# Patient Record
Sex: MALE | Race: WHITE | NOT HISPANIC OR LATINO | Employment: OTHER | URBAN - METROPOLITAN AREA
[De-identification: names, ages, dates, MRNs, and addresses within clinical notes are randomized per-mention and may not be internally consistent; named-entity substitution may affect disease eponyms.]

---

## 2018-07-06 ENCOUNTER — ANTICOAG VISIT (OUTPATIENT)
Dept: CARDIOLOGY CLINIC | Facility: CLINIC | Age: 83
End: 2018-07-06

## 2018-07-06 LAB — INR PPP: 4.5 (ref 0.86–1.17)

## 2018-07-18 LAB — INR PPP: 3.3 (ref 0.86–1.17)

## 2018-07-19 ENCOUNTER — ANTICOAG VISIT (OUTPATIENT)
Dept: CARDIOLOGY CLINIC | Facility: CLINIC | Age: 83
End: 2018-07-19

## 2018-08-01 ENCOUNTER — ANTICOAG VISIT (OUTPATIENT)
Dept: CARDIOLOGY CLINIC | Facility: CLINIC | Age: 83
End: 2018-08-01

## 2018-08-01 LAB — INR PPP: 3.3 (ref 0.86–1.17)

## 2018-08-03 DIAGNOSIS — I10 ESSENTIAL HYPERTENSION: ICD-10-CM

## 2018-08-03 DIAGNOSIS — I48.0 PAF (PAROXYSMAL ATRIAL FIBRILLATION) (HCC): Primary | ICD-10-CM

## 2018-08-03 RX ORDER — WARFARIN SODIUM 2 MG/1
TABLET ORAL
COMMUNITY
Start: 2017-12-19 | End: 2018-08-03 | Stop reason: SDUPTHER

## 2018-08-03 RX ORDER — LISINOPRIL 5 MG/1
TABLET ORAL
COMMUNITY
Start: 2018-04-10 | End: 2018-08-03 | Stop reason: SDUPTHER

## 2018-08-06 RX ORDER — LISINOPRIL 5 MG/1
5 TABLET ORAL DAILY
Qty: 90 TABLET | Refills: 3 | Status: SHIPPED | OUTPATIENT
Start: 2018-08-06 | End: 2018-12-10 | Stop reason: SDUPTHER

## 2018-08-06 RX ORDER — WARFARIN SODIUM 2 MG/1
TABLET ORAL
Qty: 105 TABLET | Refills: 3 | Status: SHIPPED | OUTPATIENT
Start: 2018-08-06 | End: 2018-12-10 | Stop reason: SDUPTHER

## 2018-08-16 ENCOUNTER — ANTICOAG VISIT (OUTPATIENT)
Dept: CARDIOLOGY CLINIC | Facility: CLINIC | Age: 83
End: 2018-08-16

## 2018-08-16 LAB — INR PPP: 2.7 (ref 0.86–1.17)

## 2018-09-06 ENCOUNTER — ANTICOAG VISIT (OUTPATIENT)
Dept: CARDIOLOGY CLINIC | Facility: CLINIC | Age: 83
End: 2018-09-06

## 2018-09-06 DIAGNOSIS — E87.8 ELECTROLYTE IMBALANCE: Primary | ICD-10-CM

## 2018-09-06 DIAGNOSIS — E78.5 HYPERLIPIDEMIA, UNSPECIFIED HYPERLIPIDEMIA TYPE: ICD-10-CM

## 2018-09-06 DIAGNOSIS — K92.2 GASTROINTESTINAL HEMORRHAGE, UNSPECIFIED GASTROINTESTINAL HEMORRHAGE TYPE: ICD-10-CM

## 2018-09-06 LAB — INR PPP: 2.4 (ref 0.86–1.17)

## 2018-10-03 ENCOUNTER — ANTICOAG VISIT (OUTPATIENT)
Dept: CARDIOLOGY CLINIC | Facility: CLINIC | Age: 83
End: 2018-10-03

## 2018-10-03 LAB
INR PPP: 2 (ref 0.86–1.17)
INR PPP: 2 (ref 0.86–1.17)

## 2018-10-04 ENCOUNTER — ANTICOAG VISIT (OUTPATIENT)
Dept: CARDIOLOGY CLINIC | Facility: CLINIC | Age: 83
End: 2018-10-04

## 2018-10-09 ENCOUNTER — OFFICE VISIT (OUTPATIENT)
Dept: CARDIOLOGY CLINIC | Facility: CLINIC | Age: 83
End: 2018-10-09
Payer: COMMERCIAL

## 2018-10-09 ENCOUNTER — IN-CLINIC DEVICE VISIT (OUTPATIENT)
Dept: CARDIOLOGY CLINIC | Facility: CLINIC | Age: 83
End: 2018-10-09
Payer: COMMERCIAL

## 2018-10-09 VITALS
BODY MASS INDEX: 27.85 KG/M2 | DIASTOLIC BLOOD PRESSURE: 60 MMHG | WEIGHT: 157.2 LBS | OXYGEN SATURATION: 92 % | HEIGHT: 63 IN | HEART RATE: 61 BPM | SYSTOLIC BLOOD PRESSURE: 100 MMHG

## 2018-10-09 DIAGNOSIS — I10 ESSENTIAL HYPERTENSION: ICD-10-CM

## 2018-10-09 DIAGNOSIS — I25.2 OLD MI (MYOCARDIAL INFARCTION): ICD-10-CM

## 2018-10-09 DIAGNOSIS — I47.2 VENTRICULAR TACHYCARDIA (HCC): ICD-10-CM

## 2018-10-09 DIAGNOSIS — Z98.61 STATUS POST PERCUTANEOUS TRANSLUMINAL CORONARY ANGIOPLASTY: ICD-10-CM

## 2018-10-09 DIAGNOSIS — Z95.810 PRESENCE OF AUTOMATIC CARDIOVERTER/DEFIBRILLATOR (AICD): ICD-10-CM

## 2018-10-09 DIAGNOSIS — I25.5 ISCHEMIC CARDIOMYOPATHY: ICD-10-CM

## 2018-10-09 DIAGNOSIS — N18.30 STAGE 3 CHRONIC KIDNEY DISEASE (HCC): ICD-10-CM

## 2018-10-09 DIAGNOSIS — E78.2 MIXED HYPERLIPIDEMIA: ICD-10-CM

## 2018-10-09 DIAGNOSIS — Z95.810 CARDIAC DEFIBRILLATOR IN SITU: ICD-10-CM

## 2018-10-09 DIAGNOSIS — I48.0 PAROXYSMAL ATRIAL FIBRILLATION (HCC): ICD-10-CM

## 2018-10-09 DIAGNOSIS — I63.40 CEREBROVASCULAR ACCIDENT (CVA) DUE TO EMBOLISM OF CEREBRAL ARTERY (HCC): ICD-10-CM

## 2018-10-09 DIAGNOSIS — I25.10 CORONARY ARTERY DISEASE INVOLVING NATIVE HEART WITHOUT ANGINA PECTORIS, UNSPECIFIED VESSEL OR LESION TYPE: Primary | ICD-10-CM

## 2018-10-09 DIAGNOSIS — I47.2 VENTRICULAR TACHYCARDIA (HCC): Primary | ICD-10-CM

## 2018-10-09 PROCEDURE — 99214 OFFICE O/P EST MOD 30 MIN: CPT | Performed by: INTERNAL MEDICINE

## 2018-10-09 PROCEDURE — 93000 ELECTROCARDIOGRAM COMPLETE: CPT | Performed by: INTERNAL MEDICINE

## 2018-10-09 PROCEDURE — 93283 PRGRMG EVAL IMPLANTABLE DFB: CPT | Performed by: INTERNAL MEDICINE

## 2018-10-09 RX ORDER — CARVEDILOL 25 MG/1
25 TABLET ORAL
COMMUNITY
Start: 2016-04-17 | End: 2018-12-10 | Stop reason: SDUPTHER

## 2018-10-09 RX ORDER — SPIRONOLACTONE 25 MG/1
12.5 TABLET ORAL
COMMUNITY
Start: 2016-04-17 | End: 2018-12-10 | Stop reason: SDUPTHER

## 2018-10-09 RX ORDER — FERROUS SULFATE 325(65) MG
1 TABLET ORAL
COMMUNITY
End: 2019-12-09 | Stop reason: ALTCHOICE

## 2018-10-09 RX ORDER — ATORVASTATIN CALCIUM 40 MG/1
40 TABLET, FILM COATED ORAL
COMMUNITY
Start: 2016-04-17 | End: 2018-12-10 | Stop reason: SDUPTHER

## 2018-10-09 NOTE — PROGRESS NOTES
Results for orders placed or performed in visit on 10/09/18   Cardiac EP device report    Narrative    MDT DUAL ICD  DEVICE INTERROGATED IN THE [facility] HEART OFFICE BY DEVICE COMPANY REP  BATTERY VOLTAGE ADEQUATE (2 61 V JEAN 2 63 V HAS NOT TRIGERED JEAN YET~ WILL DO MONTHLY REMOTES)  ALL LEAD PARAMETERS WITHIN NORMAL LIMITS   1 VT EPISODE W/ EGRAM SHOWING VT @ 214 BPM W/ ATP X 2 UNSUCCESSFUL, SHOCK X 1 SUCCESSFUL FOR VT   2 VT-NS EPISODES W/ EGRAMS SHOWING THE VT EPISODE AS PER ABOVE   NO OTHER SIGNIFICANT HIGH RATE EPISODES  NO PROGRAMMING CHANGES MADE TO DEVICE PARAMETERS  PT TAKES WARFARIN, LISINOPRIL  NO ECHO IN EPIC  APPROPRIATELY FUNCTIONING ICD    [address]

## 2018-10-09 NOTE — PROGRESS NOTES
Cardiology Follow Up    Kelli Ozuna  10/31/1932  [id number]  [address] [facility] [facility]  [address] [place] [id number] [phone]    1  Coronary artery disease involving native heart without angina pectoris, unspecified vessel or lesion type  POCT ECG    NM myocardial perfusion spect (stress and/or rest)    Echo complete with contrast if indicated   2  Ischemic cardiomyopathy  NM myocardial perfusion spect (stress and/or rest)    Echo complete with contrast if indicated   3  Ventricular tachycardia (HCC)  NM myocardial perfusion spect (stress and/or rest)    Echo complete with contrast if indicated   4  Paroxysmal atrial fibrillation (HCC)     5  Cerebrovascular accident (CVA) due to embolism of cerebral artery (HCC)     6  Cardiac defibrillator in situ     7  Status post percutaneous transluminal coronary angioplasty     8  Stage 3 chronic kidney disease ([facility] )     9  Essential hypertension     10  Mixed hyperlipidemia     11  Old MI (myocardial infarction)  NM myocardial perfusion spect (stress and/or rest)    Echo complete with contrast if indicated       Patient was 1st seen May 23, 2016  He had moved from [place] and was seeking to establish cardiology care  In 1989, he had a myocardial infarction treated by PTCA resulting in an ischemic cardiomyopathy with an ejection fraction of 35%  He has a Medtronic ICD which was placed on May 9th, 2011 in [place]  He is on chronic oral anticoagulation  He has a history of paroxysmal atrial fibrillation with a TIA/CVA in the past   Prior history includes myocardial infarction, ventricular tachycardia, TIA/CVA, GI bleed, hypertension and hyperlipidemia     05/27/2016 echocardiogram:  Severe LV systolic dysfunction, EF 96%  Distal anterior apical transmural myocardial infarction with akinesis to paradoxical motion  Grade 1 diastolic dysfunction    Mildly elevated filling pressures  Mild left ventricular enlargement  Mild concentric LVH  Biatrial enlargement  10/04/2017 labs:  Creatinine 1 23, GFR 54, cholesterol 122, triglycerides 77, HDL 51, non HDL cholesterol 71, LDL calculated 56 and direct LDL 68  Interval History:   Patient returns  He denies cardiac symptoms  He has no chest pain, shortness of breath, palpitations, dizziness or lightheadedness, near-syncope/syncope  He brought his blood pressure chart from home along  His blood pressures range from 90 systolic to 849 systolic  One or 2 blood pressures were in the 80s  Patient states that he is completely asymptomatic with these blood pressures  Since last visit he had 1 episode of sustained ventricular tachycardia at a rate of 214 beats per minute  The rhythm was terminated by the defibrillator with a shock  He has no recollection of the event  Maybe he was sleeping and passed out  Then when he awoke he did not realize anything had happened  Patient Active Problem List   Diagnosis    Cardiac defibrillator in situ    Chronic kidney disease    [facility]olic stroke ([facility] [address] )    Hyperlipidemia    Old myocardial infarction    Status post percutaneous transluminal coronary angioplasty    Ventricular tachycardia (HCC)    Ischemic cardiomyopathy    Paroxysmal atrial fibrillation ([facility] )    Essential hypertension     No past medical history on file  Social History     Social History    Marital status: /Civil Union     Spouse name: N/A    Number of children: N/A    Years of education: N/A     Occupational History    Not on file       Social History Main Topics    Smoking status: Never Smoker    Smokeless tobacco: Not on file    Alcohol use Not on file    Drug use: Unknown    Sexual activity: Not on file     Other Topics Concern    Not on file     Social History Narrative    No narrative on file      Family History   Problem Relation Age of Onset    Coronary artery disease Mother     Heart attack Father         MI    Stroke Sister      Past Surgical History:   Procedure Laterality Date    CARDIAC PACEMAKER PLACEMENT  05/09/2011    Medtronic dual chamber ICD implant    CORONARY ANGIOPLASTY  1989       Current Outpatient Prescriptions:     aspirin 81 MG tablet, Take 81 mg by mouth, Disp: , Rfl:     atorvastatin (LIPITOR) 40 mg tablet, Take 40 mg by mouth, Disp: , Rfl:     carvedilol (COREG) 25 mg tablet, Take 25 mg by mouth, Disp: , Rfl:     Cholecalciferol 2000 units CAPS, Take 1 capsule by mouth, Disp: , Rfl:     ferrous sulfate 325 (65 Fe) mg tablet, Take 1 capsule by mouth, Disp: , Rfl:     lisinopril (ZESTRIL) 5 mg tablet, Take 1 tablet (5 mg total) by mouth daily, Disp: 90 tablet, Rfl: 3    spironolactone (ALDACTONE) 25 mg tablet, Take 12 5 mg by mouth, Disp: , Rfl:     warfarin (COUMADIN) 2 mg tablet, Take 1 1/2 tablets 3 times per week & 1 tablet 4 times per week , Disp: 105 tablet, Rfl: 3  No Known Allergies    Results for orders placed or performed in visit on 10/09/18   POCT ECG    Narrative    Normal sinus rhythm at a rate 81 beats per minute  Old anterior septal myocardial infarction with secondary ST T wave abnormalities  10/03/2018 lab testing at [address] [place] [address]  CBC:  Hemoglobin 11 6, hematocrit 33 3, WBCs 5 8, platelets a 084 with normal red cell indices  Chemistry:  Glucose 89, BUN 18, creatinine 1 1, electrolytes normal, calcium 8 6, alkaline phosphatase 35, albumin 3 2, total protein 6 0, bilirubin 0 6, AST 15, ALT 18, GFR 61  Lipid profile: Total cholesterol 135, triglycerides 89, HDL 54, non HDL cholesterol 81, calculated LDL 63  Review of Systems:  Review of Systems   Constitutional: Negative  HENT: Negative  Eyes: Negative  Respiratory: Negative for chest tightness and shortness of breath  Cardiovascular: Negative for chest pain, palpitations and leg swelling  Gastrointestinal: Negative      Endocrine: Negative  Musculoskeletal: Negative  Skin: Negative  Allergic/Immunologic: Negative  Neurological: Negative  Hematological: Negative  Psychiatric/Behavioral: Negative  Physical Exam:  /60   Pulse 61   Ht 5' 3" (1 6 m)   Wt 71 3 kg (157 lb 3 2 oz)   SpO2 92%   BMI 27 85 kg/m²   Physical Exam   Constitutional: He is oriented to person, place, and time  He appears well-developed and well-nourished  HENT:   Head: Normocephalic and atraumatic  Neck: Normal range of motion  Neck supple  No JVD present  No tracheal deviation present  Cardiovascular: Normal rate, regular rhythm, normal heart sounds and intact distal pulses  Pulmonary/Chest: Effort normal and breath sounds normal  No respiratory distress  He has no wheezes  He has no rales  Abdominal: Soft  Bowel sounds are normal    Musculoskeletal: He exhibits no edema  Neurological: He is alert and oriented to person, place, and time  Skin: Skin is warm and dry  Psychiatric: He has a normal mood and affect  His behavior is normal        Discussion/Summary:  1  Sustained ventricular tachycardia requiring a defibrillator shock to terminate  Rate 214 beats per minute  2   Ischemic cardiomyopathy, old anterior septal myocardial infarction  3  Paroxysmal atrial fibrillation, none recently  4  Hyperlipidemia well controlled    Discussion and plan:  Patient has not had an episode of ventricular tachycardia since just after defibrillator implantation  A his myocardial infarction is from [facility]  He has no evaluation for ischemia or progression of coronary artery disease in the recent past     1   Nuclear stress test  2   Echocardiogram  3  Return in 3 months with device check

## 2018-10-24 ENCOUNTER — HOSPITAL ENCOUNTER (OUTPATIENT)
Dept: NON INVASIVE DIAGNOSTICS | Facility: HOSPITAL | Age: 83
Discharge: HOME | End: 2018-10-24
Payer: COMMERCIAL

## 2018-10-24 ENCOUNTER — HOSPITAL ENCOUNTER (OUTPATIENT)
Dept: RADIOLOGY | Facility: HOSPITAL | Age: 83
Discharge: HOME | End: 2018-10-24
Payer: COMMERCIAL

## 2018-10-24 DIAGNOSIS — I25.2 OLD MI (MYOCARDIAL INFARCTION): ICD-10-CM

## 2018-10-24 DIAGNOSIS — I47.2 VENTRICULAR TACHYCARDIA (HCC): ICD-10-CM

## 2018-10-24 DIAGNOSIS — I25.10 CORONARY ARTERY DISEASE INVOLVING NATIVE HEART WITHOUT ANGINA PECTORIS, UNSPECIFIED VESSEL OR LESION TYPE: ICD-10-CM

## 2018-10-24 DIAGNOSIS — I25.5 ISCHEMIC CARDIOMYOPATHY: ICD-10-CM

## 2018-10-24 PROCEDURE — 93306 TTE W/DOPPLER COMPLETE: CPT

## 2018-10-24 PROCEDURE — 93018 CV STRESS TEST I&R ONLY: CPT | Performed by: INTERNAL MEDICINE

## 2018-10-24 PROCEDURE — 93016 CV STRESS TEST SUPVJ ONLY: CPT | Performed by: INTERNAL MEDICINE

## 2018-10-24 PROCEDURE — A9502 TC99M TETROFOSMIN: HCPCS

## 2018-10-24 PROCEDURE — 93306 TTE W/DOPPLER COMPLETE: CPT | Performed by: INTERNAL MEDICINE

## 2018-10-24 PROCEDURE — 78452 HT MUSCLE IMAGE SPECT MULT: CPT | Performed by: INTERNAL MEDICINE

## 2018-10-24 PROCEDURE — 78452 HT MUSCLE IMAGE SPECT MULT: CPT

## 2018-10-24 PROCEDURE — 93017 CV STRESS TEST TRACING ONLY: CPT

## 2018-10-24 RX ADMIN — REGADENOSON 0.4 MG: 0.08 INJECTION, SOLUTION INTRAVENOUS at 10:30

## 2018-10-25 ENCOUNTER — ANTICOAG VISIT (OUTPATIENT)
Dept: CARDIOLOGY CLINIC | Facility: CLINIC | Age: 83
End: 2018-10-25

## 2018-10-25 LAB — INR PPP: 3.3 (ref 0.86–1.17)

## 2018-10-26 LAB
CHEST PAIN STATEMENT: NORMAL
MAX DIASTOLIC BP: 90 MMHG
MAX HEART RATE: 81 BPM
MAX PREDICTED HEART RATE: 135 BPM
MAX. SYSTOLIC BP: 150 MMHG
PROTOCOL NAME: NORMAL
REASON FOR TERMINATION: NORMAL
TARGET HR FORMULA: NORMAL
TEST INDICATION: NORMAL
TIME IN EXERCISE PHASE: NORMAL

## 2018-11-19 ENCOUNTER — APPOINTMENT (OUTPATIENT)
Dept: LAB | Age: 83
End: 2018-11-19
Payer: COMMERCIAL

## 2018-11-19 ENCOUNTER — ANTICOAG VISIT (OUTPATIENT)
Dept: CARDIOLOGY CLINIC | Facility: CLINIC | Age: 83
End: 2018-11-19

## 2018-11-19 ENCOUNTER — TRANSCRIBE ORDERS (OUTPATIENT)
Dept: ADMINISTRATIVE | Facility: HOSPITAL | Age: 83
End: 2018-11-19

## 2018-11-19 DIAGNOSIS — I42.9 FAMILIAL CARDIOMYOPATHY (HCC): Primary | ICD-10-CM

## 2018-11-19 DIAGNOSIS — I10 ESSENTIAL HYPERTENSION, MALIGNANT: ICD-10-CM

## 2018-11-19 DIAGNOSIS — I48.91 ATRIAL FIBRILLATION, UNSPECIFIED TYPE (HCC): ICD-10-CM

## 2018-11-19 DIAGNOSIS — I42.9 FAMILIAL CARDIOMYOPATHY (HCC): ICD-10-CM

## 2018-11-19 DIAGNOSIS — E78.2 MIXED HYPERLIPIDEMIA: ICD-10-CM

## 2018-11-19 LAB
INR PPP: 2.16 (ref 0.86–1.17)
PROTHROMBIN TIME: 24.2 SECONDS (ref 11.8–14.2)

## 2018-11-19 PROCEDURE — 85610 PROTHROMBIN TIME: CPT

## 2018-11-19 PROCEDURE — 36415 COLL VENOUS BLD VENIPUNCTURE: CPT

## 2018-11-26 ENCOUNTER — TELEPHONE (OUTPATIENT)
Dept: CARDIOLOGY CLINIC | Facility: CLINIC | Age: 83
End: 2018-11-26

## 2018-11-26 NOTE — TELEPHONE ENCOUNTER
Pt called stating his device alarm sounded   told him I would notify device clinic and they will call him   he states his device did not fire

## 2018-11-26 NOTE — TELEPHONE ENCOUNTER
Patient called as requested  Patient alert tones for ERT battery voltage began yesterday  No home remote monitor to confirm  Denies all cardiac s/sxs and/or ICD shock  Offered to see patient at [facility] office on 11/27/18 or 1/30/18 device clinic to deactivate alert tone & confirm ERT  Pt declines at this time and  will just await call back from Dr Natalie Ramsey office for further direction on arranging ICD generator revision in near future

## 2018-11-29 NOTE — TELEPHONE ENCOUNTER
Per Dr Lalo Henley please arrange device check for the patient   spoke with patient who is willing to do this because Dr Nenita Wiley agreed  Thank you  Please call patient to make these arrangements

## 2018-12-10 ENCOUNTER — OFFICE VISIT (OUTPATIENT)
Dept: CARDIOLOGY CLINIC | Facility: CLINIC | Age: 83
End: 2018-12-10
Payer: COMMERCIAL

## 2018-12-10 ENCOUNTER — IN-CLINIC DEVICE VISIT (OUTPATIENT)
Dept: CARDIOLOGY CLINIC | Facility: CLINIC | Age: 83
End: 2018-12-10
Payer: COMMERCIAL

## 2018-12-10 ENCOUNTER — APPOINTMENT (OUTPATIENT)
Dept: LAB | Facility: HOSPITAL | Age: 83
End: 2018-12-10
Payer: COMMERCIAL

## 2018-12-10 VITALS
DIASTOLIC BLOOD PRESSURE: 80 MMHG | OXYGEN SATURATION: 98 % | HEART RATE: 64 BPM | HEIGHT: 63 IN | WEIGHT: 158.2 LBS | BODY MASS INDEX: 28.03 KG/M2 | SYSTOLIC BLOOD PRESSURE: 110 MMHG

## 2018-12-10 DIAGNOSIS — Z95.810 CARDIAC DEFIBRILLATOR IN SITU: ICD-10-CM

## 2018-12-10 DIAGNOSIS — I48.0 PAF (PAROXYSMAL ATRIAL FIBRILLATION) (HCC): ICD-10-CM

## 2018-12-10 DIAGNOSIS — I42.9 CARDIOMYOPATHY, UNSPECIFIED TYPE (HCC): Primary | ICD-10-CM

## 2018-12-10 DIAGNOSIS — E78.2 MIXED HYPERLIPIDEMIA: ICD-10-CM

## 2018-12-10 DIAGNOSIS — I10 ESSENTIAL HYPERTENSION: ICD-10-CM

## 2018-12-10 DIAGNOSIS — Z45.02 ELECTIVE REPLACEMENT INDICATED FOR IMPLANTABLE CARDIOVERTER-DEFIBRILLATOR (ICD): ICD-10-CM

## 2018-12-10 DIAGNOSIS — I25.2 OLD MYOCARDIAL INFARCTION: ICD-10-CM

## 2018-12-10 DIAGNOSIS — I25.10 CORONARY ARTERY DISEASE INVOLVING NATIVE HEART WITHOUT ANGINA PECTORIS, UNSPECIFIED VESSEL OR LESION TYPE: Primary | ICD-10-CM

## 2018-12-10 DIAGNOSIS — Z98.61 STATUS POST PERCUTANEOUS TRANSLUMINAL CORONARY ANGIOPLASTY: ICD-10-CM

## 2018-12-10 DIAGNOSIS — Z95.810 PRESENCE OF CARDIOVERTER DEFIBRILLATOR: ICD-10-CM

## 2018-12-10 DIAGNOSIS — I47.2 VENTRICULAR TACHYCARDIA (HCC): ICD-10-CM

## 2018-12-10 DIAGNOSIS — I25.5 ISCHEMIC CARDIOMYOPATHY: Primary | ICD-10-CM

## 2018-12-10 DIAGNOSIS — I63.40 CEREBROVASCULAR ACCIDENT (CVA) DUE TO EMBOLISM OF CEREBRAL ARTERY (HCC): ICD-10-CM

## 2018-12-10 DIAGNOSIS — I25.5 ISCHEMIC CARDIOMYOPATHY: ICD-10-CM

## 2018-12-10 DIAGNOSIS — I48.0 PAROXYSMAL ATRIAL FIBRILLATION (HCC): ICD-10-CM

## 2018-12-10 LAB
ANION GAP SERPL CALCULATED.3IONS-SCNC: 7 MMOL/L (ref 5–14)
BASOPHILS # BLD AUTO: 0.1 THOUSANDS/ΜL (ref 0–0.1)
BASOPHILS NFR BLD AUTO: 1 % (ref 0–1)
BUN SERPL-MCNC: 23 MG/DL (ref 5–25)
CALCIUM SERPL-MCNC: 9.1 MG/DL (ref 8.4–10.2)
CHLORIDE SERPL-SCNC: 102 MMOL/L (ref 97–108)
CO2 SERPL-SCNC: 27 MMOL/L (ref 22–30)
CREAT SERPL-MCNC: 1.17 MG/DL (ref 0.7–1.5)
EOSINOPHIL # BLD AUTO: 0.3 THOUSAND/ΜL (ref 0–0.4)
EOSINOPHIL NFR BLD AUTO: 4 % (ref 0–6)
ERYTHROCYTE [DISTWIDTH] IN BLOOD BY AUTOMATED COUNT: 12.8 %
GFR SERPL CREATININE-BSD FRML MDRD: 56 ML/MIN/1.73SQ M
GLUCOSE SERPL-MCNC: 98 MG/DL (ref 70–99)
HCT VFR BLD AUTO: 40.5 % (ref 41–53)
HGB BLD-MCNC: 13.2 G/DL (ref 13.5–17.5)
INR PPP: 2.62 (ref 0.89–1.1)
LYMPHOCYTES # BLD AUTO: 1.2 THOUSANDS/ΜL (ref 0.5–4)
LYMPHOCYTES NFR BLD AUTO: 20 % (ref 25–45)
MCH RBC QN AUTO: 33.2 PG (ref 26.8–34.3)
MCHC RBC AUTO-ENTMCNC: 32.7 G/DL (ref 31.4–37.4)
MCV RBC AUTO: 102 FL (ref 80–100)
MONOCYTES # BLD AUTO: 0.6 THOUSAND/ΜL (ref 0.2–0.9)
MONOCYTES NFR BLD AUTO: 10 % (ref 1–10)
NEUTROPHILS # BLD AUTO: 4.1 THOUSANDS/ΜL (ref 1.8–7.8)
NEUTS SEG NFR BLD AUTO: 65 % (ref 45–65)
PLATELET # BLD AUTO: 165 THOUSANDS/UL (ref 150–450)
PLATELET BLD QL SMEAR: ADEQUATE
PMV BLD AUTO: 8.7 FL (ref 8.9–12.7)
POTASSIUM SERPL-SCNC: 4.2 MMOL/L (ref 3.6–5)
PROTHROMBIN TIME: 26.3 SECONDS (ref 9.5–11.6)
RBC # BLD AUTO: 3.99 MILLION/UL (ref 4.5–5.9)
RBC MORPH BLD: NORMAL
SODIUM SERPL-SCNC: 136 MMOL/L (ref 137–147)
WBC # BLD AUTO: 6.3 THOUSAND/UL (ref 4.31–10.16)

## 2018-12-10 PROCEDURE — 85610 PROTHROMBIN TIME: CPT

## 2018-12-10 PROCEDURE — 93289 INTERROG DEVICE EVAL HEART: CPT | Performed by: INTERNAL MEDICINE

## 2018-12-10 PROCEDURE — 80048 BASIC METABOLIC PNL TOTAL CA: CPT

## 2018-12-10 PROCEDURE — 93000 ELECTROCARDIOGRAM COMPLETE: CPT | Performed by: INTERNAL MEDICINE

## 2018-12-10 PROCEDURE — 85025 COMPLETE CBC W/AUTO DIFF WBC: CPT

## 2018-12-10 PROCEDURE — 36415 COLL VENOUS BLD VENIPUNCTURE: CPT

## 2018-12-10 PROCEDURE — 99214 OFFICE O/P EST MOD 30 MIN: CPT | Performed by: INTERNAL MEDICINE

## 2018-12-10 RX ORDER — ATORVASTATIN CALCIUM 40 MG/1
40 TABLET, FILM COATED ORAL DAILY
Qty: 90 TABLET | Refills: 3 | Status: SHIPPED | OUTPATIENT
Start: 2018-12-10 | End: 2019-12-09 | Stop reason: SDUPTHER

## 2018-12-10 RX ORDER — LISINOPRIL 5 MG/1
5 TABLET ORAL DAILY
Qty: 90 TABLET | Refills: 3 | Status: SHIPPED | OUTPATIENT
Start: 2018-12-10 | End: 2018-12-20 | Stop reason: SDUPTHER

## 2018-12-10 RX ORDER — SPIRONOLACTONE 25 MG/1
12.5 TABLET ORAL DAILY
Qty: 45 TABLET | Refills: 3 | Status: SHIPPED | OUTPATIENT
Start: 2018-12-10 | End: 2019-12-09 | Stop reason: SDUPTHER

## 2018-12-10 RX ORDER — WARFARIN SODIUM 2 MG/1
TABLET ORAL
Qty: 115 TABLET | Refills: 1 | Status: SHIPPED | OUTPATIENT
Start: 2018-12-10 | End: 2019-05-15 | Stop reason: SDUPTHER

## 2018-12-10 RX ORDER — CARVEDILOL 25 MG/1
25 TABLET ORAL 2 TIMES DAILY WITH MEALS
Qty: 180 TABLET | Refills: 3 | Status: SHIPPED | OUTPATIENT
Start: 2018-12-10 | End: 2019-12-09 | Stop reason: DRUGHIGH

## 2018-12-10 NOTE — PROGRESS NOTES
Cardiology Follow Up    Kelli Ozuna  10/31/1932  [id number]  [address] [facility] [facility]  [address], [facility] [id number] [phone]    1  Coronary artery disease involving native heart without angina pectoris, unspecified vessel or lesion type  POCT ECG   2  Ischemic cardiomyopathy     3  Old myocardial infarction     4  Status post percutaneous transluminal coronary angioplasty     5  Cardiac defibrillator in situ     6  Cerebrovascular accident (CVA) due to embolism of cerebral artery ([facility] [address] )     7  PAF (paroxysmal atrial fibrillation) ([facility] [address] )     8  Mixed hyperlipidemia     9  Ventricular tachycardia ([facility]     10  Essential hypertension         Patient was 1st seen May 23, 2016  He had moved from [place] and was seeking to establish cardiology care  In 1989, he had a myocardial infarction treated by PTCA resulting in an ischemic cardiomyopathy with an ejection fraction of 35%  He has a Medtronic ICD which was placed on May 9th, 2011 in [place]  He is on chronic oral anticoagulation  He has a history of paroxysmal atrial fibrillation with a TIA/CVA in the past   Prior history includes myocardial infarction, ventricular tachycardia, TIA/CVA, GI bleed, hypertension and hyperlipidemia     05/27/2016 echocardiogram:  Severe LV systolic dysfunction, EF 23%  Distal anterior apical transmural myocardial infarction with akinesis to paradoxical motion  Grade 1 diastolic dysfunction  Mildly elevated filling pressures  Mild left ventricular enlargement  Mild concentric LVH  Biatrial enlargement  10/04/2017 labs:  Creatinine 1 23, GFR 54, cholesterol 122, triglycerides 77, HDL 51, non HDL cholesterol 71, LDL calculated 56 and direct LDL 68  Interval History:   Patient is AICD started to beep in the morning  Today he had it interrogated and it has reached the point of replacement    In addition interrogation demonstrated that both leads were functioning normally  There was 1 episode atrial fibrillation which lasted for 8 hours and 22 seconds and spontaneously terminated  The device used 67 episodes of ATP to try to terminate atrial fibrillation but was unsuccessful  The beeping on the ICD was disabled  Patient denies chest pain, shortness of breath, palpitations, dizziness/lightheadedness/near-syncope or syncope  Patient Active Problem List   Diagnosis    Cardiac defibrillator in situ    Chronic kidney disease    [facility]olic stroke ([facility]    Hyperlipidemia    Old myocardial infarction    Status post percutaneous transluminal coronary angioplasty    Ventricular tachycardia (HCC)    Ischemic cardiomyopathy    PAF (paroxysmal atrial fibrillation) ([facility] )    Essential hypertension     No past medical history on file  Social History     Social History    Marital status: /Civil Union     Spouse name: N/A    Number of children: N/A    Years of education: N/A     Occupational History    Not on file       Social History Main Topics    Smoking status: Never Smoker    Smokeless tobacco: Not on file    Alcohol use Not on file    Drug use: Unknown    Sexual activity: Not on file     Other Topics Concern    Not on file     Social History Narrative    No narrative on file      Family History   Problem Relation Age of Onset    Coronary artery disease Mother     Heart attack Father         MI    Stroke Sister      Past Surgical History:   Procedure Laterality Date    CARDIAC PACEMAKER PLACEMENT  05/09/2011    Medtronic dual chamber ICD implant    CORONARY ANGIOPLASTY  1989       Current Outpatient Prescriptions:     aspirin 81 MG tablet, Take 81 mg by mouth, Disp: , Rfl:     atorvastatin (LIPITOR) 40 mg tablet, Take 40 mg by mouth, Disp: , Rfl:     carvedilol (COREG) 25 mg tablet, Take 25 mg by mouth  , Disp: , Rfl:     Cholecalciferol 2000 units CAPS, Take 1 capsule by mouth, Disp: , Rfl:     ferrous sulfate 325 (65 Fe) mg tablet, Take 1 capsule by mouth, Disp: , Rfl:     lisinopril (ZESTRIL) 5 mg tablet, Take 1 tablet (5 mg total) by mouth daily, Disp: 90 tablet, Rfl: 3    spironolactone (ALDACTONE) 25 mg tablet, Take 12 5 mg by mouth, Disp: , Rfl:     warfarin (COUMADIN) 2 mg tablet, Take 1 1/2 tablets 3 times per week & 1 tablet 4 times per week , Disp: 105 tablet, Rfl: 3  No Known Allergies    10/24/2018 nuclear stress test:  IMPRESSIONS: 1  Abnormal myocardial perfusion scan  2  Evidence of extensive, severe intensity fixed perfusion abnormalities without evidence of ischemia  3  Dilated left ventricular cavity with severely reduced left ventricular systolic function  Ejection fraction calculated as 29%  Diagnostic sensitivity was limited by submaximal stress  10/24/2018 echocardiogram:  IMPRESSIONS:  Borderline dilated left ventricular cavity with some thinning of the apex and anterior and anteroseptal walls, severely reduced left ventricular systolic function with akinesis of the distal anterior, anteroseptal and apical wall and  hypokinesis of other regions  Ejection fraction is estimated as around 25-30%  There is grade 1 diastolic dysfunction  Mild left atrial cavity enlargement  Aortic valve sclerosis, no aortic stenosis or regurgitation  Mitral annular calcification, trace mitral valve regurgitation  Mild tricuspid valve regurgitation  No obvious pulmonary hypertension  No pericardial effusion  Review of Systems:  Review of Systems   Constitutional: Negative for activity change  Respiratory: Negative for cough, chest tightness, shortness of breath and wheezing  Cardiovascular: Negative for chest pain, palpitations and leg swelling  Musculoskeletal: Negative for gait problem  Skin: Negative for color change  Neurological: Negative for dizziness, tremors, syncope, weakness, light-headedness and headaches     Psychiatric/Behavioral: Negative for agitation and confusion  Physical Exam:  /80   Pulse 64   Ht 5' 3" (1 6 m)   Wt 71 8 kg (158 lb 3 2 oz)   SpO2 98%   BMI 28 02 kg/m²   Physical Exam   Constitutional: He is oriented to person, place, and time  He appears well-developed and well-nourished  No distress  HENT:   Head: Normocephalic and atraumatic  Neck: No JVD present  Cardiovascular: Normal rate, regular rhythm, normal heart sounds and intact distal pulses  Exam reveals no gallop and no friction rub  No murmur heard  Pulmonary/Chest: Effort normal and breath sounds normal  No respiratory distress  He has no wheezes  He has no rales  He exhibits no tenderness  Abdominal: Soft  Bowel sounds are normal  He exhibits no distension  Musculoskeletal: He exhibits no edema  Neurological: He is alert and oriented to person, place, and time  Skin: Skin is warm and dry  Psychiatric: He has a normal mood and affect  His behavior is normal        Discussion/Summary:  1  Patient not to take any more warfarin at the present time  2  Patient to have a stat INR/PTT today  3  When INR is 2 0 or less will admit for generator change  4    Would not want patient's INR to drop too low since he has had an embolic CVA with atrial fibrillation in the past

## 2018-12-10 NOTE — PROGRESS NOTES
Results for orders placed or performed in visit on 12/10/18   Cardiac EP device report    Narrative    MDT DUAL ICD  MDT DUAL CHAMBER ICD DEVICE INTERROGATED IN THE [place] OFFICE  BATTERY VOLTAGE @ RRT (11/25/18) - 2 62v/RRT- 2 63v  AP - 67%  1 1%  ALL LEAD PARAMETERS TEST WITHIN NORMAL LIMITS & STABLE  1 AHR EPISODE - DURATION @ 8:22 MINS WITH EGRM SHOWING PAF WITH AVG V RATE @ 64 BPM  EPISODE TREATED WITH 67 UNSUCCESSFUL ATP SCHEMES  HX:PAF & PT ON ASA 81, WARFARIN, CARVEDILOL  ERT PATIENT ALERT TONES DISABLED  NO OTHER PROGRAMMING CHANGES MADE TO DEVICE PARAMETERS  PT SEEN IN OFFICE TODAY BY DR Kary Ramírez FOR H&P   APPROPRIATELY FUNCTIONING ICD @ ERT BATTERY STATUS    eb

## 2018-12-11 ENCOUNTER — TELEPHONE (OUTPATIENT)
Dept: SURGERY | Facility: HOSPITAL | Age: 83
End: 2018-12-11

## 2018-12-11 ENCOUNTER — ANTICOAG VISIT (OUTPATIENT)
Dept: CARDIOLOGY CLINIC | Facility: CLINIC | Age: 83
End: 2018-12-11

## 2018-12-11 DIAGNOSIS — I42.9 CARDIOMYOPATHY (HCC): Primary | ICD-10-CM

## 2018-12-11 RX ORDER — SODIUM CHLORIDE 9 MG/ML
50 INJECTION, SOLUTION INTRAVENOUS CONTINUOUS
Status: CANCELLED | OUTPATIENT
Start: 2018-12-12

## 2018-12-11 NOTE — PROGRESS NOTES
ICD generator change scheduled for 12/12/2018 at 12:30 pm  Spoke to cath lab  Will need to check coags prior to procedure

## 2018-12-12 ENCOUNTER — ANESTHESIA (OUTPATIENT)
Dept: INTERVENTIONAL RADIOLOGY/VASCULAR | Facility: HOSPITAL | Age: 83
End: 2018-12-12

## 2018-12-12 ENCOUNTER — ANESTHESIA EVENT (OUTPATIENT)
Dept: INTERVENTIONAL RADIOLOGY/VASCULAR | Facility: HOSPITAL | Age: 83
End: 2018-12-12

## 2018-12-12 ENCOUNTER — HOSPITAL ENCOUNTER (OUTPATIENT)
Dept: INTERVENTIONAL RADIOLOGY/VASCULAR | Facility: HOSPITAL | Age: 83
Discharge: HOME | End: 2018-12-12
Attending: INTERNAL MEDICINE
Payer: COMMERCIAL

## 2018-12-12 ENCOUNTER — TELEPHONE (OUTPATIENT)
Dept: SURGERY | Facility: HOSPITAL | Age: 83
End: 2018-12-12

## 2018-12-12 VITALS
RESPIRATION RATE: 14 BRPM | HEART RATE: 60 BPM | DIASTOLIC BLOOD PRESSURE: 64 MMHG | OXYGEN SATURATION: 97 % | TEMPERATURE: 96.5 F | SYSTOLIC BLOOD PRESSURE: 110 MMHG

## 2018-12-12 DIAGNOSIS — I42.9 CARDIOMYOPATHY (HCC): ICD-10-CM

## 2018-12-12 DIAGNOSIS — I25.5 ISCHEMIC CARDIOMYOPATHY: Primary | ICD-10-CM

## 2018-12-12 LAB
INR PPP: 1.73 (ref 0.89–1.1)
PROTHROMBIN TIME: 17.8 SECONDS (ref 9.5–11.6)

## 2018-12-12 PROCEDURE — C1721 AICD, DUAL CHAMBER: HCPCS

## 2018-12-12 PROCEDURE — 33263 RMVL & RPLCMT DFB GEN 2 LEAD: CPT | Performed by: INTERNAL MEDICINE

## 2018-12-12 PROCEDURE — 85610 PROTHROMBIN TIME: CPT | Performed by: INTERNAL MEDICINE

## 2018-12-12 PROCEDURE — 33263 RMVL & RPLCMT DFB GEN 2 LEAD: CPT

## 2018-12-12 RX ORDER — FENTANYL CITRATE 50 UG/ML
INJECTION, SOLUTION INTRAMUSCULAR; INTRAVENOUS AS NEEDED
Status: DISCONTINUED | OUTPATIENT
Start: 2018-12-12 | End: 2018-12-12 | Stop reason: SURG

## 2018-12-12 RX ORDER — PROPOFOL 10 MG/ML
INJECTION, EMULSION INTRAVENOUS CONTINUOUS PRN
Status: DISCONTINUED | OUTPATIENT
Start: 2018-12-12 | End: 2018-12-12 | Stop reason: SURG

## 2018-12-12 RX ORDER — CEFAZOLIN SODIUM 2 G/50ML
SOLUTION INTRAVENOUS AS NEEDED
Status: DISCONTINUED | OUTPATIENT
Start: 2018-12-12 | End: 2018-12-12 | Stop reason: SURG

## 2018-12-12 RX ORDER — MIDAZOLAM HYDROCHLORIDE 1 MG/ML
INJECTION INTRAMUSCULAR; INTRAVENOUS AS NEEDED
Status: DISCONTINUED | OUTPATIENT
Start: 2018-12-12 | End: 2018-12-12 | Stop reason: SURG

## 2018-12-12 RX ORDER — SODIUM CHLORIDE 9 MG/ML
50 INJECTION, SOLUTION INTRAVENOUS CONTINUOUS
Status: DISCONTINUED | OUTPATIENT
Start: 2018-12-13 | End: 2018-12-16 | Stop reason: HOSPADM

## 2018-12-12 RX ORDER — CEPHALEXIN 500 MG/1
500 CAPSULE ORAL EVERY 12 HOURS SCHEDULED
Qty: 10 CAPSULE | Refills: 0 | Status: SHIPPED | OUTPATIENT
Start: 2018-12-12 | End: 2018-12-17

## 2018-12-12 RX ORDER — LIDOCAINE HYDROCHLORIDE 10 MG/ML
INJECTION, SOLUTION INFILTRATION; PERINEURAL CODE/TRAUMA/SEDATION MEDICATION
Status: COMPLETED | OUTPATIENT
Start: 2018-12-12 | End: 2018-12-12

## 2018-12-12 RX ORDER — EPHEDRINE SULFATE 50 MG/ML
INJECTION, SOLUTION INTRAVENOUS AS NEEDED
Status: DISCONTINUED | OUTPATIENT
Start: 2018-12-12 | End: 2018-12-12 | Stop reason: SURG

## 2018-12-12 RX ADMIN — MIDAZOLAM HYDROCHLORIDE 1 MG: 1 INJECTION, SOLUTION INTRAMUSCULAR; INTRAVENOUS at 14:00

## 2018-12-12 RX ADMIN — LIDOCAINE HYDROCHLORIDE 7 ML: 10 INJECTION, SOLUTION INFILTRATION; PERINEURAL at 14:16

## 2018-12-12 RX ADMIN — EPHEDRINE SULFATE 10 MG: 50 INJECTION INTRAMUSCULAR; INTRAVENOUS; SUBCUTANEOUS at 14:10

## 2018-12-12 RX ADMIN — FENTANYL CITRATE 50 MCG: 50 INJECTION INTRAMUSCULAR; INTRAVENOUS at 13:55

## 2018-12-12 RX ADMIN — SODIUM CHLORIDE 50 ML/HR: 0.9 INJECTION, SOLUTION INTRAVENOUS at 13:24

## 2018-12-12 RX ADMIN — SODIUM CHLORIDE: 0.9 INJECTION, SOLUTION INTRAVENOUS at 13:41

## 2018-12-12 RX ADMIN — PROPOFOL 100 MCG/KG/MIN: 10 INJECTION, EMULSION INTRAVENOUS at 13:55

## 2018-12-12 RX ADMIN — MIDAZOLAM HYDROCHLORIDE 1 MG: 1 INJECTION, SOLUTION INTRAMUSCULAR; INTRAVENOUS at 13:55

## 2018-12-12 RX ADMIN — CEFAZOLIN SODIUM 2000 MG: 2 SOLUTION INTRAVENOUS at 14:00

## 2018-12-12 NOTE — H&P
[facility]    [address] [facility] [address]  [address]  Phone#  [phone]  Fax#  [phone]  *-*-*-*-*-*-*-*-*-*-*-*-*-*-*-*-*-*-*-*-*-*-*-*-*-*-*-*-*-*-*-*-*-*-*-*-*-*-*-*-*-*-*-*-*-*-*-*-*-*-*-*-*-*    ENCOUNTER DATE: 12/12/18 1:56 PM  PATIENT NAME: Reena Manuel   10/31/1932    [id number]  Age: 80 y o  Sex: male  AUTHOR: Malini Cintron MD  PRIMARYCARE PHYSICIAN: Sue Guerra DO  REFERRING PHYSICIAN: Prince Villatoro MD  *-*-*-*-*-*-*-*-*-*-*-*-*-*-*-*-*-*-*-*-*-*-*-*-*-*-*-*-*-*-*-*-*-*-*-*-*-*-*-*-*-*-*-*-*-*-*-*-*-*-*-*-*-*-  REASON FOR REFERRAL:  ICD generator change    *-*-*-*-*-*-*-*-*-*-*-*-*-*-*-*-*-*-*-*-*-*-*-*-*-*-*-*-*-*-*-*-*-*-*-*-*-*-*-*-*-*-*-*-*-*-*-*-*-*-*-*-*-*-  CARDIOLOGY ASSESSMENT & PLAN:  Gentleman with known history of coronary artery disease and ischemic cardiomyopathy with severely reduced left ventricular systolic function with history of ventricular tachycardia episode in the past is now noted to have depleting of the ICD generator battery  He is currently well compensated and his labs are overall stable  INR is slightly subtherapeutic  I have discussed the procedure of ICD generator change with him including its associated risks including but not limited to risk of infection bleeding damage to the existing leads, abnormal heart rhythm, stroke, heart attack or even death  We also discussed possible T that if there is complication he may have to be transferred to another hospital for further care  He understands and has signed the informed consent form  He has no known drug allergies  Will use Ancef 2 g IV for prophylaxis during the procedure  Postprocedure he will be observed and discharged to home at appropriate time with arrangements for follow-up with Dr Prince Villatoro as outpatient      *-*-*-*-*-*-*-*-*-*-*-*-*-*-*-*-*-*-*-*-*-*-*-*-*-*-*-*-*-*-*-*-*-*-*-*-*-*-*-*-*-*-*-*-*-*-*-*-*-*-*-*-*-*-  HISTORY OF PRESENT ILLNESS:  Patient is a pleasant 80year-old gentleman with medical history significant for:  1  Coronary artery disease involving native heart without angina pectoris, unspecified vessel or lesion type    2  Ischemic cardiomyopathy    3  Old myocardial infarction    4  Status post percutaneous transluminal coronary angioplasty    5  Cardiac defibrillator in situ    6  Cerebrovascular accident (CVA) due to embolism of cerebral artery ([facility] [address] )    7  PAF (paroxysmal atrial fibrillation) ([facility] [address] )    8  Mixed hyperlipidemia    9  Ventricular tachycardia ([facility] [address] )    10  Essential hypertension      It has been recently noted that his ICD device has reached the JEAN mode and needs replacement of generator  He has been recently seen by his cardiologist Dr Rivas Segura and has been noted to be doing well otherwise with no significant symptoms  He Has had no recent chest pain shortness of breath, palpitations, dizziness lightheadedness, presyncope or syncope        *-*-*-*-*-*-*-*-*-*-*-*-*-*-*-*-*-*-*-*-*-*-*-*-*-*-*-*-*-*-*-*-*-*-*-*-*-*-*-*-*-*-*-*-*-*-*-*-*-*-*-*-*-*  PAST MEDICAL HISTORY:   Past Medical History:   Diagnosis Date    Anemia     Arthritis     Coronary artery disease     Hyperlipidemia     Hypertension        PAST SURGICAL HISTORY:   Past Surgical History:   Procedure Laterality Date    CARDIAC PACEMAKER PLACEMENT  05/09/2011    Medtronic dual chamber ICD implant    CORONARY ANGIOPLASTY  1989       FAMILY HISTORY:  Family History   Problem Relation Age of Onset    Coronary artery disease Mother     Heart attack Father         MI    Stroke Sister        SOCIAL HISTORY:  [unfilled]  History   Smoking Status    Never Smoker   Smokeless Tobacco    Never Used     History   Alcohol Use    1 8 oz/week    3 Shots of liquor per week     Comment: daily     History   Drug use: Unknown       *-*-*-*-*-*-*-*-*-*-*-*-*-*-*-*-*-*-*-*-*-*-*-*-*-*-*-*-*-*-*-*-*-*-*-*-*-*-*-*-*-*-*-*-*-*-*-*-*-*-*-*-*-*  ALLERGIES:  No Known Allergies  *-*-*-*-*-*-*-*-*-*-*-*-*-*-*-*-*-*-*-*-*-*-*-*-*-*-*-*-*-*-*-*-*-*-*-*-*-*-*-*-*-*-*-*-*-*-*-*-*-*-*-*-*-*  CURRENT OUTPATIENT MEDICATIONS:     Current Outpatient Prescriptions:     aspirin 81 MG tablet, Take 81 mg by mouth, Disp: , Rfl:     atorvastatin (LIPITOR) 40 mg tablet, Take 1 tablet (40 mg total) by mouth daily, Disp: 90 tablet, Rfl: 3    carvedilol (COREG) 25 mg tablet, Take 1 tablet (25 mg total) by mouth 2 (two) times a day with meals, Disp: 180 tablet, Rfl: 3    Cholecalciferol 2000 units CAPS, Take 1 capsule by mouth, Disp: , Rfl:     ferrous sulfate 325 (65 Fe) mg tablet, Take 1 capsule by mouth, Disp: , Rfl:     lisinopril (ZESTRIL) 5 mg tablet, Take 1 tablet (5 mg total) by mouth daily, Disp: 90 tablet, Rfl: 3    spironolactone (ALDACTONE) 25 mg tablet, Take 0 5 tablets (12 5 mg total) by mouth daily, Disp: 45 tablet, Rfl: 3    warfarin (COUMADIN) 2 mg tablet, Take 1 1/2 tablets  Daily or as directed by MD, Disp: 115 tablet, Rfl: 1    Current Facility-Administered Medications:     [START ON 12/13/2018] sodium chloride 0 9 % infusion, 50 mL/hr, Intravenous, Continuous, Jackie Naik MD, Last Rate: 50 mL/hr at 12/12/18 1324, 50 mL/hr at 12/12/18 1324    *-*-*-*-*-*-*-*-*-*-*-*-*-*-*-*-*-*-*-*-*-*-*-*-*-*-*-*-*-*-*-*-*-*-*-*-*-*-*-*-*-*-*-*-*-*-*-*-*-*-*-*-*-*  REVIEW OF SYMPTOMS:    Positive for:  No significant recent symptoms  Negative for: All remaining as reviewed below and in HPI  SYSTEM SYMPTOMS REVIEWED:  General--weight change, fever, night sweats  Respirato      Cardiovascular--chest pain, syncope, dyspnea on exertion, edema, decline in exercise tolerance, claudication   Gastrointestinal--persistent vomiting, diarrhea, abdominal distention, blood in stool   Muscular or skeletal--joint pain or swelling   Neurologic--headaches, syncope, abnormal movement  Hematologic--history of easy bruising and bleeding   Endocrine--thyroid enlargement, heat or cold intolerance, polyuria   Psychiatric--anxiety, depression     *-*-*-*-*-*-*-*-*-*-*-*-*-*-*-*-*-*-*-*-*-*-*-*-*-*-*-*-*-*-*-*-*-*-*-*-*-*-*-*-*-*-*-*-*-*-*-*-*-*-*-*-*-*-  VITAL SIGNS:  Vitals:    12/12/18 1142   BP: 114/69   Pulse: 60   Resp: 14   Temp: (!) 96 9 °F (36 1 °C)   TempSrc: Temporal   SpO2: 98%       *-*-*-*-*-*-*-*-*-*-*-*-*-*-*-*-*-*-*-*-*-*-*-*-*-*-*-*-*-*-*-*-*-*-*-*-*-*-*-*-*-*-*-*-*-*-*-*-*-*-*-*-*-*-  PHYSICAL EXAM:  General Appearance:    Alert, cooperative, no distress, appears stated age, short stature  Head, Eyes, ENT:    No obvious abnormality, moist mucous mebranes  Neck:   Supple, no carotid bruit or JVD   Back:     Symmetric, no curvature  Lungs:     Respirations unlabored  Clear to auscultation bilaterally,    Chest wall:    left precordial ICD device  Heart:    Regular rate and rhythm, S1 and S2 normal, no murmur, rub  or gallop  Abdomen:     Soft, non-tender, No obvious masses, or organomegaly   Extremities:   Extremities normal, no cyanosis or edema    Skin:   Skin color, texture, turgor normal, no rashes or lesions     *-*-*-*-*-*-*-*-*-*-*-*-*-*-*-*-*-*-*-*-*-*-*-*-*-*-*-*-*-*-*-*-*-*-*-*-*-*-*-*-*-*-*-*-*-*-*-*-*-*-*-*-*-*-  LABORATORY DATA:  I have personally reviewed pertinent labs  INR 1 73      Potassium   Date Value Ref Range Status   12/10/2018 4 2 3 6 - 5 0 mmol/L Final     Chloride   Date Value Ref Range Status   12/10/2018 102 97 - 108 mmol/L Final     CO2   Date Value Ref Range Status   12/10/2018 27 22 - 30 mmol/L Final     BUN   Date Value Ref Range Status   12/10/2018 23 5 - 25 mg/dL Final     Creatinine   Date Value Ref Range Status   12/10/2018 1 17 0 70 - 1 50 mg/dL Final     Comment:     Standardized to IDMS reference method     eGFR   Date Value Ref Range Status   12/10/2018 56 (L) >60 ml/min/1 73sq m Final     Calcium   Date Value Ref Range Status   12/10/2018 9 1 8 4 - 10 2 mg/dL Final     WBC   Date Value Ref Range Status   12/10/2018 6 30 4 31 - 10 16 Thousand/uL Final     Hemoglobin   Date Value Ref Range Status   12/10/2018 13 2 (L) 13 5 - 17 5 g/dL Final     Platelets   Date Value Ref Range Status   12/10/2018 165 150 - 450 Thousands/uL Final     INR   Date Value Ref Range Status   2018 1 73 (H) 0 89 - 1 10 Final   12/10/2018 2 62 (H) 0 89 - 1 10 Final     No results found for: CKMB, BNP, DIGOXIN  No results found for: TSH  No results found for: CHOL, HDL, LDL, TRIG   No results found for: HGBA1C  No results found for: Hilario Langton, GRAMSTAIN, URINECX, WOUNDCULT, BODYFLUIDCUL, MRSACULTURE, INFLUAPCR, INFLUBPCR, RSVPCR, LEGIONELLAUR, CDIFFTOXINB    *-*-*-*-*-*-*-*-*-*-*-*-*-*-*-*-*-*-*-*-*-*-*-*-*-*-*-*-*-*-*-*-*-*-*-*-*-*-*-*-*-*-*-*-*-*-*-*-*-*-*-*-*-*-  RADIOLOGY RESULTS:  No results found  *-*-*-*-*-*-*-*-*-*-*-*-*-*-*-*-*-*-*-*-*-*-*-*-*-*-*-*-*-*-*-*-*-*-*-*-*-*-*-*-*-*-*-*-*-*-*-*-*-*-*-*-*-*-  CURRENT ECG:  ATRIAL PACED VENTRICULAR SENSED RHYTHM    ECHOCARDIOGRAM AND OTHER CARDIOLOGY RESULTS:  Results for orders placed during the hospital encounter of 10/24/18   Echo complete with contrast if indicated    Narrative [facility] [address]    Transthoracic Echocardiogram  2D, M-mode, Doppler, and Color Doppler    Study date:  24-Oct-2018    Patient: Almeta Pallas  MR number: [id number]  Account number: [de-identified]  : 31-Oct-1932  Age: 80 years  Gender: Male  Status: Outpatient  Location: [facility]  Height: 63 in  Weight: 157 lb  BP: 100/ 60 mmHg    Indications: Ischemic cardiomyopathy, coronary artery disease      Diagnoses: I25 5 - Ischemic cardiomyopathy    Sonographer:  Josefina Trinidad  Primary Physician:  Kiara Dougherty DO  Referring Physician:  Ashlie Carter MD  Group:  [facility]  Interpreting Physician:  Mahnaz Joseph MD    IMPRESSIONS:  Borderline dilated left ventricular cavity with some thinning of the apex and anterior and anteroseptal walls, severely reduced left ventricular systolic function with akinesis of the distal anterior, anteroseptal and apical wall and  hypokinesis of other regions  Ejection fraction is estimated as around 25-30%  There is grade 1 diastolic dysfunction  Mild left atrial cavity enlargement  Aortic valve sclerosis, no aortic stenosis or regurgitation  Mitral annular calcification, trace mitral valve regurgitation  Mild tricuspid valve regurgitation  No obvious pulmonary hypertension  No pericardial effusion  Compared to previous echocardiogram from May 27, 2016 there is overall no significant change  Previously noted borderline pulmonary hypertension is not currently appreciable  SUMMARY    LEFT VENTRICLE:  Borderline dilated left ventricular cavity, normal wall thickness with thinning of the mid to distal anterior wall apex and anteroseptal walls, severely reduced left ventricular systolic function with akinesis of the mid to distal anterior  wall, anteroseptal wall and apex extending to distal inferolateral walls, hypokinesis of other regions  Ejection fraction is estimated as around 25-30%  Grade 1 diastolic dysfunction  Estimated left atrial pressure is normal     RIGHT VENTRICLE:  Normal right ventricular size and systolic function  Normal estimated right ventricular systolic pressure  LEFT ATRIUM:  Mild left atrial cavity enlargement  RIGHT ATRIUM:  Normal right atrial cavity size  MITRAL VALVE:  Mild mitral annular calcification and leaflet sclerosis, trace mitral valve regurgitation  AORTIC VALVE:  Trace mitral valve regurgitation  TRICUSPID VALVE:  Mild tricuspid valve regurgitation  PULMONIC VALVE:  No significant pulmonic valve regurgitation  AORTA:  Aortic root and proximal ascending aorta are normal in size on 2D imaging      IVC, HEPATIC VEINS:  Inferior vena cava is normal in size and demonstrates appropriate respiratory phasic changes in diameter  PERICARDIUM:  Trace pericardial effusion with some organized material close to the apex, possible thrombus versus anterior fat pad  HISTORY: PRIOR HISTORY: Ventricular tachycardia, atrial fibrillation, cerebral vascular accident, defibrillator, HLD  PROCEDURE: The study was performed in the [facility]  This was a routine study  The transthoracic approach was used  The study included complete 2D imaging, M-mode, complete spectral Doppler, and color Doppler  The heart rate was  70 bpm, at the start of the study  Images were obtained from the parasternal, apical, subcostal, and suprasternal notch acoustic windows  Image quality was adequate  LEFT VENTRICLE: Borderline dilated left ventricular cavity, normal wall thickness with thinning of the mid to distal anterior wall apex and anteroseptal walls, severely reduced left ventricular systolic function with akinesis of the mid to  distal anterior wall, anteroseptal wall and apex extending to distal inferolateral walls, hypokinesis of other regions  Ejection fraction is estimated as around 25-30%  Grade 1 diastolic dysfunction  Estimated left atrial pressure is  normal     RIGHT VENTRICLE: Normal right ventricular size and systolic function  Normal estimated right ventricular systolic pressure  LEFT ATRIUM: Mild left atrial cavity enlargement  RIGHT ATRIUM: Normal right atrial cavity size  MITRAL VALVE: Mild mitral annular calcification and leaflet sclerosis, trace mitral valve regurgitation  AORTIC VALVE: Trace mitral valve regurgitation  TRICUSPID VALVE: Mild tricuspid valve regurgitation  PULMONIC VALVE: No significant pulmonic valve regurgitation  PERICARDIUM: Trace pericardial effusion with some organized material close to the apex, possible thrombus versus anterior fat pad  AORTA: Aortic root and proximal ascending aorta are normal in size on 2D imaging      SYSTEMIC VEINS: IVC: Inferior vena cava is normal in size and demonstrates appropriate respiratory phasic changes in diameter  SYSTEM MEASUREMENT TABLES    2D  %FS: 19 %  Ao Diam: 3 61 cm  EDV(Teich): 144 29 ml  EF(Teich): 38 79 %  ESV(Teich): 88 33 ml  IVSd: 1 04 cm  LA Area: 9 18 cm2  LA Diam: 4 59 cm  LVEDV MOD A4C: 104 87 ml  LVEF MOD A4C: 37 99 %  LVESV MOD A4C: 65 03 ml  LVIDd: 5 45 cm  LVIDs: 4 41 cm  LVLd A4C: 8 58 cm  LVLs A4C: 8 55 cm  LVPWd: 0 95 cm  RA Area: 11 11 cm2  RVIDd: 3 9 cm  SV MOD A4C: 39 83 ml  SV(Teich): 55 96 ml    CW  AV Vmax: 1 01 m/s  AV maxP 08 mmHg    MM  TAPSE: 2 27 cm    PW  E': 0 04 m/s  E/E': 14 25  MV A [place]: 0 93 m/s  MV Dec [place]: 3 38 m/s2  MV DecT: 164 17 ms  MV E [place]: 0 56 m/s  MV E/A Ratio: 0 6  MV PHT: 47 61 ms  MVA By PHT: 4 62 cm2  PRend P 51 mmHg  PRend Vmax: 0 79 m/s  PV Vmax: 0 65 m/s  PV maxP 71 mmHg  TR Vmax: 1 2 m/s  TR maxP 77 mmHg    [employer] Accredited Echocardiography Laboratory    Prepared and electronically signed by    Park Tovar MD  Signed 24-Oct-2018 13:25:41       No results found for this or any previous visit  No results found for this or any previous visit    Results for orders placed during the hospital encounter of 10/24/18   NM myocardial perfusion spect (stress and/or rest)    [facility] [employer]   [facility], [address] [place] [address]    Rest/Stress Gated SPECT Myocardial Perfusion Imaging After Regadenoson    Patient: Pamela Moon  MR number: [id number]  Account number: [de-identified]  : 10/31/1932  Age: 80 years  Gender: Male  Status: Outpatient  Location: [facility]  Height: 63 in  Weight: 157 lb  BP: 150/ 90 mmHg    Diagnosis: I25 10 - Atherosclerotic heart disease of native coronary artery without angina pectoris, I25 2 - Old myocardial infarction, I25 5 - Ischemic cardiomyopathy, I47 2 - Ventricular tachycardia    RN:  Valerie Stout RN  Referring Physician:  Kevon Hubbard MD  Group:  [facility]  Report Prepared By[de-identified]  Sierra Parry RN  Interpreting Physician:  Giuseppe Thomas MD    INDICATIONS: Evaluation of known coronary artery disease  Ischemic cardiomyopathy    HISTORY: The patient is a 80year old  male  Chest pain status: no chest pain  Coronary artery disease risk factors: dyslipidemia, hypertension, and family history of premature coronary artery disease  Cardiovascular history:  coronary artery disease, prior myocardial infarction, congestive heart failure, arrhythmia, stroke, ventricular tachycardia, and ischemic cardiomyopathy  ischemic cardiomyopathy Prior cardiovascular procedures: percutaneous transluminal  coronary angioplasty (on 1989), coronary artery bypass grafting, and implantable cardioverter defibrillator procedure  Medications: a beta blocker, an ACE inhibitor/ARB, a diuretic, aspirin, and a lipid lowering agent  Previous test  results: abnormal resting echocardiogram     REST ECG: Sinus rhythm, first-degree AV block, evidence of prior anteroseptal infarction with nonspecific ST T wave abnormalities at rest     PROCEDURE: The study was performed in the [facility]  A regadenoson infusion pharmacologic stress test was performed  Gated SPECT myocardial perfusion imaging was performed after stress and at rest  Systolic blood pressure was  150 mmHg, at the start of the study  Diastolic blood pressure was 90 mmHg, at the start of the study  The heart rate was 72 bpm, at the start of the study  Regadenoson protocol:  HR bpm SBP mmHg DBP mmHg Symptoms  Baseline 72 150 90 --  1 min 77 -- -- dyspnea  2 min 80 100 60 same as above  3 min 81 -- -- subsiding  4 min 76 136 80 none  5 min 67 -- -- none  6 min 64 -- -- none  7 min 65 150 80 none  8 min 65 -- -- none  9 min 63 -- -- none  10 min 63 140 80 none  No medications or fluids given  STRESS SUMMARY: Duration of pharmacologic stress was 10 min   Maximal heart rate during stress was 81 bpm ( 60 % of maximal predicted heart rate)  The heart rate response to stress was normal  There was resting hypertension with an  appropriate blood pressure response to stress  The rate-pressure product for the peak heart rate and blood pressure was 51006  There was no chest pain during stress  The stress test was terminated due to protocol completion  No abnormal ST  T wave changes with regadenoson injection  No significant arrhythmia noted  MYOCARDIAL PERFUSION IMAGING:  The image quality was fair  Rotating projection images reveal mild diaphragmatic attenuation and mild subdiaphragmatic activity  The left ventricular cavity is mildly dilated  End systolic volume measures 137 mL and end-diastolic volume measures 247 mL  Review of resting and post regadenoson SPECT imaging demonstrates large size, severe, fixed perfusion abnormality involving the entire anterior wall, apex, distal lateral wall, distal anteroseptal wall and septum  There is no definite  ischemia noted  GATED SPECT:  Gated SPECT imaging demonstrates severely reduced left ventricular systolic function with dyskinesis of the apex and the distal anterior wall and hypokinesis of other walls  Ejection fraction is calculated as 29%  SUMMARY:  -  Stress results: Target heart rate was not achieved  There was resting hypertension with an appropriate blood pressure response to stress  There was no chest pain during stress  IMPRESSIONS: 1  Abnormal myocardial perfusion scan  2  Evidence of extensive, severe intensity fixed perfusion abnormalities without evidence of ischemia  3  Dilated left ventricular cavity with severely reduced left ventricular systolic function  Ejection fraction calculated as 29%  Diagnostic sensitivity was limited by submaximal stress      Prepared and signed by    Sho Mccoy MD  Signed 10/24/2018 18:15:42 *-*-*-*-*-*-*-*-*-*-*-*-*-*-*-*-*-*-*-*-*-*-*-*-*-*-*-*-*-*-*-*-*-*-*-*-*-*-*-*-*-*-*-*-*-*-*-*-*-*-*-*-*-*-  SIGNATURES:   @NWQ@   Mark Anthony Moran MD     CC:   DO Abdulaziz Bear Ather, MD

## 2018-12-12 NOTE — DISCHARGE INSTRUCTIONS
Pacemaker Generator Change   WHAT YOU NEED TO KNOW:   What do I need to know about a pacemaker generator change? The pacemaker generator is the metal piece that you can feel under your skin  A battery is sealed within your generator  Your healthcare provider will check the generator for battery power and function during scheduled generator checks  He will decide when it is time for your generator to be changed  He will replace your generator before the battery runs out of power completely  Pacemaker batteries can last up to 12 years  He will also replace the generator if it does not function properly  How do I prepare for a pacemaker generator change? Your healthcare provider will talk to you about how to prepare for the procedure  He may tell you not to eat or drink anything after midnight on the day of your procedure  He will tell you what medicines to take or not take on the day of your procedure  What will happen during a pacemaker generator change? · You will be given medicine in your IV to help you relax  Local anesthesia will be given to numb the area  You may still feel pressure or pushing during the procedure, but you should not feel any pain  You will have a continuous ECG (electrocardiogram) to monitor your heart rate during your procedure  · Your healthcare provider will make an incision over the original scar from the procedure used to insert the pacemaker  The incision will be slightly longer than the generator  Your healthcare provider will have to remove any scar tissue holding the generator in place  He will remove the generator, unplug the leads from the generator and inspect them for damage  He may have to remove any tissue that makes the leads difficult to move  Then he will plug the leads into the new generator  Your healthcare provider will make sure the new generator functions properly  Then he will insert the new generator and close the incision    What are the risks of a pacemaker generator change? There is risk of bleeding and infection  The leads may move and damage your veins, nerves, wall of your heart, or lungs  You may have to have another procedure to correct the damage or to replace the generator or leads  CARE AGREEMENT:   You have the right to help plan your care  Learn about your health condition and how it may be treated  Discuss treatment options with your caregivers to decide what care you want to receive  You always have the right to refuse treatment  The above information is an  only  It is not intended as medical advice for individual conditions or treatments  Talk to your doctor, nurse or pharmacist before following any medical regimen to see if it is safe and effective for you  © 2017 [employer]  Information is for End User's use only and may not be sold, redistributed or otherwise used for commercial purposes  All illustrations and images included in CareNotes® are the copyrighted property of A D A M , Inc  or [employer]  Pacemaker Generator Change   WHAT YOU NEED TO KNOW:   The pacemaker generator is the metal piece that you can feel under your skin  A battery is sealed within your generator  Your healthcare provider will check the generator for battery power and function during scheduled generator checks  He will decide when it is time for your generator to be changed  He will replace your generator before the battery runs out of power completely  Pacemaker batteries can last up to 12 years  He will also replace the generator if it does not function properly  DISCHARGE INSTRUCTIONS:   Call 911 for any of the following:   · You feel lightheaded, short of breath, and have chest pain       · You have any of the following signs of a heart attack:      ¨ Squeezing, pressure, or pain in your chest that lasts longer than 5 minutes or returns    ¨ Discomfort or pain in your back, neck, jaw, stomach, or arm     ¨ Trouble breathing    ¨ Nausea or vomiting    ¨ Lightheadedness or a sudden cold sweat, especially with chest pain or trouble breathing  Seek care immediately if:   · You are dizzy, or you faint  · Your stitches come apart  · Your pulse is lower than the set rate or higher than 100  Contact your healthcare provider if:   · You have a fever or chills  · Your wound is red, swollen, or draining pus  · You have hiccups that last more than 48 hours  · You have questions or concerns about your condition or care  Care for your wound as directed:  Keep your bandage clean and dry  Your healthcare provider will teach you how to care for your wound  Activity after your procedure:   · Take baths instead of showers  for as long as directed  Baths allow you to keep your wound dry and helps lower your risk for infection  · Do not rub or massage  the area around your pacemaker  You may damage the leads or move them out of place  · Eat a variety of healthy foods to help with healing  Healthy foods include fruits, vegetables, whole-grain breads, low-fat dairy products, beans, lean meats, and fish  · Do not lift anything heavier than 3 pounds with the arm closest to your pacemaker  Do not  lift the arm over your head until your healthcare provider says it is okay  Pacemaker safety:   · Wear medical alert jewelry, such as a bracelet  Carry a pacemaker identification card  These items will inform others that you have a pacemaker  Ask where to get these items  · Check your pulse every day  Check for 1 minute while you are resting  Write down your pulse rates and keep a record of the results  · Tell all healthcare providers that you have a pacemaker  Some procedures may interfere with your pacemaker  · Do not play full contact sports  An example is football  Contact sports may damage your pacemaker  Ask your healthcare provider how much and what kinds of physical activity are safe for you       · Use your cell phone on the ear opposite from your pacemaker  Do not carry your cell phone in your shirt pocket over your chest      · Show airport security your pacemaker card before you go through the metal detectors  Metal detectors may alert security because of the small amount of metal in your pacemaker  Step away from machine if you feel dizzy or your heart rate increases  If a handheld wand is used, ask that security agents do not hold the wand over your pacemaker longer than a few seconds  Your pacemaker function or programming may be affected by the wand  Electromagnetic interference:  Electromechanical interference (ALICIA) can affect your pacemaker function  Move 4 to 6 feet away from the source if you have dizziness or palpitations  Your pulse should return to normal  Household items, such as a microwaves and electric blankets, do not  affect your pacemaker's function  The following  may  affect your pacemaker's function:  · Antennas, and amplifiers used in audio and laboratory equipment    · Electric cautery equipment, MRI equipment, and TENS units    · Unshielded motors on cars and boats  Follow up with your healthcare provider as directed: You will need regular checks to make sure your pacemaker is working properly  Some checks may be done over the telephone  Ask your healthcare provider about them  Write down your questions so you remember to ask them during your visits  © 2017 [facility] Information is for End User's use only and may not be sold, redistributed or otherwise used for commercial purposes  All illustrations and images included in CareNotes® are the copyrighted property of A D A M , Inc  or [employer]  The above information is an  only  It is not intended as medical advice for individual conditions or treatments  Talk to your doctor, nurse or pharmacist before following any medical regimen to see if it is safe and effective for you

## 2018-12-12 NOTE — PROCEDURES
Procedures   DATE:12/12/2018      PROCEDURE: Dual chamber permanent pacemaker implantation via left axillary vein approach  IMPLANTING CARDIOLOGIST: Amanda Reilly MD [facility]      NURSING ASSISTANT: Caroline Polk RN     ANESTHESIOLOGIST: Jean Pierre Genao CRNA;  Holland Lepe MD     PRE PROCEDURE DIAGNOSIS:  Ischemic cardiomyopathy, history of Medtronic dual-chamber ICD with ICD at elective replacement indicator mode  POST PROCEDURE DIAGNOSIS:  Ischemic cardiomyopathy, Medtronic dual-chamber ICD status post generator change  DEVICE INFORMATION:   Explanted generator: Medtronic Protecta  [id number], serial # [id number], date of implant: 5/9/2011  Newly implanted generator: Medtronic Evera MRI XT  [id number], serial # [id number]  Right Atrial lead: Medtronic 6299, - serial # [id number]  Right Ventricular lead: Medtronic Sprint Quatro, serial # [id number]    ANTIBIOTIC: Ancef 2 g IV  FLUORO TIME: Approximately 0 4min     Estimated Blood Loss: < 10 cc  COMPLICATIONS: None during or immediately following the procedure  PROCEDURE:   Upon arrival in the cardiac catheterization laboratory, the patient was identified visually and by name tag and Informed consent was verified  Time out procedure performed and patient was sedated by the anesthesia team and patient was prepped in a sterile environment  Fluoroscopy was performed to adequately identify device and lead positions  Under anesthesia and with the use of 1% lidocaine incision was made over the region of the device  As the device had migrated down incision was made below the site of original device implant incision  Through a combination of sharp, blunt and cautery dissection, the underlying pectoralis fascia was identified and the capsule was opened and the generator was extracted without difficulty  The generator was disconnected and the leads were tested and showed appropriate parameters    After this new device was received from the representative and connected to the lead after verification of correct position  The pocket was modified slightly to create space of for the new generator  The pocket was flushed with antibiotic solution and the generator was inserted into the cavity  The pocket was then closed with a 3-0 Vicryl suture in two layers and the skin was closed with a running subcuticular stitch  Steri-Strips were applied and a sterile dressing was placed  Final device check and fluoroscopy was performed to ensure appropriate positioning of the device  Therapies were turned back on  The patient was transferred to the medical floor for overnight observation in stable condition  IMPLANT PARAMETERS (Device measured):   Right atrium - P wave- 2 5 mV, Threshold 0  5 V at 0 4 ms, Impedance 551 Ohms;   Right ventricle - R wave 13 9 mV, Threshold 0 75 V at 0 4 ms; Impedance 361 Ohms; shock impedance:  76 Ohms     Final programmed parameters:   AAI=DDD- , Paced/Sensed AV Delay 180/150 ms  VT detection interval at 176 beats per minute, VF detection interval at 231 beats per minute  SUMMARY:   1  Successful ICD generator change  RECOMMENDATIONS:   - Patient will complete prophylactic antibiotics and continue on his medical regimen including Coumadin from tonight   - Patient will be discharged to home later today  Outpatient follow-up with Dr Loretta Jimenez as arranged    - Incision check on 12/20/2018, Thursday at 10:30 AM

## 2018-12-12 NOTE — NURSING NOTE
Received patient from IR awake and alert  VSS  Denies pain  Wife at bedside  Tolerated toast and liquids  Dr Laine Phelps here to see patient

## 2018-12-12 NOTE — PROGRESS NOTES
Left chest insertion site soft, clean and dry  Pt A/O x3  Denies c/o pain or SOB    Resting comfortably on RA

## 2018-12-12 NOTE — ANESTHESIA POSTPROCEDURE EVALUATION
Post-Op Assessment Note      CV Status:  Stable    Mental Status:  Alert and awake    Hydration Status:  Euvolemic    PONV Controlled:  Controlled    Airway Patency:  Patent    Post Op Vitals Reviewed: Yes          Staff: Anesthesiologist           BP   128/70   Temp  97 8   Pulse  61   Resp   18   SpO2   97% on 3 L NC

## 2018-12-13 ENCOUNTER — ANTICOAG VISIT (OUTPATIENT)
Dept: CARDIOLOGY CLINIC | Facility: CLINIC | Age: 83
End: 2018-12-13

## 2018-12-13 NOTE — NURSING NOTE
No distress  Denies chest pain or SOB  Ambulated with supervision  Voided  IV removed  Discharge instructions given verbally by Dr Kathleen Primes earlier  Written instructions given prior to discharge  Aware to  antibiotic at pharmacy  Left via wheelchair with staff member and wife

## 2018-12-19 ENCOUNTER — ANTICOAG VISIT (OUTPATIENT)
Dept: CARDIOLOGY CLINIC | Facility: CLINIC | Age: 83
End: 2018-12-19

## 2018-12-19 LAB
INR PPP: 2 (ref 0.86–1.17)
INR PPP: 2 (ref 0.86–1.17)

## 2018-12-19 NOTE — PROGRESS NOTES
Pt having a device replacement will hold x2 days have rechecked on admission 12/12/18 sindi pt understood instructions  jn    12/19/18, above note copied today  conor    12/19/18, tc to pt, continue current dose, inr due 2 weeks   conor

## 2018-12-20 ENCOUNTER — CLINICAL SUPPORT (OUTPATIENT)
Dept: CARDIOLOGY CLINIC | Facility: CLINIC | Age: 83
End: 2018-12-20

## 2018-12-20 ENCOUNTER — ANTICOAG VISIT (OUTPATIENT)
Dept: CARDIOLOGY CLINIC | Facility: CLINIC | Age: 83
End: 2018-12-20

## 2018-12-20 DIAGNOSIS — I10 ESSENTIAL HYPERTENSION: Primary | ICD-10-CM

## 2018-12-20 DIAGNOSIS — Z51.89 VISIT FOR WOUND CHECK: Primary | ICD-10-CM

## 2018-12-20 PROCEDURE — 99024 POSTOP FOLLOW-UP VISIT: CPT

## 2018-12-20 RX ORDER — LISINOPRIL 5 MG/1
5 TABLET ORAL DAILY
Qty: 90 TABLET | Refills: 3 | Status: SHIPPED | OUTPATIENT
Start: 2018-12-20 | End: 2019-12-09 | Stop reason: SDUPTHER

## 2018-12-20 NOTE — PATIENT INSTRUCTIONS
Pt was seen today to check his site where his pacemaker was done and it looks very good, he states he has been healing well  Shahana [place] inform him that if the tape does not come off on its own in a week to remove it with alcohol  He is scheduled for a pacer check and OV on 1/8/19

## 2019-01-04 ENCOUNTER — ANTICOAG VISIT (OUTPATIENT)
Dept: CARDIOLOGY CLINIC | Facility: CLINIC | Age: 84
End: 2019-01-04

## 2019-01-04 ENCOUNTER — APPOINTMENT (OUTPATIENT)
Dept: LAB | Age: 84
End: 2019-01-04
Payer: COMMERCIAL

## 2019-01-04 LAB
INR PPP: 2.84 (ref 0.86–1.17)
PROTHROMBIN TIME: 29.9 SECONDS (ref 11.8–14.2)

## 2019-01-04 PROCEDURE — 36415 COLL VENOUS BLD VENIPUNCTURE: CPT

## 2019-01-04 PROCEDURE — 85610 PROTHROMBIN TIME: CPT

## 2019-01-08 ENCOUNTER — IN-CLINIC DEVICE VISIT (OUTPATIENT)
Dept: CARDIOLOGY CLINIC | Facility: CLINIC | Age: 84
End: 2019-01-08
Payer: COMMERCIAL

## 2019-01-08 ENCOUNTER — OFFICE VISIT (OUTPATIENT)
Dept: CARDIOLOGY CLINIC | Facility: CLINIC | Age: 84
End: 2019-01-08
Payer: COMMERCIAL

## 2019-01-08 VITALS
DIASTOLIC BLOOD PRESSURE: 82 MMHG | BODY MASS INDEX: 27.96 KG/M2 | OXYGEN SATURATION: 99 % | SYSTOLIC BLOOD PRESSURE: 120 MMHG | HEIGHT: 63 IN | HEART RATE: 63 BPM | WEIGHT: 157.8 LBS

## 2019-01-08 DIAGNOSIS — I25.2 OLD MYOCARDIAL INFARCTION: ICD-10-CM

## 2019-01-08 DIAGNOSIS — Z95.810 PRESENCE OF AUTOMATIC CARDIOVERTER/DEFIBRILLATOR (AICD): ICD-10-CM

## 2019-01-08 DIAGNOSIS — I25.5 ISCHEMIC CARDIOMYOPATHY: Primary | ICD-10-CM

## 2019-01-08 DIAGNOSIS — I47.2 VENTRICULAR TACHYCARDIA (HCC): ICD-10-CM

## 2019-01-08 DIAGNOSIS — I63.40 CEREBROVASCULAR ACCIDENT (CVA) DUE TO EMBOLISM OF CEREBRAL ARTERY (HCC): ICD-10-CM

## 2019-01-08 DIAGNOSIS — E78.2 MIXED HYPERLIPIDEMIA: ICD-10-CM

## 2019-01-08 DIAGNOSIS — Z95.810 CARDIAC DEFIBRILLATOR IN SITU: ICD-10-CM

## 2019-01-08 DIAGNOSIS — Z98.61 STATUS POST PERCUTANEOUS TRANSLUMINAL CORONARY ANGIOPLASTY: ICD-10-CM

## 2019-01-08 DIAGNOSIS — I47.2 VENTRICULAR TACHYCARDIA (HCC): Primary | ICD-10-CM

## 2019-01-08 DIAGNOSIS — I48.0 PAF (PAROXYSMAL ATRIAL FIBRILLATION) (HCC): ICD-10-CM

## 2019-01-08 DIAGNOSIS — I10 ESSENTIAL HYPERTENSION: ICD-10-CM

## 2019-01-08 PROCEDURE — 99214 OFFICE O/P EST MOD 30 MIN: CPT | Performed by: INTERNAL MEDICINE

## 2019-01-08 PROCEDURE — 93283 PRGRMG EVAL IMPLANTABLE DFB: CPT | Performed by: INTERNAL MEDICINE

## 2019-01-08 PROCEDURE — 93000 ELECTROCARDIOGRAM COMPLETE: CPT | Performed by: INTERNAL MEDICINE

## 2019-01-08 NOTE — PROGRESS NOTES
Results for orders placed or performed in visit on 01/08/19   Cardiac EP device report    Narrative    MDT DUAL ICD  DEVICE INTERROGATED IN THE SACR HEART OFFICE  BATTERY VOLTAGE ADEQUATE (10 5 YRS)  AP: 58 3%  : 0 7%  ALL LEAD PARAMETERS WITHIN NORMAL LIMITS  NO SIGNIFICANT HIGH RATE EPISODES  NO PROGRAMMING CHANGES MADE TO DEVICE PARAMETERS  PT SEEN BY DR DR Mccullough Bodily    APPROPRIATELY FUNCTIONING ICD   [address] [place] [address]

## 2019-01-08 NOTE — PROGRESS NOTES
Cardiology Follow Up    Eliza Frye  10/31/1932  [id number]  [facility]  [address] [id number] [phone]    1  Ischemic cardiomyopathy  POCT ECG   2  Old myocardial infarction     3  Status post percutaneous transluminal coronary angioplasty     4  Cardiac defibrillator in situ     5  PAF (paroxysmal atrial fibrillation) ([facility] [address] )     6  Cerebrovascular accident (CVA) due to embolism of cerebral artery ([facility] [address] )     7  Essential hypertension     8  Mixed hyperlipidemia     9  Ventricular tachycardia [facility])         Patient was 1st seen May 23, 2016  He had moved from [place] and was seeking to establish cardiology care  In 1989, he had a myocardial infarction treated by PTCA resulting in an ischemic cardiomyopathy with an ejection fraction of 35%  He has a Medtronic ICD which was placed on May 9th, 2011 in [place]  He is on chronic oral anticoagulation  He has a history of paroxysmal atrial fibrillation with a TIA/CVA in the past  Prior history includes myocardial infarction, ventricular tachycardia, TIA/CVA, GI bleed, hypertension and hyperlipidemia     05/27/2016 echocardiogram: Severe LV systolic dysfunction, EF 13%  Distal anterior apical transmural myocardial infarction with akinesis to paradoxical motion  Grade 1 diastolic dysfunction  Mildly elevated filling pressures  Mild left ventricular enlargement  Mild concentric LVH  Biatrial enlargement  10/03/2018 lipid profile from LVH:  Total cholesterol 135, triglycerides 89, HDL 54, non HDL cholesterol 81, LDL calculated 63  Interval History:   Patient returns following having his ICD generator change  His Coumadin dose was withheld for his INR to drop to approximately 2 0  He has a history of a stroke in the distant past   Presently he is therapeutic on Coumadin with an INR of 2 8      He has no chest discomfort, shortness of breath, dizziness lightheadedness or leg edema  His cholesterol values performed at [facility] in October 2018 were excellent  Patient Active Problem List   Diagnosis    Cardiac defibrillator in situ    Chronic kidney disease    Embolic stroke ([address] )    Hyperlipidemia    Old myocardial infarction    Status post percutaneous transluminal coronary angioplasty    Ventricular tachycardia (HCC)    Ischemic cardiomyopathy    PAF (paroxysmal atrial fibrillation) (HCC)    Essential hypertension     Past Medical History:   Diagnosis Date    Anemia     Arthritis     Coronary artery disease     Hyperlipidemia     Hypertension      Social History     Social History    Marital status: /Civil Union     Spouse name: N/A    Number of children: N/A    Years of education: N/A     Occupational History    Not on file       Social History Main Topics    Smoking status: Never Smoker    Smokeless tobacco: Never Used    Alcohol use 1 8 oz/week     3 Shots of liquor per week      Comment: daily    Drug use: Unknown    Sexual activity: Not on file     Other Topics Concern    Not on file     Social History Narrative    No narrative on file      Family History   Problem Relation Age of Onset    Coronary artery disease Mother     Heart attack Father         MI    Stroke Sister      Past Surgical History:   Procedure Laterality Date    CARDIAC PACEMAKER PLACEMENT  05/09/2011    Medtronic dual chamber ICD implant    CORONARY ANGIOPLASTY  1989       Current Outpatient Prescriptions:     aspirin 81 MG tablet, Take 81 mg by mouth, Disp: , Rfl:     atorvastatin (LIPITOR) 40 mg tablet, Take 1 tablet (40 mg total) by mouth daily, Disp: 90 tablet, Rfl: 3    carvedilol (COREG) 25 mg tablet, Take 1 tablet (25 mg total) by mouth 2 (two) times a day with meals, Disp: 180 tablet, Rfl: 3    Cholecalciferol 2000 units CAPS, Take 1 capsule by mouth, Disp: , Rfl:     ferrous sulfate 325 (65 Fe) mg tablet, Take 1 capsule by mouth, Disp: , Rfl:     lisinopril (ZESTRIL) 5 mg tablet, Take 1 tablet (5 mg total) by mouth daily, Disp: 90 tablet, Rfl: 3    spironolactone (ALDACTONE) 25 mg tablet, Take 0 5 tablets (12 5 mg total) by mouth daily, Disp: 45 tablet, Rfl: 3    warfarin (COUMADIN) 2 mg tablet, Take 1 1/2 tablets  Daily or as directed by MD, Disp: 115 tablet, Rfl: 1  No Known Allergies    Results for orders placed or performed in visit on 01/08/19   POCT ECG    Narrative    Normal sinus rhythm at a rate of 63 beats per minute  Left axis deviation  Cannot rule out old anterior septal myocardial infarction with secondary ST T wave abnormalities  Lab Results   Component Value Date    CALCIUM 9 1 12/10/2018    K 4 2 12/10/2018    CO2 27 12/10/2018     12/10/2018    BUN 23 12/10/2018    CREATININE 1 17 12/10/2018     Lab Results   Component Value Date    K 4 2 12/10/2018     12/10/2018    CO2 27 12/10/2018    BUN 23 12/10/2018    CREATININE 1 17 12/10/2018    CALCIUM 9 1 12/10/2018    EGFR 56 (L) 12/10/2018     Lab Results   Component Value Date    WBC 6 30 12/10/2018    HGB 13 2 (L) 12/10/2018    HCT 40 5 (L) 12/10/2018     (H) 12/10/2018     12/10/2018         Review of Systems:  Review of Systems   Constitutional: Negative for activity change  Respiratory: Negative for cough, chest tightness, shortness of breath and wheezing  Cardiovascular: Negative for chest pain, palpitations and leg swelling  Musculoskeletal: Negative for gait problem  Skin: Negative for color change  Neurological: Negative for dizziness, tremors, syncope, weakness, light-headedness and headaches  Psychiatric/Behavioral: Negative for agitation and confusion         Physical Exam:  /82 (BP Location: Left arm, Patient Position: Sitting, Cuff Size: Adult)   Pulse 63   Ht 5' 3" (1 6 m)   Wt 71 6 kg (157 lb 12 8 oz)   SpO2 99%   BMI 27 95 kg/m²    Physical Exam   Constitutional: He is oriented to person, place, and time  He appears well-developed and well-nourished  No distress  HENT:   Head: Normocephalic and atraumatic  Neck: No JVD present  Cardiovascular: Normal rate, regular rhythm, normal heart sounds and intact distal pulses  Exam reveals no gallop and no friction rub  No murmur heard  Pulmonary/Chest: Effort normal and breath sounds normal  No respiratory distress  He has no wheezes  He has no rales  He exhibits no tenderness  Abdominal: Soft  Bowel sounds are normal  He exhibits no distension  Musculoskeletal: He exhibits no edema  Neurological: He is alert and oriented to person, place, and time  Skin: Skin is warm and dry  Psychiatric: He has a normal mood and affect  His behavior is normal        Discussion/Summary:  1  Continue present management  2  Remote device check in 3 months  3  Return office visit with device check in office in 6 months

## 2019-02-05 ENCOUNTER — APPOINTMENT (OUTPATIENT)
Dept: LAB | Age: 84
End: 2019-02-05
Payer: COMMERCIAL

## 2019-02-05 ENCOUNTER — ANTICOAG VISIT (OUTPATIENT)
Dept: CARDIOLOGY CLINIC | Facility: CLINIC | Age: 84
End: 2019-02-05

## 2019-02-05 LAB — INR PPP: 2.14 (ref 0.86–1.17)

## 2019-02-07 ENCOUNTER — ANTICOAG VISIT (OUTPATIENT)
Dept: CARDIOLOGY CLINIC | Facility: CLINIC | Age: 84
End: 2019-02-07

## 2019-02-28 ENCOUNTER — APPOINTMENT (OUTPATIENT)
Dept: LAB | Age: 84
End: 2019-02-28
Payer: COMMERCIAL

## 2019-02-28 ENCOUNTER — ANTICOAG VISIT (OUTPATIENT)
Dept: CARDIOLOGY CLINIC | Facility: CLINIC | Age: 84
End: 2019-02-28

## 2019-03-08 ENCOUNTER — TELEPHONE (OUTPATIENT)
Dept: CARDIOLOGY CLINIC | Facility: CLINIC | Age: 84
End: 2019-03-08

## 2019-03-08 DIAGNOSIS — I25.5 ISCHEMIC CARDIOMYOPATHY: Primary | ICD-10-CM

## 2019-03-08 NOTE — TELEPHONE ENCOUNTER
Pt called in reference to optivol test record showed increase in thorax fluid per Dr Juan Valenzuela  Pt denies any change in how he feels or any SOB  Pt will get BMP as instructed next week

## 2019-03-13 ENCOUNTER — APPOINTMENT (OUTPATIENT)
Dept: LAB | Age: 84
End: 2019-03-13
Payer: COMMERCIAL

## 2019-03-13 ENCOUNTER — ANTICOAG VISIT (OUTPATIENT)
Dept: CARDIOLOGY CLINIC | Facility: CLINIC | Age: 84
End: 2019-03-13

## 2019-03-13 LAB
ANION GAP SERPL CALCULATED.3IONS-SCNC: 5 MMOL/L (ref 4–13)
BUN SERPL-MCNC: 18 MG/DL (ref 5–25)
CALCIUM SERPL-MCNC: 8.8 MG/DL (ref 8.3–10.1)
CHLORIDE SERPL-SCNC: 103 MMOL/L (ref 100–108)
CO2 SERPL-SCNC: 28 MMOL/L (ref 21–32)
CREAT SERPL-MCNC: 1.25 MG/DL (ref 0.6–1.3)
GFR SERPL CREATININE-BSD FRML MDRD: 52 ML/MIN/1.73SQ M
GLUCOSE P FAST SERPL-MCNC: 84 MG/DL (ref 65–99)
POTASSIUM SERPL-SCNC: 4.3 MMOL/L (ref 3.5–5.3)
SODIUM SERPL-SCNC: 136 MMOL/L (ref 136–145)

## 2019-03-13 PROCEDURE — 80048 BASIC METABOLIC PNL TOTAL CA: CPT

## 2019-04-04 ENCOUNTER — ANTICOAG VISIT (OUTPATIENT)
Dept: CARDIOLOGY CLINIC | Facility: CLINIC | Age: 84
End: 2019-04-04

## 2019-04-04 ENCOUNTER — APPOINTMENT (OUTPATIENT)
Dept: LAB | Age: 84
End: 2019-04-04
Payer: COMMERCIAL

## 2019-04-09 ENCOUNTER — REMOTE DEVICE CLINIC VISIT (OUTPATIENT)
Dept: CARDIOLOGY CLINIC | Facility: CLINIC | Age: 84
End: 2019-04-09
Payer: COMMERCIAL

## 2019-04-09 DIAGNOSIS — I47.2 VENTRICULAR TACHYCARDIA (HCC): Primary | ICD-10-CM

## 2019-04-09 DIAGNOSIS — Z95.810 PRESENCE OF AUTOMATIC CARDIOVERTER/DEFIBRILLATOR (AICD): ICD-10-CM

## 2019-04-09 PROCEDURE — 93295 DEV INTERROG REMOTE 1/2/MLT: CPT | Performed by: INTERNAL MEDICINE

## 2019-04-09 PROCEDURE — 93296 REM INTERROG EVL PM/IDS: CPT | Performed by: INTERNAL MEDICINE

## 2019-04-11 ENCOUNTER — TELEPHONE (OUTPATIENT)
Dept: CARDIOLOGY CLINIC | Facility: CLINIC | Age: 84
End: 2019-04-11

## 2019-05-07 ENCOUNTER — ANTICOAG VISIT (OUTPATIENT)
Dept: CARDIOLOGY CLINIC | Facility: CLINIC | Age: 84
End: 2019-05-07

## 2019-05-07 ENCOUNTER — APPOINTMENT (OUTPATIENT)
Dept: LAB | Age: 84
End: 2019-05-07
Payer: COMMERCIAL

## 2019-05-07 DIAGNOSIS — I63.40 CEREBROVASCULAR ACCIDENT (CVA) DUE TO EMBOLISM OF CEREBRAL ARTERY (HCC): ICD-10-CM

## 2019-05-07 DIAGNOSIS — I48.0 PAF (PAROXYSMAL ATRIAL FIBRILLATION) (HCC): ICD-10-CM

## 2019-05-13 ENCOUNTER — REMOTE DEVICE CLINIC VISIT (OUTPATIENT)
Dept: CARDIOLOGY CLINIC | Facility: CLINIC | Age: 84
End: 2019-05-13
Payer: COMMERCIAL

## 2019-05-13 ENCOUNTER — TELEPHONE (OUTPATIENT)
Dept: CARDIOLOGY CLINIC | Facility: CLINIC | Age: 84
End: 2019-05-13

## 2019-05-13 DIAGNOSIS — Z95.810 PRESENCE OF AUTOMATIC CARDIOVERTER/DEFIBRILLATOR (AICD): Primary | ICD-10-CM

## 2019-05-13 DIAGNOSIS — I47.2 VENTRICULAR TACHYCARDIA (HCC): ICD-10-CM

## 2019-05-13 PROCEDURE — 93297 REM INTERROG DEV EVAL ICPMS: CPT | Performed by: INTERNAL MEDICINE

## 2019-05-13 PROCEDURE — 93299 PR REM INTERROG ICPMS/SCRMS <30 D TECH REVIEW: CPT | Performed by: INTERNAL MEDICINE

## 2019-05-15 DIAGNOSIS — I48.0 PAF (PAROXYSMAL ATRIAL FIBRILLATION) (HCC): ICD-10-CM

## 2019-05-17 ENCOUNTER — TELEPHONE (OUTPATIENT)
Dept: CARDIOLOGY CLINIC | Facility: CLINIC | Age: 84
End: 2019-05-17

## 2019-05-23 ENCOUNTER — APPOINTMENT (OUTPATIENT)
Dept: LAB | Age: 84
End: 2019-05-23
Payer: COMMERCIAL

## 2019-05-23 ENCOUNTER — ANTICOAG VISIT (OUTPATIENT)
Dept: CARDIOLOGY CLINIC | Facility: CLINIC | Age: 84
End: 2019-05-23

## 2019-05-23 DIAGNOSIS — I48.0 PAF (PAROXYSMAL ATRIAL FIBRILLATION) (HCC): ICD-10-CM

## 2019-05-25 RX ORDER — WARFARIN SODIUM 2 MG/1
TABLET ORAL
Qty: 115 TABLET | Refills: 1 | Status: SHIPPED | OUTPATIENT
Start: 2019-05-25 | End: 2019-12-09 | Stop reason: SDUPTHER

## 2019-06-26 ENCOUNTER — APPOINTMENT (OUTPATIENT)
Dept: LAB | Age: 84
End: 2019-06-26
Payer: COMMERCIAL

## 2019-06-26 ENCOUNTER — ANTICOAG VISIT (OUTPATIENT)
Dept: CARDIOLOGY CLINIC | Facility: CLINIC | Age: 84
End: 2019-06-26

## 2019-06-26 DIAGNOSIS — I48.0 PAF (PAROXYSMAL ATRIAL FIBRILLATION) (HCC): ICD-10-CM

## 2019-07-10 ENCOUNTER — REMOTE DEVICE CLINIC VISIT (OUTPATIENT)
Dept: CARDIOLOGY CLINIC | Facility: CLINIC | Age: 84
End: 2019-07-10
Payer: COMMERCIAL

## 2019-07-10 DIAGNOSIS — Z95.810 PRESENCE OF AUTOMATIC CARDIOVERTER/DEFIBRILLATOR (AICD): Primary | ICD-10-CM

## 2019-07-10 PROCEDURE — 93295 DEV INTERROG REMOTE 1/2/MLT: CPT | Performed by: INTERNAL MEDICINE

## 2019-07-10 PROCEDURE — 93296 REM INTERROG EVL PM/IDS: CPT | Performed by: INTERNAL MEDICINE

## 2019-07-10 NOTE — PROGRESS NOTES
Results for orders placed or performed in visit on 07/10/19   Cardiac EP device report    Narrative    MDT-DUAL CHAMBER ICD (AAIR-DDDR MODE)  CARELINK TRANSMISSION: BATTERY VOLTAGE ADEQUATE (10 2 YRS)  AP: 56 4%  : 0 9% (MVP-ON)  ALL AVAILABLE LEAD PARAMETERS WITHIN NORMAL LIMITS  NO SIGNIFICANT HIGH RATE EPISODES  OPTI-VOL WITHIN NORMAL LIMITS  APPROPRIATELY FUNCTIONING ICD    [facility]

## 2019-07-12 ENCOUNTER — ANTICOAG VISIT (OUTPATIENT)
Dept: CARDIOLOGY CLINIC | Facility: CLINIC | Age: 84
End: 2019-07-12

## 2019-07-12 ENCOUNTER — APPOINTMENT (OUTPATIENT)
Dept: LAB | Age: 84
End: 2019-07-12
Payer: COMMERCIAL

## 2019-07-12 DIAGNOSIS — I48.0 PAF (PAROXYSMAL ATRIAL FIBRILLATION) (HCC): ICD-10-CM

## 2019-07-12 DIAGNOSIS — I42.9 CARDIOMYOPATHY, UNSPECIFIED TYPE (HCC): ICD-10-CM

## 2019-07-12 LAB
INR PPP: 2.92 (ref 0.84–1.19)
PROTHROMBIN TIME: 30 SECONDS (ref 11.6–14.5)

## 2019-07-12 PROCEDURE — 36415 COLL VENOUS BLD VENIPUNCTURE: CPT

## 2019-07-12 PROCEDURE — 85610 PROTHROMBIN TIME: CPT

## 2019-07-12 NOTE — PROGRESS NOTES
Pt called will continue same dose and recheck in 3 weeks   Pt taking 2 on tues and Friday per patient

## 2019-07-15 ENCOUNTER — OFFICE VISIT (OUTPATIENT)
Dept: CARDIOLOGY CLINIC | Facility: CLINIC | Age: 84
End: 2019-07-15
Payer: COMMERCIAL

## 2019-07-15 VITALS
WEIGHT: 157 LBS | SYSTOLIC BLOOD PRESSURE: 140 MMHG | HEART RATE: 70 BPM | DIASTOLIC BLOOD PRESSURE: 82 MMHG | BODY MASS INDEX: 27.82 KG/M2 | HEIGHT: 63 IN

## 2019-07-15 DIAGNOSIS — I25.2 OLD MYOCARDIAL INFARCTION: ICD-10-CM

## 2019-07-15 DIAGNOSIS — I47.2 VENTRICULAR TACHYCARDIA (HCC): ICD-10-CM

## 2019-07-15 DIAGNOSIS — Z98.61 STATUS POST PERCUTANEOUS TRANSLUMINAL CORONARY ANGIOPLASTY: ICD-10-CM

## 2019-07-15 DIAGNOSIS — E78.2 MIXED HYPERLIPIDEMIA: ICD-10-CM

## 2019-07-15 DIAGNOSIS — Z95.810 CARDIAC DEFIBRILLATOR IN SITU: ICD-10-CM

## 2019-07-15 DIAGNOSIS — I48.0 PAF (PAROXYSMAL ATRIAL FIBRILLATION) (HCC): Primary | ICD-10-CM

## 2019-07-15 DIAGNOSIS — I10 ESSENTIAL HYPERTENSION: ICD-10-CM

## 2019-07-15 DIAGNOSIS — I25.5 ISCHEMIC CARDIOMYOPATHY: ICD-10-CM

## 2019-07-15 DIAGNOSIS — I63.10 CEREBROVASCULAR ACCIDENT (CVA) DUE TO EMBOLISM OF PRECEREBRAL ARTERY (HCC): ICD-10-CM

## 2019-07-15 PROCEDURE — 93000 ELECTROCARDIOGRAM COMPLETE: CPT | Performed by: INTERNAL MEDICINE

## 2019-07-15 PROCEDURE — 99214 OFFICE O/P EST MOD 30 MIN: CPT | Performed by: INTERNAL MEDICINE

## 2019-07-15 NOTE — PROGRESS NOTES
Cardiology Follow Up    Joyce Valencia  10/31/1932  [address] [place] [address]  [phone]    1  PAF (paroxysmal atrial fibrillation) (HCC)  POCT ECG   2  Ischemic cardiomyopathy     3  Old myocardial infarction     4  Status post percutaneous transluminal coronary angioplasty     5  Cardiac defibrillator in situ     6  Essential hypertension     7  Mixed hyperlipidemia     8  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 ([address] )     9  Ventricular tachycardia [facility])         Patient was 1st seen May 23, 2016  He had moved from [place] and was seeking to establish cardiology care  In 1989, he had a myocardial infarction treated by PTCA resulting in an ischemic cardiomyopathy with an ejection fraction of 35%  He has a Medtronic ICD which was placed on May 9th, 2011 in [place]  He is on chronic oral anticoagulation  He has a history of paroxysmal atrial fibrillation with a TIA/CVA in the past  Prior history includes myocardial infarction, ventricular tachycardia, TIA/CVA, GI bleed, hypertension and hyperlipidemia  10/24/2018 echocardiogram  Borderline dilated left ventricular cavity with some thinning of the apex and anterior and anteroseptal walls, severely reduced left ventricular systolic function with akinesis of the distal anterior, anteroseptal and apical wall and  hypokinesis of other regions  Ejection fraction is estimated as around 25-30%  There is grade 1 diastolic dysfunction  Mild left atrial cavity enlargement  Aortic valve sclerosis, no aortic stenosis or regurgitation  Mitral annular calcification, trace mitral valve regurgitation  Mild tricuspid valve regurgitation  No obvious pulmonary hypertension  No pericardial effusion  10/24/2018 nuclear stress test:  IMPRESSIONS: 1  Abnormal myocardial perfusion scan    2  Evidence of extensive, severe intensity fixed perfusion abnormalities of the anterior wall without evidence of ischemia  3  Dilated left ventricular cavity with severely reduced left ventricular systolic function  Ejection fraction calculated as 29%  Diagnostic sensitivity was limited by submaximal stress  10/03/2018 lipid profile from [facility]: Total cholesterol 135, triglycerides 89, HDL 54, non HDL cholesterol 81, LDL calculated 63  Interval History:  Patient has minor chest discomfort which occurs at random  He denies shortness of breath  He denies dizziness or lightheadedness  His last lipid profile was good  His EKG demonstrates a Q-wave in V3 with T-wave inversions in V3 and V4  He has had no significant chest discomfort since his last visit to correspond to any extension is myocardial infarction  Most likely the changes a difference in lead positioning  In addition his stress test from last year demonstrated a prior extensive anterior wall myocardial infarction without ischemia      Patient Active Problem List   Diagnosis    Cardiac defibrillator in situ    Chronic kidney disease    Embolic stroke ([facility] [address] )    Hyperlipidemia    Old myocardial infarction    Status post percutaneous transluminal coronary angioplasty    Ventricular tachycardia (HCC)    Ischemic cardiomyopathy    PAF (paroxysmal atrial fibrillation) (HCC)    Essential hypertension     Past Medical History:   Diagnosis Date    Anemia     Arthritis     Coronary artery disease     Hyperlipidemia     Hypertension      Social History     Socioeconomic History    Marital status: /Civil Union     Spouse name: Not on file    Number of children: Not on file    Years of education: Not on file    Highest education level: Not on file   Occupational History    Not on file   Social Needs    Financial resource strain: Not on file    Food insecurity:     Worry: Not on file     Inability: Not on file    Transportation needs:     Medical: Not on file     Non-medical: Not on file   Tobacco Use    Smoking status: Never Smoker    Smokeless tobacco: Never Used   Substance and Sexual Activity    Alcohol use:  Yes     Alcohol/week: 3 0 standard drinks     Types: 3 Shots of liquor per week     Comment: daily    Drug use: Not on file    Sexual activity: Not on file   Lifestyle    Physical activity:     Days per week: Not on file     Minutes per session: Not on file    Stress: Not on file   Relationships    Social connections:     Talks on phone: Not on file     Gets together: Not on file     Attends [employer] service: Not on file     Active member of club or organization: Not on file     Attends meetings of clubs or organizations: Not on file     Relationship status: Not on file    Intimate partner violence:     Fear of current or ex partner: Not on file     Emotionally abused: Not on file     Physically abused: Not on file     Forced sexual activity: Not on file   Other Topics Concern    Not on file   Social History Narrative    Not on file      Family History   Problem Relation Age of Onset    Coronary artery disease Mother     Heart attack Father         MI    Stroke Sister      Past Surgical History:   Procedure Laterality Date    CARDIAC PACEMAKER PLACEMENT  05/09/2011    Medtronic dual chamber ICD implant    CORONARY ANGIOPLASTY  1989       Current Outpatient Medications:     atorvastatin (LIPITOR) 40 mg tablet, Take 1 tablet (40 mg total) by mouth daily, Disp: 90 tablet, Rfl: 3    carvedilol (COREG) 25 mg tablet, Take 1 tablet (25 mg total) by mouth 2 (two) times a day with meals, Disp: 180 tablet, Rfl: 3    Cholecalciferol 2000 units CAPS, Take 1 capsule by mouth, Disp: , Rfl:     ferrous sulfate 325 (65 Fe) mg tablet, Take 1 capsule by mouth, Disp: , Rfl:     lisinopril (ZESTRIL) 5 mg tablet, Take 1 tablet (5 mg total) by mouth daily, Disp: 90 tablet, Rfl: 3    spironolactone (ALDACTONE) 25 mg tablet, Take 0 5 tablets (12 5 mg total) by mouth daily, Disp: 45 tablet, Rfl: 3    warfarin (COUMADIN) 2 mg tablet, TAKE 1 AND 1/2 TABLETS  DAILY OR AS DIRECTED BY MD, Disp: 115 tablet, Rfl: 1  No Known Allergies    Results for orders placed or performed in visit on 07/15/19   POCT ECG    Narrative    Atrial paced rhythm  Anterior septal myocardial infarction of indeterminate age  Abnormal EKG  Lab Results   Component Value Date    SODIUM 136 03/13/2019    K 4 3 03/13/2019     03/13/2019    CO2 28 03/13/2019    BUN 18 03/13/2019    CREATININE 1 25 03/13/2019    GLUC 98 12/10/2018    CALCIUM 8 8 03/13/2019     Lab Results   Component Value Date    SODIUM 136 03/13/2019    K 4 3 03/13/2019     03/13/2019    CO2 28 03/13/2019    AGAP 5 03/13/2019    BUN 18 03/13/2019    CREATININE 1 25 03/13/2019    GLUC 98 12/10/2018    GLUF 84 03/13/2019    CALCIUM 8 8 03/13/2019    EGFR 52 03/13/2019     Lab Results   Component Value Date    WBC 6 30 12/10/2018    HGB 13 2 (L) 12/10/2018    HCT 40 5 (L) 12/10/2018     (H) 12/10/2018     12/10/2018         Review of Systems:  Review of Systems   Constitutional: Negative for activity change  Respiratory: Negative for cough, chest tightness, shortness of breath and wheezing  Cardiovascular: Negative for chest pain, palpitations and leg swelling  Musculoskeletal: Negative for gait problem  Skin: Negative for color change  Neurological: Negative for dizziness, tremors, syncope, weakness, light-headedness and headaches  Psychiatric/Behavioral: Negative for agitation and confusion  Physical Exam:  /82 (BP Location: Right arm, Patient Position: Sitting, Cuff Size: Adult)   Pulse 70   Ht 5' 3" (1 6 m)   Wt 71 2 kg (157 lb)   BMI 27 81 kg/m²    Physical Exam   Constitutional: He is oriented to person, place, and time  He appears well-developed and well-nourished  No distress  HENT:   Head: Normocephalic and atraumatic  Neck: No JVD present     Cardiovascular: Normal rate, regular rhythm and intact distal pulses  Exam reveals no gallop and no friction rub  Murmur heard  Grade 1/6 systolic murmur at the left lower sternal border  Pulmonary/Chest: Effort normal and breath sounds normal  No respiratory distress  He has no wheezes  He has no rales  He exhibits no tenderness  Abdominal: Soft  Bowel sounds are normal  He exhibits no distension  Musculoskeletal: He exhibits no edema  Neurological: He is alert and oriented to person, place, and time  Skin: Skin is warm and dry  Psychiatric: He has a normal mood and affect  His behavior is normal        Discussion/Summary:  1  Continue present management and return in 6 months

## 2019-08-12 ENCOUNTER — APPOINTMENT (OUTPATIENT)
Dept: LAB | Age: 84
End: 2019-08-12
Payer: COMMERCIAL

## 2019-08-13 ENCOUNTER — ANTICOAG VISIT (OUTPATIENT)
Dept: CARDIOLOGY CLINIC | Facility: CLINIC | Age: 84
End: 2019-08-13

## 2019-08-13 DIAGNOSIS — I63.10 CEREBROVASCULAR ACCIDENT (CVA) DUE TO EMBOLISM OF PRECEREBRAL ARTERY (HCC): ICD-10-CM

## 2019-08-13 DIAGNOSIS — I48.0 PAF (PAROXYSMAL ATRIAL FIBRILLATION) (HCC): ICD-10-CM

## 2019-09-12 ENCOUNTER — APPOINTMENT (OUTPATIENT)
Dept: LAB | Age: 84
End: 2019-09-12
Payer: COMMERCIAL

## 2019-09-12 ENCOUNTER — ANTICOAG VISIT (OUTPATIENT)
Dept: CARDIOLOGY CLINIC | Facility: CLINIC | Age: 84
End: 2019-09-12

## 2019-09-12 DIAGNOSIS — I48.0 PAF (PAROXYSMAL ATRIAL FIBRILLATION) (HCC): ICD-10-CM

## 2019-09-12 DIAGNOSIS — I63.10 CEREBROVASCULAR ACCIDENT (CVA) DUE TO EMBOLISM OF PRECEREBRAL ARTERY (HCC): ICD-10-CM

## 2019-09-12 NOTE — PROGRESS NOTES
9/12/19, tc to pt,deneis any changes in health, medication diet or bleeding  will take 2 mg today in place of 3 mg , then resume previous dose, inr due 2 weeks   conor

## 2019-10-01 ENCOUNTER — APPOINTMENT (OUTPATIENT)
Dept: LAB | Age: 84
End: 2019-10-01
Payer: COMMERCIAL

## 2019-10-01 ENCOUNTER — ANTICOAG VISIT (OUTPATIENT)
Dept: CARDIOLOGY CLINIC | Facility: CLINIC | Age: 84
End: 2019-10-01

## 2019-10-01 DIAGNOSIS — I48.0 PAF (PAROXYSMAL ATRIAL FIBRILLATION) (HCC): ICD-10-CM

## 2019-10-08 ENCOUNTER — REMOTE DEVICE CLINIC VISIT (OUTPATIENT)
Dept: CARDIOLOGY CLINIC | Facility: CLINIC | Age: 84
End: 2019-10-08
Payer: COMMERCIAL

## 2019-10-08 ENCOUNTER — TELEPHONE (OUTPATIENT)
Dept: CARDIOLOGY CLINIC | Facility: CLINIC | Age: 84
End: 2019-10-08

## 2019-10-08 DIAGNOSIS — Z95.810 PRESENCE OF IMPLANTABLE CARDIOVERTER-DEFIBRILLATOR (ICD): Primary | ICD-10-CM

## 2019-10-08 PROCEDURE — 93296 REM INTERROG EVL PM/IDS: CPT | Performed by: INTERNAL MEDICINE

## 2019-10-08 PROCEDURE — 93295 DEV INTERROG REMOTE 1/2/MLT: CPT | Performed by: INTERNAL MEDICINE

## 2019-10-08 NOTE — PROGRESS NOTES
MDT-DUAL CHAMBER ICD (AAIR-DDDR MODE)  CARELINK TRANSMISSION:  BATTERY VOLTAGE ADEQUATE (10 0 YR)   AP 50 9%  0 9%    ALL LEAD PARAMETERS WITHIN NORMAL LIMITS   NO SIGNIFICANT HIGH RATE EPISODES   OPTI-VOL WITHIN NORMAL LIMITS   NORMAL DEVICE FUNCTION   RG

## 2019-11-04 ENCOUNTER — ANTICOAG VISIT (OUTPATIENT)
Dept: CARDIOLOGY CLINIC | Facility: CLINIC | Age: 84
End: 2019-11-04

## 2019-11-04 ENCOUNTER — APPOINTMENT (OUTPATIENT)
Dept: LAB | Age: 84
End: 2019-11-04
Payer: COMMERCIAL

## 2019-11-04 DIAGNOSIS — I48.0 PAF (PAROXYSMAL ATRIAL FIBRILLATION) (HCC): ICD-10-CM

## 2019-11-07 ENCOUNTER — TELEPHONE (OUTPATIENT)
Dept: CARDIOLOGY CLINIC | Facility: CLINIC | Age: 84
End: 2019-11-07

## 2019-11-07 NOTE — TELEPHONE ENCOUNTER
Pt called  Said he was using his teater machine 5 days ago for 2 minutes and when he got right side up he lost his mind for 4 seconds  Since then he feels fine but getting up from the couch he gets dizzy for 1-2 seconds then he feels fine  He would like a call from you  I told him if he gets worse he should go to the er but he said no he'll just be expecting a call

## 2019-12-04 ENCOUNTER — APPOINTMENT (OUTPATIENT)
Dept: LAB | Age: 84
End: 2019-12-04
Payer: COMMERCIAL

## 2019-12-05 ENCOUNTER — ANTICOAG VISIT (OUTPATIENT)
Dept: CARDIOLOGY CLINIC | Facility: CLINIC | Age: 84
End: 2019-12-05

## 2019-12-05 DIAGNOSIS — I48.0 PAF (PAROXYSMAL ATRIAL FIBRILLATION) (HCC): ICD-10-CM

## 2019-12-05 DIAGNOSIS — I48.0 PAF (PAROXYSMAL ATRIAL FIBRILLATION) (HCC): Primary | ICD-10-CM

## 2019-12-09 ENCOUNTER — OFFICE VISIT (OUTPATIENT)
Dept: CARDIOLOGY CLINIC | Facility: CLINIC | Age: 84
End: 2019-12-09
Payer: COMMERCIAL

## 2019-12-09 VITALS
HEIGHT: 63 IN | WEIGHT: 157 LBS | SYSTOLIC BLOOD PRESSURE: 130 MMHG | DIASTOLIC BLOOD PRESSURE: 72 MMHG | HEART RATE: 72 BPM | BODY MASS INDEX: 27.82 KG/M2

## 2019-12-09 DIAGNOSIS — E78.2 MIXED HYPERLIPIDEMIA: ICD-10-CM

## 2019-12-09 DIAGNOSIS — I47.2 VENTRICULAR TACHYCARDIA (HCC): ICD-10-CM

## 2019-12-09 DIAGNOSIS — I42.9 CARDIOMYOPATHY, UNSPECIFIED TYPE (HCC): ICD-10-CM

## 2019-12-09 DIAGNOSIS — I25.5 ISCHEMIC CARDIOMYOPATHY: ICD-10-CM

## 2019-12-09 DIAGNOSIS — I63.40 CEREBROVASCULAR ACCIDENT (CVA) DUE TO EMBOLISM OF CEREBRAL ARTERY (HCC): ICD-10-CM

## 2019-12-09 DIAGNOSIS — I48.0 PAF (PAROXYSMAL ATRIAL FIBRILLATION) (HCC): Primary | ICD-10-CM

## 2019-12-09 DIAGNOSIS — Z98.61 STATUS POST PERCUTANEOUS TRANSLUMINAL CORONARY ANGIOPLASTY: ICD-10-CM

## 2019-12-09 DIAGNOSIS — Z95.810 CARDIAC DEFIBRILLATOR IN SITU: ICD-10-CM

## 2019-12-09 DIAGNOSIS — I10 ESSENTIAL HYPERTENSION: ICD-10-CM

## 2019-12-09 PROCEDURE — 99215 OFFICE O/P EST HI 40 MIN: CPT | Performed by: INTERNAL MEDICINE

## 2019-12-09 PROCEDURE — 93000 ELECTROCARDIOGRAM COMPLETE: CPT | Performed by: INTERNAL MEDICINE

## 2019-12-09 RX ORDER — SPIRONOLACTONE 25 MG/1
12.5 TABLET ORAL DAILY
Qty: 45 TABLET | Refills: 3 | Status: SHIPPED | OUTPATIENT
Start: 2019-12-09 | End: 2020-11-29

## 2019-12-09 RX ORDER — LISINOPRIL 5 MG/1
5 TABLET ORAL DAILY
Qty: 90 TABLET | Refills: 3 | Status: SHIPPED | OUTPATIENT
Start: 2019-12-09 | End: 2020-12-29 | Stop reason: SDUPTHER

## 2019-12-09 RX ORDER — WARFARIN SODIUM 2 MG/1
TABLET ORAL
Qty: 150 TABLET | Refills: 3 | Status: SHIPPED | OUTPATIENT
Start: 2019-12-09 | End: 2020-11-29

## 2019-12-09 RX ORDER — ATORVASTATIN CALCIUM 40 MG/1
40 TABLET, FILM COATED ORAL DAILY
Qty: 90 TABLET | Refills: 3 | Status: SHIPPED | OUTPATIENT
Start: 2019-12-09 | End: 2020-12-29 | Stop reason: SDUPTHER

## 2019-12-09 RX ORDER — CARVEDILOL 12.5 MG/1
12.5 TABLET ORAL 2 TIMES DAILY WITH MEALS
Qty: 180 TABLET | Refills: 3 | Status: SHIPPED | OUTPATIENT
Start: 2019-12-09 | End: 2020-11-29

## 2019-12-09 NOTE — PROGRESS NOTES
Cardiology Follow Up    Gus Mendoza  10/31/1932  [address] [id number] [phone]    1  PAF (paroxysmal atrial fibrillation) (HCC)  POCT ECG    Comprehensive metabolic panel    warfarin (COUMADIN) 2 mg tablet   2  Ischemic cardiomyopathy  CBC and differential    Iron Panel (Includes Ferritin, Iron Sat%, Iron, and TIBC)    carvedilol (COREG) 12 5 mg tablet   3  Status post percutaneous transluminal coronary angioplasty     4  Essential hypertension  Comprehensive metabolic panel    lisinopril (ZESTRIL) 5 mg tablet    spironolactone (ALDACTONE) 25 mg tablet   5  Mixed hyperlipidemia  Lipid Panel with Direct LDL reflex    atorvastatin (LIPITOR) 40 mg tablet   6  Cardiac defibrillator in situ  Comprehensive metabolic panel   7  Ventricular tachycardia ([facility] [address] )     8  Cerebrovascular accident (CVA) due to embolism of cerebral artery ([facility] [address] )     9  Cardiomyopathy, unspecified type [facility])         Patient was 1st seen May 23, 2016  He had moved from [place] and was seeking to establish cardiology care  In 1989, he had a myocardial infarction treated by PTCA resulting in an ischemic cardiomyopathy with an ejection fraction of 35%  He has a Medtronic ICD which was placed on May 9th, 2011 in [place]  He is on chronic oral anticoagulation  He has a history of paroxysmal atrial fibrillation with a TIA/CVA in the past  Prior history includes myocardial infarction, ventricular tachycardia, TIA/CVA, GI bleed, hypertension and hyperlipidemia  10/24/2018 echocardiogram  Borderline dilated left ventricular cavity with some thinning of the apex and anterior and anteroseptal walls, severely reduced left ventricular systolic function with akinesis of the distal anterior, anteroseptal and apical wall and  hypokinesis of other regions  Ejection fraction is estimated as around 25-30%   There is grade 1 diastolic dysfunction  Mild left atrial cavity enlargement  Aortic valve sclerosis, no aortic stenosis or regurgitation  Mitral annular calcification, trace mitral valve regurgitation  Mild tricuspid valve regurgitation  No obvious pulmonary hypertension  No pericardial effusion  10/24/2018 nuclear stress test:  IMPRESSIONS: 1  Abnormal myocardial perfusion scan  2  Evidence of extensive, severe intensity fixed perfusion abnormalities of the anterior wall without evidence of ischemia  3  Dilated left ventricular cavity with severely reduced left ventricular systolic function  Ejection fraction calculated as 29%  Diagnostic sensitivity was limited by submaximal stress  10/03/2018 lipid profile from [facility]: Total cholesterol 135, triglycerides 89, HDL 54, non HDL cholesterol 81, LDL calculated 63  Interval History:  Patient with a history of paroxysmal atrial fibrillation, ischemic cardiomyopathy, status post ICD placement, history of ventricular tachycardia, essential hypertension, hyperlipidemia, prior cerebral vascular accident returns  This visit was made after patient had a syncopal episode when using his veto   Discussed with patient the need to be very careful and slow in getting off his device to avoid repeat syncope  Discussed patient's last ICD report with him  He had no events and had normal function  Reviewed his labs and since none are recent, ordered repeat labs  Patient is taking iron due to being told many years ago that he had an iron deficiency but his iron level has not been checked in many years  Reviewed all the patient's medications with him and reordered medications for next year      Patient Active Problem List   Diagnosis    Cardiac defibrillator in situ    Chronic kidney disease    [facility]olic stroke ([facility] [address] )    Hyperlipidemia    Old myocardial infarction    Status post percutaneous transluminal coronary angioplasty    Ventricular tachycardia ([address] )    Ischemic cardiomyopathy    PAF (paroxysmal atrial fibrillation) (HCC)    Essential hypertension     Past Medical History:   Diagnosis Date    Anemia     Arthritis     Coronary artery disease     Hyperlipidemia     Hypertension      Social History     Socioeconomic History    Marital status: /Civil Union     Spouse name: Not on file    Number of children: Not on file    Years of education: Not on file    Highest education level: Not on file   Occupational History    Not on file   Social Needs    Financial resource strain: Not on file    Food insecurity:     Worry: Not on file     Inability: Not on file    Transportation needs:     Medical: Not on file     Non-medical: Not on file   Tobacco Use    Smoking status: Never Smoker    Smokeless tobacco: Never Used   Substance and Sexual Activity    Alcohol use:  Yes     Alcohol/week: 3 0 standard drinks     Types: 3 Shots of liquor per week     Comment: daily    Drug use: Not on file    Sexual activity: Not on file   Lifestyle    Physical activity:     Days per week: Not on file     Minutes per session: Not on file    Stress: Not on file   Relationships    Social connections:     Talks on phone: Not on file     Gets together: Not on file     Attends [employer] service: Not on file     Active member of club or organization: Not on file     Attends meetings of clubs or organizations: Not on file     Relationship status: Not on file    Intimate partner violence:     Fear of current or ex partner: Not on file     Emotionally abused: Not on file     Physically abused: Not on file     Forced sexual activity: Not on file   Other Topics Concern    Not on file   Social History Narrative    Not on file      Family History   Problem Relation Age of Onset    Coronary artery disease Mother     Heart attack Father         MI    Stroke Sister      Past Surgical History:   Procedure Laterality Date    CARDIAC PACEMAKER PLACEMENT  05/09/2011    Medtronic dual chamber ICD implant    CORONARY ANGIOPLASTY  1989       Current Outpatient Medications:     atorvastatin (LIPITOR) 40 mg tablet, Take 1 tablet (40 mg total) by mouth daily, Disp: 90 tablet, Rfl: 3    Cholecalciferol 2000 units CAPS, Take 1 capsule by mouth, Disp: , Rfl:     lisinopril (ZESTRIL) 5 mg tablet, Take 1 tablet (5 mg total) by mouth daily, Disp: 90 tablet, Rfl: 3    spironolactone (ALDACTONE) 25 mg tablet, Take 0 5 tablets (12 5 mg total) by mouth daily, Disp: 45 tablet, Rfl: 3    warfarin (COUMADIN) 2 mg tablet, TAKE 1 AND 1/2 TABLETS  DAILY OR AS DIRECTED BY MD, Disp: 150 tablet, Rfl: 3    carvedilol (COREG) 12 5 mg tablet, Take 1 tablet (12 5 mg total) by mouth 2 (two) times a day with meals, Disp: 180 tablet, Rfl: 3  No Known Allergies    Results for orders placed or performed in visit on 12/09/19   POCT ECG    Narrative    Normal sinus rhythm at a rate of 72 beats per minute  Old anterior wall myocardial infarction  First degree AV block  Nonspecific ST T wave changes  Abnormal EKG  Lab Results   Component Value Date    SODIUM 136 03/13/2019    K 4 3 03/13/2019     03/13/2019    CO2 28 03/13/2019    BUN 18 03/13/2019    CREATININE 1 25 03/13/2019    GLUC 98 12/10/2018    CALCIUM 8 8 03/13/2019     Lab Results   Component Value Date    SODIUM 136 03/13/2019    K 4 3 03/13/2019     03/13/2019    CO2 28 03/13/2019    AGAP 5 03/13/2019    BUN 18 03/13/2019    CREATININE 1 25 03/13/2019    GLUC 98 12/10/2018    GLUF 84 03/13/2019    CALCIUM 8 8 03/13/2019    EGFR 52 03/13/2019     Lab Results   Component Value Date    WBC 6 30 12/10/2018    HGB 13 2 (L) 12/10/2018    HCT 40 5 (L) 12/10/2018     (H) 12/10/2018     12/10/2018       Review of Systems:  Review of Systems   Constitutional: Negative for activity change  Respiratory: Negative for cough, chest tightness, shortness of breath and wheezing      Cardiovascular: Negative for chest pain, palpitations and leg swelling  Musculoskeletal: Negative for gait problem  Skin: Negative for color change  Neurological: Positive for syncope  Negative for dizziness, tremors, weakness, light-headedness and headaches  Psychiatric/Behavioral: Negative for agitation and confusion  Physical Exam:  /72 (BP Location: Right arm, Patient Position: Sitting, Cuff Size: Large)   Pulse 72   Ht 5' 3" (1 6 m)   Wt 71 2 kg (157 lb)   BMI 27 81 kg/m²    Physical Exam   Constitutional: He is oriented to person, place, and time  He appears well-developed and well-nourished  No distress  HENT:   Head: Normocephalic and atraumatic  Neck: No JVD present  Cardiovascular: Normal rate, regular rhythm, normal heart sounds and intact distal pulses  Exam reveals no gallop and no friction rub  No murmur heard  Pulmonary/Chest: Effort normal and breath sounds normal  No respiratory distress  He has no wheezes  He has no rales  He exhibits no tenderness  Abdominal: Soft  Bowel sounds are normal  He exhibits no distension  Musculoskeletal: He exhibits no edema  Neurological: He is alert and oriented to person, place, and time  Skin: Skin is warm and dry  Psychiatric: He has a normal mood and affect  His behavior is normal        Discussion/Summary:  1  Discontinue iron  2  Obtain CBC, CMP, iron profile and lipid profile   3   Return in 6 months

## 2019-12-12 ENCOUNTER — APPOINTMENT (OUTPATIENT)
Dept: LAB | Age: 84
End: 2019-12-12
Payer: COMMERCIAL

## 2019-12-12 ENCOUNTER — ANTICOAG VISIT (OUTPATIENT)
Dept: CARDIOLOGY CLINIC | Facility: CLINIC | Age: 84
End: 2019-12-12

## 2019-12-12 DIAGNOSIS — I10 ESSENTIAL HYPERTENSION: ICD-10-CM

## 2019-12-12 DIAGNOSIS — E78.2 MIXED HYPERLIPIDEMIA: ICD-10-CM

## 2019-12-12 DIAGNOSIS — I63.40 CEREBROVASCULAR ACCIDENT (CVA) DUE TO EMBOLISM OF CEREBRAL ARTERY (HCC): ICD-10-CM

## 2019-12-12 DIAGNOSIS — Z95.810 CARDIAC DEFIBRILLATOR IN SITU: ICD-10-CM

## 2019-12-12 DIAGNOSIS — I25.5 ISCHEMIC CARDIOMYOPATHY: ICD-10-CM

## 2019-12-12 DIAGNOSIS — I48.0 PAF (PAROXYSMAL ATRIAL FIBRILLATION) (HCC): ICD-10-CM

## 2019-12-12 LAB
ALBUMIN SERPL BCP-MCNC: 3.7 G/DL (ref 3.5–5)
ALP SERPL-CCNC: 36 U/L (ref 46–116)
ALT SERPL W P-5'-P-CCNC: 23 U/L (ref 12–78)
ANION GAP SERPL CALCULATED.3IONS-SCNC: 5 MMOL/L (ref 4–13)
AST SERPL W P-5'-P-CCNC: 24 U/L (ref 5–45)
BILIRUB SERPL-MCNC: 0.57 MG/DL (ref 0.2–1)
BUN SERPL-MCNC: 24 MG/DL (ref 5–25)
CALCIUM SERPL-MCNC: 9 MG/DL (ref 8.3–10.1)
CHLORIDE SERPL-SCNC: 107 MMOL/L (ref 100–108)
CHOLEST SERPL-MCNC: 158 MG/DL (ref 50–200)
CO2 SERPL-SCNC: 28 MMOL/L (ref 21–32)
CREAT SERPL-MCNC: 1.18 MG/DL (ref 0.6–1.3)
FERRITIN SERPL-MCNC: 368 NG/ML (ref 8–388)
GFR SERPL CREATININE-BSD FRML MDRD: 55 ML/MIN/1.73SQ M
GLUCOSE P FAST SERPL-MCNC: 85 MG/DL (ref 65–99)
HDLC SERPL-MCNC: 52 MG/DL
INR PPP: 1.87 (ref 0.84–1.19)
IRON SATN MFR SERPL: 31 %
IRON SERPL-MCNC: 85 UG/DL (ref 65–175)
LDLC SERPL CALC-MCNC: 85 MG/DL (ref 0–100)
POTASSIUM SERPL-SCNC: 4.2 MMOL/L (ref 3.5–5.3)
PROT SERPL-MCNC: 6.7 G/DL (ref 6.4–8.2)
PROTHROMBIN TIME: 21 SECONDS (ref 11.6–14.5)
SODIUM SERPL-SCNC: 140 MMOL/L (ref 136–145)
TIBC SERPL-MCNC: 276 UG/DL (ref 250–450)
TRIGL SERPL-MCNC: 105 MG/DL

## 2019-12-12 PROCEDURE — 82728 ASSAY OF FERRITIN: CPT

## 2019-12-12 PROCEDURE — 36415 COLL VENOUS BLD VENIPUNCTURE: CPT

## 2019-12-12 PROCEDURE — 85610 PROTHROMBIN TIME: CPT

## 2019-12-12 PROCEDURE — 83540 ASSAY OF IRON: CPT

## 2019-12-12 PROCEDURE — 80053 COMPREHEN METABOLIC PANEL: CPT

## 2019-12-12 PROCEDURE — 80061 LIPID PANEL: CPT

## 2019-12-12 PROCEDURE — 83550 IRON BINDING TEST: CPT

## 2019-12-12 NOTE — PROGRESS NOTES
12/12/19, tc to pt, denies any missed dose,will take 3 mg x 2 days, then resume current dose, inr due 3 weeks   conor

## 2020-01-06 ENCOUNTER — ANTICOAG VISIT (OUTPATIENT)
Dept: CARDIOLOGY CLINIC | Facility: CLINIC | Age: 85
End: 2020-01-06

## 2020-01-06 ENCOUNTER — APPOINTMENT (OUTPATIENT)
Dept: LAB | Age: 85
End: 2020-01-06
Payer: COMMERCIAL

## 2020-01-06 DIAGNOSIS — I48.0 PAF (PAROXYSMAL ATRIAL FIBRILLATION) (HCC): ICD-10-CM

## 2020-01-06 DIAGNOSIS — I63.40 CEREBROVASCULAR ACCIDENT (CVA) DUE TO EMBOLISM OF CEREBRAL ARTERY (HCC): ICD-10-CM

## 2020-01-30 ENCOUNTER — APPOINTMENT (OUTPATIENT)
Dept: LAB | Age: 85
End: 2020-01-30
Payer: COMMERCIAL

## 2020-01-30 ENCOUNTER — ANTICOAG VISIT (OUTPATIENT)
Dept: CARDIOLOGY CLINIC | Facility: CLINIC | Age: 85
End: 2020-01-30

## 2020-01-30 DIAGNOSIS — I25.5 ISCHEMIC CARDIOMYOPATHY: ICD-10-CM

## 2020-01-30 DIAGNOSIS — I48.0 PAF (PAROXYSMAL ATRIAL FIBRILLATION) (HCC): ICD-10-CM

## 2020-01-30 LAB
BASOPHILS # BLD AUTO: 0.08 THOUSANDS/ΜL (ref 0–0.1)
BASOPHILS NFR BLD AUTO: 1 % (ref 0–1)
EOSINOPHIL # BLD AUTO: 0.38 THOUSAND/ΜL (ref 0–0.61)
EOSINOPHIL NFR BLD AUTO: 6 % (ref 0–6)
ERYTHROCYTE [DISTWIDTH] IN BLOOD BY AUTOMATED COUNT: 12.8 % (ref 11.6–15.1)
HCT VFR BLD AUTO: 40.1 % (ref 36.5–49.3)
HGB BLD-MCNC: 13 G/DL (ref 12–17)
IMM GRANULOCYTES # BLD AUTO: 0.01 THOUSAND/UL (ref 0–0.2)
IMM GRANULOCYTES NFR BLD AUTO: 0 % (ref 0–2)
LYMPHOCYTES # BLD AUTO: 1.58 THOUSANDS/ΜL (ref 0.6–4.47)
LYMPHOCYTES NFR BLD AUTO: 26 % (ref 14–44)
MCH RBC QN AUTO: 33.1 PG (ref 26.8–34.3)
MCHC RBC AUTO-ENTMCNC: 32.4 G/DL (ref 31.4–37.4)
MCV RBC AUTO: 102 FL (ref 82–98)
MONOCYTES # BLD AUTO: 0.6 THOUSAND/ΜL (ref 0.17–1.22)
MONOCYTES NFR BLD AUTO: 10 % (ref 4–12)
NEUTROPHILS # BLD AUTO: 3.41 THOUSANDS/ΜL (ref 1.85–7.62)
NEUTS SEG NFR BLD AUTO: 57 % (ref 43–75)
NRBC BLD AUTO-RTO: 0 /100 WBCS
PLATELET # BLD AUTO: 154 THOUSANDS/UL (ref 149–390)
PMV BLD AUTO: 10.8 FL (ref 8.9–12.7)
RBC # BLD AUTO: 3.93 MILLION/UL (ref 3.88–5.62)
WBC # BLD AUTO: 6.06 THOUSAND/UL (ref 4.31–10.16)

## 2020-01-30 PROCEDURE — 85025 COMPLETE CBC W/AUTO DIFF WBC: CPT

## 2020-02-14 ENCOUNTER — IN-CLINIC DEVICE VISIT (OUTPATIENT)
Dept: CARDIOLOGY CLINIC | Facility: CLINIC | Age: 85
End: 2020-02-14
Payer: COMMERCIAL

## 2020-02-14 DIAGNOSIS — Z95.810 PRESENCE OF AUTOMATIC CARDIOVERTER/DEFIBRILLATOR (AICD): Primary | ICD-10-CM

## 2020-02-14 PROCEDURE — 93283 PRGRMG EVAL IMPLANTABLE DFB: CPT | Performed by: INTERNAL MEDICINE

## 2020-02-14 NOTE — PROGRESS NOTES
Results for orders placed or performed in visit on 02/14/20   Cardiac EP device report    Narrative    MDT-DUAL CHAMBER ICD (AAIR-DDDR MODE)  DEVICE INTERROGATED IN THE [facility] OFFICE  BATTERY VOLTAGE ADEQUATE (9 7 YRS)  AP: 59 4%  : 1 0%  ALL LEAD PARAMETERS WITHIN NORMAL LIMITS  1 VT EPISODE W/ EGRAM SHOWING VT @ 194 BPM, ATPX 5 UNSUCCESSFUL, 35 J SHOCK X 1 SUCCESSFUL FOR TERMINATION OF VT   1 VT MONITORED EPISODE SAME DAY AS SHOCK W/ EGRAM SHOWING VT @ 194 BPM DURATION APPROX 1 HR 48 MINS  ( NO THERAPY)  DATE OF VT WAS 11/15/19  PT DOES NOT REMEMEBER GETTING SHOCK, PT ASYMPTOMATIC, SAW DR Kimberly Nelson ON 12/9/19  PT TAKES COREG, WARFARIN  EF: 29% (NU[facility] ST [place] 10/24/18)  OPTI-VOL WITHIN NORMAL LIMITS  TASK HIGH PRIORITY DR Kimberly Nelson   NO PROGRAMMING CHANGES MADE TO DEVICE PARAMETERS  APPROPRIATELY FUNCTIONING ICD    [address]

## 2020-02-28 ENCOUNTER — TELEPHONE (OUTPATIENT)
Dept: OTHER | Facility: OTHER | Age: 85
End: 2020-02-28

## 2020-02-28 ENCOUNTER — APPOINTMENT (OUTPATIENT)
Dept: LAB | Age: 85
End: 2020-02-28
Payer: COMMERCIAL

## 2020-02-28 ENCOUNTER — OFFICE VISIT (OUTPATIENT)
Dept: CARDIOLOGY CLINIC | Facility: CLINIC | Age: 85
End: 2020-02-28
Payer: COMMERCIAL

## 2020-02-28 ENCOUNTER — ANTICOAG VISIT (OUTPATIENT)
Dept: CARDIOLOGY CLINIC | Facility: CLINIC | Age: 85
End: 2020-02-28

## 2020-02-28 VITALS
HEART RATE: 62 BPM | SYSTOLIC BLOOD PRESSURE: 160 MMHG | WEIGHT: 159.4 LBS | HEIGHT: 63 IN | DIASTOLIC BLOOD PRESSURE: 82 MMHG | BODY MASS INDEX: 28.24 KG/M2

## 2020-02-28 DIAGNOSIS — I48.91 ATRIAL FIBRILLATION, UNSPECIFIED TYPE (HCC): Primary | ICD-10-CM

## 2020-02-28 DIAGNOSIS — I47.2 VT (VENTRICULAR TACHYCARDIA) (HCC): ICD-10-CM

## 2020-02-28 DIAGNOSIS — I48.0 PAF (PAROXYSMAL ATRIAL FIBRILLATION) (HCC): ICD-10-CM

## 2020-02-28 PROCEDURE — 99215 OFFICE O/P EST HI 40 MIN: CPT | Performed by: INTERNAL MEDICINE

## 2020-02-28 PROCEDURE — 93000 ELECTROCARDIOGRAM COMPLETE: CPT | Performed by: INTERNAL MEDICINE

## 2020-02-28 RX ORDER — AMIODARONE HYDROCHLORIDE 200 MG/1
200 TABLET ORAL DAILY
Qty: 90 TABLET | Refills: 3 | Status: SHIPPED | OUTPATIENT
Start: 2020-02-28 | End: 2020-03-04 | Stop reason: SDDI

## 2020-02-28 NOTE — LETTER
February 28, 2020     Alexandra Hollingsworth DO  [facility] [address]    Patient: Homer Alcantar   YOB: 1932   Date of Visit: 2/28/2020       Dear Dr Cinthya Ladd: Thank you for referring Homer Alcantar to me for evaluation  Below are my notes for this consultation  If you have questions, please do not hesitate to call me  I look forward to following your patient along with you  Sincerely,        Gideon Thomas MD        CC: No Recipients  Gideon Thomas MD  2/28/2020  5:22 PM  Sign at close encounter                                             Cardiology Follow Up    Homer Alcantar  10/31/1932  [address]   [place] [address] [id number] [phone]    1  Atrial fibrillation, unspecified type ([address] )  POCT ECG   2  VT (ventricular tachycardia) ([facility])  amiodarone 200 mg tablet       Patient was 1st seen May 23, 2016  He had moved from [place] and was seeking to establish cardiology care  In 1989, he had a myocardial infarction treated by PTCA resulting in an ischemic cardiomyopathy with an ejection fraction of 35%  He has a Medtronic ICD which was placed on May 9th, 2011 in [place]  He is on chronic oral anticoagulation  He has a history of paroxysmal atrial fibrillation with a TIA/CVA in the past  Prior history includes myocardial infarction, ventricular tachycardia, TIA/CVA, GI bleed, hypertension and hyperlipidemia  10/24/2018 echocardiogram  Borderline dilated left ventricular cavity with some thinning of the apex and anterior and anteroseptal walls, severely reduced left ventricular systolic function with akinesis of the distal anterior, anteroseptal and apical wall and  hypokinesis of other regions  Ejection fraction is estimated as around 25-30%  There is grade 1 diastolic dysfunction  Mild left atrial cavity enlargement  Aortic valve sclerosis, no aortic stenosis or regurgitation    Mitral annular calcification, trace mitral valve regurgitation  Mild tricuspid valve regurgitation  No obvious pulmonary hypertension  No pericardial effusion  10/24/2018 nuclear stress test:  IMPRESSIONS: 1  Abnormal myocardial perfusion scan  2  Evidence of extensive, severe intensity fixed perfusion abnormalities of the anterior wall without evidence of ischemia  3  Dilated left ventricular cavity with severely reduced left ventricular systolic function  Ejection fraction calculated as 29%  Diagnostic sensitivity was limited by submaximal stress  12/12/2019 lipid profile: Total cholesterol 158, triglycerides 105 HDL direct 52, LDL calculated 85  Interval History:  Patient with a history of coronary artery disease and prior myocardial infarction was scheduled to come to the office following a device check which demonstrated defibrillator discharge  Patient was totally unaware that the defibrillator shocked him  He was in ventricular tachycardia and may have passed out so that he did not feel the shock  The interrogate shin of his device demonstrated ventricular tachycardia at a rate of 194 beats per minute  ATP was tried 5 times unsuccessfully and then a shock was delivered successfully  However, the patient had a no other episode of ventricular tachycardia during which his rate was changing  The most rapid rate was 194 beats per minute but the rate did not stay at that rate and frequently drop below 190 beats per minute which was out of the zone for therapy  As a result, he was in ventricular tachycardia for 1 hour and 48 minutes without therapy  Again, the patient was unaware of this rhythm  The shock occurred in the evening around 8:00 p m  BYOM!     Patient Active Problem List   Diagnosis    Cardiac defibrillator in situ    Chronic kidney disease    [facility]    Hyperlipidemia    Old myocardial infarction    Status post percutaneous transluminal coronary angioplasty    Ventricular tachycardia ([address] )    Ischemic cardiomyopathy    PAF (paroxysmal atrial fibrillation) (HCC)    Essential hypertension     Past Medical History:   Diagnosis Date    Anemia     Arthritis     Coronary artery disease     Hyperlipidemia     Hypertension      Social History     Socioeconomic History    Marital status: /Civil Union     Spouse name: Not on file    Number of children: Not on file    Years of education: Not on file    Highest education level: Not on file   Occupational History    Not on file   Social Needs    Financial resource strain: Not on file    Food insecurity:     Worry: Not on file     Inability: Not on file    Transportation needs:     Medical: Not on file     Non-medical: Not on file   Tobacco Use    Smoking status: Never Smoker    Smokeless tobacco: Never Used   Substance and Sexual Activity    Alcohol use:  Yes     Alcohol/week: 3 0 standard drinks     Types: 3 Shots of liquor per week     Comment: daily    Drug use: Not on file    Sexual activity: Not on file   Lifestyle    Physical activity:     Days per week: Not on file     Minutes per session: Not on file    Stress: Not on file   Relationships    Social connections:     Talks on phone: Not on file     Gets together: Not on file     Attends Oriental orthodox service: Not on file     Active member of club or organization: Not on file     Attends meetings of clubs or organizations: Not on file     Relationship status: Not on file    Intimate partner violence:     Fear of current or ex partner: Not on file     Emotionally abused: Not on file     Physically abused: Not on file     Forced sexual activity: Not on file   Other Topics Concern    Not on file   Social History Narrative    Not on file      Family History   Problem Relation Age of Onset    Coronary artery disease Mother     Heart attack Father         MI    Stroke Sister      Past Surgical History:   Procedure Laterality Date    CARDIAC PACEMAKER PLACEMENT 05/09/2011    Medtronic dual chamber ICD implant    CORONARY ANGIOPLASTY  1989       Current Outpatient Medications:     atorvastatin (LIPITOR) 40 mg tablet, Take 1 tablet (40 mg total) by mouth daily, Disp: 90 tablet, Rfl: 3    carvedilol (COREG) 12 5 mg tablet, Take 1 tablet (12 5 mg total) by mouth 2 (two) times a day with meals, Disp: 180 tablet, Rfl: 3    Cholecalciferol 2000 units CAPS, Take 1 capsule by mouth, Disp: , Rfl:     lisinopril (ZESTRIL) 5 mg tablet, Take 1 tablet (5 mg total) by mouth daily, Disp: 90 tablet, Rfl: 3    spironolactone (ALDACTONE) 25 mg tablet, Take 0 5 tablets (12 5 mg total) by mouth daily, Disp: 45 tablet, Rfl: 3    warfarin (COUMADIN) 2 mg tablet, TAKE 1 AND 1/2 TABLETS  DAILY OR AS DIRECTED BY MD, Disp: 150 tablet, Rfl: 3    amiodarone 200 mg tablet, Take 1 tablet (200 mg total) by mouth daily, Disp: 90 tablet, Rfl: 3  No Known Allergies    Results for orders placed or performed in visit on 02/28/20   POCT ECG    Narrative    Atrial paced rhythm at a rate of 63 beats per minute  Left axis deviation  T-wave inversions in V 1 through V4 with flattening in V5 not significantly changed from prior record  Old anterior septal myocardial infarction  Abnormal EKG           Lab Results   Component Value Date    CHOLESTEROL 158 12/12/2019     Lab Results   Component Value Date    HDL 52 12/12/2019     Lab Results   Component Value Date    TRIG 105 12/12/2019     No results found for: [place]  Lab Results   Component Value Date    SODIUM 140 12/12/2019    K 4 2 12/12/2019     12/12/2019    CO2 28 12/12/2019    BUN 24 12/12/2019    CREATININE 1 18 12/12/2019    GLUC 98 12/10/2018    CALCIUM 9 0 12/12/2019     Lab Results   Component Value Date    SODIUM 140 12/12/2019    K 4 2 12/12/2019     12/12/2019    CO2 28 12/12/2019    AGAP 5 12/12/2019    BUN 24 12/12/2019    CREATININE 1 18 12/12/2019    GLUC 98 12/10/2018    GLUF 85 12/12/2019    CALCIUM 9 0 12/12/2019    AST 24 12/12/2019    ALT 23 12/12/2019    ALKPHOS 36 (L) 12/12/2019    TP 6 7 12/12/2019    TBILI 0 57 12/12/2019    EGFR 55 12/12/2019     Lab Results   Component Value Date    WBC 6 06 01/30/2020    HGB 13 0 01/30/2020    HCT 40 1 01/30/2020     (H) 01/30/2020     01/30/2020       Review of Systems:  Review of Systems   Constitutional: Negative for activity change  Respiratory: Negative for cough, chest tightness, shortness of breath and wheezing  Cardiovascular: Negative for chest pain, palpitations and leg swelling  Musculoskeletal: Negative for gait problem  Skin: Negative for color change  Neurological: Negative for dizziness, tremors, syncope, weakness, light-headedness and headaches  Psychiatric/Behavioral: Negative for agitation and confusion  Physical Exam:  /82 (BP Location: Left arm, Patient Position: Sitting, Cuff Size: Adult)   Pulse 62   Ht 5' 3" (1 6 m)   Wt 72 3 kg (159 lb 6 4 oz)   BMI 28 24 kg/m²    Physical Exam   Constitutional: He is oriented to person, place, and time  He appears well-developed and well-nourished  No distress  HENT:   Head: Normocephalic and atraumatic  Neck: No JVD present  Cardiovascular: Normal rate, regular rhythm, normal heart sounds and intact distal pulses  Exam reveals no gallop and no friction rub  No murmur heard  Pulmonary/Chest: Effort normal and breath sounds normal  No respiratory distress  He has no wheezes  He has no rales  He exhibits no tenderness  Abdominal: Soft  Bowel sounds are normal  He exhibits no distension  Musculoskeletal: He exhibits no edema  Neurological: He is alert and oriented to person, place, and time  Skin: Skin is warm and dry  Psychiatric: He has a normal mood and affect  His behavior is normal        Discussion/Summary:  1  Patient's defibrillator reprogrammed with reduction in his ventricular tachycardia zone to a heart rate of 150 beats per minute   The ventricular fibrillation zone was not change  A monitor zone was placed below 150 beats per minute  2  Begin amiodarone 200 mg daily  3  Return in 4-5 weeks  4   EKG on return

## 2020-02-28 NOTE — PROGRESS NOTES
Cardiology Follow Up    Rahat Hughes  10/31/1932  [address] [id number]  [phone]    1  Atrial fibrillation, unspecified type ([address] )  POCT ECG   2  VT (ventricular tachycardia) ([facility])  amiodarone 200 mg tablet       Patient was 1st seen May 23, 2016  He had moved from [place] and was seeking to establish cardiology care  In 1989, he had a myocardial infarction treated by PTCA resulting in an ischemic cardiomyopathy with an ejection fraction of 35%  He has a Medtronic ICD which was placed on May 9th, 2011 in [place]  He is on chronic oral anticoagulation  He has a history of paroxysmal atrial fibrillation with a TIA/CVA in the past  Prior history includes myocardial infarction, ventricular tachycardia, TIA/CVA, GI bleed, hypertension and hyperlipidemia  10/24/2018 echocardiogram  Borderline dilated left ventricular cavity with some thinning of the apex and anterior and anteroseptal walls, severely reduced left ventricular systolic function with akinesis of the distal anterior, anteroseptal and apical wall and  hypokinesis of other regions  Ejection fraction is estimated as around 25-30%  There is grade 1 diastolic dysfunction  Mild left atrial cavity enlargement  Aortic valve sclerosis, no aortic stenosis or regurgitation  Mitral annular calcification, trace mitral valve regurgitation  Mild tricuspid valve regurgitation  No obvious pulmonary hypertension  No pericardial effusion  10/24/2018 nuclear stress test:  IMPRESSIONS: 1  Abnormal myocardial perfusion scan  2  Evidence of extensive, severe intensity fixed perfusion abnormalities of the anterior wall without evidence of ischemia  3  Dilated left ventricular cavity with severely reduced left ventricular systolic function  Ejection fraction calculated as 29%   Diagnostic sensitivity was limited by submaximal stress  12/12/2019 lipid profile: Total cholesterol 158, triglycerides 105 HDL direct 52, LDL calculated 85  Interval History:  Patient with a history of coronary artery disease and prior myocardial infarction was scheduled to come to the office following a device check which demonstrated defibrillator discharge  Patient was totally unaware that the defibrillator shocked him  He was in ventricular tachycardia and may have passed out so that he did not feel the shock  The interrogate shin of his device demonstrated ventricular tachycardia at a rate of 194 beats per minute  ATP was tried 5 times unsuccessfully and then a shock was delivered successfully  However, the patient had a no other episode of ventricular tachycardia during which his rate was changing  The most rapid rate was 194 beats per minute but the rate did not stay at that rate and frequently drop below 190 beats per minute which was out of the zone for therapy  As a result, he was in ventricular tachycardia for 1 hour and 48 minutes without therapy  Again, the patient was unaware of this rhythm  The shock occurred in the evening around 8:00 p m  Mo Stubbs     Patient Active Problem List   Diagnosis    Cardiac defibrillator in situ    Chronic kidney disease    [facility] [address] [facility]    Hyperlipidemia    Old myocardial infarction    Status post percutaneous transluminal coronary angioplasty    Ventricular tachycardia (HCC)    Ischemic cardiomyopathy    PAF (paroxysmal atrial fibrillation) ([facility] [facility] )    Essential hypertension     Past Medical History:   Diagnosis Date    Anemia     Arthritis     Coronary artery disease     Hyperlipidemia     Hypertension      Social History     Socioeconomic History    Marital status: /Civil Union     Spouse name: Not on file    Number of children: Not on file    Years of education: Not on file    Highest education level: Not on file   Occupational History    Not on file   Social Needs    Financial resource strain: Not on file    Food insecurity:     Worry: Not on file     Inability: Not on file    Transportation needs:     Medical: Not on file     Non-medical: Not on file   Tobacco Use    Smoking status: Never Smoker    Smokeless tobacco: Never Used   Substance and Sexual Activity    Alcohol use:  Yes     Alcohol/week: 3 0 standard drinks     Types: 3 Shots of liquor per week     Comment: daily    Drug use: Not on file    Sexual activity: Not on file   Lifestyle    Physical activity:     Days per week: Not on file     Minutes per session: Not on file    Stress: Not on file   Relationships    Social connections:     Talks on phone: Not on file     Gets together: Not on file     Attends [employer] service: Not on file     Active member of club or organization: Not on file     Attends meetings of clubs or organizations: Not on file     Relationship status: Not on file    Intimate partner violence:     Fear of current or ex partner: Not on file     Emotionally abused: Not on file     Physically abused: Not on file     Forced sexual activity: Not on file   Other Topics Concern    Not on file   Social History Narrative    Not on file      Family History   Problem Relation Age of Onset    Coronary artery disease Mother     Heart attack Father         MI    Stroke Sister      Past Surgical History:   Procedure Laterality Date    CARDIAC PACEMAKER PLACEMENT  05/09/2011    Medtronic dual chamber ICD implant    CORONARY ANGIOPLASTY  1989       Current Outpatient Medications:     atorvastatin (LIPITOR) 40 mg tablet, Take 1 tablet (40 mg total) by mouth daily, Disp: 90 tablet, Rfl: 3    carvedilol (COREG) 12 5 mg tablet, Take 1 tablet (12 5 mg total) by mouth 2 (two) times a day with meals, Disp: 180 tablet, Rfl: 3    Cholecalciferol 2000 units CAPS, Take 1 capsule by mouth, Disp: , Rfl:     lisinopril (ZESTRIL) 5 mg tablet, Take 1 tablet (5 mg total) by mouth daily, Disp: 90 tablet, Rfl: 3    spironolactone (ALDACTONE) 25 mg tablet, Take 0 5 tablets (12 5 mg total) by mouth daily, Disp: 45 tablet, Rfl: 3    warfarin (COUMADIN) 2 mg tablet, TAKE 1 AND 1/2 TABLETS  DAILY OR AS DIRECTED BY MD, Disp: 150 tablet, Rfl: 3    amiodarone 200 mg tablet, Take 1 tablet (200 mg total) by mouth daily, Disp: 90 tablet, Rfl: 3  No Known Allergies    Results for orders placed or performed in visit on 02/28/20   POCT ECG    Narrative    Atrial paced rhythm at a rate of 63 beats per minute  Left axis deviation  T-wave inversions in V 1 through V4 with flattening in V5 not significantly changed from prior record  Old anterior septal myocardial infarction  Abnormal EKG  Lab Results   Component Value Date    CHOLESTEROL 158 12/12/2019     Lab Results   Component Value Date    HDL 52 12/12/2019     Lab Results   Component Value Date    TRIG 105 12/12/2019     No results found for: [facility]  Lab Results   Component Value Date    SODIUM 140 12/12/2019    K 4 2 12/12/2019     12/12/2019    CO2 28 12/12/2019    BUN 24 12/12/2019    CREATININE 1 18 12/12/2019    GLUC 98 12/10/2018    CALCIUM 9 0 12/12/2019     Lab Results   Component Value Date    SODIUM 140 12/12/2019    K 4 2 12/12/2019     12/12/2019    CO2 28 12/12/2019    AGAP 5 12/12/2019    BUN 24 12/12/2019    CREATININE 1 18 12/12/2019    GLUC 98 12/10/2018    GLUF 85 12/12/2019    CALCIUM 9 0 12/12/2019    AST 24 12/12/2019    ALT 23 12/12/2019    ALKPHOS 36 (L) 12/12/2019    TP 6 7 12/12/2019    TBILI 0 57 12/12/2019    EGFR 55 12/12/2019     Lab Results   Component Value Date    WBC 6 06 01/30/2020    HGB 13 0 01/30/2020    HCT 40 1 01/30/2020     (H) 01/30/2020     01/30/2020       Review of Systems:  Review of Systems   Constitutional: Negative for activity change  Respiratory: Negative for cough, chest tightness, shortness of breath and wheezing  Cardiovascular: Negative for chest pain, palpitations and leg swelling  Musculoskeletal: Negative for gait problem  Skin: Negative for color change  Neurological: Negative for dizziness, tremors, syncope, weakness, light-headedness and headaches  Psychiatric/Behavioral: Negative for agitation and confusion  Physical Exam:  /82 (BP Location: Left arm, Patient Position: Sitting, Cuff Size: Adult)   Pulse 62   Ht 5' 3" (1 6 m)   Wt 72 3 kg (159 lb 6 4 oz)   BMI 28 24 kg/m²    Physical Exam   Constitutional: He is oriented to person, place, and time  He appears well-developed and well-nourished  No distress  HENT:   Head: Normocephalic and atraumatic  Neck: No JVD present  Cardiovascular: Normal rate, regular rhythm, normal heart sounds and intact distal pulses  Exam reveals no gallop and no friction rub  No murmur heard  Pulmonary/Chest: Effort normal and breath sounds normal  No respiratory distress  He has no wheezes  He has no rales  He exhibits no tenderness  Abdominal: Soft  Bowel sounds are normal  He exhibits no distension  Musculoskeletal: He exhibits no edema  Neurological: He is alert and oriented to person, place, and time  Skin: Skin is warm and dry  Psychiatric: He has a normal mood and affect  His behavior is normal        Discussion/Summary:  1  Patient's defibrillator reprogrammed with reduction in his ventricular tachycardia zone to a heart rate of 150 beats per minute  The ventricular fibrillation zone was not change  A monitor zone was placed below 150 beats per minute  2  Begin amiodarone 200 mg daily  3  Return in 4-5 weeks  4   EKG on return

## 2020-02-28 NOTE — TELEPHONE ENCOUNTER
Patient is calling to leave a msg because he wants his script for Amiodarone 200mg 90 day supply sent to [facility]

## 2020-03-01 ENCOUNTER — TELEPHONE (OUTPATIENT)
Dept: OTHER | Facility: OTHER | Age: 85
End: 2020-03-01

## 2020-03-01 NOTE — TELEPHONE ENCOUNTER
MSG: PT STATED THEY WOULD LIKE TO CANCEL PRESCRIPTION BEING SENT TO [facility]- MEDICATION IS AMIODRANE 200 MILLIGRAMS- PT STATED THEY DECIDED AGAINST TAKING MEDICTION- WOULD LIKE CALL BACK

## 2020-03-02 ENCOUNTER — TELEPHONE (OUTPATIENT)
Dept: CARDIOLOGY CLINIC | Facility: CLINIC | Age: 85
End: 2020-03-02

## 2020-03-02 NOTE — TELEPHONE ENCOUNTER
Called patient left message to return call ref 2 calls received through service in reference to amiodorone Rx and possibly not wanting to start medication as discussed with Dr Bear Quinones at [facility] [place] Friday

## 2020-03-04 NOTE — TELEPHONE ENCOUNTER
Pt returned call 2 days later and states he has decideded not to start the amiodorone that was discussed at [address] on 2/28  Pt states Dr Ruth Hodges gave him a choice, "I decide to do nothing will not start medictaion"   FYI

## 2020-03-23 ENCOUNTER — TELEPHONE (OUTPATIENT)
Dept: CARDIOLOGY CLINIC | Facility: CLINIC | Age: 85
End: 2020-03-23

## 2020-03-23 NOTE — TELEPHONE ENCOUNTER
pt advised ok to wait until Mid May,pt will have device interrogated 3/28 EP clinical advised mpt put on recall list

## 2020-03-27 ENCOUNTER — REMOTE DEVICE CLINIC VISIT (OUTPATIENT)
Dept: CARDIOLOGY CLINIC | Facility: CLINIC | Age: 85
End: 2020-03-27
Payer: COMMERCIAL

## 2020-03-27 DIAGNOSIS — Z95.810 PRESENCE OF AUTOMATIC CARDIOVERTER/DEFIBRILLATOR (AICD): Primary | ICD-10-CM

## 2020-03-27 PROCEDURE — 93295 DEV INTERROG REMOTE 1/2/MLT: CPT | Performed by: INTERNAL MEDICINE

## 2020-03-27 PROCEDURE — 93296 REM INTERROG EVL PM/IDS: CPT | Performed by: INTERNAL MEDICINE

## 2020-03-27 NOTE — PROGRESS NOTES
Results for orders placed or performed in visit on 03/27/20   Cardiac EP device report    Narrative    MDT-DUAL CHAMBER ICD (AAIR-DDDR MODE)  [employer]EL[employer] TRANSMISSION  PER DR Tona Low: BATTERY VOLTAGE ADEQUATE (9 5 YRS)  AP: 60 3%  : 1 0% (MVP-ON)  ALL AVAILABLE LEAD PARAMETERS WITHIN NORMAL LIMITS  1 VT EPISODE ON 3/24/20 @ 1:19 AM W/ EGRAM SHOWING VT @ 194 BPM, ATP 1-3 UNSUCCESSFUL, ATP X 4 SUCCESSFUL  1 VT-NS MONITORED EPISODE ON 3/6/20 W/ EGRAM SHOWING NSVT 6 BEATS @ 168 BPM (NO TX REQUIRED)  PT TAKES COREG, WARFARIN  OPTI-VOL WITHIN NORMAL LIMITS  TIGER TEXT PER DR Tona Low REQUEST SENT  APPROPRIATELY FUNCTIONING ICD    [address] [place] [address] Street

## 2020-04-02 ENCOUNTER — APPOINTMENT (OUTPATIENT)
Dept: LAB | Age: 85
End: 2020-04-02
Payer: COMMERCIAL

## 2020-04-03 ENCOUNTER — ANTICOAG VISIT (OUTPATIENT)
Dept: CARDIOLOGY CLINIC | Facility: CLINIC | Age: 85
End: 2020-04-03

## 2020-04-03 DIAGNOSIS — I48.0 PAF (PAROXYSMAL ATRIAL FIBRILLATION) (HCC): ICD-10-CM

## 2020-04-08 ENCOUNTER — OFFICE VISIT (OUTPATIENT)
Dept: URGENT CARE | Age: 85
End: 2020-04-08
Payer: COMMERCIAL

## 2020-04-08 ENCOUNTER — APPOINTMENT (OUTPATIENT)
Dept: RADIOLOGY | Age: 85
End: 2020-04-08
Payer: COMMERCIAL

## 2020-04-08 VITALS
RESPIRATION RATE: 18 BRPM | BODY MASS INDEX: 27.11 KG/M2 | DIASTOLIC BLOOD PRESSURE: 88 MMHG | HEIGHT: 63 IN | SYSTOLIC BLOOD PRESSURE: 151 MMHG | TEMPERATURE: 97.7 F | OXYGEN SATURATION: 96 % | HEART RATE: 83 BPM | WEIGHT: 153 LBS

## 2020-04-08 DIAGNOSIS — M79.671 RIGHT FOOT PAIN: ICD-10-CM

## 2020-04-08 DIAGNOSIS — M10.9 ACUTE GOUT INVOLVING TOE OF RIGHT FOOT, UNSPECIFIED CAUSE: ICD-10-CM

## 2020-04-08 DIAGNOSIS — M79.671 RIGHT FOOT PAIN: Primary | ICD-10-CM

## 2020-04-08 PROCEDURE — 99203 OFFICE O/P NEW LOW 30 MIN: CPT | Performed by: NURSE PRACTITIONER

## 2020-04-08 PROCEDURE — 73630 X-RAY EXAM OF FOOT: CPT

## 2020-04-08 PROCEDURE — G0463 HOSPITAL OUTPT CLINIC VISIT: HCPCS | Performed by: NURSE PRACTITIONER

## 2020-04-08 RX ORDER — PREDNISONE 10 MG/1
TABLET ORAL
Qty: 21 TABLET | Refills: 0 | Status: SHIPPED | OUTPATIENT
Start: 2020-04-08 | End: 2020-05-28 | Stop reason: ALTCHOICE

## 2020-04-18 ENCOUNTER — OFFICE VISIT (OUTPATIENT)
Dept: URGENT CARE | Age: 85
End: 2020-04-18
Payer: COMMERCIAL

## 2020-04-18 VITALS
SYSTOLIC BLOOD PRESSURE: 154 MMHG | BODY MASS INDEX: 27.11 KG/M2 | HEART RATE: 83 BPM | TEMPERATURE: 97.9 F | WEIGHT: 153 LBS | DIASTOLIC BLOOD PRESSURE: 91 MMHG | OXYGEN SATURATION: 97 % | RESPIRATION RATE: 18 BRPM | HEIGHT: 63 IN

## 2020-04-18 DIAGNOSIS — M10.9 GOUT, UNSPECIFIED CAUSE, UNSPECIFIED CHRONICITY, UNSPECIFIED SITE: Primary | ICD-10-CM

## 2020-04-18 PROCEDURE — G0463 HOSPITAL OUTPT CLINIC VISIT: HCPCS | Performed by: PHYSICIAN ASSISTANT

## 2020-04-18 PROCEDURE — 99213 OFFICE O/P EST LOW 20 MIN: CPT | Performed by: PHYSICIAN ASSISTANT

## 2020-04-18 RX ORDER — PETROLATUM,WHITE/LANOLIN
1 OINTMENT (GRAM) TOPICAL EVERY 12 HOURS
COMMUNITY
End: 2022-01-01 | Stop reason: HOSPADM

## 2020-04-18 RX ORDER — ACETAMINOPHEN 160 MG
2000 TABLET,DISINTEGRATING ORAL DAILY
COMMUNITY
End: 2022-01-01 | Stop reason: HOSPADM

## 2020-04-18 RX ORDER — PREDNISONE 10 MG/1
TABLET ORAL
Qty: 30 TABLET | Refills: 0 | Status: SHIPPED | OUTPATIENT
Start: 2020-04-18 | End: 2020-05-28 | Stop reason: ALTCHOICE

## 2020-04-18 RX ORDER — FERROUS SULFATE 325(65) MG
1 TABLET ORAL EVERY 24 HOURS
COMMUNITY
End: 2020-07-02 | Stop reason: ALTCHOICE

## 2020-04-18 RX ORDER — ASPIRIN 81 MG/1
1 TABLET, CHEWABLE ORAL EVERY 24 HOURS
COMMUNITY
Start: 2016-05-23 | End: 2020-07-02 | Stop reason: ALTCHOICE

## 2020-04-29 ENCOUNTER — ANTICOAG VISIT (OUTPATIENT)
Dept: CARDIOLOGY CLINIC | Facility: CLINIC | Age: 85
End: 2020-04-29

## 2020-04-29 DIAGNOSIS — I48.0 PAF (PAROXYSMAL ATRIAL FIBRILLATION) (HCC): ICD-10-CM

## 2020-04-29 LAB — INR PPP: 4 (ref 0.84–1.19)

## 2020-05-13 ENCOUNTER — ANTICOAG VISIT (OUTPATIENT)
Dept: CARDIOLOGY CLINIC | Facility: CLINIC | Age: 85
End: 2020-05-13

## 2020-05-13 ENCOUNTER — APPOINTMENT (OUTPATIENT)
Dept: LAB | Age: 85
End: 2020-05-13
Payer: COMMERCIAL

## 2020-05-13 DIAGNOSIS — I48.0 PAF (PAROXYSMAL ATRIAL FIBRILLATION) (HCC): ICD-10-CM

## 2020-05-27 ENCOUNTER — TELEPHONE (OUTPATIENT)
Dept: CARDIOLOGY CLINIC | Facility: CLINIC | Age: 85
End: 2020-05-27

## 2020-05-28 ENCOUNTER — OFFICE VISIT (OUTPATIENT)
Dept: CARDIOLOGY CLINIC | Facility: CLINIC | Age: 85
End: 2020-05-28
Payer: COMMERCIAL

## 2020-05-28 VITALS
BODY MASS INDEX: 26.75 KG/M2 | DIASTOLIC BLOOD PRESSURE: 80 MMHG | TEMPERATURE: 97.7 F | HEIGHT: 63 IN | OXYGEN SATURATION: 97 % | WEIGHT: 151 LBS | SYSTOLIC BLOOD PRESSURE: 122 MMHG | HEART RATE: 67 BPM

## 2020-05-28 DIAGNOSIS — Z95.810 CARDIAC DEFIBRILLATOR IN SITU: ICD-10-CM

## 2020-05-28 DIAGNOSIS — Z98.61 STATUS POST PERCUTANEOUS TRANSLUMINAL CORONARY ANGIOPLASTY: ICD-10-CM

## 2020-05-28 DIAGNOSIS — I25.5 ISCHEMIC CARDIOMYOPATHY: ICD-10-CM

## 2020-05-28 DIAGNOSIS — I47.2 VENTRICULAR TACHYCARDIA (HCC): Primary | ICD-10-CM

## 2020-05-28 DIAGNOSIS — I25.2 OLD MYOCARDIAL INFARCTION: ICD-10-CM

## 2020-05-28 DIAGNOSIS — E78.2 MIXED HYPERLIPIDEMIA: ICD-10-CM

## 2020-05-28 DIAGNOSIS — I10 ESSENTIAL HYPERTENSION: ICD-10-CM

## 2020-05-28 DIAGNOSIS — I48.0 PAF (PAROXYSMAL ATRIAL FIBRILLATION) (HCC): ICD-10-CM

## 2020-05-28 DIAGNOSIS — I63.40 CEREBROVASCULAR ACCIDENT (CVA) DUE TO EMBOLISM OF CEREBRAL ARTERY (HCC): ICD-10-CM

## 2020-05-28 PROCEDURE — 99215 OFFICE O/P EST HI 40 MIN: CPT | Performed by: INTERNAL MEDICINE

## 2020-05-28 RX ORDER — AMIODARONE HYDROCHLORIDE 200 MG/1
200 TABLET ORAL DAILY
Qty: 90 TABLET | Refills: 3 | Status: SHIPPED | OUTPATIENT
Start: 2020-05-28 | End: 2020-12-29 | Stop reason: SDUPTHER

## 2020-06-04 ENCOUNTER — REMOTE DEVICE CLINIC VISIT (OUTPATIENT)
Dept: CARDIOLOGY CLINIC | Facility: CLINIC | Age: 85
End: 2020-06-04
Payer: COMMERCIAL

## 2020-06-04 DIAGNOSIS — Z95.810 AICD (AUTOMATIC CARDIOVERTER/DEFIBRILLATOR) PRESENT: Primary | ICD-10-CM

## 2020-06-04 PROCEDURE — 93295 DEV INTERROG REMOTE 1/2/MLT: CPT | Performed by: INTERNAL MEDICINE

## 2020-06-04 PROCEDURE — 93296 REM INTERROG EVL PM/IDS: CPT | Performed by: INTERNAL MEDICINE

## 2020-06-11 ENCOUNTER — ANTICOAG VISIT (OUTPATIENT)
Dept: CARDIOLOGY CLINIC | Facility: CLINIC | Age: 85
End: 2020-06-11

## 2020-06-11 ENCOUNTER — APPOINTMENT (OUTPATIENT)
Dept: LAB | Age: 85
End: 2020-06-11
Payer: COMMERCIAL

## 2020-06-11 DIAGNOSIS — I63.40 CEREBROVASCULAR ACCIDENT (CVA) DUE TO EMBOLISM OF CEREBRAL ARTERY (HCC): ICD-10-CM

## 2020-06-11 DIAGNOSIS — I48.0 PAF (PAROXYSMAL ATRIAL FIBRILLATION) (HCC): ICD-10-CM

## 2020-06-16 ENCOUNTER — TELEPHONE (OUTPATIENT)
Dept: CARDIOLOGY CLINIC | Facility: CLINIC | Age: 85
End: 2020-06-16

## 2020-07-02 ENCOUNTER — OFFICE VISIT (OUTPATIENT)
Dept: CARDIOLOGY CLINIC | Facility: CLINIC | Age: 85
End: 2020-07-02
Payer: COMMERCIAL

## 2020-07-02 VITALS
WEIGHT: 151.8 LBS | SYSTOLIC BLOOD PRESSURE: 134 MMHG | DIASTOLIC BLOOD PRESSURE: 80 MMHG | BODY MASS INDEX: 26.89 KG/M2 | HEART RATE: 66 BPM | TEMPERATURE: 98.4 F

## 2020-07-02 DIAGNOSIS — I10 ESSENTIAL HYPERTENSION: ICD-10-CM

## 2020-07-02 DIAGNOSIS — I25.5 ISCHEMIC CARDIOMYOPATHY: ICD-10-CM

## 2020-07-02 DIAGNOSIS — I47.2 VENTRICULAR TACHYCARDIA (HCC): Primary | ICD-10-CM

## 2020-07-02 DIAGNOSIS — N18.30 STAGE 3 CHRONIC KIDNEY DISEASE (HCC): ICD-10-CM

## 2020-07-02 DIAGNOSIS — Z95.810 CARDIAC DEFIBRILLATOR IN SITU: ICD-10-CM

## 2020-07-02 DIAGNOSIS — I48.0 PAF (PAROXYSMAL ATRIAL FIBRILLATION) (HCC): ICD-10-CM

## 2020-07-02 DIAGNOSIS — Z98.61 STATUS POST PERCUTANEOUS TRANSLUMINAL CORONARY ANGIOPLASTY: ICD-10-CM

## 2020-07-02 DIAGNOSIS — I25.2 OLD MYOCARDIAL INFARCTION: ICD-10-CM

## 2020-07-02 DIAGNOSIS — E78.2 MIXED HYPERLIPIDEMIA: ICD-10-CM

## 2020-07-02 PROCEDURE — 99214 OFFICE O/P EST MOD 30 MIN: CPT | Performed by: INTERNAL MEDICINE

## 2020-07-02 PROCEDURE — 93000 ELECTROCARDIOGRAM COMPLETE: CPT | Performed by: INTERNAL MEDICINE

## 2020-07-02 NOTE — LETTER
July 2, 2020     Patricia Bailey DO  [address] [facility] [place]     Patient: Domo Downing   YOB: 1932   Date of Visit: 7/2/2020       Dear Dr Carl Half: Thank you for referring Domo Downing to me for evaluation  Below are my notes for this consultation  If you have questions, please do not hesitate to call me  I look forward to following your patient along with you  Sincerely,        Ольга Lemons MD        CC: No Recipients  Ольга Lemons MD  7/2/2020  2:06 PM  Sign at close encounter                                             Cardiology Follow Up    Domo Downing  10/31/1932  [address]   [address] [facility] [address] [phone]    1  Ventricular tachycardia (HCC)  POCT ECG   2  PAF (paroxysmal atrial fibrillation) (HCC)  POCT ECG   3  Old myocardial infarction     4  Ischemic cardiomyopathy     5  Mixed hyperlipidemia     6  Status post percutaneous transluminal coronary angioplasty     7  Essential hypertension     8  Cardiac defibrillator in situ     9  Stage 3 chronic kidney disease [facility])         Patient was 1st seen May 23, 2016  He had moved from [place] and was seeking to establish cardiology care  In 1989, he had a myocardial infarction treated by PTCA resulting in an ischemic cardiomyopathy with an ejection fraction of 35%  He has a Medtronic ICD which was placed on May 9th, 2011 in [place]  He is on chronic oral anticoagulation  He has a history of paroxysmal atrial fibrillation with a TIA/CVA in the past  Prior history includes myocardial infarction, ventricular tachycardia, TIA/CVA, GI bleed, hypertension and hyperlipidemia  12/12/2019 lipid profile: Total cholesterol 158, triglycerides 105 HDL direct 52, LDL calculated 85  Interval History:  Patient is feeling very well and tolerating amiodarone without any side effects  He denies shortness of breath    He has no chest pain  He denies leg edema  He has had no palpitations  His last interrogation of his ICD demonstrated that he has had no more episodes of ventricular tachycardia    Discussion/Summary:  1  Continue present medications  2  Return in 3 months  3  EKG on return to check QT interval      Patient Active Problem List   Diagnosis    Cardiac defibrillator in situ    Chronic kidney disease    [facility]olic stroke ([facility] )    Hyperlipidemia    Old myocardial infarction    Status post percutaneous transluminal coronary angioplasty    Ventricular tachycardia (HCC)    Ischemic cardiomyopathy    PAF (paroxysmal atrial fibrillation) ([place] [address] )    Essential hypertension    Cardiomyopathy ([place] [address] )     Past Medical History:   Diagnosis Date    Anemia     Arthritis     Coronary artery disease     Hyperlipidemia     Hypertension      Social History     Socioeconomic History    Marital status: /Civil Union     Spouse name: Not on file    Number of children: Not on file    Years of education: Not on file    Highest education level: Not on file   Occupational History    Not on file   Social Needs    Financial resource strain: Not on file    Food insecurity:     Worry: Not on file     Inability: Not on file    Transportation needs:     Medical: Not on file     Non-medical: Not on file   Tobacco Use    Smoking status: Never Smoker    Smokeless tobacco: Never Used   Substance and Sexual Activity    Alcohol use:  Yes     Alcohol/week: 3 0 standard drinks     Types: 3 Shots of liquor per week     Comment: daily    Drug use: Never    Sexual activity: Not on file   Lifestyle    Physical activity:     Days per week: Not on file     Minutes per session: Not on file    Stress: Not on file   Relationships    Social connections:     Talks on phone: Not on file     Gets together: Not on file     Attends Holiness service: Not on file     Active member of club or organization: Not on file     Attends meetings of clubs or organizations: Not on file     Relationship status: Not on file    Intimate partner violence:     Fear of current or ex partner: Not on file     Emotionally abused: Not on file     Physically abused: Not on file     Forced sexual activity: Not on file   Other Topics Concern    Not on file   Social History Narrative    Not on file      Family History   Problem Relation Age of Onset    Coronary artery disease Mother     Heart attack Father         MI    Stroke Sister      Past Surgical History:   Procedure Laterality Date    CARDIAC PACEMAKER PLACEMENT  05/09/2011    Medtronic dual chamber ICD implant    CORONARY ANGIOPLASTY  1989       Current Outpatient Medications:     amiodarone 200 mg tablet, Take 1 tablet (200 mg total) by mouth daily, Disp: 90 tablet, Rfl: 3    atorvastatin (LIPITOR) 40 mg tablet, Take 1 tablet (40 mg total) by mouth daily, Disp: 90 tablet, Rfl: 3    carvedilol (COREG) 12 5 mg tablet, Take 1 tablet (12 5 mg total) by mouth 2 (two) times a day with meals, Disp: 180 tablet, Rfl: 3    Cholecalciferol 2000 units CAPS, Take 1 capsule by mouth, Disp: , Rfl:     Glucosamine Sulfate 1000 MG CAPS, 1 capsule Every 12 hours, Disp: , Rfl:     lisinopril (ZESTRIL) 5 mg tablet, Take 1 tablet (5 mg total) by mouth daily, Disp: 90 tablet, Rfl: 3    spironolactone (ALDACTONE) 25 mg tablet, Take 0 5 tablets (12 5 mg total) by mouth daily, Disp: 45 tablet, Rfl: 3    warfarin (COUMADIN) 2 mg tablet, TAKE 1 AND 1/2 TABLETS  DAILY OR AS DIRECTED BY MD, Disp: 150 tablet, Rfl: 3    Cholecalciferol (VITAMIN D3) 50 MCG (2000 UT) capsule, take 1 tablet  alt with 1 1/2 tablets daily, Disp: , Rfl:   No Known Allergies    Results for orders placed or performed in visit on 07/02/20   POCT ECG    Narrative    Atrial paced rhythm with prolonged AV conduction  Left axis deviation  Cannot rule out old anterior septal myocardial infarction    Nonspecific versus is secondary to prior MI T-wave inversion anterior precordium  QRS duration 96 milliseconds  QT interval 422 milliseconds and  milliseconds         Review of Systems:  Review of Systems   Constitutional: Negative for activity change  Respiratory: Negative for cough, chest tightness, shortness of breath and wheezing  Cardiovascular: Negative for chest pain, palpitations and leg swelling  Musculoskeletal: Negative for gait problem  Skin: Negative for color change  Neurological: Negative for dizziness, tremors, syncope, weakness, light-headedness and headaches  Psychiatric/Behavioral: Negative for agitation and confusion  Physical Exam:  /80 (BP Location: Right arm, Patient Position: Sitting, Cuff Size: Adult)   Pulse 66   Temp 98 4 °F (36 9 °C)   Wt 68 9 kg (151 lb 12 8 oz)   BMI 26 89 kg/m²    Physical Exam   Constitutional: He is oriented to person, place, and time  He appears well-developed and well-nourished  No distress  HENT:   Head: Normocephalic and atraumatic  Neck: No JVD present  Cardiovascular: Normal rate, regular rhythm, normal heart sounds and intact distal pulses  Exam reveals no gallop and no friction rub  No murmur heard  Pulmonary/Chest: Effort normal and breath sounds normal  No respiratory distress  He has no wheezes  He has no rales  He exhibits no tenderness  Abdominal: Soft  Bowel sounds are normal  He exhibits no distension  Musculoskeletal: He exhibits no edema  Neurological: He is alert and oriented to person, place, and time  Skin: Skin is warm and dry  Psychiatric: He has a normal mood and affect  His behavior is normal        06/04/2020 device report    MDT-DUAL CHAMBER ICD (AAIR-DDDR MODE)/ ACTIVE SYSTEM IS MRI CONDITIONAL  CARELINK TRANSMISSION: BATTERY VOLTAGE ADEQUATE (9 3 YRS)  AP-48%, -1%  ALL AVAILABLE LEAD PARAMETERS WITHIN NORMAL LIMITS  4 NSVT EPISODES, MOST RECENT FOR 6 BEATS, AVG CL~365MS & LONGEST 9 BEATS, AVG CL~345MS  PT ON WARFARIN, ASA 81MG, AMIO & CARVEDILOL  OPTI-VOL WITHIN NORMAL LIMITS  NORMAL DEVICE FUNCTION  GV     ----------------------------------------------------------------------------------------------  NUCLEAR STRESS TEST:   Results for orders placed during the hospital encounter of 10/24/18   NM myocardial perfusion spect (stress and/or rest)    [place]  [address]    Rest/Stress Gated SPECT Myocardial Perfusion Imaging After Regadenoson    Patient: Anita Espino  MR number: [id number]  Account number: [de-identified]  : 10/31/1932  Age: 80 years  Gender: Male  Status: Outpatient  Location: [address] [place] [address]  Height: 63 in  Weight: 157 lb  BP: 150/ 90 mmHg    Diagnosis: I25 10 - Atherosclerotic heart disease of native coronary artery without angina pectoris, I25 2 - Old myocardial infarction, I25 5 - Ischemic cardiomyopathy, I47 2 - Ventricular tachycardia    RN:  Sabina Jacob RN  Referring Physician:  Nat Dean MD  Group:  [facility]  Report Prepared By[de-identified]  Sabina Jacob RN  Interpreting Physician:  Costa Calderon MD    INDICATIONS: Evaluation of known coronary artery disease  Ischemic cardiomyopathy    HISTORY: The patient is a 80year old  male  Chest pain status: no chest pain  Coronary artery disease risk factors: dyslipidemia, hypertension, and family history of premature coronary artery disease  Cardiovascular history:  coronary artery disease, prior myocardial infarction, congestive heart failure, arrhythmia, stroke, ventricular tachycardia, and ischemic cardiomyopathy  ischemic cardiomyopathy Prior cardiovascular procedures: percutaneous transluminal  coronary angioplasty (on ), coronary artery bypass grafting, and implantable cardioverter defibrillator procedure  Medications: a beta blocker, an ACE inhibitor/ARB, a diuretic, aspirin, and a lipid lowering agent   Previous test  results: abnormal resting echocardiogram     REST ECG: Sinus rhythm, first-degree AV block, evidence of prior anteroseptal infarction with nonspecific ST T wave abnormalities at rest     PROCEDURE: The study was performed in the the [facility]  A regadenoson infusion pharmacologic stress test was performed  Gated SPECT myocardial perfusion imaging was performed after stress and at rest  Systolic blood pressure was  150 mmHg, at the start of the study  Diastolic blood pressure was 90 mmHg, at the start of the study  The heart rate was 72 bpm, at the start of the study  Regadenoson protocol:  HR bpm SBP mmHg DBP mmHg Symptoms  Baseline 72 150 90 --  1 min 77 -- -- dyspnea  2 min 80 100 60 same as above  3 min 81 -- -- subsiding  4 min 76 136 80 none  5 min 67 -- -- none  6 min 64 -- -- none  7 min 65 150 80 none  8 min 65 -- -- none  9 min 63 -- -- none  10 min 63 140 80 none  No medications or fluids given  STRESS SUMMARY: Duration of pharmacologic stress was 10 min  Maximal heart rate during stress was 81 bpm ( 60 % of maximal predicted heart rate)  The heart rate response to stress was normal  There was resting hypertension with an  appropriate blood pressure response to stress  The rate-pressure product for the peak heart rate and blood pressure was 21848  There was no chest pain during stress  The stress test was terminated due to protocol completion  No abnormal ST  T wave changes with regadenoson injection  No significant arrhythmia noted  MYOCARDIAL PERFUSION IMAGING:  The image quality was fair  Rotating projection images reveal mild diaphragmatic attenuation and mild subdiaphragmatic activity  The left ventricular cavity is mildly dilated  End systolic volume measures 618 mL and end-diastolic volume measures 566 mL  Review of resting and post regadenoson SPECT imaging demonstrates large size, severe, fixed perfusion abnormality involving the entire anterior wall, apex, distal lateral wall, distal anteroseptal wall and septum   There is no definite  ischemia noted  GATED SPECT:  Gated SPECT imaging demonstrates severely reduced left ventricular systolic function with dyskinesis of the apex and the distal anterior wall and hypokinesis of other walls  Ejection fraction is calculated as 29%  SUMMARY:  -  Stress results: Target heart rate was not achieved  There was resting hypertension with an appropriate blood pressure response to stress  There was no chest pain during stress  IMPRESSIONS: 1  Abnormal myocardial perfusion scan  2  Evidence of extensive, severe intensity fixed perfusion abnormalities without evidence of ischemia  3  Dilated left ventricular cavity with severely reduced left ventricular systolic function  Ejection fraction calculated as 29%  Diagnostic sensitivity was limited by submaximal stress  Prepared and signed by    Giuseppe Thomas MD  Signed 10/24/2018 18:15:42         --------------------------------------------------------------------------------  ECHO:   Results for orders placed during the hospital encounter of 10/24/18   Echo complete with contrast if indicated    [place] [address] [facility] [address]    Transthoracic Echocardiogram  2D, M-mode, Doppler, and Color Doppler    Study date:  24-Oct-2018    Patient: Sivakumar Zhou  MR number: [id number]  Account number: [de-identified]  : 31-Oct-1932  Age: 80 years  Gender: Male  Status: Outpatient  Location: [address] [place] [address]  Height: 63 in  Weight: 157 lb  BP: 100/ 60 mmHg    Indications: Ischemic cardiomyopathy, coronary artery disease      Diagnoses: I25 5 - Ischemic cardiomyopathy    Sonographer:  Josefina Samayoa  Primary Physician:  Alexis Bell DO  Referring Physician:  Linda Esparza MD  Group:  [facility]  Interpreting Physician:  Giuseppe Thomas MD    IMPRESSIONS:  Borderline dilated left ventricular cavity with some thinning of the apex and anterior and anteroseptal walls, severely reduced left ventricular systolic function with akinesis of the distal anterior, anteroseptal and apical wall and  hypokinesis of other regions  Ejection fraction is estimated as around 25-30%  There is grade 1 diastolic dysfunction  Mild left atrial cavity enlargement  Aortic valve sclerosis, no aortic stenosis or regurgitation  Mitral annular calcification, trace mitral valve regurgitation  Mild tricuspid valve regurgitation  No obvious pulmonary hypertension  No pericardial effusion  Compared to previous echocardiogram from May 27, 2016 there is overall no significant change  Previously noted borderline pulmonary hypertension is not currently appreciable  SUMMARY    LEFT VENTRICLE:  Borderline dilated left ventricular cavity, normal wall thickness with thinning of the mid to distal anterior wall apex and anteroseptal walls, severely reduced left ventricular systolic function with akinesis of the mid to distal anterior  wall, anteroseptal wall and apex extending to distal inferolateral walls, hypokinesis of other regions  Ejection fraction is estimated as around 25-30%  Grade 1 diastolic dysfunction  Estimated left atrial pressure is normal     RIGHT VENTRICLE:  Normal right ventricular size and systolic function  Normal estimated right ventricular systolic pressure  LEFT ATRIUM:  Mild left atrial cavity enlargement  RIGHT ATRIUM:  Normal right atrial cavity size  MITRAL VALVE:  Mild mitral annular calcification and leaflet sclerosis, trace mitral valve regurgitation  AORTIC VALVE:  Trace mitral valve regurgitation  TRICUSPID VALVE:  Mild tricuspid valve regurgitation  PULMONIC VALVE:  No significant pulmonic valve regurgitation  AORTA:  Aortic root and proximal ascending aorta are normal in size on 2D imaging      IVC, HEPATIC VEINS:  Inferior vena cava is normal in size and demonstrates appropriate respiratory phasic changes in diameter  PERICARDIUM:  Trace pericardial effusion with some organized material close to the apex, possible thrombus versus anterior fat pad  HISTORY: PRIOR HISTORY: Ventricular tachycardia, atrial fibrillation, cerebral vascular accident, defibrillator, HLD  PROCEDURE: The study was performed in the [address]  This was a routine study  The transthoracic approach was used  The study included complete 2D imaging, M-mode, complete spectral Doppler, and color Doppler  The heart rate was  70 bpm, at the start of the study  Images were obtained from the parasternal, apical, subcostal, and suprasternal notch acoustic windows  Image quality was adequate  LEFT VENTRICLE: Borderline dilated left ventricular cavity, normal wall thickness with thinning of the mid to distal anterior wall apex and anteroseptal walls, severely reduced left ventricular systolic function with akinesis of the mid to  distal anterior wall, anteroseptal wall and apex extending to distal inferolateral walls, hypokinesis of other regions  Ejection fraction is estimated as around 25-30%  Grade 1 diastolic dysfunction  Estimated left atrial pressure is  normal     RIGHT VENTRICLE: Normal right ventricular size and systolic function  Normal estimated right ventricular systolic pressure  LEFT ATRIUM: Mild left atrial cavity enlargement  RIGHT ATRIUM: Normal right atrial cavity size  MITRAL VALVE: Mild mitral annular calcification and leaflet sclerosis, trace mitral valve regurgitation  AORTIC VALVE: Trace mitral valve regurgitation  TRICUSPID VALVE: Mild tricuspid valve regurgitation  PULMONIC VALVE: No significant pulmonic valve regurgitation  PERICARDIUM: Trace pericardial effusion with some organized material close to the apex, possible thrombus versus anterior fat pad  AORTA: Aortic root and proximal ascending aorta are normal in size on 2D imaging      SYSTEMIC VEINS: IVC: Inferior vena cava is normal in size and demonstrates appropriate respiratory phasic changes in diameter      SYSTEM MEASUREMENT TABLES    2D  %FS: 19 %  Ao Diam: 3 61 cm  EDV(Teich): 144 29 ml  EF(Teich): 38 79 %  ESV(Teich): 88 33 ml  IVSd: 1 04 cm  LA Area: 9 18 cm2  LA Diam: 4 59 cm  LVEDV MOD A4C: 104 87 ml  LVEF MOD A4C: 37 99 %  LVESV MOD A4C: 65 03 ml  LVIDd: 5 45 cm  LVIDs: 4 41 cm  LVLd A4C: 8 58 cm  LVLs A4C: 8 55 cm  LVPWd: 0 95 cm  RA Area: 11 11 cm2  RVIDd: 3 9 cm  SV MOD A4C: 39 83 ml  SV(Teich): 55 96 ml    CW  AV Vmax: 1 01 m/s  AV maxP 08 mmHg    MM  TAPSE: 2 27 cm    PW  E': 0 04 m/s  E/E': 14 25  MV A [place]: 0 93 m/s  MV Dec [place]: 3 38 m/s2  MV DecT: 164 17 ms  MV E [place]: 0 56 m/s  MV E/A Ratio: 0 6  MV PHT: 47 61 ms  MVA By PHT: 4 62 cm2  PRend P 51 mmHg  PRend Vmax: 0 79 m/s  PV Vmax: 0 65 m/s  PV maxP 71 mmHg  TR Vmax: 1 2 m/s  TR maxP 77 mmHg    [facility] Accredited [facility]chocardiography [facility]    Prepared and electronically signed by    Birtha Skiff, MD  Signed 24-Oct-2018 13:25:41       ======================================================    Lab Results   Component Value Date    WBC 6 06 2020    HGB 13 0 2020    HCT 40 1 2020     (H) 2020     2020      Lab Results   Component Value Date    SODIUM 140 2019    K 4 2 2019     2019    CO2 28 2019    BUN 24 2019    CREATININE 1 18 2019    GLUC 98 12/10/2018    CALCIUM 9 0 2019        Lab Results   Component Value Date    HDL 52 2019     Lab Results   Component Value Date    LDLCALC 85 2019     Lab Results   Component Value Date    TRIG 105 2019     No results found for: [place], [place]   Lab Results   Component Value Date    INR 2 78 (H) 2020    INR 2 63 (H) 2020    INR 4 00 (A) 2020    PROTIME 28 8 (H) 2020    PROTIME 27 6 (H) 2020    PROTIME 28 6 (H) 2020        Imaging:   I have personally reviewed pertinent reports  Portions of the record may have been created with voice recognition software  Occasional wrong word or "sound a like" substitutions may have occurred due to the inherent limitations of voice recognition software  Read the chart carefully and recognize, using context, where substitutions have occurred

## 2020-07-02 NOTE — PROGRESS NOTES
Cardiology Follow Up    Nancy Valdovinos  10/31/1932  [address] [place] [address]  [phone]    1  Ventricular tachycardia (HCC)  POCT ECG   2  PAF (paroxysmal atrial fibrillation) (HCC)  POCT ECG   3  Old myocardial infarction     4  Ischemic cardiomyopathy     5  Mixed hyperlipidemia     6  Status post percutaneous transluminal coronary angioplasty     7  Essential hypertension     8  Cardiac defibrillator in situ     9  Stage 3 chronic kidney disease [facility])         Patient was 1st seen May 23, 2016  He had moved from [place] and was seeking to establish cardiology care  In 1989, he had a myocardial infarction treated by [facility] resulting in an ischemic cardiomyopathy with an ejection fraction of 35%  He has a Medtronic ICD which was placed on May 9th, 2011 in [place]  He is on chronic oral anticoagulation  He has a history of paroxysmal atrial fibrillation with a TIA/CVA in the past  Prior history includes myocardial infarction, ventricular tachycardia, TIA/CVA, GI bleed, hypertension and hyperlipidemia  12/12/2019 lipid profile: Total cholesterol 158, triglycerides 105 HDL direct 52, LDL calculated 85  Interval History:  Patient is feeling very well and tolerating amiodarone without any side effects  He denies shortness of breath  He has no chest pain  He denies leg edema  He has had no palpitations  His last interrogation of his ICD demonstrated that he has had no more episodes of ventricular tachycardia    Discussion/Summary:  1  Continue present medications  2  Return in 3 months  3   EKG on return to check QT interval      Patient Active Problem List   Diagnosis    Cardiac defibrillator in situ    Chronic kidney disease    Embolic stroke ([address] )    Hyperlipidemia    Old myocardial infarction    Status post percutaneous transluminal coronary angioplasty    [facility] )    Ischemic cardiomyopathy    [facility] fibrillation) [facility])    Essential hypertension    Cardiomyopathy ([facility] )     Past Medical History:   Diagnosis Date    Anemia     Arthritis     Coronary artery disease     Hyperlipidemia     Hypertension      Social History     Socioeconomic History    Marital status: /Civil Union     Spouse name: Not on file    Number of children: Not on file    Years of education: Not on file    Highest education level: Not on file   Occupational History    Not on file   Social Needs    Financial resource strain: Not on file    Food insecurity:     Worry: Not on file     Inability: Not on file    Transportation needs:     Medical: Not on file     Non-medical: Not on file   Tobacco Use    Smoking status: Never Smoker    Smokeless tobacco: Never Used   Substance and Sexual Activity    Alcohol use:  Yes     Alcohol/week: 3 0 standard drinks     Types: 3 Shots of liquor per week     Comment: daily    Drug use: Never    Sexual activity: Not on file   Lifestyle    Physical activity:     Days per week: Not on file     Minutes per session: Not on file    Stress: Not on file   Relationships    Social connections:     Talks on phone: Not on file     Gets together: Not on file     Attends Yazidi service: Not on file     Active member of club or organization: Not on file     Attends meetings of clubs or organizations: Not on file     Relationship status: Not on file    Intimate partner violence:     Fear of current or ex partner: Not on file     Emotionally abused: Not on file     Physically abused: Not on file     Forced sexual activity: Not on file   Other Topics Concern    Not on file   Social History Narrative    Not on file      Family History   Problem Relation Age of Onset    Coronary artery disease Mother     Heart attack Father         MI    Stroke Sister      Past Surgical History:   Procedure Laterality Date    CARDIAC PACEMAKER PLACEMENT  05/09/2011    Medtronic dual chamber ICD implant    CORONARY ANGIOPLASTY  1989       Current Outpatient Medications:     amiodarone 200 mg tablet, Take 1 tablet (200 mg total) by mouth daily, Disp: 90 tablet, Rfl: 3    atorvastatin (LIPITOR) 40 mg tablet, Take 1 tablet (40 mg total) by mouth daily, Disp: 90 tablet, Rfl: 3    carvedilol (COREG) 12 5 mg tablet, Take 1 tablet (12 5 mg total) by mouth 2 (two) times a day with meals, Disp: 180 tablet, Rfl: 3    Cholecalciferol 2000 units CAPS, Take 1 capsule by mouth, Disp: , Rfl:     Glucosamine Sulfate 1000 MG CAPS, 1 capsule Every 12 hours, Disp: , Rfl:     lisinopril (ZESTRIL) 5 mg tablet, Take 1 tablet (5 mg total) by mouth daily, Disp: 90 tablet, Rfl: 3    spironolactone (ALDACTONE) 25 mg tablet, Take 0 5 tablets (12 5 mg total) by mouth daily, Disp: 45 tablet, Rfl: 3    warfarin (COUMADIN) 2 mg tablet, TAKE 1 AND 1/2 TABLETS  DAILY OR AS DIRECTED BY MD, Disp: 150 tablet, Rfl: 3    Cholecalciferol (VITAMIN D3) 50 MCG (2000 UT) capsule, take 1 tablet  alt with 1 1/2 tablets daily, Disp: , Rfl:   No Known Allergies    Results for orders placed or performed in visit on 07/02/20   POCT ECG    Narrative    Atrial paced rhythm with prolonged AV conduction  Left axis deviation  Cannot rule out old anterior septal myocardial infarction  Nonspecific versus is secondary to prior MI T-wave inversion anterior precordium  QRS duration 96 milliseconds  QT interval 422 milliseconds and  milliseconds         Review of Systems:  Review of Systems   Constitutional: Negative for activity change  Respiratory: Negative for cough, chest tightness, shortness of breath and wheezing  Cardiovascular: Negative for chest pain, palpitations and leg swelling  Musculoskeletal: Negative for gait problem  Skin: Negative for color change  Neurological: Negative for dizziness, tremors, syncope, weakness, light-headedness and headaches  Psychiatric/Behavioral: Negative for agitation and confusion  Physical Exam:  /80 (BP Location: Right arm, Patient Position: Sitting, Cuff Size: Adult)   Pulse 66   Temp 98 4 °F (36 9 °C)   Wt 68 9 kg (151 lb 12 8 oz)   BMI 26 89 kg/m²   Physical Exam   Constitutional: He is oriented to person, place, and time  He appears well-developed and well-nourished  No distress  HENT:   Head: Normocephalic and atraumatic  Neck: No JVD present  Cardiovascular: Normal rate, regular rhythm, normal heart sounds and intact distal pulses  Exam reveals no gallop and no friction rub  No murmur heard  Pulmonary/Chest: Effort normal and breath sounds normal  No respiratory distress  He has no wheezes  He has no rales  He exhibits no tenderness  Abdominal: Soft  Bowel sounds are normal  He exhibits no distension  Musculoskeletal: He exhibits no edema  Neurological: He is alert and oriented to person, place, and time  Skin: Skin is warm and dry  Psychiatric: He has a normal mood and affect  His behavior is normal        06/04/2020 device report    MDT-DUAL CHAMBER ICD (AAIR-DDDR MODE)/ ACTIVE SYSTEM IS MRI CONDITIONAL  CARELINK TRANSMISSION: BATTERY VOLTAGE ADEQUATE (9 3 YRS)  AP-48%, -1%  ALL AVAILABLE LEAD PARAMETERS WITHIN NORMAL LIMITS  4 NSVT EPISODES, MOST RECENT FOR 6 BEATS, AVG CL~365MS & LONGEST 9 BEATS, AVG CL~345MS  PT ON WARFARIN, ASA 81MG, AMIO & CARVEDILOL  OPTI-VOL WITHIN NORMAL LIMITS  NORMAL DEVICE FUNCTION   GV     ----------------------------------------------------------------------------------------------  NUCLEAR STRESS TEST:   Results for orders placed during the hospital encounter of 10/24/18   NM myocardial perfusion spect (stress and/or rest)    Narrative [employer] [address]    Rest/Stress Gated SPECT Myocardial Perfusion Imaging After Regadenoson    Patient: Indigo Lindo  MR number: [id number]  Account number: [id number]  : 10/31/1932  Age: 80 years  Gender: Male  Status: Outpatient  Location: [address] [place] [address]  Height: 63 in  Weight: 157 lb  BP: 150/ 90 mmHg    Diagnosis: I25 10 - Atherosclerotic heart disease of native coronary artery without angina pectoris, I25 2 - Old myocardial infarction, I25 5 - Ischemic cardiomyopathy, I47 2 - Ventricular tachycardia    RN:  Ruiz Mccain RN  Referring Physician:  Katherine Chaudhry MD  Group:  [facility]  Report Prepared By[de-identified]  Ruiz Mccain RN  Interpreting Physician:  Amanda Reilly MD    INDICATIONS: Evaluation of known coronary artery disease  Ischemic cardiomyopathy    HISTORY: The patient is a 80year old  male  Chest pain status: no chest pain  Coronary artery disease risk factors: dyslipidemia, hypertension, and family history of premature coronary artery disease  Cardiovascular history:  coronary artery disease, prior myocardial infarction, congestive heart failure, arrhythmia, stroke, ventricular tachycardia, and ischemic cardiomyopathy  ischemic cardiomyopathy Prior cardiovascular procedures: percutaneous transluminal  coronary angioplasty (on ), coronary artery bypass grafting, and implantable cardioverter defibrillator procedure  Medications: a beta blocker, an ACE inhibitor/ARB, a diuretic, aspirin, and a lipid lowering agent  Previous test  results: abnormal resting echocardiogram     REST ECG: Sinus rhythm, first-degree AV block, evidence of prior anteroseptal infarction with nonspecific ST T wave abnormalities at rest     PROCEDURE: The study was performed in the the [address] [place] [address]  A regadenoson infusion pharmacologic stress test was performed  Gated SPECT myocardial perfusion imaging was performed after stress and at rest  Systolic blood pressure was  150 mmHg, at the start of the study  Diastolic blood pressure was 90 mmHg, at the start of the study   The heart rate was 72 bpm, at the start of the study   Regadenoson protocol:  HR bpm SBP mmHg DBP mmHg Symptoms  Baseline 72 150 90 --  1 min 77 -- -- dyspnea  2 min 80 100 60 same as above  3 min 81 -- -- subsiding  4 min 76 136 80 none  5 min 67 -- -- none  6 min 64 -- -- none  7 min 65 150 80 none  8 min 65 -- -- none  9 min 63 -- -- none  10 min 63 140 80 none  No medications or fluids given  STRESS SUMMARY: Duration of pharmacologic stress was 10 min  Maximal heart rate during stress was 81 bpm ( 60 % of maximal predicted heart rate)  The heart rate response to stress was normal  There was resting hypertension with an  appropriate blood pressure response to stress  The rate-pressure product for the peak heart rate and blood pressure was 77363  There was no chest pain during stress  The stress test was terminated due to protocol completion  No abnormal ST  T wave changes with regadenoson injection  No significant arrhythmia noted  MYOCARDIAL PERFUSION IMAGING:  The image quality was fair  Rotating projection images reveal mild diaphragmatic attenuation and mild subdiaphragmatic activity  The left ventricular cavity is mildly dilated  End systolic volume measures 277 mL and end-diastolic volume measures 683 mL  Review of resting and post regadenoson SPECT imaging demonstrates large size, severe, fixed perfusion abnormality involving the entire anterior wall, apex, distal lateral wall, distal anteroseptal wall and septum  There is no definite  ischemia noted  GATED SPECT:  Gated SPECT imaging demonstrates severely reduced left ventricular systolic function with dyskinesis of the apex and the distal anterior wall and hypokinesis of other walls  Ejection fraction is calculated as 29%  SUMMARY:  -  Stress results: Target heart rate was not achieved  There was resting hypertension with an appropriate blood pressure response to stress  There was no chest pain during stress  IMPRESSIONS: 1  Abnormal myocardial perfusion scan    2  Evidence of extensive, severe intensity fixed perfusion abnormalities without evidence of ischemia  3  Dilated left ventricular cavity with severely reduced left ventricular systolic function  Ejection fraction calculated as 29%  Diagnostic sensitivity was limited by submaximal stress  Prepared and signed by    Maritza Perez MD  Signed 10/24/2018 18:15:42         --------------------------------------------------------------------------------  ECHO:   Results for orders placed during the hospital encounter of 10/24/18   Echo complete with contrast if indicated    Narrative [facility]    Transthoracic Echocardiogram  2D, M-mode, Doppler, and Color Doppler    Study date:  24-Oct-2018    Patient: Joselin Whitman  MR number: [id number]  Account number: [de-identified]  : 31-Oct-1932  Age: 80 years  Gender: Male  Status: Outpatient  Location: [address] [place] [address]  Height: 63 in  Weight: 157 lb  BP: 100/ 60 mmHg    Indications: Ischemic cardiomyopathy, coronary artery disease  Diagnoses: I25 5 - Ischemic cardiomyopathy    Sonographer:  Josefina Ramirez  Primary Physician:  Shashi Bosch DO  Referring Physician:  Janes Costa MD  Group:  [facility]  Interpreting Physician:  Maritza Perez MD    IMPRESSIONS:  Borderline dilated left ventricular cavity with some thinning of the apex and anterior and anteroseptal walls, severely reduced left ventricular systolic function with akinesis of the distal anterior, anteroseptal and apical wall and  hypokinesis of other regions  Ejection fraction is estimated as around 25-30%  There is grade 1 diastolic dysfunction  Mild left atrial cavity enlargement  Aortic valve sclerosis, no aortic stenosis or regurgitation  Mitral annular calcification, trace mitral valve regurgitation  Mild tricuspid valve regurgitation  No obvious pulmonary hypertension    No pericardial effusion  Compared to previous echocardiogram from May 27, 2016 there is overall no significant change  Previously noted borderline pulmonary hypertension is not currently appreciable  SUMMARY    LEFT VENTRICLE:  Borderline dilated left ventricular cavity, normal wall thickness with thinning of the mid to distal anterior wall apex and anteroseptal walls, severely reduced left ventricular systolic function with akinesis of the mid to distal anterior  wall, anteroseptal wall and apex extending to distal inferolateral walls, hypokinesis of other regions  Ejection fraction is estimated as around 25-30%  Grade 1 diastolic dysfunction  Estimated left atrial pressure is normal     RIGHT VENTRICLE:  Normal right ventricular size and systolic function  Normal estimated right ventricular systolic pressure  LEFT ATRIUM:  Mild left atrial cavity enlargement  RIGHT ATRIUM:  Normal right atrial cavity size  MITRAL VALVE:  Mild mitral annular calcification and leaflet sclerosis, trace mitral valve regurgitation  AORTIC VALVE:  Trace mitral valve regurgitation  TRICUSPID VALVE:  Mild tricuspid valve regurgitation  PULMONIC VALVE:  No significant pulmonic valve regurgitation  AORTA:  Aortic root and proximal ascending aorta are normal in size on 2D imaging  IVC, HEPATIC VEINS:  Inferior vena cava is normal in size and demonstrates appropriate respiratory phasic changes in diameter  PERICARDIUM:  Trace pericardial effusion with some organized material close to the apex, possible thrombus versus anterior fat pad  HISTORY: PRIOR HISTORY: Ventricular tachycardia, atrial fibrillation, cerebral vascular accident, defibrillator, HLD  PROCEDURE: The study was performed in the [facility]  This was a routine study  The transthoracic approach was used  The study included complete 2D imaging, M-mode, complete spectral Doppler, and color Doppler   The heart rate was  70 bpm, at the start of the study  Images were obtained from the parasternal, apical, subcostal, and suprasternal notch acoustic windows  Image quality was adequate  LEFT VENTRICLE: Borderline dilated left ventricular cavity, normal wall thickness with thinning of the mid to distal anterior wall apex and anteroseptal walls, severely reduced left ventricular systolic function with akinesis of the mid to  distal anterior wall, anteroseptal wall and apex extending to distal inferolateral walls, hypokinesis of other regions  Ejection fraction is estimated as around 25-30%  Grade 1 diastolic dysfunction  Estimated left atrial pressure is  normal     RIGHT VENTRICLE: Normal right ventricular size and systolic function  Normal estimated right ventricular systolic pressure  LEFT ATRIUM: Mild left atrial cavity enlargement  RIGHT ATRIUM: Normal right atrial cavity size  MITRAL VALVE: Mild mitral annular calcification and leaflet sclerosis, trace mitral valve regurgitation  AORTIC VALVE: Trace mitral valve regurgitation  TRICUSPID VALVE: Mild tricuspid valve regurgitation  PULMONIC VALVE: No significant pulmonic valve regurgitation  PERICARDIUM: Trace pericardial effusion with some organized material close to the apex, possible thrombus versus anterior fat pad  AORTA: Aortic root and proximal ascending aorta are normal in size on 2D imaging  SYSTEMIC VEINS: IVC: Inferior vena cava is normal in size and demonstrates appropriate respiratory phasic changes in diameter      SYSTEM MEASUREMENT TABLES    2D  %FS: 19 %  Ao Diam: 3 61 cm  EDV(Teich): 144 29 ml  EF(Teich): 38 79 %  ESV(Teich): 88 33 ml  IVSd: 1 04 cm  LA Area: 9 18 cm2  LA Diam: 4 59 cm  LVEDV MOD A4C: 104 87 ml  LVEF MOD A4C: 37 99 %  LVESV MOD A4C: 65 03 ml  LVIDd: 5 45 cm  LVIDs: 4 41 cm  LVLd A4C: 8 58 cm  LVLs A4C: 8 55 cm  LVPWd: 0 95 cm  RA Area: 11 11 cm2  RVIDd: 3 9 cm  SV MOD A4C: 39 83 ml  SV(Teich): 55 96 ml    CW  AV Vmax: 1 01 m/s  AV maxP 08 mmHg    MM  TAPSE: 2 27 cm    PW  E': 0 04 m/s  E/E': 14 25  MV A [place]: 0 93 m/s  MV Dec [place]: 3 38 m/s2  MV DecT: 164 17 ms  MV E [place]: 0 56 m/s  MV E/A Ratio: 0 6  MV PHT: 47 61 ms  MVA By PHT: 4 62 cm2  PRend P 51 mmHg  PRend Vmax: 0 79 m/s  PV Vmax: 0 65 m/s  PV maxP 71 mmHg  TR Vmax: 1 2 m/s  TR maxP 77 mmHg    [facility] Accredited Echocardiography [facility]    Prepared and electronically signed by    Giuseppe Thomas MD  Signed 24-Oct-2018 13:25:41       ======================================================    Lab Results   Component Value Date    WBC 6 06 2020    HGB 13 0 2020    HCT 40 1 2020     (H) 2020     2020      Lab Results   Component Value Date    SODIUM 140 2019    K 4 2 2019     2019    CO2 28 2019    BUN 24 2019    CREATININE 1 18 2019    GLUC 98 12/10/2018    CALCIUM 9 0 2019        Lab Results   Component Value Date    HDL 52 2019     Lab Results   Component Value Date    LDLCALC 85 2019     Lab Results   Component Value Date    TRIG 105 2019     No results found for: [place], [place]   Lab Results   Component Value Date    INR 2 78 (H) 2020    INR 2 63 (H) 2020    INR 4 00 (A) 2020    PROTIME 28 8 (H) 2020    PROTIME 27 6 (H) 2020    PROTIME 28 6 (H) 2020        Imaging:   I have personally reviewed pertinent reports  Portions of the record may have been created with voice recognition software  Occasional wrong word or "sound a like" substitutions may have occurred due to the inherent limitations of voice recognition software  Read the chart carefully and recognize, using context, where substitutions have occurred

## 2020-07-14 ENCOUNTER — ANTICOAG VISIT (OUTPATIENT)
Dept: CARDIOLOGY CLINIC | Facility: CLINIC | Age: 85
End: 2020-07-14

## 2020-07-14 ENCOUNTER — APPOINTMENT (OUTPATIENT)
Dept: LAB | Age: 85
End: 2020-07-14
Payer: COMMERCIAL

## 2020-07-14 DIAGNOSIS — I48.0 PAF (PAROXYSMAL ATRIAL FIBRILLATION) (HCC): ICD-10-CM

## 2020-07-28 ENCOUNTER — ANTICOAG VISIT (OUTPATIENT)
Dept: CARDIOLOGY CLINIC | Facility: CLINIC | Age: 85
End: 2020-07-28

## 2020-07-28 ENCOUNTER — APPOINTMENT (OUTPATIENT)
Dept: LAB | Age: 85
End: 2020-07-28
Payer: COMMERCIAL

## 2020-07-28 DIAGNOSIS — I48.0 PAF (PAROXYSMAL ATRIAL FIBRILLATION) (HCC): ICD-10-CM

## 2020-08-21 ENCOUNTER — APPOINTMENT (OUTPATIENT)
Dept: LAB | Age: 85
End: 2020-08-21
Payer: COMMERCIAL

## 2020-08-24 ENCOUNTER — ANTICOAG VISIT (OUTPATIENT)
Dept: CARDIOLOGY CLINIC | Facility: CLINIC | Age: 85
End: 2020-08-24

## 2020-08-24 DIAGNOSIS — I48.0 PAF (PAROXYSMAL ATRIAL FIBRILLATION) (HCC): ICD-10-CM

## 2020-08-24 NOTE — PROGRESS NOTES
Tc to pt, denies nay bleeding or bruising or medication changes  Will decrease dose, 3 mg sun/tues/th, 2 mg other days of the week, inr due 2 weeks

## 2020-09-10 ENCOUNTER — REMOTE DEVICE CLINIC VISIT (OUTPATIENT)
Dept: CARDIOLOGY CLINIC | Facility: CLINIC | Age: 85
End: 2020-09-10
Payer: COMMERCIAL

## 2020-09-10 DIAGNOSIS — Z95.810 CARDIAC DEFIBRILLATOR IN SITU: Primary | ICD-10-CM

## 2020-09-10 PROCEDURE — 93296 REM INTERROG EVL PM/IDS: CPT | Performed by: INTERNAL MEDICINE

## 2020-09-10 PROCEDURE — 93295 DEV INTERROG REMOTE 1/2/MLT: CPT | Performed by: INTERNAL MEDICINE

## 2020-09-10 NOTE — PROGRESS NOTES
Results for orders placed or performed in visit on 09/10/20   Cardiac EP device report    Narrative    MDT-DUAL CHAMBER ICD (AAIR-DDDR MODE)/ ACTIVE SYSTEM IS MRI CONDITIONAL  CARELINK TRANSMISSION (NEW TRANSMITTER); BATTERY VOLTAGE ADEQUATE (9 YRS)  AP 69 2%  3 2%  ALL AVAILABLE LEAD PARAMETERS WITHIN NORMAL LIMITS  14 NEW VT-NS EPISODES W/MULTIPLE EVENTS SAME DAY (SEE ARRHYTHMIA LOG) W/AVAIL EGRMS SHOWING 6 BEATS @  BPM, 4 BEATS @  BPM, 9 BEATS @  BPM, 9 BEATS @  BPM  EF 29% (12/2018 GATED SPECT)  PT ON ASA 81, WARFARIN, AMIODORONE, CARVEDILOL  OPTI-VOL FLUID THRESHOLD CROSSED SINCE 7/22/20 AND  ONGOING  PT ON ALDACTONE  TASK TO CHF TEAM  APPROPRIATELY FUNCTIONING ICD       EB

## 2020-09-21 ENCOUNTER — TELEPHONE (OUTPATIENT)
Dept: CARDIOLOGY CLINIC | Facility: CLINIC | Age: 85
End: 2020-09-21

## 2020-09-21 NOTE — TELEPHONE ENCOUNTER
Tc to patient, reviewed chart, noted he is over due for pt/inr testing  Requested he test pt/inr this week

## 2020-09-22 ENCOUNTER — APPOINTMENT (OUTPATIENT)
Dept: LAB | Age: 85
End: 2020-09-22
Payer: COMMERCIAL

## 2020-09-22 ENCOUNTER — ANTICOAG VISIT (OUTPATIENT)
Dept: CARDIOLOGY CLINIC | Facility: CLINIC | Age: 85
End: 2020-09-22

## 2020-09-22 DIAGNOSIS — I48.0 PAF (PAROXYSMAL ATRIAL FIBRILLATION) (HCC): ICD-10-CM

## 2020-09-22 NOTE — PROGRESS NOTES
Tc to pt, will decrease dose, 3 mg sun/th, 2 mg other days of the week, inr due 3 weeks  Pt takes vit d otc for several months

## 2020-10-12 ENCOUNTER — REMOTE DEVICE CLINIC VISIT (OUTPATIENT)
Dept: CARDIOLOGY CLINIC | Facility: CLINIC | Age: 85
End: 2020-10-12
Payer: COMMERCIAL

## 2020-10-12 DIAGNOSIS — Z95.810 PRESENCE OF AUTOMATIC CARDIOVERTER/DEFIBRILLATOR (AICD): Primary | ICD-10-CM

## 2020-10-12 PROCEDURE — G2066 INTER DEVC REMOTE 30D: HCPCS | Performed by: INTERNAL MEDICINE

## 2020-10-12 PROCEDURE — 93297 REM INTERROG DEV EVAL ICPMS: CPT | Performed by: INTERNAL MEDICINE

## 2020-10-19 ENCOUNTER — ANTICOAG VISIT (OUTPATIENT)
Dept: CARDIOLOGY CLINIC | Facility: CLINIC | Age: 85
End: 2020-10-19

## 2020-10-19 ENCOUNTER — LAB (OUTPATIENT)
Dept: LAB | Age: 85
End: 2020-10-19
Payer: COMMERCIAL

## 2020-10-19 DIAGNOSIS — I48.0 PAF (PAROXYSMAL ATRIAL FIBRILLATION) (HCC): ICD-10-CM

## 2020-10-29 ENCOUNTER — ANTICOAG VISIT (OUTPATIENT)
Dept: CARDIOLOGY CLINIC | Facility: CLINIC | Age: 85
End: 2020-10-29

## 2020-10-29 ENCOUNTER — LAB (OUTPATIENT)
Dept: LAB | Age: 85
End: 2020-10-29
Payer: COMMERCIAL

## 2020-10-29 DIAGNOSIS — I48.0 PAF (PAROXYSMAL ATRIAL FIBRILLATION) (HCC): ICD-10-CM

## 2020-11-02 ENCOUNTER — OFFICE VISIT (OUTPATIENT)
Dept: CARDIOLOGY CLINIC | Facility: CLINIC | Age: 85
End: 2020-11-02
Payer: COMMERCIAL

## 2020-11-02 VITALS
HEIGHT: 63 IN | TEMPERATURE: 97.2 F | SYSTOLIC BLOOD PRESSURE: 142 MMHG | DIASTOLIC BLOOD PRESSURE: 80 MMHG | BODY MASS INDEX: 27.14 KG/M2 | HEART RATE: 66 BPM | WEIGHT: 153.2 LBS

## 2020-11-02 DIAGNOSIS — I10 ESSENTIAL HYPERTENSION: ICD-10-CM

## 2020-11-02 DIAGNOSIS — E78.2 MIXED HYPERLIPIDEMIA: ICD-10-CM

## 2020-11-02 DIAGNOSIS — Z95.810 CARDIAC DEFIBRILLATOR IN SITU: ICD-10-CM

## 2020-11-02 DIAGNOSIS — I63.419 CEREBROVASCULAR ACCIDENT (CVA) DUE TO EMBOLISM OF MIDDLE CEREBRAL ARTERY, UNSPECIFIED BLOOD VESSEL LATERALITY (HCC): ICD-10-CM

## 2020-11-02 DIAGNOSIS — I25.2 OLD MYOCARDIAL INFARCTION: ICD-10-CM

## 2020-11-02 DIAGNOSIS — I48.0 PAF (PAROXYSMAL ATRIAL FIBRILLATION) (HCC): Primary | ICD-10-CM

## 2020-11-02 DIAGNOSIS — I25.5 ISCHEMIC CARDIOMYOPATHY: ICD-10-CM

## 2020-11-02 DIAGNOSIS — Z98.61 STATUS POST PERCUTANEOUS TRANSLUMINAL CORONARY ANGIOPLASTY: ICD-10-CM

## 2020-11-02 DIAGNOSIS — N18.9 CHRONIC KIDNEY DISEASE, UNSPECIFIED CKD STAGE: ICD-10-CM

## 2020-11-02 DIAGNOSIS — I47.2 VENTRICULAR TACHYCARDIA (HCC): ICD-10-CM

## 2020-11-02 PROCEDURE — 99214 OFFICE O/P EST MOD 30 MIN: CPT | Performed by: INTERNAL MEDICINE

## 2020-11-02 PROCEDURE — 93000 ELECTROCARDIOGRAM COMPLETE: CPT | Performed by: INTERNAL MEDICINE

## 2020-11-12 ENCOUNTER — REMOTE DEVICE CLINIC VISIT (OUTPATIENT)
Dept: CARDIOLOGY CLINIC | Facility: CLINIC | Age: 85
End: 2020-11-12
Payer: COMMERCIAL

## 2020-11-12 DIAGNOSIS — Z95.810 PRESENCE OF AUTOMATIC CARDIOVERTER/DEFIBRILLATOR (AICD): Primary | ICD-10-CM

## 2020-11-12 PROCEDURE — G2066 INTER DEVC REMOTE 30D: HCPCS | Performed by: INTERNAL MEDICINE

## 2020-11-12 PROCEDURE — 93297 REM INTERROG DEV EVAL ICPMS: CPT | Performed by: INTERNAL MEDICINE

## 2020-11-23 ENCOUNTER — LAB (OUTPATIENT)
Dept: LAB | Age: 85
End: 2020-11-23
Payer: COMMERCIAL

## 2020-11-23 ENCOUNTER — ANTICOAG VISIT (OUTPATIENT)
Dept: CARDIOLOGY CLINIC | Facility: CLINIC | Age: 85
End: 2020-11-23

## 2020-11-23 DIAGNOSIS — I63.419 CEREBROVASCULAR ACCIDENT (CVA) DUE TO EMBOLISM OF MIDDLE CEREBRAL ARTERY, UNSPECIFIED BLOOD VESSEL LATERALITY (HCC): ICD-10-CM

## 2020-11-23 DIAGNOSIS — I48.0 PAF (PAROXYSMAL ATRIAL FIBRILLATION) (HCC): ICD-10-CM

## 2020-11-27 DIAGNOSIS — I25.5 ISCHEMIC CARDIOMYOPATHY: ICD-10-CM

## 2020-11-27 DIAGNOSIS — I48.0 PAF (PAROXYSMAL ATRIAL FIBRILLATION) (HCC): ICD-10-CM

## 2020-11-27 DIAGNOSIS — I10 ESSENTIAL HYPERTENSION: ICD-10-CM

## 2020-11-29 RX ORDER — CARVEDILOL 12.5 MG/1
TABLET ORAL
Qty: 180 TABLET | Refills: 3 | Status: SHIPPED | OUTPATIENT
Start: 2020-11-29 | End: 2021-01-01

## 2020-11-29 RX ORDER — SPIRONOLACTONE 25 MG/1
TABLET ORAL
Qty: 45 TABLET | Refills: 3 | Status: SHIPPED | OUTPATIENT
Start: 2020-11-29 | End: 2021-01-01

## 2020-11-29 RX ORDER — WARFARIN SODIUM 2 MG/1
TABLET ORAL
Qty: 150 TABLET | Refills: 3 | Status: SHIPPED | OUTPATIENT
Start: 2020-11-29 | End: 2021-01-01

## 2020-12-16 ENCOUNTER — TELEPHONE (OUTPATIENT)
Dept: CARDIOLOGY CLINIC | Facility: CLINIC | Age: 85
End: 2020-12-16

## 2020-12-17 ENCOUNTER — REMOTE DEVICE CLINIC VISIT (OUTPATIENT)
Dept: CARDIOLOGY CLINIC | Facility: CLINIC | Age: 85
End: 2020-12-17
Payer: COMMERCIAL

## 2020-12-17 DIAGNOSIS — Z95.810 AICD (AUTOMATIC CARDIOVERTER/DEFIBRILLATOR) PRESENT: Primary | ICD-10-CM

## 2020-12-17 PROCEDURE — 93295 DEV INTERROG REMOTE 1/2/MLT: CPT | Performed by: INTERNAL MEDICINE

## 2020-12-17 PROCEDURE — 93296 REM INTERROG EVL PM/IDS: CPT | Performed by: INTERNAL MEDICINE

## 2020-12-28 ENCOUNTER — LAB (OUTPATIENT)
Dept: LAB | Age: 85
End: 2020-12-28
Payer: COMMERCIAL

## 2020-12-28 ENCOUNTER — ANTICOAG VISIT (OUTPATIENT)
Dept: CARDIOLOGY CLINIC | Facility: CLINIC | Age: 85
End: 2020-12-28

## 2020-12-28 ENCOUNTER — TRANSCRIBE ORDERS (OUTPATIENT)
Dept: ADMINISTRATIVE | Facility: HOSPITAL | Age: 85
End: 2020-12-28

## 2020-12-28 DIAGNOSIS — I48.0 PAF (PAROXYSMAL ATRIAL FIBRILLATION) (HCC): Primary | ICD-10-CM

## 2020-12-28 DIAGNOSIS — I48.0 PAF (PAROXYSMAL ATRIAL FIBRILLATION) (HCC): ICD-10-CM

## 2020-12-28 LAB
INR PPP: 3.18 (ref 0.84–1.19)
PROTHROMBIN TIME: 32.3 SECONDS (ref 11.6–14.5)

## 2020-12-28 PROCEDURE — 36415 COLL VENOUS BLD VENIPUNCTURE: CPT

## 2020-12-28 PROCEDURE — 85610 PROTHROMBIN TIME: CPT

## 2020-12-29 DIAGNOSIS — I10 ESSENTIAL HYPERTENSION: ICD-10-CM

## 2020-12-29 DIAGNOSIS — I47.2 VENTRICULAR TACHYCARDIA (HCC): ICD-10-CM

## 2020-12-29 DIAGNOSIS — E78.2 MIXED HYPERLIPIDEMIA: ICD-10-CM

## 2020-12-31 RX ORDER — ATORVASTATIN CALCIUM 40 MG/1
40 TABLET, FILM COATED ORAL DAILY
Qty: 90 TABLET | Refills: 3 | Status: SHIPPED | OUTPATIENT
Start: 2020-12-31 | End: 2021-01-01

## 2020-12-31 RX ORDER — AMIODARONE HYDROCHLORIDE 200 MG/1
200 TABLET ORAL DAILY
Qty: 90 TABLET | Refills: 3 | Status: SHIPPED | OUTPATIENT
Start: 2020-12-31 | End: 2021-01-01 | Stop reason: SDUPTHER

## 2020-12-31 RX ORDER — LISINOPRIL 5 MG/1
5 TABLET ORAL DAILY
Qty: 90 TABLET | Refills: 3 | Status: SHIPPED | OUTPATIENT
Start: 2020-12-31 | End: 2021-01-01

## 2021-01-01 ENCOUNTER — TELEPHONE (OUTPATIENT)
Dept: CARDIOLOGY CLINIC | Facility: CLINIC | Age: 86
End: 2021-01-01

## 2021-01-01 ENCOUNTER — ANTICOAG VISIT (OUTPATIENT)
Dept: CARDIOLOGY CLINIC | Facility: CLINIC | Age: 86
End: 2021-01-01

## 2021-01-01 ENCOUNTER — TELEPHONE (OUTPATIENT)
Dept: NEPHROLOGY | Facility: CLINIC | Age: 86
End: 2021-01-01

## 2021-01-01 ENCOUNTER — REMOTE DEVICE CLINIC VISIT (OUTPATIENT)
Dept: CARDIOLOGY CLINIC | Facility: CLINIC | Age: 86
End: 2021-01-01
Payer: COMMERCIAL

## 2021-01-01 ENCOUNTER — APPOINTMENT (INPATIENT)
Dept: ULTRASOUND IMAGING | Facility: HOSPITAL | Age: 86
DRG: 291 | End: 2021-01-01
Payer: COMMERCIAL

## 2021-01-01 ENCOUNTER — TRANSCRIBE ORDERS (OUTPATIENT)
Dept: URGENT CARE | Age: 86
End: 2021-01-01

## 2021-01-01 ENCOUNTER — OFFICE VISIT (OUTPATIENT)
Dept: NEPHROLOGY | Facility: CLINIC | Age: 86
End: 2021-01-01
Payer: COMMERCIAL

## 2021-01-01 ENCOUNTER — OFFICE VISIT (OUTPATIENT)
Dept: CARDIOLOGY CLINIC | Facility: CLINIC | Age: 86
End: 2021-01-01
Payer: COMMERCIAL

## 2021-01-01 ENCOUNTER — DOCUMENTATION (OUTPATIENT)
Dept: NON INVASIVE DIAGNOSTICS | Facility: HOSPITAL | Age: 86
End: 2021-01-01

## 2021-01-01 ENCOUNTER — APPOINTMENT (OUTPATIENT)
Dept: LAB | Age: 86
End: 2021-01-01
Payer: COMMERCIAL

## 2021-01-01 ENCOUNTER — IN-CLINIC DEVICE VISIT (OUTPATIENT)
Dept: CARDIOLOGY CLINIC | Facility: CLINIC | Age: 86
End: 2021-01-01
Payer: COMMERCIAL

## 2021-01-01 ENCOUNTER — OFFICE VISIT (OUTPATIENT)
Dept: URGENT CARE | Age: 86
End: 2021-01-01
Payer: COMMERCIAL

## 2021-01-01 ENCOUNTER — CONSULT (OUTPATIENT)
Dept: CARDIOLOGY CLINIC | Facility: CLINIC | Age: 86
End: 2021-01-01
Payer: COMMERCIAL

## 2021-01-01 ENCOUNTER — APPOINTMENT (EMERGENCY)
Dept: RADIOLOGY | Facility: HOSPITAL | Age: 86
DRG: 291 | End: 2021-01-01
Payer: COMMERCIAL

## 2021-01-01 ENCOUNTER — HOSPITAL ENCOUNTER (INPATIENT)
Facility: HOSPITAL | Age: 86
LOS: 3 days | Discharge: HOME | DRG: 291 | End: 2021-10-26
Attending: EMERGENCY MEDICINE | Admitting: INTERNAL MEDICINE
Payer: COMMERCIAL

## 2021-01-01 ENCOUNTER — LAB (OUTPATIENT)
Dept: LAB | Age: 86
End: 2021-01-01
Payer: COMMERCIAL

## 2021-01-01 ENCOUNTER — APPOINTMENT (INPATIENT)
Dept: NON INVASIVE DIAGNOSTICS | Facility: HOSPITAL | Age: 86
DRG: 291 | End: 2021-01-01
Payer: COMMERCIAL

## 2021-01-01 VITALS
DIASTOLIC BLOOD PRESSURE: 95 MMHG | SYSTOLIC BLOOD PRESSURE: 134 MMHG | HEIGHT: 63 IN | BODY MASS INDEX: 26.58 KG/M2 | OXYGEN SATURATION: 96 % | WEIGHT: 150 LBS | RESPIRATION RATE: 20 BRPM | HEART RATE: 78 BPM | TEMPERATURE: 98.4 F

## 2021-01-01 VITALS
WEIGHT: 152.8 LBS | BODY MASS INDEX: 27.07 KG/M2 | SYSTOLIC BLOOD PRESSURE: 118 MMHG | OXYGEN SATURATION: 99 % | DIASTOLIC BLOOD PRESSURE: 74 MMHG | HEART RATE: 68 BPM | HEIGHT: 63 IN

## 2021-01-01 VITALS
RESPIRATION RATE: 16 BRPM | DIASTOLIC BLOOD PRESSURE: 76 MMHG | BODY MASS INDEX: 26.26 KG/M2 | WEIGHT: 148.2 LBS | HEART RATE: 80 BPM | HEIGHT: 63 IN | SYSTOLIC BLOOD PRESSURE: 122 MMHG

## 2021-01-01 VITALS
HEART RATE: 88 BPM | TEMPERATURE: 97 F | RESPIRATION RATE: 18 BRPM | OXYGEN SATURATION: 98 % | DIASTOLIC BLOOD PRESSURE: 93 MMHG | SYSTOLIC BLOOD PRESSURE: 115 MMHG | HEIGHT: 63 IN | WEIGHT: 139.11 LBS | BODY MASS INDEX: 24.65 KG/M2

## 2021-01-01 VITALS
DIASTOLIC BLOOD PRESSURE: 80 MMHG | BODY MASS INDEX: 25.2 KG/M2 | SYSTOLIC BLOOD PRESSURE: 128 MMHG | WEIGHT: 142.2 LBS | HEIGHT: 63 IN | HEART RATE: 73 BPM

## 2021-01-01 VITALS
BODY MASS INDEX: 25.48 KG/M2 | WEIGHT: 143.8 LBS | HEART RATE: 73 BPM | DIASTOLIC BLOOD PRESSURE: 80 MMHG | SYSTOLIC BLOOD PRESSURE: 132 MMHG | HEIGHT: 63 IN

## 2021-01-01 VITALS
BODY MASS INDEX: 26.49 KG/M2 | HEIGHT: 63 IN | RESPIRATION RATE: 16 BRPM | DIASTOLIC BLOOD PRESSURE: 88 MMHG | WEIGHT: 149.5 LBS | SYSTOLIC BLOOD PRESSURE: 116 MMHG | HEART RATE: 80 BPM

## 2021-01-01 DIAGNOSIS — N18.9 ACUTE KIDNEY INJURY SUPERIMPOSED ON CHRONIC KIDNEY DISEASE (HCC): Primary | ICD-10-CM

## 2021-01-01 DIAGNOSIS — I50.9 CHF (CONGESTIVE HEART FAILURE) (HCC): ICD-10-CM

## 2021-01-01 DIAGNOSIS — I63.10 CEREBROVASCULAR ACCIDENT (CVA) DUE TO EMBOLISM OF PRECEREBRAL ARTERY (HCC): ICD-10-CM

## 2021-01-01 DIAGNOSIS — I25.5 ISCHEMIC CARDIOMYOPATHY: ICD-10-CM

## 2021-01-01 DIAGNOSIS — Z95.810 AICD (AUTOMATIC CARDIOVERTER/DEFIBRILLATOR) PRESENT: Primary | ICD-10-CM

## 2021-01-01 DIAGNOSIS — R06.02 SOB (SHORTNESS OF BREATH): Primary | ICD-10-CM

## 2021-01-01 DIAGNOSIS — I25.5 ISCHEMIC CARDIOMYOPATHY: Primary | ICD-10-CM

## 2021-01-01 DIAGNOSIS — I25.2 OLD MYOCARDIAL INFARCTION: ICD-10-CM

## 2021-01-01 DIAGNOSIS — E78.2 MIXED HYPERLIPIDEMIA: ICD-10-CM

## 2021-01-01 DIAGNOSIS — I47.2 VENTRICULAR TACHYCARDIA (HCC): ICD-10-CM

## 2021-01-01 DIAGNOSIS — N18.32 STAGE 3B CHRONIC KIDNEY DISEASE (HCC): Primary | ICD-10-CM

## 2021-01-01 DIAGNOSIS — I48.0 PAF (PAROXYSMAL ATRIAL FIBRILLATION) (HCC): Primary | ICD-10-CM

## 2021-01-01 DIAGNOSIS — Z95.810 CARDIAC DEFIBRILLATOR IN SITU: ICD-10-CM

## 2021-01-01 DIAGNOSIS — N17.9 ACUTE KIDNEY INJURY SUPERIMPOSED ON CHRONIC KIDNEY DISEASE (HCC): ICD-10-CM

## 2021-01-01 DIAGNOSIS — Z98.61 STATUS POST PERCUTANEOUS TRANSLUMINAL CORONARY ANGIOPLASTY: ICD-10-CM

## 2021-01-01 DIAGNOSIS — I10 ESSENTIAL HYPERTENSION: ICD-10-CM

## 2021-01-01 DIAGNOSIS — I50.43 ACUTE ON CHRONIC COMBINED SYSTOLIC AND DIASTOLIC CONGESTIVE HEART FAILURE (HCC): ICD-10-CM

## 2021-01-01 DIAGNOSIS — N18.9 ACUTE KIDNEY INJURY SUPERIMPOSED ON CHRONIC KIDNEY DISEASE (HCC): ICD-10-CM

## 2021-01-01 DIAGNOSIS — E87.1 HYPONATREMIA: ICD-10-CM

## 2021-01-01 DIAGNOSIS — N28.9 KIDNEY DISEASE: Primary | ICD-10-CM

## 2021-01-01 DIAGNOSIS — I50.9 CHF (CONGESTIVE HEART FAILURE) (HCC): Primary | ICD-10-CM

## 2021-01-01 DIAGNOSIS — R06.02 SOB (SHORTNESS OF BREATH): ICD-10-CM

## 2021-01-01 DIAGNOSIS — R79.1 INR (INTERNATIONAL NORMAL RATIO) ABNORMAL: ICD-10-CM

## 2021-01-01 DIAGNOSIS — N17.9 ACUTE KIDNEY INJURY SUPERIMPOSED ON CHRONIC KIDNEY DISEASE (HCC): Primary | ICD-10-CM

## 2021-01-01 DIAGNOSIS — N18.30 STAGE 3 CHRONIC KIDNEY DISEASE, UNSPECIFIED WHETHER STAGE 3A OR 3B CKD (HCC): ICD-10-CM

## 2021-01-01 DIAGNOSIS — R60.0 BILATERAL LOWER EXTREMITY EDEMA: ICD-10-CM

## 2021-01-01 DIAGNOSIS — I48.0 PAF (PAROXYSMAL ATRIAL FIBRILLATION) (HCC): ICD-10-CM

## 2021-01-01 DIAGNOSIS — N17.9 AKI (ACUTE KIDNEY INJURY) (HCC): ICD-10-CM

## 2021-01-01 DIAGNOSIS — R79.1 ABNORMAL COAGULATION PROFILE: Primary | ICD-10-CM

## 2021-01-01 DIAGNOSIS — Z78.9 PACED RHYTHM ON CARDIAC MONITOR: ICD-10-CM

## 2021-01-01 DIAGNOSIS — N28.9 KIDNEY DISEASE: ICD-10-CM

## 2021-01-01 DIAGNOSIS — Z95.810 PRESENCE OF AUTOMATIC CARDIOVERTER/DEFIBRILLATOR (AICD): Primary | ICD-10-CM

## 2021-01-01 DIAGNOSIS — R79.1 ABNORMAL COAGULATION PROFILE: ICD-10-CM

## 2021-01-01 DIAGNOSIS — N18.9 CHRONIC KIDNEY DISEASE, UNSPECIFIED CKD STAGE: ICD-10-CM

## 2021-01-01 DIAGNOSIS — R06.02 SHORTNESS OF BREATH: ICD-10-CM

## 2021-01-01 DIAGNOSIS — I47.20 VENTRICULAR TACHYCARDIA (HCC): ICD-10-CM

## 2021-01-01 DIAGNOSIS — R06.02 SHORTNESS OF BREATH: Primary | ICD-10-CM

## 2021-01-01 LAB
ALBUMIN SERPL BCP-MCNC: 3.6 G/DL (ref 3.5–5)
ALBUMIN SERPL BCP-MCNC: 3.8 G/DL (ref 3.5–5)
ALP SERPL-CCNC: 43 U/L (ref 46–116)
ALP SERPL-CCNC: 53 U/L (ref 46–116)
ALT SERPL W P-5'-P-CCNC: 102 U/L (ref 12–78)
ALT SERPL W P-5'-P-CCNC: 34 U/L (ref 12–78)
ANION GAP SERPL CALCULATED.3IONS-SCNC: 10 MMOL/L (ref 4–13)
ANION GAP SERPL CALCULATED.3IONS-SCNC: 11 MMOL/L (ref 4–13)
ANION GAP SERPL CALCULATED.3IONS-SCNC: 12 MMOL/L (ref 4–13)
ANION GAP SERPL CALCULATED.3IONS-SCNC: 4 MMOL/L (ref 4–13)
ANION GAP SERPL CALCULATED.3IONS-SCNC: 5 MMOL/L (ref 4–13)
ANION GAP SERPL CALCULATED.3IONS-SCNC: 7 MMOL/L (ref 4–13)
ANION GAP SERPL CALCULATED.3IONS-SCNC: 9 MMOL/L (ref 4–13)
AORTIC ROOT: 3.6 CM
APICAL FOUR CHAMBER EJECTION FRACTION: 6 %
APTT PPP: 52 SECONDS (ref 23–37)
ASCENDING AORTA: 3.9 CM
AST SERPL W P-5'-P-CCNC: 29 U/L (ref 5–45)
AST SERPL W P-5'-P-CCNC: 64 U/L (ref 5–45)
ATRIAL RATE: 60 BPM
ATRIAL RATE: 93 BPM
BACTERIA UR QL AUTO: ABNORMAL /HPF
BASOPHILS # BLD AUTO: 0.04 THOUSANDS/ΜL (ref 0–0.1)
BASOPHILS # BLD AUTO: 0.05 THOUSANDS/ΜL (ref 0–0.1)
BASOPHILS # BLD AUTO: 0.05 THOUSANDS/ΜL (ref 0–0.1)
BASOPHILS # BLD AUTO: 0.06 THOUSANDS/ΜL (ref 0–0.1)
BASOPHILS # BLD AUTO: 0.06 THOUSANDS/ΜL (ref 0–0.1)
BASOPHILS NFR BLD AUTO: 1 % (ref 0–1)
BILIRUB DIRECT SERPL-MCNC: 0.31 MG/DL (ref 0–0.2)
BILIRUB SERPL-MCNC: 0.9 MG/DL (ref 0.2–1)
BILIRUB SERPL-MCNC: 0.97 MG/DL (ref 0.2–1)
BILIRUB UR QL STRIP: NEGATIVE
BUN SERPL-MCNC: 21 MG/DL (ref 5–25)
BUN SERPL-MCNC: 25 MG/DL (ref 5–25)
BUN SERPL-MCNC: 28 MG/DL (ref 5–25)
BUN SERPL-MCNC: 28 MG/DL (ref 5–25)
BUN SERPL-MCNC: 35 MG/DL (ref 5–25)
BUN SERPL-MCNC: 38 MG/DL (ref 5–25)
BUN SERPL-MCNC: 41 MG/DL (ref 5–25)
CALCIUM SERPL-MCNC: 8.8 MG/DL (ref 8.3–10.1)
CALCIUM SERPL-MCNC: 8.9 MG/DL (ref 8.3–10.1)
CALCIUM SERPL-MCNC: 8.9 MG/DL (ref 8.3–10.1)
CALCIUM SERPL-MCNC: 9 MG/DL (ref 8.3–10.1)
CALCIUM SERPL-MCNC: 9 MG/DL (ref 8.3–10.1)
CALCIUM SERPL-MCNC: 9.1 MG/DL (ref 8.3–10.1)
CALCIUM SERPL-MCNC: 9.1 MG/DL (ref 8.3–10.1)
CHLORIDE SERPL-SCNC: 100 MMOL/L (ref 100–108)
CHLORIDE SERPL-SCNC: 101 MMOL/L (ref 100–108)
CHLORIDE SERPL-SCNC: 101 MMOL/L (ref 100–108)
CHLORIDE SERPL-SCNC: 104 MMOL/L (ref 100–108)
CHLORIDE SERPL-SCNC: 104 MMOL/L (ref 100–108)
CHLORIDE SERPL-SCNC: 105 MMOL/L (ref 100–108)
CHLORIDE SERPL-SCNC: 99 MMOL/L (ref 100–108)
CHLORIDE UR-SCNC: 73 MMOL/L
CHOLEST SERPL-MCNC: 192 MG/DL (ref 50–200)
CLARITY UR: CLEAR
CO2 SERPL-SCNC: 23 MMOL/L (ref 21–32)
CO2 SERPL-SCNC: 25 MMOL/L (ref 21–32)
CO2 SERPL-SCNC: 25 MMOL/L (ref 21–32)
CO2 SERPL-SCNC: 26 MMOL/L (ref 21–32)
CO2 SERPL-SCNC: 28 MMOL/L (ref 21–32)
COLOR UR: YELLOW
CREAT SERPL-MCNC: 1.67 MG/DL (ref 0.6–1.3)
CREAT SERPL-MCNC: 1.69 MG/DL (ref 0.6–1.3)
CREAT SERPL-MCNC: 1.72 MG/DL (ref 0.6–1.3)
CREAT SERPL-MCNC: 1.94 MG/DL (ref 0.6–1.3)
CREAT SERPL-MCNC: 2.03 MG/DL (ref 0.6–1.3)
CREAT SERPL-MCNC: 2.09 MG/DL (ref 0.6–1.3)
CREAT SERPL-MCNC: 2.12 MG/DL (ref 0.6–1.3)
CREAT UR-MCNC: 144.8 MG/DL
EOSINOPHIL # BLD AUTO: 0.09 THOUSAND/ΜL (ref 0–0.61)
EOSINOPHIL # BLD AUTO: 0.1 THOUSAND/ΜL (ref 0–0.61)
EOSINOPHIL # BLD AUTO: 0.11 THOUSAND/ΜL (ref 0–0.61)
EOSINOPHIL # BLD AUTO: 0.22 THOUSAND/ΜL (ref 0–0.61)
EOSINOPHIL # BLD AUTO: 0.25 THOUSAND/ΜL (ref 0–0.61)
EOSINOPHIL NFR BLD AUTO: 2 % (ref 0–6)
EOSINOPHIL NFR BLD AUTO: 4 % (ref 0–6)
EOSINOPHIL NFR BLD AUTO: 4 % (ref 0–6)
ERYTHROCYTE [DISTWIDTH] IN BLOOD BY AUTOMATED COUNT: 13 % (ref 11.6–15.1)
ERYTHROCYTE [DISTWIDTH] IN BLOOD BY AUTOMATED COUNT: 13.3 % (ref 11.6–15.1)
ERYTHROCYTE [DISTWIDTH] IN BLOOD BY AUTOMATED COUNT: 13.6 % (ref 11.6–15.1)
ERYTHROCYTE [DISTWIDTH] IN BLOOD BY AUTOMATED COUNT: 13.8 % (ref 11.6–15.1)
ERYTHROCYTE [DISTWIDTH] IN BLOOD BY AUTOMATED COUNT: 13.9 % (ref 11.6–15.1)
ERYTHROCYTE [DISTWIDTH] IN BLOOD BY AUTOMATED COUNT: 14.1 % (ref 11.6–15.1)
FLUAV RNA RESP QL NAA+PROBE: NEGATIVE
FLUBV RNA RESP QL NAA+PROBE: NEGATIVE
FRACTIONAL SHORTENING: 21 % (ref 28–44)
GFR SERPL CREATININE-BSD FRML MDRD: 27 ML/MIN/1.73SQ M
GFR SERPL CREATININE-BSD FRML MDRD: 27 ML/MIN/1.73SQ M
GFR SERPL CREATININE-BSD FRML MDRD: 28 ML/MIN/1.73SQ M
GFR SERPL CREATININE-BSD FRML MDRD: 30 ML/MIN/1.73SQ M
GFR SERPL CREATININE-BSD FRML MDRD: 35 ML/MIN/1.73SQ M
GFR SERPL CREATININE-BSD FRML MDRD: 35 ML/MIN/1.73SQ M
GFR SERPL CREATININE-BSD FRML MDRD: 36 ML/MIN/1.73SQ M
GLUCOSE P FAST SERPL-MCNC: 93 MG/DL (ref 65–99)
GLUCOSE SERPL-MCNC: 101 MG/DL (ref 65–140)
GLUCOSE SERPL-MCNC: 111 MG/DL (ref 65–140)
GLUCOSE SERPL-MCNC: 114 MG/DL (ref 65–140)
GLUCOSE SERPL-MCNC: 120 MG/DL (ref 65–140)
GLUCOSE SERPL-MCNC: 95 MG/DL (ref 65–140)
GLUCOSE SERPL-MCNC: 99 MG/DL (ref 65–140)
GLUCOSE UR STRIP-MCNC: NEGATIVE MG/DL
HCT VFR BLD AUTO: 38.1 % (ref 36.5–49.3)
HCT VFR BLD AUTO: 38.3 % (ref 36.5–49.3)
HCT VFR BLD AUTO: 38.7 % (ref 36.5–49.3)
HCT VFR BLD AUTO: 39.2 % (ref 36.5–49.3)
HCT VFR BLD AUTO: 39.6 % (ref 36.5–49.3)
HCT VFR BLD AUTO: 41.1 % (ref 36.5–49.3)
HDLC SERPL-MCNC: 62 MG/DL
HGB BLD-MCNC: 12.2 G/DL (ref 12–17)
HGB BLD-MCNC: 12.5 G/DL (ref 12–17)
HGB BLD-MCNC: 12.6 G/DL (ref 12–17)
HGB BLD-MCNC: 12.8 G/DL (ref 12–17)
HGB BLD-MCNC: 13 G/DL (ref 12–17)
HGB BLD-MCNC: 13.6 G/DL (ref 12–17)
HGB UR QL STRIP.AUTO: ABNORMAL
IMM GRANULOCYTES # BLD AUTO: 0.01 THOUSAND/UL (ref 0–0.2)
IMM GRANULOCYTES # BLD AUTO: 0.02 THOUSAND/UL (ref 0–0.2)
IMM GRANULOCYTES # BLD AUTO: 0.02 THOUSAND/UL (ref 0–0.2)
IMM GRANULOCYTES NFR BLD AUTO: 0 % (ref 0–2)
INR PPP: 2 (ref 0.84–1.19)
INR PPP: 2.3 (ref 0.84–1.19)
INR PPP: 2.4 (ref 0.84–1.19)
INR PPP: 2.7 (ref 0.84–1.19)
INR PPP: 2.83 (ref 0.84–1.19)
INR PPP: 3.48 (ref 0.84–1.19)
INR PPP: 3.54 (ref 0.84–1.19)
INR PPP: 3.7 (ref 0.84–1.19)
INR PPP: 4.1 (ref 0.84–1.19)
INR PPP: 4.3 (ref 0.84–1.19)
INR PPP: 4.3 (ref 0.84–1.19)
INR PPP: 5.18 (ref 0.84–1.19)
INR PPP: 7.83 (ref 0.84–1.19)
INTERVENTRICULAR SEPTUM IN DIASTOLE (PARASTERNAL SHORT AXIS VIEW): 0.8 CM
KETONES UR STRIP-MCNC: NEGATIVE MG/DL
LDLC SERPL CALC-MCNC: 108 MG/DL (ref 0–100)
LEFT INTERNAL DIMENSION IN SYSTOLE: 4.5 CM (ref 2.1–4)
LEFT VENTRICLE DIASTOLIC VOLUME (MOD BIPLANE): 125 ML
LEFT VENTRICLE SYSTOLIC VOLUME (MOD BIPLANE): 109 ML
LEFT VENTRICULAR INTERNAL DIMENSION IN DIASTOLE: 5.7 CM (ref 3.84–5.72)
LEFT VENTRICULAR POSTERIOR WALL IN END DIASTOLE: 0.8 CM
LEFT VENTRICULAR STROKE VOLUME: 47 ML
LEUKOCYTE ESTERASE UR QL STRIP: NEGATIVE
LV EF: 36 %
LYMPHOCYTES # BLD AUTO: 1.11 THOUSANDS/ΜL (ref 0.6–4.47)
LYMPHOCYTES # BLD AUTO: 1.15 THOUSANDS/ΜL (ref 0.6–4.47)
LYMPHOCYTES # BLD AUTO: 1.2 THOUSANDS/ΜL (ref 0.6–4.47)
LYMPHOCYTES # BLD AUTO: 1.24 THOUSANDS/ΜL (ref 0.6–4.47)
LYMPHOCYTES # BLD AUTO: 1.48 THOUSANDS/ΜL (ref 0.6–4.47)
LYMPHOCYTES NFR BLD AUTO: 18 % (ref 14–44)
LYMPHOCYTES NFR BLD AUTO: 22 % (ref 14–44)
LYMPHOCYTES NFR BLD AUTO: 23 % (ref 14–44)
LYMPHOCYTES NFR BLD AUTO: 25 % (ref 14–44)
LYMPHOCYTES NFR BLD AUTO: 25 % (ref 14–44)
MAGNESIUM SERPL-MCNC: 1.9 MG/DL (ref 1.6–2.6)
MCH RBC QN AUTO: 31.1 PG (ref 26.8–34.3)
MCH RBC QN AUTO: 31.2 PG (ref 26.8–34.3)
MCH RBC QN AUTO: 31.6 PG (ref 26.8–34.3)
MCH RBC QN AUTO: 31.9 PG (ref 26.8–34.3)
MCH RBC QN AUTO: 32.2 PG (ref 26.8–34.3)
MCH RBC QN AUTO: 32.4 PG (ref 26.8–34.3)
MCHC RBC AUTO-ENTMCNC: 32 G/DL (ref 31.4–37.4)
MCHC RBC AUTO-ENTMCNC: 32.3 G/DL (ref 31.4–37.4)
MCHC RBC AUTO-ENTMCNC: 32.7 G/DL (ref 31.4–37.4)
MCHC RBC AUTO-ENTMCNC: 32.8 G/DL (ref 31.4–37.4)
MCHC RBC AUTO-ENTMCNC: 32.9 G/DL (ref 31.4–37.4)
MCHC RBC AUTO-ENTMCNC: 33.1 G/DL (ref 31.4–37.4)
MCV RBC AUTO: 100 FL (ref 82–98)
MCV RBC AUTO: 94 FL (ref 82–98)
MCV RBC AUTO: 95 FL (ref 82–98)
MCV RBC AUTO: 98 FL (ref 82–98)
MCV RBC AUTO: 99 FL (ref 82–98)
MCV RBC AUTO: 99 FL (ref 82–98)
MONOCYTES # BLD AUTO: 0.51 THOUSAND/ΜL (ref 0.17–1.22)
MONOCYTES # BLD AUTO: 0.53 THOUSAND/ΜL (ref 0.17–1.22)
MONOCYTES # BLD AUTO: 0.55 THOUSAND/ΜL (ref 0.17–1.22)
MONOCYTES # BLD AUTO: 0.59 THOUSAND/ΜL (ref 0.17–1.22)
MONOCYTES # BLD AUTO: 0.6 THOUSAND/ΜL (ref 0.17–1.22)
MONOCYTES NFR BLD AUTO: 10 % (ref 4–12)
MONOCYTES NFR BLD AUTO: 10 % (ref 4–12)
MONOCYTES NFR BLD AUTO: 11 % (ref 4–12)
MONOCYTES NFR BLD AUTO: 11 % (ref 4–12)
MONOCYTES NFR BLD AUTO: 9 % (ref 4–12)
NEUTROPHILS # BLD AUTO: 2.98 THOUSANDS/ΜL (ref 1.85–7.62)
NEUTROPHILS # BLD AUTO: 3.19 THOUSANDS/ΜL (ref 1.85–7.62)
NEUTROPHILS # BLD AUTO: 3.34 THOUSANDS/ΜL (ref 1.85–7.62)
NEUTROPHILS # BLD AUTO: 3.64 THOUSANDS/ΜL (ref 1.85–7.62)
NEUTROPHILS # BLD AUTO: 4.26 THOUSANDS/ΜL (ref 1.85–7.62)
NEUTS SEG NFR BLD AUTO: 60 % (ref 43–75)
NEUTS SEG NFR BLD AUTO: 61 % (ref 43–75)
NEUTS SEG NFR BLD AUTO: 61 % (ref 43–75)
NEUTS SEG NFR BLD AUTO: 65 % (ref 43–75)
NEUTS SEG NFR BLD AUTO: 70 % (ref 43–75)
NITRITE UR QL STRIP: NEGATIVE
NON-SQ EPI CELLS URNS QL MICRO: ABNORMAL /HPF
NRBC BLD AUTO-RTO: 0 /100 WBCS
NT-PROBNP SERPL-MCNC: 1684 PG/ML
NT-PROBNP SERPL-MCNC: 2277 PG/ML
NT-PROBNP SERPL-MCNC: 2717 PG/ML
NT-PROBNP SERPL-MCNC: 345 PG/ML
PH UR STRIP.AUTO: 5 [PH] (ref 4.5–8)
PLATELET # BLD AUTO: 128 THOUSANDS/UL (ref 149–390)
PLATELET # BLD AUTO: 141 THOUSANDS/UL (ref 149–390)
PLATELET # BLD AUTO: 148 THOUSANDS/UL (ref 149–390)
PLATELET # BLD AUTO: 163 THOUSANDS/UL (ref 149–390)
PLATELET # BLD AUTO: 163 THOUSANDS/UL (ref 149–390)
PLATELET # BLD AUTO: 169 THOUSANDS/UL (ref 149–390)
PMV BLD AUTO: 10.5 FL (ref 8.9–12.7)
PMV BLD AUTO: 10.9 FL (ref 8.9–12.7)
PMV BLD AUTO: 10.9 FL (ref 8.9–12.7)
PMV BLD AUTO: 11.3 FL (ref 8.9–12.7)
PMV BLD AUTO: 11.5 FL (ref 8.9–12.7)
PMV BLD AUTO: 11.7 FL (ref 8.9–12.7)
POTASSIUM SERPL-SCNC: 3.3 MMOL/L (ref 3.5–5.3)
POTASSIUM SERPL-SCNC: 3.8 MMOL/L (ref 3.5–5.3)
POTASSIUM SERPL-SCNC: 3.9 MMOL/L (ref 3.5–5.3)
POTASSIUM SERPL-SCNC: 4.1 MMOL/L (ref 3.5–5.3)
POTASSIUM SERPL-SCNC: 4.3 MMOL/L (ref 3.5–5.3)
POTASSIUM SERPL-SCNC: 4.4 MMOL/L (ref 3.5–5.3)
POTASSIUM SERPL-SCNC: 4.5 MMOL/L (ref 3.5–5.3)
PROT SERPL-MCNC: 6.7 G/DL (ref 6.4–8.2)
PROT SERPL-MCNC: 7.2 G/DL (ref 6.4–8.2)
PROT UR STRIP-MCNC: NEGATIVE MG/DL
PROTHROMBIN TIME: 28.7 SECONDS (ref 11.6–14.5)
PROTHROMBIN TIME: 33.2 SECONDS (ref 11.6–14.5)
PROTHROMBIN TIME: 33.9 SECONDS (ref 11.6–14.5)
PROTHROMBIN TIME: 37.9 SECONDS (ref 11.6–14.5)
PROTHROMBIN TIME: 45.3 SECONDS (ref 11.6–14.5)
PROTHROMBIN TIME: 65 SECONDS (ref 11.6–14.5)
QRS AXIS: -51 DEGREES
QRS AXIS: -67 DEGREES
QRSD INTERVAL: 218 MS
QRSD INTERVAL: 228 MS
QT INTERVAL: 472 MS
QT INTERVAL: 518 MS
QTC INTERVAL: 555 MS
QTC INTERVAL: 557 MS
RA PRESSURE ESTIMATED: 15 MMHG
RBC # BLD AUTO: 3.83 MILLION/UL (ref 3.88–5.62)
RBC # BLD AUTO: 3.95 MILLION/UL (ref 3.88–5.62)
RBC # BLD AUTO: 3.97 MILLION/UL (ref 3.88–5.62)
RBC # BLD AUTO: 4.01 MILLION/UL (ref 3.88–5.62)
RBC # BLD AUTO: 4.05 MILLION/UL (ref 3.88–5.62)
RBC # BLD AUTO: 4.36 MILLION/UL (ref 3.88–5.62)
RBC #/AREA URNS AUTO: ABNORMAL /HPF
RIGHT VENTRICLE ID DIMENSION: 3 CM
RSV RNA RESP QL NAA+PROBE: NEGATIVE
SARS-COV-2 RNA RESP QL NAA+PROBE: NEGATIVE
SL CV LV EF: 12
SL CV PED ECHO LEFT VENTRICLE DIASTOLIC VOLUME (MOD BIPLANE) 2D: 138 ML
SL CV PED ECHO LEFT VENTRICLE SYSTOLIC VOLUME (MOD BIPLANE) 2D: 91 ML
SODIUM 24H UR-SCNC: 62 MOL/L
SODIUM SERPL-SCNC: 133 MMOL/L (ref 136–145)
SODIUM SERPL-SCNC: 134 MMOL/L (ref 136–145)
SODIUM SERPL-SCNC: 135 MMOL/L (ref 136–145)
SODIUM SERPL-SCNC: 135 MMOL/L (ref 136–145)
SODIUM SERPL-SCNC: 137 MMOL/L (ref 136–145)
SODIUM SERPL-SCNC: 138 MMOL/L (ref 136–145)
SODIUM SERPL-SCNC: 139 MMOL/L (ref 136–145)
SP GR UR STRIP.AUTO: 1.01 (ref 1–1.03)
T WAVE AXIS: 108 DEGREES
T WAVE AXIS: 96 DEGREES
TR MAX PG: 47 MMHG
TR PEAK VELOCITY: 2.9 M/S
TRICUSPID ANNULAR PLANE SYSTOLIC EXCURSION: 1 CM
TRICUSPID VALVE PEAK REGURGITATION VELOCITY: 2.85 M/S
TRICUSPID VALVE S': 39 CM/S
TRIGL SERPL-MCNC: 109 MG/DL
TROPONIN I SERPL-MCNC: <0.02 NG/ML
TV PEAK GRADIENT: 32 MMHG
UROBILINOGEN UR QL STRIP.AUTO: 0.2 E.U./DL
UUN 24H UR-MCNC: 763 MG/DL
VENTRICULAR RATE: 69 BPM
VENTRICULAR RATE: 84 BPM
WBC # BLD AUTO: 4.84 THOUSAND/UL (ref 4.31–10.16)
WBC # BLD AUTO: 5.19 THOUSAND/UL (ref 4.31–10.16)
WBC # BLD AUTO: 5.32 THOUSAND/UL (ref 4.31–10.16)
WBC # BLD AUTO: 6.04 THOUSAND/UL (ref 4.31–10.16)
WBC # BLD AUTO: 6.08 THOUSAND/UL (ref 4.31–10.16)
WBC # BLD AUTO: 6.27 THOUSAND/UL (ref 4.31–10.16)
WBC #/AREA URNS AUTO: ABNORMAL /HPF
Z-SCORE OF LEFT VENTRICULAR DIMENSION IN END SYSTOLE: 1.96

## 2021-01-01 PROCEDURE — 99239 HOSP IP/OBS DSCHRG MGMT >30: CPT | Performed by: PHYSICIAN ASSISTANT

## 2021-01-01 PROCEDURE — 93971 EXTREMITY STUDY: CPT

## 2021-01-01 PROCEDURE — G0463 HOSPITAL OUTPT CLINIC VISIT: HCPCS | Performed by: PHYSICIAN ASSISTANT

## 2021-01-01 PROCEDURE — 85027 COMPLETE CBC AUTOMATED: CPT | Performed by: PHYSICIAN ASSISTANT

## 2021-01-01 PROCEDURE — 36415 COLL VENOUS BLD VENIPUNCTURE: CPT

## 2021-01-01 PROCEDURE — 99213 OFFICE O/P EST LOW 20 MIN: CPT | Performed by: PHYSICIAN ASSISTANT

## 2021-01-01 PROCEDURE — 93005 ELECTROCARDIOGRAM TRACING: CPT

## 2021-01-01 PROCEDURE — 84300 ASSAY OF URINE SODIUM: CPT | Performed by: INTERNAL MEDICINE

## 2021-01-01 PROCEDURE — 99232 SBSQ HOSP IP/OBS MODERATE 35: CPT | Performed by: INTERNAL MEDICINE

## 2021-01-01 PROCEDURE — 99222 1ST HOSP IP/OBS MODERATE 55: CPT | Performed by: INTERNAL MEDICINE

## 2021-01-01 PROCEDURE — 85025 COMPLETE CBC W/AUTO DIFF WBC: CPT | Performed by: INTERNAL MEDICINE

## 2021-01-01 PROCEDURE — 80048 BASIC METABOLIC PNL TOTAL CA: CPT | Performed by: INTERNAL MEDICINE

## 2021-01-01 PROCEDURE — 99214 OFFICE O/P EST MOD 30 MIN: CPT | Performed by: INTERNAL MEDICINE

## 2021-01-01 PROCEDURE — 85730 THROMBOPLASTIN TIME PARTIAL: CPT | Performed by: PHYSICIAN ASSISTANT

## 2021-01-01 PROCEDURE — 93971 EXTREMITY STUDY: CPT | Performed by: SURGERY

## 2021-01-01 PROCEDURE — 85610 PROTHROMBIN TIME: CPT | Performed by: INTERNAL MEDICINE

## 2021-01-01 PROCEDURE — 97166 OT EVAL MOD COMPLEX 45 MIN: CPT

## 2021-01-01 PROCEDURE — 99232 SBSQ HOSP IP/OBS MODERATE 35: CPT | Performed by: PHYSICIAN ASSISTANT

## 2021-01-01 PROCEDURE — 93005 ELECTROCARDIOGRAM TRACING: CPT | Performed by: PHYSICIAN ASSISTANT

## 2021-01-01 PROCEDURE — 93296 REM INTERROG EVL PM/IDS: CPT | Performed by: INTERNAL MEDICINE

## 2021-01-01 PROCEDURE — 82436 ASSAY OF URINE CHLORIDE: CPT | Performed by: INTERNAL MEDICINE

## 2021-01-01 PROCEDURE — 99223 1ST HOSP IP/OBS HIGH 75: CPT | Performed by: INTERNAL MEDICINE

## 2021-01-01 PROCEDURE — G2066 INTER DEVC REMOTE 30D: HCPCS | Performed by: INTERNAL MEDICINE

## 2021-01-01 PROCEDURE — 93306 TTE W/DOPPLER COMPLETE: CPT | Performed by: INTERNAL MEDICINE

## 2021-01-01 PROCEDURE — 93000 ELECTROCARDIOGRAM COMPLETE: CPT | Performed by: INTERNAL MEDICINE

## 2021-01-01 PROCEDURE — 80076 HEPATIC FUNCTION PANEL: CPT | Performed by: PHYSICIAN ASSISTANT

## 2021-01-01 PROCEDURE — C8929 TTE W OR WO FOL WCON,DOPPLER: HCPCS

## 2021-01-01 PROCEDURE — 85610 PROTHROMBIN TIME: CPT

## 2021-01-01 PROCEDURE — 36415 COLL VENOUS BLD VENIPUNCTURE: CPT | Performed by: INTERNAL MEDICINE

## 2021-01-01 PROCEDURE — 80048 BASIC METABOLIC PNL TOTAL CA: CPT | Performed by: PHYSICIAN ASSISTANT

## 2021-01-01 PROCEDURE — 93283 PRGRMG EVAL IMPLANTABLE DFB: CPT | Performed by: INTERNAL MEDICINE

## 2021-01-01 PROCEDURE — 83880 ASSAY OF NATRIURETIC PEPTIDE: CPT

## 2021-01-01 PROCEDURE — 99285 EMERGENCY DEPT VISIT HI MDM: CPT

## 2021-01-01 PROCEDURE — 93297 REM INTERROG DEV EVAL ICPMS: CPT | Performed by: INTERNAL MEDICINE

## 2021-01-01 PROCEDURE — 93295 DEV INTERROG REMOTE 1/2/MLT: CPT | Performed by: INTERNAL MEDICINE

## 2021-01-01 PROCEDURE — 97163 PT EVAL HIGH COMPLEX 45 MIN: CPT

## 2021-01-01 PROCEDURE — 93010 ELECTROCARDIOGRAM REPORT: CPT

## 2021-01-01 PROCEDURE — 85610 PROTHROMBIN TIME: CPT | Performed by: PHYSICIAN ASSISTANT

## 2021-01-01 PROCEDURE — 80061 LIPID PANEL: CPT

## 2021-01-01 PROCEDURE — 82570 ASSAY OF URINE CREATININE: CPT | Performed by: INTERNAL MEDICINE

## 2021-01-01 PROCEDURE — 85025 COMPLETE CBC W/AUTO DIFF WBC: CPT | Performed by: PHYSICIAN ASSISTANT

## 2021-01-01 PROCEDURE — 81001 URINALYSIS AUTO W/SCOPE: CPT

## 2021-01-01 PROCEDURE — 83735 ASSAY OF MAGNESIUM: CPT | Performed by: NURSE PRACTITIONER

## 2021-01-01 PROCEDURE — 76770 US EXAM ABDO BACK WALL COMP: CPT

## 2021-01-01 PROCEDURE — 0241U HB NFCT DS VIR RESP RNA 4 TRGT: CPT | Performed by: PHYSICIAN ASSISTANT

## 2021-01-01 PROCEDURE — 80048 BASIC METABOLIC PNL TOTAL CA: CPT

## 2021-01-01 PROCEDURE — 36415 COLL VENOUS BLD VENIPUNCTURE: CPT | Performed by: PHYSICIAN ASSISTANT

## 2021-01-01 PROCEDURE — 85025 COMPLETE CBC W/AUTO DIFF WBC: CPT

## 2021-01-01 PROCEDURE — 83880 ASSAY OF NATRIURETIC PEPTIDE: CPT | Performed by: PHYSICIAN ASSISTANT

## 2021-01-01 PROCEDURE — 96374 THER/PROPH/DIAG INJ IV PUSH: CPT

## 2021-01-01 PROCEDURE — 93010 ELECTROCARDIOGRAM REPORT: CPT | Performed by: INTERNAL MEDICINE

## 2021-01-01 PROCEDURE — 84484 ASSAY OF TROPONIN QUANT: CPT | Performed by: PHYSICIAN ASSISTANT

## 2021-01-01 PROCEDURE — 80053 COMPREHEN METABOLIC PANEL: CPT

## 2021-01-01 PROCEDURE — 99285 EMERGENCY DEPT VISIT HI MDM: CPT | Performed by: PHYSICIAN ASSISTANT

## 2021-01-01 PROCEDURE — 84540 ASSAY OF URINE/UREA-N: CPT | Performed by: INTERNAL MEDICINE

## 2021-01-01 PROCEDURE — 71045 X-RAY EXAM CHEST 1 VIEW: CPT

## 2021-01-01 RX ORDER — WARFARIN SODIUM 2 MG/1
TABLET ORAL
Qty: 150 TABLET | Refills: 3 | Status: SHIPPED | OUTPATIENT
Start: 2021-01-01 | End: 2022-01-01 | Stop reason: HOSPADM

## 2021-01-01 RX ORDER — FUROSEMIDE 40 MG/1
TABLET ORAL
Qty: 90 TABLET | Refills: 3 | Status: SHIPPED | OUTPATIENT
Start: 2021-01-01 | End: 2021-01-01 | Stop reason: SDUPTHER

## 2021-01-01 RX ORDER — SPIRONOLACTONE 25 MG/1
TABLET ORAL
Qty: 45 TABLET | Refills: 3 | Status: SHIPPED | OUTPATIENT
Start: 2021-01-01 | End: 2021-01-01 | Stop reason: HOSPADM

## 2021-01-01 RX ORDER — WARFARIN SODIUM 2 MG/1
TABLET ORAL
Qty: 150 TABLET | Refills: 3 | Status: SHIPPED | OUTPATIENT
Start: 2021-01-01 | End: 2021-01-01 | Stop reason: SDUPTHER

## 2021-01-01 RX ORDER — FUROSEMIDE 40 MG/1
40 TABLET ORAL DAILY
Qty: 30 TABLET | Refills: 0 | Status: SHIPPED | OUTPATIENT
Start: 2021-01-01 | End: 2021-01-01 | Stop reason: SDUPTHER

## 2021-01-01 RX ORDER — ATORVASTATIN CALCIUM 40 MG/1
TABLET, FILM COATED ORAL
Qty: 90 TABLET | Refills: 3 | Status: SHIPPED | OUTPATIENT
Start: 2021-01-01 | End: 2021-01-01 | Stop reason: SDUPTHER

## 2021-01-01 RX ORDER — CARVEDILOL 12.5 MG/1
12.5 TABLET ORAL 2 TIMES DAILY WITH MEALS
Qty: 180 TABLET | Refills: 3 | Status: SHIPPED | OUTPATIENT
Start: 2021-01-01 | End: 2022-01-01 | Stop reason: SDUPTHER

## 2021-01-01 RX ORDER — POTASSIUM CHLORIDE 20 MEQ/1
20 TABLET, EXTENDED RELEASE ORAL ONCE
Status: COMPLETED | OUTPATIENT
Start: 2021-01-01 | End: 2021-01-01

## 2021-01-01 RX ORDER — AMIODARONE HYDROCHLORIDE 200 MG/1
200 TABLET ORAL DAILY
Qty: 90 TABLET | Refills: 3 | Status: SHIPPED | OUTPATIENT
Start: 2021-01-01 | End: 2022-01-01 | Stop reason: HOSPADM

## 2021-01-01 RX ORDER — CARVEDILOL 12.5 MG/1
TABLET ORAL
Qty: 180 TABLET | Refills: 3 | Status: SHIPPED | OUTPATIENT
Start: 2021-01-01 | End: 2021-01-01 | Stop reason: SDUPTHER

## 2021-01-01 RX ORDER — ATORVASTATIN CALCIUM 40 MG/1
40 TABLET, FILM COATED ORAL DAILY
Qty: 90 TABLET | Refills: 3 | Status: SHIPPED | OUTPATIENT
Start: 2021-01-01 | End: 2022-01-01 | Stop reason: HOSPADM

## 2021-01-01 RX ORDER — FUROSEMIDE 40 MG/1
TABLET ORAL
Qty: 68 TABLET | Refills: 3 | OUTPATIENT
Start: 2021-01-01 | End: 2022-01-01 | Stop reason: HOSPADM

## 2021-01-01 RX ORDER — DOCUSATE SODIUM 100 MG/1
100 CAPSULE, LIQUID FILLED ORAL 2 TIMES DAILY
Status: DISCONTINUED | OUTPATIENT
Start: 2021-01-01 | End: 2021-01-01 | Stop reason: HOSPADM

## 2021-01-01 RX ORDER — AMIODARONE HYDROCHLORIDE 200 MG/1
200 TABLET ORAL DAILY
Status: DISCONTINUED | OUTPATIENT
Start: 2021-01-01 | End: 2021-01-01 | Stop reason: HOSPADM

## 2021-01-01 RX ORDER — FUROSEMIDE 10 MG/ML
40 INJECTION INTRAMUSCULAR; INTRAVENOUS ONCE
Status: COMPLETED | OUTPATIENT
Start: 2021-01-01 | End: 2021-01-01

## 2021-01-01 RX ORDER — ATORVASTATIN CALCIUM 40 MG/1
40 TABLET, FILM COATED ORAL
Status: DISCONTINUED | OUTPATIENT
Start: 2021-01-01 | End: 2021-01-01 | Stop reason: HOSPADM

## 2021-01-01 RX ORDER — FUROSEMIDE 10 MG/ML
40 INJECTION INTRAMUSCULAR; INTRAVENOUS
Status: DISCONTINUED | OUTPATIENT
Start: 2021-01-01 | End: 2021-01-01

## 2021-01-01 RX ORDER — CARVEDILOL 12.5 MG/1
12.5 TABLET ORAL 2 TIMES DAILY WITH MEALS
Status: DISCONTINUED | OUTPATIENT
Start: 2021-01-01 | End: 2021-01-01 | Stop reason: HOSPADM

## 2021-01-01 RX ADMIN — POTASSIUM CHLORIDE 20 MEQ: 1500 TABLET, EXTENDED RELEASE ORAL at 17:06

## 2021-01-01 RX ADMIN — DOCUSATE SODIUM 100 MG: 100 CAPSULE ORAL at 09:15

## 2021-01-01 RX ADMIN — CARVEDILOL 12.5 MG: 12.5 TABLET, FILM COATED ORAL at 16:36

## 2021-01-01 RX ADMIN — ATORVASTATIN CALCIUM 40 MG: 40 TABLET, FILM COATED ORAL at 16:36

## 2021-01-01 RX ADMIN — CARVEDILOL 12.5 MG: 12.5 TABLET, FILM COATED ORAL at 10:02

## 2021-01-01 RX ADMIN — PERFLUTREN 0.6 ML/MIN: 6.52 INJECTION, SUSPENSION INTRAVENOUS at 11:51

## 2021-01-01 RX ADMIN — AMIODARONE HYDROCHLORIDE 200 MG: 200 TABLET ORAL at 10:02

## 2021-01-01 RX ADMIN — AMIODARONE HYDROCHLORIDE 200 MG: 200 TABLET ORAL at 09:15

## 2021-01-01 RX ADMIN — ATORVASTATIN CALCIUM 40 MG: 40 TABLET, FILM COATED ORAL at 17:01

## 2021-01-01 RX ADMIN — CARVEDILOL 12.5 MG: 12.5 TABLET, FILM COATED ORAL at 08:34

## 2021-01-01 RX ADMIN — AMIODARONE HYDROCHLORIDE 200 MG: 200 TABLET ORAL at 16:36

## 2021-01-01 RX ADMIN — FUROSEMIDE 40 MG: 10 INJECTION, SOLUTION INTRAVENOUS at 17:01

## 2021-01-01 RX ADMIN — POTASSIUM CHLORIDE 20 MEQ: 1500 TABLET, EXTENDED RELEASE ORAL at 10:02

## 2021-01-01 RX ADMIN — FUROSEMIDE 40 MG: 10 INJECTION, SOLUTION INTRAVENOUS at 16:36

## 2021-01-01 RX ADMIN — DOCUSATE SODIUM 100 MG: 100 CAPSULE ORAL at 17:08

## 2021-01-01 RX ADMIN — FUROSEMIDE 40 MG: 10 INJECTION, SOLUTION INTRAVENOUS at 08:34

## 2021-01-01 RX ADMIN — AMIODARONE HYDROCHLORIDE 200 MG: 200 TABLET ORAL at 08:35

## 2021-01-01 RX ADMIN — FUROSEMIDE 40 MG: 10 INJECTION, SOLUTION INTRAVENOUS at 11:12

## 2021-01-01 RX ADMIN — ATORVASTATIN CALCIUM 40 MG: 40 TABLET, FILM COATED ORAL at 17:06

## 2021-01-01 RX ADMIN — CARVEDILOL 12.5 MG: 12.5 TABLET, FILM COATED ORAL at 17:01

## 2021-01-01 RX ADMIN — CARVEDILOL 12.5 MG: 12.5 TABLET, FILM COATED ORAL at 09:13

## 2021-01-01 RX ADMIN — DOCUSATE SODIUM 100 MG: 100 CAPSULE ORAL at 11:55

## 2021-01-12 ENCOUNTER — LAB (OUTPATIENT)
Dept: LAB | Age: 86
End: 2021-01-12
Payer: COMMERCIAL

## 2021-01-12 ENCOUNTER — ANTICOAG VISIT (OUTPATIENT)
Dept: CARDIOLOGY CLINIC | Facility: CLINIC | Age: 86
End: 2021-01-12

## 2021-01-12 DIAGNOSIS — I48.0 PAF (PAROXYSMAL ATRIAL FIBRILLATION) (HCC): ICD-10-CM

## 2021-01-12 LAB
INR PPP: 2.49 (ref 0.84–1.19)
PROTHROMBIN TIME: 26.7 SECONDS (ref 11.6–14.5)

## 2021-01-12 PROCEDURE — 85610 PROTHROMBIN TIME: CPT

## 2021-01-12 PROCEDURE — 36415 COLL VENOUS BLD VENIPUNCTURE: CPT

## 2021-01-12 NOTE — PROGRESS NOTES
Tc to pt, will continue current dose, inr due 2/3  Pt is planning dental implant with lv oarl surgery, on 2/4, will have inr done 2/3  Will have oral surgery office fax to dr Jasper Marcelo any medication holds to be addressed

## 2021-01-13 ENCOUNTER — TELEPHONE (OUTPATIENT)
Dept: CARDIOLOGY CLINIC | Facility: CLINIC | Age: 86
End: 2021-01-13

## 2021-01-13 NOTE — TELEPHONE ENCOUNTER
Dentist called have okay for oral hold from Dr Kavin Wood completed in November but is for this procedure is requesting that next INR number be called to their office on 2/3 prior to scheduled sx 2/4  Told him would put note is system for same

## 2021-01-13 NOTE — PROGRESS NOTES
Pt having dental sx and holding coumadin 3 days prior per clearance sheet scanned in chart from Oral Sx in Nov    Dentist from Oral Sx requests when INR result is received to please call to their office the day prior to sx  Phone # [phone]

## 2021-01-13 NOTE — TELEPHONE ENCOUNTER
Pt called stating having dental procedure and is scheduled for INR day prior    Jamie Tracey ask how his dental office gets instructions  Pt told dental office needs to fax info to office so we can respond  As of now no fax received

## 2021-02-10 ENCOUNTER — LAB (OUTPATIENT)
Dept: LAB | Age: 86
End: 2021-02-10
Payer: COMMERCIAL

## 2021-02-11 ENCOUNTER — ANTICOAG VISIT (OUTPATIENT)
Dept: CARDIOLOGY CLINIC | Facility: CLINIC | Age: 86
End: 2021-02-11

## 2021-02-11 DIAGNOSIS — I48.0 PAF (PAROXYSMAL ATRIAL FIBRILLATION) (HCC): ICD-10-CM

## 2021-03-04 ENCOUNTER — REMOTE DEVICE CLINIC VISIT (OUTPATIENT)
Dept: CARDIOLOGY CLINIC | Facility: CLINIC | Age: 86
End: 2021-03-04
Payer: COMMERCIAL

## 2021-03-04 DIAGNOSIS — Z95.810 PRESENCE OF AUTOMATIC CARDIOVERTER/DEFIBRILLATOR (AICD): Primary | ICD-10-CM

## 2021-03-04 PROCEDURE — 93295 DEV INTERROG REMOTE 1/2/MLT: CPT | Performed by: INTERNAL MEDICINE

## 2021-03-04 PROCEDURE — 93296 REM INTERROG EVL PM/IDS: CPT | Performed by: INTERNAL MEDICINE

## 2021-03-04 NOTE — PROGRESS NOTES
Results for orders placed or performed in visit on 03/04/21   Cardiac EP device report    Narrative    MDT-DUAL CHAMBER ICD (AAIR-DDDR MODE)/ ACTIVE SYSTEM IS MRI CONDITIONAL  CARELINK TRANSMISSION: BATTERY VOLTAGE ADEQUATE (8 3 YRS)  AP: 98 2%  : 5 4% (MVP-ON)  ALL LEAD PARAMETERS WITHIN NORMAL LIMITS  2 VT-NS MONITORED EPISODES W/ EGRMS SHOWING NSVT 6 BEATS @ 158 BPM & 6 BEATS @ 150 BPM   PT TAKES AMIODARONE, COREG, WARFARIN  EF: 25% (ECHO 10/24/18)  OPTI-VOL WITHIN NORMAL LIMITS  APPROPRIATELY FUNCTIONING ICD    [address]

## 2021-03-12 ENCOUNTER — ANTICOAG VISIT (OUTPATIENT)
Dept: CARDIOLOGY CLINIC | Facility: CLINIC | Age: 86
End: 2021-03-12

## 2021-03-12 ENCOUNTER — APPOINTMENT (OUTPATIENT)
Dept: LAB | Age: 86
End: 2021-03-12
Payer: COMMERCIAL

## 2021-03-12 DIAGNOSIS — I48.0 PAF (PAROXYSMAL ATRIAL FIBRILLATION) (HCC): ICD-10-CM

## 2021-04-09 NOTE — TELEPHONE ENCOUNTER
Phone call to patient regarding pt/inr results  He states he has ov with dr Lizbeth Morales on 5/14/21  He states  he has routine blood work done yearly ordered by dr Lizbeth Morales prior to visit  Patient is requesting dr Lizbeth Morales order blood work, so he can have this done on 5/6/21 with next pt/inr  Will review with dr Lizbeth Morales  Please advise

## 2021-04-09 NOTE — PROGRESS NOTES
Tc tp patient, will increase dose, 3 mg mon/th, 2 mg other days of the week, inr due 5/6  Planning oral surgery, dental implant on 5/27, per dr Collin Barillas [facility] oral surgery, will have inr done 48 prior to surgery, ordered by oral surgery 
no

## 2021-04-23 NOTE — TELEPHONE ENCOUNTER
I placed orders for CBC, CMP, fasting lipid profile and in NT-BNP pro  He can either go to a [facility] facility and tell them that there is blood work in the computer or he can stop in the office and  the slips or lastly we can mail the slips to him see what he wants to do

## 2021-04-26 NOTE — TELEPHONE ENCOUNTER
Tc to pt, reviewed response from dr Suzi Eaton  He will go to [facility] lab to have blood work done on 4/27/21

## 2021-04-28 NOTE — PROGRESS NOTES
Tc to pt, will continue current dose, inr due 4 weeks  Planning dental procedure on 5/27  Planning inr to be done 48 hours prior to procedure

## 2021-05-14 NOTE — PROGRESS NOTES
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 [facility])         Patient was 1st seen May 23, 2016  He had moved from [place] and was seeking to establish cardiology care  In 1989, he had a myocardial infarction treated by PTCA resulting in an ischemic cardiomyopathy with an ejection fraction of 35%  He has a Medtronic ICD which was placed on May 9th, 2011 in [place]  He is on chronic oral anticoagulation  He has a history of paroxysmal atrial fibrillation with a TIA/CVA in the past  Prior history includes myocardial infarction, ventricular tachycardia, TIA/CVA, GI bleed, hypertension and hyperlipidemia  NT- proBNP 345, normal less than 450    12/12/2019 lipid profile: Total cholesterol 158, triglycerides 105 HDL direct 52, LDL calculated 85    04/27/2021 lipid profile: Cholesterol 192, triglycerides 109, HDL 62, LDL calculated 108    Interval History: Patient has no cardiac complaints  Patient denies chest discomfort or shortness of breath  Patient has no palpitations  Patient denies symptoms of dizziness, lightheadedness or near-syncope/syncope  Patient denies leg edema  Patient denies symptoms of orthopnea or paroxysmal nocturnal dyspnea        Discussion/Summary:    Return in 6 month    Patient Active Problem List   Diagnosis    Cardiac defibrillator in situ    Chronic kidney disease    Embolic stroke ([facility] [address] )  Hyperlipidemia    Old myocardial infarction    Status post percutaneous transluminal coronary angioplasty    Ventricular tachycardia (HCC)    Ischemic cardiomyopathy    PAF (paroxysmal atrial fibrillation) (HCC)    Essential hypertension    Cardiomyopathy ([address] )     Past Medical History:   Diagnosis Date    Anemia     Arthritis     Coronary artery disease     Hyperlipidemia     Hypertension      Social History     Socioeconomic History    Marital status: /Civil Union     Spouse name: Not on file    Number of children: Not on file    Years of education: Not on file    Highest education level: Not on file   Occupational History    Not on file   Social Needs    Financial resource strain: Not on file    Food insecurity     Worry: Not on file     Inability: Not on file   [employer] needs     Medical: Not on file     Non-medical: Not on file   Tobacco Use    Smoking status: Never Smoker    Smokeless tobacco: Never Used   Substance and Sexual Activity    Alcohol use:  Yes     Alcohol/week: 3 0 standard drinks     Types: 3 Shots of liquor per week     Comment: daily    Drug use: Never    Sexual activity: Not on file   Lifestyle    Physical activity     Days per week: Not on file     Minutes per session: Not on file    Stress: Not on file   Relationships    Social connections     Talks on phone: Not on file     Gets together: Not on file     Attends [employer] service: Not on file     Active member of club or organization: Not on file     Attends meetings of clubs or organizations: Not on file     Relationship status: Not on file    Intimate partner violence     Fear of current or ex partner: Not on file     Emotionally abused: Not on file     Physically abused: Not on file     Forced sexual activity: Not on file   Other Topics Concern    Not on file   Social History Narrative    Not on file      Family History   Problem Relation Age of Onset    Coronary artery disease Mother    [facility] Heart attack Father         MI    Stroke Sister      Past Surgical History:   Procedure Laterality Date    CARDIAC PACEMAKER PLACEMENT  05/09/2011    Medtronic dual chamber ICD implant    CORONARY ANGIOPLASTY  1989       Current Outpatient Medications:     amiodarone 200 mg tablet, Take 1 tablet (200 mg total) by mouth daily, Disp: 90 tablet, Rfl: 3    atorvastatin (LIPITOR) 40 mg tablet, Take 1 tablet (40 mg total) by mouth daily, Disp: 90 tablet, Rfl: 3    carvedilol (COREG) 12 5 mg tablet, TAKE 1 TABLET TWICE DAILY WITH MEALS, Disp: 180 tablet, Rfl: 3    Cholecalciferol (VITAMIN D3) 50 MCG (2000 UT) capsule, take 1 tablet  alt with 1 1/2 tablets daily, Disp: , Rfl:     Cholecalciferol 2000 units CAPS, Take 1 capsule by mouth, Disp: , Rfl:     Glucosamine Sulfate 1000 MG CAPS, 1 capsule Every 12 hours, Disp: , Rfl:     lisinopril (ZESTRIL) 5 mg tablet, Take 1 tablet (5 mg total) by mouth daily, Disp: 90 tablet, Rfl: 3    spironolactone (ALDACTONE) 25 mg tablet, TAKE 1/2 TABLET EVERY DAY, Disp: 45 tablet, Rfl: 3    warfarin (COUMADIN) 2 mg tablet, TAKE 1 AND 1/2 TABLETS  DAILY OR AS DIRECTED BY MD, Disp: 150 tablet, Rfl: 3  No Known Allergies    No results found for this visit on 05/14/21  Review of Systems:  Review of Systems   Constitutional: Negative for activity change  Respiratory: Negative for cough, chest tightness, shortness of breath and wheezing  Cardiovascular: Negative for chest pain, palpitations and leg swelling  Musculoskeletal: Negative for gait problem  Skin: Negative for color change  Neurological: Negative for dizziness, tremors, syncope, weakness, light-headedness and headaches  Psychiatric/Behavioral: Negative for agitation and confusion         Physical Exam:  /74 (BP Location: Right arm, Patient Position: Sitting, Cuff Size: Standard)   Pulse 68   Ht 5' 3" (1 6 m)   Wt 69 3 kg (152 lb 12 8 oz)   SpO2 99%   BMI 27 07 kg/m²   Physical Exam  Constitutional:       General: He is not in acute distress  Appearance: He is well-developed  HENT:      Head: Normocephalic and atraumatic  Neck:      Vascular: No carotid bruit or JVD  Cardiovascular:      Rate and Rhythm: Normal rate and regular rhythm  Heart sounds: Normal heart sounds  No murmur  No friction rub  No gallop  Pulmonary:      Effort: Pulmonary effort is normal  No respiratory distress  Breath sounds: Normal breath sounds  No wheezing, rhonchi or rales  Chest:      Chest wall: No tenderness  Abdominal:      General: Bowel sounds are normal  There is no distension  Palpations: Abdomen is soft  Musculoskeletal:      Right lower leg: No edema  Left lower leg: No edema  Skin:     General: Skin is warm and dry  Neurological:      General: No focal deficit present  Mental Status: He is alert and oriented to person, place, and time  Psychiatric:         Mood and Affect: Mood normal          Behavior: Behavior normal          Thought Content:  Thought content normal          Judgment: Judgment normal          ----------------------------------------------------------------------------------------------  NUCLEAR STRESS TEST:   Results for orders placed during the hospital encounter of 10/24/18   NM myocardial perfusion spect (stress and/or rest)    [address]    Rest/Stress Gated SPECT Myocardial Perfusion Imaging After Regadenoson    Patient: Ezequiel Rojas  MR number: [id number]  Account number: [de-identified]  : 10/31/1932  Age: 80 years  Gender: Male  Status: Outpatient  Location: [facility]  Height: 63 in  Weight: 157 lb  BP: 150/ 90 mmHg    Diagnosis: I25 10 - Atherosclerotic heart disease of native coronary artery without angina pectoris, I25 2 - Old myocardial infarction, I25 5 - Ischemic cardiomyopathy, I47 2 - Ventricular tachycardia    RN:  Amos Faria Genna Willoughby RN  Referring Physician:  Magdy Guzman MD  Group:  [facility]  Report Prepared By[de-identified]  Jose Alfredo Devine RN  Interpreting Physician:  Sravanthi Bob MD    INDICATIONS: Evaluation of known coronary artery disease  Ischemic cardiomyopathy    HISTORY: The patient is a 80year old  male  Chest pain status: no chest pain  Coronary artery disease risk factors: dyslipidemia, hypertension, and family history of premature coronary artery disease  Cardiovascular history:  coronary artery disease, prior myocardial infarction, congestive heart failure, arrhythmia, stroke, ventricular tachycardia, and ischemic cardiomyopathy  ischemic cardiomyopathy Prior cardiovascular procedures: percutaneous transluminal  coronary angioplasty (on 1989), coronary artery bypass grafting, and implantable cardioverter defibrillator procedure  Medications: a beta blocker, an ACE inhibitor/ARB, a diuretic, aspirin, and a lipid lowering agent  Previous test  results: abnormal resting echocardiogram     REST ECG: Sinus rhythm, first-degree AV block, evidence of prior anteroseptal infarction with nonspecific ST T wave abnormalities at rest     PROCEDURE: The study was performed in the the [facility]  A regadenoson infusion pharmacologic stress test was performed  Gated SPECT myocardial perfusion imaging was performed after stress and at rest  Systolic blood pressure was  150 mmHg, at the start of the study  Diastolic blood pressure was 90 mmHg, at the start of the study  The heart rate was 72 bpm, at the start of the study  Regadenoson protocol:  HR bpm SBP mmHg DBP mmHg Symptoms  Baseline 72 150 90 --  1 min 77 -- -- dyspnea  2 min 80 100 60 same as above  3 min 81 -- -- subsiding  4 min 76 136 80 none  5 min 67 -- -- none  6 min 64 -- -- none  7 min 65 150 80 none  8 min 65 -- -- none  9 min 63 -- -- none  10 min 63 140 80 none  No medications or fluids given      STRESS SUMMARY: Duration of pharmacologic stress was 10 min  Maximal heart rate during stress was 81 bpm ( 60 % of maximal predicted heart rate)  The heart rate response to stress was normal  There was resting hypertension with an  appropriate blood pressure response to stress  The rate-pressure product for the peak heart rate and blood pressure was 09905  There was no chest pain during stress  The stress test was terminated due to protocol completion  No abnormal ST  T wave changes with regadenoson injection  No significant arrhythmia noted  MYOCARDIAL PERFUSION IMAGING:  The image quality was fair  Rotating projection images reveal mild diaphragmatic attenuation and mild subdiaphragmatic activity  The left ventricular cavity is mildly dilated  End systolic volume measures 408 mL and end-diastolic volume measures 552 mL  Review of resting and post regadenoson SPECT imaging demonstrates large size, severe, fixed perfusion abnormality involving the entire anterior wall, apex, distal lateral wall, distal anteroseptal wall and septum  There is no definite  ischemia noted  GATED SPECT:  Gated SPECT imaging demonstrates severely reduced left ventricular systolic function with dyskinesis of the apex and the distal anterior wall and hypokinesis of other walls  Ejection fraction is calculated as 29%  SUMMARY:  -  Stress results: Target heart rate was not achieved  There was resting hypertension with an appropriate blood pressure response to stress  There was no chest pain during stress  IMPRESSIONS: 1  Abnormal myocardial perfusion scan  2  Evidence of extensive, severe intensity fixed perfusion abnormalities without evidence of ischemia  3  Dilated left ventricular cavity with severely reduced left ventricular systolic function  Ejection fraction calculated as 29%  Diagnostic sensitivity was limited by submaximal stress      Prepared and signed by    Qian Garcia MD  Signed 10/24/2018 18:15:42 --------------------------------------------------------------------------------  ECHO:   Results for orders placed during the hospital encounter of 10/24/18   Echo complete with contrast if indicated    Narrative [address] [place] [address]    Transthoracic Echocardiogram  2D, M-mode, Doppler, and Color Doppler    Study date:  24-Oct-2018    Patient: Tu Palmer  MR number: [id number]  Account number: [de-identified]  : 31-Oct-1932  Age: 80 years  Gender: Male  Status: Outpatient  [facility]: [facility]  Height: 63 in  Weight: 157 lb  BP: 100/ 60 mmHg    Indications: Ischemic cardiomyopathy, coronary artery disease  Diagnoses: I25 5 - Ischemic cardiomyopathy    Sonographer:  Josefina Doe  Primary Physician:  Ivan Helms DO  Referring Physician:  Roderick Burgos MD  Group:  [facility]  Interpreting Physician:  Altagracia Lockhart MD    IMPRESSIONS:  Borderline dilated left ventricular cavity with some thinning of the apex and anterior and anteroseptal walls, severely reduced left ventricular systolic function with akinesis of the distal anterior, anteroseptal and apical wall and  hypokinesis of other regions  Ejection fraction is estimated as around 25-30%  There is grade 1 diastolic dysfunction  Mild left atrial cavity enlargement  Aortic valve sclerosis, no aortic stenosis or regurgitation  Mitral annular calcification, trace mitral valve regurgitation  Mild tricuspid valve regurgitation  No obvious pulmonary hypertension  No pericardial effusion  Compared to previous echocardiogram from May 27, 2016 there is overall no significant change  Previously noted borderline pulmonary hypertension is not currently appreciable      SUMMARY    LEFT VENTRICLE:  Borderline dilated left ventricular cavity, normal wall thickness with thinning of the mid to distal anterior wall apex and anteroseptal walls, severely reduced left ventricular systolic function with akinesis of the mid to distal anterior  wall, anteroseptal wall and apex extending to distal inferolateral walls, hypokinesis of other regions  Ejection fraction is estimated as around 25-30%  Grade 1 diastolic dysfunction  Estimated left atrial pressure is normal     RIGHT VENTRICLE:  Normal right ventricular size and systolic function  Normal estimated right ventricular systolic pressure  LEFT ATRIUM:  Mild left atrial cavity enlargement  RIGHT ATRIUM:  Normal right atrial cavity size  MITRAL VALVE:  Mild mitral annular calcification and leaflet sclerosis, trace mitral valve regurgitation  AORTIC VALVE:  Trace mitral valve regurgitation  TRICUSPID VALVE:  Mild tricuspid valve regurgitation  PULMONIC VALVE:  No significant pulmonic valve regurgitation  AORTA:  Aortic root and proximal ascending aorta are normal in size on 2D imaging  IVC, HEPATIC VEINS:  Inferior vena cava is normal in size and demonstrates appropriate respiratory phasic changes in diameter  PERICARDIUM:  Trace pericardial effusion with some organized material close to the apex, possible thrombus versus anterior fat pad  HISTORY: PRIOR HISTORY: Ventricular tachycardia, atrial fibrillation, cerebral vascular accident, defibrillator, HLD  PROCEDURE: The study was performed in the [facility]  This was a routine study  The transthoracic approach was used  The study included complete 2D imaging, M-mode, complete spectral Doppler, and color Doppler  The heart rate was  70 bpm, at the start of the study  Images were obtained from the parasternal, apical, subcostal, and suprasternal notch acoustic windows  Image quality was adequate      LEFT VENTRICLE: Borderline dilated left ventricular cavity, normal wall thickness with thinning of the mid to distal anterior wall apex and anteroseptal walls, severely reduced left ventricular systolic function with akinesis of the mid to  distal anterior wall, anteroseptal wall and apex extending to distal inferolateral walls, hypokinesis of other regions  Ejection fraction is estimated as around 25-30%  Grade 1 diastolic dysfunction  Estimated left atrial pressure is  normal     RIGHT VENTRICLE: Normal right ventricular size and systolic function  Normal estimated right ventricular systolic pressure  LEFT ATRIUM: Mild left atrial cavity enlargement  RIGHT ATRIUM: Normal right atrial cavity size  MITRAL VALVE: Mild mitral annular calcification and leaflet sclerosis, trace mitral valve regurgitation  AORTIC VALVE: Trace mitral valve regurgitation  TRICUSPID VALVE: Mild tricuspid valve regurgitation  PULMONIC VALVE: No significant pulmonic valve regurgitation  PERICARDIUM: Trace pericardial effusion with some organized material close to the apex, possible thrombus versus anterior fat pad  AORTA: Aortic root and proximal ascending aorta are normal in size on 2D imaging  SYSTEMIC VEINS: IVC: Inferior vena cava is normal in size and demonstrates appropriate respiratory phasic changes in diameter      SYSTEM MEASUREMENT TABLES    2D  %FS: 19 %  Ao Diam: 3 61 cm  EDV(Teich): 144 29 ml  EF(Teich): 38 79 %  ESV(Teich): 88 33 ml  IVSd: 1 04 cm  LA Area: 9 18 cm2  LA Diam: 4 59 cm  LVEDV MOD A4C: 104 87 ml  LVEF MOD A4C: 37 99 %  LVESV MOD A4C: 65 03 ml  LVIDd: 5 45 cm  LVIDs: 4 41 cm  LVLd A4C: 8 58 cm  LVLs A4C: 8 55 cm  LVPWd: 0 95 cm  RA Area: 11 11 cm2  RVIDd: 3 9 cm  SV MOD A4C: 39 83 ml  SV(Teich): 55 96 ml    CW  AV Vmax: 1 01 m/s  AV maxP 08 mmHg    MM  TAPSE: 2 27 cm    PW  E': 0 04 m/s  E/E': 14 25  MV A Chinedu: 0 93 m/s  MV Dec Lemhi: 3 38 m/s2  MV DecT: 164 17 ms  MV E Chinedu: 0 56 m/s  MV E/A Ratio: 0 6  MV PHT: 47 61 ms  MVA By PHT: 4 62 cm2  PRend P 51 mmHg  PRend Vmax: 0 79 m/s  PV Vmax: 0 65 m/s  PV maxP 71 mmHg  TR Vmax: 1 2 m/s  TR maxP 77 mmHg    [facility] Accredited Echocardiography Laboratory    Prepared and electronically signed by    Linda Shelton MD  Signed 24-Oct-2018 13:25:41         ======================================================    Lab Results   Component Value Date    WBC 6 04 04/27/2021    HGB 12 8 04/27/2021    HCT 39 2 04/27/2021    MCV 99 (H) 04/27/2021     04/27/2021      Lab Results   Component Value Date    SODIUM 138 04/27/2021    K 4 1 04/27/2021     04/27/2021    CO2 26 04/27/2021    BUN 25 04/27/2021    CREATININE 1 67 (H) 04/27/2021    GLUC 98 12/10/2018    CALCIUM 9 0 04/27/2021      No results found for: HGBA1C   No results found for: CHOL  Lab Results   Component Value Date    HDL 62 04/27/2021    HDL 52 12/12/2019     Lab Results   Component Value Date    LDLCALC 108 (H) 04/27/2021    LDLCALC 85 12/12/2019     Lab Results   Component Value Date    TRIG 109 04/27/2021    TRIG 105 12/12/2019     No results found for: [place], [place]   Lab Results   Component Value Date    INR 2 19 (H) 04/27/2021    INR 1 99 (H) 04/09/2021    INR 2 51 (H) 03/12/2021    PROTIME 24 3 (H) 04/27/2021    PROTIME 22 5 (H) 04/09/2021    PROTIME 27 0 (H) 03/12/2021        Imaging:   I have personally reviewed pertinent reports  Portions of the record may have been created with voice recognition software  Occasional wrong word or "sound a like" substitutions may have occurred due to the inherent limitations of voice recognition software  Read the chart carefully and recognize, using context, where substitutions have occurred

## 2021-05-14 NOTE — LETTER
May 14, 2021     Aracelis Ewing DO  [address] [facility],[address] [facility] [address]    Patient: Breonna Aranda   YOB: 1932   Date of Visit: 5/14/2021       Dear Dr Ariadne Roblero: Thank you for referring Breonna Aranda to me for evaluation  Below are my notes for this consultation  If you have questions, please do not hesitate to call me  I look forward to following your patient along with you  Sincerely,        Amari Hagan MD        CC: No Recipients  Amari Hagan MD  5/14/2021 10:17 AM  Sign when Signing Visit                                             Cardiology Follow Up    Breonna Aranda  10/31/1932  [address]   [address] [facility] [address]  [phone]  [phone]    1  PAF (paroxysmal atrial fibrillation) (HCC)     2  Old myocardial infarction     3  Ischemic cardiomyopathy     4  Mixed hyperlipidemia     5  Ventricular tachycardia (HCC)     6  Cardiac defibrillator in situ     7  Chronic kidney disease, unspecified CKD stage     8  Essential hypertension     9  Status post percutaneous transluminal coronary angioplasty     10  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 [facility])         Patient was 1st seen May 23, 2016  He had moved from [place] and was seeking to establish cardiology care  In 1989, he had a myocardial infarction treated by [facility] resulting in an ischemic cardiomyopathy with an ejection fraction of 35%  He has a Medtronic ICD which was placed on May 9th, 2011 in [place]  He is on chronic oral anticoagulation  He has a history of paroxysmal atrial fibrillation with a TIA/CVA in the past  Prior history includes myocardial infarction, ventricular tachycardia, TIA/CVA, GI bleed, hypertension and hyperlipidemia  NT- proBNP 345, normal less than 450    12/12/2019 lipid profile:  Total cholesterol 158, triglycerides 105 HDL direct 52, LDL calculated 85    04/27/2021 lipid profile: Cholesterol 192, triglycerides 109, HDL 62, LDL calculated 108    Interval History: Patient has no cardiac complaints  Patient denies chest discomfort or shortness of breath  Patient has no palpitations  Patient denies symptoms of dizziness, lightheadedness or near-syncope/syncope  Patient denies leg edema  Patient denies symptoms of orthopnea or paroxysmal nocturnal dyspnea  Discussion/Summary:    Return in 6 month    Patient Active Problem List   Diagnosis    Cardiac defibrillator in situ    Chronic kidney disease    Embolic stroke ([place] [address] )    Hyperlipidemia    Old myocardial infarction    Status post percutaneous transluminal coronary angioplasty    Ventricular tachycardia (HCC)    Ischemic cardiomyopathy    PAF [facility]oxysmal atrial fibrillation) [facility] )    Essential hypertension    Cardiomyopathy ([place] [address] )     Past Medical History:   Diagnosis Date    Anemia     Arthritis     Coronary artery disease     Hyperlipidemia     Hypertension      Social History     Socioeconomic History    Marital status: /Civil Union     Spouse name: Not on file    Number of children: Not on file    Years of education: Not on file    Highest education level: Not on file   Occupational History    Not on file   Social Needs    Financial resource strain: Not on file    Food insecurity     Worry: Not on file     Inability: Not on file   Nanoleaf needs     Medical: Not on file     Non-medical: Not on file   Tobacco Use    Smoking status: Never Smoker    Smokeless tobacco: Never Used   Substance and Sexual Activity    Alcohol use:  Yes     Alcohol/week: 3 0 standard drinks     Types: 3 Shots of liquor per week     Comment: daily    Drug use: Never    Sexual activity: Not on file   Lifestyle    Physical activity     Days per week: Not on file     Minutes per session: Not on file    Stress: Not on file   Relationships    Social connections     Talks on phone: Not on file     Gets together: Not on file     Attends Jehov[employer]'s witness service: Not on file     Active member of club or organization: Not on file     Attends meetings of clubs or organizations: Not on file     Relationship status: Not on file    Intimate partner violence     Fear of current or ex partner: Not on file     Emotionally abused: Not on file     Physically abused: Not on file     Forced sexual activity: Not on file   Other Topics Concern    Not on file   Social History Narrative    Not on file      Family History   Problem Relation Age of Onset    Coronary artery disease Mother     Heart attack Father         MI    Stroke Sister      Past Surgical History:   Procedure Laterality Date    CARDIAC PACEMAKER PLACEMENT  05/09/2011    Medtronic dual chamber ICD implant    CORONARY ANGIOPLASTY  1989       Current Outpatient Medications:     amiodarone 200 mg tablet, Take 1 tablet (200 mg total) by mouth daily, Disp: 90 tablet, Rfl: 3    atorvastatin (LIPITOR) 40 mg tablet, Take 1 tablet (40 mg total) by mouth daily, Disp: 90 tablet, Rfl: 3    carvedilol (COREG) 12 5 mg tablet, TAKE 1 TABLET TWICE DAILY WITH MEALS, Disp: 180 tablet, Rfl: 3    Cholecalciferol (VITAMIN D3) 50 MCG (2000 UT) capsule, take 1 tablet  alt with 1 1/2 tablets daily, Disp: , Rfl:     Cholecalciferol 2000 units CAPS, Take 1 capsule by mouth, Disp: , Rfl:     Glucosamine Sulfate 1000 MG CAPS, 1 capsule Every 12 hours, Disp: , Rfl:     lisinopril (ZESTRIL) 5 mg tablet, Take 1 tablet (5 mg total) by mouth daily, Disp: 90 tablet, Rfl: 3    spironolactone (ALDACTONE) 25 mg tablet, TAKE 1/2 TABLET EVERY DAY, Disp: 45 tablet, Rfl: 3    warfarin (COUMADIN) 2 mg tablet, TAKE 1 AND 1/2 TABLETS  DAILY OR AS DIRECTED BY MD, Disp: 150 tablet, Rfl: 3  No Known Allergies    No results found for this visit on 05/14/21  Review of Systems:  Review of Systems   Constitutional: Negative for activity change     Respiratory: Negative for cough, chest tightness, shortness of breath and wheezing  Cardiovascular: Negative for chest pain, palpitations and leg swelling  Musculoskeletal: Negative for gait problem  Skin: Negative for color change  Neurological: Negative for dizziness, tremors, syncope, weakness, light-headedness and headaches  Psychiatric/Behavioral: Negative for agitation and confusion  Physical Exam:  /74 (BP Location: Right arm, Patient Position: Sitting, Cuff Size: Standard)   Pulse 68   Ht 5' 3" (1 6 m)   Wt 69 3 kg (152 lb 12 8 oz)   SpO2 99%   BMI 27 07 kg/m²   Physical Exam  Constitutional:       General: He is not in acute distress  Appearance: He is well-developed  HENT:      Head: Normocephalic and atraumatic  Neck:      Vascular: No carotid bruit or JVD  Cardiovascular:      Rate and Rhythm: Normal rate and regular rhythm  Heart sounds: Normal heart sounds  No murmur  No friction rub  No gallop  Pulmonary:      Effort: Pulmonary effort is normal  No respiratory distress  Breath sounds: Normal breath sounds  No wheezing, rhonchi or rales  Chest:      Chest wall: No tenderness  Abdominal:      General: Bowel sounds are normal  There is no distension  Palpations: Abdomen is soft  Musculoskeletal:      Right lower leg: No edema  Left lower leg: No edema  Skin:     General: Skin is warm and dry  Neurological:      General: No focal deficit present  Mental Status: He is alert and oriented to person, place, and time  Psychiatric:         Mood and Affect: Mood normal          Behavior: Behavior normal          Thought Content:  Thought content normal          Judgment: Judgment normal          ----------------------------------------------------------------------------------------------  NUCLEAR STRESS TEST:   Results for orders placed during the hospital encounter of 10/24/18   NM myocardial perfusion spect (stress and/or rest)    [facility]  [address] [facility]    Rest/Stress Gated SPECT Myocardial Perfusion Imaging After Regadenoson    Patient: Izabel Vallejo  MR number: [id number]  Account number: [de-identified]  : 10/31/1932  Age: 80 years  Gender: Male  Status: Outpatient  Location: [facility]  Height: 63 in  Weight: 157 lb  BP: 150/ 90 mmHg    Diagnosis: I25 10 - Atherosclerotic heart disease of native coronary artery without angina pectoris, I25 2 - Old myocardial infarction, I25 5 - Ischemic cardiomyopathy, I47 2 - Ventricular tachycardia    RN:  Bhavani Marr RN  Referring Physician:  Yeny Grijalva MD  Group:  [facility]  Report Prepared By[de-identified]  Bhavani Marr RN  Interpreting Physician:  Sho Mccoy MD    INDICATIONS: Evaluation of known coronary artery disease  Ischemic cardiomyopathy    HISTORY: The patient is a 80year old  male  Chest pain status: no chest pain  Coronary artery disease risk factors: dyslipidemia, hypertension, and family history of premature coronary artery disease  Cardiovascular history:  coronary artery disease, prior myocardial infarction, congestive heart failure, arrhythmia, stroke, ventricular tachycardia, and ischemic cardiomyopathy  ischemic cardiomyopathy Prior cardiovascular procedures: percutaneous transluminal  coronary angioplasty (on ), coronary artery bypass grafting, and implantable cardioverter defibrillator procedure  Medications: a beta blocker, an ACE inhibitor/ARB, a diuretic, aspirin, and a lipid lowering agent  Previous test  results: abnormal resting echocardiogram     REST ECG: Sinus rhythm, first-degree AV block, evidence of prior anteroseptal infarction with nonspecific ST T wave abnormalities at rest     PROCEDURE: The study was performed in the the [facility]  A regadenoson infusion pharmacologic stress test was performed   Gated SPECT myocardial perfusion imaging was performed after stress and at rest  Systolic blood pressure was  150 mmHg, at the start of the study  Diastolic blood pressure was 90 mmHg, at the start of the study  The heart rate was 72 bpm, at the start of the study  Regadenoson protocol:  HR bpm SBP mmHg DBP mmHg Symptoms  Baseline 72 150 90 --  1 min 77 -- -- dyspnea  2 min 80 100 60 same as above  3 min 81 -- -- subsiding  4 min 76 136 80 none  5 min 67 -- -- none  6 min 64 -- -- none  7 min 65 150 80 none  8 min 65 -- -- none  9 min 63 -- -- none  10 min 63 140 80 none  No medications or fluids given  STRESS SUMMARY: Duration of pharmacologic stress was 10 min  Maximal heart rate during stress was 81 bpm ( 60 % of maximal predicted heart rate)  The heart rate response to stress was normal  There was resting hypertension with an  appropriate blood pressure response to stress  The rate-pressure product for the peak heart rate and blood pressure was 11037  There was no chest pain during stress  The stress test was terminated due to protocol completion  No abnormal ST  T wave changes with regadenoson injection  No significant arrhythmia noted  MYOCARDIAL PERFUSION IMAGING:  The image quality was fair  Rotating projection images reveal mild diaphragmatic attenuation and mild subdiaphragmatic activity  The left ventricular cavity is mildly dilated  End systolic volume measures 413 mL and end-diastolic volume measures 577 mL  Review of resting and post regadenoson SPECT imaging demonstrates large size, severe, fixed perfusion abnormality involving the entire anterior wall, apex, distal lateral wall, distal anteroseptal wall and septum  There is no definite  ischemia noted  GATED SPECT:  Gated SPECT imaging demonstrates severely reduced left ventricular systolic function with dyskinesis of the apex and the distal anterior wall and hypokinesis of other walls  Ejection fraction is calculated as 29%  SUMMARY:  -  Stress results: Target heart rate was not achieved   There was resting hypertension with an appropriate blood pressure response to stress  There was no chest pain during stress  IMPRESSIONS: 1  Abnormal myocardial perfusion scan  2  Evidence of extensive, severe intensity fixed perfusion abnormalities without evidence of ischemia  3  Dilated left ventricular cavity with severely reduced left ventricular systolic function  Ejection fraction calculated as 29%  Diagnostic sensitivity was limited by submaximal stress  Prepared and signed by    Altagracia Lockhart MD  Signed 10/24/2018 18:15:42         --------------------------------------------------------------------------------  ECHO:   Results for orders placed during the hospital encounter of 10/24/18   Echo complete with contrast if indicated    Narrative DNA Response  [address]    Transthoracic Echocardiogram  2D, M-mode, Doppler, and Color Doppler    Study date:  24-Oct-2018    Patient: Tu Palmer  MR number: [id number]  Account number: [de-identified]  : 31-Oct-1932  Age: 80 years  Gender: Male  Status: Outpatient  Location: [facility]  Height: 63 in  Weight: 157 lb  BP: 100/ 60 mmHg    Indications: Ischemic cardiomyopathy, coronary artery disease  Diagnoses: I25 5 - Ischemic cardiomyopathy    Sonographer:  Josefina Doe  Primary Physician:  Ivan Helms DO  Referring Physician:  Jax Watson MD  Group:  [facility]  Interpreting Physician:  Altagracia Lockhart MD    IMPRESSIONS:  Borderline dilated left ventricular cavity with some thinning of the apex and anterior and anteroseptal walls, severely reduced left ventricular systolic function with akinesis of the distal anterior, anteroseptal and apical wall and  hypokinesis of other regions  Ejection fraction is estimated as around 25-30%  There is grade 1 diastolic dysfunction  Mild left atrial cavity enlargement  Aortic valve sclerosis, no aortic stenosis or regurgitation    Mitral annular calcification, trace mitral valve regurgitation  Mild tricuspid valve regurgitation  No obvious pulmonary hypertension  No pericardial effusion  Compared to previous echocardiogram from May 27, 2016 there is overall no significant change  Previously noted borderline pulmonary hypertension is not currently appreciable  SUMMARY    LEFT VENTRICLE:  Borderline dilated left ventricular cavity, normal wall thickness with thinning of the mid to distal anterior wall apex and anteroseptal walls, severely reduced left ventricular systolic function with akinesis of the mid to distal anterior  wall, anteroseptal wall and apex extending to distal inferolateral walls, hypokinesis of other regions  Ejection fraction is estimated as around 25-30%  Grade 1 diastolic dysfunction  Estimated left atrial pressure is normal     RIGHT VENTRICLE:  Normal right ventricular size and systolic function  Normal estimated right ventricular systolic pressure  LEFT ATRIUM:  Mild left atrial cavity enlargement  RIGHT ATRIUM:  Normal right atrial cavity size  MITRAL VALVE:  Mild mitral annular calcification and leaflet sclerosis, trace mitral valve regurgitation  AORTIC VALVE:  Trace mitral valve regurgitation  TRICUSPID VALVE:  Mild tricuspid valve regurgitation  PULMONIC VALVE:  No significant pulmonic valve regurgitation  AORTA:  Aortic root and proximal ascending aorta are normal in size on 2D imaging  IVC, HEPATIC VEINS:  Inferior vena cava is normal in size and demonstrates appropriate respiratory phasic changes in diameter  PERICARDIUM:  Trace pericardial effusion with some organized material close to the apex, possible thrombus versus anterior fat pad  HISTORY: PRIOR HISTORY: Ventricular tachycardia, atrial fibrillation, cerebral vascular accident, defibrillator, HLD  PROCEDURE: The study was performed in the [address]  This was a routine study  The transthoracic approach was used  The study included complete 2D imaging, M-mode, complete spectral Doppler, and color Doppler  The heart rate was  70 bpm, at the start of the study  Images were obtained from the parasternal, apical, subcostal, and suprasternal notch acoustic windows  Image quality was adequate  LEFT VENTRICLE: Borderline dilated left ventricular cavity, normal wall thickness with thinning of the mid to distal anterior wall apex and anteroseptal walls, severely reduced left ventricular systolic function with akinesis of the mid to  distal anterior wall, anteroseptal wall and apex extending to distal inferolateral walls, hypokinesis of other regions  Ejection fraction is estimated as around 25-30%  Grade 1 diastolic dysfunction  Estimated left atrial pressure is  normal     RIGHT VENTRICLE: Normal right ventricular size and systolic function  Normal estimated right ventricular systolic pressure  LEFT ATRIUM: Mild left atrial cavity enlargement  RIGHT ATRIUM: Normal right atrial cavity size  MITRAL VALVE: Mild mitral annular calcification and leaflet sclerosis, trace mitral valve regurgitation  AORTIC VALVE: Trace mitral valve regurgitation  TRICUSPID VALVE: Mild tricuspid valve regurgitation  PULMONIC VALVE: No significant pulmonic valve regurgitation  PERICARDIUM: Trace pericardial effusion with some organized material close to the apex, possible thrombus versus anterior fat pad  AORTA: Aortic root and proximal ascending aorta are normal in size on 2D imaging  SYSTEMIC VEINS: IVC: Inferior vena cava is normal in size and demonstrates appropriate respiratory phasic changes in diameter      SYSTEM MEASUREMENT TABLES    2D  %FS: 19 %  Ao Diam: 3 61 cm  EDV(Teich): 144 29 ml  EF(Teich): 38 79 %  ESV(Teich): 88 33 ml  IVSd: 1 04 cm  LA Area: 9 18 cm2  LA Diam: 4 59 cm  LVEDV MOD A4C: 104 87 ml  LVEF MOD A4C: 37 99 %  LVESV MOD A4C: 65 03 ml  LVIDd: 5 45 cm  LVIDs: 4 41 cm  LVLd A4C: 8 58 cm  LVLs A4C: 8 55 cm  LVPWd: 0 95 cm  RA Area: 11 11 cm2  RVIDd: 3 9 cm  SV MOD A4C: 39 83 ml  SV(Teich): 55 96 ml    CW  AV Vmax: 1 01 m/s  AV maxP 08 mmHg    MM  TAPSE: 2 27 cm    PW  E': 0 04 m/s  E/E': 14 25  MV A Chinedu: 0 93 m/s  MV Dec Burleson: 3 38 m/s2  MV DecT: 164 17 ms  MV E Chinedu: 0 56 m/s  MV E/A Ratio: 0 6  MV PHT: 47 61 ms  MVA By PHT: 4 62 cm2  PRend P 51 mmHg  PRend Vmax: 0 79 m/s  PV Vmax: 0 65 m/s  PV maxP 71 mmHg  TR Vmax: 1 2 m/s  TR maxP 77 mmHg    [employer] Accredited Echocardiography Laboratory    Prepared and electronically signed by    Lalit Gallegos MD  Signed 24-Oct-2018 13:25:41         ======================================================    Lab Results   Component Value Date    WBC 6 04 2021    HGB 12 8 2021    HCT 39 2 2021    MCV 99 (H) 2021     2021      Lab Results   Component Value Date    SODIUM 138 2021    K 4 1 2021     2021    CO2 26 2021    BUN 25 2021    CREATININE 1 67 (H) 2021    GLUC 98 12/10/2018    CALCIUM 9 0 2021      No results found for: HGBA1C   No results found for: CHOL  Lab Results   Component Value Date    HDL 62 2021    HDL 52 2019     Lab Results   Component Value Date    LDLCALC 108 (H) 2021    LDLCALC 85 2019     Lab Results   Component Value Date    TRIG 109 2021    TRIG 105 2019     No results found for: [place], [place]   Lab Results   Component Value Date    INR 2 19 (H) 2021    INR 1 99 (H) 2021    INR 2 51 (H) 2021    PROTIME 24 3 (H) 2021    PROTIME 22 5 (H) 2021    PROTIME 27 0 (H) 2021        Imaging:   I have personally reviewed pertinent reports  Portions of the record may have been created with voice recognition software  Occasional wrong word or "sound a like" substitutions may have occurred due to the inherent limitations of voice recognition software   Read the chart carefully and recognize, using context, where substitutions have occurred

## 2021-05-14 NOTE — PROGRESS NOTES
Results for orders placed or performed in visit on 05/14/21   Cardiac EP device report    Narrative    MDT-DUAL CHAMBER ICD (AAIR-DDDR MODE)/ ACTIVE SYSTEM IS MRI CONDITIONAL  DEVICE INTERROGATED IN THE [place] OFFICE: BATTERY VOLTAGE ADEQUATE (8 2 YRS)  AP: 85 6%  : 4 8%  ALL LEAD PARAMETERS WITHIN NORMAL LIMITS  NO SIGNIFICANT HIGH RATE EPISODES (ALL PREVIOUSLY ADDRESSED)  OPTI-VOL WITHIN NORMAL LIMITS  NO PROGRAMMING CHANGES MADE TO DEVICE PARAMETERS  PT SEEN TODAY BY DR Hoang Oas  PT TAKES AMIODARONE, COREG, WARFARIN  EF: 29% (NUC ST TX 10/24/18)  APPROPRIATELY FUNCTIONING ICD    [address]

## 2021-06-23 NOTE — TELEPHONE ENCOUNTER
PC to patient with INR reminder and to go ASAP for he is one month overdue  Patient states he sprained his ankle and cannot get out  He will go when he is better and he doesn't know when that will be

## 2021-06-28 NOTE — PROGRESS NOTES
Tc to pt, spoke with wife, will continue current dose, in due 4 weeks  Pt to call if any questions or concerns  negative Regular rate & rhythm, normal S1, S2; no murmurs, gallops or rubs; no S3, S4

## 2021-08-17 NOTE — PROGRESS NOTES
Results for orders placed or performed in visit on 08/17/21   Cardiac EP device report    Narrative    MDT-DUAL CHAMBER ICD (AAIR-DDDR MODE)/ ACTIVE SYSTEM IS MRI CONDITIONAL  CARELINK TRANSMISSION: BATTERY ADEQUATE (7 9 YRS)  AP 38%;  44% (AAI-DDDR 60)  ALL LEAD PARAMETERS WITHIN NORMAL LIMITS  NO EPISODES  PT  TAKES AMIODARONE, CARVEDILOL & WARFARIN  OPTI-VOL FLUID THRESHOLD CROSSED  WILL F/U IN ONE MONTH  SENT TO HF TEAM FOR REVIEW  NORMAL DEVICE FUNCTION   PL

## 2021-08-24 NOTE — PROGRESS NOTES
Tc to pt, denies any bleeding or bruising, denies any medication change or new medications  deenis any recent prescription refills for warfarin  Patient states he has followed dosing instructions  Patient feels this is an error in result  Patient will hold warfarin today and have repeat inr tomorrow  Will eat foods high in vit k today  Will monitor closely for any signs or symptoms of bleeding, ie, nose bleed, coughing up blood, vomiting blood or coffee ground material  Voiding dark colored blood or bloody urine  Passing black or bloody stool, go to er, instructed if he falls, hits head , go to er, any chest pain , back or abdominal pain, go to er  Patient understood and agreed to same  reviewed with dr Kirsten Hodgson, he agreed to plan

## 2021-08-25 NOTE — PROGRESS NOTES
Tc to pt, denies any bleeding, will hold warfarin wed/th, inr due fri  Patient states he will not go to lab on fri, he will test on Monday  Wife interrupted call, states she feels the lab may be an error and does not agree with report  Advised I will discuss with dr Azalea Aase  Per dr Azalea Aase, offer patient ot test at [facility], tc to pt, he will hold warfarin , will test at [facility] fri   He will come to office for lab script

## 2021-09-02 NOTE — TELEPHONE ENCOUNTER
Pt called with INR results ask to go in 1 week he refuses and hung up  FYJIMMY See anticoagulation flow sheet

## 2021-09-19 NOTE — PROGRESS NOTES
Results for orders placed or performed in visit on 09/17/21   Cardiac EP device report    Narrative    MDT-DUAL CHAMBER ICD (AAIR-DDDR MODE)/ ACTIVE SYSTEM IS MRI CONDITIONAL  CARELINK TRANSMISSION - OPTI-VOL ONLY: OPTI-VOL FLUID THRESHOLD CROSSED  SENT TO HF TEAM FOR REVIEW  WILL F/U IN A MONTH  BATTERY ADEQUATE (7 7 YRS)  AP 25%;  44%  ALL AVAILABLE LEAD PARAMETERS WITHIN NORMAL LIMITS  NO EPISODES  NORMAL DEVICE FUNCTION   PL

## 2021-09-27 NOTE — TELEPHONE ENCOUNTER
----- Message from Katherine Valencia sent at 9/24/2021  1:48 PM EDT -----  Regarding: Dr Carissa Mccord pt- Optivol    ----- Message -----  From: Luisito Khoury  Sent: 9/24/2021   1:43 PM EDT  To: Katherine Valencia  Subject: FW: Rome Hankins                                        ----- Message -----  From: Mitzi Pruett  Sent: 9/24/2021  11:27 AM EDT  To: Luisito Khoury  Subject: FW: Rome Hankins                                      This message is going back to me -please forward to the correct person  Thank you  ----- Message -----  From: Gordon Babcock  Sent: 9/24/2021  11:08 AM EDT  To: Mitzi Pruett  Subject: RE: Rome Hankins                                      Maybe that was an error, supposed to go to [facility] I think  Then whatever protocol they use which is believe to to patient's doc  Only comes to us if only EP sees patient     ----- Message -----  From: Mitzi Pruett  Sent: 9/24/2021   8:45 AM EDT  To: Gordon Babcock  Subject: FW: Optivol                                      So what do I do? Forward  question to the Dr ? Leonela Gilmore sent it to me  ----- Message -----  From: Gordon Babcock  Sent: 9/21/2021   2:10 PM EDT  To: Mitzi Pruett  Subject: RE: Rome Cr                                      This is Dr Prince Villalba patient  We do not see him    ----- Message -----  From: Mitzi Pruett  Sent: 9/21/2021   9:18 AM EDT  To: Cardiology Ep Clin[facility]  Subject: FW: Rome Hankins                                        ----- Message -----  From: Luisito Khoury  Sent: 9/19/2021   9:15 AM EDT  To: [facility]  Subject: Optivol                                          Good morning,     Please review from today's remote transmission    Results for orders placed or performed in visit on 09/17/21  Cardiac EP device report   Narrative   MDT-DUAL CHAMBER ICD (AAIR-DDDR MODE)/ ACTIVE SYSTEM IS MRI CONDITIONAL  CARELINK TRANSMISSION - OPTI-VOL ONLY: OPTI-VOL FLUID THRESHOLD CROSSED  SENT TO HF TEAM FOR REVIEW  WILL F/U IN A MONTH   BATTERY ADEQUATE (7 7 YRS)  AP 25%;  44%  ALL AVAILABLE LEAD PARAMETERS WITHIN NORMAL LIMITS  NO EPISODES  NORMAL DEVICE FUNCTION   PL    Thank you,      Rolando Marroquin

## 2021-09-27 NOTE — TELEPHONE ENCOUNTER
Called pt reached VM ask patient to call back  Would like to evaluated how he is feeling   any SOB, weight gain or other issues  Awaiting call back

## 2021-09-28 NOTE — TELEPHONE ENCOUNTER
Pt responded to message left yesterday stating he is more short of breath, denies any weight gain, or fluid retention  He states as he gets older gets SOB more easily but no acute changes, he states is gonna take better note to when it occur  Also reminded to INR which he overdue for   states going tomorrow

## 2021-09-28 NOTE — PROGRESS NOTES
Pt called for another reason reminded due for INR and states he is going to go tomorrow  Pt was ask to go 9/8/21

## 2021-09-29 NOTE — TELEPHONE ENCOUNTER
I would like to see patient obtain a CBC, nonfasting BMP and NT-BNP pro with his next protime/INR  After his blood work is done, he needs a return appointment to see me

## 2021-09-30 NOTE — TELEPHONE ENCOUNTER
Patient states he goes to both [facility] and  labs for blood tests  I asked why he uses two labs, sometimes on the same day, and he states "one is cheaper and because I feel like it"  He was to get a cbc, bnp and bmp done with next INR but did not  He will go to [facility] for those on next INR day which is scheduled on 10/10/21  I mailed out lab requisition to him for the cbc, bnp and bmp

## 2021-09-30 NOTE — PROGRESS NOTES
Spoke with patient and good INR    Patient told to stay on same dose of warfarin and repeat INR in one month   isabelle

## 2021-10-06 NOTE — RESULT ENCOUNTER NOTE
Patient's renal function has slightly deteriorated  Creatinine 1 67 -> 1 72  Please call him and tell him to stop his lisinopril  I would also like to have him repeat his Non fasting BMP in a month and have a return office visit to see me after he has the BMP  Patient could be instructed to get the BMP with a protime /INR in a month  I placed the order for the BMP  Also ask him if he sees a kidney doctor?

## 2021-10-23 PROBLEM — I50.43 ACUTE ON CHRONIC COMBINED SYSTOLIC AND DIASTOLIC CONGESTIVE HEART FAILURE (HCC): Status: ACTIVE | Noted: 2021-01-01

## 2021-10-23 NOTE — ED PROVIDER NOTES
History  Chief Complaint   Patient presents with   • Shortness of Breath     sob x 2 weeks, denies any other sx. Seen at urgent care pta. EKG done and sent here for further eval. Denies CP. Patient presents emergency department complaining of shortness of breath that has been intermittent over the past 2 weeks. He went to urgent care they noticed EKG changes and sent him here directly. Patient was advised to go via EMS. Patient denies any pain in his chest.  Patient has a pacemaker and he follows up with our cardiologists. Patient denies any cough congestion fevers. Patient is not immunized for COVID. Patient is Coumadin spironolactone Coreg and amiodarone. Patient admits to little bit of swelling in his legs he states that that comes and goes is not any worse than normal.            Prior to Admission Medications   Prescriptions Last Dose Informant Patient Reported? Taking?    Cholecalciferol (VITAMIN D3) 50 MCG (2000 UT) capsule  Self Yes Yes   Sig: take 1 tablet  alt with 1 1/2 tablets daily   Cholecalciferol 2000 units CAPS  Self Yes Yes   Sig: Take 1 capsule by mouth   Glucosamine Sulfate 1000 MG CAPS  Self Yes Yes   Si capsule Every 12 hours   amiodarone 200 mg tablet  Self No Yes   Sig: Take 1 tablet (200 mg total) by mouth daily   atorvastatin (LIPITOR) 40 mg tablet   No Yes   Sig: TAKE 1 TABLET EVERY DAY   carvedilol (COREG) 12.5 mg tablet   No Yes   Sig: TAKE 1 TABLET TWICE DAILY WITH MEALS   spironolactone (ALDACTONE) 25 mg tablet   No Yes   Sig: TAKE 1/2 TABLET EVERY DAY   warfarin (COUMADIN) 2 mg tablet   No No   Sig: TAKE 1 AND 1/2 TABLETS  DAILY OR AS DIRECTED BY MD      Facility-Administered Medications: None       Past Medical History:   Diagnosis Date   • Anemia    • Arthritis    • Coronary artery disease    • CVA (cerebral vascular accident) ([address])    • Hyperlipidemia    • Hypertension        Past Surgical History:   Procedure Laterality Date   • CARDIAC PACEMAKER PLACEMENT 05/09/2011    Medtronic dual chamber ICD implant   • CORONARY ANGIOPLASTY  1989       Family History   Problem Relation Age of Onset   • Coronary artery disease Mother    • Heart attack Father         MI   • Stroke Sister      I have reviewed and agree with the history as documented. E-Cigarette/Vaping   • E-Cigarette Use Never User      E-Cigarette/Vaping Substances   • Nicotine No    • THC No    • CBD No    • Flavoring No    • Other No    • Unknown No      Social History     Tobacco Use   • Smoking status: Never Smoker   • Smokeless tobacco: Never Used   Vaping Use   • Vaping Use: Never used   Substance Use Topics   • Alcohol use: Yes     Alcohol/week: 3.0 standard drinks     Types: 3 Shots of liquor per week     Comment: daily   • Drug use: Never       Review of Systems   All other systems reviewed and are negative. Physical Exam  Physical Exam  Vitals and nursing note reviewed. Constitutional:       Appearance: He is well-developed. HENT:      Head: Normocephalic and atraumatic. Right Ear: External ear normal.      Left Ear: External ear normal.   Eyes:      Conjunctiva/sclera: Conjunctivae normal.   Cardiovascular:      Rate and Rhythm: Normal rate and regular rhythm. Heart sounds: Normal heart sounds. Pulmonary:      Effort: Pulmonary effort is normal.      Breath sounds: Normal breath sounds. Abdominal:      General: Bowel sounds are normal.      Palpations: Abdomen is soft. Musculoskeletal:         General: Normal range of motion. Cervical back: Normal range of motion and neck supple. Right lower leg: Edema present. Left lower leg: Edema present. Lymphadenopathy:      Cervical: No cervical adenopathy. Skin:     General: Skin is warm. Findings: No rash. Neurological:      Mental Status: He is alert and oriented to person, place, and time. Motor: No abnormal muscle tone.       Coordination: Coordination normal.   Psychiatric:         Mood and Affect: Mood normal.         Behavior: Behavior normal.         Vital Signs  ED Triage Vitals   Temperature Pulse Respirations Blood Pressure SpO2   10/23/21 0954 10/23/21 0908 10/23/21 0908 10/23/21 0908 10/23/21 0908   98.2 °F (36.8 °C) 84 20 160/87 96 %      Temp Source Heart Rate Source Patient Position - Orthostatic VS BP Location FiO2 (%)   10/23/21 0954 10/23/21 1110 10/23/21 1110 10/23/21 1110 --   Oral Monitor Lying Right arm       Pain Score       10/23/21 0908       No Pain           Vitals:    10/23/21 0908 10/23/21 1110 10/23/21 1328   BP: 160/87 131/93 135/88   Pulse: 84 76 88   Patient Position - Orthostatic VS:  Lying Sitting         Visual Acuity      ED Medications  Medications   furosemide (LASIX) injection 40 mg (40 mg Intravenous Given 10/23/21 1112)       Diagnostic Studies  Results Reviewed     Procedure Component Value Units Date/Time    Urine Microscopic [[id number]]  (Abnormal) Collected: 10/23/21 1218    Lab Status: Final result Specimen: Urine, Clean Catch Updated: 10/23/21 1235     RBC, UA None Seen /hpf      WBC, UA 0-1 /hpf      Epithelial Cells None Seen /hpf      Bacteria, UA None Seen /hpf     Urine Macroscopic, POC [[id number]]  (Abnormal) Collected: 10/23/21 1218    Lab Status: Final result Specimen: Urine Updated: 10/23/21 1220     Color, UA Yellow     Clarity, UA Clear     pH, UA 5.0     Leukocytes, UA Negative     Nitrite, UA Negative     Protein, UA Negative mg/dl      Glucose, UA Negative mg/dl      Ketones, UA Negative mg/dl      Urobilinogen, UA 0.2 E.U./dl      Bilirubin, UA Negative     Blood, UA Trace     Specific Gravity, UA 1.010    Narrative:      CLINITEK RESULT    COVID19, Influenza A/B, RSV PCR, [facility]- 2 hours STAT [[id number]]  (Normal) Collected: 10/23/21 0930    Lab Status: Final result Specimen: Nasopharyngeal Swab Updated: 10/23/21 1034     SARS-CoV-2 Negative     INFLUENZA A PCR Negative     INFLUENZA B PCR Negative     RSV PCR Negative    Narrative:      FOR PEDIATRIC PATIENTS - copy/paste COVID Guidelines URL to browser: [email or web address]     This test has been authorized by FDA under an EUA (Emergency Use Assay) for use by authorized laboratories. Clinical caution and judgement should be used with the interpretation of these results with consideration of the clinical impression and other laboratory testing. Testing reported as "Positive" or "Negative" has been proven to be accurate according to standard laboratory validation requirements. All testing is performed with control materials showing appropriate reactivity at standard intervals.     Hepatic function panel [[id number]]  (Abnormal) Collected: 10/23/21 0931    Lab Status: Final result Specimen: Blood from Arm, Right Updated: 10/23/21 1013     Total Bilirubin 0.97 mg/dL      Bilirubin, Direct 0.31 mg/dL      Alkaline Phosphatase 53 U/L      AST 64 U/L       U/L      Total Protein 7.2 g/dL      Albumin 3.8 g/dL     NT-BNP PRO [[id number]]  (Abnormal) Collected: 10/23/21 0931    Lab Status: Final result Specimen: Blood from Arm, Right Updated: 10/23/21 1013     NT-proBNP 2,717 pg/mL     Basic metabolic panel [[id number]]  (Abnormal) Collected: 10/23/21 0931    Lab Status: Final result Specimen: Blood from Arm, Right Updated: 10/23/21 1010     Sodium 135 mmol/L      Potassium 4.3 mmol/L      Chloride 101 mmol/L      CO2 23 mmol/L      ANION GAP 11 mmol/L      BUN 28 mg/dL      Creatinine 2.03 mg/dL      Glucose 114 mg/dL      Calcium 9.1 mg/dL      eGFR 28 ml/min/1.73sq m     Narrative:      [place] guidelines for Chronic Kidney Disease (CKD):   •  Stage 1 with normal or high GFR (GFR > 90 mL/min/1.73 square meters)  •  Stage 2 Mild CKD (GFR = 60-89 mL/min/1.73 square meters)  •  Stage 3A Moderate CKD (GFR = 45-59 mL/min/1.73 square meters)  •  Stage 3B Moderate CKD (GFR = 30-44 mL/min/1.73 square meters)  •  Stage 4 Severe CKD (GFR = 15-29 mL/min/1.73 square meters)  •  Stage 5 End Stage CKD (GFR <15 mL/min/1.73 square meters)  Note: GFR calculation is accurate only with a steady state creatinine    Protime-INR [[id number]]  (Abnormal) Collected: 10/23/21 0931    Lab Status: Final result Specimen: Blood from Arm, Right Updated: 10/23/21 1008     Protime 45.3 seconds      INR 5.18    APTT [[id number]]  (Abnormal) Collected: 10/23/21 0931    Lab Status: Final result Specimen: Blood from Arm, Right Updated: 10/23/21 1008     PTT 52 seconds     Troponin I [[id number]]  (Normal) Collected: 10/23/21 0931    Lab Status: Final result Specimen: Blood from Arm, Right Updated: 10/23/21 1003     Troponin I <0.02 ng/mL     CBC and differential [[id number]]  (Abnormal) Collected: 10/23/21 0931    Lab Status: Final result Specimen: Blood from Arm, Right Updated: 10/23/21 0943     WBC 6.08 Thousand/uL      RBC 4.01 Million/uL      Hemoglobin 13.0 g/dL      Hematocrit 39.6 %      MCV 99 fL      MCH 32.4 pg      MCHC 32.8 g/dL      RDW 13.9 %      MPV 11.3 fL      Platelets 019 Thousands/uL      nRBC 0 /100 WBCs      Neutrophils Relative 70 %      Immat GRANS % 0 %      Lymphocytes Relative 18 %      Monocytes Relative 9 %      Eosinophils Relative 2 %      Basophils Relative 1 %      Neutrophils Absolute 4.26 Thousands/µL      Immature Grans Absolute 0.02 Thousand/uL      Lymphocytes Absolute 1.11 Thousands/µL      Monocytes Absolute 0.55 Thousand/µL      Eosinophils Absolute 0.10 Thousand/µL      Basophils Absolute 0.04 Thousands/µL                  VAS lower limb venous duplex study, unilateral/limited   Final Result by Evelynn Scheuermann, MD (10/23 1305)      XR chest 1 view portable   ED Interpretation by Nancy Bran PA-C (10/23 1033)   Mild vascular congestion, pacemaker       Final Result by Silvia Lee MD (10/23 4507)      Moderate interstitial edema with trace effusions.                         Workstation performed: [id number] Procedures  ECG 12 Lead Documentation Only    Date/Time: 10/23/2021 9:34 AM  Performed by: Mono Griffin PA-C  Authorized by: Mono Griffin PA-C     Patient location:  ED  Previous ECG:     Previous ECG:  Compared to current    Comparison ECG info:  Urgent care and also 11/2/20, 7/2/20    Similarity:  Changes noted  Rate:     ECG rate:  84    ECG rate assessment: normal    Rhythm:     Rhythm: paced    Pacing:     Capture:  Complete    Type of pacing:  Ventricular  Comments:      Ventricularly paced rhythm verses new bundle south block. Discussed case with Cardiology they feel this is a paced rhythm which was confirmed with [employer]. The earlier today EKG at urgent care also shows a paced rhythm. Prior EKGs done in 2020 show atrially paced. ED Course  ED Course as of Oct 23 1512   Sat Oct 23, 2021   2935 Reviewed EKG with DR Linda Dickinson - either ventricularly paced or has a left bundle branch block - reviewed with cardiology - they feel its ventricular ly paced. Old EKG located in cardiology- showing an atrially paced rhythm. 1017 Discussed results of pacemaker interrogation - [employer] - Late august - starting having Ventricular pacing,   Increased a fib and   With heart failure progressing and a fib - starting to ventricularly pace more. - 23% ventricularly paced. Increased fluid over load. Thoracic impedance - increased. 1030 Discussed results with pt - he states that he has had several episodes of the SOB since being here, - comes and goes -       1059 DVT negative                                 SBIRT 22yo+      Most Recent Value   SBIRT (24 yo +)   In order to provide better care to our patients, we are screening all of our patients for alcohol and drug use. Would it be okay to ask you these screening questions? Yes Filed at: 10/23/2021 7336   Initial Alcohol Screen: US AUDIT-C    1. How often do you have a drink containing alcohol? 6 Filed at: 10/23/2021 6390   2.  How many drinks containing alcohol do you have on a typical day you are drinking? 2 Filed at: 10/23/2021 1448   3a. Male UNDER 65: How often do you have five or more drinks on one occasion? 0 Filed at: 10/23/2021 1448   3b. FEMALE Any Age, or MALE 65+: How often do you have 4 or more drinks on one occassion? 0 Filed at: 10/23/2021 1448   Audit-C Score  (!) 8 Filed at: 10/23/2021 1448   Full Alcohol Screen: US AUDIT   4. How often during the last year have you found that you were not able to stop drinking once you had started? 0 Filed at: 10/23/2021 1448   5. How often during past year have you failed to do what was normally expected of you because of drinking? 0 Filed at: 10/23/2021 1448   6. How often in past year have you needed a first drink in the morning to get yourself going after a heavy drinking session? 0 Filed at: 10/23/2021 1448   7. How often in past year have you had feeling of guilt or remorse after drinking? 0 Filed at: 10/23/2021 1448   8. How often in past year have you been unable to remember what happened night before because you had been drinking? 0 Filed at: 10/23/2021 1448   9. Have you or someone else been injured as a result of your drinking? 0 Filed at: 10/23/2021 1448   10. Has a relative, friend, doctor or other health worker been concerned about your drinking and suggested you cut down?   0 Filed at: 10/23/2021 1448   AUDIT Total Score  8 Filed at: 10/23/2021 1448   RITA: How many times in the past year have you. .. Used an illegal drug or used a prescription medication for non-medical reasons?   Never Filed at: 10/23/2021 1448                    MDM  Number of Diagnoses or Management Options  CHRISTY (acute kidney injury) [facility]): new and requires workup  CHF (congestive heart failure) ([address]): new and requires workup  Paced rhythm on cardiac monitor: new and requires workup  SOB (shortness of breath): new and requires workup     Amount and/or Complexity of Data Reviewed  Clinical lab tests: reviewed  Tests in the radiology section of CPT®: reviewed  Decide to obtain previous medical records or to obtain history from someone other than the patient: yes  Review and summarize past medical records: yes  Discuss the patient with other providers: yes (DR Regino Khan and cardiology)  Independent visualization of images, tracings, or specimens: yes    Patient Progress  Patient progress: stable      Disposition  Final diagnoses:   CHF (congestive heart failure) ([address])   SOB (shortness of breath)   Paced rhythm on cardiac monitor   CHRISTY (acute kidney injury) ([address])     Time reflects when diagnosis was documented in both MDM as applicable and the Disposition within this note     Time User Action Codes Description Comment    10/23/2021 11:11 AM Nancy Bran [I50.9] CHF (congestive heart failure) ([address])     10/23/2021 11:11 AM Nancy Bran [R06.02] SOB (shortness of breath)     10/23/2021 11:11 AM Nancy Bran [Z78.9] Paced rhythm on cardiac monitor     10/23/2021 11:11 AM Nancy Bran [N17.9] CHRISTY (acute kidney injury) [facility])       ED Disposition     ED Disposition Condition Date/Time Comment    Admit Stable Sat Oct 23, 2021 11:11 AM Case was discussed with DR Balwinder Galeano and the patient's admission status was agreed to be admission to the service of Dr. Carol Callaway    None         Patient's Medications   Discharge Prescriptions    No medications on file     No discharge procedures on file.     PDMP Review     None          ED Provider  Electronically Signed by           Sanjeev Lucero PA-C  10/23/21 1281

## 2021-10-23 NOTE — ASSESSMENT & PLAN NOTE
Wt Readings from Last 3 Encounters:   10/23/21 70.7 kg (155 lb 13.8 oz)   10/23/21 68 kg (150 lb)   05/14/21 69.3 kg (152 lb 12.8 oz)     Known ischemic cardiomyopathy with EF of 25-30% per last echocardiogram  Chest x-ray with vascular congestion  BNP elevated at 2700  [employer]tr[employer] pacer interrogation showed fluid overload  Start Lasix 40 mg IV b.i.d. Continue Coreg  Not on Ace or Arb due to CKD  Consult cardiology  Last echocardiogram was in 2018.  Will need repeat echocardiogram during this admit

## 2021-10-23 NOTE — ASSESSMENT & PLAN NOTE
· Status post ICD for cardiomyopathy and history of V-tach  · Status post [employer] interrogation.   · Continue amiodarone  · Continue Coreg 12.5 mg b.i.d.  · Hold Aldactone while on high-dose Lasix

## 2021-10-23 NOTE — ASSESSMENT & PLAN NOTE
· INR supratherapeutic with no evidence of bleeding  · Currently pain  · Hold warfarin  · Repeat INR in a.m.

## 2021-10-23 NOTE — H&P
[address] [facility]  H&P- Florinda Artis 10/31/1932, 80 y.o. male MRN: [id number]  Unit/Bed#: ED 18 Encounter: [id number]  Primary Care Provider: Lily Allred DO   Date and time admitted to hospital: 10/23/2021  9:06 AM    * Acute on chronic combined systolic and diastolic congestive heart failure (HCC)  Assessment & Plan  Wt Readings from Last 3 Encounters:   10/23/21 70.7 kg (155 lb 13.8 oz)   10/23/21 68 kg (150 lb)   05/14/21 69.3 kg (152 lb 12.8 oz)     Known ischemic cardiomyopathy with EF of 25-30% per last echocardiogram  Chest x-ray with vascular congestion  BNP elevated at 2700  Medtronic pacer interrogation showed fluid overload  Start Lasix 40 mg IV b.i.d. Continue Coreg  Not on Ace or Arb due to CKD  Consult cardiology  Last echocardiogram was in 2018. Will need repeat echocardiogram during this admit        Acute kidney injury superimposed on chronic kidney disease [facility])  Assessment & Plan  Lab Results   Component Value Date    EGFR 28 10/23/2021    EGFR 35 10/04/2021    EGFR 36 04/27/2021    CREATININE 2.03 (H) 10/23/2021    CREATININE 1.72 (H) 10/04/2021    CREATININE 1.67 (H) 04/27/2021     Secondary to cardiorenal syndrome  Initiate Lasix 40 mg IV b.i.d. Repeat BMP in a.m. Consult nephrology if creatinine worsens    PAF (paroxysmal atrial fibrillation) ([facility])  Assessment & Plan  · INR supratherapeutic with no evidence of bleeding  · Currently pain  · Hold warfarin  · Repeat INR in a.m. Ischemic cardiomyopathy  Assessment & Plan  · Status post ICD for cardiomyopathy and history of V-tach  · Status post Medtronic interrogation.   · Continue amiodarone  · Continue Coreg 12.5 mg b.i.d.  · Hold Aldactone while on high-dose Lasix      VTE Prophylaxis: Warfarin (Coumadin)  / sequential compression device   Code Status:  Full code  POLST: There is no POLST form on file for this patient (pre-hospital)    Anticipated Length of Stay:  Patient will be admitted on an Inpatient basis with an anticipated length of stay of  at least 2 midnights. Justification for Hospital Stay:  CHF    Total Time for Visit, including Counseling / Coordination of Care: 45 minutes. Greater than 50% of this total time spent on direct patient counseling and coordination of care. Chief Complaint:   Shortness of breath    History of Present Illness:    Hazel Tobin is a 80 y.o. male who presents with daily intermittent shortness of breath for the past 2 weeks. He has known history of ischemic cardiomyopathy, PAF, V-tach, status post ICD followed by Cardiology. He presented to an urgent care center due to shortness of breath which occurs several times a day both at rest and on exertion. When he develops shortness of breath he would stop, stand or sit down and take deep breaths which relieves his shortness of breath. It happened several times a day. He denies using more pillows to sleep. He does wake up several times at night to urinate but not because of shortness of breath. He denies palpitations or chest pain. He denies leg swelling. He has been trying to lose weight on purpose. He reportedly has lost 4 lb. Review of Systems:    Review of Systems   Constitutional: Negative for chills and fever. HENT: Negative. Eyes: Negative. Respiratory: Positive for shortness of breath. Cardiovascular: Negative for leg swelling. Gastrointestinal: Negative. Genitourinary:        Nocturia   Musculoskeletal: Negative. Skin: Negative. Neurological: Negative. Psychiatric/Behavioral: Negative. All other systems reviewed and are negative.       Past Medical and Surgical History:     Past Medical History:   Diagnosis Date   • Anemia    • Arthritis    • Coronary artery disease    • CVA (cerebral vascular accident) ([address])    • Hyperlipidemia    • Hypertension        Past Surgical History:   Procedure Laterality Date   • CARDIAC PACEMAKER PLACEMENT  05/09/2011    Medtronic dual chamber ICD implant   • CORONARY ANGIOPLASTY  1989       Meds/Allergies:    Prior to Admission medications    Medication Sig Start Date End Date Taking? Authorizing Provider   amiodarone 200 mg tablet Take 1 tablet (200 mg total) by mouth daily 12/31/20  Yes Jones Perez MD   atorvastatin (LIPITOR) 40 mg tablet TAKE 1 TABLET EVERY DAY 10/13/21  Yes Jones Perez MD   carvedilol (COREG) 12.5 mg tablet TAKE 1 TABLET TWICE DAILY WITH MEALS 8/26/21  Yes Jones Perez MD   Cholecalciferol (VITAMIN D3) 50 MCG (2000 UT) capsule take 1 tablet  alt with 1 1/2 tablets daily   Yes Historical Provider, MD   Cholecalciferol 2000 units CAPS Take 1 capsule by mouth   Yes Historical Provider, MD   Glucosamine Sulfate 1000 MG CAPS 1 capsule Every 12 hours   Yes Historical Provider, MD   spironolactone (ALDACTONE) 25 mg tablet TAKE 1/2 TABLET EVERY DAY 8/26/21  Yes Jones Perez MD   warfarin (COUMADIN) 2 mg tablet TAKE 1 AND 1/2 TABLETS  DAILY OR AS DIRECTED BY MD 8/26/21   Jones Perez MD   lisinopril (ZESTRIL) 5 mg tablet Take 1 tablet (5 mg total) by mouth daily  Patient not taking: Reported on 10/23/2021 12/31/20 10/23/21  Jones Perez MD     I have reviewed home medications with a medical source (PCP, Pharmacy, other).     Allergies: No Known Allergies    Social History:     Marital Status: /Civil Union   Occupation: retired   Patient Pre-hospital Living Situation: independent  Patient Pre-hospital Level of Mobility: independent  Patient Pre-hospital Diet Restrictions: none  Substance Use History:   Social History     Substance and Sexual Activity   Alcohol Use Yes   • Alcohol/week: 3.0 standard drinks   • Types: 3 Shots of liquor per week    Comment: daily     Social History     Tobacco Use   Smoking Status Never Smoker   Smokeless Tobacco Never Used     Social History     Substance and Sexual Activity   Drug Use Never       Family History:    Family History   Problem Relation Age of Onset   • Coronary artery disease Mother    • Heart attack Father         MI   • Stroke Sister        Physical Exam:     Vitals:   Blood Pressure: 135/88 (10/23/21 1328)  Pulse: 88 (10/23/21 1328)  Temperature: 98.2 °F (36.8 °C) (10/23/21 0954)  Temp Source: Oral (10/23/21 0954)  Respirations: 18 (10/23/21 1328)  Weight - Scale: 70.7 kg (155 lb 13.8 oz) (10/23/21 0908)  SpO2: 94 % (10/23/21 1328)    Physical Exam  Constitutional:       Appearance: He is not ill-appearing or diaphoretic. HENT:      Head: Normocephalic and atraumatic. Eyes:      General: No scleral icterus. Cardiovascular:      Rate and Rhythm: Regular rhythm. Heart sounds: No murmur heard. No gallop. Pulmonary:      Effort: Pulmonary effort is normal.      Comments: Bilateral crackles  Abdominal:      General: Abdomen is flat. There is no distension. Tenderness: There is no abdominal tenderness. Musculoskeletal:      Cervical back: Neck supple. Right lower leg: Edema present. Left lower leg: Edema present. Comments: Trace edema   Neurological:      Mental Status: He is alert and oriented to person, place, and time. Psychiatric:         Mood and Affect: Mood normal.         Behavior: Behavior normal.       Additional Data:     Lab Results: I have personally reviewed pertinent reports.       Results from last 7 days   Lab Units 10/23/21  0931   WBC Thousand/uL 6.08   HEMOGLOBIN g/dL 13.0   HEMATOCRIT % 39.6   PLATELETS Thousands/uL 163   NEUTROS PCT % 70   LYMPHS PCT % 18   MONOS PCT % 9   EOS PCT % 2     Results from last 7 days   Lab Units 10/23/21  0931   SODIUM mmol/L 135*   POTASSIUM mmol/L 4.3   CHLORIDE mmol/L 101   CO2 mmol/L 23   BUN mg/dL 28*   CREATININE mg/dL 2.03*   ANION GAP mmol/L 11   CALCIUM mg/dL 9.1   ALBUMIN g/dL 3.8   TOTAL BILIRUBIN mg/dL 0.97   ALK PHOS U/L 53   ALT U/L 102*   AST U/L 64*   GLUCOSE RANDOM mg/dL 114     Results from last 7 days   Lab Units 10/23/21  0931   INR  5.18*                   Imaging: I have personally reviewed pertinent reports. VAS lower limb venous duplex study, unilateral/limited   Final Result by Katja Sigala MD (10/23 0042)      XR chest 1 view portable   ED Interpretation by Nancy Bran PA-C (10/23 4903)   Mild vascular congestion, pacemaker       Final Result by Luis Wolf MD (10/23 6590)      Moderate interstitial edema with trace effusions. Workstation performed: [id number]             EKG, Pathology, and Other Studies Reviewed on Admission:   · EKG: V paced    Allscripts / Epic Records Reviewed: Yes     ** Please Note: This note has been constructed using a voice recognition system.  **

## 2021-10-23 NOTE — ASSESSMENT & PLAN NOTE
Lab Results   Component Value Date    EGFR 28 10/23/2021    EGFR 35 10/04/2021    EGFR 36 04/27/2021    CREATININE 2.03 (H) 10/23/2021    CREATININE 1.72 (H) 10/04/2021    CREATININE 1.67 (H) 04/27/2021     Secondary to cardiorenal syndrome  Initiate Lasix 40 mg IV b.i.d. Repeat BMP in a.m.   Consult nephrology if creatinine worsens

## 2021-10-24 NOTE — ASSESSMENT & PLAN NOTE
Wt Readings from Last 3 Encounters:   10/24/21 63 kg (138 lb 12.8 oz)   10/23/21 68 kg (150 lb)   05/14/21 69.3 kg (152 lb 12.8 oz)     Known ischemic cardiomyopathy with EF of 25-30% per last echocardiogram  Chest x-ray on admission with vascular congestion  BNP on admission elevated at 2700  Medtronic pacer interrogation showed fluid overload  Currently on Lasix 40 mg IV b.i.d.   Continue Coreg with hold parameters  Not on Ace or Arb due to CKD  Consult pending  Repeat echocardiogram ordered since his last echo was 3 years ago

## 2021-10-24 NOTE — UTILIZATION REVIEW
Initial Clinical Review    Admission: Date/Time/Statement:   Admission Orders (From admission, onward)     Ordered        10/23/21 1112  INPATIENT ADMISSION  Once                   Orders Placed This Encounter   Procedures   • INPATIENT ADMISSION     Standing Status:   Standing     Number of Occurrences:   1     Order Specific Question:   Level of Care     Answer:   Med Surg [16]     Order Specific Question:   Estimated length of stay     Answer:   More than 2 Midnights     Order Specific Question:   Certification     Answer:   I certify that inpatient services are medically necessary for this patient for a duration of greater than two midnights. See H&P and MD Progress Notes for additional information about the patient's course of treatment. ED Arrival Information     Expected Arrival Acuity    10/23/2021 08:56 10/23/2021 09:00 Urgent         Means of arrival Escorted by Service Admission type    Walk-In Self Hospitalist Urgent         Arrival complaint    sob        Chief Complaint   Patient presents with   • Shortness of Breath     sob x 2 weeks, denies any other sx. Seen at urgent care pta. EKG done and sent here for further eval. Denies CP. Initial Presentation: 81 y/o male with PMH of ischemic cardiomyopathy, PAF, V-tach, status post ICD presents to [facility] ED via personal vehice with c/o intermittent SOB at rest and w/ exertion. CXR w/ vascular congestion, BP 2700, Medtronic pacer interrogation showed fluid overload. Known ischemic cardiomyopathy with EF of 25-30% per last echocardiogram.  Admit Inpatient med surg telemetry d/t CHF, CHRISTY:  Start iv lasix, continue home meds, hld aldactone, consult cardiology, echo, repeat bmp and INR in am, consult nephrology, hold warfarin. Date: 10/24   Day 2: Hospitalist Note  Awaiting cardiology consult and echo d/t possibly worsening EF. Continue IV lasix, repeat labs, continue home meds hold warfarin and aldactone.   The patient will continue to require additional inpatient hospital stay due to CHF    10/24 Cardiology Consult:  -- may transition to oral diuretic beginning tomorrow morning. Continue atorvastatin, carvedilol and amiodarone. Agree with continuing to hold lisinopril and spironolactone. -- warfarin may likely be restarted in 48 hours. -- echocardiogram to moderate to to reassess cardiac structure and function. -- out of bed to chair and ambulation are advised. -- needs review of basic metabolic panel from today. ED Triage Vitals   Temperature Pulse Respirations Blood Pressure SpO2   10/23/21 0954 10/23/21 0908 10/23/21 0908 10/23/21 0908 10/23/21 0908   98.2 °F (36.8 °C) 84 20 160/87 96 %      Temp Source Heart Rate Source Patient Position - Orthostatic VS BP Location FiO2 (%)   10/23/21 0954 10/23/21 1110 10/23/21 1110 10/23/21 1110 --   Oral Monitor Lying Right arm       Pain Score       10/23/21 0908       No Pain          Wt Readings from Last 1 Encounters:   10/24/21 62.6 kg (138 lb)     Additional Vital Signs:   Date/Time  Temp  Pulse  Resp  BP  MAP (mmHg)  SpO2  O2 Device   10/24/21 1149  --  79  --  110/69  --  --  --   10/24/21 0734  97.9 °F (36.6 °C)  79  18  110/69  --  91 %  None (Room air)   10/24/21 0150  96.9 °F (36.1 °C)Abnormal   78  18  109/71  89  90 %  None (Room air)   10/23/21 2049  --  77  18  110/69  --  97 %  None (Room air)   10/23/21 1627  98 °F (36.7 °C)  91  18  123/82  --  92 %  None (Room air)   10/23/21 1328  --  88  18  135/88  --  94 %  None (Room air)   10/23/21 1110  --  76  18  131/93  --  93 %  None (Room air)     Pertinent Labs/Diagnostic Test Results:   10/23 CXR:  Moderate interstitial edema with trace effusions.   10/23 EKG:  Paced  10/24 ECHO PENDING    Results from last 7 days   Lab Units 10/23/21  0930   SARS-COV-2  Negative     Results from last 7 days   Lab Units 10/24/21  0550 10/23/21  0931   WBC Thousand/uL 4.84 6.08   HEMOGLOBIN g/dL 12.6 13.0   HEMATOCRIT % 38.3 39.6   PLATELETS Thousands/uL 128* 163   NEUTROS ABS Thousands/µL 2.98 4.26     Results from last 7 days   Lab Units 10/23/21  0931   SODIUM mmol/L 135*   POTASSIUM mmol/L 4.3   CHLORIDE mmol/L 101   CO2 mmol/L 23   ANION GAP mmol/L 11   BUN mg/dL 28*   CREATININE mg/dL 2.03*   EGFR ml/min/1.73sq m 28   CALCIUM mg/dL 9.1     Results from last 7 days   Lab Units 10/23/21  0931   AST U/L 64*   ALT U/L 102*   ALK PHOS U/L 53   TOTAL PROTEIN g/dL 7.2   ALBUMIN g/dL 3.8   TOTAL BILIRUBIN mg/dL 0.97   BILIRUBIN DIRECT mg/dL 0.31*         Results from last 7 days   Lab Units 10/23/21  0931   GLUCOSE RANDOM mg/dL 114     Results from last 7 days   Lab Units 10/23/21  0931   TROPONIN I ng/mL <0.02     Results from last 7 days   Lab Units 10/24/21  0550 10/23/21  0931   PROTIME seconds 37.9* 45.3*   INR  4.10* 5.18*   PTT seconds  --  52*     Results from last 7 days   Lab Units 10/23/21  0931   NT-PRO BNP pg/mL 2,717*     Results from last 7 days   Lab Units 10/23/21  1218   CLARITY UA  Clear   COLOR UA  Yellow   SPEC GRAV UA  1.010   PH UA  5.0   GLUCOSE UA mg/dl Negative   KETONES UA mg/dl Negative   BLOOD UA  Trace*   PROTEIN UA mg/dl Negative   NITRITE UA  Negative   BILIRUBIN UA  Negative   UROBILINOGEN UA E.U./dl 0.2   LEUKOCYTES UA  Negative   WBC UA /hpf 0-1*   RBC UA /hpf None Seen   BACTERIA UA /hpf None Seen   EPITHELIAL CELLS WET PREP /hpf None Seen     Results from last 7 days   Lab Units 10/23/21  0930   INFLUENZA A PCR  Negative   INFLUENZA B PCR  Negative   RSV PCR  Negative     ED Treatment:   Medication Administration from 10/23/2021 0856 to 10/24/2021 0051       Date/Time Order Dose Route Action     10/23/2021 1112 furosemide (LASIX) injection 40 mg 40 mg Intravenous Given     10/23/2021 1636 amiodarone tablet 200 mg 200 mg Oral Given     10/23/2021 1636 atorvastatin (LIPITOR) tablet 40 mg 40 mg Oral Given     10/23/2021 1636 carvedilol (COREG) tablet 12.5 mg 12.5 mg Oral Given     10/23/2021 1636 furosemide (LASIX) injection 40 mg 40 mg Intravenous Given        Past Medical History:   Diagnosis Date   • Anemia    • Arthritis    • Coronary artery disease    • CVA (cerebral vascular accident) ([address])    • Hyperlipidemia    • Hypertension      Present on Admission:  • Acute kidney injury superimposed on chronic kidney disease (HCC)  • PAF (paroxysmal atrial fibrillation) ([facility])  • Ischemic cardiomyopathy      Admitting Diagnosis: CHF (congestive heart failure) ([facility]) [I50.9]  Ventricular tachycardia ([facility]) [I47.2]  SOB (shortness of breath) [R06.02]  CHRISTY (acute kidney injury) ([address]) [N17.9]  Cardiac defibrillator in situ [Z95.810]  Paced rhythm on cardiac monitor [Z78.9]  Age/Sex: 80 y.o. male  Admission Orders:  Scheduled Medications:  amiodarone, 200 mg, Oral, Daily  atorvastatin, 40 mg, Oral, Daily With Dinner  carvedilol, 12.5 mg, Oral, BID With Meals  furosemide, 40 mg, Intravenous, BID (diuretic)      Continuous IV Infusions: NONE     PRN Meds: NONE     Strict IO, daily wts  scd    IP CONSULT TO CARDIOLOGY  IP CONSULT TO NUTRITION SERVICES    Network Utilization Review Department  ATTENTION: Please call with any questions or concerns to [phone] and carefully listen to the prompts so that you are directed to the right person. All voicemails are confidential.  Giulia Zarcoence all requests for admission clinical reviews, approved or denied determinations and any other requests to dedicated fax number below belonging to the campus where the patient is receiving treatment.  List of dedicated fax numbers for the Facilities:  [place] DENIALS (Administrative/Medical Necessity) [phone]   [facility] [facility]) [phone] [address] [address] [facility] [address] [facility]  [address]  [phone]

## 2021-10-24 NOTE — UTILIZATION REVIEW
Inpatient Admission Authorization Request   NOTIFICATION OF INPATIENT ADMISSION/INPATIENT AUTHORIZATION REQUEST   SERVICING FACILITY:   [address] [place] [address]  Tax ID: [id number]  NPI: [id number]  Place of Service: Inpatient [address]  Place of Service Code: 24     ATTENDING PROVIDER:  Attending Name and NPI#: Merrill Espinoza Md [[id number]]  Address: [address]  Phone: [phone]     UTILIZATION REVIEW CONTACT:  Donna Daly Utilization   Network Utilization Review Department  Phone: [phone]  Fax: [phone]  Email: [email or web address]     PHYSICIAN ADVISORY SERVICES:  FOR [id number] REVIEW - MEDICAL NECESSITY DENIAL  Phone: [phone]  Fax: [phone]  Email: [email or web address]     TYPE OF REQUEST:  Inpatient Status     ADMISSION INFORMATION:  ADMISSION DATE/TIME: 10/23/21 11:12 AM  PATIENT DIAGNOSIS CODE/DESCRIPTION:  CHF (congestive heart failure) (HCC) [I50.9]  Ventricular tachycardia (HCC) [I47.2]  SOB (shortness of breath) [R06.02]  CHRISTY (acute kidney injury) ([address]) [N17.9]  Cardiac defibrillator in situ [Z95.810]  Paced rhythm on cardiac monitor [Z78.9]  DISCHARGE DATE/TIME: No discharge date for patient encounter. DISCHARGE DISPOSITION (IF DISCHARGED): Final discharge disposition not confirmed     IMPORTANT INFORMATION:  Please contact the Donna Daly directly with any questions or concerns regarding this request. Department voicemails are confidential.    Send requests for admission clinical reviews, concurrent reviews, approvals, and administrative denials due to lack of clinical to fax [phone].

## 2021-10-24 NOTE — ASSESSMENT & PLAN NOTE
· Status post ICD for cardiomyopathy and history of V-tach  · Status post Medtronic interrogation.   · Continue amiodarone  · Continue Coreg 12.5 mg b.i.d.  · Hold Aldactone while on high-dose Lasix  · Repeat echocardiogram to check for possible worsening EF

## 2021-10-24 NOTE — PLAN OF CARE
Problem: Potential for Falls  Goal: Patient will remain free of falls  Description: INTERVENTIONS:  - Educate patient/family on patient safety including physical limitations  - Instruct patient to call for assistance with activity   - Consult OT/PT to assist with strengthening/mobility   - Keep Call bell within reach  - Keep bed low and locked with side rails adjusted as appropriate  - Keep care items and personal belongings within reach  - Initiate and maintain comfort rounds  - Make Fall Risk Sign visible to staff  - Offer Toileting every  Hours, in advance of need  - Initiate/Maintain alarm  - Obtain necessary fall risk management equipment:   - Apply yellow socks and bracelet for high fall risk patients  - Consider moving patient to room near nurses station  Outcome: Progressing     Problem: PAIN - ADULT  Goal: Verbalizes/displays adequate comfort level or baseline comfort level  Description: Interventions:  - Encourage patient to monitor pain and request assistance  - Assess pain using appropriate pain scale  - Administer analgesics based on type and severity of pain and evaluate response  - Implement non-pharmacological measures as appropriate and evaluate response  - Consider cultural and social influences on pain and pain management  - Notify physician/advanced practitioner if interventions unsuccessful or patient reports new pain  Outcome: Progressing     Problem: INFECTION - ADULT  Goal: Absence or prevention of progression during hospitalization  Description: INTERVENTIONS:  - Assess and monitor for signs and symptoms of infection  - Monitor lab/diagnostic results  - Monitor all insertion sites, i.e. indwelling lines, tubes, and drains  - Monitor endotracheal if appropriate and nasal secretions for changes in amount and color  - [place] appropriate cooling/warming therapies per order  - Administer medications as ordered  - Instruct and encourage patient and family to use good hand hygiene technique  - Identify and instruct in appropriate isolation precautions for identified infection/condition  Outcome: Progressing     Problem: SAFETY ADULT  Goal: Patient will remain free of falls  Description: INTERVENTIONS:  - Educate patient/family on patient safety including physical limitations  - Instruct patient to call for assistance with activity   - Consult OT/PT to assist with strengthening/mobility   - Keep Call bell within reach  - Keep bed low and locked with side rails adjusted as appropriate  - Keep care items and personal belongings within reach  - Initiate and maintain comfort rounds  - Make Fall Risk Sign visible to staff  - Offer Toileting every  Hours, in advance of need  - Initiate/Maintain alarm  - Obtain necessary fall risk management equipment: - Apply yellow socks and bracelet for high fall risk patients  - Consider moving patient to room near nurses station  Outcome: Progressing  Goal: Maintain or return to baseline ADL function  Description: INTERVENTIONS:  -  Assess patient's ability to carry out ADLs; assess patient's baseline for ADL function and identify physical deficits which impact ability to perform ADLs (bathing, care of mouth/teeth, toileting, grooming, dressing, etc.)  - Assess/evaluate cause of self-care deficits   - Assess range of motion  - Assess patient's mobility; develop plan if impaired  - Assess patient's need for assistive devices and provide as appropriate  - Encourage maximum independence but intervene and supervise when necessary  - Involve family in performance of ADLs  - Assess for home care needs following discharge   - Consider OT consult to assist with ADL evaluation and planning for discharge  - Provide patient education as appropriate  Outcome: Progressing  Goal: Maintains/Returns to pre admission functional level  Description: INTERVENTIONS:  - Perform BMAT or MOVE assessment daily.   - Set and communicate daily mobility goal to care team and patient/family/caregiver. - Collaborate with rehabilitation services on mobility goals if consulted  - Perform Range of Motion times a day. - Reposition patient every  hours. - Dangle patient  times a day  - Stand patient  times a day  - Ambulate patient  times a day  - Out of bed to chair  times a day   - Out of bed for meals  times a day  - Out of bed for toileting  - Record patient progress and toleration of activity level   Outcome: Progressing     Problem: DISCHARGE PLANNING  Goal: Discharge to home or other facility with appropriate resources  Description: INTERVENTIONS:  - Identify barriers to discharge w/patient and caregiver  - Arrange for needed discharge resources and transportation as appropriate  - Identify discharge learning needs (meds, wound care, etc.)  - Arrange for interpretive services to assist at discharge as needed  - Refer to Case Management Department for coordinating discharge planning if the patient needs post-hospital services based on physician/advanced practitioner order or complex needs related to functional status, cognitive ability, or social support system  Outcome: Progressing     Problem: Knowledge Deficit  Goal: Patient/family/caregiver demonstrates understanding of disease process, treatment plan, medications, and discharge instructions  Description: Complete learning assessment and assess knowledge base.   Interventions:  - Provide teaching at level of understanding  - Provide teaching via preferred learning methods  Outcome: Progressing

## 2021-10-24 NOTE — CONSULTS
ENCOUNTER DATE: 10/24/21 12:02 PM  PATIENT NAME: Vesta Corey   10/31/1932    [id number]  Age: 80 y.o. Sex: male  [address] [place], [place] [address]vard: MD Shad  INPATIENT ATTENDING PHYSICIAN: Roberto Marie MD; [facility] PHYSICIAN: Abbey Brannon DO  DATE OF ADMISSION: 10/23/2021  9:06 AM; LENGTH OF STAY: 1 days  *-*-*-*-*-*-*-*-*-*-*-*-*-*-*-*-*-*-*-*-*-*-*-*-*-*-*-*-*-*-*-*-*-*-*-*-*-*-*-*-*-*-*-*-*-*-*-*-*-*-*-*-*-*-   REASON FOR CONSULTATION:   Decompensated heart failure    *-*-*-*-*-*-*-*-*-*-*-*-*-*-*-*-*-*-*-*-*-*-*-*-*-*-*-*-*-*-*-*-*-*-*-*-*-*-*-*-*-*-*-*-*-*-*-*-*-*-*-*-*-*-  CARDIAC ASSESSMENT:     1. Acute on chronic decompensated systolic heart failure  2. Coronary artery disease without angina, history of MI, history of PTCA 1989  3. Supratherapeutic INR  4. Ischemic cardiomyopathy with severely reduced left ventricular ejection fraction  5. History of ventricular tachycardia, status post ICD implantation  6. Paroxysmal atrial fibrillation  7. Chronic kidney disease, stage IIIb-IV  8. History of CVA. Patient appears to be clinically better today  following IV diuresis. Today's renal function is not known. He does have stage IIIB, stage 4 chronic kidney disease. His device interrogation shows 0.2% atrial fibrillation burden. Patient is currently ventricular paced but he is predominantly atrial paced normally.        Patient Active Problem List   Diagnosis   • Cardiac defibrillator in situ   • Acute kidney injury superimposed on chronic kidney disease ([address])   • Embolic stroke ([address])   • Hyperlipidemia   • Old myocardial infarction   • Status post percutaneous transluminal coronary angioplasty   • Ventricular tachycardia (HCC)   • Ischemic cardiomyopathy   • PAF (paroxysmal atrial fibrillation) (HCC)   • Essential hypertension   • Cardiomyopathy ([address])   • Acute on chronic combined systolic and diastolic congestive heart failure ([address])        CARDIAC PLAN:     -- may transition to oral diuretic beginning tomorrow morning. Continue atorvastatin, carvedilol and amiodarone. Agree with continuing to hold lisinopril and spironolactone. -- warfarin may likely be restarted in 48 hours. -- echocardiogram to moderate to to reassess cardiac structure and function. -- out of bed to chair and ambulation are advised. -- needs review of basic metabolic panel from today. -- if patient stable and renal function is stable may be discharged home tomorrow. -- will need arrangement for in office visit with Dr. Alia Mathis upon discharge and with Coumadin clinic and device clinic. *-*-*-*-*-*-*-*-*-*-*-*-*-*-*-*-*-*-*-*-*-*-*-*-*-*-*-*-*-*-*-*-*-*-*-*-*-*-*-*-*-*-*-*-*-*-*-*-*-*-*-*-*-*-  HISTORY OF PRESENT ILLNESS     Patient is a 59-year-old gentleman with medical history significant for:   1. Coronary artery disease with remote history of MI in 1989, status post PTCA  2. Ischemic cardiomyopathy with EF of 35%  3. History of ventricular tachycardia  4. Status post single lead ICD placement  5. Paroxysmal atrial fibrillation  6. Chronic kidney disease  7. History of CVA  8. History of GI bleed  9. Essential hypertension  10. Dyslipidemia. He follows with Dr. Alia Mathis and was last seen in office on September 14, 2021. He presented to emergency room yesterday with 2 week history of progressive shortness of breath. He initially went to an urgent care where an ECG was performed and patient was advised to go to the emergency room. In the emergency room he was noted to have elevated blood pressure. His clinical evaluation suggested evidence of pulmonary and peripheral vascular congestion. Blood work was significant for elevated BUN and creatinine. Chest x-ray was significant for moderate interstitial edema. Patient was admitted for management of heart failure. Overnight he has been diuresed with intravenous furosemide and is feeling better today.     Prior to recent on sent of symptoms patient reports that he has had no significant illnesses recently. He reports of orthopnea and PND but no chest pain or palpitations or dizziness is. He states he did not notice pedal edema himself until it was brought to his attention by clinicians. He reports that though he is not very active he does perform activity at home including taking care of the house. He lives with his wife and still drives and is fairly independent. He does report being compliant with medications. He does like salt in his diet so does not restrict himself. Denies smoking or significant alcohol use.     *-*-*-*-*-*-*-*-*-*-*-*-*-*-*-*-*-*-*-*-*-*-*-*-*-*-*-*-*-*-*-*-*-*-*-*-*-*-*-*-*-*-*-*-*-*-*-*-*-*-*-*-*-*  PAST MEDICAL HISTORY:     Past Medical History:   Diagnosis Date   • Anemia    • Arthritis    • Coronary artery disease    • CVA (cerebral vascular accident) ([address])    • Hyperlipidemia    • Hypertension     PAST SURGICAL HISTORY     Past Surgical History:   Procedure Laterality Date   • CARDIAC PACEMAKER PLACEMENT  05/09/2011    [employer]onic dual chamber ICD implant   • CORONARY ANGIOPLASTY  1989          FAMILY HISTORY     Family History   Problem Relation Age of Onset   • Coronary artery disease Mother    • Heart attack Father         MI   • Stroke Sister      SOCIAL HISTORY     Social History     Tobacco Use   Smoking Status Never Smoker   Smokeless Tobacco Never Used      Social History     Substance and Sexual Activity   Alcohol Use Yes   • Alcohol/week: 3.0 standard drinks   • Types: 3 Shots of liquor per week    Comment: daily     Social History     Substance and Sexual Activity   Drug Use Never    [unfilled]     *-*-*-*-*-*-*-*-*-*-*-*-*-*-*-*-*-*-*-*-*-*-*-*-*-*-*-*-*-*-*-*-*-*-*-*-*-*-*-*-*-*-*-*-*-*-*-*-*-*-*-*-*-*  ALLERGIES     No Known Allergies  CURRENT SCHEDULED MEDICATIONS       Current Facility-Administered Medications:   •  amiodarone tablet 200 mg, 200 mg, Oral, Daily, Louana Ligas, MD, 200 mg at 10/24/21 0835  • atorvastatin (LIPITOR) tablet 40 mg, 40 mg, Oral, Daily With Zuleyka Ly MD, 40 mg at 10/23/21 1636  •  carvedilol (COREG) tablet 12.5 mg, 12.5 mg, Oral, BID With Meals, Cherri Romberg, MD, 12.5 mg at 10/24/21 6401  •  furosemide (LASIX) injection 40 mg, 40 mg, Intravenous, BID (diuretic), Cherri Romberg, MD, 40 mg at 10/24/21 8439     *-*-*-*-*-*-*-*-*-*-*-*-*-*-*-*-*-*-*-*-*-*-*-*-*-*-*-*-*-*-*-*-*-*-*-*-*-*-*-*-*-*-*-*-*-*-*-*-*-*-*-*-*-*   REVIEW OF SYSTEMS     Positive for:  As noted above in HPI  Negative for: All remaining as reviewed below and in HPI. SYSTEM SYMPTOMS REVIEWED:  General--weight change, fever, night sweats  Respiratoryl-- Wheezing, shortness of breath, cough, URI symptoms, sputum, blood  Cardiovascular--chest pain, syncope, dyspnea on exertion, edema, decline in exercise tolerance, claudication   Gastrointestinal--persistent vomiting, diarrhea, abdominal distention, blood in stool   Muscular or skeletal--joint pain or swelling   Neurologic--headaches, syncope, abnormal movement  Hematologic--history of easy bruising and bleeding   Endocrine--thyroid enlargement, heat or cold intolerance, polyuria   Psychiatric--anxiety, depression      *-*-*-*-*-*-*-*-*-*-*-*-*-*-*-*-*-*-*-*-*-*-*-*-*-*-*-*-*-*-*-*-*-*-*-*-*-*-*-*-*-*-*-*-*-*-*-*-*-*-*-*-*-*-   VITAL SIGNS     Vitals:    10/24/21 0548 10/24/21 0549 10/24/21 0734 10/24/21 1149   BP:   110/69 110/69   BP Location:   Right arm    Pulse:   79 79   Resp:   18    Temp:   97.9 °F (36.6 °C)    TempSrc:   Tympanic    SpO2:   91%    Weight: 63 kg (138 lb 12.8 oz) 63 kg (138 lb 12.8 oz)  62.6 kg (138 lb)   Height:    5' 3" (1.6 m)       TEMPERATURE HISTORY 24H: Temp (24hrs), Av.6 °F (36.4 °C), Min:96.9 °F (36.1 °C), Max:98 °F (36.7 °C)   .  BLOOD PRESSURE HISTORY: Systolic (06XLO), PPJ:447 , Min:109 , CFN:722    Diastolic (39PZH), DXX:75, Min:69, Max:95      WEIGHTS:   Wt Readings from Last 25 Encounters:   10/24/21 62.6 kg (138 lb)   10/23/21 68 kg (150 lb)   05/14/21 69.3 kg (152 lb 12.8 oz)   11/02/20 69.5 kg (153 lb 3.2 oz)   07/02/20 68.9 kg (151 lb 12.8 oz)   05/28/20 68.5 kg (151 lb)   04/18/20 69.4 kg (153 lb)   04/08/20 69.4 kg (153 lb)   02/28/20 72.3 kg (159 lb 6.4 oz)   12/09/19 71.2 kg (157 lb)   07/15/19 71.2 kg (157 lb)   01/08/19 71.6 kg (157 lb 12.8 oz)   12/10/18 71.8 kg (158 lb 3.2 oz)   10/09/18 71.3 kg (157 lb 3.2 oz)        BMI: Body mass index is 24.45 kg/m². I&O:     Intake/Output Summary (Last 24 hours) at 10/24/2021 1202  Last data filed at 10/24/2021 0801  Gross per 24 hour   Intake --   Output 2545 ml   Net -2545 ml      *-*-*-*-*-*-*-*-*-*-*-*-*-*-*-*-*-*-*-*-*-*-*-*-*-*-*-*-*-*-*-*-*-*-*-*-*-*-*-*-*-*-*-*-*-*-*-*-*-*-*-*-*-*-   PHYSICAL EXAMINATION:     General Appearance:    Alert, cooperative, no distress, appears stated age    Head, Eyes, ENT:    No obvious abnormality, moist mucous mebranes. Neck:   Supple, no carotid bruit or JVD   Back:     Symmetric, no curvature. Lungs:     Respirations unlabored. decreased breath sounds at bases with fine crackles at right base. Chest wall:    No tenderness or deformity   Heart:    Regular rate and rhythm, S1 and S2 normal, no murmur, rub  or gallop. Abdomen:     Soft, non-tender, No obvious masses, or organomegaly   Extremities:   Extremities warm, no cyanosis, has trace to 1+ left greater than right lower extremity edema. Skin:   Skin color, texture, turgor normal, no rashes or lesions     *-*-*-*-*-*-*-*-*-*-*-*-*-*-*-*-*-*-*-*-*-*-*-*-*-*-*-*-*-*-*-*-*-*-*-*-*-*-*-*-*-*-*-*-*-*-*-*-*-*-*-*-*-*-   LABORATORY DATA:    I have personally reviewed the available laboratory data.     CMP:  Results from last 7 days   Lab Units 10/23/21  0931   SODIUM mmol/L 135*   POTASSIUM mmol/L 4.3   CHLORIDE mmol/L 101   CO2 mmol/L 23   BUN mg/dL 28*   CREATININE mg/dL 2.03*   CALCIUM mg/dL 9.1   ALK PHOS U/L 53   ALT U/L 102*   AST U/L 64*               Cardiac Profile:   Results from last 7 days   Lab Units 10/23/21  0931   TROPONIN I ng/mL <0.02   NT-PRO BNP pg/mL 2,717*                CBC:   Results from last 7 days   Lab Units 10/24/21  0550 10/23/21  0931   WBC Thousand/uL 4.84 6.08   HEMOGLOBIN g/dL 12.6 13.0   HEMATOCRIT % 38.3 39.6   PLATELETS Thousands/uL 128* 163     PT/INR:   Lab Results   Component Value Date    INR 4.10 (H) 10/24/2021   , Microbiology:          *-*-*-*-*-*-*-*-*-*-*-*-*-*-*-*-*-*-*-*-*-*-*-*-*-*-*-*-*-*-*-*-*-*-*-*-*-*-*-*-*-*-*-*-*-*-*-*-*-*-*-*-*-*-   RADIOLOGY RESULTS:     XR chest 1 view portable    Result Date: 10/23/2021  Impression: Moderate interstitial edema with trace effusions. Workstation performed: [id number]       *-*-*-*-*-*-*-*-*-*-*-*-*-*-*-*-*-*-*-*-*-*-*-*-*-*-*-*-*-*-*-*-*-*-*-*-*-*-*-*-*-*-*-*-*-*-*-*-*-*-*-*-*-*-    LAST ECG, ECHOCARDIOGRAM AND OTHER CARDIOLOGY TEST RESULTS:         Atrial sensed ventricular paced rhythm with appropriate capture of paced beats. Results for orders placed during the hospital encounter of 10/24/18    Echo complete with contrast if indicated    Narrative  [facility] [address], [address] [facility]    Transthoracic Echocardiogram  2D, M-mode, Doppler, and Color Doppler    Study date:  24-Oct-2018    Patient: Gabby Roblero  MR number: [id number]  Account number: [de-identified]  : 31-Oct-1932  Age: 80 years  Gender: Male  Status: Outpatient  Location: [facility]  Height: 63 in  Weight: 157 lb  BP: 100/ 60 mmHg    Indications: Ischemic cardiomyopathy, coronary artery disease.     Diagnoses: I25.5 - Ischemic cardiomyopathy    Sonographer:  Dori Hill, [facility]  Primary Physician:  Laquita Roe DO  Referring Physician:  Severiano Dempsey MD  Group:  [facility]  Interpreting Physician:  Ford Santos MD    IMPRESSIONS:  Borderline dilated left ventricular cavity with some thinning of the apex and anterior and anteroseptal walls, severely reduced left ventricular systolic function with akinesis of the distal anterior, anteroseptal and apical wall and  hypokinesis of other regions. Ejection fraction is estimated as around 25-30%. There is grade 1 diastolic dysfunction. Mild left atrial cavity enlargement. Aortic valve sclerosis, no aortic stenosis or regurgitation. Mitral annular calcification, trace mitral valve regurgitation. Mild tricuspid valve regurgitation. No obvious pulmonary hypertension. No pericardial effusion. Compared to previous echocardiogram from May 27, 2016 there is overall no significant change. Previously noted borderline pulmonary hypertension is not currently appreciable. SUMMARY    LEFT VENTRICLE:  Borderline dilated left ventricular cavity, normal wall thickness with thinning of the mid to distal anterior wall apex and anteroseptal walls, severely reduced left ventricular systolic function with akinesis of the mid to distal anterior  wall, anteroseptal wall and apex extending to distal inferolateral walls, hypokinesis of other regions. Ejection fraction is estimated as around 25-30%. Grade 1 diastolic dysfunction. Estimated left atrial pressure is normal.    RIGHT VENTRICLE:  Normal right ventricular size and systolic function. Normal estimated right ventricular systolic pressure. LEFT ATRIUM:  Mild left atrial cavity enlargement. RIGHT ATRIUM:  Normal right atrial cavity size. MITRAL VALVE:  Mild mitral annular calcification and leaflet sclerosis, trace mitral valve regurgitation. AORTIC VALVE:  Trace mitral valve regurgitation. TRICUSPID VALVE:  Mild tricuspid valve regurgitation. PULMONIC VALVE:  No significant pulmonic valve regurgitation. AORTA:  Aortic root and proximal ascending aorta are normal in size on 2D imaging. IVC, HEPATIC VEINS:  Inferior vena cava is normal in size and demonstrates appropriate respiratory phasic changes in diameter.     PERICARDIUM:  Trace pericardial effusion with some organized material close to the apex, possible thrombus versus anterior fat pad. HISTORY: PRIOR HISTORY: Ventricular tachycardia, atrial fibrillation, cerebral vascular accident, defibrillator, HLD. PROCEDURE: The study was performed in the [employer]. This was a routine study. The transthoracic approach was used. The study included complete 2D imaging, M-mode, complete spectral Doppler, and color Doppler. The heart rate was  70 bpm, at the start of the study. Images were obtained from the parasternal, apical, subcostal, and suprasternal notch acoustic windows. Image quality was adequate. LEFT VENTRICLE: Borderline dilated left ventricular cavity, normal wall thickness with thinning of the mid to distal anterior wall apex and anteroseptal walls, severely reduced left ventricular systolic function with akinesis of the mid to  distal anterior wall, anteroseptal wall and apex extending to distal inferolateral walls, hypokinesis of other regions. Ejection fraction is estimated as around 25-30%. Grade 1 diastolic dysfunction. Estimated left atrial pressure is  normal.    RIGHT VENTRICLE: Normal right ventricular size and systolic function. Normal estimated right ventricular systolic pressure. LEFT ATRIUM: Mild left atrial cavity enlargement. RIGHT ATRIUM: Normal right atrial cavity size. MITRAL VALVE: Mild mitral annular calcification and leaflet sclerosis, trace mitral valve regurgitation. AORTIC VALVE: Trace mitral valve regurgitation. TRICUSPID VALVE: Mild tricuspid valve regurgitation. PULMONIC VALVE: No significant pulmonic valve regurgitation. PERICARDIUM: Trace pericardial effusion with some organized material close to the apex, possible thrombus versus anterior fat pad. AORTA: Aortic root and proximal ascending aorta are normal in size on 2D imaging.     SYSTEMIC VEINS: IVC: Inferior vena cava is normal in size and demonstrates appropriate respiratory phasic changes in diameter. SYSTEM MEASUREMENT TABLES    2D  %FS: 19 %  Ao Diam: 3.61 cm  EDV(Teich): 144.29 ml  EF(Teich): 38.79 %  ESV(Teich): 88.33 ml  IVSd: 1.04 cm  LA Area: 9.18 cm2  LA Diam: 4.59 cm  LVEDV MOD A4C: 104.87 ml  LVEF MOD A4C: 37.99 %  LVESV MOD A4C: 65.03 ml  LVIDd: 5.45 cm  LVIDs: 4.41 cm  LVLd A4C: 8.58 cm  LVLs A4C: 8.55 cm  LVPWd: 0.95 cm  RA Area: 11.11 cm2  RVIDd: 3.9 cm  SV MOD A4C: 39.83 ml  SV(Teich): 55.96 ml    CW  AV Vmax: 1.01 m/s  AV maxP.08 mmHg    MM  TAPSE: 2.27 cm    PW  E': 0.04 m/s  E/E': 14.25  MV A Chinedu: 0.93 m/s  MV Dec McCormick: 3.38 m/s2  MV DecT: 164.17 ms  MV E Chinedu: 0.56 m/s  MV E/A Ratio: 0.6  MV PHT: 47.61 ms  MVA By PHT: 4.62 cm2  PRend P.51 mmHg  PRend Vmax: 0.79 m/s  PV Vmax: 0.65 m/s  PV maxP.71 mmHg  TR Vmax: 1.2 m/s  TR maxP.77 mmHg    [employer] Accredited Echocardiography Laboratory    Prepared and electronically signed by    Keon Oconnor MD  Signed 24-Oct-2018 13:25:41    No results found for this or any previous visit. No results found for this or any previous visit.     Results for orders placed during the hospital encounter of 10/24/18    NM myocardial perfusion spect (stress and/or rest)    Narrative  [address] [place] [address], [address] [place] [address]    Rest/Stress Gated SPECT Myocardial Perfusion Imaging After Regadenoson    Patient: Chari Aguilar  MR number: [id number]  Account number: [de-identified]  : 10/31/1932  Age: 80 years  Gender: Male  Status: Outpatient  Location: [address] [place] [address]  Height: 63 in  Weight: 157 lb  BP: 150/ 90 mmHg    Diagnosis: I25.10 - Atherosclerotic heart disease of native coronary artery without angina pectoris, I25.2 - Old myocardial infarction, I25.5 - Ischemic cardiomyopathy, I47.2 - Ventricular tachycardia    RN:  Kenyetta Schafer RN  Referring Physician:  Goyo Delgado MD  Group:  [facility]  Report Prepared By[de-identified]  Kenyetta Schafer, RN  Interpreting Physician:  Wilmer Fam MD    INDICATIONS: Evaluation of known coronary artery disease. Ischemic cardiomyopathy    HISTORY: The patient is a 80year old  male. Chest pain status: no chest pain. Coronary artery disease risk factors: dyslipidemia, hypertension, and family history of premature coronary artery disease. Cardiovascular history:  coronary artery disease, prior myocardial infarction, congestive heart failure, arrhythmia, stroke, ventricular tachycardia, and ischemic cardiomyopathy. ischemic cardiomyopathy Prior cardiovascular procedures: percutaneous transluminal  coronary angioplasty (on 1989), coronary artery bypass grafting, and implantable cardioverter defibrillator procedure. Medications: a beta blocker, an ACE inhibitor/ARB, a diuretic, aspirin, and a lipid lowering agent. Previous test  results: abnormal resting echocardiogram.    REST ECG: Sinus rhythm, first-degree AV block, evidence of prior anteroseptal infarction with nonspecific ST T wave abnormalities at rest.    PROCEDURE: The study was performed in the [facility]. A regadenoson infusion pharmacologic stress test was performed. Gated SPECT myocardial perfusion imaging was performed after stress and at rest. Systolic blood pressure was  150 mmHg, at the start of the study. Diastolic blood pressure was 90 mmHg, at the start of the study. The heart rate was 72 bpm, at the start of the study. Regadenoson protocol:  HR bpm SBP mmHg DBP mmHg Symptoms  Baseline 72 150 90 --  1 min 77 -- -- dyspnea  2 min 80 100 60 same as above  3 min 81 -- -- subsiding  4 min 76 136 80 none  5 min 67 -- -- none  6 min 64 -- -- none  7 min 65 150 80 none  8 min 65 -- -- none  9 min 63 -- -- none  10 min 63 140 80 none  No medications or fluids given. STRESS SUMMARY: Duration of pharmacologic stress was 10 min. Maximal heart rate during stress was 81 bpm ( 60 % of maximal predicted heart rate).  The heart rate response to stress was normal. There was resting hypertension with an  appropriate blood pressure response to stress. The rate-pressure product for the peak heart rate and blood pressure was 09457. There was no chest pain during stress. The stress test was terminated due to protocol completion. No abnormal ST  T wave changes with regadenoson injection. No significant arrhythmia noted. MYOCARDIAL PERFUSION IMAGING:  The image quality was fair. Rotating projection images reveal mild diaphragmatic attenuation and mild subdiaphragmatic activity. The left ventricular cavity is mildly dilated. End systolic volume measures 675 mL and end-diastolic volume measures 514 mL. Review of resting and post regadenoson SPECT imaging demonstrates large size, severe, fixed perfusion abnormality involving the entire anterior wall, apex, distal lateral wall, distal anteroseptal wall and septum. There is no definite  ischemia noted. GATED SPECT:  Gated SPECT imaging demonstrates severely reduced left ventricular systolic function with dyskinesis of the apex and the distal anterior wall and hypokinesis of other walls. Ejection fraction is calculated as 29%. SUMMARY:  -  Stress results: Target heart rate was not achieved. There was resting hypertension with an appropriate blood pressure response to stress. There was no chest pain during stress. IMPRESSIONS: 1. Abnormal myocardial perfusion scan. 2. Evidence of extensive, severe intensity fixed perfusion abnormalities without evidence of ischemia. 3. Dilated left ventricular cavity with severely reduced left ventricular systolic function. Ejection fraction calculated as 29%. Diagnostic sensitivity was limited by submaximal stress.     Prepared and signed by    Yenny Pinedo MD  Signed 10/24/2018 18:15:42         *-*-*-*-*-*-*-*-*-*-*-*-*-*-*-*-*-*-*-*-*-*-*-*-*-*-*-*-*-*-*-*-*-*-*-*-*-*-*-*-*-*-*-*-*-*-*-*-*-*-*-*-*-*-  SIGNATURES:   [unfilled]   Malini Cintron MD     CC:   Karyle Oats DO Mabel Ramírez PA-C .

## 2021-10-24 NOTE — ASSESSMENT & PLAN NOTE
Lab Results   Component Value Date    EGFR 28 10/23/2021    EGFR 35 10/04/2021    EGFR 36 04/27/2021    CREATININE 2.03 (H) 10/23/2021    CREATININE 1.72 (H) 10/04/2021    CREATININE 1.67 (H) 04/27/2021     Secondary to cardiorenal syndrome  She was initiated on Lasix 40 mg IV b.i.d.   Repeat BMP is pending  Consult nephrology if creatinine worsens

## 2021-10-24 NOTE — PLAN OF CARE
Problem: Potential for Falls  Goal: Patient will remain free of falls  Description: INTERVENTIONS:  - Educate patient/family on patient safety including physical limitations  - Instruct patient to call for assistance with activity   - Consult OT/PT to assist with strengthening/mobility   - Keep Call bell within reach  - Keep bed low and locked with side rails adjusted as appropriate  - Keep care items and personal belongings within reach  - Initiate and maintain comfort rounds  - Make Fall Risk Sign visible to staff  - Offer Toileting every 4 Hours, in advance of need  - Initiate/Maintain bed alarm  -  - Apply yellow socks and bracelet for high fall risk patients  - Consider moving patient to room near nurses station  Outcome: Progressing     Problem: PAIN - ADULT  Goal: Verbalizes/displays adequate comfort level or baseline comfort level  Description: Interventions:  - Encourage patient to monitor pain and request assistance  - Assess pain using appropriate pain scale  - Administer analgesics based on type and severity of pain and evaluate response  - Implement non-pharmacological measures as appropriate and evaluate response  - Consider cultural and social influences on pain and pain management  - Notify physician/advanced practitioner if interventions unsuccessful or patient reports new pain  Outcome: Progressing     Problem: INFECTION - ADULT  Goal: Absence or prevention of progression during hospitalization  Description: INTERVENTIONS:  - Assess and monitor for signs and symptoms of infection  - Monitor lab/diagnostic results  - Monitor all insertion sites, i.e. indwelling lines, tubes, and drains  - Monitor endotracheal if appropriate and nasal secretions for changes in amount and color  - [place] appropriate cooling/warming therapies per order  - Administer medications as ordered  - Instruct and encourage patient and family to use good hand hygiene technique  - Identify and instruct in appropriate isolation precautions for identified infection/condition  Outcome: Progressing     Problem: SAFETY ADULT  Goal: Maintain or return to baseline ADL function  Description: INTERVENTIONS:  -  Assess patient's ability to carry out ADLs; assess patient's baseline for ADL function and identify physical deficits which impact ability to perform ADLs (bathing, care of mouth/teeth, toileting, grooming, dressing, etc.)  - Assess/evaluate cause of self-care deficits   - Assess range of motion  - Assess patient's mobility; develop plan if impaired  - Assess patient's need for assistive devices and provide as appropriate  - Encourage maximum independence but intervene and supervise when necessary  - Involve family in performance of ADLs  - Assess for home care needs following discharge   - Consider OT consult to assist with ADL evaluation and planning for discharge  - Provide patient education as appropriate  Outcome: Progressing     Problem: SAFETY ADULT  Goal: Maintains/Returns to pre admission functional level  Description: INTERVENTIONS:  - Perform BMAT or MOVE assessment daily.   - Set and communicate daily mobility goal to care team and patient/family/caregiver.    - Collaborate with rehabilitation services on mobility goals if consulted          - Ambulate patient 3 times a day  - Out of bed to chair 3 times a day   - Out of bed for meals 3 times a day  - Out of bed for toileting  - Record patient progress and toleration of activity level   Outcome: Progressing     Problem: DISCHARGE PLANNING  Goal: Discharge to home or other facility with appropriate resources  Description: INTERVENTIONS:  - Identify barriers to discharge w/patient and caregiver  - Arrange for needed discharge resources and transportation as appropriate  - Identify discharge learning needs (meds, wound care, etc.)  - Arrange for interpretive services to assist at discharge as needed  - Refer to Case Management Department for coordinating discharge planning if the patient needs post-hospital services based on physician/advanced practitioner order or complex needs related to functional status, cognitive ability, or social support system  Outcome: Progressing     Problem: Knowledge Deficit  Goal: Patient/family/caregiver demonstrates understanding of disease process, treatment plan, medications, and discharge instructions  Description: Complete learning assessment and assess knowledge base.   Interventions:  - Provide teaching at level of understanding  - Provide teaching via preferred learning methods  Outcome: Progressing

## 2021-10-24 NOTE — PROGRESS NOTES
[facility]  Progress Note Erla Im 10/31/1932, 80 y.o. male MRN: [id number]  Unit/Bed#: E4 -01 Encounter: [id number]  Primary Care Provider: Damon Gomez DO   Date and time admitted to hospital: 10/23/2021  9:06 AM    * Acute on chronic combined systolic and diastolic congestive heart failure (HCC)  Assessment & Plan  Wt Readings from Last 3 Encounters:   10/24/21 63 kg (138 lb 12.8 oz)   10/23/21 68 kg (150 lb)   05/14/21 69.3 kg (152 lb 12.8 oz)     Known ischemic cardiomyopathy with EF of 25-30% per last echocardiogram  Chest x-ray on admission with vascular congestion  BNP on admission elevated at 2700  Medtronic pacer interrogation showed fluid overload  Currently on Lasix 40 mg IV b.i.d. Continue Coreg with hold parameters  Not on Ace or Arb due to CKD  Consult pending  Repeat echocardiogram ordered since his last echo was 3 years ago        Acute kidney injury superimposed on chronic kidney disease [facility])  Assessment & Plan  Lab Results   Component Value Date    EGFR 28 10/23/2021    EGFR 35 10/04/2021    EGFR 36 04/27/2021    CREATININE 2.03 (H) 10/23/2021    CREATININE 1.72 (H) 10/04/2021    CREATININE 1.67 (H) 04/27/2021     Secondary to cardiorenal syndrome  She was initiated on Lasix 40 mg IV b.i.d. Repeat BMP is pending  Consult nephrology if creatinine worsens    PAF (paroxysmal atrial fibrillation) ([facility])  Assessment & Plan  · INR supratherapeutic with no evidence of bleeding  · Hold warfarin  · Repeat INR in a.m. Ischemic cardiomyopathy  Assessment & Plan  · Status post ICD for cardiomyopathy and history of V-tach  · Status post Medtronic interrogation.   · Continue amiodarone  · Continue Coreg 12.5 mg b.i.d.  · Hold Aldactone while on high-dose Lasix  · Repeat echocardiogram to check for possible worsening EF      VTE Pharmacologic Prophylaxis:   Pharmacologic: Warfarin (Coumadin)  Mechanical VTE Prophylaxis in Place: Yes    Patient Centered Rounds:  Discussed with his nurse    Discussions with Specialists or Other Care Team Provider:  None    Education and Discussions with Family / Patient:  Offered to call his family but he declined    Time Spent for Care: 20 minutes. More than 50% of total time spent on counseling and coordination of care as described above. Current Length of Stay: 1 day(s)    Current Patient Status: Inpatient   Certification Statement: The patient will continue to require additional inpatient hospital stay due to CHF    Discharge Plan:  In 24-48 hours    Code Status: Level 1 - Full Code      Subjective:   Had a good night sleep  No shortness of breath  No chest pain  No leg swelling    Objective:     Vitals:   Temp (24hrs), Av.6 °F (36.4 °C), Min:96.9 °F (36.1 °C), Max:98 °F (36.7 °C)    Temp:  [96.9 °F (36.1 °C)-98 °F (36.7 °C)] 97.9 °F (36.6 °C)  HR:  [77-91] 79  Resp:  [18] 18  BP: (109-135)/(69-88) 110/69  SpO2:  [90 %-97 %] 91 %  Body mass index is 24.59 kg/m². Input and Output Summary (last 24 hours): Intake/Output Summary (Last 24 hours) at 10/24/2021 1136  Last data filed at 10/24/2021 0801  Gross per 24 hour   Intake --   Output 2725 ml   Net -2725 ml       Physical Exam:     Physical Exam  Constitutional:       Appearance: He is not ill-appearing or diaphoretic. HENT:      Head: Normocephalic and atraumatic. Nose: No rhinorrhea. Eyes:      General: No scleral icterus. Cardiovascular:      Rate and Rhythm: Regular rhythm. Heart sounds: No murmur heard. No gallop. Pulmonary:      Comments: Bilateral crackles  Abdominal:      General: Abdomen is flat. Palpations: Abdomen is soft. Musculoskeletal:      Cervical back: Neck supple. Right lower leg: No edema. Left lower leg: No edema. Skin:     General: Skin is warm and dry. Neurological:      Mental Status: He is alert and oriented to person, place, and time.    Psychiatric:         Mood and Affect: Mood normal. Behavior: Behavior normal.     Additional Data:     Labs:    Results from last 7 days   Lab Units 10/24/21  0550   WBC Thousand/uL 4.84   HEMOGLOBIN g/dL 12.6   HEMATOCRIT % 38.3   PLATELETS Thousands/uL 128*   NEUTROS PCT % 61   LYMPHS PCT % 25   MONOS PCT % 11   EOS PCT % 2     Results from last 7 days   Lab Units 10/23/21  0931   SODIUM mmol/L 135*   POTASSIUM mmol/L 4.3   CHLORIDE mmol/L 101   CO2 mmol/L 23   BUN mg/dL 28*   CREATININE mg/dL 2.03*   ANION GAP mmol/L 11   CALCIUM mg/dL 9.1   ALBUMIN g/dL 3.8   TOTAL BILIRUBIN mg/dL 0.97   ALK PHOS U/L 53   ALT U/L 102*   AST U/L 64*   GLUCOSE RANDOM mg/dL 114     Results from last 7 days   Lab Units 10/24/21  0550   INR  4.10*                       * I Have Reviewed All Lab Data Listed Above. * Additional Pertinent Lab Tests Reviewed: All Labs Within Last 24 Hours Reviewed    Imaging:    Imaging Reports Reviewed Today Include:  None    Recent Cultures (last 7 days):           Last 24 Hours Medication List:   Current Facility-Administered Medications   Medication Dose Route Frequency Provider Last Rate   • amiodarone  200 mg Oral Daily Dariela Farrell MD     • atorvastatin  40 mg Oral Daily With Hari Greer MD     • carvedilol  12.5 mg Oral BID With Meals Dariela Farrell MD     • furosemide  40 mg Intravenous BID (diuretic) Dariela Farrell MD          Today, Patient Was Seen By: Dariela Farrell MD    ** Please Note: Dictation voice to text software may have been used in the creation of this document.  **

## 2021-10-25 NOTE — PLAN OF CARE
Problem: Potential for Falls  Goal: Patient will remain free of falls  Description: INTERVENTIONS:  - Educate patient/family on patient safety including physical limitations  - Instruct patient to call for assistance with activity   - Consult OT/PT to assist with strengthening/mobility   - Keep Call bell within reach  - Keep bed low and locked with side rails adjusted as appropriate  - Keep care items and personal belongings within reach  - Initiate and maintain comfort rounds  - Make Fall Risk Sign visible to staff  - Offer Toileting every  Hours, in advance of need  - Initiate/Maintain alarm  - Obtain necessary fall risk management equipment:   - Apply yellow socks and bracelet for high fall risk patients  - Consider moving patient to room near nurses station  Outcome: Progressing     Problem: PAIN - ADULT  Goal: Verbalizes/displays adequate comfort level or baseline comfort level  Description: Interventions:  - Encourage patient to monitor pain and request assistance  - Assess pain using appropriate pain scale  - Administer analgesics based on type and severity of pain and evaluate response  - Implement non-pharmacological measures as appropriate and evaluate response  - Consider cultural and social influences on pain and pain management  - Notify physician/advanced practitioner if interventions unsuccessful or patient reports new pain  Outcome: Progressing     Problem: INFECTION - ADULT  Goal: Absence or prevention of progression during hospitalization  Description: INTERVENTIONS:  - Assess and monitor for signs and symptoms of infection  - Monitor lab/diagnostic results  - Monitor all insertion sites, i.e. indwelling lines, tubes, and drains  - Monitor endotracheal if appropriate and nasal secretions for changes in amount and color  - [place] appropriate cooling/warming therapies per order  - Administer medications as ordered  - Instruct and encourage patient and family to use good hand hygiene technique  - Identify and instruct in appropriate isolation precautions for identified infection/condition  Outcome: Progressing     Problem: SAFETY ADULT  Goal: Patient will remain free of falls  Description: INTERVENTIONS:  - Educate patient/family on patient safety including physical limitations  - Instruct patient to call for assistance with activity   - Consult OT/PT to assist with strengthening/mobility   - Keep Call bell within reach  - Keep bed low and locked with side rails adjusted as appropriate  - Keep care items and personal belongings within reach  - Initiate and maintain comfort rounds  - Make Fall Risk Sign visible to staff  - Offer Toileting every  Hours, in advance of need  - Initiate/Maintain alarm  - Obtain necessary fall risk management equipment:   - Apply yellow socks and bracelet for high fall risk patients  - Consider moving patient to room near nurses station  Outcome: Progressing  Goal: Maintain or return to baseline ADL function  Description: INTERVENTIONS:  -  Assess patient's ability to carry out ADLs; assess patient's baseline for ADL function and identify physical deficits which impact ability to perform ADLs (bathing, care of mouth/teeth, toileting, grooming, dressing, etc.)  - Assess/evaluate cause of self-care deficits   - Assess range of motion  - Assess patient's mobility; develop plan if impaired  - Assess patient's need for assistive devices and provide as appropriate  - Encourage maximum independence but intervene and supervise when necessary  - Involve family in performance of ADLs  - Assess for home care needs following discharge   - Consider OT consult to assist with ADL evaluation and planning for discharge  - Provide patient education as appropriate  Outcome: Progressing  Goal: Maintains/Returns to pre admission functional level  Description: INTERVENTIONS:  - Perform BMAT or MOVE assessment daily.   - Set and communicate daily mobility goal to care team and patient/family/caregiver. - Collaborate with rehabilitation services on mobility goals if consulted  - Perform Range of Motion  times a day. - Reposition patient every  hours. - Dangle patient  times a day  - Stand patient  times a day  - Ambulate patient  times a day  - Out of bed to chair  times a day   - Out of bed for meal times a day  - Out of bed for toileting  - Record patient progress and toleration of activity level   Outcome: Progressing     Problem: DISCHARGE PLANNING  Goal: Discharge to home or other facility with appropriate resources  Description: INTERVENTIONS:  - Identify barriers to discharge w/patient and caregiver  - Arrange for needed discharge resources and transportation as appropriate  - Identify discharge learning needs (meds, wound care, etc.)  - Arrange for interpretive services to assist at discharge as needed  - Refer to Case Management Department for coordinating discharge planning if the patient needs post-hospital services based on physician/advanced practitioner order or complex needs related to functional status, cognitive ability, or social support system  Outcome: Progressing     Problem: Knowledge Deficit  Goal: Patient/family/caregiver demonstrates understanding of disease process, treatment plan, medications, and discharge instructions  Description: Complete learning assessment and assess knowledge base.   Interventions:  - Provide teaching at level of understanding  - Provide teaching via preferred learning methods  Outcome: Progressing

## 2021-10-25 NOTE — ASSESSMENT & PLAN NOTE
Wt Readings from Last 3 Encounters:   10/24/21 62.6 kg (138 lb)   10/23/21 68 kg (150 lb)   05/14/21 69.3 kg (152 lb 12.8 oz)     · Known ischemic cardiomyopathy with EF of 25-30% per last echocardiogram  · Chest x-ray on admission with vascular congestion  · BNP on admission elevated at 2700  · Medtronic pacer interrogation showed fluid overload  · Was on Lasix 40 mg IV b.i.d. with plans to transition to PO today per cardiology- diuretic on hold since 10/24 due to worsening CHRISTY   · Continue Coreg with hold parameters  · Not on Ace or Arb due to CKD  · Repeat echo 10/24 shows severely reduced EF 12% compared to prior echo- appreciate cardiology input

## 2021-10-25 NOTE — PLAN OF CARE
Problem: PAIN - ADULT  Goal: Verbalizes/displays adequate comfort level or baseline comfort level  Description: Interventions:  - Encourage patient to monitor pain and request assistance  - Assess pain using appropriate pain scale  - Administer analgesics based on type and severity of pain and evaluate response  - Implement non-pharmacological measures as appropriate and evaluate response  - Consider cultural and social influences on pain and pain management  - Notify physician/advanced practitioner if interventions unsuccessful or patient reports new pain  Outcome: Progressing     Problem: INFECTION - ADULT  Goal: Absence or prevention of progression during hospitalization  Description: INTERVENTIONS:  - Assess and monitor for signs and symptoms of infection  - Monitor lab/diagnostic results  - Monitor all insertion sites, i.e. indwelling lines, tubes, and drains  - Monitor endotracheal if appropriate and nasal secretions for changes in amount and color  - [place] appropriate cooling/warming therapies per order  - Administer medications as ordered  - Instruct and encourage patient and family to use good hand hygiene technique  - Identify and instruct in appropriate isolation precautions for identified infection/condition  Outcome: Progressing     Problem: SAFETY ADULT  Goal: Maintain or return to baseline ADL function  Description: INTERVENTIONS:  - Educate patient/family on patient safety including physical limitations  - Instruct patient to call for assistance with activity   - Consult OT/PT to assist with strengthening/mobility   - Keep Call bell within reach  - Keep bed low and locked with side rails adjusted as appropriate  - Keep care items and personal belongings within reach  - Initiate and maintain comfort rounds  - Make Fall Risk Sign visible to staff  - Apply yellow socks and bracelet for high fall risk patients  - Consider moving patient to room near nurses station  Outcome: Progressing  Goal: Maintains/Returns to pre admission functional level  Description: INTERVENTIONS:  - Perform BMAT or MOVE assessment daily.   - Set and communicate daily mobility goal to care team and patient/family/caregiver. - Collaborate with rehabilitation services on mobility goals if consulted  - Out of bed for toileting  - Record patient progress and toleration of activity level   Outcome: Progressing     Problem: DISCHARGE PLANNING  Goal: Discharge to home or other facility with appropriate resources  Description: INTERVENTIONS:  - Identify barriers to discharge w/patient and caregiver  - Arrange for needed discharge resources and transportation as appropriate  - Identify discharge learning needs (meds, wound care, etc.)  - Arrange for interpretive services to assist at discharge as needed  - Refer to Case Management Department for coordinating discharge planning if the patient needs post-hospital services based on physician/advanced practitioner order or complex needs related to functional status, cognitive ability, or social support system  Outcome: Progressing     Problem: Knowledge Deficit  Goal: Patient/family/caregiver demonstrates understanding of disease process, treatment plan, medications, and discharge instructions  Description: Complete learning assessment and assess knowledge base.   Interventions:  - Provide teaching at level of understanding  - Provide teaching via preferred learning methods  Outcome: Progressing

## 2021-10-25 NOTE — ASSESSMENT & PLAN NOTE
· Status post ICD for cardiomyopathy and history of V-tach  · Status post Medtronic interrogation.   · Continue amiodarone  · Continue Coreg 12.5 mg b.i.d.  · Hold Aldactone while on high-dose Lasix  · Repeat echo 10/24 shows worsening EF 12%

## 2021-10-25 NOTE — ASSESSMENT & PLAN NOTE
· INR supratherapeutic with no evidence of bleeding  · Repeat INR in a.m. shows INR 3.54  · Continue to hold warfarin  · Check INR in AM

## 2021-10-25 NOTE — CONSULTS
Consultation - Nephrology   [facility] 80 y.o. male MRN: [id number]  Unit/Bed#: E4 -01 Encounter: [id number]    ASSESSMENT/PLAN:  Acute kidney injury (POA) on CKD IIIB:  Likely secondary to cardiorenal syndrome, initially presented with concerns for volume overload as well as Ace inhibition. Now suspect secondary to over diuresis. -presents with creatinine of 2.03.  -baseline creatinine 1.3-1.7 since 2019.  -may need higher baseline for euvolemia.  -UA:  Trace blood, 0-1 WBCs. -previously on IV diuretics which were placed on hold 10/24 due to worsening renal function.  -no clear indication for diuretics at this time.  -Ace inhibitor remains on hold. -will check bladder scan.  -no recent renal imaging.   -recommend avoiding nephrotoxins, hypotension, IV contrast.  -I/O. Hemodynamics:  Blood pressure low at times, overall stable and acceptable.  -home medications:  Carvedilol 12.5 mg 2 times per day, Aldactone 12.5 mg daily. -currently maintained on carvedilol 12.5 mg 2 times per day. -recommend avoiding hypotension or high fluctuations in blood pressure.  -recommend holding parameters antihypertensive for systolic blood pressure less than 130 mmHg. Acute on chronic CHF/ischemic cardiomyopathy:  Previous echo with EF of 25-30%, repeat echo with severely reduced EF of 12%. History of ppm.  -chest x-ray on admission shows moderate interstitial edema with trace effusions. -BNP elevated at 2700. -outpatient diuretic: aldactone 12.5 mg po daily. -previously on Lasix 40 mg IV b.i.d. With worsening creatinine. Diuretics on hold since 10/24.  -no current indication to resume diuretics today.  -cardiology team following. Recommending outpatient follow-up for cardiac resynchronization.  -continue to check daily weights, placed on fluid restriction, strict I/O. Hypokalemia:  Suspect secondary to IV diuretics.   -will check Mag level.  -will continue to monitor replace as needed, receiving oral supplements today. Other:  Atrial fibrillation on Coumadin. HISTORY OF PRESENT ILLNESS:  Requesting Physician: Corin Medina MD  Reason for Consult:  Acute kidney injury    Florinda Artis is a 80y.o. year old male with past medical history of CKD stage 3, hypertension, atrial fibrillation, CHF, who was admitted to [address] [place] [address] after presenting with complaints of shortness of breath times 1-2 weeks. He denies any chest discomfort. He denies nausea, vomiting, diarrhea. He reports drinking 3 alcoholic beverages per day. He denies any significant renal history. He denies the use of NSAIDs at home. He reports baseline weight around 146 lb but recently having some weight loss. He reports attempted weight loss. He is on Aldactone at home. He reports compliance with medications. He reports having some right ankle pain due to previously sprained ankle. He denies issues with urination. A renal consultation is requested today for assistance in the management of CHRISTY.     PAST MEDICAL HISTORY:  Past Medical History:   Diagnosis Date   • Anemia    • Arthritis    • Coronary artery disease    • CVA (cerebral vascular accident) ([address] [facility])    • Hyperlipidemia    • Hypertension        PAST SURGICAL HISTORY:  Past Surgical History:   Procedure Laterality Date   • CARDIAC PACEMAKER PLACEMENT  05/09/2011    Medtronic dual chamber ICD implant   • CORONARY ANGIOPLASTY  1989       ALLERGIES:  No Known Allergies    SOCIAL HISTORY:  Social History     Substance and Sexual Activity   Alcohol Use Yes   • Alcohol/week: 3.0 standard drinks   • Types: 3 Shots of liquor per week    Comment: daily     Social History     Substance and Sexual Activity   Drug Use Never     Social History     Tobacco Use   Smoking Status Never Smoker   Smokeless Tobacco Never Used       FAMILY HISTORY:  Family History   Problem Relation Age of Onset   • Coronary artery disease Mother    • Heart attack Father         MI   • Stroke Sister MEDICATIONS:    Current Facility-Administered Medications:   •  amiodarone tablet 200 mg, 200 mg, Oral, Daily, Demarco Ledesma MD, 200 mg at 10/24/21 6799  •  atorvastatin (LIPITOR) tablet 40 mg, 40 mg, Oral, Daily With Alejo Urban MD, 40 mg at 10/24/21 1701  •  carvedilol (COREG) tablet 12.5 mg, 12.5 mg, Oral, BID With Meals, Demarco Ledesma MD, 12.5 mg at 10/24/21 1701  •  potassium chloride (K-DUR,KLOR-CON) CR tablet 20 mEq, 20 mEq, Oral, Once, Bharti Escobar PA-C    REVIEW OF SYSTEMS:  A complete review of systems was performed and found to be negative unless otherwise noted in the history of present illness. General: No fevers, chills. Cardiovascular:  - chest pain, - leg edema. Respiratory: No cough, sputum production,  - shortness of breath. Gastrointestinal:  - nausea/vomiting,  - diarrhea,  - abdominal pain. Genitourinary: No hematuria. No foamy urine.   No dysuria    PHYSICAL EXAM:  Current Weight: Weight - Scale: 62.6 kg (138 lb)  First Weight: Weight - Scale: 70.7 kg (155 lb 13.8 oz)  Vitals:    10/24/21 1553 10/24/21 1919 10/24/21 2300 10/25/21 0835   BP: 117/72 99/65 122/56 132/78   BP Location: Right arm Right arm  Right arm   Pulse: 58 58 56 74   Resp: 18 18 19 18   Temp: (!) 97 °F (36.1 °C) 97.6 °F (36.4 °C) (!) 97.1 °F (36.2 °C) 97.8 °F (36.6 °C)   TempSrc: Temporal Temporal Temporal Temporal   SpO2: 98% 92% 96% 98%   Weight:       Height:           Intake/Output Summary (Last 24 hours) at 10/25/2021 9784  Last data filed at 10/25/2021 0501  Gross per 24 hour   Intake --   Output 1275 ml   Net -1275 ml     General: NAD  Skin: warm, dry, intact, no rash  HEENT: Moist mucous membranes, sclera anicteric, normocephalic, atraumatic  Neck: No apparent JVD appreciated  Chest:lung sounds clear B/L, on RA   CVS:Regular rate and rhythm, no murmer   Abdomen: Soft, round, non-tender, +BS  Extremities: No B/L LE edema present  Neuro: alert and oriented  Psych: appropriate mood and affect Invasive Devices:      Lab Results:   Results from last 7 days   Lab Units 10/25/21  0458 10/24/21  1244 10/24/21  0550 10/23/21  0931   WBC Thousand/uL  --   --  4.84 6.08   HEMOGLOBIN g/dL  --   --  12.6 13.0   HEMATOCRIT %  --   --  38.3 39.6   PLATELETS Thousands/uL  --   --  128* 163   SODIUM mmol/L 137 139  --  135*   POTASSIUM mmol/L 3.3* 3.8  --  4.3   CHLORIDE mmol/L 99* 101  --  101   CO2 mmol/L 26 28  --  23   BUN mg/dL 41* 35*  --  28*   CREATININE mg/dL 2.12* 2.09*  --  2.03*   CALCIUM mg/dL 9.0 8.8  --  9.1   ALK PHOS U/L  --   --   --  53   ALT U/L  --   --   --  102*   AST U/L  --   --   --  64*

## 2021-10-25 NOTE — PROGRESS NOTES
[facility]  Progress Note Donna Rivera 10/31/1932, 80 y.o. male MRN: [id number]  Unit/Bed#: E4 -01 Encounter: [id number]  Primary Care Provider: An Morin DO   Date and time admitted to hospital: 10/23/2021  9:06 AM    * Acute on chronic combined systolic and diastolic congestive heart failure ([address])  Assessment & Plan  Wt Readings from Last 3 Encounters:   10/24/21 62.6 kg (138 lb)   10/23/21 68 kg (150 lb)   05/14/21 69.3 kg (152 lb 12.8 oz)     · Known ischemic cardiomyopathy with EF of 25-30% per last echocardiogram  · Chest x-ray on admission with vascular congestion  · BNP on admission elevated at 2700  · Medtronic pacer interrogation showed fluid overload  · Was on Lasix 40 mg IV b.i.d. with plans to transition to PO today per cardiology- diuretic on hold since 10/24 due to worsening CHRISTY   · Continue Coreg with hold parameters  · Not on Ace or Arb due to CKD  · Repeat echo 10/24 shows severely reduced EF 12% compared to prior echo- appreciate cardiology input         Acute kidney injury superimposed on chronic kidney disease [facility])  Assessment & Plan  Lab Results   Component Value Date    EGFR 27 10/25/2021    EGFR 27 10/24/2021    EGFR 28 10/23/2021    CREATININE 2.12 (H) 10/25/2021    CREATININE 2.09 (H) 10/24/2021    CREATININE 2.03 (H) 10/23/2021     · Secondary to cardiorenal syndrome  · Baseline Cr 1.3-1.7   · Creatinine 2.12 today   · May need to tolerate higher creatinine to achieve euvolemia   · Was on Lasix 40 mg IV b.i.d. which is currently held due to worsening CHRISTY   · Hold lisinopril   · Consult nephrology appreciate further input     Ischemic cardiomyopathy  Assessment & Plan  · Status post ICD for cardiomyopathy and history of V-tach  · Status post Medtronic interrogation.   · Continue amiodarone  · Continue Coreg 12.5 mg b.i.d.  · Hold Aldactone while on high-dose Lasix  · Repeat echo 10/24 shows worsening EF 12%    PAF (paroxysmal atrial fibrillation) (HCC)  Assessment & Plan  · INR supratherapeutic with no evidence of bleeding  · Repeat INR in a.m. shows INR 3.54  · Continue to hold warfarin  · Check INR in AM         VTE Pharmacologic Prophylaxis: VTE Score: 5 Coumadin on hold due to supratherapeutic INR     Patient Centered Rounds: I performed bedside rounds with nursing staff today. Discussions with Specialists or Other Care Team Provider: CM, RN, cardiology, nephrology     Education and Discussions with Family / Patient: Updated  (wife) via phone. Time Spent for Care: 30 minutes. More than 50% of total time spent on counseling and coordination of care as described above. Current Length of Stay: 2 day(s)  Current Patient Status: Inpatient   Certification Statement: The patient will continue to require additional inpatient hospital stay due to worsening CHRISTY, diuretic on hold, reduced EF 12%  Discharge Plan: Anticipate discharge tomorrow to pending PT/OT     Code Status: Level 1 - Full Code    Subjective:   Patient was resting in bed, denies any chest pain. No leg swelling. No shortness of breath. No overnight events. Objective:     Vitals:   Temp (24hrs), Av.4 °F (36.3 °C), Min:97 °F (36.1 °C), Max:97.8 °F (36.6 °C)    Temp:  [97 °F (36.1 °C)-97.8 °F (36.6 °C)] 97.8 °F (36.6 °C)  HR:  [56-79] 74  Resp:  [18-19] 18  BP: ()/(56-78) 132/78  SpO2:  [92 %-98 %] 98 %  Body mass index is 24.45 kg/m². Input and Output Summary (last 24 hours): Intake/Output Summary (Last 24 hours) at 10/25/2021 0842  Last data filed at 10/25/2021 0501  Gross per 24 hour   Intake --   Output 1275 ml   Net -1275 ml       Physical Exam:   Physical Exam  Constitutional:       General: He is not in acute distress. HENT:      Mouth/Throat:      Mouth: Mucous membranes are moist.   Eyes:      General: No scleral icterus. Cardiovascular:      Rate and Rhythm: Normal rate and regular rhythm. Heart sounds: Normal heart sounds. Pulmonary:      Effort: Pulmonary effort is normal. No respiratory distress. Breath sounds: No wheezing, rhonchi or rales. Abdominal:      General: Abdomen is flat. Bowel sounds are normal.      Palpations: Abdomen is soft. Tenderness: There is no abdominal tenderness. Musculoskeletal:      Right lower leg: No edema. Left lower leg: No edema. Skin:     General: Skin is warm. Neurological:      General: No focal deficit present. Mental Status: He is alert. Psychiatric:         Mood and Affect: Mood normal.          Additional Data:     Labs:  Results from last 7 days   Lab Units 10/24/21  0550   WBC Thousand/uL 4.84   HEMOGLOBIN g/dL 12.6   HEMATOCRIT % 38.3   PLATELETS Thousands/uL 128*   NEUTROS PCT % 61   LYMPHS PCT % 25   MONOS PCT % 11   EOS PCT % 2     Results from last 7 days   Lab Units 10/25/21  0458 10/23/21  0931   SODIUM mmol/L 137 135*   POTASSIUM mmol/L 3.3* 4.3   CHLORIDE mmol/L 99* 101   CO2 mmol/L 26 23   BUN mg/dL 41* 28*   CREATININE mg/dL 2.12* 2.03*   ANION GAP mmol/L 12 11   CALCIUM mg/dL 9.0 9.1   ALBUMIN g/dL  --  3.8   TOTAL BILIRUBIN mg/dL  --  0.97   ALK PHOS U/L  --  53   ALT U/L  --  102*   AST U/L  --  64*   GLUCOSE RANDOM mg/dL 101 114     Results from last 7 days   Lab Units 10/25/21  0458   INR  3.54*                   Lines/Drains:  Invasive Devices     Peripheral Intravenous Line            Peripheral IV 10/23/21 Right Forearm 1 day                  Telemetry:  Telemetry Orders (From admission, onward)             48 Hour Telemetry Monitoring  Continuous x 48 hours     Expiring   Question:  Reason for 48 Hour Telemetry  Answer:  Arrhythmias Requiring Medical Therapy (eg. SVT, Vtach/fib, Bradycardia, Uncontrolled A-fib)                 Telemetry Reviewed: wide QRS, NSR  Indication for Continued Telemetry Use: Acute CHF on >200 mg lasix/day or equivalent dose or with new reduced EF.               Imaging: Reviewed radiology reports from this admission including: ECHO    Recent Cultures (last 7 days):         Last 24 Hours Medication List:   Current Facility-Administered Medications   Medication Dose Route Frequency Provider Last Rate   • amiodarone  200 mg Oral Daily Rachel Willis MD     • atorvastatin  40 mg Oral Daily With Sandhya Blocker, MD     • carvedilol  12.5 mg Oral BID With Meals Rachel Willis MD     • potassium chloride  20 mEq Oral Once Mehdi Woodson PA-C          Today, Patient Was Seen By: Mehdi Woodson PA-C    **Please Note: This note may have been constructed using a voice recognition system. **

## 2021-10-25 NOTE — ASSESSMENT & PLAN NOTE
Lab Results   Component Value Date    EGFR 27 10/25/2021    EGFR 27 10/24/2021    EGFR 28 10/23/2021    CREATININE 2.12 (H) 10/25/2021    CREATININE 2.09 (H) 10/24/2021    CREATININE 2.03 (H) 10/23/2021     · Secondary to cardiorenal syndrome  · Baseline Cr 1.3-1.7   · Creatinine 2.12 today   · May need to tolerate higher creatinine to achieve euvolemia   · Was on Lasix 40 mg IV b.i.d. which is currently held due to worsening CHRISTY   · Hold lisinopril   · Consult nephrology appreciate further input

## 2021-10-25 NOTE — PROGRESS NOTES
Cardiology         Progress Note - Cardiology   Chari Aguilar 80 y.o. male MRN: [id number]  Unit/Bed#: E4 -01 Encounter: [id number]          Assessment/Recommendations/Discussion:     1. Acute on chronic systolic and diastolic CHF  2. CAD, prior PTCA in 1989  3. Ischemic cardiomyopathy, prior left ventricle ejection fraction 25-30% 2018, now 12%--suspect secondary dyssynchrony from new left bundle branch block  4. New left bundle branch block this admission compared to prior ECG of 2020  5. Medtronic Dual-chamber AICD in-situ  6. Paroxysmal atrial fibrillation  7. CKD  8. History of CVA      · Suspect significant decline in patient's left ventricular systolic function secondary to new left bundle branch block noted on admission with significantly wide QRS interval.  Recommend outpatient electrophysiology evaluation for cardiac resynchronization. Patient without symptoms of angina. Would not pursue invasive ischemic evaluation in light of CHRISTY/CKD. · Diuretics on hold. Repeat renal function tomorrow again, and restart oral diuretic when renal function optimized.   Consider low-dose Entresto and follow renal function cautiously        Subjective:  Patient seen and examined, feels well, denies chest pain, shortness of breath          Physical Exam:  GEN:  NAD  HEENT:  MMM, NCAT, pink conjunctiva, EOMI, nonicteric sclera  CV:  NO JVD/HJR, RR, NO M/R/G, +S1/S2, NO PARASTERNAL HEAVE/THRILL, NO LE EDEMA, NO HEPATIC SYSTOLIC PULSATION, WARM EXTREMITIES  RESP:  CTAB/L  ABD:  SOFT, NT, NO GROSS ORGANOMEGALY        Vitals:   /78 (BP Location: Right arm)   Pulse 74   Temp 97.8 °F (36.6 °C) (Temporal)   Resp 18   Ht 5' 3" (1.6 m)   Wt 62.6 kg (138 lb)   SpO2 98%   BMI 24.45 kg/m²   Vitals:    10/24/21 0549 10/24/21 1149   Weight: 63 kg (138 lb 12.8 oz) 62.6 kg (138 lb)       Intake/Output Summary (Last 24 hours) at 10/25/2021 1021  Last data filed at 10/25/2021 0501  Gross per 24 hour   Intake -- Output 1275 ml   Net -1275 ml       TELEMETRY: AF  Lab Results:  Results from last 7 days   Lab Units 10/24/21  0550   WBC Thousand/uL 4.84   HEMOGLOBIN g/dL 12.6   HEMATOCRIT % 38.3   PLATELETS Thousands/uL 128*     Results from last 7 days   Lab Units 10/25/21  0458 10/23/21  0931   POTASSIUM mmol/L 3.3* 4.3   CHLORIDE mmol/L 99* 101   CO2 mmol/L 26 23   BUN mg/dL 41* 28*   CREATININE mg/dL 2.12* 2.03*   CALCIUM mg/dL 9.0 9.1   ALK PHOS U/L  --  53   ALT U/L  --  102*   AST U/L  --  64*     Results from last 7 days   Lab Units 10/25/21  0458   POTASSIUM mmol/L 3.3*   CHLORIDE mmol/L 99*   CO2 mmol/L 26   BUN mg/dL 41*   CREATININE mg/dL 2.12*   CALCIUM mg/dL 9.0           Medications:    Current Facility-Administered Medications:   •  amiodarone tablet 200 mg, 200 mg, Oral, Daily, Sowmya Pillai MD, 200 mg at 10/25/21 1002  •  atorvastatin (LIPITOR) tablet 40 mg, 40 mg, Oral, Daily With Nati King MD, 40 mg at 10/24/21 1701  •  carvedilol (COREG) tablet 12.5 mg, 12.5 mg, Oral, BID With Meals, Sowmya Pillai MD, 12.5 mg at 10/25/21 1002  •  docusate sodium (COLACE) capsule 100 mg, 100 mg, Oral, BID, Tacho Sykes PA-C    This note was completed in part utilizing [employer] Direct Software. Grammatical errors, random word insertions, spelling mistakes, and incomplete sentences may be an occasional consequence of this system secondary to software limitations, ambient noise, and hardware issues. If you have any questions or concerns about the content, text, or information contained within the body of this dictation, please contact the provider for clarification.

## 2021-10-26 NOTE — ASSESSMENT & PLAN NOTE
· Status post ICD for cardiomyopathy and history of V-tach  · Status post Medtronic interrogation. · Continue amiodarone  · Continue Coreg 12.5 mg b.i.d.   · Repeat echo 10/24 shows worsening EF 12%

## 2021-10-26 NOTE — CASE MANAGEMENT
Case Management Assessment & Discharge Planning Note    Patient name Korin Guerrero  Location [facility] 4 [address] [facility] [place] 431/E4 [address] [facility] MRN [id number]  : 10/31/1932 Date 10/26/2021       Current Admission Date: 10/23/2021  Current Admission Diagnosis:Acute on chronic combined systolic and diastolic congestive heart failure [facility])   Patient Active Problem List    Diagnosis Date Noted   • Acute on chronic combined systolic and diastolic congestive heart failure ([address]) 10/23/2021   • Ischemic cardiomyopathy 10/09/2018   • PAF (paroxysmal atrial fibrillation) ([address]) 10/09/2018   • Essential hypertension 10/09/2018   • Cardiac defibrillator in situ 2016   • Acute kidney injury superimposed on chronic kidney disease ([address])    • Embolic stroke ([address])    • Hyperlipidemia 2016   • Old myocardial infarction 2016   • Status post percutaneous transluminal coronary angioplasty 2016   • Ventricular tachycardia ([address]) 2016   • Cardiomyopathy ([address]) 2016      LOS (days): 3  Geometric Mean LOS (GMLOS) (days): 3.80  Days to GMLOS:0.7     OBJECTIVE:    Risk of Unplanned Readmission Score: 13     Current admission status: Inpatient  Referral Reason:  (Discharge Planning)    Preferred Pharmacy:   [address] [place] [address]  Phone: [phone] Fax: [phone] [address] - [place] [address]  Phone: [phone] Fax: [phone]    Primary Care Provider: Kay Terrazas DO    Primary Insurance: [facility] [employer] [employer]  Secondary Insurance:     ASSESSMENT:  Active Health Care Agents    There are no active Health Care Agents on file.        Advance Directives  Does patient have a Health Care POA?: Yes    Readmission Root Cause  30 Day Readmission: No    Patient Information  Admitted from[de-identified] Home  Mental Status: Alert  During Assessment patient was accompanied by: Not accompanied during assessment  Assessment information provided by[de-identified] Patient  Primary Caregiver: Self  Support Systems: Spouse/significant other, [address] of Residence: [address] [facility] do you live in?: [address] entry access options. Select all that apply.: Elevator  Type of Current Residence: Apartment  Floor Level: 2  Upon entering residence, is there a bedroom on the main floor (no further steps)?: Yes  Upon entering residence, is there a bathroom on the main floor (no further steps)?: Yes  Living Arrangements: Lives w/ Spouse/significant other    Activities of Daily Living Prior to Admission  Functional Status: Independent  Completes ADLs independently?: Yes  Ambulates independently?: Yes  Does patient use assisted devices?: No  Does patient currently own DME?: No  Does patient have a history of Outpatient Therapy (PT/OT)?: No  Does the patient have a history of Short-Term Rehab?: No  Does patient have a history of HHC?: No  Does patient currently have [address] [facility]?: No      Patient Information Continued  Income Source: Pension/longterm  Does patient have prescription coverage?: Yes  Does patient receive dialysis treatments?: No  Does patient have a history of substance abuse?: No  Does patient have a history of Mental Health Diagnosis?: No    Means of Transportation  Means of Transport to Appts[de-identified] Drives Self  In the past 12 months, has lack of transportation kept you from medical appointments or from getting medications?: No  In the past 12 months, has lack of transportation kept you from meetings, work, or from getting things needed for daily living?: No  Was application for public transport provided?: N/A        DISCHARGE DETAILS:    Discharge planning discussed with[de-identified] Patient  Freedom of Choice: Yes  Comments - Freedom of Choice: Pts goal is to return home without services.   CM called family/caregiver?: No- see comments (N/A Pt is alert & oriented-Pt is able to make his own decisions.)  Were Treatment Team discharge recommendations reviewed with patient/caregiver?: No    Requested [address]         Is the patient interested in [address] at discharge?: No    DME Referral Provided  Referral made for DME?: No    Treatment Team Recommendation: Home  Discharge Destination Plan[de-identified] Home  Transport at Discharge : Automobile, Self, Family     Transfer Mode: Self  Accompanied by: Family member

## 2021-10-26 NOTE — PHYSICAL THERAPY NOTE
PT EVALUATION    Pt. Name: Boy Roth  Pt. Age: 80 y.o. MRN: [id number]  LENGTH OF STAY: 3      Admitting Diagnoses:   CHF (congestive heart failure) ([facility]) [I50.9]  Ventricular tachycardia (HCC) [I47.2]  SOB (shortness of breath) [R06.02]  CHRISTY (acute kidney injury) ([address]) [N17.9]  Cardiac defibrillator in situ [Z95.810]  Paced rhythm on cardiac monitor [Z78.9]    Past Medical History:   Diagnosis Date   • Anemia    • Arthritis    • Coronary artery disease    • CVA (cerebral vascular accident) ([address])    • Hyperlipidemia    • Hypertension        Past Surgical History:   Procedure Laterality Date   • CARDIAC PACEMAKER PLACEMENT  05/09/2011    Medtronic dual chamber ICD implant   • CORONARY ANGIOPLASTY  1989       Imaging Studies:  US kidney and bladder   Final Result by Kwabena Thomas MD (10/26 6413)      No evidence of obstruction. Workstation performed: [id number]         VAS lower limb venous duplex study, unilateral/limited   Final Result by Hernandez Rodríguez MD (10/23 3992)      XR chest 1 view portable   ED Interpretation by Nancy Bran PA-C (10/23 6570)   Mild vascular congestion, pacemaker       Final Result by Rosa Hutson MD (10/23 6407)      Moderate interstitial edema with trace effusions.                         Workstation performed: [id number]              10/26/21 3019   PT Last Visit   PT Visit Date 10/26/21   Note Type   Note type Evaluation   Pain Assessment   Pain Score No Pain   Home Living   Type of Home Apartment  (2nd flr)   Home Layout One level;Elevator  (0STE)   Bathroom Shower/Tub Walk-in shower   Bathroom Toilet Standard   Bathroom Equipment Other (Comment)  (no DME)   Home Equipment Walker;Cane  (not using at baseline)   Prior Function   Level of [place] Independent with ADLs and functional mobility  (w/o AD)   Lives With Spouse   Receives Help From Family   ADL Assistance Independent   Falls in the last 6 months 0   Vocational Retired   Comments (+) drives   Restrictions/Precautions   Weight Bearing Precautions Per Order No   Other Precautions Chair Alarm; Bed Alarm; Fall Risk   Cognition   Arousal/Participation Alert   Attention Attends with cues to redirect   Orientation Level Oriented to person;Oriented to place;Oriented to time  (oriented to general time only, not specific date)   Following Commands Follows one step commands without difficulty   Comments cooperative; flat affect; somewhat irritated to perform therapy evaluations   RUE Assessment   RUE Assessment   (refer to OT)   LUE Assessment   LUE Assessment   (refer to OT)   RLE Assessment   RLE Assessment WFL  (4/5 grossly)   LLE Assessment   LLE Assessment WFL  (4/5 grossly)   Bed Mobility   Supine to Sit 6  Modified independent   Additional items HOB elevated; Increased time required   Sit to Supine 6  Modified independent   Additional items HOB elevated; Increased time required   Additional Comments pt demonstrates no difficulty w/ bed mobility. Transfers   Sit to Stand 5  Supervision  (close supervision for safety)   Additional items Increased time required;Verbal cues   Stand to Sit 5  Supervision  (close supervision for safety)   Additional items Increased time required;Verbal cues   Additional Comments Pt require cues for safety   Ambulation/Elevation   Gait pattern Forward Flexion; Inconsistent ru   Gait Assistance 5  Supervision  (close supervision)   Assistive Device None   Distance 40'x1   Balance   Static Sitting Good   Dynamic Sitting Fair +   Static Standing Fair   Dynamic Standing Fair -   Ambulatory Fair -   Activity Tolerance   Activity Tolerance Patient limited by fatigue;Treatment limited secondary to medical complications (Comment)   Medical Staff Made Aware OT Lashon Fan   Nurse Made Aware NATIVIDAD Phillips   Assessment   Prognosis Fair   Problem List Decreased strength;Decreased endurance; Impaired balance;Decreased mobility; Impaired judgement;Decreased safety awareness   Assessment Pt.  88 y.o.male presented w/ inermittent SOB for the past 2 weeks. Pt admitted for Acute on chronic combined systolic and diastolic congestive heart failure (HCC) w/ CHRISTY & PAF. Pt referred to PT for mobility assessment & D/C planning w/ orders of up as tolerated. PTA, pt reports being I w/o AD. On eval, pt demonstrate BLE weakness, dec mobility, balance, endurance & amb. Pt require modified I w/ bed mobility & close S for transfers & amb w/o AD + cues for techniques & safety. Gait deviations as above but no gross LOB noted. Dec amb tolerance 2* to fatigue. Pt impulsive at times but can be redirected. No SOB & dizziness reported t/o session. Nsg staff most recent vital signs as follows: /93 (BP Location: Right arm)   Pulse 88   Temp (!) 97 °F (36.1 °C) (Temporal)   Resp 18   Ht 5' 3" (1.6 m)   Wt 63.1 kg (139 lb 1.8 oz)   SpO2 98%   BMI 24.64 kg/m² . At end of session, pt back in bed in stable condition, call bell & phone in reach, bed alarm activated. Fall precautions reinforced w/ good understanding. Pt functioning below baseline hence will continue skilled PT to improve function & safety. The patient's AM-PAC Basic Mobility Inpatient Short Form Raw Score is 20, Standardized Score is 43.99. A standardized score greater than 42.9 suggests the patient may benefit from discharge to home. From PT standpoint,  will anticipate good progress in PT for safe D/C to home w/ family support. HHPT PRN. CM to follow. Nsg staff to continue to mobilized pt (OOB in chair for all meals & ambulate in room/unit) as tolerated to prevent further decline in function. Nsg notified. Co-eval was necessary to complete this PT eval for the pt's best interest given pt's medical acuity & complexity.     Goals   Patient Goals to go home   STG Expiration Date 11/02/21   Short Term Goal #1 Goals to be met in 7 days; pt will be able to: 1) inc strength & balance by 1/2 grade to improve overall functional mobility & dec fall risk; 2) inc bed mobility to I for pt to be able to get in/OOB safely w/ proper techniques 100% of the time, to dec caregiver burden & safely function at home; 3) inc transfers to modified I for pt to transition safely from one surface to another w/o % of the time, to dec caregiver burden & safely function at home; 4) inc amb w/o AD approx. >80' w/ I for pt to ambulate household distances w/o any % of the time, to dec caregiver burden & safely function at home; 5) pt/caregiver ed   PT Treatment Day 0   Plan   Treatment/Interventions Functional transfer training;LE strengthening/ROM; Therapeutic exercise; Endurance training;Patient/family training;Bed mobility;Gait training;Spoke to nursing;OT   PT Frequency 2-3x/wk   Recommendation   PT Discharge Recommendation No rehabilitation needs  (home w/ family support; HHPT PRN)   Equipment Recommended Other (Comment)  (none)   PT - OK to Discharge Yes  (when medically cleared)   AM-PAC Basic Mobility Inpatient   Turning in Bed Without Bedrails 4   Lying on Back to Sitting on Edge of Flat Bed 4   Moving Bed to Chair 3   Standing Up From Chair 3   Walk in Room 3   Climb 3-5 Stairs 3   Basic Mobility Inpatient Raw Score 20   Basic Mobility Standardized Score 43.99   Hx/personal factors: co-morbidities, dec caregiver support, advanced age, dec cognition, fall risk and assist w/ ADL's  Examination: BLE weakness, dec mobility, dec balance, dec endurance, dec amb, risk for falls, dec cognition  Clinical: unpredictable (ongoing medical status, abnormal lab values and risk for falls)  Complexity: high     Hayder Sos, PT

## 2021-10-26 NOTE — OCCUPATIONAL THERAPY NOTE
Occupational Therapy Evaluation     Patient Name: Boy oRth  PLQMG'E Date: 10/26/2021  Problem List  Principal Problem:    Acute on chronic combined systolic and diastolic congestive heart failure ([address])  Active Problems:    Acute kidney injury superimposed on chronic kidney disease (HCC)    Ischemic cardiomyopathy    PAF (paroxysmal atrial fibrillation) ([address])    Past Medical History  Past Medical History:   Diagnosis Date   • Anemia    • Arthritis    • Coronary artery disease    • CVA (cerebral vascular accident) ([address])    • Hyperlipidemia    • Hypertension      Past Surgical History  Past Surgical History:   Procedure Laterality Date   • CARDIAC PACEMAKER PLACEMENT  05/09/2011    Medtronic dual chamber ICD implant   • CORONARY ANGIOPLASTY  1989             10/26/21 0925   OT Last Visit   OT Visit Date 10/26/21   Note Type   Note type Evaluation   Restrictions/Precautions   Weight Bearing Precautions Per Order No   Other Precautions Chair Alarm; Bed Alarm; Fall Risk;Telemetry;Multiple lines;Cognitive   Pain Assessment   Pain Assessment Tool 0-10   Pain Score No Pain   Home Living   Type of Home Apartment  (lives on 2nd floor )   Home Layout One level;Performs ADLs on one level;Elevator  (0 GERONIMO)   Bathroom Shower/Tub Walk-in shower   Bathroom Toilet Standard   Bathroom Equipment   (no DME)   Bathroom Accessibility Accessible   Home Equipment Walker;Cane  (at baseline, pt not using AD, however in pt's possession)   Additional Comments Pt lives with his wife in an apartment on the second floor w/ elevator, and 0 GERONIMO.    Prior Function   Level of [place] Independent with ADLs and functional mobility   Lives With Spouse   Receives Help From   (wife, no local friends or family per pt report)   ADL Assistance Independent   IADLs   (light IADLs as wife does most, pt enjoys cooking. )   Falls in the last 6 months 0   Vocational Retired  (Accounting)   Comments At baseline, pt I w/ ADLs, light IADLs (wife completes most), and functional mobility w/o use of AD. Pt's wife is retired and home w/ pt to assist if needed   Lifestyle   Autonomy At baseline, pt I w/ ADLs, light IADLs (wife completes most), and functional mobility w/o use of AD. Pt is (+) . Wife is retired and home with pt to assist if needed. Reciprocal Relationships wife   Service to Others Retired- accounting   Intrinsic Gratification Watch [employer], read newspaper, and [employer]    Psychosocial   Psychosocial ([address]) [address] [facility] 7  Independent   Grooming Assistance 5  [address] [facility] 5  [address] [facility] 5  [address]  [address]  [address] 5  Supervision/Setup   Additional Comments Pt requiring cues for safety throughout. Bed Mobility   Rolling L 6  Modified independent   Additional items HOB elevated; Bedrails; Increased time required   Supine to Sit 6  Modified independent   Additional items HOB elevated; Bedrails; Increased time required   Sit to Supine 6  Modified independent   Additional items Bedrails; Increased time required   Additional Comments At the end of session, pt supine in bed per pt request, w/ alarm enabled, call bell, and tray within reach. All needs met and no further questions or concerns for OT. Transfers   Sit to Stand 5  Supervision   Additional items Increased time required;Verbal cues   Stand to Sit 5  Supervision   Additional items Increased time required;Verbal cues   Additional Comments Pt requiring verbal cues for safety techniques and hand placement. Functional Mobility   Functional Mobility 5  Supervision   Additional Comments Pt requiring S for safety techniques and pacing. Pt was quickly walking w/ UES out to the side and pt had a slight LOB 2* poor safety insight.  Pt was able to self-recover and discontinued that behavior. Additional items   (No AD)   Balance   Static Sitting Fair +   Dynamic Sitting Fair +   Static Standing Fair   Dynamic Standing Fair   Ambulatory Fair -   Activity Tolerance   Activity Tolerance Patient tolerated treatment well   Medical Staff Made Aware SAILAJA Saavedra   Nurse Made Aware Geovanny Nina RN   RUE Assessment   RUE Assessment WFL   RUE Strength   RUE Overall Strength Within Functional Limits - able to perform ADL tasks with strength  (4/5 overall)   LUE Assessment   LUE Assessment WFL   LUE Strength   LUE Overall Strength Within Functional Limits - able to perform ADL tasks with strength  (4/5 overall )   Hand Function   Gross Motor Coordination Functional   Fine Motor Coordination Functional   Sensation   Light Touch No apparent deficits   Proprioception   Proprioception No apparent deficits   Vision-Basic Assessment   Current Vision Wears glasses only for reading   Vision - Complex Assessment   Acuity Able to read clock/calendar on wall without difficulty; Able to read employee name badge without difficulty   Perception   Inattention/Neglect Appears intact   Cognition   Overall Cognitive Status Impaired   Arousal/Participation Cooperative; Alert   Attention Attends with cues to redirect   Orientation Level Oriented to person;Oriented to place;Oriented to time  (general to date (oct 23, 2021))   Memory Decreased recall of precautions   Following Commands Follows one step commands with increased time or repetition   Comments Pt cooperative throughout session. Pt requiring cues for safety throughout. Pt appears to have decreased safety judgement. Assessment   Limitation Decreased UE strength;Decreased Safe judgement during ADL;Decreased cognition;Decreased ADL status; Decreased endurance;Decreased high-level ADLs; Decreased self-care trans   Prognosis Fair   Assessment Pt is a 80 y.o. Male that presented to the [facility] ED with intermittent SOB for 2 weeks. Pt was admitted on 10/23/21 with Acute on chronic combined systolic and diastolic CHF and CHRISTY. Comorbidities that have been affecting functional performance include: anemia, arthritis, CAD, hx of CVA, HLD, and HTN. OT was consulted for initial evaluation with active orders for Up as tolerated. Pt lives with his wife in an apartment on the second floor w/ elevator, and 0 GERONIMO. At baseline, pt was I with ADLs, light IADLs (wife completes most), and I with functional mobility without use of AD. Pt's wife is also retired and home throughout the day and able to assist as needed. Pt's name, , and wristband were confirmed at the start of OT session. Upon evaluation, pt required S level for UBD ADLs, S level for LB ADLs, S level for toileting, and S level for grooming. Pt requires S for functional transfers and S for functional mobility w/o use of AD. The patient's raw score on the -PAC Daily Activity inpatient short form is 19, standardized score is 40.22, greater than 39.4. Patients at this level are likely to benefit from discharge to home. Please refer to the recommendation of the Occupational Therapist for safe discharge planning. Pt's occupational performance is affected by the following deficits:impaired balance, decreased endurance and decreased safety awareness. These impairments and personal factors of: poor insight into deficits limit the pt's ability to safely engage in baseline areas of occupation. Pt to benefit from continued OT services during stay 2-3x/week. Pt states that his wife is home and able to assist if needed. OT recommendations are for pt to d/c w/ increased social support. Goals   Patient Goals To have a BM prior to d/c   LTG Time Frame 10-14   Long Term Goal Please refer to goals listed below    Plan   Treatment Interventions ADL retraining;UE strengthening/ROM; Endurance training; Activityengagement; Compensatory technique education;Continued evaluation;Patient/family training;Equipment evaluation/education   Goal Expiration Date 11/09/21   OT Frequency 2-3x/wk   Recommendation   OT Discharge Recommendation No rehabilitation needs   OT - OK to Discharge Yes  (when medically cleared)   AM-PAC Daily Activity Inpatient   Lower Body Dressing 3   Bathing 3   Toileting 3   Upper Body Dressing 3   Grooming 3   Eating 4   Daily Activity Raw Score 19   Daily Activity Standardized Score (Calc for Raw Score >=11) 40.22   AM-PAC Applied Cognition Inpatient   Following a Speech/Presentation 3   Understanding Ordinary Conversation 3   Taking Medications 3   Remembering Where Things Are Placed or Put Away 3   Remembering List of 4-5 Errands 2   Taking Care of Complicated Tasks 2   Applied Cognition Raw Score 16   Applied Cognition Standardized Score 35.03     Goals:  1. Pt will complete UBD at Mod I/I  level with increased endurance and tolerance. 2. Pt will complete LBD at Mod I/I  level using AE as needed to help increase independence and improve safety. 3. Pt will complete bathing tasks with Mod I/I  Level and with/without use of AE. 4. Pt will complete toileting tasks at Mod I/I level using safety techniques. 5. Pt will demonstrate 100% carryover of education provided by therapist to increase safety during tasks/activities. 6. Pt will verbalize 3 potential fall hazards and ways to prevent them from happening in the environment. 7. Pt will increase standing tolerance and balance to 8-10 mins for fair+ dynamic standing balance in order to improve safety and balance for ADLs and functional mobility. 8. Pt will tolerate continued cognitive/home-safety to assist with the making of a safe d/c plan and recommendations. 9. Pt will improve functional transfers with high and low surfaces at Mod I/I  level using DME as needed with increased safety awareness and techniques.    10.Pt will increase independence in functional mobility to Mod I/I  level using DME as needed with increased balance, endurance, and safety awareness. 11. Pt will increase UE strength to 1 MM grade improve with functional transfers and tasks for increased independence. 12. Pt will demonstrate 100% carryover of education provided about CHF management and ways to prevent an exacerbation and readmission.        Yuri Shaw OTS

## 2021-10-26 NOTE — CASE MANAGEMENT
Case Management Assessment & Discharge Planning Note    Patient name Shahnaz Peña  Location [address] [facility] [address] [address] [facility] 431/E4 [address] [facility] [address]-* MRN [id number]  : 10/31/1932 Date 10/26/2021       Current Admission Date: 10/23/2021  Current Admission Diagnosis:Acute on chronic combined systolic and diastolic congestive heart failure [facility])   Patient Active Problem List    Diagnosis Date Noted   • Acute on chronic combined systolic and diastolic congestive heart failure ([address]) 10/23/2021   • Ischemic cardiomyopathy 10/09/2018   • PAF (paroxysmal atrial fibrillation) ([address]) 10/09/2018   • Essential hypertension 10/09/2018   • Cardiac defibrillator in situ 2016   • Acute kidney injury superimposed on chronic kidney disease ([address]) 3056   • Embolic stroke ([address])    • Hyperlipidemia 2016   • Old myocardial infarction 2016   • Status post percutaneous transluminal coronary angioplasty 2016   • Ventricular tachycardia ([address]) 2016   • Cardiomyopathy ([address]) 2016      LOS (days): 3  Geometric Mean LOS (GMLOS) (days): 3.80  Days to GMLOS:0.7     OBJECTIVE:    Risk of Unplanned Readmission Score: 13         Current admission status: Inpatient  Referral Reason:  (Discharge Planning)    Preferred Pharmacy:   [address] [place] [address]  Phone: [phone] Fax: [phone] [address] - [place] [address]  Phone: [phone] Fax: [phone]    Primary Care Provider: Avelino Broussard DO    Primary Insurance: [facility] [employer]  Secondary Insurance:     ASSESSMENT:  Active Health Care Agents    There are no active Health Care Agents on file.        Advance Directives  Does patient have a Health Care POA?: Yes         Readmission Root Cause  30 Day Readmission: No    Patient Information  Admitted from[de-identified] Home  Mental Status: Alert  During Assessment patient was accompanied by: Not accompanied during assessment  Assessment information provided by[de-identified] Patient  Primary Caregiver: Self  Support Systems: Spouse/significant other, [address] of Residence: [address] [facility] do you live in?: [address] entry access options. Select all that apply.: Elevator  Type of Current Residence: Apartment  Floor Level: 2  Upon entering residence, is there a bedroom on the main floor (no further steps)?: Yes  Upon entering residence, is there a bathroom on the main floor (no further steps)?: Yes  Living Arrangements: Lives w/ Spouse/significant other    Activities of Daily Living Prior to Admission  Functional Status: Independent  Completes ADLs independently?: Yes  Ambulates independently?: Yes  Does patient use assisted devices?: No  Does patient currently own DME?: No  Does patient have a history of Outpatient Therapy (PT/OT)?: No  Does the patient have a history of Short-Term Rehab?: No  Does patient have a history of HHC?: No  Does patient currently have [address] [facility]?: No         Patient Information Continued  Income Source: Pension/care home  Does patient have prescription coverage?: Yes  Does patient receive dialysis treatments?: No  Does patient have a history of substance abuse?: No  Does patient have a history of Mental Health Diagnosis?: No         Means of Transportation  Means of Transport to Appts[de-identified] Drives Self  In the past 12 months, has lack of transportation kept you from medical appointments or from getting medications?: No  In the past 12 months, has lack of transportation kept you from meetings, work, or from getting things needed for daily living?: No  Was application for public transport provided?: N/A        DISCHARGE DETAILS:    Discharge planning discussed with[de-identified] Patient  Freedom of Choice: Yes  Comments - Freedom of Choice: Pts goal is to return home without services.   CM called family/caregiver?: No- see comments (N/A Pt is alert & oriented-Pt is able to make his own decisions.)  Were Treatment Team discharge recommendations reviewed with patient/caregiver?: No               Requested [address]         Is the patient interested in [facility] at discharge?: No    DME Referral Provided  Referral made for DME?: No              Treatment Team Recommendation: Home  Discharge Destination Plan[de-identified] Home  Transport at Discharge : Automobile, Self, Family                    Transfer Mode: Self  Accompanied by: Family member

## 2021-10-26 NOTE — PLAN OF CARE
Problem: OCCUPATIONAL THERAPY ADULT  Goal: Performs self-care activities at highest level of function for planned discharge setting. See evaluation for individualized goals. Description: Treatment Interventions: ADL retraining, UE strengthening/ROM, Endurance training, Activityengagement, Compensatory technique education, Continued evaluation, Patient/family training, Equipment evaluation/education          See flowsheet documentation for full assessment, interventions and recommendations. Note: Limitation: Decreased UE strength, Decreased Safe judgement during ADL, Decreased cognition, Decreased ADL status, Decreased endurance, Decreased high-level ADLs, Decreased self-care trans  Prognosis: Fair  Assessment: Pt is a 80 y.o. Male that presented to the [facility] ED with intermittent SOB for 2 weeks. Pt was admitted on 10/23/21 with Acute on chronic combined systolic and diastolic CHF and CHRISTY. Comorbidities that have been affecting functional performance include: anemia, arthritis, CAD, hx of CVA, HLD, and HTN. OT was consulted for initial evaluation with active orders for Up as tolerated. Pt lives with his wife in an apartment on the second floor w/ elevator, and 0 [address]ERONIMO. At baseline, pt was I with ADLs, light IADLs (wife completes most), and I with functional mobility without use of AD. Pt's wife is also retired and home throughout the day and able to assist as needed. Pt's name, , and wristband were confirmed at the start of OT session. Upon evaluation, pt required S level for UBD ADLs, S level for LB ADLs, S level for toileting, and S level for grooming. Pt requires S for functional transfers and S for functional mobility w/o use of AD. The patient's raw score on the AM-PAC Daily Activity inpatient short form is 19, standardized score is 40.22, greater than 39.4. Patients at this level are likely to benefit from discharge to home.  Please refer to the recommendation of the Occupational Therapist for safe discharge planning. Pt's occupational performance is affected by the following deficits:impaired balance, decreased endurance and decreased safety awareness. These impairments and personal factors of: poor insight into deficits limit the pt's ability to safely engage in baseline areas of occupation. Pt to benefit from continued OT services during stay 2-3x/week. Pt states that his wife is home and able to assist if needed. OT recommendations are for pt to d/c w/ increased social support.      OT Discharge Recommendation: No rehabilitation needs  OT - OK to Discharge: Yes (when medically cleared)

## 2021-10-26 NOTE — DISCHARGE SUMMARY
[address]  Discharge- Chris Ray 10/31/1932, 80 y.o. male MRN: [id number]  Unit/Bed#: E4 -01 Encounter: [id number]  Primary Care Provider: Dillon Velazquez DO   Date and time admitted to hospital: 10/23/2021  9:06 AM    * Acute on chronic combined systolic and diastolic congestive heart failure ([address])  Assessment & Plan  Wt Readings from Last 3 Encounters:   10/26/21 63.1 kg (139 lb 1.8 oz)   10/23/21 68 kg (150 lb)   05/14/21 69.3 kg (152 lb 12.8 oz)   · Known ischemic cardiomyopathy with EF of 25-30% per last echocardiogram  · Chest x-ray on admission with vascular congestion  · BNP on admission elevated at 2700  · Medtronic pacer interrogation showed fluid overload  · Not on Ace or Arb due to CKD  · Continue Coreg with hold parameters  · Received Lasix 40 mg IV b.i.d.   · Diuretic on hold since 10/24 due to worsening CHRISTY   · Repeat echo 10/24 shows severely reduced EF 12%  · Prior echo 2018 with EF 25-30% and grade 1 diastolic dysfunction  · Pt adamantly wants to go home today  · Cardiology will schedule outpatient electrophysiology evaluation for consideration of CRT D/ Bi V upgrade  · Discharge on Lasix 40 mg daily. Okay to keep off spironolactone. · Recheck BMP in 1 week  · Will need follow-up with his routine cardiologist as well-Dr. Alexia Hoffman    PAF (paroxysmal atrial fibrillation) [facility])  Assessment & Plan  · INR supratherapeutic with no evidence of bleeding  · Warfarin held secondary to being supratherapeutic  · INR 3.54-->2.83  · May resume upon discharge and follow-up with INR clinic for further titration    Ischemic cardiomyopathy  Assessment & Plan  · Status post ICD for cardiomyopathy and history of V-tach  · Status post Medtronic interrogation. · Continue amiodarone  · Continue Coreg 12.5 mg b.i.d.   · Repeat echo 10/24 shows worsening EF 12%    Acute kidney injury superimposed on chronic kidney disease [facility])  Assessment & Plan  Lab Results   Component Value Date    EGFR 30 10/26/2021    EGFR 27 10/25/2021    EGFR 27 10/24/2021    CREATININE 1.94 (H) 10/26/2021    CREATININE 2.12 (H) 10/25/2021    CREATININE 2.09 (H) 10/24/2021   · Secondary to cardiorenal   · Baseline Cr 1.3-1.7   · May need to tolerate higher creatinine to achieve euvolemia   · Was on Lasix 40 mg IV b.i.d. which was held 10/24 due to worsening CHRISTY   · Okay to resume diuretics at this time per Cardiology and Nephrology  · Will discharge on Lasix 40 mg daily  · Ace inhibitor remains on hold at the time of d.c    Cardiac defibrillator in situ  Assessment & Plan  · Defibrillator in place      Consultations During Hospital Stay:  · Cardiology  · Nephrology    Procedures Performed:   XR chest 1 view portable  Result Date: 10/23/2021  Impression: Moderate interstitial edema with trace effusions. Workstation performed: [id number]     US kidney and bladder  Result Date: 10/26/2021  · Impression: No evidence of obstruction. Workstation performed: [id number]     Significant Findings / Test Results:   · Acute on chronic systolic and diastolic CHF  · Severely reduced EF of 12%  · CHRISTY on CKD stage 3  · Supratherapeutic INR    Incidental Findings:   · None    Test Results Pending at Discharge (will require follow up): · None     Outpatient follow-up Requested:  · Cardiology-1 week-repeat [facility]-Dr. Luis A Noonan  · Electrophysiology-office will schedule    Complications:  none    Hospital Course:     Heidi Zayas is a 80 y.o. male patient with past medical history of ischemic cardiomyopathy, paroxysmal atrial fibrillation anticoagulated on Coumadin, ventricular tachycardia, status post ICD followed by Dr. Luis A Noonan of cardiology. He originally presented to the hospital on 10/23/2021 due to shortness of breath. Initially presented to urgent care and further directed to ER for workup and evaluation. Found to have elevated BNP along with signs of acute volume overload. His pacemaker was interrogated.   A repeat echocardiogram was performed which showed a severely decreased EF of 12% compared to his prior EF of 25-30% back in 2018. This is suspected dyssynchrony from new left bundle branch block. He was recommended that patient undergo EP evaluation as an outpatient by Cardiology for consideration of CRT D (Bi V upgrade). Patient was continued on his carvedilol, statin, amiodarone. He was also discharged on Lasix 20 mg daily. Spironolactone was stopped. Patient should have a repeat BMP in 1 weeks time. In conclusion, patient is stable for discharge at this time. Recommended follow-up as listed above. Patient declined call to family member. Condition at Discharge: stable     Discharge Day Visit / Exam:     Subjective:  Resting in bed. Feels good. Wants to go home today. Reports he needs to see his routine cardiologist before seeing any other specialists. Vitals: Blood Pressure: 115/93 (10/26/21 1114)  Pulse: 88 (10/26/21 1114)  Temperature: (!) 97 °F (36.1 °C) (10/26/21 0704)  Temp Source: Temporal (10/26/21 0704)  Respirations: 18 (10/26/21 1114)  Height: 5' 3" (160 cm) (10/24/21 1149)  Weight - Scale: 63.1 kg (139 lb 1.8 oz) (10/26/21 0600)  SpO2: 98 % (10/26/21 1114)     Exam:   Physical Exam  Vitals and nursing note reviewed. Constitutional:       General: He is not in acute distress. Appearance: Normal appearance. He is normal weight. He is not ill-appearing, toxic-appearing or diaphoretic. HENT:      Head: Normocephalic and atraumatic. Eyes:      General: Scleral icterus present. Cardiovascular:      Rate and Rhythm: Regular rhythm. Pulmonary:      Effort: Pulmonary effort is normal. No respiratory distress. Breath sounds: Normal breath sounds. No stridor. No wheezing or rhonchi. Abdominal:      General: Bowel sounds are normal. There is no distension. Palpations: Abdomen is soft. There is no mass. Tenderness: There is no abdominal tenderness.       Hernia: No hernia is present. Musculoskeletal:         General: No swelling. Cervical back: Neck supple. Skin:     General: Skin is warm and dry. Neurological:      Mental Status: He is alert and oriented to person, place, and time. Mental status is at baseline. Psychiatric:         Mood and Affect: Mood normal.         Behavior: Behavior normal.       Discussion with Family: declined call     Discharge instructions/Information to patient and family:   See after visit summary for information provided to patient and family. Provisions for Follow-Up Care:  See after visit summary for information related to follow-up care and any pertinent home health orders. Planned Readmission: no     Discharge Statement:  I spent 50 minutes discharging the patient. This time was spent on the day of discharge. I had direct contact with the patient on the day of discharge. Greater than 50% of the total time was spent examining patient, answering all patient questions, arranging and discussing plan of care with patient as well as directly providing post-discharge instructions. Additional time then spent on discharge activities. Discharge Medications:  See after visit summary for reconciled discharge medications provided to patient and family.       ** Please Note: This note has been constructed using a voice recognition system **

## 2021-10-26 NOTE — PROGRESS NOTES
Cardiology         Progress Note - Cardiology   Eual Gift 80 y.o. male MRN: [id number]  Unit/Bed#: E4 -01 Encounter: [id number]          Assessment/Recommendations/Discussion:   1. Acute on chronic systolic and diastolic CHF  2. CAD, prior PTCA in 1989  3. Ischemic cardiomyopathy, prior left ventricle ejection fraction 25-30% 2018, now 12%--suspect secondary dyssynchrony from new left bundle branch block  4. New left bundle branch block this admission compared to prior ECG of 2020  5. Medtronic Dual-chamber AICD in-situ  6. Paroxysmal atrial fibrillation  7. CKD  8. History of CVA    · Renal function improved. Patient adamantly wants to go home. Recommend discharged on furosemide 40 mg daily. Okay to keep off spironolactone any cm for now. Recheck renal function 1 week, and if stable, at ACE-inhibitor as tolerated.   · Will schedule outpatient left physiology evaluation for consideration of CRT D (Bi V upgrade)  · Continue beta-blocker, statin, amiodarone  · Okay to discharge from cardiac standpoint, discussed with Nephrology and Internal Medicine      Subjective:  Patient seen and examined, feels well, wants to go home          Physical Exam:  GEN:  NAD  HEENT:  MMM, NCAT, pink conjunctiva, EOMI, nonicteric sclera  CV:  NO JVD/HJR, RR, NO M/R/G, +S1/S2, NO PARASTERNAL HEAVE/THRILL, NO LE EDEMA, NO HEPATIC SYSTOLIC PULSATION, WARM EXTREMITIES  RESP:  CTAB/L  ABD:  SOFT, NT, NO GROSS ORGANOMEGALY        Vitals:   /93 (BP Location: Right arm)   Pulse 88   Temp (!) 97 °F (36.1 °C) (Temporal)   Resp 18   Ht 5' 3" (1.6 m)   Wt 63.1 kg (139 lb 1.8 oz)   SpO2 98%   BMI 24.64 kg/m²   Vitals:    10/24/21 1149 10/26/21 0600   Weight: 62.6 kg (138 lb) 63.1 kg (139 lb 1.8 oz)       Intake/Output Summary (Last 24 hours) at 10/26/2021 1247  Last data filed at 10/26/2021 1114  Gross per 24 hour   Intake 240 ml   Output 375 ml   Net -135 ml       Off telemetry  Lab Results:  Results from last 7 days Lab Units 10/26/21  0528   WBC Thousand/uL 6.27   HEMOGLOBIN g/dL 13.6   HEMATOCRIT % 41.1   PLATELETS Thousands/uL 148*     Results from last 7 days   Lab Units 10/26/21  0528 10/23/21  0931   POTASSIUM mmol/L 3.9 4.3   CHLORIDE mmol/L 100 101   CO2 mmol/L 26 23   BUN mg/dL 38* 28*   CREATININE mg/dL 1.94* 2.03*   CALCIUM mg/dL 8.9 9.1   ALK PHOS U/L  --  53   ALT U/L  --  102*   AST U/L  --  64*     Results from last 7 days   Lab Units 10/26/21  0528   POTASSIUM mmol/L 3.9   CHLORIDE mmol/L 100   CO2 mmol/L 26   BUN mg/dL 38*   CREATININE mg/dL 1.94*   CALCIUM mg/dL 8.9           Medications:    Current Facility-Administered Medications:   •  amiodarone tablet 200 mg, 200 mg, Oral, Daily, Merrill Espinoza MD, 200 mg at 10/26/21 0915  •  atorvastatin (LIPITOR) tablet 40 mg, 40 mg, Oral, Daily With Clint Mckeon MD, 40 mg at 10/25/21 1706  •  carvedilol (COREG) tablet 12.5 mg, 12.5 mg, Oral, BID With Meals, Merrill Espinoza MD, 12.5 mg at 10/26/21 0913  •  docusate sodium (COLACE) capsule 100 mg, 100 mg, Oral, BID, Fazal Delgadillo PA-C, 100 mg at 10/26/21 0915    This note was completed in part utilizing [employer] Software. Grammatical errors, random word insertions, spelling mistakes, and incomplete sentences may be an occasional consequence of this system secondary to software limitations, ambient noise, and hardware issues. If you have any questions or concerns about the content, text, or information contained within the body of this dictation, please contact the provider for clarification.

## 2021-10-26 NOTE — ASSESSMENT & PLAN NOTE
· INR supratherapeutic with no evidence of bleeding  · Warfarin held secondary to being supratherapeutic  · INR 3.54-->2.83  · May resume upon discharge and follow-up with INR clinic for further titration

## 2021-10-26 NOTE — PLAN OF CARE
Problem: PHYSICAL THERAPY ADULT  Goal: Performs mobility at highest level of function for planned discharge setting. See evaluation for individualized goals. Description: Treatment/Interventions: Functional transfer training, LE strengthening/ROM, Therapeutic exercise, Endurance training, Patient/family training, Bed mobility, Gait training, Spoke to nursing, OT  Equipment Recommended: Other (Comment) (none)       See flowsheet documentation for full assessment, interventions and recommendations. 10/26/2021 1153 by Stacey Chavez PT  Note: Prognosis: Fair  Problem List: Decreased strength, Decreased endurance, Impaired balance, Decreased mobility, Impaired judgement, Decreased safety awareness  Assessment: Pt. 88 y.o.male presented w/ inermittent SOB for the past 2 weeks. Pt admitted for Acute on chronic combined systolic and diastolic congestive heart failure (HCC) w/ CHRISTY & PAF. Pt referred to PT for mobility assessment & D/C planning w/ orders of up as tolerated. PTA, pt reports being I w/o AD. On eval, pt demonstrate BLE weakness, dec mobility, balance, endurance & amb. Pt require modified I w/ bed mobility & close S for transfers & amb w/o AD + cues for techniques & safety. Gait deviations as above but no gross LOB noted. Dec amb tolerance 2* to fatigue. Pt impulsive at times but can be redirected. No SOB & dizziness reported t/o session. Nsg staff most recent vital signs as follows: /93 (BP Location: Right arm)   Pulse 88   Temp (!) 97 °F (36.1 °C) (Temporal)   Resp 18   Ht 5' 3" (1.6 m)   Wt 63.1 kg (139 lb 1.8 oz)   SpO2 98%   BMI 24.64 kg/m² . At end of session, pt back in bed in stable condition, call bell & phone in reach, bed alarm activated. Fall precautions reinforced w/ good understanding. Pt functioning below baseline hence will continue skilled PT to improve function & safety. The patient's AM-PAC Basic Mobility Inpatient Short Form Raw Score is 20, Standardized Score is 43.99.  A standardized score greater than 42.9 suggests the patient may benefit from discharge to home. From PT standpoint,  will anticipate good progress in PT for safe D/C to home w/ family support. HHPT PRN. CM to follow. Nsg staff to continue to mobilized pt (OOB in chair for all meals & ambulate in room/unit) as tolerated to prevent further decline in function. Nsg notified. Co-eval was necessary to complete this PT eval for the pt's best interest given pt's medical acuity & complexity. PT Discharge Recommendation: No rehabilitation needs (home w/ family support; HHPT PRN)     PT - OK to Discharge: Yes (when medically cleared)    See flowsheet documentation for full assessment.

## 2021-10-26 NOTE — ASSESSMENT & PLAN NOTE
Lab Results   Component Value Date    EGFR 30 10/26/2021    EGFR 27 10/25/2021    EGFR 27 10/24/2021    CREATININE 1.94 (H) 10/26/2021    CREATININE 2.12 (H) 10/25/2021    CREATININE 2.09 (H) 10/24/2021   · Secondary to cardiorenal   · Baseline Cr 1.3-1.7   · May need to tolerate higher creatinine to achieve euvolemia   · Was on Lasix 40 mg IV b.i.d. which was held 10/24 due to worsening CHRISTY   · Okay to resume diuretics at this time per Cardiology and Nephrology  · Will discharge on Lasix 40 mg daily  · Ace inhibitor remains on hold at the time of d.c

## 2021-10-26 NOTE — ASSESSMENT & PLAN NOTE
Wt Readings from Last 3 Encounters:   10/26/21 63.1 kg (139 lb 1.8 oz)   10/23/21 68 kg (150 lb)   05/14/21 69.3 kg (152 lb 12.8 oz)   · Known ischemic cardiomyopathy with EF of 25-30% per last echocardiogram  · Chest x-ray on admission with vascular congestion  · BNP on admission elevated at 2700  · Medtronic pacer interrogation showed fluid overload  · Not on Ace or Arb due to CKD  · Continue Coreg with hold parameters  · Received Lasix 40 mg IV b.i.d.   · Diuretic on hold since 10/24 due to worsening CHRISTY   · Repeat echo 10/24 shows severely reduced EF 12%  · Prior echo 2018 with EF 25-30% and grade 1 diastolic dysfunction  · Pt adamantly wants to go home today  · Cardiology will schedule outpatient electrophysiology evaluation for consideration of CRT D/ Bi V upgrade  · Discharge on Lasix 40 mg daily. Okay to keep off spironolactone.   · Recheck BMP in 1 week  · Will need follow-up with his routine cardiologist as well-Dr. Jesse Hendrickson

## 2021-10-26 NOTE — PLAN OF CARE
Problem: PAIN - ADULT  Goal: Verbalizes/displays adequate comfort level or baseline comfort level  Description: Interventions:  - Encourage patient to monitor pain and request assistance  - Assess pain using appropriate pain scale  - Administer analgesics based on type and severity of pain and evaluate response  - Implement non-pharmacological measures as appropriate and evaluate response  - Consider cultural and social influences on pain and pain management  - Notify physician/advanced practitioner if interventions unsuccessful or patient reports new pain  Outcome: Progressing     Problem: INFECTION - ADULT  Goal: Absence or prevention of progression during hospitalization  Description: INTERVENTIONS:  - Assess and monitor for signs and symptoms of infection  - Monitor lab/diagnostic results  - Monitor all insertion sites, i.e. indwelling lines, tubes, and drains  - Monitor endotracheal if appropriate and nasal secretions for changes in amount and color  - [place] appropriate cooling/warming therapies per order  - Administer medications as ordered  - Instruct and encourage patient and family to use good hand hygiene technique  - Identify and instruct in appropriate isolation precautions for identified infection/condition  Outcome: Progressing     Problem: SAFETY ADULT  Goal: Maintain or return to baseline ADL function  Description: INTERVENTIONS:  - Educate patient/family on patient safety including physical limitations  - Instruct patient to call for assistance with activity   - Consult OT/PT to assist with strengthening/mobility   - Keep Call bell within reach  - Keep bed low and locked with side rails adjusted as appropriate  - Keep care items and personal belongings within reach  - Initiate and maintain comfort rounds  - Make Fall Risk Sign visible to staff  - Apply yellow socks and bracelet for high fall risk patients  - Consider moving patient to room near nurses station  Outcome: Progressing     Problem: SAFETY ADULT  Goal: Maintains/Returns to pre admission functional level  Description: INTERVENTIONS:  - Perform BMAT or MOVE assessment daily.   - Set and communicate daily mobility goal to care team and patient/family/caregiver. - Collaborate with rehabilitation services on mobility goals if consulted  -         - Ambulate patient 3 times a day  - Out of bed to chair 3 times a day   - Out of bed for meals 3 times a day  - Out of bed for toileting  - Record patient progress and toleration of activity level   Outcome: Progressing     Problem: DISCHARGE PLANNING  Goal: Discharge to home or other facility with appropriate resources  Description: INTERVENTIONS:  - Identify barriers to discharge w/patient and caregiver  - Arrange for needed discharge resources and transportation as appropriate  - Identify discharge learning needs (meds, wound care, etc.)  - Arrange for interpretive services to assist at discharge as needed  - Refer to Case Management Department for coordinating discharge planning if the patient needs post-hospital services based on physician/advanced practitioner order or complex needs related to functional status, cognitive ability, or social support system  Outcome: Progressing     Problem: Knowledge Deficit  Goal: Patient/family/caregiver demonstrates understanding of disease process, treatment plan, medications, and discharge instructions  Description: Complete learning assessment and assess knowledge base. Interventions:  - Provide teaching at level of understanding  - Provide teaching via preferred learning methods  Outcome: Progressing     Problem: Nutrition/Hydration-ADULT  Goal: Nutrient/Hydration intake appropriate for improving, restoring or maintaining nutritional needs  Description: Monitor and assess patient's nutrition/hydration status for malnutrition. Collaborate with interdisciplinary team and initiate plan and interventions as ordered.   Monitor patient's weight and dietary intake as ordered or per policy. Utilize nutrition screening tool and intervene as necessary. Determine patient's food preferences and provide high-protein, high-caloric foods as appropriate.      INTERVENTIONS:  - Monitor oral intake, urinary output, labs, and treatment plans  - Assess nutrition and hydration status and recommend course of action  - Evaluate amount of meals eaten  - Assist patient with eating if necessary   - Allow adequate time for meals  - Recommend/ encourage appropriate diets, oral nutritional supplements, and vitamin/mineral supplements  - Order, calculate, and assess calorie counts as needed  - Recommend, monitor, and adjust tube feedings and TPN/PPN based on assessed needs  - Assess need for intravenous fluids  - Provide specific nutrition/hydration education as appropriate  - Include patient/family/caregiver in decisions related to nutrition  Outcome: Progressing

## 2021-10-26 NOTE — PLAN OF CARE
Problem: PAIN - ADULT  Goal: Verbalizes/displays adequate comfort level or baseline comfort level  Description: Interventions:  - Encourage patient to monitor pain and request assistance  - Assess pain using appropriate pain scale  - Administer analgesics based on type and severity of pain and evaluate response  - Implement non-pharmacological measures as appropriate and evaluate response  - Consider cultural and social influences on pain and pain management  - Notify physician/advanced practitioner if interventions unsuccessful or patient reports new pain  Outcome: Progressing     Problem: INFECTION - ADULT  Goal: Absence or prevention of progression during hospitalization  Description: INTERVENTIONS:  - Assess and monitor for signs and symptoms of infection  - Monitor lab/diagnostic results  - Monitor all insertion sites, i.e. indwelling lines, tubes, and drains  - Monitor endotracheal if appropriate and nasal secretions for changes in amount and color  - Morton appropriate cooling/warming therapies per order  - Administer medications as ordered  - Instruct and encourage patient and family to use good hand hygiene technique  - Identify and instruct in appropriate isolation precautions for identified infection/condition  Outcome: Progressing     Problem: SAFETY ADULT  Goal: Maintain or return to baseline ADL function  Description: INTERVENTIONS:  - Educate patient/family on patient safety including physical limitations  - Instruct patient to call for assistance with activity   - Consult OT/PT to assist with strengthening/mobility   - Keep Call bell within reach  - Keep bed low and locked with side rails adjusted as appropriate  - Keep care items and personal belongings within reach  - Initiate and maintain comfort rounds  - Make Fall Risk Sign visible to staff  - Apply yellow socks and bracelet for high fall risk patients  - Consider moving patient to room near nurses station  Outcome: Progressing  Goal: Maintains/Returns to pre admission functional level  Description: INTERVENTIONS:  - Perform BMAT or MOVE assessment daily.   - Set and communicate daily mobility goal to care team and patient/family/caregiver. - Collaborate with rehabilitation services on mobility goals if consulted  - Perform Range of Motion  times a day. - Reposition patient every  hours. - Dangle patient  times a day  - Stand patient  times a day  - Ambulate patient  times a day  - Out of bed to chair  times a day   - Out of bed for meals times a day  - Out of bed for toileting  - Record patient progress and toleration of activity level   Outcome: Progressing     Problem: DISCHARGE PLANNING  Goal: Discharge to home or other facility with appropriate resources  Description: INTERVENTIONS:  - Identify barriers to discharge w/patient and caregiver  - Arrange for needed discharge resources and transportation as appropriate  - Identify discharge learning needs (meds, wound care, etc.)  - Arrange for interpretive services to assist at discharge as needed  - Refer to Case Management Department for coordinating discharge planning if the patient needs post-hospital services based on physician/advanced practitioner order or complex needs related to functional status, cognitive ability, or social support system  Outcome: Progressing     Problem: Knowledge Deficit  Goal: Patient/family/caregiver demonstrates understanding of disease process, treatment plan, medications, and discharge instructions  Description: Complete learning assessment and assess knowledge base. Interventions:  - Provide teaching at level of understanding  - Provide teaching via preferred learning methods  Outcome: Progressing     Problem: Nutrition/Hydration-ADULT  Goal: Nutrient/Hydration intake appropriate for improving, restoring or maintaining nutritional needs  Description: Monitor and assess patient's nutrition/hydration status for malnutrition.  Collaborate with interdisciplinary team and initiate plan and interventions as ordered. Monitor patient's weight and dietary intake as ordered or per policy. Utilize nutrition screening tool and intervene as necessary. Determine patient's food preferences and provide high-protein, high-caloric foods as appropriate.      INTERVENTIONS:  - Monitor oral intake, urinary output, labs, and treatment plans  - Assess nutrition and hydration status and recommend course of action  - Evaluate amount of meals eaten  - Assist patient with eating if necessary   - Allow adequate time for meals  - Recommend/ encourage appropriate diets, oral nutritional supplements, and vitamin/mineral supplements  - Order, calculate, and assess calorie counts as needed  - Recommend, monitor, and adjust tube feedings and TPN/PPN based on assessed needs  - Assess need for intravenous fluids  - Provide specific nutrition/hydration education as appropriate  - Include patient/family/caregiver in decisions related to nutrition  Outcome: Progressing

## 2021-11-16 PROBLEM — N18.30 CHRONIC KIDNEY DISEASE, STAGE III (MODERATE) (HCC): Status: ACTIVE | Noted: 2021-01-01

## 2021-12-10 PROBLEM — E87.1 HYPONATREMIA: Status: ACTIVE | Noted: 2021-01-01

## 2021-12-10 PROBLEM — N18.32 STAGE 3B CHRONIC KIDNEY DISEASE (HCC): Status: ACTIVE | Noted: 2021-01-01

## 2021-12-19 PROBLEM — R60.0 BILATERAL LOWER EXTREMITY EDEMA: Status: ACTIVE | Noted: 2021-01-01

## 2022-01-01 ENCOUNTER — APPOINTMENT (OUTPATIENT)
Dept: LAB | Facility: HOSPITAL | Age: 87
End: 2022-01-01
Attending: INTERNAL MEDICINE
Payer: COMMERCIAL

## 2022-01-01 ENCOUNTER — LAB (OUTPATIENT)
Dept: LAB | Facility: HOSPITAL | Age: 87
DRG: 683 | End: 2022-01-01
Attending: INTERNAL MEDICINE
Payer: COMMERCIAL

## 2022-01-01 ENCOUNTER — APPOINTMENT (INPATIENT)
Dept: RADIOLOGY | Facility: HOSPITAL | Age: 87
DRG: 308 | End: 2022-01-01
Payer: COMMERCIAL

## 2022-01-01 ENCOUNTER — TELEPHONE (OUTPATIENT)
Dept: NEPHROLOGY | Facility: CLINIC | Age: 87
End: 2022-01-01

## 2022-01-01 ENCOUNTER — TELEPHONE (OUTPATIENT)
Dept: CARDIOLOGY CLINIC | Facility: CLINIC | Age: 87
End: 2022-01-01

## 2022-01-01 ENCOUNTER — TELEPHONE (OUTPATIENT)
Dept: LAB | Facility: HOSPITAL | Age: 87
End: 2022-01-01

## 2022-01-01 ENCOUNTER — APPOINTMENT (INPATIENT)
Dept: CT IMAGING | Facility: HOSPITAL | Age: 87
DRG: 683 | End: 2022-01-01
Payer: COMMERCIAL

## 2022-01-01 ENCOUNTER — HOSPITAL ENCOUNTER (INPATIENT)
Facility: HOSPITAL | Age: 87
LOS: 10 days | Discharge: HOME HEALTH | DRG: 308 | End: 2022-01-29
Attending: EMERGENCY MEDICINE | Admitting: INTERNAL MEDICINE
Payer: COMMERCIAL

## 2022-01-01 ENCOUNTER — ANTICOAG VISIT (OUTPATIENT)
Dept: CARDIOLOGY CLINIC | Facility: CLINIC | Age: 87
End: 2022-01-01

## 2022-01-01 ENCOUNTER — HOSPITAL ENCOUNTER (INPATIENT)
Facility: HOSPITAL | Age: 87
LOS: 7 days | DRG: 683 | End: 2022-04-03
Attending: EMERGENCY MEDICINE | Admitting: STUDENT IN AN ORGANIZED HEALTH CARE EDUCATION/TRAINING PROGRAM
Payer: COMMERCIAL

## 2022-01-01 ENCOUNTER — REMOTE DEVICE CLINIC VISIT (OUTPATIENT)
Dept: CARDIOLOGY CLINIC | Facility: CLINIC | Age: 87
End: 2022-01-01
Payer: COMMERCIAL

## 2022-01-01 ENCOUNTER — HOSPITAL ENCOUNTER (INPATIENT)
Facility: HOSPITAL | Age: 87
LOS: 2 days | Discharge: HOME HEALTH | DRG: 291 | End: 2022-03-17
Attending: EMERGENCY MEDICINE | Admitting: FAMILY MEDICINE
Payer: COMMERCIAL

## 2022-01-01 ENCOUNTER — OFFICE VISIT (OUTPATIENT)
Dept: CARDIOLOGY CLINIC | Facility: CLINIC | Age: 87
End: 2022-01-01
Payer: COMMERCIAL

## 2022-01-01 ENCOUNTER — APPOINTMENT (INPATIENT)
Dept: ULTRASOUND IMAGING | Facility: HOSPITAL | Age: 87
DRG: 683 | End: 2022-01-01
Payer: COMMERCIAL

## 2022-01-01 ENCOUNTER — APPOINTMENT (EMERGENCY)
Dept: RADIOLOGY | Facility: HOSPITAL | Age: 87
DRG: 291 | End: 2022-01-01
Payer: COMMERCIAL

## 2022-01-01 ENCOUNTER — APPOINTMENT (OUTPATIENT)
Dept: LAB | Age: 87
End: 2022-01-01
Payer: COMMERCIAL

## 2022-01-01 ENCOUNTER — APPOINTMENT (EMERGENCY)
Dept: CT IMAGING | Facility: HOSPITAL | Age: 87
DRG: 683 | End: 2022-01-01
Payer: COMMERCIAL

## 2022-01-01 ENCOUNTER — APPOINTMENT (EMERGENCY)
Dept: CT IMAGING | Facility: HOSPITAL | Age: 87
DRG: 308 | End: 2022-01-01
Payer: COMMERCIAL

## 2022-01-01 ENCOUNTER — DOCUMENTATION (OUTPATIENT)
Dept: NON INVASIVE DIAGNOSTICS | Facility: HOSPITAL | Age: 87
End: 2022-01-01

## 2022-01-01 VITALS
RESPIRATION RATE: 24 BRPM | DIASTOLIC BLOOD PRESSURE: 70 MMHG | TEMPERATURE: 98.2 F | HEIGHT: 62 IN | SYSTOLIC BLOOD PRESSURE: 104 MMHG | OXYGEN SATURATION: 97 % | BODY MASS INDEX: 23.12 KG/M2 | HEART RATE: 86 BPM | WEIGHT: 125.66 LBS

## 2022-01-01 VITALS
BODY MASS INDEX: 30.18 KG/M2 | SYSTOLIC BLOOD PRESSURE: 120 MMHG | HEART RATE: 76 BPM | HEIGHT: 62 IN | DIASTOLIC BLOOD PRESSURE: 74 MMHG | WEIGHT: 164 LBS

## 2022-01-01 VITALS
TEMPERATURE: 97.6 F | HEART RATE: 55 BPM | BODY MASS INDEX: 23.24 KG/M2 | SYSTOLIC BLOOD PRESSURE: 116 MMHG | OXYGEN SATURATION: 96 % | WEIGHT: 131.17 LBS | DIASTOLIC BLOOD PRESSURE: 64 MMHG | RESPIRATION RATE: 18 BRPM | HEIGHT: 63 IN

## 2022-01-01 VITALS
DIASTOLIC BLOOD PRESSURE: 56 MMHG | HEART RATE: 63 BPM | WEIGHT: 123.6 LBS | HEIGHT: 63 IN | RESPIRATION RATE: 16 BRPM | SYSTOLIC BLOOD PRESSURE: 98 MMHG | BODY MASS INDEX: 21.9 KG/M2

## 2022-01-01 VITALS
HEIGHT: 62 IN | WEIGHT: 129 LBS | BODY MASS INDEX: 23.74 KG/M2 | SYSTOLIC BLOOD PRESSURE: 150 MMHG | DIASTOLIC BLOOD PRESSURE: 78 MMHG | HEART RATE: 66 BPM | TEMPERATURE: 97.3 F | RESPIRATION RATE: 19 BRPM | OXYGEN SATURATION: 95 %

## 2022-01-01 DIAGNOSIS — I50.43 ACUTE ON CHRONIC COMBINED SYSTOLIC AND DIASTOLIC CONGESTIVE HEART FAILURE (HCC): ICD-10-CM

## 2022-01-01 DIAGNOSIS — E87.6 HYPOKALEMIA: ICD-10-CM

## 2022-01-01 DIAGNOSIS — I47.2 VT (VENTRICULAR TACHYCARDIA) (HCC): Primary | ICD-10-CM

## 2022-01-01 DIAGNOSIS — R06.00 DYSPNEA: Primary | ICD-10-CM

## 2022-01-01 DIAGNOSIS — I25.5 ISCHEMIC CARDIOMYOPATHY: ICD-10-CM

## 2022-01-01 DIAGNOSIS — I48.0 PAF (PAROXYSMAL ATRIAL FIBRILLATION) (HCC): ICD-10-CM

## 2022-01-01 DIAGNOSIS — Z71.89 GOALS OF CARE, COUNSELING/DISCUSSION: ICD-10-CM

## 2022-01-01 DIAGNOSIS — G93.49 UREMIC ENCEPHALOPATHY: ICD-10-CM

## 2022-01-01 DIAGNOSIS — Z95.810 CARDIAC DEFIBRILLATOR IN SITU: ICD-10-CM

## 2022-01-01 DIAGNOSIS — N18.4 CHRONIC KIDNEY DISEASE, STAGE 4 (SEVERE) (HCC): ICD-10-CM

## 2022-01-01 DIAGNOSIS — E78.2 HYPERLIPIDEMIA, MIXED: ICD-10-CM

## 2022-01-01 DIAGNOSIS — R33.9 URINARY RETENTION: ICD-10-CM

## 2022-01-01 DIAGNOSIS — S09.90XA MINOR HEAD INJURY, INITIAL ENCOUNTER: ICD-10-CM

## 2022-01-01 DIAGNOSIS — I25.2 OLD MYOCARDIAL INFARCTION: ICD-10-CM

## 2022-01-01 DIAGNOSIS — Z98.61 STATUS POST PERCUTANEOUS TRANSLUMINAL CORONARY ANGIOPLASTY: ICD-10-CM

## 2022-01-01 DIAGNOSIS — I42.9 CARDIOMYOPATHY, UNSPECIFIED TYPE (HCC): ICD-10-CM

## 2022-01-01 DIAGNOSIS — E83.42 HYPOMAGNESEMIA: ICD-10-CM

## 2022-01-01 DIAGNOSIS — I48.0 PAF (PAROXYSMAL ATRIAL FIBRILLATION) (HCC): Primary | ICD-10-CM

## 2022-01-01 DIAGNOSIS — I47.2 VT (VENTRICULAR TACHYCARDIA) (HCC): ICD-10-CM

## 2022-01-01 DIAGNOSIS — R74.01 TRANSAMINITIS: ICD-10-CM

## 2022-01-01 DIAGNOSIS — I63.10 CEREBROVASCULAR ACCIDENT (CVA) DUE TO EMBOLISM OF PRECEREBRAL ARTERY (HCC): ICD-10-CM

## 2022-01-01 DIAGNOSIS — N17.9 AKI (ACUTE KIDNEY INJURY) (HCC): ICD-10-CM

## 2022-01-01 DIAGNOSIS — N17.9 ACUTE KIDNEY INJURY SUPERIMPOSED ON CHRONIC KIDNEY DISEASE (HCC): ICD-10-CM

## 2022-01-01 DIAGNOSIS — E87.6 HYPOKALEMIA: Primary | ICD-10-CM

## 2022-01-01 DIAGNOSIS — N18.4 STAGE 4 CHRONIC KIDNEY DISEASE (HCC): ICD-10-CM

## 2022-01-01 DIAGNOSIS — R07.9 CHEST PAIN: Primary | ICD-10-CM

## 2022-01-01 DIAGNOSIS — R60.0 BILATERAL LOWER EXTREMITY EDEMA: ICD-10-CM

## 2022-01-01 DIAGNOSIS — N28.9 RENAL INSUFFICIENCY: ICD-10-CM

## 2022-01-01 DIAGNOSIS — I10 ESSENTIAL HYPERTENSION: ICD-10-CM

## 2022-01-01 DIAGNOSIS — Z95.810 AICD (AUTOMATIC CARDIOVERTER/DEFIBRILLATOR) PRESENT: Primary | ICD-10-CM

## 2022-01-01 DIAGNOSIS — R42 DIZZINESS: ICD-10-CM

## 2022-01-01 DIAGNOSIS — I47.2 VENTRICULAR TACHYCARDIA (HCC): Primary | ICD-10-CM

## 2022-01-01 DIAGNOSIS — N18.9 ACUTE KIDNEY INJURY SUPERIMPOSED ON CHRONIC KIDNEY DISEASE (HCC): ICD-10-CM

## 2022-01-01 DIAGNOSIS — N18.32 STAGE 3B CHRONIC KIDNEY DISEASE (HCC): ICD-10-CM

## 2022-01-01 DIAGNOSIS — R77.8 ELEVATED TROPONIN I LEVEL: ICD-10-CM

## 2022-01-01 DIAGNOSIS — E78.2 MIXED HYPERLIPIDEMIA: ICD-10-CM

## 2022-01-01 DIAGNOSIS — I47.2 V-TACH (HCC): Primary | ICD-10-CM

## 2022-01-01 DIAGNOSIS — N18.4 STAGE 4 CHRONIC KIDNEY DISEASE (HCC): Primary | ICD-10-CM

## 2022-01-01 DIAGNOSIS — I50.9 CHF (CONGESTIVE HEART FAILURE) (HCC): ICD-10-CM

## 2022-01-01 DIAGNOSIS — N19 UREMIC ENCEPHALOPATHY: ICD-10-CM

## 2022-01-01 LAB
2HR DELTA HS TROPONIN: -1 NG/L
2HR DELTA HS TROPONIN: -1 NG/L
2HR DELTA HS TROPONIN: -9 NG/L
4HR DELTA HS TROPONIN: -18 NG/L
4HR DELTA HS TROPONIN: -4 NG/L
4HR DELTA HS TROPONIN: 0 NG/L
ALBUMIN SERPL BCP-MCNC: 1.8 G/DL (ref 3.5–5)
ALBUMIN SERPL BCP-MCNC: 1.9 G/DL (ref 3.5–5)
ALBUMIN SERPL BCP-MCNC: 2 G/DL (ref 3.5–5)
ALBUMIN SERPL BCP-MCNC: 2 G/DL (ref 3.5–5)
ALBUMIN SERPL BCP-MCNC: 2.2 G/DL (ref 3.5–5)
ALBUMIN SERPL BCP-MCNC: 2.3 G/DL (ref 3.5–5)
ALBUMIN SERPL BCP-MCNC: 2.4 G/DL (ref 3.5–5)
ALBUMIN SERPL BCP-MCNC: 2.5 G/DL (ref 3.5–5)
ALBUMIN SERPL BCP-MCNC: 2.5 G/DL (ref 3.5–5)
ALBUMIN SERPL BCP-MCNC: 2.6 G/DL (ref 3.5–5)
ALBUMIN SERPL BCP-MCNC: 2.7 G/DL (ref 3.5–5)
ALP SERPL-CCNC: 38 U/L (ref 46–116)
ALP SERPL-CCNC: 47 U/L (ref 46–116)
ALP SERPL-CCNC: 52 U/L (ref 46–116)
ALP SERPL-CCNC: 53 U/L (ref 46–116)
ALP SERPL-CCNC: 54 U/L (ref 46–116)
ALP SERPL-CCNC: 63 U/L (ref 46–116)
ALP SERPL-CCNC: 64 U/L (ref 46–116)
ALP SERPL-CCNC: 70 U/L (ref 46–116)
ALP SERPL-CCNC: 70 U/L (ref 46–116)
ALP SERPL-CCNC: 71 U/L (ref 46–116)
ALP SERPL-CCNC: 84 U/L (ref 46–116)
ALT SERPL W P-5'-P-CCNC: 109 U/L (ref 12–78)
ALT SERPL W P-5'-P-CCNC: 136 U/L (ref 12–78)
ALT SERPL W P-5'-P-CCNC: 207 U/L (ref 12–78)
ALT SERPL W P-5'-P-CCNC: 210 U/L (ref 12–78)
ALT SERPL W P-5'-P-CCNC: 231 U/L (ref 12–78)
ALT SERPL W P-5'-P-CCNC: 290 U/L (ref 12–78)
ALT SERPL W P-5'-P-CCNC: 387 U/L (ref 12–78)
ALT SERPL W P-5'-P-CCNC: 496 U/L (ref 12–78)
ALT SERPL W P-5'-P-CCNC: 503 U/L (ref 12–78)
ALT SERPL W P-5'-P-CCNC: 585 U/L (ref 12–78)
ALT SERPL W P-5'-P-CCNC: 78 U/L (ref 12–78)
ANION GAP SERPL CALCULATED.3IONS-SCNC: 10 MMOL/L (ref 4–13)
ANION GAP SERPL CALCULATED.3IONS-SCNC: 12 MMOL/L (ref 4–13)
ANION GAP SERPL CALCULATED.3IONS-SCNC: 12 MMOL/L (ref 4–13)
ANION GAP SERPL CALCULATED.3IONS-SCNC: 13 MMOL/L (ref 4–13)
ANION GAP SERPL CALCULATED.3IONS-SCNC: 14 MMOL/L (ref 4–13)
ANION GAP SERPL CALCULATED.3IONS-SCNC: 5 MMOL/L (ref 4–13)
ANION GAP SERPL CALCULATED.3IONS-SCNC: 6 MMOL/L (ref 4–13)
ANION GAP SERPL CALCULATED.3IONS-SCNC: 7 MMOL/L (ref 4–13)
ANION GAP SERPL CALCULATED.3IONS-SCNC: 8 MMOL/L (ref 4–13)
ANION GAP SERPL CALCULATED.3IONS-SCNC: 8 MMOL/L (ref 4–13)
ANION GAP SERPL CALCULATED.3IONS-SCNC: 9 MMOL/L (ref 4–13)
ANISOCYTOSIS BLD QL SMEAR: PRESENT
APTT PPP: 36 SECONDS (ref 23–37)
APTT PPP: 44 SECONDS (ref 23–37)
APTT PPP: 52 SECONDS (ref 23–37)
AST SERPL W P-5'-P-CCNC: 109 U/L (ref 5–45)
AST SERPL W P-5'-P-CCNC: 201 U/L (ref 5–45)
AST SERPL W P-5'-P-CCNC: 215 U/L (ref 5–45)
AST SERPL W P-5'-P-CCNC: 226 U/L (ref 5–45)
AST SERPL W P-5'-P-CCNC: 235 U/L (ref 5–45)
AST SERPL W P-5'-P-CCNC: 260 U/L (ref 5–45)
AST SERPL W P-5'-P-CCNC: 320 U/L (ref 5–45)
AST SERPL W P-5'-P-CCNC: 465 U/L (ref 5–45)
AST SERPL W P-5'-P-CCNC: 515 U/L (ref 5–45)
AST SERPL W P-5'-P-CCNC: 56 U/L (ref 5–45)
AST SERPL W P-5'-P-CCNC: 85 U/L (ref 5–45)
ATRIAL RATE: 110 BPM
ATRIAL RATE: 267 BPM
ATRIAL RATE: 54 BPM
ATRIAL RATE: 59 BPM
ATRIAL RATE: 60 BPM
ATRIAL RATE: 60 BPM
ATRIAL RATE: 63 BPM
ATRIAL RATE: 73 BPM
ATRIAL RATE: 78 BPM
ATRIAL RATE: 89 BPM
BACTERIA UR QL AUTO: ABNORMAL /HPF
BACTERIA UR QL AUTO: ABNORMAL /HPF
BASOPHILS # BLD AUTO: 0.01 THOUSANDS/ΜL (ref 0–0.1)
BASOPHILS # BLD AUTO: 0.03 THOUSANDS/ΜL (ref 0–0.1)
BASOPHILS # BLD AUTO: 0.04 THOUSANDS/ΜL (ref 0–0.1)
BASOPHILS # BLD AUTO: 0.05 THOUSANDS/ΜL (ref 0–0.1)
BASOPHILS # BLD MANUAL: 0 THOUSAND/UL (ref 0–0.1)
BASOPHILS NFR BLD AUTO: 0 % (ref 0–1)
BASOPHILS NFR BLD AUTO: 1 % (ref 0–1)
BASOPHILS NFR MAR MANUAL: 0 % (ref 0–1)
BILIRUB SERPL-MCNC: 0.64 MG/DL (ref 0.2–1)
BILIRUB SERPL-MCNC: 0.92 MG/DL (ref 0.2–1)
BILIRUB SERPL-MCNC: 0.95 MG/DL (ref 0.2–1)
BILIRUB SERPL-MCNC: 0.96 MG/DL (ref 0.2–1)
BILIRUB SERPL-MCNC: 1.06 MG/DL (ref 0.2–1)
BILIRUB SERPL-MCNC: 1.29 MG/DL (ref 0.2–1)
BILIRUB SERPL-MCNC: 2.46 MG/DL (ref 0.2–1)
BILIRUB SERPL-MCNC: 2.62 MG/DL (ref 0.2–1)
BILIRUB SERPL-MCNC: 2.69 MG/DL (ref 0.2–1)
BILIRUB SERPL-MCNC: 2.74 MG/DL (ref 0.2–1)
BILIRUB SERPL-MCNC: 3.4 MG/DL (ref 0.2–1)
BILIRUB UR QL STRIP: NEGATIVE
BILIRUB UR QL STRIP: NEGATIVE
BUN SERPL-MCNC: 13 MG/DL (ref 5–25)
BUN SERPL-MCNC: 14 MG/DL (ref 5–25)
BUN SERPL-MCNC: 14 MG/DL (ref 5–25)
BUN SERPL-MCNC: 15 MG/DL (ref 5–25)
BUN SERPL-MCNC: 17 MG/DL (ref 5–25)
BUN SERPL-MCNC: 19 MG/DL (ref 5–25)
BUN SERPL-MCNC: 19 MG/DL (ref 5–25)
BUN SERPL-MCNC: 20 MG/DL (ref 5–25)
BUN SERPL-MCNC: 22 MG/DL (ref 5–25)
BUN SERPL-MCNC: 22 MG/DL (ref 5–25)
BUN SERPL-MCNC: 23 MG/DL (ref 5–25)
BUN SERPL-MCNC: 24 MG/DL (ref 5–25)
BUN SERPL-MCNC: 24 MG/DL (ref 5–25)
BUN SERPL-MCNC: 35 MG/DL (ref 5–25)
BUN SERPL-MCNC: 38 MG/DL (ref 5–25)
BUN SERPL-MCNC: 44 MG/DL (ref 5–25)
BUN SERPL-MCNC: 45 MG/DL (ref 5–25)
BUN SERPL-MCNC: 57 MG/DL (ref 5–25)
BUN SERPL-MCNC: 74 MG/DL (ref 5–25)
BUN SERPL-MCNC: 90 MG/DL (ref 5–25)
BURR CELLS BLD QL SMEAR: PRESENT
CA-I BLD-SCNC: 1 MMOL/L (ref 1.12–1.32)
CA-I BLD-SCNC: 1.02 MMOL/L (ref 1.12–1.32)
CALCIUM ALBUM COR SERPL-MCNC: 8.6 MG/DL (ref 8.3–10.1)
CALCIUM ALBUM COR SERPL-MCNC: 9 MG/DL (ref 8.3–10.1)
CALCIUM ALBUM COR SERPL-MCNC: 9 MG/DL (ref 8.3–10.1)
CALCIUM ALBUM COR SERPL-MCNC: 9.2 MG/DL (ref 8.3–10.1)
CALCIUM ALBUM COR SERPL-MCNC: 9.4 MG/DL (ref 8.3–10.1)
CALCIUM ALBUM COR SERPL-MCNC: 9.4 MG/DL (ref 8.3–10.1)
CALCIUM ALBUM COR SERPL-MCNC: 9.5 MG/DL (ref 8.3–10.1)
CALCIUM ALBUM COR SERPL-MCNC: 9.6 MG/DL (ref 8.3–10.1)
CALCIUM ALBUM COR SERPL-MCNC: 9.9 MG/DL (ref 8.3–10.1)
CALCIUM SERPL-MCNC: 6.9 MG/DL (ref 8.3–10.1)
CALCIUM SERPL-MCNC: 7.3 MG/DL (ref 8.3–10.1)
CALCIUM SERPL-MCNC: 7.4 MG/DL (ref 8.3–10.1)
CALCIUM SERPL-MCNC: 7.5 MG/DL (ref 8.3–10.1)
CALCIUM SERPL-MCNC: 7.6 MG/DL (ref 8.3–10.1)
CALCIUM SERPL-MCNC: 7.6 MG/DL (ref 8.3–10.1)
CALCIUM SERPL-MCNC: 7.7 MG/DL (ref 8.3–10.1)
CALCIUM SERPL-MCNC: 8 MG/DL (ref 8.3–10.1)
CALCIUM SERPL-MCNC: 8.1 MG/DL (ref 8.3–10.1)
CALCIUM SERPL-MCNC: 8.1 MG/DL (ref 8.3–10.1)
CALCIUM SERPL-MCNC: 8.2 MG/DL (ref 8.3–10.1)
CALCIUM SERPL-MCNC: 8.3 MG/DL (ref 8.3–10.1)
CALCIUM SERPL-MCNC: 8.4 MG/DL (ref 8.3–10.1)
CALCIUM SERPL-MCNC: 8.5 MG/DL (ref 8.3–10.1)
CALCIUM SERPL-MCNC: 8.6 MG/DL (ref 8.3–10.1)
CALCIUM SERPL-MCNC: 8.7 MG/DL (ref 8.3–10.1)
CALCIUM SERPL-MCNC: 9.1 MG/DL (ref 8.3–10.1)
CARDIAC TROPONIN I PNL SERPL HS: 16 NG/L
CARDIAC TROPONIN I PNL SERPL HS: 19 NG/L
CARDIAC TROPONIN I PNL SERPL HS: 20 NG/L
CARDIAC TROPONIN I PNL SERPL HS: 43 NG/L
CARDIAC TROPONIN I PNL SERPL HS: 44 NG/L
CARDIAC TROPONIN I PNL SERPL HS: 44 NG/L
CARDIAC TROPONIN I PNL SERPL HS: 50 NG/L
CARDIAC TROPONIN I PNL SERPL HS: 59 NG/L
CARDIAC TROPONIN I PNL SERPL HS: 68 NG/L
CHLORIDE SERPL-SCNC: 100 MMOL/L (ref 100–108)
CHLORIDE SERPL-SCNC: 101 MMOL/L (ref 100–108)
CHLORIDE SERPL-SCNC: 101 MMOL/L (ref 100–108)
CHLORIDE SERPL-SCNC: 102 MMOL/L (ref 100–108)
CHLORIDE SERPL-SCNC: 103 MMOL/L (ref 100–108)
CHLORIDE SERPL-SCNC: 104 MMOL/L (ref 100–108)
CHLORIDE SERPL-SCNC: 105 MMOL/L (ref 100–108)
CHLORIDE SERPL-SCNC: 106 MMOL/L (ref 100–108)
CHLORIDE SERPL-SCNC: 106 MMOL/L (ref 100–108)
CHLORIDE SERPL-SCNC: 107 MMOL/L (ref 100–108)
CHLORIDE SERPL-SCNC: 95 MMOL/L (ref 100–108)
CHLORIDE SERPL-SCNC: 96 MMOL/L (ref 100–108)
CHLORIDE SERPL-SCNC: 97 MMOL/L (ref 100–108)
CHLORIDE SERPL-SCNC: 98 MMOL/L (ref 100–108)
CHLORIDE SERPL-SCNC: 99 MMOL/L (ref 100–108)
CK SERPL-CCNC: 135 U/L (ref 39–308)
CLARITY UR: ABNORMAL
CLARITY UR: CLEAR
CO2 SERPL-SCNC: 21 MMOL/L (ref 21–32)
CO2 SERPL-SCNC: 22 MMOL/L (ref 21–32)
CO2 SERPL-SCNC: 22 MMOL/L (ref 21–32)
CO2 SERPL-SCNC: 24 MMOL/L (ref 21–32)
CO2 SERPL-SCNC: 25 MMOL/L (ref 21–32)
CO2 SERPL-SCNC: 25 MMOL/L (ref 21–32)
CO2 SERPL-SCNC: 26 MMOL/L (ref 21–32)
CO2 SERPL-SCNC: 27 MMOL/L (ref 21–32)
CO2 SERPL-SCNC: 28 MMOL/L (ref 21–32)
CO2 SERPL-SCNC: 29 MMOL/L (ref 21–32)
CO2 SERPL-SCNC: 31 MMOL/L (ref 21–32)
CO2 SERPL-SCNC: 32 MMOL/L (ref 21–32)
COLOR UR: YELLOW
COLOR UR: YELLOW
CREAT SERPL-MCNC: 1.42 MG/DL (ref 0.6–1.3)
CREAT SERPL-MCNC: 1.46 MG/DL (ref 0.6–1.3)
CREAT SERPL-MCNC: 1.52 MG/DL (ref 0.6–1.3)
CREAT SERPL-MCNC: 1.53 MG/DL (ref 0.6–1.3)
CREAT SERPL-MCNC: 1.64 MG/DL (ref 0.6–1.3)
CREAT SERPL-MCNC: 1.72 MG/DL (ref 0.6–1.3)
CREAT SERPL-MCNC: 1.8 MG/DL (ref 0.6–1.3)
CREAT SERPL-MCNC: 1.89 MG/DL (ref 0.6–1.3)
CREAT SERPL-MCNC: 1.92 MG/DL (ref 0.6–1.3)
CREAT SERPL-MCNC: 1.94 MG/DL (ref 0.6–1.3)
CREAT SERPL-MCNC: 1.96 MG/DL (ref 0.6–1.3)
CREAT SERPL-MCNC: 1.99 MG/DL (ref 0.6–1.3)
CREAT SERPL-MCNC: 2.01 MG/DL (ref 0.6–1.3)
CREAT SERPL-MCNC: 2.04 MG/DL (ref 0.6–1.3)
CREAT SERPL-MCNC: 2.14 MG/DL (ref 0.6–1.3)
CREAT SERPL-MCNC: 2.29 MG/DL (ref 0.6–1.3)
CREAT SERPL-MCNC: 2.87 MG/DL (ref 0.6–1.3)
CREAT SERPL-MCNC: 2.89 MG/DL (ref 0.6–1.3)
CREAT SERPL-MCNC: 3.02 MG/DL (ref 0.6–1.3)
CREAT SERPL-MCNC: 3.14 MG/DL (ref 0.6–1.3)
CREAT SERPL-MCNC: 3.68 MG/DL (ref 0.6–1.3)
CREAT SERPL-MCNC: 3.85 MG/DL (ref 0.6–1.3)
CREAT SERPL-MCNC: 3.91 MG/DL (ref 0.6–1.3)
EOSINOPHIL # BLD AUTO: 0 THOUSAND/ΜL (ref 0–0.61)
EOSINOPHIL # BLD AUTO: 0 THOUSAND/ΜL (ref 0–0.61)
EOSINOPHIL # BLD AUTO: 0.1 THOUSAND/ΜL (ref 0–0.61)
EOSINOPHIL # BLD AUTO: 0.1 THOUSAND/ΜL (ref 0–0.61)
EOSINOPHIL # BLD AUTO: 0.11 THOUSAND/ΜL (ref 0–0.61)
EOSINOPHIL # BLD AUTO: 0.11 THOUSAND/ΜL (ref 0–0.61)
EOSINOPHIL # BLD AUTO: 0.12 THOUSAND/ΜL (ref 0–0.61)
EOSINOPHIL # BLD AUTO: 0.12 THOUSAND/ΜL (ref 0–0.61)
EOSINOPHIL # BLD AUTO: 0.13 THOUSAND/ΜL (ref 0–0.61)
EOSINOPHIL # BLD AUTO: 0.15 THOUSAND/ΜL (ref 0–0.61)
EOSINOPHIL # BLD AUTO: 0.15 THOUSAND/ΜL (ref 0–0.61)
EOSINOPHIL # BLD MANUAL: 0 THOUSAND/UL (ref 0–0.4)
EOSINOPHIL NFR BLD AUTO: 0 % (ref 0–6)
EOSINOPHIL NFR BLD AUTO: 0 % (ref 0–6)
EOSINOPHIL NFR BLD AUTO: 1 % (ref 0–6)
EOSINOPHIL NFR BLD AUTO: 2 % (ref 0–6)
EOSINOPHIL NFR BLD AUTO: 3 % (ref 0–6)
EOSINOPHIL NFR BLD MANUAL: 0 % (ref 0–6)
ERYTHROCYTE [DISTWIDTH] IN BLOOD BY AUTOMATED COUNT: 13.1 % (ref 11.6–15.1)
ERYTHROCYTE [DISTWIDTH] IN BLOOD BY AUTOMATED COUNT: 13.6 % (ref 11.6–15.1)
ERYTHROCYTE [DISTWIDTH] IN BLOOD BY AUTOMATED COUNT: 13.7 % (ref 11.6–15.1)
ERYTHROCYTE [DISTWIDTH] IN BLOOD BY AUTOMATED COUNT: 13.9 % (ref 11.6–15.1)
ERYTHROCYTE [DISTWIDTH] IN BLOOD BY AUTOMATED COUNT: 14 % (ref 11.6–15.1)
ERYTHROCYTE [DISTWIDTH] IN BLOOD BY AUTOMATED COUNT: 14.2 % (ref 11.6–15.1)
ERYTHROCYTE [DISTWIDTH] IN BLOOD BY AUTOMATED COUNT: 14.3 % (ref 11.6–15.1)
ERYTHROCYTE [DISTWIDTH] IN BLOOD BY AUTOMATED COUNT: 14.5 % (ref 11.6–15.1)
ERYTHROCYTE [DISTWIDTH] IN BLOOD BY AUTOMATED COUNT: 17.5 % (ref 11.6–15.1)
ERYTHROCYTE [DISTWIDTH] IN BLOOD BY AUTOMATED COUNT: 17.7 % (ref 11.6–15.1)
ERYTHROCYTE [DISTWIDTH] IN BLOOD BY AUTOMATED COUNT: 17.9 % (ref 11.6–15.1)
ERYTHROCYTE [DISTWIDTH] IN BLOOD BY AUTOMATED COUNT: 18.1 % (ref 11.6–15.1)
ERYTHROCYTE [DISTWIDTH] IN BLOOD BY AUTOMATED COUNT: 18.3 % (ref 11.6–15.1)
FINE GRAN CASTS URNS QL MICRO: ABNORMAL /LPF
FLUAV RNA RESP QL NAA+PROBE: NEGATIVE
FLUBV RNA RESP QL NAA+PROBE: NEGATIVE
GFR SERPL CREATININE-BSD FRML MDRD: 12 ML/MIN/1.73SQ M
GFR SERPL CREATININE-BSD FRML MDRD: 13 ML/MIN/1.73SQ M
GFR SERPL CREATININE-BSD FRML MDRD: 13 ML/MIN/1.73SQ M
GFR SERPL CREATININE-BSD FRML MDRD: 16 ML/MIN/1.73SQ M
GFR SERPL CREATININE-BSD FRML MDRD: 17 ML/MIN/1.73SQ M
GFR SERPL CREATININE-BSD FRML MDRD: 18 ML/MIN/1.73SQ M
GFR SERPL CREATININE-BSD FRML MDRD: 18 ML/MIN/1.73SQ M
GFR SERPL CREATININE-BSD FRML MDRD: 24 ML/MIN/1.73SQ M
GFR SERPL CREATININE-BSD FRML MDRD: 26 ML/MIN/1.73SQ M
GFR SERPL CREATININE-BSD FRML MDRD: 28 ML/MIN/1.73SQ M
GFR SERPL CREATININE-BSD FRML MDRD: 29 ML/MIN/1.73SQ M
GFR SERPL CREATININE-BSD FRML MDRD: 29 ML/MIN/1.73SQ M
GFR SERPL CREATININE-BSD FRML MDRD: 30 ML/MIN/1.73SQ M
GFR SERPL CREATININE-BSD FRML MDRD: 30 ML/MIN/1.73SQ M
GFR SERPL CREATININE-BSD FRML MDRD: 32 ML/MIN/1.73SQ M
GFR SERPL CREATININE-BSD FRML MDRD: 34 ML/MIN/1.73SQ M
GFR SERPL CREATININE-BSD FRML MDRD: 36 ML/MIN/1.73SQ M
GFR SERPL CREATININE-BSD FRML MDRD: 39 ML/MIN/1.73SQ M
GFR SERPL CREATININE-BSD FRML MDRD: 40 ML/MIN/1.73SQ M
GFR SERPL CREATININE-BSD FRML MDRD: 42 ML/MIN/1.73SQ M
GFR SERPL CREATININE-BSD FRML MDRD: 43 ML/MIN/1.73SQ M
GLUCOSE SERPL-MCNC: 101 MG/DL (ref 65–140)
GLUCOSE SERPL-MCNC: 102 MG/DL (ref 65–140)
GLUCOSE SERPL-MCNC: 102 MG/DL (ref 65–140)
GLUCOSE SERPL-MCNC: 103 MG/DL (ref 65–140)
GLUCOSE SERPL-MCNC: 104 MG/DL (ref 65–140)
GLUCOSE SERPL-MCNC: 105 MG/DL (ref 65–140)
GLUCOSE SERPL-MCNC: 105 MG/DL (ref 65–140)
GLUCOSE SERPL-MCNC: 106 MG/DL (ref 65–140)
GLUCOSE SERPL-MCNC: 107 MG/DL (ref 65–140)
GLUCOSE SERPL-MCNC: 111 MG/DL (ref 65–140)
GLUCOSE SERPL-MCNC: 119 MG/DL (ref 65–140)
GLUCOSE SERPL-MCNC: 120 MG/DL (ref 65–140)
GLUCOSE SERPL-MCNC: 124 MG/DL (ref 65–140)
GLUCOSE SERPL-MCNC: 152 MG/DL (ref 65–140)
GLUCOSE SERPL-MCNC: 72 MG/DL (ref 65–140)
GLUCOSE SERPL-MCNC: 83 MG/DL (ref 65–140)
GLUCOSE SERPL-MCNC: 86 MG/DL (ref 65–140)
GLUCOSE SERPL-MCNC: 87 MG/DL (ref 65–140)
GLUCOSE SERPL-MCNC: 89 MG/DL (ref 65–140)
GLUCOSE SERPL-MCNC: 90 MG/DL (ref 65–140)
GLUCOSE SERPL-MCNC: 93 MG/DL (ref 65–140)
GLUCOSE SERPL-MCNC: 95 MG/DL (ref 65–140)
GLUCOSE SERPL-MCNC: 98 MG/DL (ref 65–140)
GLUCOSE SERPL-MCNC: 99 MG/DL (ref 65–140)
GLUCOSE UR STRIP-MCNC: NEGATIVE MG/DL
GLUCOSE UR STRIP-MCNC: NEGATIVE MG/DL
HAV IGM SER QL: NORMAL
HBV CORE IGM SER QL: NORMAL
HBV SURFACE AG SER QL: NORMAL
HCT VFR BLD AUTO: 29.5 % (ref 36.5–49.3)
HCT VFR BLD AUTO: 30.5 % (ref 36.5–49.3)
HCT VFR BLD AUTO: 32.3 % (ref 36.5–49.3)
HCT VFR BLD AUTO: 33.1 % (ref 36.5–49.3)
HCT VFR BLD AUTO: 33.8 % (ref 36.5–49.3)
HCT VFR BLD AUTO: 33.8 % (ref 36.5–49.3)
HCT VFR BLD AUTO: 33.9 % (ref 36.5–49.3)
HCT VFR BLD AUTO: 34 % (ref 36.5–49.3)
HCT VFR BLD AUTO: 34.2 % (ref 36.5–49.3)
HCT VFR BLD AUTO: 34.5 % (ref 36.5–49.3)
HCT VFR BLD AUTO: 34.5 % (ref 36.5–49.3)
HCT VFR BLD AUTO: 35.5 % (ref 36.5–49.3)
HCT VFR BLD AUTO: 35.8 % (ref 36.5–49.3)
HCT VFR BLD AUTO: 36.8 % (ref 36.5–49.3)
HCT VFR BLD AUTO: 40.9 % (ref 36.5–49.3)
HCV AB SER QL: NORMAL
HGB BLD-MCNC: 10.4 G/DL (ref 12–17)
HGB BLD-MCNC: 10.8 G/DL (ref 12–17)
HGB BLD-MCNC: 11.1 G/DL (ref 12–17)
HGB BLD-MCNC: 11.2 G/DL (ref 12–17)
HGB BLD-MCNC: 11.3 G/DL (ref 12–17)
HGB BLD-MCNC: 11.4 G/DL (ref 12–17)
HGB BLD-MCNC: 11.5 G/DL (ref 12–17)
HGB BLD-MCNC: 11.6 G/DL (ref 12–17)
HGB BLD-MCNC: 11.6 G/DL (ref 12–17)
HGB BLD-MCNC: 11.7 G/DL (ref 12–17)
HGB BLD-MCNC: 11.8 G/DL (ref 12–17)
HGB BLD-MCNC: 12.1 G/DL (ref 12–17)
HGB BLD-MCNC: 12.2 G/DL (ref 12–17)
HGB BLD-MCNC: 13.4 G/DL (ref 12–17)
HGB BLD-MCNC: 9.9 G/DL (ref 12–17)
HGB UR QL STRIP.AUTO: ABNORMAL
HGB UR QL STRIP.AUTO: ABNORMAL
HYALINE CASTS #/AREA URNS LPF: ABNORMAL /LPF
IMM GRANULOCYTES # BLD AUTO: 0.01 THOUSAND/UL (ref 0–0.2)
IMM GRANULOCYTES # BLD AUTO: 0.02 THOUSAND/UL (ref 0–0.2)
IMM GRANULOCYTES # BLD AUTO: 0.03 THOUSAND/UL (ref 0–0.2)
IMM GRANULOCYTES # BLD AUTO: 0.04 THOUSAND/UL (ref 0–0.2)
IMM GRANULOCYTES # BLD AUTO: 0.06 THOUSAND/UL (ref 0–0.2)
IMM GRANULOCYTES NFR BLD AUTO: 0 % (ref 0–2)
IMM GRANULOCYTES NFR BLD AUTO: 1 % (ref 0–2)
INR PPP: 1.75 (ref 0.84–1.19)
INR PPP: 2.01 (ref 0.84–1.19)
INR PPP: 2.04 (ref 0.84–1.19)
INR PPP: 2.11 (ref 0.84–1.19)
INR PPP: 2.38 (ref 0.84–1.19)
INR PPP: 2.47 (ref 0.84–1.19)
INR PPP: 2.57 (ref 0.84–1.19)
INR PPP: 2.61 (ref 0.84–1.19)
INR PPP: 2.72 (ref 0.84–1.19)
INR PPP: 2.73 (ref 0.84–1.19)
INR PPP: 2.78 (ref 0.84–1.19)
INR PPP: 2.91 (ref 0.84–1.19)
INR PPP: 3.11 (ref 0.84–1.19)
INR PPP: 3.33 (ref 0.84–1.19)
INR PPP: 3.5 (ref 0.84–1.19)
KETONES UR STRIP-MCNC: NEGATIVE MG/DL
KETONES UR STRIP-MCNC: NEGATIVE MG/DL
LEUKOCYTE ESTERASE UR QL STRIP: NEGATIVE
LEUKOCYTE ESTERASE UR QL STRIP: NEGATIVE
LYMPHOCYTES # BLD AUTO: 0.41 THOUSAND/UL (ref 0.6–4.47)
LYMPHOCYTES # BLD AUTO: 0.48 THOUSANDS/ΜL (ref 0.6–4.47)
LYMPHOCYTES # BLD AUTO: 0.6 THOUSANDS/ΜL (ref 0.6–4.47)
LYMPHOCYTES # BLD AUTO: 0.93 THOUSANDS/ΜL (ref 0.6–4.47)
LYMPHOCYTES # BLD AUTO: 1.1 THOUSANDS/ΜL (ref 0.6–4.47)
LYMPHOCYTES # BLD AUTO: 1.11 THOUSANDS/ΜL (ref 0.6–4.47)
LYMPHOCYTES # BLD AUTO: 1.12 THOUSANDS/ΜL (ref 0.6–4.47)
LYMPHOCYTES # BLD AUTO: 1.15 THOUSANDS/ΜL (ref 0.6–4.47)
LYMPHOCYTES # BLD AUTO: 1.24 THOUSANDS/ΜL (ref 0.6–4.47)
LYMPHOCYTES # BLD AUTO: 1.28 THOUSANDS/ΜL (ref 0.6–4.47)
LYMPHOCYTES # BLD AUTO: 1.33 THOUSANDS/ΜL (ref 0.6–4.47)
LYMPHOCYTES # BLD AUTO: 2.02 THOUSANDS/ΜL (ref 0.6–4.47)
LYMPHOCYTES # BLD AUTO: 6 % (ref 14–44)
LYMPHOCYTES NFR BLD AUTO: 17 % (ref 14–44)
LYMPHOCYTES NFR BLD AUTO: 18 % (ref 14–44)
LYMPHOCYTES NFR BLD AUTO: 18 % (ref 14–44)
LYMPHOCYTES NFR BLD AUTO: 20 % (ref 14–44)
LYMPHOCYTES NFR BLD AUTO: 21 % (ref 14–44)
LYMPHOCYTES NFR BLD AUTO: 23 % (ref 14–44)
LYMPHOCYTES NFR BLD AUTO: 27 % (ref 14–44)
LYMPHOCYTES NFR BLD AUTO: 5 % (ref 14–44)
LYMPHOCYTES NFR BLD AUTO: 8 % (ref 14–44)
MAGNESIUM SERPL-MCNC: 1.3 MG/DL (ref 1.6–2.6)
MAGNESIUM SERPL-MCNC: 1.6 MG/DL (ref 1.6–2.6)
MAGNESIUM SERPL-MCNC: 1.6 MG/DL (ref 1.6–2.6)
MAGNESIUM SERPL-MCNC: 1.7 MG/DL (ref 1.6–2.6)
MAGNESIUM SERPL-MCNC: 1.7 MG/DL (ref 1.6–2.6)
MAGNESIUM SERPL-MCNC: 1.8 MG/DL (ref 1.6–2.6)
MAGNESIUM SERPL-MCNC: 1.9 MG/DL (ref 1.6–2.6)
MAGNESIUM SERPL-MCNC: 1.9 MG/DL (ref 1.6–2.6)
MAGNESIUM SERPL-MCNC: 2 MG/DL (ref 1.6–2.6)
MAGNESIUM SERPL-MCNC: 2.3 MG/DL (ref 1.6–2.6)
MCH RBC QN AUTO: 30.4 PG (ref 26.8–34.3)
MCH RBC QN AUTO: 31 PG (ref 26.8–34.3)
MCH RBC QN AUTO: 31 PG (ref 26.8–34.3)
MCH RBC QN AUTO: 31.3 PG (ref 26.8–34.3)
MCH RBC QN AUTO: 31.6 PG (ref 26.8–34.3)
MCH RBC QN AUTO: 31.9 PG (ref 26.8–34.3)
MCH RBC QN AUTO: 32.1 PG (ref 26.8–34.3)
MCH RBC QN AUTO: 32.2 PG (ref 26.8–34.3)
MCH RBC QN AUTO: 32.2 PG (ref 26.8–34.3)
MCH RBC QN AUTO: 32.4 PG (ref 26.8–34.3)
MCH RBC QN AUTO: 32.8 PG (ref 26.8–34.3)
MCH RBC QN AUTO: 32.9 PG (ref 26.8–34.3)
MCH RBC QN AUTO: 33 PG (ref 26.8–34.3)
MCHC RBC AUTO-ENTMCNC: 32.4 G/DL (ref 31.4–37.4)
MCHC RBC AUTO-ENTMCNC: 32.8 G/DL (ref 31.4–37.4)
MCHC RBC AUTO-ENTMCNC: 32.8 G/DL (ref 31.4–37.4)
MCHC RBC AUTO-ENTMCNC: 32.9 G/DL (ref 31.4–37.4)
MCHC RBC AUTO-ENTMCNC: 33 G/DL (ref 31.4–37.4)
MCHC RBC AUTO-ENTMCNC: 33.2 G/DL (ref 31.4–37.4)
MCHC RBC AUTO-ENTMCNC: 33.3 G/DL (ref 31.4–37.4)
MCHC RBC AUTO-ENTMCNC: 33.4 G/DL (ref 31.4–37.4)
MCHC RBC AUTO-ENTMCNC: 33.6 G/DL (ref 31.4–37.4)
MCHC RBC AUTO-ENTMCNC: 33.6 G/DL (ref 31.4–37.4)
MCHC RBC AUTO-ENTMCNC: 34.1 G/DL (ref 31.4–37.4)
MCHC RBC AUTO-ENTMCNC: 34.1 G/DL (ref 31.4–37.4)
MCHC RBC AUTO-ENTMCNC: 34.2 G/DL (ref 31.4–37.4)
MCHC RBC AUTO-ENTMCNC: 34.3 G/DL (ref 31.4–37.4)
MCHC RBC AUTO-ENTMCNC: 35.3 G/DL (ref 31.4–37.4)
MCV RBC AUTO: 100 FL (ref 82–98)
MCV RBC AUTO: 101 FL (ref 82–98)
MCV RBC AUTO: 90 FL (ref 82–98)
MCV RBC AUTO: 93 FL (ref 82–98)
MCV RBC AUTO: 94 FL (ref 82–98)
MCV RBC AUTO: 95 FL (ref 82–98)
MCV RBC AUTO: 96 FL (ref 82–98)
MCV RBC AUTO: 97 FL (ref 82–98)
MCV RBC AUTO: 97 FL (ref 82–98)
MCV RBC AUTO: 99 FL (ref 82–98)
MCV RBC AUTO: 99 FL (ref 82–98)
MONOCYTES # BLD AUTO: 0.48 THOUSAND/UL (ref 0–1.22)
MONOCYTES # BLD AUTO: 0.53 THOUSAND/ΜL (ref 0.17–1.22)
MONOCYTES # BLD AUTO: 0.6 THOUSAND/ΜL (ref 0.17–1.22)
MONOCYTES # BLD AUTO: 0.63 THOUSAND/ΜL (ref 0.17–1.22)
MONOCYTES # BLD AUTO: 0.65 THOUSAND/ΜL (ref 0.17–1.22)
MONOCYTES # BLD AUTO: 0.67 THOUSAND/ΜL (ref 0.17–1.22)
MONOCYTES # BLD AUTO: 0.69 THOUSAND/ΜL (ref 0.17–1.22)
MONOCYTES # BLD AUTO: 0.7 THOUSAND/ΜL (ref 0.17–1.22)
MONOCYTES # BLD AUTO: 0.71 THOUSAND/ΜL (ref 0.17–1.22)
MONOCYTES # BLD AUTO: 0.71 THOUSAND/ΜL (ref 0.17–1.22)
MONOCYTES # BLD AUTO: 0.76 THOUSAND/ΜL (ref 0.17–1.22)
MONOCYTES # BLD AUTO: 0.76 THOUSAND/ΜL (ref 0.17–1.22)
MONOCYTES NFR BLD AUTO: 10 % (ref 4–12)
MONOCYTES NFR BLD AUTO: 10 % (ref 4–12)
MONOCYTES NFR BLD AUTO: 11 % (ref 4–12)
MONOCYTES NFR BLD AUTO: 12 % (ref 4–12)
MONOCYTES NFR BLD AUTO: 7 % (ref 4–12)
MONOCYTES NFR BLD AUTO: 9 % (ref 4–12)
MONOCYTES NFR BLD: 7 % (ref 4–12)
NEUTROPHILS # BLD AUTO: 3.13 THOUSANDS/ΜL (ref 1.85–7.62)
NEUTROPHILS # BLD AUTO: 3.37 THOUSANDS/ΜL (ref 1.85–7.62)
NEUTROPHILS # BLD AUTO: 3.96 THOUSANDS/ΜL (ref 1.85–7.62)
NEUTROPHILS # BLD AUTO: 4.09 THOUSANDS/ΜL (ref 1.85–7.62)
NEUTROPHILS # BLD AUTO: 4.32 THOUSANDS/ΜL (ref 1.85–7.62)
NEUTROPHILS # BLD AUTO: 4.59 THOUSANDS/ΜL (ref 1.85–7.62)
NEUTROPHILS # BLD AUTO: 4.61 THOUSANDS/ΜL (ref 1.85–7.62)
NEUTROPHILS # BLD AUTO: 4.7 THOUSANDS/ΜL (ref 1.85–7.62)
NEUTROPHILS # BLD AUTO: 5.16 THOUSANDS/ΜL (ref 1.85–7.62)
NEUTROPHILS # BLD AUTO: 5.56 THOUSANDS/ΜL (ref 1.85–7.62)
NEUTROPHILS # BLD AUTO: 9.59 THOUSANDS/ΜL (ref 1.85–7.62)
NEUTROPHILS # BLD MANUAL: 5.92 THOUSAND/UL (ref 1.85–7.62)
NEUTS BAND NFR BLD MANUAL: 1 % (ref 0–8)
NEUTS SEG NFR BLD AUTO: 61 % (ref 43–75)
NEUTS SEG NFR BLD AUTO: 62 % (ref 43–75)
NEUTS SEG NFR BLD AUTO: 65 % (ref 43–75)
NEUTS SEG NFR BLD AUTO: 66 % (ref 43–75)
NEUTS SEG NFR BLD AUTO: 67 % (ref 43–75)
NEUTS SEG NFR BLD AUTO: 68 % (ref 43–75)
NEUTS SEG NFR BLD AUTO: 68 % (ref 43–75)
NEUTS SEG NFR BLD AUTO: 70 % (ref 43–75)
NEUTS SEG NFR BLD AUTO: 72 % (ref 43–75)
NEUTS SEG NFR BLD AUTO: 80 % (ref 43–75)
NEUTS SEG NFR BLD AUTO: 85 % (ref 43–75)
NEUTS SEG NFR BLD AUTO: 87 % (ref 43–75)
NITRITE UR QL STRIP: NEGATIVE
NITRITE UR QL STRIP: NEGATIVE
NON-SQ EPI CELLS URNS QL MICRO: ABNORMAL /HPF
NON-SQ EPI CELLS URNS QL MICRO: ABNORMAL /HPF
NRBC BLD AUTO-RTO: 0 /100 WBCS
NT-PROBNP SERPL-MCNC: 1058 PG/ML
NT-PROBNP SERPL-MCNC: 1757 PG/ML
NT-PROBNP SERPL-MCNC: 4679 PG/ML
NT-PROBNP SERPL-MCNC: 4693 PG/ML
P AXIS: -78 DEGREES
P AXIS: 0 DEGREES
P AXIS: 0 DEGREES
P AXIS: 131 DEGREES
P AXIS: 52 DEGREES
P AXIS: 66 DEGREES
PH UR STRIP.AUTO: 5.5 [PH] (ref 4.5–8)
PH UR STRIP.AUTO: 6.5 [PH]
PHOSPHATE SERPL-MCNC: 1.9 MG/DL (ref 2.3–4.1)
PHOSPHATE SERPL-MCNC: 4.3 MG/DL (ref 2.3–4.1)
PHOSPHATE SERPL-MCNC: 4.9 MG/DL (ref 2.3–4.1)
PHOSPHATE SERPL-MCNC: 5.1 MG/DL (ref 2.3–4.1)
PLATELET # BLD AUTO: 100 THOUSANDS/UL (ref 149–390)
PLATELET # BLD AUTO: 145 THOUSANDS/UL (ref 149–390)
PLATELET # BLD AUTO: 150 THOUSANDS/UL (ref 149–390)
PLATELET # BLD AUTO: 167 THOUSANDS/UL (ref 149–390)
PLATELET # BLD AUTO: 181 THOUSANDS/UL (ref 149–390)
PLATELET # BLD AUTO: 189 THOUSANDS/UL (ref 149–390)
PLATELET # BLD AUTO: 189 THOUSANDS/UL (ref 149–390)
PLATELET # BLD AUTO: 196 THOUSANDS/UL (ref 149–390)
PLATELET # BLD AUTO: 204 THOUSANDS/UL (ref 149–390)
PLATELET # BLD AUTO: 206 THOUSANDS/UL (ref 149–390)
PLATELET # BLD AUTO: 215 THOUSANDS/UL (ref 149–390)
PLATELET # BLD AUTO: 72 THOUSANDS/UL (ref 149–390)
PLATELET # BLD AUTO: 77 THOUSANDS/UL (ref 149–390)
PLATELET # BLD AUTO: 81 THOUSANDS/UL (ref 149–390)
PLATELET # BLD AUTO: 86 THOUSANDS/UL (ref 149–390)
PLATELET BLD QL SMEAR: ABNORMAL
PMV BLD AUTO: 10.1 FL (ref 8.9–12.7)
PMV BLD AUTO: 10.3 FL (ref 8.9–12.7)
PMV BLD AUTO: 10.4 FL (ref 8.9–12.7)
PMV BLD AUTO: 10.4 FL (ref 8.9–12.7)
PMV BLD AUTO: 10.5 FL (ref 8.9–12.7)
PMV BLD AUTO: 10.6 FL (ref 8.9–12.7)
PMV BLD AUTO: 10.6 FL (ref 8.9–12.7)
PMV BLD AUTO: 10.7 FL (ref 8.9–12.7)
PMV BLD AUTO: 10.7 FL (ref 8.9–12.7)
PMV BLD AUTO: 11.4 FL (ref 8.9–12.7)
PMV BLD AUTO: 11.9 FL (ref 8.9–12.7)
PMV BLD AUTO: 11.9 FL (ref 8.9–12.7)
PMV BLD AUTO: 12.1 FL (ref 8.9–12.7)
PMV BLD AUTO: 13.2 FL (ref 8.9–12.7)
PMV BLD AUTO: 13.7 FL (ref 8.9–12.7)
POTASSIUM SERPL-SCNC: 2.1 MMOL/L (ref 3.5–5.3)
POTASSIUM SERPL-SCNC: 2.3 MMOL/L (ref 3.5–5.3)
POTASSIUM SERPL-SCNC: 2.8 MMOL/L (ref 3.5–5.3)
POTASSIUM SERPL-SCNC: 3 MMOL/L (ref 3.5–5.3)
POTASSIUM SERPL-SCNC: 3.3 MMOL/L (ref 3.5–5.3)
POTASSIUM SERPL-SCNC: 3.3 MMOL/L (ref 3.5–5.3)
POTASSIUM SERPL-SCNC: 3.4 MMOL/L (ref 3.5–5.3)
POTASSIUM SERPL-SCNC: 3.5 MMOL/L (ref 3.5–5.3)
POTASSIUM SERPL-SCNC: 3.5 MMOL/L (ref 3.5–5.3)
POTASSIUM SERPL-SCNC: 3.7 MMOL/L (ref 3.5–5.3)
POTASSIUM SERPL-SCNC: 3.8 MMOL/L (ref 3.5–5.3)
POTASSIUM SERPL-SCNC: 3.9 MMOL/L (ref 3.5–5.3)
POTASSIUM SERPL-SCNC: 4.1 MMOL/L (ref 3.5–5.3)
POTASSIUM SERPL-SCNC: 4.2 MMOL/L (ref 3.5–5.3)
POTASSIUM SERPL-SCNC: 4.2 MMOL/L (ref 3.5–5.3)
POTASSIUM SERPL-SCNC: 4.3 MMOL/L (ref 3.5–5.3)
POTASSIUM SERPL-SCNC: 4.3 MMOL/L (ref 3.5–5.3)
POTASSIUM SERPL-SCNC: 4.4 MMOL/L (ref 3.5–5.3)
POTASSIUM SERPL-SCNC: 4.7 MMOL/L (ref 3.5–5.3)
POTASSIUM SERPL-SCNC: 4.9 MMOL/L (ref 3.5–5.3)
POTASSIUM SERPL-SCNC: 4.9 MMOL/L (ref 3.5–5.3)
PR INTERVAL: 208 MS
PR INTERVAL: 216 MS
PR INTERVAL: 544 MS
PR INTERVAL: 592 MS
PROT SERPL-MCNC: 4.6 G/DL (ref 6.4–8.2)
PROT SERPL-MCNC: 4.6 G/DL (ref 6.4–8.2)
PROT SERPL-MCNC: 4.8 G/DL (ref 6.4–8.2)
PROT SERPL-MCNC: 4.9 G/DL (ref 6.4–8.2)
PROT SERPL-MCNC: 5 G/DL (ref 6.4–8.2)
PROT SERPL-MCNC: 5.4 G/DL (ref 6.4–8.2)
PROT SERPL-MCNC: 5.4 G/DL (ref 6.4–8.2)
PROT SERPL-MCNC: 5.5 G/DL (ref 6.4–8.2)
PROT SERPL-MCNC: 6.2 G/DL (ref 6.4–8.2)
PROT UR STRIP-MCNC: NEGATIVE MG/DL
PROT UR STRIP-MCNC: NEGATIVE MG/DL
PROTHROMBIN TIME: 19.9 SECONDS (ref 11.6–14.5)
PROTHROMBIN TIME: 22.2 SECONDS (ref 11.6–14.5)
PROTHROMBIN TIME: 22.2 SECONDS (ref 11.6–14.5)
PROTHROMBIN TIME: 23 SECONDS (ref 11.6–14.5)
PROTHROMBIN TIME: 25 SECONDS (ref 11.6–14.5)
PROTHROMBIN TIME: 25.9 SECONDS (ref 11.6–14.5)
PROTHROMBIN TIME: 26.7 SECONDS (ref 11.6–14.5)
PROTHROMBIN TIME: 26.8 SECONDS (ref 11.6–14.5)
PROTHROMBIN TIME: 27.8 SECONDS (ref 11.6–14.5)
PROTHROMBIN TIME: 27.9 SECONDS (ref 11.6–14.5)
PROTHROMBIN TIME: 28.2 SECONDS (ref 11.6–14.5)
PROTHROMBIN TIME: 29.4 SECONDS (ref 11.6–14.5)
PROTHROMBIN TIME: 30.7 SECONDS (ref 11.6–14.5)
PROTHROMBIN TIME: 32.5 SECONDS (ref 11.6–14.5)
PROTHROMBIN TIME: 33.9 SECONDS (ref 11.6–14.5)
QRS AXIS: -47 DEGREES
QRS AXIS: -52 DEGREES
QRS AXIS: -54 DEGREES
QRS AXIS: -58 DEGREES
QRS AXIS: -67 DEGREES
QRS AXIS: -68 DEGREES
QRS AXIS: -71 DEGREES
QRS AXIS: -74 DEGREES
QRS AXIS: -74 DEGREES
QRS AXIS: -79 DEGREES
QRSD INTERVAL: 110 MS
QRSD INTERVAL: 112 MS
QRSD INTERVAL: 162 MS
QRSD INTERVAL: 162 MS
QRSD INTERVAL: 166 MS
QRSD INTERVAL: 166 MS
QRSD INTERVAL: 176 MS
QRSD INTERVAL: 208 MS
QRSD INTERVAL: 218 MS
QRSD INTERVAL: 222 MS
QT INTERVAL: 404 MS
QT INTERVAL: 420 MS
QT INTERVAL: 460 MS
QT INTERVAL: 460 MS
QT INTERVAL: 476 MS
QT INTERVAL: 508 MS
QT INTERVAL: 514 MS
QT INTERVAL: 548 MS
QT INTERVAL: 572 MS
QT INTERVAL: 666 MS
QTC INTERVAL: 410 MS
QTC INTERVAL: 455 MS
QTC INTERVAL: 466 MS
QTC INTERVAL: 470 MS
QTC INTERVAL: 559 MS
QTC INTERVAL: 572 MS
QTC INTERVAL: 578 MS
QTC INTERVAL: 595 MS
QTC INTERVAL: 624 MS
QTC INTERVAL: 634 MS
RBC # BLD AUTO: 3.19 MILLION/UL (ref 3.88–5.62)
RBC # BLD AUTO: 3.23 MILLION/UL (ref 3.88–5.62)
RBC # BLD AUTO: 3.36 MILLION/UL (ref 3.88–5.62)
RBC # BLD AUTO: 3.4 MILLION/UL (ref 3.88–5.62)
RBC # BLD AUTO: 3.45 MILLION/UL (ref 3.88–5.62)
RBC # BLD AUTO: 3.48 MILLION/UL (ref 3.88–5.62)
RBC # BLD AUTO: 3.54 MILLION/UL (ref 3.88–5.62)
RBC # BLD AUTO: 3.61 MILLION/UL (ref 3.88–5.62)
RBC # BLD AUTO: 3.64 MILLION/UL (ref 3.88–5.62)
RBC # BLD AUTO: 3.65 MILLION/UL (ref 3.88–5.62)
RBC # BLD AUTO: 3.67 MILLION/UL (ref 3.88–5.62)
RBC # BLD AUTO: 3.67 MILLION/UL (ref 3.88–5.62)
RBC # BLD AUTO: 3.72 MILLION/UL (ref 3.88–5.62)
RBC # BLD AUTO: 3.82 MILLION/UL (ref 3.88–5.62)
RBC # BLD AUTO: 4.24 MILLION/UL (ref 3.88–5.62)
RBC #/AREA URNS AUTO: ABNORMAL /HPF
RBC #/AREA URNS AUTO: ABNORMAL /HPF
RSV RNA RESP QL NAA+PROBE: NEGATIVE
SARS-COV-2 RNA RESP QL NAA+PROBE: NEGATIVE
SODIUM SERPL-SCNC: 131 MMOL/L (ref 136–145)
SODIUM SERPL-SCNC: 132 MMOL/L (ref 136–145)
SODIUM SERPL-SCNC: 133 MMOL/L (ref 136–145)
SODIUM SERPL-SCNC: 133 MMOL/L (ref 136–145)
SODIUM SERPL-SCNC: 134 MMOL/L (ref 136–145)
SODIUM SERPL-SCNC: 135 MMOL/L (ref 136–145)
SODIUM SERPL-SCNC: 135 MMOL/L (ref 136–145)
SODIUM SERPL-SCNC: 136 MMOL/L (ref 136–145)
SODIUM SERPL-SCNC: 138 MMOL/L (ref 136–145)
SODIUM SERPL-SCNC: 139 MMOL/L (ref 136–145)
SODIUM SERPL-SCNC: 140 MMOL/L (ref 136–145)
SODIUM SERPL-SCNC: 141 MMOL/L (ref 136–145)
SODIUM SERPL-SCNC: 141 MMOL/L (ref 136–145)
SP GR UR STRIP.AUTO: 1.01 (ref 1–1.03)
SP GR UR STRIP.AUTO: 1.01 (ref 1–1.03)
T WAVE AXIS: -53 DEGREES
T WAVE AXIS: -56 DEGREES
T WAVE AXIS: 100 DEGREES
T WAVE AXIS: 101 DEGREES
T WAVE AXIS: 103 DEGREES
T WAVE AXIS: 110 DEGREES
T WAVE AXIS: 94 DEGREES
T WAVE AXIS: 96 DEGREES
T WAVE AXIS: 97 DEGREES
T WAVE AXIS: 98 DEGREES
TSH SERPL DL<=0.05 MIU/L-ACNC: 1.46 UIU/ML (ref 0.36–3.74)
TSH SERPL DL<=0.05 MIU/L-ACNC: 4.45 UIU/ML (ref 0.36–3.74)
UROBILINOGEN UR QL STRIP.AUTO: 0.2 E.U./DL
UROBILINOGEN UR QL STRIP.AUTO: 0.2 E.U./DL
VARIANT LYMPHS # BLD AUTO: 1 %
VENTRICULAR RATE: 53 BPM
VENTRICULAR RATE: 59 BPM
VENTRICULAR RATE: 62 BPM
VENTRICULAR RATE: 63 BPM
VENTRICULAR RATE: 71 BPM
VENTRICULAR RATE: 73 BPM
VENTRICULAR RATE: 74 BPM
VENTRICULAR RATE: 74 BPM
VENTRICULAR RATE: 76 BPM
VENTRICULAR RATE: 87 BPM
VIT B12 SERPL-MCNC: 654 PG/ML (ref 100–900)
WBC # BLD AUTO: 11.04 THOUSAND/UL (ref 4.31–10.16)
WBC # BLD AUTO: 4.77 THOUSAND/UL (ref 4.31–10.16)
WBC # BLD AUTO: 5.46 THOUSAND/UL (ref 4.31–10.16)
WBC # BLD AUTO: 5.98 THOUSAND/UL (ref 4.31–10.16)
WBC # BLD AUTO: 5.99 THOUSAND/UL (ref 4.31–10.16)
WBC # BLD AUTO: 6.12 THOUSAND/UL (ref 4.31–10.16)
WBC # BLD AUTO: 6.26 THOUSAND/UL (ref 4.31–10.16)
WBC # BLD AUTO: 6.31 THOUSAND/UL (ref 4.31–10.16)
WBC # BLD AUTO: 6.35 THOUSAND/UL (ref 4.31–10.16)
WBC # BLD AUTO: 6.63 THOUSAND/UL (ref 4.31–10.16)
WBC # BLD AUTO: 6.72 THOUSAND/UL (ref 4.31–10.16)
WBC # BLD AUTO: 6.88 THOUSAND/UL (ref 4.31–10.16)
WBC # BLD AUTO: 7.23 THOUSAND/UL (ref 4.31–10.16)
WBC # BLD AUTO: 7.54 THOUSAND/UL (ref 4.31–10.16)
WBC # BLD AUTO: 7.81 THOUSAND/UL (ref 4.31–10.16)
WBC #/AREA URNS AUTO: ABNORMAL /HPF
WBC #/AREA URNS AUTO: ABNORMAL /HPF

## 2022-01-01 PROCEDURE — 85025 COMPLETE CBC W/AUTO DIFF WBC: CPT | Performed by: INTERNAL MEDICINE

## 2022-01-01 PROCEDURE — G1004 CDSM NDSC: HCPCS

## 2022-01-01 PROCEDURE — 85027 COMPLETE CBC AUTOMATED: CPT | Performed by: STUDENT IN AN ORGANIZED HEALTH CARE EDUCATION/TRAINING PROGRAM

## 2022-01-01 PROCEDURE — 72125 CT NECK SPINE W/O DYE: CPT

## 2022-01-01 PROCEDURE — 99222 1ST HOSP IP/OBS MODERATE 55: CPT | Performed by: FAMILY MEDICINE

## 2022-01-01 PROCEDURE — 80048 BASIC METABOLIC PNL TOTAL CA: CPT

## 2022-01-01 PROCEDURE — 99285 EMERGENCY DEPT VISIT HI MDM: CPT

## 2022-01-01 PROCEDURE — 0T9B70Z DRAINAGE OF BLADDER WITH DRAINAGE DEVICE, VIA NATURAL OR ARTIFICIAL OPENING: ICD-10-PCS | Performed by: INTERNAL MEDICINE

## 2022-01-01 PROCEDURE — 85025 COMPLETE CBC W/AUTO DIFF WBC: CPT | Performed by: EMERGENCY MEDICINE

## 2022-01-01 PROCEDURE — 99232 SBSQ HOSP IP/OBS MODERATE 35: CPT | Performed by: INTERNAL MEDICINE

## 2022-01-01 PROCEDURE — 99223 1ST HOSP IP/OBS HIGH 75: CPT | Performed by: INTERNAL MEDICINE

## 2022-01-01 PROCEDURE — 97530 THERAPEUTIC ACTIVITIES: CPT

## 2022-01-01 PROCEDURE — 81001 URINALYSIS AUTO W/SCOPE: CPT | Performed by: NURSE PRACTITIONER

## 2022-01-01 PROCEDURE — 85610 PROTHROMBIN TIME: CPT | Performed by: STUDENT IN AN ORGANIZED HEALTH CARE EDUCATION/TRAINING PROGRAM

## 2022-01-01 PROCEDURE — 85610 PROTHROMBIN TIME: CPT | Performed by: FAMILY MEDICINE

## 2022-01-01 PROCEDURE — 93005 ELECTROCARDIOGRAM TRACING: CPT

## 2022-01-01 PROCEDURE — 84100 ASSAY OF PHOSPHORUS: CPT | Performed by: INTERNAL MEDICINE

## 2022-01-01 PROCEDURE — 83735 ASSAY OF MAGNESIUM: CPT | Performed by: EMERGENCY MEDICINE

## 2022-01-01 PROCEDURE — 99232 SBSQ HOSP IP/OBS MODERATE 35: CPT | Performed by: PHYSICIAN ASSISTANT

## 2022-01-01 PROCEDURE — 80048 BASIC METABOLIC PNL TOTAL CA: CPT | Performed by: INTERNAL MEDICINE

## 2022-01-01 PROCEDURE — 93000 ELECTROCARDIOGRAM COMPLETE: CPT | Performed by: INTERNAL MEDICINE

## 2022-01-01 PROCEDURE — 94761 N-INVAS EAR/PLS OXIMETRY MLT: CPT

## 2022-01-01 PROCEDURE — 71045 X-RAY EXAM CHEST 1 VIEW: CPT

## 2022-01-01 PROCEDURE — 85025 COMPLETE CBC W/AUTO DIFF WBC: CPT | Performed by: FAMILY MEDICINE

## 2022-01-01 PROCEDURE — 99215 OFFICE O/P EST HI 40 MIN: CPT | Performed by: INTERNAL MEDICINE

## 2022-01-01 PROCEDURE — 84443 ASSAY THYROID STIM HORMONE: CPT | Performed by: FAMILY MEDICINE

## 2022-01-01 PROCEDURE — 85610 PROTHROMBIN TIME: CPT

## 2022-01-01 PROCEDURE — 85007 BL SMEAR W/DIFF WBC COUNT: CPT | Performed by: STUDENT IN AN ORGANIZED HEALTH CARE EDUCATION/TRAINING PROGRAM

## 2022-01-01 PROCEDURE — 83735 ASSAY OF MAGNESIUM: CPT | Performed by: FAMILY MEDICINE

## 2022-01-01 PROCEDURE — 83735 ASSAY OF MAGNESIUM: CPT | Performed by: NURSE PRACTITIONER

## 2022-01-01 PROCEDURE — 99239 HOSP IP/OBS DSCHRG MGMT >30: CPT | Performed by: PHYSICIAN ASSISTANT

## 2022-01-01 PROCEDURE — 96361 HYDRATE IV INFUSION ADD-ON: CPT

## 2022-01-01 PROCEDURE — 74176 CT ABD & PELVIS W/O CONTRAST: CPT

## 2022-01-01 PROCEDURE — 93010 ELECTROCARDIOGRAM REPORT: CPT | Performed by: INTERNAL MEDICINE

## 2022-01-01 PROCEDURE — 80053 COMPREHEN METABOLIC PANEL: CPT | Performed by: STUDENT IN AN ORGANIZED HEALTH CARE EDUCATION/TRAINING PROGRAM

## 2022-01-01 PROCEDURE — 80053 COMPREHEN METABOLIC PANEL: CPT | Performed by: INTERNAL MEDICINE

## 2022-01-01 PROCEDURE — 0241U HB NFCT DS VIR RESP RNA 4 TRGT: CPT | Performed by: INTERNAL MEDICINE

## 2022-01-01 PROCEDURE — 85025 COMPLETE CBC W/AUTO DIFF WBC: CPT

## 2022-01-01 PROCEDURE — 84484 ASSAY OF TROPONIN QUANT: CPT | Performed by: EMERGENCY MEDICINE

## 2022-01-01 PROCEDURE — 80053 COMPREHEN METABOLIC PANEL: CPT

## 2022-01-01 PROCEDURE — 70486 CT MAXILLOFACIAL W/O DYE: CPT

## 2022-01-01 PROCEDURE — 4B02XSZ MEASUREMENT OF CARDIAC PACEMAKER, EXTERNAL APPROACH: ICD-10-PCS | Performed by: INTERNAL MEDICINE

## 2022-01-01 PROCEDURE — 80048 BASIC METABOLIC PNL TOTAL CA: CPT | Performed by: FAMILY MEDICINE

## 2022-01-01 PROCEDURE — 90471 IMMUNIZATION ADMIN: CPT

## 2022-01-01 PROCEDURE — 93295 DEV INTERROG REMOTE 1/2/MLT: CPT | Performed by: INTERNAL MEDICINE

## 2022-01-01 PROCEDURE — 80074 ACUTE HEPATITIS PANEL: CPT | Performed by: STUDENT IN AN ORGANIZED HEALTH CARE EDUCATION/TRAINING PROGRAM

## 2022-01-01 PROCEDURE — 99233 SBSQ HOSP IP/OBS HIGH 50: CPT | Performed by: INTERNAL MEDICINE

## 2022-01-01 PROCEDURE — 99232 SBSQ HOSP IP/OBS MODERATE 35: CPT | Performed by: STUDENT IN AN ORGANIZED HEALTH CARE EDUCATION/TRAINING PROGRAM

## 2022-01-01 PROCEDURE — 99233 SBSQ HOSP IP/OBS HIGH 50: CPT | Performed by: FAMILY MEDICINE

## 2022-01-01 PROCEDURE — 83880 ASSAY OF NATRIURETIC PEPTIDE: CPT | Performed by: FAMILY MEDICINE

## 2022-01-01 PROCEDURE — 99239 HOSP IP/OBS DSCHRG MGMT >30: CPT | Performed by: INTERNAL MEDICINE

## 2022-01-01 PROCEDURE — 83735 ASSAY OF MAGNESIUM: CPT

## 2022-01-01 PROCEDURE — 97535 SELF CARE MNGMENT TRAINING: CPT

## 2022-01-01 PROCEDURE — 84100 ASSAY OF PHOSPHORUS: CPT | Performed by: STUDENT IN AN ORGANIZED HEALTH CARE EDUCATION/TRAINING PROGRAM

## 2022-01-01 PROCEDURE — 85027 COMPLETE CBC AUTOMATED: CPT | Performed by: PHYSICIAN ASSISTANT

## 2022-01-01 PROCEDURE — 71250 CT THORAX DX C-: CPT

## 2022-01-01 PROCEDURE — 99231 SBSQ HOSP IP/OBS SF/LOW 25: CPT | Performed by: INTERNAL MEDICINE

## 2022-01-01 PROCEDURE — 36415 COLL VENOUS BLD VENIPUNCTURE: CPT

## 2022-01-01 PROCEDURE — 80048 BASIC METABOLIC PNL TOTAL CA: CPT | Performed by: NURSE PRACTITIONER

## 2022-01-01 PROCEDURE — 82550 ASSAY OF CK (CPK): CPT | Performed by: STUDENT IN AN ORGANIZED HEALTH CARE EDUCATION/TRAINING PROGRAM

## 2022-01-01 PROCEDURE — 99285 EMERGENCY DEPT VISIT HI MDM: CPT | Performed by: EMERGENCY MEDICINE

## 2022-01-01 PROCEDURE — 83735 ASSAY OF MAGNESIUM: CPT | Performed by: STUDENT IN AN ORGANIZED HEALTH CARE EDUCATION/TRAINING PROGRAM

## 2022-01-01 PROCEDURE — 83880 ASSAY OF NATRIURETIC PEPTIDE: CPT

## 2022-01-01 PROCEDURE — 99222 1ST HOSP IP/OBS MODERATE 55: CPT | Performed by: PSYCHIATRY & NEUROLOGY

## 2022-01-01 PROCEDURE — 82948 REAGENT STRIP/BLOOD GLUCOSE: CPT

## 2022-01-01 PROCEDURE — 99232 SBSQ HOSP IP/OBS MODERATE 35: CPT | Performed by: FAMILY MEDICINE

## 2022-01-01 PROCEDURE — 97110 THERAPEUTIC EXERCISES: CPT

## 2022-01-01 PROCEDURE — 83880 ASSAY OF NATRIURETIC PEPTIDE: CPT | Performed by: EMERGENCY MEDICINE

## 2022-01-01 PROCEDURE — 84100 ASSAY OF PHOSPHORUS: CPT

## 2022-01-01 PROCEDURE — 0241U HB NFCT DS VIR RESP RNA 4 TRGT: CPT | Performed by: FAMILY MEDICINE

## 2022-01-01 PROCEDURE — 85610 PROTHROMBIN TIME: CPT | Performed by: INTERNAL MEDICINE

## 2022-01-01 PROCEDURE — 96374 THER/PROPH/DIAG INJ IV PUSH: CPT

## 2022-01-01 PROCEDURE — 93283 PRGRMG EVAL IMPLANTABLE DFB: CPT | Performed by: INTERNAL MEDICINE

## 2022-01-01 PROCEDURE — 99222 1ST HOSP IP/OBS MODERATE 55: CPT | Performed by: INTERNAL MEDICINE

## 2022-01-01 PROCEDURE — 99232 SBSQ HOSP IP/OBS MODERATE 35: CPT | Performed by: NURSE PRACTITIONER

## 2022-01-01 PROCEDURE — 96365 THER/PROPH/DIAG IV INF INIT: CPT

## 2022-01-01 PROCEDURE — 82607 VITAMIN B-12: CPT | Performed by: FAMILY MEDICINE

## 2022-01-01 PROCEDURE — 97167 OT EVAL HIGH COMPLEX 60 MIN: CPT

## 2022-01-01 PROCEDURE — 97163 PT EVAL HIGH COMPLEX 45 MIN: CPT

## 2022-01-01 PROCEDURE — 99497 ADVNCD CARE PLAN 30 MIN: CPT | Performed by: INTERNAL MEDICINE

## 2022-01-01 PROCEDURE — 82330 ASSAY OF CALCIUM: CPT | Performed by: NURSE PRACTITIONER

## 2022-01-01 PROCEDURE — 99239 HOSP IP/OBS DSCHRG MGMT >30: CPT | Performed by: FAMILY MEDICINE

## 2022-01-01 PROCEDURE — 97116 GAIT TRAINING THERAPY: CPT

## 2022-01-01 PROCEDURE — 85730 THROMBOPLASTIN TIME PARTIAL: CPT | Performed by: EMERGENCY MEDICINE

## 2022-01-01 PROCEDURE — 84484 ASSAY OF TROPONIN QUANT: CPT | Performed by: STUDENT IN AN ORGANIZED HEALTH CARE EDUCATION/TRAINING PROGRAM

## 2022-01-01 PROCEDURE — 85610 PROTHROMBIN TIME: CPT | Performed by: PHYSICIAN ASSISTANT

## 2022-01-01 PROCEDURE — 90715 TDAP VACCINE 7 YRS/> IM: CPT | Performed by: EMERGENCY MEDICINE

## 2022-01-01 PROCEDURE — 80053 COMPREHEN METABOLIC PANEL: CPT | Performed by: EMERGENCY MEDICINE

## 2022-01-01 PROCEDURE — 36415 COLL VENOUS BLD VENIPUNCTURE: CPT | Performed by: EMERGENCY MEDICINE

## 2022-01-01 PROCEDURE — 76770 US EXAM ABDO BACK WALL COMP: CPT

## 2022-01-01 PROCEDURE — 84484 ASSAY OF TROPONIN QUANT: CPT | Performed by: NURSE PRACTITIONER

## 2022-01-01 PROCEDURE — 99233 SBSQ HOSP IP/OBS HIGH 50: CPT | Performed by: NURSE PRACTITIONER

## 2022-01-01 PROCEDURE — NC001 PR NO CHARGE: Performed by: INTERNAL MEDICINE

## 2022-01-01 PROCEDURE — 99223 1ST HOSP IP/OBS HIGH 75: CPT | Performed by: STUDENT IN AN ORGANIZED HEALTH CARE EDUCATION/TRAINING PROGRAM

## 2022-01-01 PROCEDURE — 93296 REM INTERROG EVL PM/IDS: CPT | Performed by: INTERNAL MEDICINE

## 2022-01-01 PROCEDURE — 84443 ASSAY THYROID STIM HORMONE: CPT | Performed by: STUDENT IN AN ORGANIZED HEALTH CARE EDUCATION/TRAINING PROGRAM

## 2022-01-01 PROCEDURE — 85610 PROTHROMBIN TIME: CPT | Performed by: EMERGENCY MEDICINE

## 2022-01-01 PROCEDURE — 82330 ASSAY OF CALCIUM: CPT

## 2022-01-01 PROCEDURE — 70450 CT HEAD/BRAIN W/O DYE: CPT

## 2022-01-01 PROCEDURE — 80053 COMPREHEN METABOLIC PANEL: CPT | Performed by: FAMILY MEDICINE

## 2022-01-01 PROCEDURE — 0241U HB NFCT DS VIR RESP RNA 4 TRGT: CPT | Performed by: EMERGENCY MEDICINE

## 2022-01-01 PROCEDURE — 85027 COMPLETE CBC AUTOMATED: CPT | Performed by: FAMILY MEDICINE

## 2022-01-01 PROCEDURE — 81001 URINALYSIS AUTO W/SCOPE: CPT

## 2022-01-01 PROCEDURE — 99222 1ST HOSP IP/OBS MODERATE 55: CPT | Performed by: PHYSICIAN ASSISTANT

## 2022-01-01 PROCEDURE — 4A02XFZ MEASUREMENT OF CARDIAC RHYTHM, EXTERNAL APPROACH: ICD-10-PCS | Performed by: INTERNAL MEDICINE

## 2022-01-01 PROCEDURE — 99291 CRITICAL CARE FIRST HOUR: CPT | Performed by: EMERGENCY MEDICINE

## 2022-01-01 RX ORDER — WARFARIN SODIUM 1 MG/1
1 TABLET ORAL
Status: DISCONTINUED | OUTPATIENT
Start: 2022-01-01 | End: 2022-01-01

## 2022-01-01 RX ORDER — CARVEDILOL 12.5 MG/1
12.5 TABLET ORAL 2 TIMES DAILY WITH MEALS
Qty: 180 TABLET | Refills: 3 | Status: SHIPPED | OUTPATIENT
Start: 2022-01-01 | End: 2022-01-01 | Stop reason: HOSPADM

## 2022-01-01 RX ORDER — FUROSEMIDE 10 MG/ML
40 INJECTION INTRAMUSCULAR; INTRAVENOUS ONCE
Status: COMPLETED | OUTPATIENT
Start: 2022-01-01 | End: 2022-01-01

## 2022-01-01 RX ORDER — MEXILETINE HYDROCHLORIDE 150 MG/1
150 CAPSULE ORAL 2 TIMES DAILY
Qty: 180 CAPSULE | Refills: 3 | Status: SHIPPED | OUTPATIENT
Start: 2022-01-01 | End: 2022-01-01 | Stop reason: HOSPADM

## 2022-01-01 RX ORDER — WARFARIN SODIUM 2 MG/1
2 TABLET ORAL
Status: DISCONTINUED | OUTPATIENT
Start: 2022-01-01 | End: 2022-01-01 | Stop reason: HOSPADM

## 2022-01-01 RX ORDER — SENNOSIDES 8.6 MG
2 TABLET ORAL
Status: DISCONTINUED | OUTPATIENT
Start: 2022-01-01 | End: 2022-01-01 | Stop reason: HOSPADM

## 2022-01-01 RX ORDER — POTASSIUM CHLORIDE 20MEQ/15ML
40 LIQUID (ML) ORAL DAILY
Status: DISCONTINUED | OUTPATIENT
Start: 2022-01-01 | End: 2022-01-01

## 2022-01-01 RX ORDER — POTASSIUM CHLORIDE 750 MG/1
10 TABLET, EXTENDED RELEASE ORAL 2 TIMES DAILY
Qty: 180 TABLET | Refills: 1 | Status: SHIPPED | OUTPATIENT
Start: 2022-01-01 | End: 2022-01-01

## 2022-01-01 RX ORDER — ATORVASTATIN CALCIUM 40 MG/1
40 TABLET, FILM COATED ORAL DAILY
Status: DISCONTINUED | OUTPATIENT
Start: 2022-01-01 | End: 2022-01-01 | Stop reason: ALTCHOICE

## 2022-01-01 RX ORDER — MEXILETINE HYDROCHLORIDE 150 MG/1
150 CAPSULE ORAL 3 TIMES DAILY
Qty: 270 CAPSULE | Refills: 1 | Status: SHIPPED | OUTPATIENT
Start: 2022-01-01 | End: 2022-01-01 | Stop reason: SDUPTHER

## 2022-01-01 RX ORDER — MAGNESIUM SULFATE HEPTAHYDRATE 40 MG/ML
2 INJECTION, SOLUTION INTRAVENOUS ONCE
Status: DISCONTINUED | OUTPATIENT
Start: 2022-01-01 | End: 2022-01-01

## 2022-01-01 RX ORDER — POTASSIUM CHLORIDE 20MEQ/15ML
40 LIQUID (ML) ORAL
Status: COMPLETED | OUTPATIENT
Start: 2022-01-01 | End: 2022-01-01

## 2022-01-01 RX ORDER — ACETAMINOPHEN 650 MG/1
650 SUPPOSITORY RECTAL EVERY 6 HOURS PRN
Status: DISCONTINUED | OUTPATIENT
Start: 2022-01-01 | End: 2022-01-01 | Stop reason: HOSPADM

## 2022-01-01 RX ORDER — WARFARIN SODIUM 1 MG/1
1 TABLET ORAL 2 TIMES WEEKLY
Status: DISCONTINUED | OUTPATIENT
Start: 2022-01-01 | End: 2022-01-01 | Stop reason: HOSPADM

## 2022-01-01 RX ORDER — SODIUM CHLORIDE, SODIUM GLUCONATE, SODIUM ACETATE, POTASSIUM CHLORIDE, MAGNESIUM CHLORIDE, SODIUM PHOSPHATE, DIBASIC, AND POTASSIUM PHOSPHATE .53; .5; .37; .037; .03; .012; .00082 G/100ML; G/100ML; G/100ML; G/100ML; G/100ML; G/100ML; G/100ML
50 INJECTION, SOLUTION INTRAVENOUS CONTINUOUS
Status: DISCONTINUED | OUTPATIENT
Start: 2022-01-01 | End: 2022-01-01

## 2022-01-01 RX ORDER — MEXILETINE HYDROCHLORIDE 150 MG/1
150 CAPSULE ORAL 3 TIMES DAILY
Qty: 90 CAPSULE | Refills: 0 | Status: SHIPPED | OUTPATIENT
Start: 2022-01-01 | End: 2022-01-01 | Stop reason: SDUPTHER

## 2022-01-01 RX ORDER — POTASSIUM CHLORIDE 750 MG/1
10 TABLET, EXTENDED RELEASE ORAL DAILY
Qty: 90 TABLET | Refills: 1 | Status: SHIPPED | OUTPATIENT
Start: 2022-01-01 | End: 2022-01-01 | Stop reason: HOSPADM

## 2022-01-01 RX ORDER — MAGNESIUM SULFATE HEPTAHYDRATE 40 MG/ML
2 INJECTION, SOLUTION INTRAVENOUS ONCE
Status: COMPLETED | OUTPATIENT
Start: 2022-01-01 | End: 2022-01-01

## 2022-01-01 RX ORDER — TORSEMIDE 10 MG/1
10 TABLET ORAL DAILY
Qty: 90 TABLET | Refills: 3 | Status: SHIPPED | OUTPATIENT
Start: 2022-01-01 | End: 2022-01-01 | Stop reason: HOSPADM

## 2022-01-01 RX ORDER — ATORVASTATIN CALCIUM 40 MG/1
40 TABLET, FILM COATED ORAL DAILY
Status: DISCONTINUED | OUTPATIENT
Start: 2022-01-01 | End: 2022-01-01 | Stop reason: HOSPADM

## 2022-01-01 RX ORDER — ACETAMINOPHEN 325 MG/1
650 TABLET ORAL EVERY 6 HOURS PRN
Status: DISCONTINUED | OUTPATIENT
Start: 2022-01-01 | End: 2022-01-01 | Stop reason: HOSPADM

## 2022-01-01 RX ORDER — FUROSEMIDE 10 MG/ML
60 INJECTION INTRAMUSCULAR; INTRAVENOUS ONCE
Status: COMPLETED | OUTPATIENT
Start: 2022-01-01 | End: 2022-01-01

## 2022-01-01 RX ORDER — FUROSEMIDE 10 MG/ML
40 INJECTION INTRAMUSCULAR; INTRAVENOUS
Status: DISCONTINUED | OUTPATIENT
Start: 2022-01-01 | End: 2022-01-01

## 2022-01-01 RX ORDER — CALCIUM CARBONATE 200(500)MG
1000 TABLET,CHEWABLE ORAL DAILY PRN
Status: DISCONTINUED | OUTPATIENT
Start: 2022-01-01 | End: 2022-01-01 | Stop reason: HOSPADM

## 2022-01-01 RX ORDER — POTASSIUM CHLORIDE 750 MG/1
10 TABLET, EXTENDED RELEASE ORAL DAILY
Status: DISCONTINUED | OUTPATIENT
Start: 2022-01-01 | End: 2022-01-01

## 2022-01-01 RX ORDER — MEXILETINE HYDROCHLORIDE 150 MG/1
150 CAPSULE ORAL EVERY 8 HOURS SCHEDULED
Status: DISCONTINUED | OUTPATIENT
Start: 2022-01-01 | End: 2022-01-01 | Stop reason: HOSPADM

## 2022-01-01 RX ORDER — CARVEDILOL 12.5 MG/1
12.5 TABLET ORAL 2 TIMES DAILY WITH MEALS
Status: DISCONTINUED | OUTPATIENT
Start: 2022-01-01 | End: 2022-01-01

## 2022-01-01 RX ORDER — NYSTATIN 100000 [USP'U]/G
POWDER TOPICAL 2 TIMES DAILY
Status: DISCONTINUED | OUTPATIENT
Start: 2022-01-01 | End: 2022-01-01 | Stop reason: HOSPADM

## 2022-01-01 RX ORDER — POTASSIUM CHLORIDE 14.9 MG/ML
20 INJECTION INTRAVENOUS ONCE
Status: COMPLETED | OUTPATIENT
Start: 2022-01-01 | End: 2022-01-01

## 2022-01-01 RX ORDER — AMIODARONE HYDROCHLORIDE 200 MG/1
200 TABLET ORAL DAILY
Status: DISCONTINUED | OUTPATIENT
Start: 2022-01-01 | End: 2022-01-01 | Stop reason: HOSPADM

## 2022-01-01 RX ORDER — TORSEMIDE 10 MG/1
10 TABLET ORAL DAILY
Qty: 30 TABLET | Refills: 0 | Status: SHIPPED | OUTPATIENT
Start: 2022-01-01 | End: 2022-01-01 | Stop reason: SDUPTHER

## 2022-01-01 RX ORDER — ACETAMINOPHEN 325 MG/1
650 TABLET ORAL EVERY 6 HOURS PRN
Status: DISCONTINUED | OUTPATIENT
Start: 2022-01-01 | End: 2022-01-01

## 2022-01-01 RX ORDER — TORSEMIDE 20 MG/1
10 TABLET ORAL DAILY
Status: DISCONTINUED | OUTPATIENT
Start: 2022-01-01 | End: 2022-01-01 | Stop reason: HOSPADM

## 2022-01-01 RX ORDER — HEPARIN SODIUM 5000 [USP'U]/ML
5000 INJECTION, SOLUTION INTRAVENOUS; SUBCUTANEOUS EVERY 8 HOURS SCHEDULED
Status: DISCONTINUED | OUTPATIENT
Start: 2022-01-01 | End: 2022-01-01

## 2022-01-01 RX ORDER — MORPHINE SULFATE 100 MG/5ML
5 SOLUTION, CONCENTRATE ORAL EVERY 4 HOURS PRN
Qty: 10 ML | Refills: 0 | Status: SHIPPED | OUTPATIENT
Start: 2022-01-01 | End: 2022-04-12

## 2022-01-01 RX ORDER — WARFARIN SODIUM 2 MG/1
2 TABLET ORAL
Status: DISCONTINUED | OUTPATIENT
Start: 2022-01-01 | End: 2022-01-01

## 2022-01-01 RX ORDER — POTASSIUM CHLORIDE 20MEQ/15ML
20 LIQUID (ML) ORAL
Status: COMPLETED | OUTPATIENT
Start: 2022-01-01 | End: 2022-01-01

## 2022-01-01 RX ORDER — POTASSIUM CHLORIDE 750 MG/1
10 TABLET, EXTENDED RELEASE ORAL DAILY
Qty: 180 TABLET | Refills: 1
Start: 2022-01-01 | End: 2022-01-01 | Stop reason: SDUPTHER

## 2022-01-01 RX ORDER — TORSEMIDE 10 MG/1
10 TABLET ORAL DAILY
Qty: 90 TABLET | Refills: 1 | Status: SHIPPED | OUTPATIENT
Start: 2022-01-01 | End: 2022-01-01 | Stop reason: SDUPTHER

## 2022-01-01 RX ORDER — TORSEMIDE 5 MG/1
5 TABLET ORAL DAILY
Qty: 30 TABLET | Refills: 0 | Status: SHIPPED | OUTPATIENT
Start: 2022-01-01 | End: 2022-01-01 | Stop reason: SDUPTHER

## 2022-01-01 RX ORDER — POTASSIUM CHLORIDE 20 MEQ/1
40 TABLET, EXTENDED RELEASE ORAL ONCE
Status: COMPLETED | OUTPATIENT
Start: 2022-01-01 | End: 2022-01-01

## 2022-01-01 RX ORDER — MEXILETINE HYDROCHLORIDE 150 MG/1
150 CAPSULE ORAL 3 TIMES DAILY
Status: DISCONTINUED | OUTPATIENT
Start: 2022-01-01 | End: 2022-01-01

## 2022-01-01 RX ORDER — MAGNESIUM SULFATE HEPTAHYDRATE 40 MG/ML
4 INJECTION, SOLUTION INTRAVENOUS ONCE
Status: COMPLETED | OUTPATIENT
Start: 2022-01-01 | End: 2022-01-01

## 2022-01-01 RX ORDER — POTASSIUM CHLORIDE 20MEQ/15ML
40 LIQUID (ML) ORAL 2 TIMES DAILY
Status: DISCONTINUED | OUTPATIENT
Start: 2022-01-01 | End: 2022-01-01

## 2022-01-01 RX ORDER — DIGOXIN 0.25 MG/ML
125 INJECTION INTRAMUSCULAR; INTRAVENOUS ONCE
Status: COMPLETED | OUTPATIENT
Start: 2022-01-01 | End: 2022-01-01

## 2022-01-01 RX ORDER — LANOLIN ALCOHOL/MO/W.PET/CERES
3 CREAM (GRAM) TOPICAL
Status: DISCONTINUED | OUTPATIENT
Start: 2022-01-01 | End: 2022-01-01

## 2022-01-01 RX ORDER — LORAZEPAM 2 MG/ML
0.5 INJECTION INTRAMUSCULAR EVERY 4 HOURS PRN
Status: DISCONTINUED | OUTPATIENT
Start: 2022-01-01 | End: 2022-01-01 | Stop reason: HOSPADM

## 2022-01-01 RX ORDER — FUROSEMIDE 10 MG/ML
20 INJECTION INTRAMUSCULAR; INTRAVENOUS ONCE
Status: COMPLETED | OUTPATIENT
Start: 2022-01-01 | End: 2022-01-01

## 2022-01-01 RX ORDER — MORPHINE SULFATE 100 MG/5ML
5 SOLUTION, CONCENTRATE ORAL EVERY 4 HOURS PRN
Status: DISCONTINUED | OUTPATIENT
Start: 2022-01-01 | End: 2022-01-01 | Stop reason: HOSPADM

## 2022-01-01 RX ORDER — CARVEDILOL 6.25 MG/1
3.12 TABLET ORAL 2 TIMES DAILY WITH MEALS
Status: DISCONTINUED | OUTPATIENT
Start: 2022-01-01 | End: 2022-01-01

## 2022-01-01 RX ORDER — CARVEDILOL 12.5 MG/1
12.5 TABLET ORAL 2 TIMES DAILY WITH MEALS
Status: DISCONTINUED | OUTPATIENT
Start: 2022-01-01 | End: 2022-01-01 | Stop reason: HOSPADM

## 2022-01-01 RX ORDER — POTASSIUM CHLORIDE 14.9 MG/ML
20 INJECTION INTRAVENOUS
Status: COMPLETED | OUTPATIENT
Start: 2022-01-01 | End: 2022-01-01

## 2022-01-01 RX ORDER — LORAZEPAM 0.5 MG/1
0.5 TABLET ORAL EVERY 8 HOURS PRN
Status: DISCONTINUED | OUTPATIENT
Start: 2022-01-01 | End: 2022-01-01

## 2022-01-01 RX ORDER — MEXILETINE HYDROCHLORIDE 150 MG/1
150 CAPSULE ORAL EVERY 12 HOURS
Qty: 270 CAPSULE | Refills: 1 | Status: CANCELLED
Start: 2022-01-01

## 2022-01-01 RX ORDER — FUROSEMIDE 10 MG/ML
80 INJECTION INTRAMUSCULAR; INTRAVENOUS 2 TIMES DAILY
Status: DISCONTINUED | OUTPATIENT
Start: 2022-01-01 | End: 2022-01-01

## 2022-01-01 RX ORDER — MEXILETINE HYDROCHLORIDE 150 MG/1
150 CAPSULE ORAL EVERY 12 HOURS SCHEDULED
Status: DISCONTINUED | OUTPATIENT
Start: 2022-01-01 | End: 2022-01-01 | Stop reason: HOSPADM

## 2022-01-01 RX ORDER — POTASSIUM CHLORIDE 14.9 MG/ML
20 INJECTION INTRAVENOUS
Status: DISPENSED | OUTPATIENT
Start: 2022-01-01 | End: 2022-01-01

## 2022-01-01 RX ORDER — HYDROMORPHONE HCL IN WATER/PF 6 MG/30 ML
0.2 PATIENT CONTROLLED ANALGESIA SYRINGE INTRAVENOUS
Status: DISCONTINUED | OUTPATIENT
Start: 2022-01-01 | End: 2022-01-01

## 2022-01-01 RX ORDER — POTASSIUM CHLORIDE 20 MEQ/1
20 TABLET, EXTENDED RELEASE ORAL ONCE
Status: COMPLETED | OUTPATIENT
Start: 2022-01-01 | End: 2022-01-01

## 2022-01-01 RX ORDER — TORSEMIDE 5 MG/1
5 TABLET ORAL DAILY
Qty: 90 TABLET | Refills: 1 | Status: SHIPPED | OUTPATIENT
Start: 2022-01-01 | End: 2022-01-01 | Stop reason: ALTCHOICE

## 2022-01-01 RX ORDER — DOCUSATE SODIUM 100 MG/1
100 CAPSULE, LIQUID FILLED ORAL 2 TIMES DAILY
Status: DISCONTINUED | OUTPATIENT
Start: 2022-01-01 | End: 2022-01-01 | Stop reason: HOSPADM

## 2022-01-01 RX ORDER — CARVEDILOL 6.25 MG/1
12.5 TABLET ORAL 2 TIMES DAILY WITH MEALS
Status: DISCONTINUED | OUTPATIENT
Start: 2022-01-01 | End: 2022-01-01 | Stop reason: HOSPADM

## 2022-01-01 RX ORDER — LANOLIN ALCOHOL/MO/W.PET/CERES
6 CREAM (GRAM) TOPICAL
Status: DISCONTINUED | OUTPATIENT
Start: 2022-01-01 | End: 2022-01-01 | Stop reason: HOSPADM

## 2022-01-01 RX ORDER — MEXILETINE HYDROCHLORIDE 150 MG/1
150 CAPSULE ORAL EVERY 12 HOURS SCHEDULED
Status: DISCONTINUED | OUTPATIENT
Start: 2022-01-01 | End: 2022-01-01

## 2022-01-01 RX ORDER — ATORVASTATIN CALCIUM 40 MG/1
40 TABLET, FILM COATED ORAL EVERY EVENING
Status: DISCONTINUED | OUTPATIENT
Start: 2022-01-01 | End: 2022-01-01 | Stop reason: HOSPADM

## 2022-01-01 RX ORDER — ALBUMIN (HUMAN) 12.5 G/50ML
25 SOLUTION INTRAVENOUS EVERY 6 HOURS
Status: COMPLETED | OUTPATIENT
Start: 2022-01-01 | End: 2022-01-01

## 2022-01-01 RX ORDER — MELATONIN
1000 DAILY
Status: DISCONTINUED | OUTPATIENT
Start: 2022-01-01 | End: 2022-01-01 | Stop reason: HOSPADM

## 2022-01-01 RX ORDER — MEXILETINE HYDROCHLORIDE 150 MG/1
150 CAPSULE ORAL ONCE
Status: COMPLETED | OUTPATIENT
Start: 2022-01-01 | End: 2022-01-01

## 2022-01-01 RX ORDER — POTASSIUM CHLORIDE 750 MG/1
10 TABLET, EXTENDED RELEASE ORAL 2 TIMES DAILY
Qty: 60 TABLET | Refills: 5 | Status: SHIPPED | OUTPATIENT
Start: 2022-01-01 | End: 2022-01-01 | Stop reason: SDUPTHER

## 2022-01-01 RX ADMIN — ATORVASTATIN CALCIUM 40 MG: 40 TABLET, FILM COATED ORAL at 18:04

## 2022-01-01 RX ADMIN — CARVEDILOL 12.5 MG: 12.5 TABLET, FILM COATED ORAL at 09:06

## 2022-01-01 RX ADMIN — NYSTATIN: 100000 POWDER TOPICAL at 17:51

## 2022-01-01 RX ADMIN — FUROSEMIDE 40 MG: 10 INJECTION, SOLUTION INTRAVENOUS at 11:42

## 2022-01-01 RX ADMIN — CARVEDILOL 12.5 MG: 6.25 TABLET, FILM COATED ORAL at 16:24

## 2022-01-01 RX ADMIN — WARFARIN SODIUM 2 MG: 2 TABLET ORAL at 17:21

## 2022-01-01 RX ADMIN — MAGNESIUM OXIDE TAB 400 MG (241.3 MG ELEMENTAL MG) 400 MG: 400 (241.3 MG) TAB at 08:35

## 2022-01-01 RX ADMIN — MAGNESIUM SULFATE HEPTAHYDRATE 2 G: 40 INJECTION, SOLUTION INTRAVENOUS at 12:01

## 2022-01-01 RX ADMIN — MEXILETINE HYDROCHLORIDE 150 MG: 150 CAPSULE ORAL at 21:27

## 2022-01-01 RX ADMIN — ATORVASTATIN CALCIUM 40 MG: 40 TABLET, FILM COATED ORAL at 08:43

## 2022-01-01 RX ADMIN — ATORVASTATIN CALCIUM 40 MG: 40 TABLET, FILM COATED ORAL at 09:20

## 2022-01-01 RX ADMIN — MAGNESIUM OXIDE TAB 400 MG (241.3 MG ELEMENTAL MG) 400 MG: 400 (241.3 MG) TAB at 17:31

## 2022-01-01 RX ADMIN — CARVEDILOL 12.5 MG: 12.5 TABLET, FILM COATED ORAL at 08:38

## 2022-01-01 RX ADMIN — MEXILETINE HYDROCHLORIDE 150 MG: 150 CAPSULE ORAL at 21:04

## 2022-01-01 RX ADMIN — FUROSEMIDE 40 MG: 10 INJECTION, SOLUTION INTRAMUSCULAR; INTRAVENOUS at 08:01

## 2022-01-01 RX ADMIN — MAGNESIUM OXIDE TAB 400 MG (241.3 MG ELEMENTAL MG) 400 MG: 400 (241.3 MG) TAB at 08:22

## 2022-01-01 RX ADMIN — MEXILETINE HYDROCHLORIDE 150 MG: 150 CAPSULE ORAL at 10:10

## 2022-01-01 RX ADMIN — MORPHINE SULFATE 5 MG: 100 SOLUTION ORAL at 01:27

## 2022-01-01 RX ADMIN — ATORVASTATIN CALCIUM 40 MG: 40 TABLET, FILM COATED ORAL at 09:35

## 2022-01-01 RX ADMIN — MAGNESIUM OXIDE TAB 400 MG (241.3 MG ELEMENTAL MG) 400 MG: 400 (241.3 MG) TAB at 08:25

## 2022-01-01 RX ADMIN — FUROSEMIDE 40 MG: 10 INJECTION, SOLUTION INTRAMUSCULAR; INTRAVENOUS at 16:24

## 2022-01-01 RX ADMIN — MAGNESIUM OXIDE TAB 400 MG (241.3 MG ELEMENTAL MG) 400 MG: 400 (241.3 MG) TAB at 08:01

## 2022-01-01 RX ADMIN — MEXILETINE HYDROCHLORIDE 150 MG: 150 CAPSULE ORAL at 13:18

## 2022-01-01 RX ADMIN — CARVEDILOL 12.5 MG: 12.5 TABLET, FILM COATED ORAL at 17:51

## 2022-01-01 RX ADMIN — CEFTRIAXONE SODIUM 1000 MG: 10 INJECTION, POWDER, FOR SOLUTION INTRAVENOUS at 10:12

## 2022-01-01 RX ADMIN — ATORVASTATIN CALCIUM 40 MG: 40 TABLET, FILM COATED ORAL at 08:25

## 2022-01-01 RX ADMIN — MEXILETINE HYDROCHLORIDE 150 MG: 150 CAPSULE ORAL at 05:56

## 2022-01-01 RX ADMIN — WARFARIN SODIUM 2 MG: 2 TABLET ORAL at 18:06

## 2022-01-01 RX ADMIN — MAGNESIUM OXIDE TAB 400 MG (241.3 MG ELEMENTAL MG) 400 MG: 400 (241.3 MG) TAB at 08:43

## 2022-01-01 RX ADMIN — FUROSEMIDE 20 MG: 10 INJECTION, SOLUTION INTRAMUSCULAR; INTRAVENOUS at 10:40

## 2022-01-01 RX ADMIN — AMIODARONE HYDROCHLORIDE 200 MG: 200 TABLET ORAL at 09:06

## 2022-01-01 RX ADMIN — CARVEDILOL 12.5 MG: 12.5 TABLET, FILM COATED ORAL at 18:09

## 2022-01-01 RX ADMIN — ACETAMINOPHEN 650 MG: 650 SUPPOSITORY RECTAL at 04:22

## 2022-01-01 RX ADMIN — POTASSIUM CHLORIDE 40 MEQ: 20 SOLUTION ORAL at 18:22

## 2022-01-01 RX ADMIN — NYSTATIN: 100000 POWDER TOPICAL at 09:47

## 2022-01-01 RX ADMIN — MORPHINE SULFATE 2 MG: 2 INJECTION, SOLUTION INTRAMUSCULAR; INTRAVENOUS at 03:10

## 2022-01-01 RX ADMIN — CARVEDILOL 12.5 MG: 12.5 TABLET, FILM COATED ORAL at 17:26

## 2022-01-01 RX ADMIN — MEXILETINE HYDROCHLORIDE 150 MG: 150 CAPSULE ORAL at 05:38

## 2022-01-01 RX ADMIN — MORPHINE SULFATE 5 MG: 100 SOLUTION ORAL at 20:38

## 2022-01-01 RX ADMIN — CARVEDILOL 12.5 MG: 12.5 TABLET, FILM COATED ORAL at 08:10

## 2022-01-01 RX ADMIN — WARFARIN SODIUM 2 MG: 2 TABLET ORAL at 17:23

## 2022-01-01 RX ADMIN — POTASSIUM CHLORIDE 40 MEQ: 20 SOLUTION ORAL at 09:20

## 2022-01-01 RX ADMIN — POTASSIUM CHLORIDE 20 MEQ: 20 SOLUTION ORAL at 15:57

## 2022-01-01 RX ADMIN — DIGOXIN 125 MCG: 0.25 INJECTION INTRAMUSCULAR; INTRAVENOUS at 13:05

## 2022-01-01 RX ADMIN — MAGNESIUM OXIDE TAB 400 MG (241.3 MG ELEMENTAL MG) 400 MG: 400 (241.3 MG) TAB at 08:06

## 2022-01-01 RX ADMIN — WARFARIN SODIUM 2 MG: 2 TABLET ORAL at 16:34

## 2022-01-01 RX ADMIN — AMIODARONE HYDROCHLORIDE 200 MG: 200 TABLET ORAL at 08:13

## 2022-01-01 RX ADMIN — CARVEDILOL 12.5 MG: 12.5 TABLET, FILM COATED ORAL at 08:13

## 2022-01-01 RX ADMIN — MEXILETINE HYDROCHLORIDE 150 MG: 150 CAPSULE ORAL at 22:30

## 2022-01-01 RX ADMIN — MEXILETINE HYDROCHLORIDE 150 MG: 150 CAPSULE ORAL at 20:34

## 2022-01-01 RX ADMIN — AMIODARONE HYDROCHLORIDE 200 MG: 200 TABLET ORAL at 08:36

## 2022-01-01 RX ADMIN — MEXILETINE HYDROCHLORIDE 150 MG: 150 CAPSULE ORAL at 23:17

## 2022-01-01 RX ADMIN — AMIODARONE HYDROCHLORIDE 200 MG: 200 TABLET ORAL at 08:06

## 2022-01-01 RX ADMIN — CARVEDILOL 12.5 MG: 12.5 TABLET, FILM COATED ORAL at 16:35

## 2022-01-01 RX ADMIN — NYSTATIN: 100000 POWDER TOPICAL at 18:23

## 2022-01-01 RX ADMIN — FUROSEMIDE 80 MG: 10 INJECTION, SOLUTION INTRAMUSCULAR; INTRAVENOUS at 08:43

## 2022-01-01 RX ADMIN — AMIODARONE HYDROCHLORIDE 200 MG: 200 TABLET ORAL at 08:10

## 2022-01-01 RX ADMIN — MEXILETINE HYDROCHLORIDE 150 MG: 150 CAPSULE ORAL at 09:06

## 2022-01-01 RX ADMIN — NYSTATIN: 100000 POWDER TOPICAL at 08:30

## 2022-01-01 RX ADMIN — MAGNESIUM SULFATE 2 G: 2 INJECTION INTRAVENOUS at 13:30

## 2022-01-01 RX ADMIN — MAGNESIUM OXIDE TAB 400 MG (241.3 MG ELEMENTAL MG) 400 MG: 400 (241.3 MG) TAB at 16:23

## 2022-01-01 RX ADMIN — FUROSEMIDE 80 MG: 10 INJECTION, SOLUTION INTRAMUSCULAR; INTRAVENOUS at 20:09

## 2022-01-01 RX ADMIN — MEXILETINE HYDROCHLORIDE 150 MG: 150 CAPSULE ORAL at 22:04

## 2022-01-01 RX ADMIN — WARFARIN SODIUM 2 MG: 2 TABLET ORAL at 18:04

## 2022-01-01 RX ADMIN — MEXILETINE HYDROCHLORIDE 150 MG: 150 CAPSULE ORAL at 05:54

## 2022-01-01 RX ADMIN — MAGNESIUM OXIDE TAB 400 MG (241.3 MG ELEMENTAL MG) 400 MG: 400 (241.3 MG) TAB at 09:20

## 2022-01-01 RX ADMIN — POTASSIUM CHLORIDE 40 MEQ: 1500 TABLET, EXTENDED RELEASE ORAL at 11:04

## 2022-01-01 RX ADMIN — MEXILETINE HYDROCHLORIDE 150 MG: 150 CAPSULE ORAL at 06:12

## 2022-01-01 RX ADMIN — MEXILETINE HYDROCHLORIDE 150 MG: 150 CAPSULE ORAL at 05:10

## 2022-01-01 RX ADMIN — MEXILETINE HYDROCHLORIDE 150 MG: 150 CAPSULE ORAL at 21:59

## 2022-01-01 RX ADMIN — MEXILETINE HYDROCHLORIDE 150 MG: 150 CAPSULE ORAL at 05:30

## 2022-01-01 RX ADMIN — CARVEDILOL 12.5 MG: 12.5 TABLET, FILM COATED ORAL at 17:01

## 2022-01-01 RX ADMIN — SENNOSIDES 17.2 MG: 8.6 TABLET ORAL at 20:34

## 2022-01-01 RX ADMIN — CARVEDILOL 12.5 MG: 12.5 TABLET, FILM COATED ORAL at 08:06

## 2022-01-01 RX ADMIN — Medication 1000 UNITS: at 08:01

## 2022-01-01 RX ADMIN — MORPHINE SULFATE 5 MG: 100 SOLUTION ORAL at 14:24

## 2022-01-01 RX ADMIN — LORAZEPAM 0.5 MG: 2 INJECTION INTRAMUSCULAR; INTRAVENOUS at 02:07

## 2022-01-01 RX ADMIN — NYSTATIN: 100000 POWDER TOPICAL at 10:25

## 2022-01-01 RX ADMIN — MEXILETINE HYDROCHLORIDE 150 MG: 150 CAPSULE ORAL at 13:13

## 2022-01-01 RX ADMIN — MORPHINE SULFATE 5 MG: 100 SOLUTION ORAL at 05:18

## 2022-01-01 RX ADMIN — MEXILETINE HYDROCHLORIDE 150 MG: 150 CAPSULE ORAL at 13:40

## 2022-01-01 RX ADMIN — FUROSEMIDE 20 MG: 10 INJECTION, SOLUTION INTRAMUSCULAR; INTRAVENOUS at 18:10

## 2022-01-01 RX ADMIN — ATORVASTATIN CALCIUM 40 MG: 40 TABLET, FILM COATED ORAL at 08:35

## 2022-01-01 RX ADMIN — POTASSIUM CHLORIDE 20 MEQ: 14.9 INJECTION, SOLUTION INTRAVENOUS at 16:35

## 2022-01-01 RX ADMIN — MEXILETINE HYDROCHLORIDE 150 MG: 150 CAPSULE ORAL at 08:17

## 2022-01-01 RX ADMIN — MEXILETINE HYDROCHLORIDE 150 MG: 150 CAPSULE ORAL at 09:38

## 2022-01-01 RX ADMIN — AMIODARONE HYDROCHLORIDE 200 MG: 200 TABLET ORAL at 08:22

## 2022-01-01 RX ADMIN — MELATONIN 6 MG: at 22:31

## 2022-01-01 RX ADMIN — MEXILETINE HYDROCHLORIDE 150 MG: 150 CAPSULE ORAL at 13:16

## 2022-01-01 RX ADMIN — MEXILETINE HYDROCHLORIDE 150 MG: 150 CAPSULE ORAL at 13:06

## 2022-01-01 RX ADMIN — POTASSIUM CHLORIDE 20 MEQ: 14.9 INJECTION, SOLUTION INTRAVENOUS at 09:11

## 2022-01-01 RX ADMIN — ATORVASTATIN CALCIUM 40 MG: 40 TABLET, FILM COATED ORAL at 08:10

## 2022-01-01 RX ADMIN — ATORVASTATIN CALCIUM 40 MG: 40 TABLET, FILM COATED ORAL at 08:06

## 2022-01-01 RX ADMIN — MAGNESIUM OXIDE TAB 400 MG (241.3 MG ELEMENTAL MG) 400 MG: 400 (241.3 MG) TAB at 16:55

## 2022-01-01 RX ADMIN — POTASSIUM CHLORIDE 40 MEQ: 1500 TABLET, EXTENDED RELEASE ORAL at 13:29

## 2022-01-01 RX ADMIN — WARFARIN SODIUM 2 MG: 2 TABLET ORAL at 17:01

## 2022-01-01 RX ADMIN — FUROSEMIDE 60 MG: 10 INJECTION, SOLUTION INTRAMUSCULAR; INTRAVENOUS at 13:40

## 2022-01-01 RX ADMIN — MEXILETINE HYDROCHLORIDE 150 MG: 150 CAPSULE ORAL at 21:09

## 2022-01-01 RX ADMIN — MEXILETINE HYDROCHLORIDE 150 MG: 150 CAPSULE ORAL at 13:45

## 2022-01-01 RX ADMIN — ATORVASTATIN CALCIUM 40 MG: 40 TABLET, FILM COATED ORAL at 18:06

## 2022-01-01 RX ADMIN — POTASSIUM CHLORIDE 40 MEQ: 20 SOLUTION ORAL at 09:23

## 2022-01-01 RX ADMIN — AMIODARONE HYDROCHLORIDE 200 MG: 200 TABLET ORAL at 13:29

## 2022-01-01 RX ADMIN — MEXILETINE HYDROCHLORIDE 150 MG: 150 CAPSULE ORAL at 08:19

## 2022-01-01 RX ADMIN — POTASSIUM CHLORIDE 20 MEQ: 14.9 INJECTION, SOLUTION INTRAVENOUS at 09:50

## 2022-01-01 RX ADMIN — MEXILETINE HYDROCHLORIDE 150 MG: 150 CAPSULE ORAL at 23:00

## 2022-01-01 RX ADMIN — MAGNESIUM OXIDE TAB 400 MG (241.3 MG ELEMENTAL MG) 400 MG: 400 (241.3 MG) TAB at 17:03

## 2022-01-01 RX ADMIN — TETANUS TOXOID, REDUCED DIPHTHERIA TOXOID AND ACELLULAR PERTUSSIS VACCINE, ADSORBED 0.5 ML: 5; 2.5; 8; 8; 2.5 SUSPENSION INTRAMUSCULAR at 12:13

## 2022-01-01 RX ADMIN — MAGNESIUM OXIDE TAB 400 MG (241.3 MG ELEMENTAL MG) 400 MG: 400 (241.3 MG) TAB at 09:35

## 2022-01-01 RX ADMIN — MEXILETINE HYDROCHLORIDE 150 MG: 150 CAPSULE ORAL at 22:16

## 2022-01-01 RX ADMIN — ATORVASTATIN CALCIUM 40 MG: 40 TABLET, FILM COATED ORAL at 09:06

## 2022-01-01 RX ADMIN — POTASSIUM CHLORIDE 20 MEQ: 14.9 INJECTION, SOLUTION INTRAVENOUS at 21:11

## 2022-01-01 RX ADMIN — POTASSIUM CHLORIDE 40 MEQ: 20 SOLUTION ORAL at 08:34

## 2022-01-01 RX ADMIN — MAGNESIUM OXIDE TAB 400 MG (241.3 MG ELEMENTAL MG) 400 MG: 400 (241.3 MG) TAB at 08:20

## 2022-01-01 RX ADMIN — POTASSIUM CHLORIDE 40 MEQ: 1500 TABLET, EXTENDED RELEASE ORAL at 08:20

## 2022-01-01 RX ADMIN — MAGNESIUM OXIDE TAB 400 MG (241.3 MG ELEMENTAL MG) 400 MG: 400 (241.3 MG) TAB at 08:10

## 2022-01-01 RX ADMIN — MEXILETINE HYDROCHLORIDE 150 MG: 150 CAPSULE ORAL at 23:02

## 2022-01-01 RX ADMIN — MEXILETINE HYDROCHLORIDE 150 MG: 150 CAPSULE ORAL at 05:53

## 2022-01-01 RX ADMIN — CARVEDILOL 12.5 MG: 12.5 TABLET, FILM COATED ORAL at 08:22

## 2022-01-01 RX ADMIN — DOCUSATE SODIUM 100 MG: 100 CAPSULE ORAL at 20:34

## 2022-01-01 RX ADMIN — POTASSIUM CHLORIDE 40 MEQ: 20 SOLUTION ORAL at 08:36

## 2022-01-01 RX ADMIN — CARVEDILOL 12.5 MG: 12.5 TABLET, FILM COATED ORAL at 09:45

## 2022-01-01 RX ADMIN — MAGNESIUM SULFATE HEPTAHYDRATE 2 G: 40 INJECTION, SOLUTION INTRAVENOUS at 11:50

## 2022-01-01 RX ADMIN — ATORVASTATIN CALCIUM 40 MG: 40 TABLET, FILM COATED ORAL at 08:22

## 2022-01-01 RX ADMIN — POTASSIUM CHLORIDE 20 MEQ: 1500 TABLET, EXTENDED RELEASE ORAL at 05:17

## 2022-01-01 RX ADMIN — AMIODARONE HYDROCHLORIDE 200 MG: 200 TABLET ORAL at 08:19

## 2022-01-01 RX ADMIN — MORPHINE SULFATE 5 MG: 100 SOLUTION ORAL at 20:05

## 2022-01-01 RX ADMIN — MEXILETINE HYDROCHLORIDE 150 MG: 150 CAPSULE ORAL at 06:17

## 2022-01-01 RX ADMIN — ALBUMIN (HUMAN) 25 G: 0.25 INJECTION, SOLUTION INTRAVENOUS at 13:16

## 2022-01-01 RX ADMIN — MEXILETINE HYDROCHLORIDE 150 MG: 150 CAPSULE ORAL at 13:44

## 2022-01-01 RX ADMIN — MEXILETINE HYDROCHLORIDE 150 MG: 150 CAPSULE ORAL at 20:23

## 2022-01-01 RX ADMIN — CARVEDILOL 12.5 MG: 12.5 TABLET, FILM COATED ORAL at 17:15

## 2022-01-01 RX ADMIN — CARVEDILOL 12.5 MG: 6.25 TABLET, FILM COATED ORAL at 16:36

## 2022-01-01 RX ADMIN — CARVEDILOL 12.5 MG: 6.25 TABLET, FILM COATED ORAL at 08:01

## 2022-01-01 RX ADMIN — MAGNESIUM SULFATE HEPTAHYDRATE 2 G: 40 INJECTION, SOLUTION INTRAVENOUS at 16:35

## 2022-01-01 RX ADMIN — Medication 1000 UNITS: at 08:19

## 2022-01-01 RX ADMIN — MORPHINE SULFATE 5 MG: 100 SOLUTION ORAL at 05:48

## 2022-01-01 RX ADMIN — POTASSIUM CHLORIDE 40 MEQ: 20 SOLUTION ORAL at 16:23

## 2022-01-01 RX ADMIN — MEXILETINE HYDROCHLORIDE 150 MG: 150 CAPSULE ORAL at 22:15

## 2022-01-01 RX ADMIN — WARFARIN SODIUM 1 MG: 1 TABLET ORAL at 18:22

## 2022-01-01 RX ADMIN — MAGNESIUM OXIDE TAB 400 MG (241.3 MG ELEMENTAL MG) 400 MG: 400 (241.3 MG) TAB at 17:21

## 2022-01-01 RX ADMIN — MORPHINE SULFATE 5 MG: 100 SOLUTION ORAL at 22:53

## 2022-01-01 RX ADMIN — CARVEDILOL 12.5 MG: 12.5 TABLET, FILM COATED ORAL at 16:23

## 2022-01-01 RX ADMIN — MAGNESIUM SULFATE HEPTAHYDRATE 4 G: 40 INJECTION, SOLUTION INTRAVENOUS at 05:22

## 2022-01-01 RX ADMIN — MORPHINE SULFATE 2 MG: 2 INJECTION, SOLUTION INTRAMUSCULAR; INTRAVENOUS at 23:16

## 2022-01-01 RX ADMIN — AMIODARONE HYDROCHLORIDE 200 MG: 200 TABLET ORAL at 09:20

## 2022-01-01 RX ADMIN — POTASSIUM CHLORIDE 40 MEQ: 20 SOLUTION ORAL at 08:43

## 2022-01-01 RX ADMIN — POTASSIUM CHLORIDE 20 MEQ: 20 SOLUTION ORAL at 17:51

## 2022-01-01 RX ADMIN — WARFARIN SODIUM 1 MG: 1 TABLET ORAL at 18:09

## 2022-01-01 RX ADMIN — ATORVASTATIN CALCIUM 40 MG: 40 TABLET, FILM COATED ORAL at 08:13

## 2022-01-01 RX ADMIN — AMIODARONE HYDROCHLORIDE 200 MG: 200 TABLET ORAL at 08:35

## 2022-01-01 RX ADMIN — MEXILETINE HYDROCHLORIDE 150 MG: 150 CAPSULE ORAL at 08:06

## 2022-01-01 RX ADMIN — MAGNESIUM OXIDE TAB 400 MG (241.3 MG ELEMENTAL MG) 400 MG: 400 (241.3 MG) TAB at 16:34

## 2022-01-01 RX ADMIN — AMIODARONE HYDROCHLORIDE 200 MG: 200 TABLET ORAL at 09:35

## 2022-01-01 RX ADMIN — NYSTATIN: 100000 POWDER TOPICAL at 18:09

## 2022-01-01 RX ADMIN — AMIODARONE HYDROCHLORIDE 200 MG: 200 TABLET ORAL at 08:26

## 2022-01-01 RX ADMIN — FUROSEMIDE 20 MG: 10 INJECTION, SOLUTION INTRAMUSCULAR; INTRAVENOUS at 13:13

## 2022-01-01 RX ADMIN — POTASSIUM CHLORIDE 40 MEQ: 20 SOLUTION ORAL at 16:35

## 2022-01-01 RX ADMIN — MELATONIN 6 MG: at 21:11

## 2022-01-01 RX ADMIN — MAGNESIUM OXIDE TAB 400 MG (241.3 MG ELEMENTAL MG) 400 MG: 400 (241.3 MG) TAB at 17:26

## 2022-01-01 RX ADMIN — WARFARIN SODIUM 2 MG: 2 TABLET ORAL at 18:09

## 2022-01-01 RX ADMIN — AMIODARONE HYDROCHLORIDE 200 MG: 200 TABLET ORAL at 08:01

## 2022-01-01 RX ADMIN — WARFARIN SODIUM 1 MG: 1 TABLET ORAL at 17:17

## 2022-01-01 RX ADMIN — NYSTATIN: 100000 POWDER TOPICAL at 17:28

## 2022-01-01 RX ADMIN — CARVEDILOL 12.5 MG: 6.25 TABLET, FILM COATED ORAL at 08:19

## 2022-01-01 RX ADMIN — MEXILETINE HYDROCHLORIDE 150 MG: 150 CAPSULE ORAL at 22:18

## 2022-01-01 RX ADMIN — NYSTATIN: 100000 POWDER TOPICAL at 17:17

## 2022-01-01 RX ADMIN — NYSTATIN: 100000 POWDER TOPICAL at 09:58

## 2022-01-01 RX ADMIN — WARFARIN SODIUM 2 MG: 2 TABLET ORAL at 18:21

## 2022-01-01 RX ADMIN — MAGNESIUM OXIDE TAB 400 MG (241.3 MG ELEMENTAL MG) 400 MG: 400 (241.3 MG) TAB at 17:14

## 2022-01-01 RX ADMIN — POTASSIUM CHLORIDE 20 MEQ: 14.9 INJECTION, SOLUTION INTRAVENOUS at 11:32

## 2022-01-01 RX ADMIN — MORPHINE SULFATE 5 MG: 100 SOLUTION ORAL at 16:26

## 2022-01-01 RX ADMIN — MELATONIN 6 MG: at 22:15

## 2022-01-01 RX ADMIN — WARFARIN SODIUM 2 MG: 2 TABLET ORAL at 17:15

## 2022-01-01 RX ADMIN — CARVEDILOL 12.5 MG: 12.5 TABLET, FILM COATED ORAL at 16:55

## 2022-01-01 RX ADMIN — POTASSIUM CHLORIDE 40 MEQ: 20 SOLUTION ORAL at 11:32

## 2022-01-01 RX ADMIN — FUROSEMIDE 40 MG: 10 INJECTION, SOLUTION INTRAMUSCULAR; INTRAVENOUS at 16:36

## 2022-01-01 RX ADMIN — CARVEDILOL 12.5 MG: 12.5 TABLET, FILM COATED ORAL at 08:26

## 2022-01-01 RX ADMIN — POTASSIUM CHLORIDE 40 MEQ: 20 SOLUTION ORAL at 09:36

## 2022-01-01 RX ADMIN — SODIUM CHLORIDE 500 ML: 0.9 INJECTION, SOLUTION INTRAVENOUS at 04:23

## 2022-01-01 RX ADMIN — POTASSIUM CHLORIDE 20 MEQ: 14.9 INJECTION, SOLUTION INTRAVENOUS at 09:23

## 2022-01-01 RX ADMIN — CARVEDILOL 12.5 MG: 12.5 TABLET, FILM COATED ORAL at 09:38

## 2022-01-01 RX ADMIN — MEXILETINE HYDROCHLORIDE 150 MG: 150 CAPSULE ORAL at 05:15

## 2022-01-01 RX ADMIN — CARVEDILOL 12.5 MG: 12.5 TABLET, FILM COATED ORAL at 16:43

## 2022-01-01 RX ADMIN — FUROSEMIDE 80 MG: 10 INJECTION, SOLUTION INTRAMUSCULAR; INTRAVENOUS at 13:44

## 2022-01-01 RX ADMIN — TORSEMIDE 10 MG: 20 TABLET ORAL at 08:25

## 2022-01-01 RX ADMIN — NYSTATIN: 100000 POWDER TOPICAL at 08:17

## 2022-01-01 RX ADMIN — CARVEDILOL 12.5 MG: 12.5 TABLET, FILM COATED ORAL at 08:35

## 2022-01-01 RX ADMIN — POTASSIUM CHLORIDE 20 MEQ: 14.9 INJECTION, SOLUTION INTRAVENOUS at 06:27

## 2022-01-01 RX ADMIN — ALBUMIN (HUMAN) 25 G: 0.25 INJECTION, SOLUTION INTRAVENOUS at 18:23

## 2022-01-01 RX ADMIN — SODIUM CHLORIDE, SODIUM GLUCONATE, SODIUM ACETATE, POTASSIUM CHLORIDE, MAGNESIUM CHLORIDE, SODIUM PHOSPHATE, DIBASIC, AND POTASSIUM PHOSPHATE 50 ML/HR: .53; .5; .37; .037; .03; .012; .00082 INJECTION, SOLUTION INTRAVENOUS at 15:08

## 2022-01-01 RX ADMIN — CEFTRIAXONE SODIUM 1000 MG: 10 INJECTION, POWDER, FOR SOLUTION INTRAVENOUS at 08:30

## 2022-01-01 RX ADMIN — AMIODARONE HYDROCHLORIDE 200 MG: 200 TABLET ORAL at 08:42

## 2022-01-17 NOTE — PROGRESS NOTES
Spoke with patient, advised INR good, will continue same dose, 1 mg Mon and Friday, 2 mg other days  Will recheck 2/7/22  Patient verbalizes understanding

## 2022-01-17 NOTE — PROGRESS NOTES
Patient's dual chamber ICD reported 13 episodes of ventricular tachycardia  2 episodes required shocks to terminate  The other episodes were treated with multiple therapies of ATP  Discussed patient with Dr Sabina Hammonds  His ejection fraction was last reported as only 12%  He is already on amiodarone 200 mg daily  Will begin mexiletine 150 mg q 8 hours and see whether the patient tolerates it and whether it reduces his episodes of VT  Discussed with patient and he is agreeable  One month prescription sent to [facility]

## 2022-01-17 NOTE — TELEPHONE ENCOUNTER
Spoke with Dr Nenita Wiley, BMP and MG ordered, will discuss at visit  Patient aware  Scheduled visit 2/2/22 at 8 am     Reinforced dose of lasix, decrease salt intake

## 2022-01-17 NOTE — TELEPHONE ENCOUNTER
Pt states prior auth needed for mexilitene    started prior auth with [facility]  ID # [id number]  Reference # [id number] - 72 hours for approval waiting   Called [phone]

## 2022-01-17 NOTE — TELEPHONE ENCOUNTER
I had called patient with INR, he advised that his ankles are good when he gets up, but as the day goes on his ankles swell, right greater than left  States he has not had any change in his weight  He does not monitor the amount of salt he uses  He states that he has been taking Lasix 40 mg every day  I advised that per Dr Justin Jimenez he should be taking Lasix 40 mg alternating with 20 mg      I advised he should elevate legs as much as possible during the day, watch his salt intake and will discuss with Dr Justin Jimenez and call with recommendations    Please advise

## 2022-01-18 NOTE — TELEPHONE ENCOUNTER
Patient has to not use the salt shaker at all even in cooking  He needs to avoid foods which are high in salt  If he does not stop using salt, no amount of Lasix will take care of the swelling in his legs

## 2022-01-19 PROBLEM — Z79.01 ANTICOAGULATED: Status: ACTIVE | Noted: 2022-01-01

## 2022-01-19 PROBLEM — R79.89 ELEVATED SERUM CREATININE: Status: ACTIVE | Noted: 2022-01-01

## 2022-01-19 PROBLEM — I50.40 COMBINED SYSTOLIC AND DIASTOLIC CONGESTIVE HEART FAILURE (HCC): Status: ACTIVE | Noted: 2021-01-01

## 2022-01-19 PROBLEM — E83.42 HYPOMAGNESEMIA: Status: ACTIVE | Noted: 2022-01-01

## 2022-01-19 PROBLEM — E87.6 HYPOKALEMIA: Status: ACTIVE | Noted: 2022-01-01

## 2022-01-19 PROBLEM — R74.01 TRANSAMINITIS: Status: ACTIVE | Noted: 2022-01-01

## 2022-01-19 NOTE — Clinical Note
Case was discussed with Critical Care and the patient's admission status was agreed to be Admission Status: inpatient status to the service of Dr Yael Olvera

## 2022-01-19 NOTE — ASSESSMENT & PLAN NOTE
Medtronic ICD placed on May 9, 2011  Last interrogated on 1/15, summary shows 15 VHRS, multiple instances of antitachycardia pacing and 2 treated with shocks

## 2022-01-19 NOTE — ED NOTES
Pacemaker interrogation completed     Bassem Emery, Critical access hospital3 [facility]  01/19/22 2122

## 2022-01-19 NOTE — ASSESSMENT & PLAN NOTE
Wt Readings from Last 3 Encounters:   01/19/22 69 kg (152 lb 1 9 oz)   12/21/21 65 2 kg (143 lb 12 8 oz)   12/10/21 67 2 kg (148 lb 3 2 oz)     · Patient was taking Lasix 40 mg 1 tablet every other day and 20mg every other day  · NT-pro BNP 1058 at this time  · Will hold Lasix at this time, as patient presented with hypokalemia  · Monitor for signs of volume overload

## 2022-01-19 NOTE — PROGRESS NOTES
[address] [place] [address]  Progress Note Rc Coombs 10/31/1932, 80 y o  male MRN: [id number]  Unit/Bed#: ED 19 Encounter: [id number]  Primary Care Provider: Sixto Luciano DO   Date and time admitted to hospital: 1/19/2022  4:03 AM    * Ventricular tachycardia ([address] )  Assessment & Plan  · Has had multiple episodes of ventricular tachycardia in the past week  Medtronic ICD interrogation on 01/15 revealed 13 VHRS with multiple instances of antitachycardia pacing, 2 of them treated with shocks  Mexilitene added to regimen but patient has not been able to  medication as it requires prior auth from insurance  · In the setting of hypokalemia and hypomagnesemia  · Patient's home Lasix currently on hold  · Currently on Amiodarone 200mg - will continue on admission  · Will start Mexilitene  · Replete electrolytes as needed  · Cardiology on board - input appreciated  Anticoagulated  Assessment & Plan  · Patient has a past medical history of paroxysmally AFib, currently anticoagulated with Coumadin after TIA and CVA in the past   · Reports that he had a door recently fall on his head  · Imaging reveals no acute intracranial abnormalities, no traumatic injuries to the head    · INR today-2 47  · Will hold Coumadin today  · Will recheck INR tomorrow    Hypomagnesemia  Assessment & Plan  Recent Labs     01/19/22  0426   MG 1 3*     · Will replete as needed  · Continue monitoring    Hypokalemia  Assessment & Plan  Recent Labs     01/19/22  0426   K 2 1*   · POA:  Patient presented with multiple episodes of V-tach over the past 2 days  · Magnesium is at 1 3 on admission  · Will hold Lasix at this time  · Replete as needed  · Continue monitoring    Combined systolic and diastolic congestive heart failure [facility])  Assessment & Plan  Wt Readings from Last 3 Encounters:   01/19/22 69 kg (152 lb 1 9 oz)   12/21/21 65 2 kg (143 lb 12 8 oz)   12/10/21 67 2 kg (148 lb 3 2 oz)     · Patient was taking Lasix 40 mg 1 tablet every other day and 20mg every other day  · NT-pro BNP 1058 at this time  · Will hold Lasix at this time, as patient presented with hypokalemia  · Monitor for signs of volume overload          PAF (paroxysmal atrial fibrillation) ([facility] [address] )  Assessment & Plan  · Patient with history of paroxysmal atrial fibrillation resulting in TIA and CVA in the past   · Currently on amiodarone and anticoagulated with Coumadin  · Will hold Coumadin today and recheck INR tomorrow  · Continue amiodarone    Cardiac defibrillator in situ  Assessment & Plan  Medtronic ICD placed on May 9, 2011  Last interrogated on 1/15, summary shows 15 VHRS, multiple instances of antitachycardia pacing and 2 treated with shocks  Cardiology on board - appreciate recommendations    Stage 3b chronic kidney disease [facility])  Assessment & Plan  Lab Results   Component Value Date    EGFR 29 01/19/2022    EGFR 35 11/02/2021    EGFR 30 10/26/2021    CREATININE 1 96 (H) 01/19/2022    CREATININE 1 69 (H) 11/02/2021    CREATININE 1 94 (H) 10/26/2021   Patient with history of chronic kidney disease secondary to cardiorenal syndrome  Presents with elevated creatinine  Patient's home Lasix currently on hold in the setting of elevated creatinine and hypokalemia  Will monitor via BMP    Essential hypertension  Assessment & Plan  Continue home Coreg    Hyperlipidemia  Assessment & Plan  Continue home Lipitor    -------------------------------------------------------------------------------------------------------------  Chief Complaint: Dizziness    History of Present Illness   HX and PE limited by: None  Ilia Connell is a 80 y o  male with a PMH of CAD, CVA, Afib, HLD, HTN and CHF s/p dual chamber ICD placement in 2011  He presents today with a 1 week history of intermittent dizziness/lightheadedness and a 1 5 week history of diarrhea  Patient reports that diarrhea sarted with 5-6 loose nonbloody bowel movements approximately 10 days ago   He states that this has now nearly resolved since he started incorporating oatmeal into his diet  On 1/15 patient noted that his ICD fired 2 shocks  Per ICD interrogation he had multiple instances of  VHRS requiring antitachycardial pacing and 2 shocks  He was prescribed Mexiletine by his cardiologist on 1/15 but has not been able to  his prescription yet  He has a history of combined systolic and diastolic congestive heart failure for which he currently takes Lasix 40mg every other day, alternating with 20mg every other day  He denies lightheadedness on the time of my evaluation, no chest pain, palpitations, SoB, abdominal pain or distension  Endorses edema in the lower extremities which is chronic for him  Patient was also noted to have periorbital ecchymosis on evaluation  When inquired, he reports that a door fell on his head  Imaging performed while in the ED does not indicated traumatic injury at this time  Patient was noted to be hypokalemic on admission with potassium of 2 1, with hypomagnesemia, mildly elevated creatinine at 1 96 and transaminitis  Patient will be admitted to the ICU as Stepdown 1 for further management  History obtained from chart review and the patient   -------------------------------------------------------------------------------------------------------------  Dispo: Admit to Stepdown Level 1    Code Status: Level 1 - Full Code  --------------------------------------------------------------------------------------------------------------  Review of Systems   Constitutional: Negative for appetite change, chills, fatigue and fever  HENT: Negative for congestion, rhinorrhea, sore throat and trouble swallowing  Eyes: Negative for visual disturbance  Respiratory: Negative for cough, shortness of breath and wheezing  Cardiovascular: Positive for leg swelling  Negative for chest pain and palpitations  Gastrointestinal: Positive for diarrhea   Negative for abdominal distention, abdominal pain, blood in stool, constipation, nausea and vomiting  Genitourinary: Positive for difficulty urinating (difficulty starting stream)  Negative for dysuria  Neurological: Negative for dizziness, syncope, weakness, light-headedness, numbness and headaches  A 12-point, complete review of systems was reviewed and negative except as stated above     Physical Exam  Vitals and nursing note reviewed  Constitutional:       General: He is awake  He is not in acute distress  Appearance: He is well-developed  He is not ill-appearing, toxic-appearing or diaphoretic  HENT:      Head: Normocephalic and atraumatic  Nose: No congestion or rhinorrhea  Eyes:      General: No scleral icterus  Right eye: No discharge  Left eye: No discharge  Extraocular Movements: Extraocular movements intact  Conjunctiva/sclera: Conjunctivae normal    Cardiovascular:      Rate and Rhythm: Normal rate and regular rhythm  Pulses: Normal pulses  Heart sounds: Normal heart sounds  No murmur heard  Pulmonary:      Effort: Pulmonary effort is normal  No respiratory distress  Breath sounds: Normal breath sounds  No wheezing, rhonchi or rales  Abdominal:      General: Bowel sounds are normal  There is no distension  Palpations: Abdomen is soft  Tenderness: There is no abdominal tenderness  There is no guarding  Musculoskeletal:      Cervical back: Normal range of motion and neck supple  Right lower leg: Edema present  Left lower leg: Edema present  Comments: 1+ pitting edema noted in the ankles bilaterally  Skin:     General: Skin is warm and dry  Capillary Refill: Capillary refill takes less than 2 seconds  Coloration: Skin is not jaundiced or pale  Neurological:      Mental Status: He is alert  Psychiatric:         Mood and Affect: Mood normal          Behavior: Behavior normal          Thought Content:  Thought content normal          Judgment: Judgment normal        --------------------------------------------------------------------------------------------------------------  Vitals:   Vitals:    01/19/22 0704 01/19/22 0830 01/19/22 0906 01/19/22 0930   BP: 138/79 141/75  158/79   BP Location:       Pulse: 59 (!) 52 62 (!) 46   Resp: 16 21 21   Temp:       TempSrc:       SpO2: 99% 100%  99%   Weight: 69 kg (152 lb 1 9 oz)      Height: 5' 3" (1 6 m)        Temp  Min: 97 9 °F (36 6 °C)  Max: 97 9 °F (36 6 °C)  IBW (Ideal Body Weight): 56 9 kg  Height: 5' 3" (160 cm)  Body mass index is 26 95 kg/m²  Laboratory and Diagnostics:  Results from last 7 days   Lab Units 01/19/22  0426   WBC Thousand/uL 5 98   HEMOGLOBIN g/dL 11 7*   HEMATOCRIT % 33 1*   PLATELETS Thousands/uL 196   NEUTROS PCT % 68   MONOS PCT % 11     Results from last 7 days   Lab Units 01/19/22  0426   SODIUM mmol/L 139   POTASSIUM mmol/L 2 1*   CHLORIDE mmol/L 97*   CO2 mmol/L 29   ANION GAP mmol/L 13   BUN mg/dL 23   CREATININE mg/dL 1 96*   CALCIUM mg/dL 8 0*   GLUCOSE RANDOM mg/dL 105   ALT U/L 210*   AST U/L 235*   ALK PHOS U/L 64   ALBUMIN g/dL 2 7*   TOTAL BILIRUBIN mg/dL 1 29*     Results from last 7 days   Lab Units 01/19/22  0426   MAGNESIUM mg/dL 1 3*      Results from last 7 days   Lab Units 01/19/22  0426 01/14/22  1129   INR  2 47* 2 72*   PTT seconds 52*  --               ABG:    VBG:          Micro:        EKG: Paced rhythm, L anterior fascicular block,  Anteroseptal infarct of indeterminate age  QTC 624ms  HR 69   Imaging: I have personally reviewed pertinent reports     and I have personally reviewed pertinent films in PACS      Historical Information   Past Medical History:   Diagnosis Date    Anemia     Arthritis     Coronary artery disease     CVA [facility]cerebral vascular accident) [facility] [address]     Hyperlipidemia     Hypertension      Past Surgical History:   Procedure Laterality Date    CARDIAC PACEMAKER PLACEMENT  05/09/2011    Medtronic dual chamber ICD implant    CORONARY ANGIOPLASTY       Social History   Social History     Substance and Sexual Activity   Alcohol Use Yes    Alcohol/week: 3 0 standard drinks    Types: 3 Shots of liquor per week    Comment: daily     Social History     Substance and Sexual Activity   Drug Use Never     Social History     Tobacco Use   Smoking Status Never Smoker   Smokeless Tobacco Never Used     Family History:   Family History   Problem Relation Age of Onset    Coronary artery disease Mother     Heart attack Father         MI    Stroke Sister      I have reviewed this patient's family history and commented on sigificant items within the HPI      Medications:  Current Facility-Administered Medications   Medication Dose Route Frequency    amiodarone tablet 200 mg  200 mg Oral Daily    atorvastatin (LIPITOR) tablet 40 mg  40 mg Oral Daily    carvedilol (COREG) tablet 12 5 mg  12 5 mg Oral BID With Meals    mexiletine (MEXITIL) capsule 150 mg  150 mg Oral TID    potassium chloride 20 mEq IVPB (premix)  20 mEq Intravenous Q2H     Home medications:  Prior to Admission Medications   Prescriptions Last Dose Informant Patient Reported? Taking?    Cholecalciferol (VITAMIN D3) 50 MCG ( UT) capsule Unknown at Unknown time Self Yes No   Sig: Take 2,000 Units by mouth daily     Glucosamine Sulfate 1000 MG CAPS Unknown at Unknown time Self Yes No   Si capsule Every 12 hours   amiodarone 200 mg tablet 2022 at Unknown time  No Yes   Sig: Take 1 tablet (200 mg total) by mouth daily   atorvastatin (LIPITOR) 40 mg tablet 2022 at Unknown time  No Yes   Sig: Take 1 tablet (40 mg total) by mouth daily   carvedilol (COREG) 12 5 mg tablet 2022 at Unknown time  No Yes   Sig: Take 1 tablet (12 5 mg total) by mouth 2 (two) times a day with meals   furosemide (LASIX) 40 mg tablet Past Week at Unknown time  No Yes   Sig: One tablet (40mg) every other day, alternating with 1/2 tablet (20mg) every other day mexiletine (MEXITIL) 150 mg capsule 1/18/2022 at Unknown time  No Yes   Sig: Take 1 capsule (150 mg total) by mouth 3 (three) times a day   warfarin (COUMADIN) 2 mg tablet 1/18/2022 at Unknown time  No Yes   Sig: Take 1 tablet daily or as directed by physician      Facility-Administered Medications: None     Allergies:  No Known Allergies    ------------------------------------------------------------------------------------------------------------  Advance Directive and Living Will:      Power of :    POLST:    ------------------------------------------------------------------------------------------------------------  Anticipated Length of Stay is > 2 midnights    Care Time Delivered:   No Critical Care time spent       Harley Corrigan MD        Portions of the record may have been created with voice recognition software  Occasional wrong word or "sound a like" substitutions may have occurred due to the inherent limitations of voice recognition software    Read the chart carefully and recognize, using context, where substitutions have occurred

## 2022-01-19 NOTE — ASSESSMENT & PLAN NOTE
Lab Results   Component Value Date    EGFR 29 01/19/2022    EGFR 35 11/02/2021    EGFR 30 10/26/2021    CREATININE 1 96 (H) 01/19/2022    CREATININE 1 69 (H) 11/02/2021    CREATININE 1 94 (H) 10/26/2021   Patient with history of chronic kidney disease secondary to cardiorenal syndrome  Presents with elevated creatinine  Patient's home Lasix currently on hold in the setting of elevated creatinine and hypokalemia  Will monitor via BMP

## 2022-01-19 NOTE — ED NOTES
Pt with notable old bruising to Lt eye, Lt arm and states 2 days ago he had a door close on him     Carolann Jones RN  01/19/22 1969

## 2022-01-19 NOTE — UTILIZATION REVIEW
Inpatient Admission Authorization Request   NOTIFICATION OF INPATIENT ADMISSION/INPATIENT AUTHORIZATION REQUEST   SERVICING FACILITY:   [address] [place] [address]  [address] [place] [address], [address]   Tax ID: [id number]  NPI: [id number]  Place of [facility]: [facility]patient [facility]  [address]  Place of Service Code: 24     ATTENDING PROVIDER:  Attending Name and NPI#: Hina Charles Md [[id number]]  Address: [address] [place] [address], [address]   Phone: [phone]     UTILIZATION REVIEW CONTACT:  Gwenette Ormond, Utilization   Network Utilization Review Department  Phone: [phone]  Fax: [phone]  Email: [email or web address]     PHYSICIAN ADVISORY SERVICES:  FOR HRGP-OA-URRM REVIEW - MEDICAL NECESSITY DENIAL  Phone: [phone]  Fax: [phone]  Email: [email or web address]     TYPE OF REQUEST:  Inpatient Status     ADMISSION INFORMATION:  ADMISSION DATE/TIME: 1/19/22  6:35 AM  PATIENT DIAGNOSIS CODE/DESCRIPTION:  Dizziness [R42]  DISCHARGE DATE/TIME: No discharge date for patient encounter  DISCHARGE DISPOSITION (IF DISCHARGED): Home/Self Care     IMPORTANT INFORMATION:  Please contact the Gwenette Ormond directly with any questions or concerns regarding this request  Department voicemails are confidential     Send requests for admission clinical reviews, concurrent reviews, approvals, and administrative denials due to lack of clinical to fax [phone]

## 2022-01-19 NOTE — ASSESSMENT & PLAN NOTE
Recent Labs     01/19/22  0426   K 2 1*   · POA:  Patient presented with multiple episodes of V-tach over the past 2 days  · Magnesium is at 1 3 on admission  · Will hold Lasix at this time  · Replete as needed  · Continue monitoring

## 2022-01-19 NOTE — ED NOTES
Critical K+ 2 1 communicated to Dr Cheryl Moore   Additional orders to be placed by provider     Rafaela Soriano RN  01/19/22 3615

## 2022-01-19 NOTE — PROCEDURES
Medtronic dual-chamber ICD device  Presenting rhythm is predominantly atrial paced with ventricular demand pacing  Patient is approximately 90% atrial paced and 31% ventricular paced  Remaining battery life is 2 6 years; lead sensing and pacing parameters and impedance are in stable state  Patient is noted to have 4 recent shock for VT/VF with most recent event last night at 2:40a m  Percentage of atrial fibrillation burden is 0 2%  There have been 13 less than 1 minute episode, 7 1-10 minutes episode and 7 10 minutes-1 hour episode and 0 episode lasting more than 1 hour  Patient OptiVol and thoracic impedance are within normal limit  The device is programmed AAIR=DDDR 60/130/180/150  VT is classified as 150-230 ppm to be treated with  burst pacingX4 followed by Rampx1 and 35 J x4  VF is classified as greater than 231 treated with ATP during charging followed by 35 J shockx6                                  [address], MD

## 2022-01-19 NOTE — ASSESSMENT & PLAN NOTE
· Patient with history of paroxysmal atrial fibrillation resulting in TIA and CVA in the past   · Currently on amiodarone and anticoagulated with Coumadin    · Will hold Coumadin today and recheck INR tomorrow  · Continue amiodarone

## 2022-01-19 NOTE — ASSESSMENT & PLAN NOTE
Medtronic ICD placed on May 9, 2011  Last interrogated on 1/15, summary shows 15 VHRS, multiple instances of antitachycardia pacing and 2 treated with shocks    Cardiology on board - appreciate recommendations

## 2022-01-19 NOTE — ASSESSMENT & PLAN NOTE
Recent Labs     01/19/22  0426   CREATININE 1 96*   EGFR 29     Estimated Creatinine Clearance: 22 3 mL/min (A) (by C-G formula based on SCr of 1 96 mg/dL (H))

## 2022-01-19 NOTE — ASSESSMENT & PLAN NOTE
· Has had multiple episodes of ventricular tachycardia in the past week  Medtronic ICD interrogation on 01/15 revealed 13 VHRS with multiple instances of antitachycardia pacing, 2 of them treated with shocks  Mexilitene added to regimen but patient has not been able to  medication as it requires prior auth from insurance  · In the setting of hypokalemia and hypomagnesemia  · Patient's home Lasix currently on hold  · Currently on Amiodarone 200mg - will continue on admission  · Will start Mexilitene  · Replete electrolytes as needed  · Cardiology on board - input appreciated

## 2022-01-19 NOTE — ASSESSMENT & PLAN NOTE
Lab Results   Component Value Date    EGFR 29 01/19/2022    EGFR 35 11/02/2021    EGFR 30 10/26/2021    CREATININE 1 96 (H) 01/19/2022    CREATININE 1 69 (H) 11/02/2021    CREATININE 1 94 (H) 10/26/2021   Patient with history of chronic kidney disease secondary to cardiorenal syndrome  Presents with elevated creatinine, likely in the setting of poor p o   Intake  Patient's home Lasix currently on hold in the setting of elevated creatinine and hypokalemia  Will monitor via BMP

## 2022-01-19 NOTE — ASSESSMENT & PLAN NOTE
· Patient has a past medical history of paroxysmally AFib, currently anticoagulated with Coumadin after TIA and CVA in the past   · Reports that he had a door recently fall on his head  · Imaging reveals no acute intracranial abnormalities, no traumatic injuries to the head    · INR today-2 47  · Will hold Coumadin today  · Will recheck INR tomorrow

## 2022-01-19 NOTE — CONSULTS
ENCOUNTER DATE: 01/19/22 9:18 AM  PATIENT NAME: Taylor Segovia   10/31/1932    [id number]  Age: 80 y o  Sex: male  [address]: Maryam Snow MD  INPATIENT ATTENDING PHYSICIAN: Katie Servin*; PRIMARYCARE PHYSICIAN: Alphonso Porter DO  DATE OF ADMISSION: 1/19/2022  4:03 AM; LENGTH OF STAY: 0 days  *-*-*-*-*-*-*-*-*-*-*-*-*-*-*-*-*-*-*-*-*-*-*-*-*-*-*-*-*-*-*-*-*-*-*-*-*-*-*-*-*-*-*-*-*-*-*-*-*-*-*-*-*-*-   REASON FOR CONSULTATION:   ICD shock, dizziness    *-*-*-*-*-*-*-*-*-*-*-*-*-*-*-*-*-*-*-*-*-*-*-*-*-*-*-*-*-*-*-*-*-*-*-*-*-*-*-*-*-*-*-*-*-*-*-*-*-*-*-*-*-*-  CARDIAC ASSESSMENT:     1  Ventricular tachycardia and ventricular fibrillation  2  Successful ICD shocksx2  3  Paroxysmal atrial fibrillation  4  Severe hypokalemia and hypomagnesium  5  Coronary artery disease with remote history of MI in 1989, status post PTCA  6  Ischemic cardiomyopathy with severely reduced LV systolic function, last reported EF of 12% with marked regional wall abnormality, moderately reduced right ventricular systolic function  7  Status post single lead ICD placement, 2011, upgraded to dual-chamber ICD 2018  8  Chronic kidney disease, , stage IIIb  9  History of embolic CVA  10  History of GI bleed  11  Essential hypertension  12  Dyslipidemia  13  Hyponatremia    Mr Taylor Segovia is presenting with ICD shock and intermittent dizziness  He is noted to have marked hypokalemia and hypomagnesemia  His ICD interrogation shows appropriate shock for VT the VF  He has AF burden of 0 2%  On clinical examination he does not appear to be in decompensated heart failure  EST for ECG or g magnesium and 20 mEq of potassium with 2nd IV potassium back ongoing rate now         Patient Active Problem List   Diagnosis    Cardiac defibrillator in situ    Acute kidney injury superimposed on chronic kidney disease ([facility] [address] [facility]    Embolic stroke ([facility] [address] [facility]    Hyperlipidemia    Old myocardial infarction    Status post percutaneous transluminal coronary angioplasty    Ventricular tachycardia (HCC)    Ischemic cardiomyopathy    [facility]    Essential hypertension    Cardiomyopathy [facility] [address] [facility]    Acute on chronic combined systolic and diastolic congestive heart failure (HCC)    Stage 3b chronic kidney disease (HCC)    Hyponatremia    Bilateral lower extremity edema    Hypokalemia    Hypomagnesemia    Anticoagulated        CARDIAC PLAN:     -- at this time we will continue with aggressive replacement of electrolytes and repeat electrolyte check this afternoon with goal of achieving potassium greater than 4 0 and magnesium greater than 2 0     -- given that we have an explanation for his ICD shock due to electrolyte abnormalities would not add 2nd agent of mexiletine at this time  I will discuss his case with our electrophysiology colleagues at [facility] and will come up with a plan regarding medical management  -- We will continue oral Amiodarone, carvedilol and warfarin with goal INR of 2 0-3 0  Hold Lasix today and reassess creatinine tomorrow reinitiating the home dose  --patient is counseled about cutting down on alcohol consumption  -- if patient has no significant ectopy and arrhythmia by later in the day today then he can  be transferred to medical floor  *-*-*-*-*-*-*-*-*-*-*-*-*-*-*-*-*-*-*-*-*-*-*-*-*-*-*-*-*-*-*-*-*-*-*-*-*-*-*-*-*-*-*-*-*-*-*-*-*-*-*-*-*-*-  HISTORY OF PRESENT ILLNESS      Patient is a 66-year-old gentleman with medical history significant for:     1  Coronary artery disease with remote history of MI in 1989, status post PTCA  2  Ischemic cardiomyopathy with severely reduced LV systolic function, last reported EF of 12% with marked regional wall abnormality, moderately reduced right ventricular systolic function  3  History of ventricular tachycardia  4  Status post single lead ICD placement, 2011, upgraded to dual-chamber ICD 2018  5   Paroxysmal atrial fibrillation, on chronic anticoagulation  6  Chronic kidney disease, stage IIIb  7  History of embolic CVA  8  History of GI bleed  9  Essential hypertension  10  Dyslipidemia  11  Hyponatremia    Follows with Dr Phil Chiu and was last seen in office on December 21, 2021  More recently patient has been dealing with recurrent episodes of ventricular tachycardia some of which required ATP therapy and also had 2 recent ICD shock  2 days earlier to the presentation patient was advised to begin additional antiarrhythmic therapy with mexiletine however patient did not begin it yet  Patient presented to emergency room this morning with complaint of feeling more dizzy than his usual   He mentions that he was intermittently feeling dizzy throughout the day, in evening he went to bed     Patient with reports of feeling pain of the but denies angina-like chest pain or shortness of breath  Patient also reported that recently he suffered bruising and injury to his face and shoulder following her door that fell onto him a few days back  In the emergency room patient vital signs were stable at presentation  EN route to the hospital EMS reported seeing runs of ventricular tachycardia  He has been initiated on IV potassium and is being [facility]  He currently denies any dizziness  He denies any recent angina like pain or shortness of breath or palpitations  He reports his previously reported l;e swelling is better,  Reports the swelling does become apparent only towards the end of day and is absent in morning  He denies dietary indiscretion  He reports to drinking 4 drinks of [place] everyday but he has cut this down to one-two drinks in recent couple weeks  He doesnot smoke      *-*-*-*-*-*-*-*-*-*-*-*-*-*-*-*-*-*-*-*-*-*-*-*-*-*-*-*-*-*-*-*-*-*-*-*-*-*-*-*-*-*-*-*-*-*-*-*-*-*-*-*-*-*  PAST MEDICAL HISTORY:     Past Medical History:   Diagnosis Date    Anemia     Arthritis     Coronary artery disease     [facility]cerebral vascular [facility]) [facility] [address] [facility]     Hyperlipidemia     Hypertension     PAST SURGICAL HISTORY     Past Surgical History:   Procedure Laterality Date    CARDIAC PACEMAKER PLACEMENT  05/09/2011    Medtronic dual chamber ICD implant    CORONARY ANGIOPLASTY  1989          FAMILY HISTORY     Family History   Problem Relation Age of Onset    Coronary artery disease Mother     Heart attack Father         MI    Stroke Sister      SOCIAL HISTORY     Social History     Tobacco Use   Smoking Status Never Smoker   Smokeless Tobacco Never Used      Social History     Substance and Sexual Activity   Alcohol Use Yes    Alcohol/week: 3 0 standard drinks    Types: 3 Shots of liquor per week    Comment: daily     Social History     Substance and Sexual Activity   Drug Use Never    [unfilled]     *-*-*-*-*-*-*-*-*-*-*-*-*-*-*-*-*-*-*-*-*-*-*-*-*-*-*-*-*-*-*-*-*-*-*-*-*-*-*-*-*-*-*-*-*-*-*-*-*-*-*-*-*-*  ALLERGIES     No Known Allergies  CURRENT SCHEDULED MEDICATIONS       Current Facility-Administered Medications:     amiodarone tablet 200 mg, 200 mg, Oral, Daily, WANDER Deleon, 200 mg at 01/19/22 0906    atorvastatin (LIPITOR) tablet 40 mg, 40 mg, Oral, Daily, WANDER Deleon, 40 mg at 01/19/22 0906    carvedilol (COREG) tablet 12 5 mg, 12 5 mg, Oral, BID With Meals, WANDER Nur, 12 5 mg at 01/19/22 0906    mexiletine (MEXITIL) capsule 150 mg, 150 mg, Oral, TID, WANDER Nur, 150 mg at 01/19/22 0906    potassium chloride 20 mEq IVPB (premix), 20 mEq, Intravenous, Q2H, WANDER Deleon, Last Rate: 25 mL/hr at 01/19/22 0911, 20 mEq at 01/19/22 0911    Current Outpatient Medications:     amiodarone 200 mg tablet, Take 1 tablet (200 mg total) by mouth daily, Disp: 90 tablet, Rfl: 3    atorvastatin (LIPITOR) 40 mg tablet, Take 1 tablet (40 mg total) by mouth daily, Disp: 90 tablet, Rfl: 3    carvedilol (COREG) 12 5 mg tablet, Take 1 tablet (12 5 mg total) by mouth 2 (two) times a day with meals, Disp: 180 tablet, Rfl: 3    furosemide (LASIX) 40 mg tablet, One tablet (40mg) every other day, alternating with 1/2 tablet (20mg) every other day, Disp: 68 tablet, Rfl: 3    mexiletine (MEXITIL) 150 mg capsule, Take 1 capsule (150 mg total) by mouth 3 (three) times a day, Disp: 90 capsule, Rfl: 0    warfarin (COUMADIN) 2 mg tablet, Take 1 tablet daily or as directed by physician, Disp: 150 tablet, Rfl: 3    Cholecalciferol (VITAMIN D3) 50 MCG (2000 UT) capsule, Take 2,000 Units by mouth daily  , Disp: , Rfl:     Glucosamine Sulfate 1000 MG CAPS, 1 capsule Every 12 hours, Disp: , Rfl:      *-*-*-*-*-*-*-*-*-*-*-*-*-*-*-*-*-*-*-*-*-*-*-*-*-*-*-*-*-*-*-*-*-*-*-*-*-*-*-*-*-*-*-*-*-*-*-*-*-*-*-*-*-*   REVIEW OF SYSTEMS     Positive for:  As noted above in HPI  Negative for: All remaining as reviewed below and in HPI   SYSTEM SYMPTOMS REVIEWED:  General--weight change, fever, night sweats  Respiratoryl-- Wheezing, shortness of breath, cough, URI symptoms, sputum, blood  Cardiovascular--chest pain, syncope, dyspnea on exertion, edema, decline in exercise tolerance, claudication   Gastrointestinal--persistent vomiting, diarrhea, abdominal distention, blood in stool   Muscular or skeletal--joint pain or swelling   Neurologic--headaches, syncope, abnormal movement  Hematologic--history of easy bruising and bleeding   Endocrine--thyroid enlargement, heat or cold intolerance, polyuria   Psychiatric--anxiety, depression      *-*-*-*-*-*-*-*-*-*-*-*-*-*-*-*-*-*-*-*-*-*-*-*-*-*-*-*-*-*-*-*-*-*-*-*-*-*-*-*-*-*-*-*-*-*-*-*-*-*-*-*-*-*-   VITAL SIGNS     Vitals:    22 0542 22 0704 22 0830 22 0906   BP: 139/72 138/79 141/75    BP Location: Right arm      Pulse: 58 59 (!) 52 62   Resp: 15 16 21    Temp:       TempSrc:       SpO2: 100% 99% 100%    Weight:  69 kg (152 lb 1 9 oz)     Height:  5' 3" (1 6 m)         TEMPERATURE HISTORY 24H: Temp (24hrs), Av 9 °F (36 6 °C), Min:97 9 °F (36 6 °C), Max:97 9 °F (36 6 °C)     BLOOD PRESSURE HISTORY: Systolic (97DVM), YKR:616 , Min:137 , DQT:693    Diastolic (51ROJ), JOJO:30, Min:65, Max:79      WEIGHTS:   Wt Readings from Last 25 Encounters:   01/19/22 69 kg (152 lb 1 9 oz)   12/21/21 65 2 kg (143 lb 12 8 oz)   12/10/21 67 2 kg (148 lb 3 2 oz)   12/10/21 67 8 kg (149 lb 8 oz)   11/16/21 64 5 kg (142 lb 3 2 oz)   10/26/21 63 1 kg (139 lb 1 8 oz)   10/23/21 68 kg (150 lb)   05/14/21 69 3 kg (152 lb 12 8 oz)   11/02/20 69 5 kg (153 lb 3 2 oz)   07/02/20 68 9 kg (151 lb 12 8 oz)   05/28/20 68 5 kg (151 lb)   04/18/20 69 4 kg (153 lb)   04/08/20 69 4 kg (153 lb)   02/28/20 72 3 kg (159 lb 6 4 oz)   12/09/19 71 2 kg (157 lb)   07/15/19 71 2 kg (157 lb)   01/08/19 71 6 kg (157 lb 12 8 oz)   12/10/18 71 8 kg (158 lb 3 2 oz)   10/09/18 71 3 kg (157 lb 3 2 oz)        BMI: Body mass index is 26 95 kg/m²  I&O:     Intake/Output Summary (Last 24 hours) at 1/19/2022 0918  Last data filed at 1/19/2022 0911  Gross per 24 hour   Intake 915 ml   Output 500 ml   Net 415 ml      *-*-*-*-*-*-*-*-*-*-*-*-*-*-*-*-*-*-*-*-*-*-*-*-*-*-*-*-*-*-*-*-*-*-*-*-*-*-*-*-*-*-*-*-*-*-*-*-*-*-*-*-*-*-   PHYSICAL EXAMINATION:     General Appearance:    Alert, cooperative, no distress, appears stated age    Head, Eyes, ENT:    No obvious abnormality, moist mucous mebranes  Neck:   Supple, no carotid bruit or JVD   Back:     Symmetric, no curvature  Lungs:     Respirations unlabored  Clear to auscultation bilaterally,    Chest wall:    No tenderness or deformity  has left prepectoral ICD   Heart:    Regular rate and rhythm, S1 and S2 normal, no murmur, rub  or gallop  Abdomen:     Soft, non-tender, No obvious masses, or organomegaly   Extremities:   Extremities warm, no cyanosis or edema  Venostatic abnormalities noted     Skin:   Skin color, texture, turgor normal, no rashes or lesions *-*-*-*-*-*-*-*-*-*-*-*-*-*-*-*-*-*-*-*-*-*-*-*-*-*-*-*-*-*-*-*-*-*-*-*-*-*-*-*-*-*-*-*-*-*-*-*-*-*-*-*-*-*-   LABORATORY DATA:    I have personally reviewed the available laboratory data  CMP:  Results from last 7 days   Lab Units 01/19/22  0426   SODIUM mmol/L 139   POTASSIUM mmol/L 2 1*   CHLORIDE mmol/L 97*   CO2 mmol/L 29   BUN mg/dL 23   CREATININE mg/dL 1 96*   CALCIUM mg/dL 8 0*   ALK PHOS U/L 64   ALT U/L 210*   AST U/L 235*        Results from last 7 days   Lab Units 01/19/22  0426   MAGNESIUM mg/dL 1 3*        Cardiac Profile:   Results from last 7 days   Lab Units 01/19/22  0428   NT-PRO BNP pg/mL 1,058*                CBC:   Results from last 7 days   Lab Units 01/19/22  0426   WBC Thousand/uL 5 98   HEMOGLOBIN g/dL 11 7*   HEMATOCRIT % 33 1*   PLATELETS Thousands/uL 196     PT/INR:   Lab Results   Component Value Date    INR 2 47 (H) 01/19/2022   , Microbiology:          *-*-*-*-*-*-*-*-*-*-*-*-*-*-*-*-*-*-*-*-*-*-*-*-*-*-*-*-*-*-*-*-*-*-*-*-*-*-*-*-*-*-*-*-*-*-*-*-*-*-*-*-*-*-   RADIOLOGY RESULTS:     CT chest abdomen pelvis wo contrast    Addendum Date: 1/19/2022    ADDENDUM: Bilateral lower lobe groundglass infiltrates with mild peribronchial thickening  Findings most compatible with bilateral lower lobe pneumonia and bronchitis  In the setting of clinically suspected/proven COVID-19, the above lung parenchymal findings on CT indicate low confidence level for COVID-19  Result Date: 1/19/2022  Impression: 1  Suboptimal examination due to lack of intravenous and oral contrast material   This limits evaluation for traumatic solid or visceral organ injury  There is significant beam hardening artifact from patient's pacemaker as well as imaging the patient with his arms at his side and his hands across his pelvis  2   No traumatic solid or visceral organ injury, however intravenous contrast material not administered   3   Severely distended urinary bladder, likely sequela of bladder outlet obstruction from prostatic enlargement  Consider follow-up neurology consultation  4   Additional incidental findings as described above  Workstation performed: [id number]     CT head without contrast    Result Date: 1/19/2022  Impression: 1  Cerebral atrophy with chronic small vessel ischemic white matter disease and 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  No acute intracranial abnormality  2   Moderate-sized chronic-appearing left-sided subdural hygroma with mild mass effect and midline shift of approximately 4 mm to the right  If warranted, consider follow-up neurosurgical evaluation  3   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  4   Moderate cerebellar atrophy  Workstation performed: [id number]     CT facial bones without contrast    Result Date: 1/19/2022  Impression: No acute traumatic facial bone injuries  More chronic appearing bilateral nasal bone fractures  Workstation performed: [id number]     CT cervical spine without contrast    Result Date: 1/19/2022  Impression: Advanced degenerative changes of the cervical spine  No acute cervical spine fracture or traumatic malalignment  Workstation performed: [id number]       *-*-*-*-*-*-*-*-*-*-*-*-*-*-*-*-*-*-*-*-*-*-*-*-*-*-*-*-*-*-*-*-*-*-*-*-*-*-*-*-*-*-*-*-*-*-*-*-*-*-*-*-*-*-    LAST ECG, ECHOCARDIOGRAM AND OTHER CARDIOLOGY TEST RESULTS:        Last echocardiogram is October 24, 2021:  Left ventricular cavity is mildly dilated, LV function is severely reduced with EF of 81% with diastolic dysfunction and elevated left ventricular filling pressures, marked regional wall abnormalities, moderately reduced right ventricular systolic function, mild left atrial cavity enlargement, mild mitral valve regurgitation, mild tricuspid and mild pulmonic valve regurgitation with mild-to-moderate pulmonary hypertension  *-*-*-*-*-*-*-*-*-*-*-*-*-*-*-*-*-*-*-*-*-*-*-*-*-*-*-*-*-*-*-*-*-*-*-*-*-*-*-*-*-*-*-*-*-*-*-*-*-*-*-*-*-*-  SIGNATURES:   @CGD@   Malini Cintron MD     CC:   Rahat Rodriguez,    No ref   provider found

## 2022-01-19 NOTE — ED PROVIDER NOTES
History  Chief Complaint   Patient presents with    Dizziness     pt from home, c/o sudden onset dizziness  Per EMS, pt's rhythm went from NSR to multiple runs of bigeminy  + pacemaker GCS 15  Denies CP, SOB  Patient presents for dizziness in the setting of a history of ventricular tachycardia  The patient states that he was shocked this morning  Did have a shock the other day as well  Talked to Cardiology who recommended that he start mexilitene  Due to insurance issues the patient has not been able to pick this up yet  Patient called 911 tonight to to his worsening symptoms from baseline  Patient was in bigeminy for EMS  Currently in normal sinus rhythm that does go into a paced rhythm  Patient has no symptoms of dizziness, chest pain or shortness of breath at this time  In addition the patient also has bruising to his face and left shoulder that he states is from door that fell onto him the other day  Patient is denying any headache, trouble moving the extremity or loss of consciousness from that event  History provided by:  Patient and EMS personnel   used: No    Dizziness  Quality:  Lightheadedness  Severity:  Unable to specify  Onset quality:  Gradual  Timing:  Intermittent  Progression:  Worsening  Chronicity:  Recurrent  Relieved by:  Nothing  Ineffective treatments:  Lying down  Associated symptoms: palpitations    Associated symptoms: no chest pain, no nausea, no shortness of breath and no vomiting        Prior to Admission Medications   Prescriptions Last Dose Informant Patient Reported? Taking?    Cholecalciferol (VITAMIN D3) 50 MCG (2000) capsule Unknown at Unknown time Self Yes No   Sig: Take 2,000 Units by mouth daily     Glucosamine Sulfate 1000 MG CAPS Unknown at Unknown time Self Yes No   Si capsule Every 12 hours   amiodarone 200 mg tablet 2022 at Unknown time  No Yes   Sig: Take 1 tablet (200 mg total) by mouth daily   atorvastatin (LIPITOR) 40 mg tablet 1/18/2022 at Unknown time  No Yes   Sig: Take 1 tablet (40 mg total) by mouth daily   carvedilol (COREG) 12 5 mg tablet 1/18/2022 at Unknown time  No Yes   Sig: Take 1 tablet (12 5 mg total) by mouth 2 (two) times a day with meals   furosemide (LASIX) 40 mg tablet Past Week at Unknown time  No Yes   Sig: One tablet (40mg) every other day, alternating with 1/2 tablet (20mg) every other day   mexiletine (MEXITIL) 150 mg capsule 1/18/2022 at Unknown time  No Yes   Sig: Take 1 capsule (150 mg total) by mouth 3 (three) times a day   warfarin (COUMADIN) 2 mg tablet 1/18/2022 at Unknown time  No Yes   Sig: Take 1 tablet daily or as directed by physician      Facility-Administered Medications: None       Past Medical History:   Diagnosis Date    Anemia     Arthritis     Coronary artery disease     CVA [facility]cerebral vascular accident) ([facility] [address] [facility]     Hyperlipidemia     Hypertension        Past Surgical History:   Procedure Laterality Date    CARDIAC PACEMAKER PLACEMENT  05/09/2011    [employer]tronic dual chamber ICD implant    CORONARY ANGIOPLASTY  1989       Family History   Problem Relation Age of Onset    Coronary artery disease Mother     Heart attack Father         MI    Stroke Sister      I have reviewed and agree with the history as documented  E-Cigarette/Vaping    E-Cigarette Use Never User      E-Cigarette/Vaping Substances    Nicotine No     THC No     CBD No     Flavoring No     Other No     Unknown No      Social History     Tobacco Use    Smoking status: Never Smoker    Smokeless tobacco: Never Used   Vaping Use    Vaping Use: Never used   Substance Use Topics    Alcohol use: Yes     Alcohol/week: 3 0 standard drinks     Types: 3 Shots of liquor per week     Comment: daily    Drug use: Never       Review of Systems   Constitutional: Positive for fatigue  Negative for chills and fever  Eyes: Negative for visual disturbance     Respiratory: Negative for cough, chest tightness and shortness of breath  Cardiovascular: Positive for palpitations  Negative for chest pain  Gastrointestinal: Negative for abdominal pain, nausea and vomiting  Musculoskeletal: Negative for back pain and neck pain  Skin: Negative for color change, pallor, rash and wound  Allergic/Immunologic: Negative for immunocompromised state  Neurological: Positive for dizziness and light-headedness  Negative for syncope  Hematological: Bruises/bleeds easily  All other systems reviewed and are negative  Physical Exam  Physical Exam  Vitals and nursing note reviewed  Constitutional:       General: He is not in acute distress  Appearance: He is well-developed  He is not ill-appearing, toxic-appearing or diaphoretic  HENT:      Head: Normocephalic  Contusion present  Right Ear: External ear normal       Left Ear: External ear normal       Nose: No congestion  Eyes:      General: No scleral icterus  Conjunctiva/sclera: Conjunctivae normal       Right eye: Right conjunctiva is not injected  Left eye: Left conjunctiva is not injected  Neck:      Trachea: No tracheal deviation  Cardiovascular:      Rate and Rhythm: Normal rate and regular rhythm  Extrasystoles are present  Pulses: Normal pulses  Heart sounds: Normal heart sounds  Pulmonary:      Effort: Pulmonary effort is normal  No respiratory distress  Breath sounds: Normal breath sounds  No stridor  No wheezing or rales  Abdominal:      General: There is no distension  Palpations: Abdomen is soft  Tenderness: There is no abdominal tenderness  There is no guarding or rebound  Musculoskeletal:         General: No tenderness or deformity  Normal range of motion  Left shoulder: No deformity, tenderness or crepitus  Normal range of motion  Normal pulse  Arms:       Cervical back: Normal range of motion and neck supple  Right lower leg: No edema  Left lower leg: No edema     Skin:     General: Skin is warm and dry  Capillary Refill: Capillary refill takes less than 2 seconds  Coloration: Skin is not pale  Findings: No erythema or rash  Neurological:      Mental Status: He is alert and oriented to person, place, and time  Motor: No abnormal muscle tone     Psychiatric:         Mood and Affect: Mood normal          Behavior: Behavior normal          Vital Signs  ED Triage Vitals [01/19/22 0405]   Temperature Pulse Respirations Blood Pressure SpO2   97 9 °F (36 6 °C) 75 21 137/65 99 %      Temp Source Heart Rate Source Patient Position - Orthostatic VS BP Location FiO2 (%)   Oral Monitor Sitting Right arm --      Pain Score       No Pain           Vitals:    01/19/22 0405 01/19/22 0419 01/19/22 0442 01/19/22 0530   BP: 137/65 141/74 149/73 151/74   Pulse: 75 58 62 60   Patient Position - Orthostatic VS: Sitting Sitting Sitting Sitting         Visual Acuity      ED Medications  Medications   potassium chloride 20 mEq IVPB (premix) (20 mEq Intravenous New Bag 1/19/22 0627)   sodium chloride 0 9 % bolus 500 mL (0 mL Intravenous Stopped 1/19/22 0455)   mexiletine (MEXITIL) capsule 150 mg (150 mg Oral Given 1/19/22 0530)   potassium chloride (K-DUR,KLOR-CON) CR tablet 20 mEq (20 mEq Oral Given 1/19/22 0517)   magnesium sulfate 4 g/100 mL IVPB (premix) 4 g (0 g Intravenous Stopped 1/19/22 0622)       Diagnostic Studies  Results Reviewed     Procedure Component Value Units Date/Time    HS Troponin I 4hr [[id number]]     Lab Status: No result Specimen: Blood     HS Troponin I 2hr [[id number]] Collected: 01/19/22 0534    Lab Status: Final result Specimen: Blood from Arm, Left Updated: 01/19/22 0605     hs TnI 2hr 43 ng/L      Delta 2hr hsTnI -1 ng/L     Comprehensive metabolic panel [[id number]]  (Abnormal) Collected: 01/19/22 0426    Lab Status: Final result Specimen: Blood from Arm, Right Updated: 01/19/22 0503     Sodium 139 mmol/L      Potassium 2 1 mmol/L      Chloride 97 mmol/L      CO2 29 mmol/L ANION GAP 13 mmol/L      BUN 23 mg/dL      Creatinine 1 96 mg/dL      Glucose 105 mg/dL      Calcium 8 0 mg/dL      Corrected Calcium 9 0 mg/dL       U/L       U/L      Alkaline Phosphatase 64 U/L      Total Protein 5 4 g/dL      Albumin 2 7 g/dL      Total Bilirubin 1 29 mg/dL      eGFR 29 ml/min/1 73sq m     Narrative:      [place] guidelines for Chronic Kidney Disease (CKD):     Stage 1 with normal or high GFR (GFR > 90 mL/min/1 73 square meters)    Stage 2 Mild CKD (GFR = 60-89 mL/min/1 73 square meters)    Stage 3A Moderate CKD (GFR = 45-59 mL/min/1 73 square meters)    Stage 3B Moderate CKD (GFR = 30-44 mL/min/1 73 square meters)    Stage 4 Severe CKD (GFR = 15-29 mL/min/1 73 square meters)    Stage 5 End Stage CKD (GFR <15 mL/min/1 73 square meters)  Note: GFR calculation is accurate only with a steady state creatinine    HS Troponin 0hr (reflex protocol) [[id number]]  (Normal) Collected: 01/19/22 0426    Lab Status: Final result Specimen: Blood from Arm, Right Updated: 01/19/22 0456     hs TnI 0hr 44 ng/L     Magnesium [[id number]]  (Abnormal) Collected: 01/19/22 0426    Lab Status: Final result Specimen: Blood from Arm, Right Updated: 01/19/22 0456     Magnesium 1 3 mg/dL     NT-BNP PRO [[id number]]  (Abnormal) Collected: 01/19/22 0428    Lab Status: Final result Specimen: Blood from Arm, Right Updated: 01/19/22 0455     NT-proBNP 1,058 pg/mL     Protime-INR [[id number]]  (Abnormal) Collected: 01/19/22 0426    Lab Status: Final result Specimen: Blood from Arm, Right Updated: 01/19/22 0449     Protime 25 9 seconds      INR 2 47    APTT [[id number]]  (Abnormal) Collected: 01/19/22 0426    Lab Status: Final result Specimen: Blood from Arm, Right Updated: 01/19/22 0449     PTT 52 seconds     CBC and differential [[id number]]  (Abnormal) Collected: 01/19/22 0426    Lab Status: Final result Specimen: Blood from Arm, Right Updated: 01/19/22 0434     WBC 5 98 Thousand/uL RBC 3 67 Million/uL      Hemoglobin 11 7 g/dL      Hematocrit 33 1 %      MCV 90 fL      MCH 31 9 pg      MCHC 35 3 g/dL      RDW 13 1 %      MPV 10 3 fL      Platelets 020 Thousands/uL      nRBC 0 /100 WBCs      Neutrophils Relative 68 %      Immat GRANS % 0 %      Lymphocytes Relative 18 %      Monocytes Relative 11 %      Eosinophils Relative 2 %      Basophils Relative 1 %      Neutrophils Absolute 4 09 Thousands/µL      Immature Grans Absolute 0 01 Thousand/uL      Lymphocytes Absolute 1 10 Thousands/µL      Monocytes Absolute 0 63 Thousand/µL      Eosinophils Absolute 0 12 Thousand/µL      Basophils Absolute 0 03 Thousands/µL                  CT head without contrast   Final Result by Alberto Del Castillo DO (01/19 9446)   1  Cerebral atrophy with chronic small vessel ischemic white matter disease and 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  No acute intracranial abnormality  2   Moderate-sized chronic-appearing left-sided subdural hygroma with mild mass effect and midline shift of approximately 4 mm to the right  If warranted, consider follow-up neurosurgical evaluation  3   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  4   Moderate cerebellar atrophy  Workstation performed: [id number]         CT facial bones without contrast   Final Result by Alberto Del Castillo DO (01/19 3962)   No acute traumatic facial bone injuries  More chronic appearing bilateral nasal bone fractures  Workstation performed: [id number]         CT cervical spine without contrast   Final Result by Alberto Del Castillo DO (01/19 0710)   Advanced degenerative changes of the cervical spine  No acute cervical spine fracture or traumatic malalignment                     Workstation performed: [id number]         CT chest abdomen pelvis wo contrast   Final Result by Alberto Del Castillo DO (01/19 7774)   Addendum 1 of 1 by Alberto Del Castillo DO (01/19 4531) ADDENDUM:      Bilateral lower lobe groundglass infiltrates with mild peribronchial    thickening  Findings most compatible with bilateral lower lobe pneumonia    and bronchitis  In the setting of clinically suspected/proven COVID-19, the above lung    parenchymal findings on CT indicate low confidence level for COVID-19  Final   1  Suboptimal examination due to lack of intravenous and oral contrast material   This limits evaluation for traumatic solid or visceral organ injury  There is significant beam hardening artifact from patient's pacemaker as well as imaging the patient    with his arms at his side and his hands across his pelvis  2   No traumatic solid or visceral organ injury, however intravenous contrast material not administered  3   Severely distended urinary bladder, likely sequela of bladder outlet obstruction from prostatic enlargement  Consider follow-up neurology consultation  4   Additional incidental findings as described above        Workstation performed: [id number]                    Procedures  ECG 12 Lead Documentation Only    Date/Time: 1/19/2022 4:11 AM  Performed by: Licha Storm DO  Authorized by: Licha Storm DO     Indications / Diagnosis:  Dizziness  ECG reviewed by me, the ED Provider: yes    Patient location:  ED  Previous ECG:     Previous ECG:  Compared to current    Similarity:  Changes noted  Interpretation:     Interpretation: abnormal    Quality:     Tracing quality:  Limited by artifact  Rate:     ECG rate:  62    ECG rate assessment: normal    Rhythm:     Rhythm: other rhythm    Ectopy:     Ectopy: none    QRS:     QRS intervals:  Normal  Conduction:     Conduction: abnormal      Abnormal conduction: LAFB    ST segments:     ST segments:  Non-specific  T waves:     T waves: non-specific    ECG 12 Lead Documentation Only    Date/Time: 1/19/2022 4:11 AM  Performed by: Licha Storm DO  Authorized by: Enrique Gray Margarette Nguyen DO     Indications / Diagnosis:  Arrhythmia  ECG reviewed by me, the ED Provider: yes    Patient location:  ED  Previous ECG:     Previous ECG:  Compared to current    Similarity:  Changes noted  Interpretation:     Interpretation: abnormal    Rate:     ECG rate:  74    ECG rate assessment: normal    Rhythm:     Rhythm: paced    Pacing:     Capture:  Complete    Type of pacing:  Ventricular  Ectopy:     Ectopy: PVCs    QRS:     QRS intervals:  Normal  Conduction:     Conduction: abnormal      Abnormal conduction: complete LBBB    ST segments:     ST segments:  Non-specific  T waves:     T waves: non-specific    CriticalCare Time  Performed by: Meaghan Aleman DO  Authorized by: Meaghan Aleman DO     Critical care provider statement:     Critical care time (minutes):  60    Critical care time was exclusive of:  Separately billable procedures and treating other patients and teaching time    Critical care was necessary to treat or prevent imminent or life-threatening deterioration of the following conditions:  Cardiac failure, circulatory failure, metabolic crisis and renal failure    Critical care was time spent personally by me on the following activities:  Blood draw for specimens, obtaining history from patient or surrogate, development of treatment plan with patient or surrogate, discussions with consultants, evaluation of patient's response to treatment, examination of patient, interpretation of cardiac output measurements, ordering and performing treatments and interventions, ordering and review of laboratory studies, ordering and review of radiographic studies, review of old charts and re-evaluation of patient's condition    I assumed direction of critical care for this patient from another provider in my specialty: no    ECG 12 Lead Documentation Only    Date/Time: 1/19/2022 5:40 AM  Performed by: Meaghan Aleman DO  Authorized by: Meaghan Aleman DO     Indications / Diagnosis:  Repeat for dizziness and arrhythmia  ECG reviewed by me, the ED Provider: yes    Patient location:  ED  Previous ECG:     Previous ECG:  Compared to current    Similarity:  Changes noted  Interpretation:     Interpretation: abnormal    Rate:     ECG rate:  59    ECG rate assessment: bradycardic    Rhythm:     Rhythm: paced    Pacing:     Capture:  Complete    Type of pacing:  AV  Ectopy:     Ectopy: none    QRS:     QRS intervals:  Normal  Conduction:     Conduction: abnormal      Abnormal conduction: complete LBBB    ST segments:     ST segments:  Non-specific  T waves:     T waves: non-specific               ED Course                               SBIRT 20yo+      Most Recent Value   SBIRT (24 yo +)    In order to provide better care to our patients, we are screening all of our patients for alcohol and drug use  Would it be okay to ask you these screening questions? Yes Filed at: 01/19/2022 0500   Initial Alcohol Screen: US AUDIT-C     1  How often do you have a drink containing alcohol? 0 Filed at: 01/19/2022 0500   2  How many drinks containing alcohol do you have on a typical day you are drinking? 0 Filed at: 01/19/2022 0500   3a  Male UNDER 65: How often do you have five or more drinks on one occasion? 0 Filed at: 01/19/2022 0500   Audit-C Score 0 Filed at: 01/19/2022 0500   RITA: How many times in the past year have you    Used an illegal drug or used a prescription medication for non-medical reasons? Never Filed at: 01/19/2022 0500                    MDM  Number of Diagnoses or Management Options  CHRISTY (acute kidney injury) [facility]): new and requires workup  Dizziness: new and requires workup  Hypokalemia: new and requires workup  Hypomagnesemia: new and requires workup  Urinary retention: new and requires workup  Ventricular tachycardia [facility]): new and requires workup  Diagnosis management comments: Patient presents with dizziness and an abnormal pre-hospital EKG    Patient does have a history ventricular tachycardia and has an AICD placed  Patient did speak with Cardiology the other day and was ordered mexilitene but the patient has not been able to pick it up yet due to insurance issues  Patient had abrupt onset dizziness tonight which was worse than normal   Patient tried to sit down but was unable to relieve his symptoms  Patient is now symptom free  Pre-hospital EKG did show normal sinus rhythm that progressed to bigeminy that progressed to intermittent pacing  Patient here has intermittent pacing and then a completely paced EKG  Given his symptoms, ejection fraction of 12% and known history of ventricular tachycardia will obtain cardiac workup, magnesium, interrogate the pacemaker and start him on mexilitene  Will continue with supportive care with amiodarone, lidocaine and or defibrillation as needed  Will give a small bolus of IV fluids  Patient also had a door that fell into him the other day  Has bruising to his face and left shoulder  He is on Coumadin  Will obtain trauma scans for possible underlying traumatic cause of his symptoms tonight as well  Pacemaker interrogation report noted and placed on the chart  Picture taken and placed in the media tab as well  It does not appear that the report goes until today but from December 8th to January 1st the patient has had 4 shocks  5:08 AM  Potassium is 2 1, magnesium is 1 3, troponin is 44 and creatinine is 1 96  Will replace potassium with both p o  And IV potassium and will replace magnesium with 4 g IV      6:14 AM  No acute trauma on CTs but there are multiple incidental findings  Patient has what appears to be chronic subdural hygromas, pulmonary nodules, show urinary retention    6:36 AM  Case discussed with critical care  They will accept the patient to level 1 step-down under their service         Amount and/or Complexity of Data Reviewed  Clinical lab tests: ordered and reviewed  Tests in the radiology section of CPT®: ordered and reviewed  Tests in the medicine section of CPT®: reviewed and ordered  Review and summarize past medical records: yes            Disposition  Final diagnoses:   Ventricular tachycardia ([facility] [address] )   Dizziness   Hypokalemia   Hypomagnesemia   CHRISTY (acute kidney injury) ([facility] [address] )   Urinary retention     Time reflects when diagnosis was documented in both MDM as applicable and the Disposition within this note     Time User Action Codes Description Comment    1/19/2022  4:57 AM Smeriglio, Ness Chyle Add [I47 2] Ventricular tachycardia ([facility] [address] )     1/19/2022  4:58 AM Smeriglio, Ness Chyle Add [R42] Lightheadedness     1/19/2022  4:58 AM Smeriglio, Major Pick [R42] Lightheadedness     1/19/2022  4:58 AM Ziggy Grain, Ness Chyle Add [R42] Dizziness     1/19/2022  5:07 AM Ziggy Grain, Ness Chyle Add [E87 6] Hypokalemia     1/19/2022  5:07 AM Smeriglio, Ness Chyle Add [E83 42] Hypomagnesemia     1/19/2022  5:07 AM Smeriglio, Ness Chyle Add [N17 9] CHRISTY (acute kidney injury) ([facility] [address] )     1/19/2022  6:15 AM Smeriglio, Ness Chyle Add [R33 9] Urinary retention       ED Disposition     ED Disposition Condition Date/Time Comment    Admit Stable Wed Jan 19, 2022  6:35 AM Case was discussed with Critical Care and the patient's admission status was agreed to be Admission Status: inpatient status to the service of Dr Zurdo Chandler  Follow-up Information    None         Patient's Medications   Discharge Prescriptions    No medications on file       No discharge procedures on file      PDMP Review     None          ED Provider  Electronically Signed by           Ninfa Richter DO  01/19/22 9635

## 2022-01-20 NOTE — ASSESSMENT & PLAN NOTE
Lab Results   Component Value Date    EGFR 42 01/20/2022    EGFR 40 01/19/2022    EGFR 29 01/19/2022    CREATININE 1 46 (H) 01/20/2022    CREATININE 1 52 (H) 01/19/2022    CREATININE 1 96 (H) 01/19/2022   At baseline  Lasix was on hold, can likely resume

## 2022-01-20 NOTE — PLAN OF CARE
Problem: Potential for Falls  Goal: Patient will remain free of falls  Description: INTERVENTIONS:  - Educate patient/family on patient safety including physical limitations  - Instruct patient to call for assistance with activity   - Consult OT/PT to assist with strengthening/mobility   - Keep Call bell within reach  - Keep bed low and locked with side rails adjusted as appropriate  - Keep care items and personal belongings within reach  - Initiate and maintain comfort rounds  - Make Fall Risk Sign visible to staff  - Offer Toileting every 2 Hours, in advance of need  - Initiate/Maintain bed alarm  - Obtain necessary fall risk management equipment  - Apply yellow socks and bracelet for high fall risk patients  - Consider moving patient to room near nurses station  1/19/2022 2244 by Rachel Mitchell RN  Outcome: Progressing  1/19/2022 2244 by Rachel Mitchell RN  Outcome: Progressing     Problem: MOBILITY - ADULT  Goal: Maintain or return to baseline ADL function  Description: INTERVENTIONS:  -  Assess patient's ability to carry out ADLs; assess patient's baseline for ADL function and identify physical deficits which impact ability to perform ADLs (bathing, care of mouth/teeth, toileting, grooming, dressing, etc )  - Assess/evaluate cause of self-care deficits   - Assess range of motion  - Assess patient's mobility; develop plan if impaired  - Assess patient's need for assistive devices and provide as appropriate  - Encourage maximum independence but intervene and supervise when necessary  - Involve family in performance of ADLs  - Assess for home care needs following discharge   - Consider OT consult to assist with ADL evaluation and planning for discharge  - Provide patient education as appropriate  1/19/2022 2244 by Rachel Mitchell RN  Outcome: Progressing  1/19/2022 2244 by Rachel Mitchell RN  Outcome: Progressing  Goal: Maintains/Returns to pre admission functional level  Description: INTERVENTIONS:  - Perform BMAT or MOVE assessment daily    - Set and communicate daily mobility goal to care team and patient/family/caregiver  - Collaborate with rehabilitation services on mobility goals if consulted  - Perform Range of Motion 6 times a day  - Reposition patient every 2 hours    - Dangle patient 4 times a day  - Stand patient 4 times a day  - Ambulate patient 4 times a day  - Out of bed to chair 3 times a day   - Out of bed for meals 3 times a day  - Out of bed for toileting  - Record patient progress and toleration of activity level   1/19/2022 2244 by Jens Larios RN  Outcome: Progressing  1/19/2022 2244 by Jens Larios, RN  Outcome: Progressing

## 2022-01-20 NOTE — ASSESSMENT & PLAN NOTE
Likely cause of cardiac arrhythmias in the setting of severely reduced LV EF status post ICD  Continue to replace and monitor potassium and magnesium levels

## 2022-01-20 NOTE — PLAN OF CARE
Problem: Potential for Falls  Goal: Patient will remain free of falls  Description: INTERVENTIONS:  - Educate patient/family on patient safety including physical limitations  - Instruct patient to call for assistance with activity   - Consult OT/PT to assist with strengthening/mobility   - Keep Call bell within reach  - Keep bed low and locked with side rails adjusted as appropriate  - Keep care items and personal belongings within reach  - Initiate and maintain comfort rounds  - Make Fall Risk Sign visible to staff  - Apply yellow socks and bracelet for high fall risk patients  - Consider moving patient to room near nurses station  Outcome: Progressing     Problem: MOBILITY - ADULT  Goal: Maintain or return to baseline ADL function  Description: INTERVENTIONS:  -  Assess patient's ability to carry out ADLs; assess patient's baseline for ADL function and identify physical deficits which impact ability to perform ADLs (bathing, care of mouth/teeth, toileting, grooming, dressing, etc )  - Assess/evaluate cause of self-care deficits   - Assess range of motion  - Assess patient's mobility; develop plan if impaired  - Assess patient's need for assistive devices and provide as appropriate  - Encourage maximum independence but intervene and supervise when necessary  - Involve family in performance of ADLs  - Assess for home care needs following discharge   - Consider OT consult to assist with ADL evaluation and planning for discharge  - Provide patient education as appropriate  Outcome: Progressing  Goal: Maintains/Returns to pre admission functional level  Description: INTERVENTIONS:  - Perform BMAT or MOVE assessment daily    - Set and communicate daily mobility goal to care team and patient/family/caregiver  - Collaborate with rehabilitation services on mobility goals if consulted  - Perform Range of Motion 3 times a day  - Reposition patient every 2 hours    - Dangle patient 3 times a day  - Stand patient 3 times a day  - Ambulate patient 3 times a day  - Out of bed to chair 3 times a day   - Out of bed for meals 3 times a day  - Out of bed for toileting  - Record patient progress and toleration of activity level   Outcome: Progressing

## 2022-01-20 NOTE — H&P
[facility]  H&P- Patricia Figueredo 10/31/1932, 80 y o  male MRN: [id number]  Unit/Bed#: [place] [address] 2 [facility] 87 223-01 Encounter: [id number]  Primary Care Provider: Tangela Cueva DO   Date and time admitted to hospital: 1/19/2022  4:03 AM      Please refer to progress note from 01/19/21 7:28 a m  for patient's H&P

## 2022-01-20 NOTE — ASSESSMENT & PLAN NOTE
On amiodarone, carvedilol and warfarin for anticoagulation, continue  Cardiology consulted, telemetry monitoring

## 2022-01-20 NOTE — TREATMENT PLAN
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  He has had no recurrence of ventricular tachycardia events during the daytime  His potassium remains low at 2 3, magnesium has increased to 2 3  In order to avoid bradycardia are related VT VF due to recurrent dropping of heart rate, device pacing settings are change from AAIR=DDDR to DDDR  This is a temporary measure and we will discuss with electrophysiology colleague and consider reach changing back to  AAIR=DDDR prior to discharge  I discussed his case with our electrophysiology colleague Dr Alexandro Rahman reviewed patient's medical record and device interrogation from this morning  He has advised that given that patient has had recurrent VT episodes despite being on amiodarone and because is not a candidate for VT ablation is recommended to add mexiletine to his regimen  -- meanwhile we should continue aggressive replacement of potassium and recheck potassium level tonight  -- mexiletine should be restarted at 150 mg 3 times daily after potassium level has normalized, hopefully by tomorrow morning            [address], [place]

## 2022-01-20 NOTE — PROGRESS NOTES
[address] [facility]  Progress Note Rosemary Pardo 10/31/1932, 80 y o  male MRN: [id number]  Unit/Bed#: [place] [address] 87 223-01 Encounter: [id number]  Primary Care Provider: Vashti Fowler DO   Date and time admitted to hospital: 1/19/2022  4:03 AM    * Ventricular tachycardia ([address] )  Assessment & Plan  · Has had multiple episodes of ventricular tachycardia in the past week  Medtronic ICD interrogation on 01/15 revealed 13 VHRS with multiple instances of antitachycardia pacing, 2 of them treated with shocks  Mexilitene added to regimen but patient has not been able to  medication as it requires prior auth from insurance    · In the setting of hypokalemia and hypomagnesemia  · Currently on Amiodarone 200mg - will continue on admission  · Cardiology input appreciated, electrolyte disturbances as the etiology of arrhythmia, no additional anti-arrhythmic indicated, replace and monitor electrolytes        Anticoagulated  Assessment & Plan  With history of Afib  Continue warfarin, monitor INR    Hypomagnesemia  Assessment & Plan  Currently resolved    Hypokalemia  Assessment & Plan  Likely cause of cardiac arrhythmias in the setting of severely reduced LV EF status post ICD  Continue to replace and monitor potassium and magnesium levels    Stage 3b chronic kidney disease [facility])  Assessment & Plan  Lab Results   Component Value Date    EGFR 42 01/20/2022    EGFR 40 01/19/2022    EGFR 29 01/19/2022    CREATININE 1 46 (H) 01/20/2022    CREATININE 1 52 (H) 01/19/2022    CREATININE 1 96 (H) 01/19/2022   At baseline  Lasix was on hold, can likely resume    Combined systolic and diastolic congestive heart failure (HCC)  Assessment & Plan  Wt Readings from Last 3 Encounters:   01/20/22 62 6 kg (138 lb 0 1 oz)   12/21/21 65 2 kg (143 lb 12 8 oz)   12/10/21 67 2 kg (148 lb 3 2 oz)     Cardiology following  Patient is admitted with V tach/VFib with ICD firing, felt to be due to electrolyte disturbances  Lasix was on hold due to an elevated creatinine  Patient appears euvolemic on exam  Monitor volume status closely      Essential hypertension  Assessment & Plan  Continue carvedilol     PAF (paroxysmal atrial fibrillation) (HCC)  Assessment & Plan  On amiodarone, carvedilol and warfarin for anticoagulation, continue  Cardiology consulted, telemetry monitoring            VTE Pharmacologic Prophylaxis: VTE Score: 4 Moderate Risk (Score 3-4) - Pharmacological DVT Prophylaxis Ordered: warfarin (Coumadin)  Patient Centered Rounds: I performed bedside rounds with nursing staff today  Discussions with Specialists or Other Care Team Provider: Critical care, cardiology, CM    Education and Discussions with Family / Patient: Patient  Time Spent for Care: 30 minutes  More than 50% of total time spent on counseling and coordination of care as described above  Current Length of Stay: 1 day(s)  Current Patient Status: Inpatient   Certification Statement: The patient will continue to require additional inpatient hospital stay due to Close monitoring  Discharge Plan: Anticipate discharge in 24-48 hrs to home  Code Status: Level 1 - Full Code    Subjective:   Patient seen and examined  Apparently feeling dizzy at times when ambulating  Otherwise no significant complaints  No overnight events  Objective:     Vitals:   Temp (24hrs), Av 5 °F (36 4 °C), Min:97 5 °F (36 4 °C), Max:97 5 °F (36 4 °C)    Temp:  [97 5 °F (36 4 °C)] 97 5 °F (36 4 °C)  HR:  [57-68] 57  Resp:  [18-20] 20  BP: (107-123)/(61-66) 123/66  SpO2:  [92 %-93 %] 92 %  Body mass index is 24 45 kg/m²  Input and Output Summary (last 24 hours): Intake/Output Summary (Last 24 hours) at 2022 1841  Last data filed at 2022 1546  Gross per 24 hour   Intake --   Output 225 ml   Net -225 ml       Physical Exam:   Physical Exam  Constitutional:       General: He is not in acute distress  HENT:      Head: Normocephalic  Nose: No congestion  Eyes:      Conjunctiva/sclera: Conjunctivae normal    Cardiovascular:      Rate and Rhythm: Normal rate and regular rhythm  Pulmonary:      Effort: No respiratory distress  Breath sounds: No wheezing or rales  Abdominal:      General: There is no distension  Tenderness: There is no abdominal tenderness  There is no guarding  Musculoskeletal:      Right lower leg: No edema  Left lower leg: No edema  Skin:     General: Skin is warm and dry  Neurological:      Mental Status: He is oriented to person, place, and time  Psychiatric:         Mood and Affect: Mood normal           Additional Data:     Labs:  Results from last 7 days   Lab Units 01/20/22  0543   WBC Thousand/uL 4 77   HEMOGLOBIN g/dL 9 9*   HEMATOCRIT % 29 5*   PLATELETS Thousands/uL 181   NEUTROS PCT % 65   LYMPHS PCT % 20   MONOS PCT % 11   EOS PCT % 3     Results from last 7 days   Lab Units 01/20/22  0543   SODIUM mmol/L 140   POTASSIUM mmol/L 2 8*   CHLORIDE mmol/L 106   CO2 mmol/L 25   BUN mg/dL 19   CREATININE mg/dL 1 46*   ANION GAP mmol/L 9   CALCIUM mg/dL 6 9*   ALBUMIN g/dL 1 9*   TOTAL BILIRUBIN mg/dL 0 64   ALK PHOS U/L 52   ALT U/L 207*   AST U/L 226*   GLUCOSE RANDOM mg/dL 98     Results from last 7 days   Lab Units 01/20/22  0543   INR  2 72*                   Lines/Drains:  Invasive Devices  Report    Peripheral Intravenous Line            Peripheral IV 01/19/22 Right Antecubital 1 day                  Telemetry:  Telemetry Orders (From admission, onward)             48 Hour Telemetry Monitoring  Continuous x 48 hours        References:    Telemetry Guidelines   Question:  Reason for 48 Hour Telemetry  Answer:  Arrhythmias Requiring Medical Therapy (eg  SVT, Vtach/fib, Bradycardia, Uncontrolled A-fib)                 Telemetry Reviewed: Atrial fibrillation   HR averaging 50's  Indication for Continued Telemetry Use: Arrthymias requiring medical therapy             Imaging: reviewed trauma scans     Recent Cultures (last 7 days):         Last 24 Hours Medication List:   Current Facility-Administered Medications   Medication Dose Route Frequency Provider Last Rate    amiodarone  200 mg Oral Daily Roque Home, MD      atorvastatin  40 mg Oral Daily Roque Home, MD      carvedilol  12 5 mg Oral BID With Meals Roque Home, MD      melatonin  3 mg Oral HS PRN Shahana Rizo PA-C      [START ON 1/21/2022] potassium chloride  40 mEq Oral BID Brigida Smith MD      [START ON 1/21/2022] warfarin  1 mg Oral Once per day on Mon Fri Roque Home, MD      warfarin  2 mg Oral Once per day on Sun Tue Wed Thu Sat Roque Home, MD          Today, Patient Was Seen By: Isidro Lisa MD    **Please Note: This note may have been constructed using a voice recognition system  **

## 2022-01-20 NOTE — UTILIZATION REVIEW
Initial Clinical Review    Admission: Date/Time/Statement:   Admission Orders (From admission, onward)     Ordered        01/19/22 0635  INPATIENT ADMISSION  Once                      Orders Placed This Encounter   Procedures    INPATIENT ADMISSION     Standing Status:   Standing     Number of Occurrences:   1     Order Specific Question:   Level of Care     Answer:   Level 1 Stepdown [13]     Order Specific Question:   Estimated length of stay     Answer:   More than 2 Midnights     Order Specific Question:   Certification     Answer:   I certify that inpatient services are medically necessary for this patient for a duration of greater than two midnights  See H&P and MD Progress Notes for additional information about the patient's course of treatment  ED Arrival Information     Expected Arrival Acuity    - 1/19/2022 04:03 Emergent         Means of arrival Escorted by Service Admission type    Ambulance [facility] Urgent         Arrival complaint    dizziness        Chief Complaint   Patient presents with    Dizziness     pt from home, c/o sudden onset dizziness  Per EMS, pt's rhythm went from NSR to multiple runs of bigeminy  + pacemaker GCS 15  Denies CP, SOB  Initial Presentation:   Mr Brigitte Medrano is an 80 yom who presents to the ED via EMS from home with c/o intermittent dizziness/ lightheadedness x 1 wk and diarrhea x 1 5 weeks but seems to be resolving  ICD fired x 2 on 1/15  ICD interrogation he had multiple instances of  VHRS requiring antitachycardial pacing and 2 shocks  He was prescribed Mexiletine by his cardiologist on 1/15 but has not been able to  his prescription yet  Has been compliant with Lasix dosing  Has no c/o CP, SOB, abd distention, pain, + chronic edema BLE  Also had periorbital ecchymosis from falling  - imaging is negative  Had K 2 1 with hypomagnesemia, mild elevated creat 1 69 and transaminitis   PMH:  CAD, CVA, Afib, HLD, HTN and CHF s/p dual chamber ICD placement in 2011  He is admitted to INPATIENT status to Level 1 SD with Ventricular Tachycardia - home Lasix on hold, adding Mexilitene, continue Amiodarone, Cardio consult  Anticoagulated - IRN 2 47 - holding Coumadin x 1 day, daily INR  Hypomagnesemia - 1 3 - replete and trend  Hypokalemia - 2 1 - replete, trend  Combined CHF - hold home Lasix, monitor for overload  Stage 3B CKD - elevated creat - monitor BMP  HTN - home Coreg  1/19 Cardio Consult - Ventricular tachycardia and ventricular fibrillation, Successful ICD shocksx2, Paroxysmal atrial fibrillation, Severe hypokalemia and hypomagnesium, Coronary artery disease with remote history of MI in 1989, status post PTCA, Ischemic cardiomyopathy with severely reduced LV systolic function, last reported EF of 12% with marked regional wall abnormality, moderately reduced right ventricular systolic function, Status post single lead ICD placement, 2011, upgraded to dual-chamber ICD 2018 - aggressive electrolyte repletion and trending, will not add Mexiletine at this time since electrolyte imbalance triggered ICD  Continue Amiodaron, Carvedilol, Coumadin, hold Lasix and reassess creat 1/20  If no significant ectopy throughout day can transfer to Med Surg       Date: 1/20   Day 2:         ED Triage Vitals [01/19/22 0405]   Temperature Pulse Respirations Blood Pressure SpO2   97 9 °F (36 6 °C) 75 21 137/65 99 %      Temp Source Heart Rate Source Patient Position - Orthostatic VS BP Location FiO2 (%)   Oral Monitor Sitting Right arm --      Pain Score       No Pain          Wt Readings from Last 1 Encounters:   01/20/22 62 6 kg (138 lb 0 1 oz)     Additional Vital Signs:   01/20/22 03:19:29 -- 68 18 107/61 76 93 % -- -- -- --   01/19/22 2200 -- -- -- -- -- -- -- -- None (Room air) --   01/19/22 2100 -- -- -- -- -- -- -- -- None (Room air) --   01/19/22 1500 98 2 °F (36 8 °C) 58 13 -- -- 94 % -- -- -- --   01/19/22 1300 -- 48 Abnormal  13 -- -- 100 % -- -- -- --   01/19/22 1200 -- 50 Abnormal  23 Abnormal  136/91 106 98 % -- -- -- --   01/19/22 1100 -- 56 20 135/78 97 99 % -- -- -- --   01/19/22 1000 98 3 °F (36 8 °C) 60 22 145/74 -- -- -- -- -- --   01/19/22 0930 -- 46 Abnormal  21 158/79 113 99 % 28 2 L/min Nasal cannula --   01/19/22 0906 -- 62 -- -- -- -- -- -- -- --   01/19/22 0830 -- 52 Abnormal  21 141/75 101 100 % -- -- -- --   01/19/22 0704 -- 59 16 138/79 102 99 % 28 2 L/min Nasal cannula --   01/19/22 0542 -- 58 15 139/72 96 100 % 32 3 L/min Nasal cannula Sitting   01/19/22 0530 -- 60 15 151/74 -- 97 % 32 3 L/min Nasal cannula Sitting   01/19/22 0442 -- 62 19 149/73 103 98 % -- -- -- Sitting   01/19/22 0419 -- 58 19 141/74 102 100 % -- -- None (Room air) Sitting     Pertinent Labs/Diagnostic Test Results:     1/19 CT head - 1  Cerebral atrophy with chronic small vessel ischemic white matter disease and 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  No acute intracranial abnormality  2   Moderate-sized chronic-appearing left-sided subdural hygroma with mild mass effect and midline shift of approximately 4 mm to the right  If warranted, consider follow-up neurosurgical evaluation  3   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  4   Moderate cerebellar atrophy  1/19 CT facial bones - No acute traumatic facial bone injuries  More chronic appearing bilateral nasal bone fractures          1/19 CT C spine - Advanced degenerative changes of the cervical spine  No acute cervical spine fracture or traumatic malalignment  1/19 CT CAP - 1  Suboptimal examination due to lack of intravenous and oral contrast material   This limits evaluation for traumatic solid or visceral organ injury  There is significant beam hardening artifact from patient's pacemaker as well as imaging the patient with his arms at his side and his hands across his pelvis    2   No traumatic solid or visceral organ injury, however intravenous contrast material not administered  3   Severely distended urinary bladder, likely sequela of bladder outlet obstruction from prostatic enlargement  Consider follow-up neurology consultation      1/19 ECG - Junctional rhythm  1/19 ECG - Ventricular-paced rhythm with probable underlying atrial fibrillation  Premature ventricular complexes  Abnormal ECG    1/15 Cardiac EP device report - MDT-DUAL CHAMBER ICD (AAIR-DDDR MODE)/ ACTIVE SYSTEM IS MRI CONDITIONAL   NB/ALERT-CARELINK TRANSMISSION: 13 VHRS NOTED; #175 & # 173 TREATED W/MULTIPLE ATPS & 1 SHOCK EACH  PT STATES HE DID FEEL IT & JUST LIED DOWN TILL IT SUBSIDED  PT ON AMIO, COREG, & WARFARIN  HAS BEEN HAVING PROBLEMS W LOWER BILATERAL EDEMA  ON FUROSEMIDE  OPTI-VOL WITHIN NORMAL LIMITS  SAYS HE TAKES MEDS AS RX'D  DENIES ANY OTHER HEALTH ISSUES AT THE TIME OR NEW MEDS  DR SOLO & JS MADE AWARE  BATTERY STATUS "7 YRS " AP 92%  33%  ALL AVAILABLE LEAD PARAMETERS & TRENDS WITHIN NORMAL LIMITS       Results from last 7 days   Lab Units 01/20/22  0543 01/19/22  0426   WBC Thousand/uL 4 77 5 98   HEMOGLOBIN g/dL 9 9* 11 7*   HEMATOCRIT % 29 5* 33 1*   PLATELETS Thousands/uL 181 196   NEUTROS ABS Thousands/µL 3 13 4 09         Results from last 7 days   Lab Units 01/20/22  0543 01/19/22  1410 01/19/22  0426   SODIUM mmol/L 140 138 139   POTASSIUM mmol/L 2 8* 2 3* 2 1*   CHLORIDE mmol/L 106 100 97*   CO2 mmol/L 25 29 29   ANION GAP mmol/L 9 9 13   BUN mg/dL 19 22 23   CREATININE mg/dL 1 46* 1 52* 1 96*   EGFR ml/min/1 73sq m 42 40 29   CALCIUM mg/dL 6 9* 7 3* 8 0*   CALCIUM, IONIZED mmol/L 1 00* 1 02*  --    MAGNESIUM mg/dL 2 0 2 3 1 3*   PHOSPHORUS mg/dL 1 9*  --   --      Results from last 7 days   Lab Units 01/20/22  0543 01/19/22  0426   AST U/L 226* 235*   ALT U/L 207* 210*   ALK PHOS U/L 52 64   TOTAL PROTEIN g/dL 4 6* 5 4*   ALBUMIN g/dL 1 9* 2 7*   TOTAL BILIRUBIN mg/dL 0 64 1 29*         Results from last 7 days   Lab Units 01/20/22  0543 01/19/22  1410 01/19/22  0426   GLUCOSE RANDOM mg/dL 98 89 105     Results from last 7 days   Lab Units 01/19/22  0907 01/19/22  0534 01/19/22  0426   HS TNI 0HR ng/L  --   --  44   HS TNI 2HR ng/L  --  43  --    HSTNI D2 ng/L  --  -1  --    HS TNI 4HR ng/L 44  --   --    HSTNI D4 ng/L 0  --   --          Results from last 7 days   Lab Units 01/20/22  0543 01/19/22  0426 01/14/22  1129   PROTIME seconds 27 9* 25 9* 27 5*   INR  2 72* 2 47* 2 72*   PTT seconds  --  52*  --      Results from last 7 days   Lab Units 01/19/22  0428   NT-PRO BNP pg/mL 1,058*     ED Treatment:   Medication Administration from 01/19/2022 0403 to 01/19/2022 1043    Date/Time Order Dose Route Action   01/19/2022 0423 sodium chloride 0 9 % bolus 500 mL 500 mL Intravenous New Bag   01/19/2022 0530 mexiletine (MEXITIL) capsule 150 mg 150 mg Oral Given   01/19/2022 0517 potassium chloride (K-DUR,KLOR-CON) CR tablet 20 mEq 20 mEq Oral Given   01/19/2022 0911 potassium chloride 20 mEq IVPB (premix) 20 mEq Intravenous New Bag   01/19/2022 0723 potassium chloride 20 mEq IVPB (premix)   Intravenous Rate/Dose Change   01/19/2022 0627 potassium chloride 20 mEq IVPB (premix) 20 mEq Intravenous New Bag   01/19/2022 0522 magnesium sulfate 4 g/100 mL IVPB (premix) 4 g 4 g Intravenous New Bag   01/19/2022 7204 amiodarone tablet 200 mg 200 mg Oral Given   01/19/2022 0906 atorvastatin (LIPITOR) tablet 40 mg 40 mg Oral Given   01/19/2022 0906 carvedilol (COREG) tablet 12 5 mg 12 5 mg Oral Given   01/19/2022 0906 mexiletine (MEXITIL) capsule 150 mg 150 mg Oral Given        Past Medical History:   Diagnosis Date    Anemia     Arthritis     Coronary artery disease     CVA (cerebral vascular accident) ([address] )     Hyperlipidemia     Hypertension      Present on Admission:   Hyperlipidemia   [facility]   Essential hypertension   Combined systolic and diastolic congestive heart failure ([facility] )   Cardiac defibrillator in situ   Ventricular tachycardia (HCC)   Stage 3b chronic kidney disease ([facility] )      Admitting Diagnosis: Hypokalemia [E87 6]  Hypomagnesemia [E83 42]  Urinary retention [R33 9]  Dizziness [R42]  Ventricular tachycardia (HCC) [I47 2]  CHRISTY (acute kidney injury) ([facility] ) [N17 9]  Age/Sex: 80 y o  male  Admission Orders:  Scheduled Medications:  amiodarone, 200 mg, Oral, Daily  atorvastatin, 40 mg, Oral, Daily  carvedilol, 12 5 mg, Oral, BID With Meals  potassium chloride, 20 mEq, Intravenous, Q2H  [START ON 1/21/2022] warfarin, 1 mg, Oral, Once per day on Mon Fri  warfarin, 2 mg, Oral, Once per day on Sun Tue Wed Thu Sat      Continuous IV Infusions:     PRN Meds:  melatonin, 3 mg, Oral, HS PRN    Tele  SCDs  ICD/pacer interrogation  Neuro checks q 4 hr   IP CONSULT TO CASE MANAGEMENT  IP CONSULT TO CARDIOLOGY    Network Utilization Review Department  ATTENTION: Please call with any questions or concerns to [phone] and carefully listen to the prompts so that you are directed to the right person  All voicemails are confidential   Candia Siemens all requests for admission clinical reviews, approved or denied determinations and any other requests to dedicated fax number below belonging to the campus where the patient is receiving treatment   List of dedicated fax numbers for the Facilities:  [address] DENIALS (Administrative/Medical Necessity) [phone]   [address] (Maternity/NICU/Pediatrics) [phone]   [address]  [address] Medical [place] [address] [address] [address] [place] [address] [phone]   Annabella Ayon [address] [place] [address] [address] [place] [address] [phone]

## 2022-01-20 NOTE — ASSESSMENT & PLAN NOTE
Wt Readings from Last 3 Encounters:   01/20/22 62 6 kg (138 lb 0 1 oz)   12/21/21 65 2 kg (143 lb 12 8 oz)   12/10/21 67 2 kg (148 lb 3 2 oz)     Cardiology following  Patient is admitted with V tach/VFib with ICD firing, felt to be due to electrolyte disturbances  Lasix was on hold due to an elevated creatinine  Patient appears euvolemic on exam  Monitor volume status closely

## 2022-01-20 NOTE — TELEPHONE ENCOUNTER
Patient left message on MA line asking for a call back to schedule a follow up-states he is currently in the hospital

## 2022-01-20 NOTE — ASSESSMENT & PLAN NOTE
· Has had multiple episodes of ventricular tachycardia in the past week  Medtronic ICD interrogation on 01/15 revealed 13 VHRS with multiple instances of antitachycardia pacing, 2 of them treated with shocks  Mexilitene added to regimen but patient has not been able to  medication as it requires prior auth from insurance    · In the setting of hypokalemia and hypomagnesemia  · Currently on Amiodarone 200mg - will continue on admission  · Cardiology input appreciated, electrolyte disturbances as the etiology of arrhythmia, no additional anti-arrhythmic indicated, replace and monitor electrolytes

## 2022-01-21 PROBLEM — R09.02 HYPOXIA: Status: ACTIVE | Noted: 2022-01-01

## 2022-01-21 NOTE — ASSESSMENT & PLAN NOTE
Patient with history of CHF with severely reduced EF of 12%  He developed shortness of breath and hypoxia this morning, a stat portable chest x-ray revealed ground-glass opacities, possibly due to CHF exacerbation versus COVID-19 infection  - will check proBNP and COVID/viral panel  - supplemental oxygen to keep oxygen saturations above 90%  - will give IV Lasix 20 mg

## 2022-01-21 NOTE — ASSESSMENT & PLAN NOTE
· Has had multiple episodes of ventricular tachycardia in the past week  Medtronic ICD interrogation on 01/15 revealed 13 VHRS with multiple instances of antitachycardia pacing, 2 of them treated with shocks  Mexilitene added to regimen but patient has apparently not been able to  medication as it requires prior auth from insurance    · Presented with severe hypokalemia and hypomagnesemia now corrected - goal for K>4, Mg>2  · Currently on Amiodarone 200mg, continued   · Mexilitene added to therapy   · Continue close monitoring   · Patient developed hypoxia today, requires further workup and monitoring, will remain hospitalized

## 2022-01-21 NOTE — PLAN OF CARE
Problem: PHYSICAL THERAPY ADULT  Goal: Performs mobility at highest level of function for planned discharge setting  See evaluation for individualized goals  Description: Treatment/Interventions: Functional transfer training,LE strengthening/ROM,Therapeutic exercise,Endurance training,Patient/family training,Equipment eval/education,Bed mobility,Gait training,Compensatory technique education,Continued evaluation,Spoke to MD,Spoke to nursing,OT  Equipment Recommended:  (recommend use of RW at home; pt has)       See flowsheet documentation for full assessment, interventions and recommendations  1/21/2022 1333 by Kai Jimenez, PT  Note: Prognosis: Fair  Problem List: Decreased strength,Decreased endurance,Impaired balance,Decreased mobility,Impaired judgement,Decreased safety awareness,Impaired hearing  Assessment: Mane Jaeger is a 80 y o  male admitted to [facility] on 1/19/2022 for Ventricular tachycardia ([facility] [address] )  PT was consulted and pt was seen on 1/21/2022 for mobility assessment and d/c planning  Pt presents w high fall risk, multiple lines  At baseline is indep without an AD and does not require oxygen at home  Pt is currently functioning at a supervision assistance x1 level for bed mobility, transfers (CGA for standing d/t instability from dizziness) and ambulation w no AD  Pt demonstrated deficits of endurance (dizziness, SOB), balance (poor + ambulatory balance), cognition (safety awareness, insight) and vitals (oxygen saturations)  Oxygen improved w O2 sats; RN and MD aware  No gross LOB but require close S for safety and may benefit from trial of RW next session  At this time pt at increased risk for falls  Pt will benefit from continued skilled IP PT to address the above mentioned impairments  in order to maximize recovery and increase functional independence when completing mobility and ADLs  Currently PT recommendations for DME include ues of RW at home   At this time PT recommendations for d/c are home w HHPT pending progress  Barriers to Discharge: Other (Comment) (decline in function)        PT Discharge Recommendation: Home with home health rehabilitation (pending progress in therapy)          See flowsheet documentation for full assessment  1/21/2022 1333 by Xin Alvares PT  Note: Prognosis: Fair  Problem List: Decreased strength,Decreased endurance,Impaired balance,Decreased mobility,Impaired judgement,Decreased safety awareness,Impaired hearing  Assessment: Domo Downing is a 80 y o  male admitted to [facility] on 1/19/2022 for Ventricular tachycardia ([address] )  PT was consulted and pt was seen on 1/21/2022 for mobility assessment and d/c planning  Pt presents w high fall risk, multiple lines  At baseline is indep without an AD and does not require oxygen at home  Pt is currently functioning at a supervision assistance x1 level for bed mobility, transfers (CGA for standing d/t instability from dizziness) and ambulation w no AD  Pt demonstrated deficits of endurance (dizziness, SOB), balance (poor + ambulatory balance), cognition (safety awareness, insight) and vitals (oxygen saturations)  Oxygen improved w O2 sats; RN and MD aware  No gross LOB but require close S for safety and may benefit from trial of RW next session  At this time pt at increased risk for falls  Pt will benefit from continued skilled IP PT to address the above mentioned impairments  in order to maximize recovery and increase functional independence when completing mobility and ADLs  Currently PT recommendations for DME include ues of RW at home  At this time PT recommendations for d/c are home w HHPT pending progress  Barriers to Discharge: Other (Comment) (decline in function)        PT Discharge Recommendation: Home with home health rehabilitation (pending progress in therapy)          See flowsheet documentation for full assessment

## 2022-01-21 NOTE — OCCUPATIONAL THERAPY NOTE
Occupational Therapy Evaluation     Patient Name: April Sanya RODRIGUEZ Date: 1/21/2022  Problem List  Principal Problem:    Ventricular tachycardia ([facility] [address] )  Active Problems:    Cardiac defibrillator in situ    Hyperlipidemia    PAF (paroxysmal atrial fibrillation) ([facility] [address] )    Essential hypertension    Combined systolic and diastolic congestive heart failure (HCC)    Stage 3b chronic kidney disease ([facility] [address] )    Hypokalemia    Hypomagnesemia    Anticoagulated    Transaminitis    Past Medical History  Past Medical History:   Diagnosis Date    Anemia     Arthritis     Coronary artery disease     CVA (cerebral vascular accident) ([facility] [address] )     Hyperlipidemia     Hypertension      Past Surgical History  Past Surgical History:   Procedure Laterality Date    CARDIAC PACEMAKER PLACEMENT  05/09/2011    Medtronic dual chamber ICD implant    CORONARY ANGIOPLASTY  1989 01/21/22 1052   OT Last Visit   OT Visit Date 01/21/22   Note Type   Note type Evaluation   Restrictions/Precautions   Weight Bearing Precautions Per Order No   Other Precautions Cognitive; Chair Alarm; Bed Alarm;Multiple lines;Telemetry;O2;Fall Risk;Hard of hearing  (Applied 2L O2 due to decreased sats )   Pain Assessment   Pain Assessment Tool 0-10   Pain Score No Pain   Home Living   Type of Home Apartment   Home Layout One level;Elevator  (2nd fl apt; [address])   Bathroom Shower/Tub Walk-in shower   Bathroom Toilet Standard   Bathroom Equipment Other (Comment)  (None per pt )   [address] Walker;Cane  (Did not use PTA)   Prior Function   Lives With Spouse   Receives Help From Other (Comment)  (None needed prior )   ADL Assistance Independent   IADLs Independent  (Shares w/ spouse)   Falls in the last 6 months 0   Comments Pt did not have any falls but recently got hit by automatic door in building  L eye, arm, and leg bruised  Lifestyle   Autonomy Pt reports being (I) PLOF  + drives  0 falls      Reciprocal Relationships Spouse   Psychosocial   Psychosocial (WDL) WDL   Subjective   Subjective " It's freezing in here "    ADL   Eating Assistance 7  Independent   Grooming Assistance 7  Independent   UB Bathing Assistance 5  Supervision/Setup   LB Bathing Assistance 4  Minimal Assistance   UB Dressing Assistance 5  Supervision/Setup   LB Dressing Assistance 4  [address]  5  Supervision/Setup   Additional Comments Fatigues quickly  Easily SOB  Unsteady when unsupported in stance  Bed Mobility   Supine to Sit 5  Supervision   Additional items HOB elevated; Increased time required   Sit to Supine 5  Supervision   Additional Comments Reports dizziness w/ position change  /81  Remains in bed at end of session w/ all needs and alarm engaged  Transfers   Sit to Stand   (CG)   Additional items Verbal cues; Increased time required   Stand to Sit   (CG)   Additional items Verbal cues; Increased time required   Stand pivot   (Close S without AD)   Additional Comments Cues for tech and safety    Functional Mobility   Functional Mobility   (CG without AD )   Additional Comments Slow, + sway, slightly unsteady  Recommend use of RW for increased support and EC      Additional items   (SPO2 83% RA; 93% 2L)   Balance   Static Sitting Fair +   Dynamic Sitting Fair   Static Standing Fair -   Dynamic Standing Poor +   Ambulatory Poor +   Activity Tolerance   Activity Tolerance Patient limited by fatigue;Treatment limited secondary to medical complications (Comment)  (Decreased O2 sats RA)   Medical Staff Made Aware Shirley MENARD    Nurse Made Aware Shirley PRETTY Assessment   RUE Assessment   (4/5)   LUE Assessment   LUE Assessment WFL  (4/5)   Hand Function   Gross Motor Coordination Functional   Fine Motor Coordination Functional   Vision-Basic Assessment   Current Vision Wears glasses all the time   Cognition   Arousal/Participation Responsive   Attention Attends with cues to redirect   Orientation Level Oriented X4   Memory Decreased short term memory; Other (Comment)  (STM vs [place] )   Following Commands Follows one step commands with increased time or repetition   Comments Flat at times but pleasant  Requires increased motivation  Very [place]  Assessment   Limitation Decreased ADL status; Decreased endurance;Decreased self-care trans;Decreased high-level ADLs   Prognosis Good   Assessment Pt admit 1/19/22 with dizziness in the setting of a history of ventricular tachycardia  Reports being shocked that morning and the other day as well  Spoke w/ cardiology that recommended medication  Pt unable to  due to insurance issues  OT completed extensive review of pt's medical and social history  Pt with h/o ICD, anemia, CVA, HTN  Prior to admit was living w/ wife in 2nd fl apt  P (I) PLOF  Ambulates without AD  - falls  + drives  Pt presents to OT below baseline due to the following performance deficits: balance; stand tolerance; functional mobility; attention; awareness; coping; community integration; self care; and IADLs  Therefore, pt would benefit from OT services to achieve optimal level of performance  Occupational performance areas to be addressed include:  bathing, toileting, dressing, activity tolerance, functional mobility, and clothing management  Pt is (S) for bed mobility and CG for transfers  Close S for ambulation without AD  Slow and unsteady at times but no LOB  Increased SOB and fatigue  Recommend use of RW at this time, which he did not need at baseline  Increased assist for ADLS due to fatigue, SOB, and deconditioning  Significant cardiac history  SPO2 83% RA  Placed on 2L per RN and O2 93%  C/o dizziness w/ position changes  BP seated 130/81  BP standing 143/81  Based on findings, pt is of high complexity  The patient's raw score on the AM-PAC Daily Activity inpatient short form is 20, standardized score is 42 03, greater than 39 4   Patients at this level are likely to benefit from discharge to home  Recommend pt return home w/ wife support PRN and home therapy  However, pt w/ decreased insight and doesn't feel he needs therapy despite stating he tires quickly and becomes SOB  May benefit from home O2 study to determine needs  Goals   Patient Goals To get warm    Plan   Treatment Interventions ADL retraining;Functional transfer training; Endurance training;Patient/family training;Equipment evaluation/education; Activityengagement; Energy conservation   Goal Expiration Date 02/04/22   OT Treatment Day 0   OT Frequency 2-3x/wk   Recommendation   OT Discharge Recommendation Home with home health rehabilitation   Additional Comments  Would benefit from home therapy to maximize independence, balance, and activity tolerance   However, states " I don't think so at this time but maybe later "    AM-PAC Daily Activity Inpatient   Lower Body Dressing 3   Bathing 3   Toileting 3   Upper Body Dressing 4   Grooming 3   Eating 4   Daily Activity Raw Score 20   Daily Activity Standardized Score (Calc for Raw Score >=11) 42 03   AM-PAC Applied Cognition Inpatient   Following a Speech/Presentation 4   Understanding Ordinary Conversation 4   Taking Medications 3   Remembering Where Things Are Placed or Put Away 3   Remembering List of 4-5 Errands 3   Taking Care of Complicated Tasks 3   Applied Cognition Raw Score 20   Applied Cognition Standardized Score 41 76        GOALS:      Pt will complete UB ADL's w/ MI      Pt will complete LB ADL's w/ MI using AE PRN     Pt will complete toileting including hygiene and clothing management w/ MI      Pt will complete functional transfers w/ MI using most appropriate method     Pt will complete dynamic and unsupported stand balance w/ G- for safe clothing management      Pt will improve stand tolerance to 5-10 mins for safety w/ ADL tasks      Pt will improve activity tolerance to G for 20- 30 min tx session for increased engagement in functional tasks       After education, pt will verbalize 3 energy conservation techs to utilize to increase activity tolerance during functional tasks      Pt will perform simulated IADLS w/ G- balance       TheodoreRangeley Vanessa LUKE, OTR/L

## 2022-01-21 NOTE — PROGRESS NOTES
Cardiology Progress Note   MD Redd Goldstein MD Katharyn Plate, DO, MD Sanjeev Liu DO, Vishal Vazquez DO, [facility]  ----------------------------------------------------------------  [address], [address] [place] [address] 80 y o  male MRN: [id number]  [facility] Encounter: [id number]      ASSESSMENT:    Ventricular tachycardia with shock x2 in the setting of severe cardiomyopathy and superimposed hypokalemia and hypomagnesemia   s/p Medtronic dual-chamber ICD   Hypokalemia/hypomagnesemia secondary to alcohol and diuretic use (predominantly related to alcohol use)   CAD w/ remote history of MI and angioplasty, 1989   Paroxysmal atrial fibrillation on warfarin   Chronic combined systolic and diastolic heart failure   Hypertension   Dyslipidemia   CKD stage IIIB   History of embolic CVA   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    PLAN:  Patient without further episodes of lightheadedness and no further runs of nonsustained VT  Started mexiletine overnight 150 mg q 8 hours  Keep potassium greater than 4 and magnesium greater than 2  Continue amiodarone, carvedilol and warfarin; goal INR of 2-3  Discussed case with primary cardiologist and agreed to keep pacemaker in DDDR setting  Alcohol cessation has again been heavily advised due to the potential life-threatening nature of electrolyte abnormality resulting from the alcohol ingestion  Should he continue to drink, there is a distinct possibility he could again be shocked or have untreatable dysrhythmia; patient understands  Clinically euvolemic  If patient without VT, reasonable for discharge from CV perspective later today versus or early tomorrow    Signed: Anita Garrett DO, [facility], [facility]      History of Present Illness:  Patient seen and examined  Denies chest pain, pressure, tightness or squeezing    Denies lightheadedness, dizziness or palpitations  Denies lower extremity swelling, orthopnea or paroxysmal nocturnal dyspnea  Resting comfortably in bed  Review of Systems:  Review of Systems   Constitutional: Negative for decreased appetite, fever, weight gain and weight loss  HENT: Negative for congestion and sore throat  Eyes: Negative for visual disturbance  Cardiovascular: Negative for chest pain, dyspnea on exertion, leg swelling, near-syncope and palpitations  Respiratory: Negative for cough and shortness of breath  Hematologic/Lymphatic: Negative for bleeding problem  Skin: Negative for rash  Musculoskeletal: Negative for myalgias and neck pain  Gastrointestinal: Negative for abdominal pain and nausea  Neurological: Negative for light-headedness and weakness  Psychiatric/Behavioral: Negative for depression  No Known Allergies    No current facility-administered medications on file prior to encounter       Current Outpatient Medications on File Prior to Encounter   Medication Sig    amiodarone 200 mg tablet Take 1 tablet (200 mg total) by mouth daily    atorvastatin (LIPITOR) 40 mg tablet Take 1 tablet (40 mg total) by mouth daily    carvedilol (COREG) 12 5 mg tablet Take 1 tablet (12 5 mg total) by mouth 2 (two) times a day with meals    furosemide (LASIX) 40 mg tablet One tablet (40mg) every other day, alternating with 1/2 tablet (20mg) every other day    mexiletine (MEXITIL) 150 mg capsule Take 1 capsule (150 mg total) by mouth 3 (three) times a day    warfarin (COUMADIN) 2 mg tablet Take 1 tablet daily or as directed by physician    Cholecalciferol (VITAMIN D3) 50 MCG (2000 UT) capsule Take 2,000 Units by mouth daily      Glucosamine Sulfate 1000 MG CAPS 1 capsule Every 12 hours        Current Facility-Administered Medications   Medication Dose Route Frequency Provider Last Rate    amiodarone  200 mg Oral Daily Whitley Green MD      atorvastatin  40 mg Oral Daily MD Margie Ferreira carvedilol  12 5 mg Oral BID With Meals Harley Corrigan MD      magnesium oxide  400 mg Oral BID Tika Beach MD      magnesium sulfate  2 g Intravenous Once Tika Beach MD      melatonin  3 mg Oral HS PRN Silva Carmona PA-C      mexiletine  150 mg Oral Q8H [facility] Thea Pike DO      potassium chloride  20 mEq Intravenous Once Tika Beach MD      potassium chloride  40 mEq Oral BID Tika [place] MD      warfarin  1 mg Oral Once per day on Mon Fri Harely Corrigan MD      warfarin  2 mg Oral Once per day on Sun Tue Wed Thu Sat Harley Corrigan MD              Vitals:    01/20/22 1418 01/20/22 2142 01/21/22 0546 01/21/22 0732   BP: 123/66 129/78  128/78   BP Location:  Right arm     Pulse: 57 68  68   Resp: 20 20 18   Temp: 97 5 °F (36 4 °C) 98 1 °F (36 7 °C)  98 3 °F (36 8 °C)   TempSrc:  Oral     SpO2: 92% 91%  92%   Weight:   64 8 kg (142 lb 13 7 oz)    Height:             Intake/Output Summary (Last 24 hours) at 1/21/2022 0847  Last data filed at 1/20/2022 1546  Gross per 24 hour   Intake --   Output 225 ml   Net -225 ml       Weight change: -2 2 kg (-4 lb 13 6 oz)    PHYSICAL EXAMINATION:  Gen: Awake, Alert, NAD  Head/eyes: AT/NC, pupils equal and round, Anicteric  ENT: mmm  Neck: Supple, No elevated JVP, trachea midline  Resp: CTA bilaterally no w/r/r  CV: RRR +S1, S2, No m/r/g  Abd: Soft, NT/ND + BS  Ext: no LE edema bilaterally  Neuro:  Follows commands, moves all extermities  Psych: Appropriate affect, normal mood, pleasant attitude, non-combative  Skin: warm; no rash, erythema or venous stasis changes on exposed skin    Lab Results:  Results from last 7 days   Lab Units 01/21/22  0524   WBC Thousand/uL 5 99   HEMOGLOBIN g/dL 11 3*   HEMATOCRIT % 34 2*   PLATELETS Thousands/uL 206     Results from last 7 days   Lab Units 01/21/22  0524 01/20/22  1952 01/20/22  0543   POTASSIUM mmol/L 3 7   < > 2 8*   CHLORIDE mmol/L 105   < > 106   CO2 mmol/L 24   < > 25   BUN mg/dL 17   < > 19 CREATININE mg/dL 1 42*   < > 1 46*   CALCIUM mg/dL 7 5*   < > 6 9*   ALK PHOS U/L  --   --  52   ALT U/L  --   --  207*   AST U/L  --   --  226*    < > = values in this interval not displayed  No results found for: Silvana Neves          Results from last 7 days   Lab Units 01/21/22  0524   INR  2 57*       Tele:  V paced rhythm    This note was completed in part utilizing [employer] Software  Grammatical errors, random word insertions, spelling mistakes, and incomplete sentences may be an occasional consequence of this system secondary to software limitations, ambient noise, and hardware issues  If you have any questions or concerns about the content, text, or information contained within the body of this dictation, please contact the provider for clarification

## 2022-01-21 NOTE — UTILIZATION REVIEW
Continued Stay Review    Date: 1/20/2021                        Current Patient Class: inpatient  Current Level of Care: med/surg  HPI:89 y o  male initially admitted on 1/19 with V-tach with shock x2 in the setting of severe cardiomyopathy and superimposed hypokalemia and hypomagnesemia  Assessment/Plan: ICD showed defibrillation x2 and patient has been drinking with significant hypokalemia  Keep K >4 and Mag >2  K today 4 1  Per discussion with cardiology and EP plan to start mexiletine 150 mg TID  Plan to set pacemaker back to [facility] prior to discharge; currently in DDDR  Alcohol cessation heavily advised   Continue amiodarone, carvedilol and warfarin; goal INR of 2-3    Vital Signs:   Date/Time Temp Pulse Resp BP MAP (mmHg) SpO2 Calculated FIO2 (%) - Nasal Cannula Nasal Cannula O2 Flow Rate (L/min) O2 Device   01/20/22 21:42:55 98 1 °F (36 7 °C) 68 20 129/78 95 91 % -- -- None (Room air)   01/20/22 14:18:49 97 5 °F (36 4 °C) 57 20 123/66 85 92 % -- -- None (Room air)   01/20/22 03:19:29 -- 68 18 107/61 76 93 % -- -- --   01/19/22 2200 -- -- -- -- -- -- -- -- None (Room air)   01/19/22 2100 -- -- -- -- -- -- -- -- None (Room air)   01/19/22 1500 98 2 °F (36 8 °C) 58 13 -- -- 94 % -- -- --   01/19/22 1300 -- 48 Abnormal  13 -- -- 100 % -- -- --   01/19/22 1200 -- 50 Abnormal  23 Abnormal  136/91 106 98 % -- -- --       Pertinent Labs/Diagnostic Results:     Results from last 7 days   Lab Units 01/21/22  0524 01/20/22  0543 01/19/22  0426   WBC Thousand/uL 5 99 4 77 5 98   HEMOGLOBIN g/dL 11 3* 9 9* 11 7*   HEMATOCRIT % 34 2* 29 5* 33 1*   PLATELETS Thousands/uL 206 181 196   NEUTROS ABS Thousands/µL 3 96 3 13 4 09     Results from last 7 days   Lab Units 01/21/22  0524 01/20/22  1952 01/20/22  0543 01/19/22  1410 01/19/22  0426   SODIUM mmol/L 138 136 140 138 139   POTASSIUM mmol/L 3 7 4 1 2 8* 2 3* 2 1*   CHLORIDE mmol/L 105 105 106 100 97*   CO2 mmol/L 24 24 25 29 29   ANION GAP mmol/L 9 7 9 9 13   BUN mg/dL 17 19 19 22 23   CREATININE mg/dL 1 42* 1 64* 1 46* 1 52* 1 96*   EGFR ml/min/1 73sq m 43 36 42 40 29   CALCIUM mg/dL 7 5* 7 4* 6 9* 7 3* 8 0*   CALCIUM, IONIZED mmol/L  --   --  1 00* 1 02*  --    MAGNESIUM mg/dL 1 7  --  2 0 2 3 1 3*   PHOSPHORUS mg/dL  --   --  1 9*  --   --      Results from last 7 days   Lab Units 01/20/22  0543 01/19/22  0426   AST U/L 226* 235*   ALT U/L 207* 210*   ALK PHOS U/L 52 64   TOTAL PROTEIN g/dL 4 6* 5 4*   ALBUMIN g/dL 1 9* 2 7*   TOTAL BILIRUBIN mg/dL 0 64 1 29*       Results from last 7 days   Lab Units 01/21/22  0524 01/20/22  1952 01/20/22  0543 01/19/22  1410 01/19/22  0426   GLUCOSE RANDOM mg/dL 93 152* 98 89 105     Results from last 7 days   Lab Units 01/21/22  0524 01/20/22  0543 01/19/22  0426   PROTIME seconds 26 7* 27 9* 25 9*   INR  2 57* 2 72* 2 47*   PTT seconds  --   --  52*     Medications:   Scheduled Medications:  amiodarone, 200 mg, Oral, Daily  atorvastatin, 40 mg, Oral, Daily  carvedilol, 12 5 mg, Oral, BID With Meals  magnesium oxide, 400 mg, Oral, BID  magnesium sulfate, 2 g, Intravenous, Once  mexiletine, 150 mg, Oral, Q8H [address]  potassium chloride, 20 mEq, Intravenous, Once  potassium chloride, 40 mEq, Oral, BID  warfarin, 1 mg, Oral, Once per day on Mon Fri  warfarin, 2 mg, Oral, Once per day on Sun Tue Wed Thu Sat    PRN Meds:  melatonin, 3 mg, Oral, HS PRN      Discharge Plan: TBD    Network Utilization Review Department  ATTENTION: Please call with any questions or concerns to [phone] and carefully listen to the prompts so that you are directed to the right person  All voicemails are confidential   Kaylah Hough all requests for admission clinical reviews, approved or denied determinations and any other requests to dedicated fax number below belonging to the campus where the patient is receiving treatment   List of dedicated fax numbers for the Facilities:  FACILITY NAME UR FAX NUMBER   ADMISSION DENIALS (Administrative/Medical Necessity) [phone]   [facility] [address] (Maternity/NICU/Pediatrics) 261 [facility],[address] Floor [facility] [address] [place] [address]  [phone]   Priscilla Alvarez 50 [address] [facility] [address] [id number] [place] [address] Arabella Edwin Rodriguez 1481 [address] [facility] [place] [address] [phone]

## 2022-01-21 NOTE — PLAN OF CARE
Problem: OCCUPATIONAL THERAPY ADULT  Goal: Performs self-care activities at highest level of function for planned discharge setting  See evaluation for individualized goals  Description: Treatment Interventions: ADL retraining,Functional transfer training,Endurance training,Patient/family training,Equipment evaluation/education,Activityengagement,Energy conservation          See flowsheet documentation for full assessment, interventions and recommendations  Note: Limitation: Decreased ADL status,Decreased endurance,Decreased self-care trans,Decreased high-level ADLs  Prognosis: Good  Assessment: Pt admit 1/19/22 with dizziness in the setting of a history of ventricular tachycardia  Reports being shocked that morning and the other day as well  Spoke w/ cardiology that recommended medication  Pt unable to  due to insurance issues  OT completed extensive review of pt's medical and social history  Pt with h/o ICD, anemia, CVA, HTN  Prior to admit was living w/ wife in [facility] apt  P (I) PLOF  Ambulates without AD  - falls  + drives  Pt presents to OT below baseline due to the following performance deficits: balance; stand tolerance; functional mobility; attention; awareness; coping; community integration; self care; and IADLs  Therefore, pt would benefit from OT services to achieve optimal level of performance  Occupational performance areas to be addressed include:  bathing, toileting, dressing, activity tolerance, functional mobility, and clothing management  Pt is (S) for bed mobility and CG for transfers  Close S for ambulation without AD  Slow and unsteady at times but no LOB  Increased SOB and fatigue  Recommend use of RW at this time, which he did not need at baseline  Increased assist for ADLS due to fatigue, SOB, and deconditioning  Significant cardiac history  SPO2 83% RA  Placed on 2L per RN and O2 93%  C/o dizziness w/ position changes  BP seated 130/81  BP standing 143/81   Based on findings, pt is of high complexity  The patient's raw score on the AM-PAC Daily Activity inpatient short form is 20, standardized score is 42 03, greater than 39 4  Patients at this level are likely to benefit from discharge to home  Recommend pt return home w/ wife support PRN and home therapy  However, pt w/ decreased insight and doesn't feel he needs therapy despite stating he tires quickly and becomes SOB  May benefit from home O2 study to determine needs        OT Discharge Recommendation: Home with home health rehabilitation

## 2022-01-21 NOTE — CASE MANAGEMENT
Case Management Assessment & Discharge Planning Note    Patient name Senthil Rajan  Location [facility] 2 /[facility] 2 Abdoul Trevizo* MRN [id number]  : 10/31/1932 Date 2022       Current Admission Date: 2022  Current Admission Diagnosis:Ventricular tachycardia [facility])   Patient Active Problem List    Diagnosis Date Noted    Hypoxia 2022    Hypokalemia 2022    Hypomagnesemia 2022    Anticoagulated 2022    Transaminitis 2022    Bilateral lower extremity edema 2021    Hyponatremia 12/10/2021    Stage 3b chronic kidney disease ([facility] ) 2021    Combined systolic and diastolic congestive heart failure ([facility] ) 10/23/2021    Ischemic cardiomyopathy 10/09/2018    PA[facility] 10/09/2018    Essential hypertension 10/09/2018    Cardiac defibrillator in situ 2016    Acute kidney injury superimposed on chronic kidney disease [facility]     Embolic stroke [facility]     Hyperlipidemia 2016    Old myocardial infarction 2016    Status post percutaneous transluminal coronary angioplasty 2016    Ventricular tachycardia ([facility] ) 2016    Cardiomyopathy ([facility] ) 2016      LOS (days): 2  Geometric Mean LOS (GMLOS) (days): 3 30  Days to GMLOS:0 9     OBJECTIVE:    Risk of Unplanned Readmission Score: 21         Current admission status: Inpatient       Preferred Pharmacy:   [facility], [address] [place] [id number]  Phone: [phone] Fax: [phone]    [facility] [address] [place] [address] - [address] [place] [address]  [facility] [address]  Phone: [phone] Fax: [phone]    Primary Care Provider: Isaías Fuller DO    Primary Insurance: [facility]  Secondary Insurance:     ASSESSMENT:  [address] [place] [address] Representative - Spouse   Primary Phone: [phone] (Home)               Advance Directives  Does patient have Advance Directives?: Yes  Primary Contact: Adriana Tang [phone]         Readmission Root Cause  30 Day Readmission: No    Patient Information  Admitted from[de-identified] Home  Mental Status: Alert  During Assessment patient was accompanied by: Not accompanied during assessment  Assessment information provided by[de-identified] Patient  Primary Caregiver: Self  Support Systems: Spouse/significant Yumiko 2 of Residence: [facility] do you live in?: [address] entry access options   Select all that apply : Ramp  Type of Current Residence: Apartment  Floor Level: 1  Upon entering residence, is there a bedroom on the main floor (no further steps)?: Yes  Upon entering residence, is there a bathroom on the main floor (no further steps)?: Yes  In the last 12 months, was there a time when you were not able to pay the mortgage or rent on time?: No  Living Arrangements: Lives w/ Spouse/significant other    Activities of Daily Living Prior to Admission  Functional Status: Independent  Completes ADLs independently?: Yes  Ambulates independently?: Yes  Does patient use assisted devices?: No  Does patient currently own DME?: Yes  What DME does the patient currently own?: [employer]ight [employer],Walker  Does patient have a history of Outpatient Therapy (PT/OT)?: No  Does the patient have a history of Short-Term Rehab?: No  Does patient have a history of HHC?: No  [facility] No         Patient Information Continued  Income Source: Pension/skilled nursing  Does patient have prescription coverage?: Yes (Uses [facility] on [address] send away)  Within the past 12 months, you worried that your food would run out before you got the money to buy more : Never true  Does patient have a history of substance abuse?: No  Does patient have a history of Mental Health Diagnosis?: No         Means of Transportation  Means of Transport to Appts[de-identified] Drives Self  In the past 12 months, has lack of transportation kept you from medical appointments or from getting medications?: No        DISCHARGE DETAILS:    Discharge planning discussed with[de-identified] Pt  Freedom of Choice: Yes  Comments - Freedom of Choice: Declines HHPT ([facility])  CM contacted family/caregiver?: Yes  Were Treatment Team discharge recommendations reviewed with patient/caregiver?: Yes  Did patient/caregiver verbalize understanding of patient care needs?: Yes            Requested [address]         Is the patient interested in [place] [address] at discharge?: No (declines)              Would you like to participate in our [address] service program?  : No - Declined (Call from Cardiology office informing this writer of pt's [place] went through at $74/mo- would be available Monday- pt understanding of same)    Treatment Team Recommendation: Home with [address]  Discharge Destination Plan[de-identified] Home  Transport at Discharge : [employer] (Will need Lyft ride on d/c)

## 2022-01-21 NOTE — TELEPHONE ENCOUNTER
from [facility] called  To see if prior auth obtained for mexiletine   called [facility] because we did obtain any confirmation or denial  Pharmacy ran rx and stated approved pt has 74$ copay and rx needs to be ordered for pickup on Monday  Bhavna Francis will tell patient the same

## 2022-01-21 NOTE — PROGRESS NOTES
Cardiology Progress Note   MD Tena Titus MD Paulino Seminole, DO, MD Matt Pmientel DO, Yuko Burton DO, [facility]  ----------------------------------------------------------------  [address], [address] [place] [address] 80 y o  male MRN: [id number]  Unit/Bed#: [facility] 68 2 -01 Encounter: [id number]      ASSESSMENT:    Ventricular tachycardia with shock x2 in the setting of severe cardiomyopathy and superimposed hypokalemia and hypomagnesemia   s/p Medtronic dual-chamber ICD   Hypokalemia/hypomagnesemia secondary to alcohol and diuretic use (predominantly related to alcohol use)   CAD w/ remote history of MI and angioplasty, 1989   Paroxysmal atrial fibrillation on warfarin   Chronic combined systolic and diastolic heart failure   Hypertension   Dyslipidemia   CKD stage IIIB   History of embolic CVA   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    PLAN:  Patient had been experiencing dizziness and recently suffered bruising and injury to the face and shoulder with recent fall  Presented the emergency department due to dizziness and was found to have runs of nonsustained VT  ICD showed defibrillation x2 and patient has been drinking with significant hypokalemia  Keep potassium greater than 4 and magnesium greater than 2; potassium now above for  Case discussed with electrophysiology and will start mexiletine 150 mg t i d  Plan to set pacemaker back to [facility] prior to discharge; currently in DDDR  Alcohol cessation heavily advised  Continue amiodarone, carvedilol and warfarin; goal INR of 2-3  Clinically examines to be euvolemic  Probable discharge from CV perspective tomorrow after pacemaker sent back to [facility]IR mode if no further VT    Signed: Jair Robertson DO, [facility], FAC      History of Present Illness:  Patient seen and examined  Denies chest pain, pressure, tightness or squeezing    Denies lightheadedness, dizziness or palpitations  Denies lower extremity swelling, orthopnea or paroxysmal nocturnal dyspnea  Review of Systems:  Review of Systems   Constitutional: Negative for decreased appetite, fever, weight gain and weight loss  HENT: Negative for congestion and sore throat  Eyes: Negative for visual disturbance  Cardiovascular: Negative for chest pain, dyspnea on exertion, leg swelling, near-syncope and palpitations  Respiratory: Negative for cough and shortness of breath  Hematologic/Lymphatic: Negative for bleeding problem  Skin: Negative for rash  Musculoskeletal: Negative for myalgias and neck pain  Gastrointestinal: Negative for abdominal pain and nausea  Neurological: Negative for light-headedness and weakness  Psychiatric/Behavioral: Negative for depression  No Known Allergies    No current facility-administered medications on file prior to encounter       Current Outpatient Medications on File Prior to Encounter   Medication Sig    amiodarone 200 mg tablet Take 1 tablet (200 mg total) by mouth daily    atorvastatin (LIPITOR) 40 mg tablet Take 1 tablet (40 mg total) by mouth daily    carvedilol (COREG) 12 5 mg tablet Take 1 tablet (12 5 mg total) by mouth 2 (two) times a day with meals    furosemide (LASIX) 40 mg tablet One tablet (40mg) every other day, alternating with 1/2 tablet (20mg) every other day    mexiletine (MEXITIL) 150 mg capsule Take 1 capsule (150 mg total) by mouth 3 (three) times a day    warfarin (COUMADIN) 2 mg tablet Take 1 tablet daily or as directed by physician    Cholecalciferol (VITAMIN D3) 50 MCG (2000 UT) capsule Take 2,000 Units by mouth daily      Glucosamine Sulfate 1000 MG CAPS 1 capsule Every 12 hours        Current Facility-Administered Medications   Medication Dose Route Frequency Provider Last Rate    amiodarone  200 mg Oral Daily Mya Quintanilla MD      atorvastatin  40 mg Oral Daily Mya Quintanilla MD      carvedilol  12 5 mg Oral BID With Meals Nikhil Burton MD      melatonin  3 mg Oral HS PRN Mandy Weller PA-C      mexiletine  150 mg Oral [facility]      [START ON 1/21/2022] potassium chloride  40 mEq Oral BID MD Osvaldo Freire [START ON 1/21/2022] warfarin  1 mg Oral Once per day on Mon Fri Nikhil Burton MD      warfarin  2 mg Oral Once per day on Sun Tue Wed Thu Sat Nikhil Burton MD              Vitals:    01/20/22 0319 01/20/22 0600 01/20/22 1418 01/20/22 2142   BP: 107/61  123/66 129/78   BP Location:    Right arm   Pulse: 68  57 68   Resp: 18 20 20   Temp:   97 5 °F (36 4 °C) 98 1 °F (36 7 °C)   TempSrc:    Oral   SpO2: 93%  92% 91%   Weight:  62 6 kg (138 lb 0 1 oz)     Height:             Intake/Output Summary (Last 24 hours) at 1/20/2022 2224  Last data filed at 1/20/2022 1546  Gross per 24 hour   Intake --   Output 225 ml   Net -225 ml       Weight change:     PHYSICAL EXAMINATION:  Gen: Awake, Alert, NAD  Head/eyes: AT/NC, pupils equal and round, Anicteric  ENT: mmm  Neck: Supple, No elevated JVP, trachea midline  Resp: CTA bilaterally no w/r/r  CV: RRR +S1, S2, No m/r/g  Abd: Soft, NT/ND + BS  Ext: no LE edema bilaterally  Neuro: Follows commands, moves all extermities  Psych: Appropriate affect, normal mood, pleasant attitude, non-combative  Skin: warm; no rash, erythema or venous stasis changes on exposed skin    Lab Results:  Results from last 7 days   Lab Units 01/20/22  0543   WBC Thousand/uL 4 77   HEMOGLOBIN g/dL 9 9*   HEMATOCRIT % 29 5*   PLATELETS Thousands/uL 181     Results from last 7 days   Lab Units 01/20/22  1952 01/20/22  0543 01/20/22  0543   POTASSIUM mmol/L 4 1   < > 2 8*   CHLORIDE mmol/L 105   < > 106   CO2 mmol/L 24   < > 25   BUN mg/dL 19   < > 19   CREATININE mg/dL 1 64*   < > 1 46*   CALCIUM mg/dL 7 4*   < > 6 9*   ALK PHOS U/L  --   --  52   ALT U/L  --   --  207*   AST U/L  --   --  226*    < > = values in this interval not displayed       No results found for: Rocío Daly          Results from last 7 days   Lab Units 01/20/22  0543   INR  2 72*       Tele:  V paced rhythm    This note was completed in part utilizing M-Modal Fluency Direct Software  Grammatical errors, random word insertions, spelling mistakes, and incomplete sentences may be an occasional consequence of this system secondary to software limitations, ambient noise, and hardware issues  If you have any questions or concerns about the content, text, or information contained within the body of this dictation, please contact the provider for clarification

## 2022-01-21 NOTE — ASSESSMENT & PLAN NOTE
Lab Results   Component Value Date    EGFR 43 01/21/2022    EGFR 36 01/20/2022    EGFR 42 01/20/2022    CREATININE 1 42 (H) 01/21/2022    CREATININE 1 64 (H) 01/20/2022    CREATININE 1 46 (H) 01/20/2022   At baseline  Will give Lasix 20 mg IV x1 due to hypoxia, abnormal CXR findings  Monitor BMP

## 2022-01-21 NOTE — PHYSICAL THERAPY NOTE
PHYSICAL THERAPY EVALUATION          Patient Name: Bubba Waters  XRHNE'C Date: 1/21/2022   PT EVALUATION    80 y o     [id number]    Hypokalemia [E87 6]  Hypomagnesemia [E83 42]  Urinary retention [R33 9]  Dizziness [R42]  Ventricular tachycardia (HCC) [I47 2]  CHRISTY (acute kidney injury) ([facility] [address] ) [N17 9]    Past Medical History:   Diagnosis Date    Anemia     Arthritis     Coronary artery disease     CVA (cerebral vascular accident) ([facility] [address] )     Hyperlipidemia     Hypertension      Past Surgical History:   Procedure Laterality Date    CARDIAC PACEMAKER PLACEMENT  05/09/2011    Medtronic dual chamber ICD implant   [address]        01/21/22 1053   PT Last Visit   PT Visit Date 01/21/22   Note Type   Note type Evaluation   Pain Assessment   Pain Assessment Tool 0-10   Pain Score No Pain   Restrictions/Precautions   Other Precautions Chair Alarm; Bed Alarm;Multiple lines;O2;Fall Risk;Hard of hearing  (RA> 2L, PIV, jamar  r/o covid )   Home Living   Type of Home Apartment   Home Layout One level;Elevator   Bathroom Shower/Tub Walk-in shower   Bathroom Toilet Standard   Home Equipment Walker;Cane   Additional Comments 0 roman   Prior Function   Level of [place] Independent with ADLs and functional mobility   Lives With Spouse   Receives Help From Family   ADL Assistance Independent   IADLs   (splits w spouse)   Falls in the last 6 months 0   Vocational Retired   Comments indep at baseline without an AD  +  spouse home with patient   General   Additional Pertinent History pt admitted 1/19/22 for vtach  hx of CAD, CVA   does not use oxygen at home   Cognition   Overall Cognitive Status [facility]   Arousal/Participation Cooperative   Attention Attends with cues to redirect   Orientation Level Oriented to person;Oriented to place;Oriented to time   Following Commands Follows one step commands with increased time or repetition   Comments [place] requires repetition   Subjective Subjective agreeable to PT   RLE Assessment   RLE Assessment WFL   LLE Assessment   LLE Assessment WFL   Bed Mobility   Supine to Sit 5  Supervision   Additional items HOB elevated; Bedrails; Increased time required   Sit to Supine 5  Supervision   Additional items HOB elevated; Increased time required   Transfers   Sit to Stand 4  Minimal assistance  (CGA)   Additional items Assist x 1;Bedrails; Increased time required   Stand to Sit 5  Supervision   Additional items Bedrails; Increased time required   Ambulation/Elevation   Gait pattern Forward Flexion;Decreased foot clearance; Short stride; Excessively slow   Gait Assistance 5  Supervision  (close S)   Additional items Assist x 1   Assistive Device None   Distance 10'   Balance   Static Standing Fair -   Dynamic Standing Poor +   Ambulatory Poor +   Endurance Deficit   Endurance Deficit Yes   Endurance Deficit Description easily fatigued  reported feeling dizzy at EOB, Vitals recorded during session as 130/81 EOB; 143/81 standing  dizzy sx improved w rest/time  O2 at rest 83% on RA, improved to 93% on 2L   Activity Tolerance   Activity Tolerance Treatment limited secondary to medical complications (Comment); Patient limited by fatigue  (weakness, O2)   Medical Staff Kerrie Reyes 405 OT; Dr Berenice Powers MD   Nurse Jarrod Turk RN   Assessment   Prognosis Fair   Problem List Decreased strength;Decreased endurance; Impaired balance;Decreased mobility; Impaired judgement;Decreased safety awareness; Impaired hearing   Assessment Evie Adame is a 80 y o  male admitted to [facility] on 1/19/2022 for Ventricular tachycardia ([address] )  PT was consulted and pt was seen on 1/21/2022 for mobility assessment and d/c planning  Pt presents w high fall risk, multiple lines  At baseline is indep without an AD and does not require oxygen at home    Pt is currently functioning at a supervision assistance x1 level for bed mobility, transfers (CGA for standing d/t instability from dizziness) and ambulation w no AD  Pt demonstrated deficits of endurance (dizziness, SOB), balance (poor + ambulatory balance), cognition (safety awareness, insight) and vitals (oxygen saturations)  Oxygen improved w O2 sats; RN and MD aware  No gross LOB but require close S for safety and may benefit from trial of RW next session  At this time pt at increased risk for falls  Pt will benefit from continued skilled IP PT to address the above mentioned impairments  in order to maximize recovery and increase functional independence when completing mobility and ADLs  Currently PT recommendations for DME include ues of RW at home  At this time PT recommendations for d/c are home w HHPT pending progress  Barriers to Discharge Other (Comment)  (decline in function)   Goals   Patient Goals be warm, go home   STG Expiration Date 02/04/22   Short Term Goal #1 1)  Pt will perform bed mobility with Eduardo demonstrating appropriate technique 100% of the time in order to improve function  2)  Perform all transfers with Eduardo demonstrating safe and appropriate technique 100% of the time in order to improve ability to negotiate safely in home environment  3) Amb with least restrictive AD > 50'x1 with mod I in order to demonstrate ability to negotiate in home environment  4)  Improve overall strength and balance 1/2 grade in order to optimize ability to perform functional tasks and reduce fall risk  5) Increase activity tolerance to 45 minutes in order to improve endurance to functional tasks  6) PT for ongoing patient and family/caregiver education, DME needs and d/c planning in order to promote highest level of function in least restrictive environment  Plan   Treatment/Interventions Functional transfer training;LE strengthening/ROM; Therapeutic exercise; Endurance training;Patient/family training;Equipment eval/education; Bed mobility;Gait training; Compensatory technique education;Continued evaluation;Spoke to MD;Spoke to nursing;OT   PT Frequency Other (Comment)  (4-5x)   Recommendation   PT Discharge Recommendation Home with home health rehabilitation  (pending progress in therapy)   Equipment Recommended   (recommend use of RW at home; pt has)   Additional Comments The patient's AM-PAC Basic Mobility Inpatient Short Form Raw Score is 17  A Raw score of greater than 16 suggests the patient may benefit from discharge to home  Please also refer to the recommendation of the Physical Therapist for safe discharge planning     AM-PAC Basic Mobility Inpatient   Turning in Bed Without Bedrails 3   Lying on Back to Sitting on Edge of Flat Bed 3   Moving Bed to Chair 3   Standing Up From Chair 3   Walk in Room 3   Climb 3-5 Stairs 2   Basic Mobility Inpatient Raw Score 17   Basic Mobility Standardized Score 39 67   Highest Level Of Mobility   JH-HLM Goal 5: Stand one or more mins   JH-HLM Highest Level of Mobility 6: Walk 10 steps or more   JH-HLM Goal Achieved Yes   History: co - morbidities, social background,  fall risk, assist for iadl's, multiple lines  Exam: impairments in systems including neuromuscular (balance, gait, transfers, motor function), am-pac, cardiopulmonary, cognition  Clinical: unstable/unpredictable  Complexity:high      Boydmatheus Aponte, PT

## 2022-01-21 NOTE — ASSESSMENT & PLAN NOTE
Likely cause of cardiac arrhythmias in the setting of severely reduced LV EF status post ICD  Improved, potassium is 3 7 this morning  Continue to replace and monitor potassium and magnesium levels

## 2022-01-21 NOTE — PLAN OF CARE
Problem: Potential for Falls  Goal: Patient will remain free of falls  Description: INTERVENTIONS:  - Educate patient/family on patient safety including physical limitations  - Instruct patient to call for assistance with activity   - Consult OT/PT to assist with strengthening/mobility   - Keep Call bell within reach  - Keep bed low and locked with side rails adjusted as appropriate  - Keep care items and personal belongings within reach  - Initiate and maintain comfort rounds  - Make Fall Risk Sign visible to staff  - Offer Toileting every  Hours, in advance of need  - Initiate/Maintain alarm  - Obtain necessary fall risk management equipment:   - Apply yellow socks and bracelet for high fall risk patients  - Consider moving patient to room near nurses station  1/21/2022 1100 by Katia Emmanuel RN  Outcome: Progressing  1/21/2022 1100 by Katia Emmanuel RN  Outcome: Progressing     Problem: MOBILITY - ADULT  Goal: Maintain or return to baseline ADL function  Description: INTERVENTIONS:  -  Assess patient's ability to carry out ADLs; assess patient's baseline for ADL function and identify physical deficits which impact ability to perform ADLs (bathing, care of mouth/teeth, toileting, grooming, dressing, etc )  - Assess/evaluate cause of self-care deficits   - Assess range of motion  - Assess patient's mobility; develop plan if impaired  - Assess patient's need for assistive devices and provide as appropriate  - Encourage maximum independence but intervene and supervise when necessary  - Involve family in performance of ADLs  - Assess for home care needs following discharge   - Consider OT consult to assist with ADL evaluation and planning for discharge  - Provide patient education as appropriate  1/21/2022 1100 by Katia Emmanuel RN  Outcome: Progressing  1/21/2022 1100 by Katia Emmanuel RN  Outcome: Progressing  Goal: Maintains/Returns to pre admission functional level  Description: INTERVENTIONS:  - Perform BMAT or MOVE assessment daily    - Set and communicate daily mobility goal to care team and patient/family/caregiver  - Collaborate with rehabilitation services on mobility goals if consulted  - Perform Range of Motion  times a day  - Reposition patient every  hours    - Dangle patient  times a day  - Stand patient  times a day  - Ambulate patient  times a day  - Out of bed to chair  times a day   - Out of bed for meals  times a day  - Out of bed for toileting  - Record patient progress and toleration of activity level   1/21/2022 1100 by Peyton Bacon RN  Outcome: Progressing  1/21/2022 1100 by Peyton Bacon RN  Outcome: Progressing     Problem: PAIN - ADULT  Goal: Verbalizes/displays adequate comfort level or baseline comfort level  Description: Interventions:  - Encourage patient to monitor pain and request assistance  - Assess pain using appropriate pain scale  - Administer analgesics based on type and severity of pain and evaluate response  - Implement non-pharmacological measures as appropriate and evaluate response  - Consider cultural and social influences on pain and pain management  - Notify physician/advanced practitioner if interventions unsuccessful or patient reports new pain  Outcome: Progressing     Problem: INFECTION - ADULT  Goal: Absence or prevention of progression during hospitalization  Description: INTERVENTIONS:  - Assess and monitor for signs and symptoms of infection  - Monitor lab/diagnostic results  - Monitor all insertion sites, i e  indwelling lines, tubes, and drains  - Monitor endotracheal if appropriate and nasal secretions for changes in amount and color  - [place] appropriate cooling/warming therapies per order  - Administer medications as ordered  - Instruct and encourage patient and family to use good hand hygiene technique  - Identify and instruct in appropriate isolation precautions for identified infection/condition  Outcome: Progressing  Goal: Absence of fever/infection during neutropenic period  Description: INTERVENTIONS:  - Monitor WBC    Outcome: Progressing     Problem: SAFETY ADULT  Goal: Patient will remain free of falls  Description: INTERVENTIONS:  - Educate patient/family on patient safety including physical limitations  - Instruct patient to call for assistance with activity   - Consult OT/PT to assist with strengthening/mobility   - Keep Call bell within reach  - Keep bed low and locked with side rails adjusted as appropriate  - Keep care items and personal belongings within reach  - Initiate and maintain comfort rounds  - Make Fall Risk Sign visible to staff  - Offer Toileting every  Hours, in advance of need  - Initiate/Maintainalarm  - Obtain necessary fall risk management equipment:   - Apply yellow socks and bracelet for high fall risk patients  - Consider moving patient to room near nurses station  1/21/2022 1100 by Joon Roe RN  Outcome: Progressing  1/21/2022 1100 by Joon Roe RN  Outcome: Progressing  Goal: Maintain or return to baseline ADL function  Description: INTERVENTIONS:  -  Assess patient's ability to carry out ADLs; assess patient's baseline for ADL function and identify physical deficits which impact ability to perform ADLs (bathing, care of mouth/teeth, toileting, grooming, dressing, etc )  - Assess/evaluate cause of self-care deficits   - Assess range of motion  - Assess patient's mobility; develop plan if impaired  - Assess patient's need for assistive devices and provide as appropriate  - Encourage maximum independence but intervene and supervise when necessary  - Involve family in performance of ADLs  - Assess for home care needs following discharge   - Consider OT consult to assist with ADL evaluation and planning for discharge  - Provide patient education as appropriate  1/21/2022 1100 by Joon Roe RN  Outcome: Progressing  1/21/2022 1100 by Joon Roe RN  Outcome: Progressing  Goal: Maintains/Returns to pre admission functional level  Description: INTERVENTIONS:  - Perform BMAT or MOVE assessment daily    - Set and communicate daily mobility goal to care team and patient/family/caregiver  - Collaborate with rehabilitation services on mobility goals if consulted  - Perform Range of Motion  times a day  - Reposition patient every  hours  - Dangle patient  times a day  - Stand patient  times a day  - Ambulate patient times a day  - Out of bed to douglas times a day   - Out of bed for meals  times a day  - Out of bed for toileting  - Record patient progress and toleration of activity level   1/21/2022 1100 by Demetra Rain RN  Outcome: Progressing  1/21/2022 1100 by Demetra Rain RN  Outcome: Progressing     Problem: DISCHARGE PLANNING  Goal: Discharge to home or other facility with appropriate resources  Description: INTERVENTIONS:  - Identify barriers to discharge w/patient and caregiver  - Arrange for needed discharge resources and transportation as appropriate  - Identify discharge learning needs (meds, wound care, etc )  - Arrange for interpretive services to assist at discharge as needed  - Refer to Case Management Department for coordinating discharge planning if the patient needs post-hospital services based on physician/advanced practitioner order or complex needs related to functional status, cognitive ability, or social support system  Outcome: Progressing     Problem: Knowledge Deficit  Goal: Patient/family/caregiver demonstrates understanding of disease process, treatment plan, medications, and discharge instructions  Description: Complete learning assessment and assess knowledge base    Interventions:  - Provide teaching at level of understanding  - Provide teaching via preferred learning methods  Outcome: Progressing

## 2022-01-21 NOTE — PROGRESS NOTES
[address] [place] [address]  Progress Note Rolo Perez 10/31/1932, 80 y o  male MRN: [id number]  Unit/Bed#: [place] [address] [id number] Encounter: [id number]  Primary Care Provider: Mamie Patricio DO   Date and time admitted to hospital: 1/19/2022  4:03 AM    * Ventricular tachycardia ([address] )  Assessment & Plan  · Has had multiple episodes of ventricular tachycardia in the past week  Medtronic ICD interrogation on 01/15 revealed 13 VHRS with multiple instances of antitachycardia pacing, 2 of them treated with shocks  Mexilitene added to regimen but patient has apparently not been able to  medication as it requires prior auth from insurance    · Presented with severe hypokalemia and hypomagnesemia now corrected - goal for K>4, Mg>2  · Currently on Amiodarone 200mg, continued   · Mexilitene added to therapy   · Continue close monitoring   · Patient developed hypoxia today, requires further workup and monitoring, will remain hospitalized       Hypoxia  Assessment & Plan  Patient with history of CHF with severely reduced EF of 12%  He developed shortness of breath and hypoxia this morning, a stat portable chest x-ray revealed ground-glass opacities, possibly due to CHF exacerbation versus COVID-19 infection  - will check proBNP and COVID/viral panel  - supplemental oxygen to keep oxygen saturations above 90%  - will give IV Lasix 20 mg    Transaminitis  Assessment & Plan  It is noted that patient has chronic alcohol use drinking up to 4 shots of bourbon daily  No prior history of liver disease  LFTs slightly improved  Continue to trend  CT abdomen and pelvis with unremarkable liver findings  Obtain right upper quadrant ultrasound  Counseled on alcohol cessation    Anticoagulated  Assessment & Plan  With history of Afib  Continue warfarin, monitor INR    Hypomagnesemia  Assessment & Plan  Currently resolved    Hypokalemia  Assessment & Plan  Likely cause of cardiac arrhythmias in the setting of severely reduced LV EF status post ICD  Improved, potassium is 3 7 this morning  Continue to replace and monitor potassium and magnesium levels    Stage 3b chronic kidney disease [facility])  Assessment & Plan  Lab Results   Component Value Date    EGFR 43 01/21/2022    EGFR 36 01/20/2022    EGFR 42 01/20/2022    CREATININE 1 42 (H) 01/21/2022    CREATININE 1 64 (H) 01/20/2022    CREATININE 1 46 (H) 01/20/2022   At baseline  Will give Lasix 20 mg IV x1 due to hypoxia, abnormal CXR findings  Monitor BMP     Combined systolic and diastolic congestive heart failure (HCC)  Assessment & Plan  Wt Readings from Last 3 Encounters:   01/21/22 64 8 kg (142 lb 13 7 oz)   12/21/21 65 2 kg (143 lb 12 8 oz)   12/10/21 67 2 kg (148 lb 3 2 oz)     Cardiology following  Patient is admitted with V tach/VFib with ICD firing, felt to be due to electrolyte disturbances  Lasix was on hold due to an elevated creatinine  Patient with SOB and hypoxia today, 12/21 - CXR with groundglass opacities - will give Lasix 20 mg IV x1   Monitor volume status closely      Essential hypertension  Assessment & Plan  Continue carvedilol     PAF (paroxysmal atrial fibrillation) (HCC)  Assessment & Plan  On amiodarone, carvedilol and warfarin for anticoagulation, continue  Cardiology consulted, telemetry monitoring    Hyperlipidemia  Assessment & Plan  Continue statin             VTE Pharmacologic Prophylaxis: VTE Score: 4 Moderate Risk (Score 3-4) - Pharmacological DVT Prophylaxis Ordered: warfarin (Coumadin)  Patient Centered Rounds: I performed bedside rounds with nursing staff today  Discussions with Specialists or Other Care Team Provider: Cardiology    Education and Discussions with Family / Patient: Patient   Time Spent for Care: 45 minutes  More than 50% of total time spent on counseling and coordination of care as described above      Current Length of Stay: 2 day(s)  Current Patient Status: Inpatient   Certification Statement: The patient will continue to require additional inpatient hospital stay due to close monitoring   Discharge Plan: Anticipate discharge in 24-48 hrs to home with home services  Code Status: Level 1 - Full Code    Subjective:   Patient seen examined  Noted for SOB  C/o dizziness  Objective:     Vitals:   Temp (24hrs), Av °F (36 7 °C), Min:97 5 °F (36 4 °C), Max:98 3 °F (36 8 °C)    Temp:  [97 5 °F (36 4 °C)-98 3 °F (36 8 °C)] 98 3 °F (36 8 °C)  HR:  [57-73] 70  Resp:  [18-20] 18  BP: (123-143)/(66-81) 143/81  SpO2:  [89 %-99 %] 99 %  Body mass index is 25 31 kg/m²  Input and Output Summary (last 24 hours): Intake/Output Summary (Last 24 hours) at 2022 1248  Last data filed at 2022 1546  Gross per 24 hour   Intake --   Output 225 ml   Net -225 ml       Physical Exam:   Physical Exam  Constitutional:       General: He is in acute distress (mild, tachypnea )  Appearance: He is ill-appearing  HENT:      Head: Normocephalic  Comments: Left eyelid ecchymosis      Nose: No congestion  Mouth/Throat:      Pharynx: Oropharynx is clear  Eyes:      Conjunctiva/sclera: Conjunctivae normal    Cardiovascular:      Rate and Rhythm: Normal rate and regular rhythm  Pulmonary:      Effort: Respiratory distress present  Comments: Tachypnea   Abdominal:      General: There is no distension  Tenderness: There is no abdominal tenderness  There is no guarding  Musculoskeletal:      Right lower leg: No edema  Left lower leg: No edema  Skin:     General: Skin is warm and dry  Neurological:      Mental Status: He is oriented to person, place, and time     Psychiatric:         Mood and Affect: Mood normal           Additional Data:     Labs:  Results from last 7 days   Lab Units 22  0524   WBC Thousand/uL 5 99   HEMOGLOBIN g/dL 11 3*   HEMATOCRIT % 34 2*   PLATELETS Thousands/uL 206   NEUTROS PCT % 66   LYMPHS PCT % 21   MONOS PCT % 10   EOS PCT % 2     Results from last 7 days   Lab Units 01/21/22  0524 01/20/22 1952 01/20/22  0543   SODIUM mmol/L 138   < > 140   POTASSIUM mmol/L 3 7   < > 2 8*   CHLORIDE mmol/L 105   < > 106   CO2 mmol/L 24   < > 25   BUN mg/dL 17   < > 19   CREATININE mg/dL 1 42*   < > 1 46*   ANION GAP mmol/L 9   < > 9   CALCIUM mg/dL 7 5*   < > 6 9*   ALBUMIN g/dL  --   --  1 9*   TOTAL BILIRUBIN mg/dL  --   --  0 64   ALK PHOS U/L  --   --  52   ALT U/L  --   --  207*   AST U/L  --   --  226*   GLUCOSE RANDOM mg/dL 93   < > 98    < > = values in this interval not displayed  Results from last 7 days   Lab Units 01/21/22 0524   INR  2 57*                   Lines/Drains:  Invasive Devices  Report    Peripheral Intravenous Line            Peripheral IV 01/20/22 Left;Upper;Ventral (anterior) Arm <1 day                  Telemetry:  Telemetry Orders (From admission, onward)             48 Hour Telemetry Monitoring  Continuous x 48 hours        References:    Telemetry Guidelines   Question:  Reason for 48 Hour Telemetry  Answer:  Arrhythmias Requiring Medical Therapy (eg  SVT, Vtach/fib, Bradycardia, Uncontrolled A-fib)                 Telemetry Reviewed: Atrial fibrillation   HR averaging 50s  Indication for Continued Telemetry Use: Arrthymias requiring medical therapy             Imaging: Reviewed radiology reports from this admission including: chest xray and Personally reviewed the following imaging: chest xray    Recent Cultures (last 7 days):         Last 24 Hours Medication List:   Current Facility-Administered Medications   Medication Dose Route Frequency Provider Last Rate    amiodarone  200 mg Oral Daily Valente Bernal MD      atorvastatin  40 mg Oral Daily Valente Bernal MD      carvedilol  12 5 mg Oral BID With Meals Valente Bernal MD      furosemide  20 mg Intravenous Once Herber Jensen MD      magnesium oxide  400 mg Oral BID Herber Jensen MD      magnesium sulfate  2 g Intravenous Once Herber Jensen MD      melatonin  3 mg Oral HS PRN Meena Maria RIC Lama PA-C      mexiletine  150 mg Oral Q8H [address], DO      potassium chloride  40 mEq Oral BID Oh Romero MD      warfarin  1 mg Oral Once per day on Mon Fri Letty Robertson MD      warfarin  2 mg Oral Once per day on Sun Tue Wed Thu Sat Ltety Robertson MD          Today, Patient Was Seen By: Araceli Bhat MD    **Please Note: This note may have been constructed using a voice recognition system  **

## 2022-01-21 NOTE — ASSESSMENT & PLAN NOTE
Wt Readings from Last 3 Encounters:   01/21/22 64 8 kg (142 lb 13 7 oz)   12/21/21 65 2 kg (143 lb 12 8 oz)   12/10/21 67 2 kg (148 lb 3 2 oz)     Cardiology following  Patient is admitted with V tach/VFib with ICD firing, felt to be due to electrolyte disturbances  Lasix was on hold due to an elevated creatinine  Patient with SOB and hypoxia today, 12/21 - CXR with groundglass opacities - will give Lasix 20 mg IV x1   Monitor volume status closely

## 2022-01-21 NOTE — CASE MANAGEMENT
Case Management Progress Note    Patient name Karie White  [facility] Raina Walker* MRN [id number]  : 10/31/1932 Date 2022       LOS (days): 2  Geometric Mean LOS (GMLOS) (days): 3 30  Days to GMLOS:1        OBJECTIVE:        Current admission status: Inpatient  Preferred Pharmacy:   [facility], [facility] [address] [place] [id number]  Phone: [phone] Fax: [phone]    [facility] [address] [place] [address] [address], [address] [place] [address] Ave - [address] [address]  [address] [place] [address]  Phone: [phone] Fax: [phone]    Primary Care Provider: Ijeoma Henley DO    Primary Insurance: [facility]  Secondary Insurance:     PROGRESS NOTE: Prior Auth initiated for Mexiletine by cardiology office on  with [address],[facility] 5084 call to office to ascertain the status of such- VM left re: same awaiting call back

## 2022-01-21 NOTE — ASSESSMENT & PLAN NOTE
It is noted that patient has chronic alcohol use drinking up to 4 shots of bourbon daily  No prior history of liver disease  LFTs slightly improved  Continue to trend  CT abdomen and pelvis with unremarkable liver findings  Obtain right upper quadrant ultrasound  Counseled on alcohol cessation

## 2022-01-22 NOTE — ASSESSMENT & PLAN NOTE
It is noted that patient has chronic alcohol use drinking up to 4 shots of bourbon daily  No prior history of liver disease  LFTs slightly improved  Continue to trend  CT abdomen and pelvis with unremarkable liver findings  Counseled on alcohol cessation  Patient refused abdominal ultrasound to evaluate of cirrhosis, discontinued

## 2022-01-22 NOTE — PLAN OF CARE
Problem: Potential for Falls  Goal: Patient will remain free of falls  Description: INTERVENTIONS:  - Educate patient/family on patient safety including physical limitations  - Instruct patient to call for assistance with activity   - Consult OT/PT to assist with strengthening/mobility   - Keep Call bell within reach  - Keep bed low and locked with side rails adjusted as appropriate  - Keep care items and personal belongings within reach  - Initiate and maintain comfort rounds  - Make Fall Risk Sign visible to staff  - Offer Toileting every  Hours, in advance of need  - Initiate/Maintain alarm  - Obtain necessary fall risk management equipment:   - Apply yellow socks and bracelet for high fall risk patients  - Consider moving patient to room near nurses station  Outcome: Progressing     Problem: MOBILITY - ADULT  Goal: Maintain or return to baseline ADL function  Description: INTERVENTIONS:  -  Assess patient's ability to carry out ADLs; assess patient's baseline for ADL function and identify physical deficits which impact ability to perform ADLs (bathing, care of mouth/teeth, toileting, grooming, dressing, etc )  - Assess/evaluate cause of self-care deficits   - Assess range of motion  - Assess patient's mobility; develop plan if impaired  - Assess patient's need for assistive devices and provide as appropriate  - Encourage maximum independence but intervene and supervise when necessary  - Involve family in performance of ADLs  - Assess for home care needs following discharge   - Consider OT consult to assist with ADL evaluation and planning for discharge  - Provide patient education as appropriate  Outcome: Progressing  Goal: Maintains/Returns to pre admission functional level  Description: INTERVENTIONS:  - Perform BMAT or MOVE assessment daily    - Set and communicate daily mobility goal to care team and patient/family/caregiver     - Collaborate with rehabilitation services on mobility goals if consulted  - Perform Range of Motion  times a day  - Reposition patient every  hours    - Dangle patient  times a day  - Stand patient  times a day  - Ambulate patient  times a day  - Out of bed to chair  times a day   - Out of bed for meals  times a day  - Out of bed for toileting  - Record patient progress and toleration of activity level   Outcome: Progressing     Problem: PAIN - ADULT  Goal: Verbalizes/displays adequate comfort level or baseline comfort level  Description: Interventions:  - Encourage patient to monitor pain and request assistance  - Assess pain using appropriate pain scale  - Administer analgesics based on type and severity of pain and evaluate response  - Implement non-pharmacological measures as appropriate and evaluate response  - Consider cultural and social influences on pain and pain management  - Notify physician/advanced practitioner if interventions unsuccessful or patient reports new pain  Outcome: Progressing     Problem: INFECTION - ADULT  Goal: Absence or prevention of progression during hospitalization  Description: INTERVENTIONS:  - Assess and monitor for signs and symptoms of infection  - Monitor lab/diagnostic results  - Monitor all insertion sites, i e  indwelling lines, tubes, and drains  - Monitor endotracheal if appropriate and nasal secretions for changes in amount and color  - [place] appropriate cooling/warming therapies per order  - Administer medications as ordered  - Instruct and encourage patient and family to use good hand hygiene technique  - Identify and instruct in appropriate isolation precautions for identified infection/condition  Outcome: Progressing  Goal: Absence of fever/infection during neutropenic period  Description: INTERVENTIONS:  - Monitor WBC    Outcome: Progressing     Problem: SAFETY ADULT  Goal: Patient will remain free of falls  Description: INTERVENTIONS:  - Educate patient/family on patient safety including physical limitations  - Instruct patient to call for assistance with activity   - Consult OT/PT to assist with strengthening/mobility   - Keep Call bell within reach  - Keep bed low and locked with side rails adjusted as appropriate  - Keep care items and personal belongings within reach  - Initiate and maintain comfort rounds  - Make Fall Risk Sign visible to staff  - Offer Toileting every  Hours, in advance of need  - Initiate/Maintain alarm  - Obtain necessary fall risk management equipment:   - Apply yellow socks and bracelet for high fall risk patients  - Consider moving patient to room near nurses station  Outcome: Progressing  Goal: Maintain or return to baseline ADL function  Description: INTERVENTIONS:  -  Assess patient's ability to carry out ADLs; assess patient's baseline for ADL function and identify physical deficits which impact ability to perform ADLs (bathing, care of mouth/teeth, toileting, grooming, dressing, etc )  - Assess/evaluate cause of self-care deficits   - Assess range of motion  - Assess patient's mobility; develop plan if impaired  - Assess patient's need for assistive devices and provide as appropriate  - Encourage maximum independence but intervene and supervise when necessary  - Involve family in performance of ADLs  - Assess for home care needs following discharge   - Consider OT consult to assist with ADL evaluation and planning for discharge  - Provide patient education as appropriate  Outcome: Progressing  Goal: Maintains/Returns to pre admission functional level  Description: INTERVENTIONS:  - Perform BMAT or MOVE assessment daily    - Set and communicate daily mobility goal to care team and patient/family/caregiver  - Collaborate with rehabilitation services on mobility goals if consulted  - Perform Range of Motion  times a day  - Reposition patient every  hours    - Dangle patient  times a day  - Stand chris times a day  - Ambulate patient  times a day  - Out of bed to chair  times a day   - Out of bed for meals  times a day  - Out of bed for toileting  - Record patient progress and toleration of activity level   Outcome: Progressing     Problem: DISCHARGE PLANNING  Goal: Discharge to home or other facility with appropriate resources  Description: INTERVENTIONS:  - Identify barriers to discharge w/patient and caregiver  - Arrange for needed discharge resources and transportation as appropriate  - Identify discharge learning needs (meds, wound care, etc )  - Arrange for interpretive services to assist at discharge as needed  - Refer to Case Management Department for coordinating discharge planning if the patient needs post-hospital services based on physician/advanced practitioner order or complex needs related to functional status, cognitive ability, or social support system  Outcome: Progressing     Problem: Knowledge Deficit  Goal: Patient/family/caregiver demonstrates understanding of disease process, treatment plan, medications, and discharge instructions  Description: Complete learning assessment and assess knowledge base    Interventions:  - Provide teaching at level of understanding  - Provide teaching via preferred learning methods  Outcome: Progressing

## 2022-01-22 NOTE — PROGRESS NOTES
[address] [place] [address]  Progress Note David Lisa 10/31/1932, 80 y o  male MRN: [id number]  Unit/Bed#: [address]01 Encounter: [id number]  Primary Care Provider: Yayo Quintana DO   Date and time admitted to hospital: 1/19/2022  4:03 AM    * Ventricular tachycardia ([address] )  Assessment & Plan  · Has had multiple episodes of ventricular tachycardia in the past week  [employer] ICD interrogation on 01/15 revealed 13 VHRS with multiple instances of antitachycardia pacing, 2 of them treated with shocks  Mexilitene added to regimen but patient has apparently not been able to  medication as it requires prior auth from insurance    · Presented with severe hypokalemia and hypomagnesemia now corrected - goal for K>4, Mg>2  · Currently on Amiodarone 200mg, continued   · Mexilitene added to therapy   · Continue close monitoring   Patient developed hypoxia requiring supplemental oxygen and IV Lasix, improved, is able to come off oxygen, will likely be able to discharge tomorrow    Hypoxia  Assessment & Plan  Patient with history of CHF with severely reduced EF of 12%  He developed shortness of breath and hypoxia this morning, a stat portable chest x-ray revealed ground-glass opacities, possibly due to CHF exacerbation versus COVID-19 infection  - COVID/viral panel negative  - proBNP increased since admission  - supplemental oxygen to keep oxygen saturations above 90%  - IV Lasix to treat CHF exacerbation    Transaminitis  Assessment & Plan  It is noted that patient has chronic alcohol use drinking up to 4 shots of bourbon daily  No prior history of liver disease  LFTs slightly improved  Continue to trend  CT abdomen and pelvis with unremarkable liver findings  Counseled on alcohol cessation  Patient refused abdominal ultrasound to evaluate of cirrhosis, discontinued    Anticoagulated  Assessment & Plan  With history of Afib  Continue warfarin, monitor INR    Hypomagnesemia  Assessment & Plan  Currently resolved    Hypokalemia  Assessment & Plan  Likely cause of cardiac arrhythmias in the setting of severely reduced LV EF status post ICD  Resolved  Continue to monitor BMP, magnesium    Stage 3b chronic kidney disease [facility])  Assessment & Plan  Lab Results   Component Value Date    EGFR 39 01/21/2022    EGFR 43 01/21/2022    EGFR 36 01/20/2022    CREATININE 1 53 (H) 01/21/2022    CREATININE 1 42 (H) 01/21/2022    CREATININE 1 64 (H) 01/20/2022   At baseline  Will give Lasix 20 mg IV x1 due to hypoxia, abnormal CXR findings  Monitor BMP     Acute on chronic combined systolic and diastolic congestive heart failure (HCC)  Assessment & Plan  Wt Readings from Last 3 Encounters:   01/22/22 65 9 kg (145 lb 4 5 oz)   12/21/21 65 2 kg (143 lb 12 8 oz)   12/10/21 67 2 kg (148 lb 3 2 oz)     Cardiology following  Patient is admitted with V tach/VFib with ICD firing, felt to be due to electrolyte disturbances  Lasix was on hold due to an elevated creatinine  Patient with SOB and hypoxia 12/21 - CXR with groundglass opacities - fortunately COVID-19 is negative and no infectious features  Receiving IV Lasix  Monitor volume status closely  Hopefully can discharge tomorrow if volume status improves      Essential hypertension  Assessment & Plan  Continue carvedilol     PAF (paroxysmal atrial fibrillation) ([facility])  Assessment & Plan  On amiodarone, carvedilol and warfarin for anticoagulation, continue  Cardiology consulted, telemetry monitoring        VTE Pharmacologic Prophylaxis: VTE Score: 4 Moderate Risk (Score 3-4) - Pharmacological DVT Prophylaxis Ordered: warfarin (Coumadin)  Patient Centered Rounds: I performed bedside rounds with nursing staff today  Discussions with Specialists or Other Care Team Provider:  Cardiology    Education and Discussions with Family / Patient: Patient  Time Spent for Care: 30 minutes   More than 50% of total time spent on counseling and coordination of care as described above     Current Length of Stay: 3 day(s)  Current Patient Status: Inpatient   Certification Statement: The patient will continue to require additional inpatient hospital stay due to Need for close monitoring, IV treatments  Discharge Plan: Anticipate discharge tomorrow to home with home services  Code Status: Level 1 - Full Code    Subjective:   Patient seen and examined  He reports feeling improved  Still on supplemental oxygen  Receiving IV Lasix  Otherwise no complaints, no overnight events  Objective:     Vitals:   Temp (24hrs), Av 3 °F (36 8 °C), Min:98 °F (36 7 °C), Max:98 6 °F (37 °C)    Temp:  [98 °F (36 7 °C)-98 6 °F (37 °C)] 98 6 °F (37 °C)  HR:  [65-85] 84  Resp:  [18-20] 20  BP: ()/(65-82) 105/70  SpO2:  [88 %-94 %] 92 %  Body mass index is 25 74 kg/m²  Input and Output Summary (last 24 hours): Intake/Output Summary (Last 24 hours) at 2022 1831  Last data filed at 2022 1636  Gross per 24 hour   Intake 720 ml   Output 2325 ml   Net -1605 ml       Physical Exam:   Physical Exam  Constitutional:       General: He is not in acute distress  HENT:      Head: Normocephalic and atraumatic  Nose: No congestion  Mouth/Throat:      Pharynx: Oropharynx is clear  Eyes:      Conjunctiva/sclera: Conjunctivae normal    Cardiovascular:      Rate and Rhythm: Normal rate and regular rhythm  Heart sounds: No murmur heard  Pulmonary:      Effort: No respiratory distress  Breath sounds: No wheezing or rales  Abdominal:      General: There is no distension  Tenderness: There is no abdominal tenderness  There is no guarding  Musculoskeletal:      Right lower leg: No edema  Left lower leg: No edema  Skin:     General: Skin is warm and dry  Neurological:      Mental Status: He is oriented to person, place, and time     Psychiatric:         Mood and Affect: Mood normal           Additional Data:     Labs:  Results from last 7 days   Lab Units 01/22/22  0447   WBC Thousand/uL 7 81   HEMOGLOBIN g/dL 11 6*   HEMATOCRIT % 35 8*   PLATELETS Thousands/uL 204   NEUTROS PCT % 72   LYMPHS PCT % 17   MONOS PCT % 10   EOS PCT % 1     Results from last 7 days   Lab Units 01/21/22  1444   SODIUM mmol/L 136   POTASSIUM mmol/L 4 9   CHLORIDE mmol/L 104   CO2 mmol/L 27   BUN mg/dL 14   CREATININE mg/dL 1 53*   ANION GAP mmol/L 5   CALCIUM mg/dL 7 6*   ALBUMIN g/dL 2 3*   TOTAL BILIRUBIN mg/dL 0 92   ALK PHOS U/L 70   ALT U/L 231*   AST U/L 201*   GLUCOSE RANDOM mg/dL 111     Results from last 7 days   Lab Units 01/22/22  0447   INR  2 91*                   Lines/Drains:  Invasive Devices  Report    Peripheral Intravenous Line            Peripheral IV 01/22/22 Left Wrist <1 day                  Telemetry:  Telemetry Orders (From admission, onward)             48 Hour Telemetry Monitoring  Continuous x 48 hours        References:    Telemetry Guidelines   Question:  Reason for 48 Hour Telemetry  Answer:  Arrhythmias Requiring Medical Therapy (eg  SVT, Vtach/fib, Bradycardia, Uncontrolled A-fib)                 Telemetry Reviewed: Atrial fibrillation  HR averaging 50s  Indication for Continued Telemetry Use: Arrthymias requiring medical therapy             Imaging: No pertinent imaging reviewed      Recent Cultures (last 7 days):         Last 24 Hours Medication List:   Current Facility-Administered Medications   Medication Dose Route Frequency Provider Last Rate    amiodarone  200 mg Oral Daily Rock Del Cid MD      atorvastatin  40 mg Oral Daily Rock Del Cid MD      carvedilol  12 5 mg Oral BID With Meals Rock Del Cid MD      magnesium oxide  400 mg Oral BID Silvio Swift MD      melatonin  6 mg Oral HS PRN Donovan Campbell PA-C      mexiletine  150 mg Oral [facility] Corky Hidden, DO      [START ON 1/23/2022] potassium chloride  40 mEq Oral Daily Silvio Swift MD      warfarin  1 mg Oral Once per day on Mon Fri Rock Del Cid MD      warfarin 2 mg Oral Once per day on Sun Tue Wed Thu Sat Caryl Melton MD          Today, Patient Was Seen By: Peng Weller MD    **Please Note: This note may have been constructed using a voice recognition system  **

## 2022-01-22 NOTE — PLAN OF CARE
Problem: Potential for Falls  Goal: Patient will remain free of falls  Description: INTERVENTIONS:  - Educate patient/family on patient safety including physical limitations  - Instruct patient to call for assistance with activity   - Consult OT/PT to assist with strengthening/mobility   - Keep Call bell within reach  - Keep bed low and locked with side rails adjusted as appropriate  - Keep care items and personal belongings within reach  - Initiate and maintain comfort rounds  - Make Fall Risk Sign visible to staff  - Apply yellow socks and bracelet for high fall risk patients  - Consider moving patient to room near nurses station  Outcome: Progressing     Problem: MOBILITY - ADULT  Goal: Maintain or return to baseline ADL function  Description: INTERVENTIONS:  -  Assess patient's ability to carry out ADLs; assess patient's baseline for ADL function and identify physical deficits which impact ability to perform ADLs (bathing, care of mouth/teeth, toileting, grooming, dressing, etc )  - Assess/evaluate cause of self-care deficits   - Assess range of motion  - Assess patient's mobility; develop plan if impaired  - Assess patient's need for assistive devices and provide as appropriate  - Encourage maximum independence but intervene and supervise when necessary  - Involve family in performance of ADLs  - Assess for home care needs following discharge   - Consider OT consult to assist with ADL evaluation and planning for discharge  - Provide patient education as appropriate  Outcome: Progressing  Goal: Maintains/Returns to pre admission functional level  Description: INTERVENTIONS:  - Perform BMAT or MOVE assessment daily    - Set and communicate daily mobility goal to care team and patient/family/caregiver     - Collaborate with rehabilitation services on mobility goals if consulted  - Out of bed for toileting  - Record patient progress and toleration of activity level   Outcome: Progressing     Problem: PAIN - ADULT  Goal: Verbalizes/displays adequate comfort level or baseline comfort level  Description: Interventions:  - Encourage patient to monitor pain and request assistance  - Assess pain using appropriate pain scale  - Administer analgesics based on type and severity of pain and evaluate response  - Implement non-pharmacological measures as appropriate and evaluate response  - Consider cultural and social influences on pain and pain management  - Notify physician/advanced practitioner if interventions unsuccessful or patient reports new pain  Outcome: Progressing     Problem: INFECTION - ADULT  Goal: Absence or prevention of progression during hospitalization  Description: INTERVENTIONS:  - Assess and monitor for signs and symptoms of infection  - Monitor lab/diagnostic results  - Monitor all insertion sites, i e  indwelling lines, tubes, and drains  - Monitor endotracheal if appropriate and nasal secretions for changes in amount and color  - [place] appropriate cooling/warming therapies per order  - Administer medications as ordered  - Instruct and encourage patient and family to use good hand hygiene technique  - Identify and instruct in appropriate isolation precautions for identified infection/condition  Outcome: Progressing  Goal: Absence of fever/infection during neutropenic period  Description: INTERVENTIONS:  - Monitor WBC    Outcome: Progressing     Problem: SAFETY ADULT  Goal: Patient will remain free of falls  Description: INTERVENTIONS:  - Educate patient/family on patient safety including physical limitations  - Instruct patient to call for assistance with activity   - Consult OT/PT to assist with strengthening/mobility   - Keep Call bell within reach  - Keep bed low and locked with side rails adjusted as appropriate  - Keep care items and personal belongings within reach  - Initiate and maintain comfort rounds  - Make Fall Risk Sign visible to staff  - Apply yellow socks and bracelet for high fall risk patients  - Consider moving patient to room near nurses station  Outcome: Progressing  Goal: Maintain or return to baseline ADL function  Description: INTERVENTIONS:  -  Assess patient's ability to carry out ADLs; assess patient's baseline for ADL function and identify physical deficits which impact ability to perform ADLs (bathing, care of mouth/teeth, toileting, grooming, dressing, etc )  - Assess/evaluate cause of self-care deficits   - Assess range of motion  - Assess patient's mobility; develop plan if impaired  - Assess patient's need for assistive devices and provide as appropriate  - Encourage maximum independence but intervene and supervise when necessary  - Involve family in performance of ADLs  - Assess for home care needs following discharge   - Consider OT consult to assist with ADL evaluation and planning for discharge  - Provide patient education as appropriate  Outcome: Progressing  Goal: Maintains/Returns to pre admission functional level  Description: INTERVENTIONS:  - Perform BMAT or MOVE assessment daily    - Set and communicate daily mobility goal to care team and patient/family/caregiver     - Collaborate with rehabilitation services on mobility goals if consulted  - Out of bed for toileting  - Record patient progress and toleration of activity level   Outcome: Progressing     Problem: DISCHARGE PLANNING  Goal: Discharge to home or other facility with appropriate resources  Description: INTERVENTIONS:  - Identify barriers to discharge w/patient and caregiver  - Arrange for needed discharge resources and transportation as appropriate  - Identify discharge learning needs (meds, wound care, etc )  - Arrange for interpretive services to assist at discharge as needed  - Refer to Case Management Department for coordinating discharge planning if the patient needs post-hospital services based on physician/advanced practitioner order or complex needs related to functional status, cognitive ability, or social support system  Outcome: Progressing     Problem: Knowledge Deficit  Goal: Patient/family/caregiver demonstrates understanding of disease process, treatment plan, medications, and discharge instructions  Description: Complete learning assessment and assess knowledge base    Interventions:  - Provide teaching at level of understanding  - Provide teaching via preferred learning methods  Outcome: Progressing

## 2022-01-22 NOTE — ASSESSMENT & PLAN NOTE
Patient with history of CHF with severely reduced EF of 12%  He developed shortness of breath and hypoxia this morning, a stat portable chest x-ray revealed ground-glass opacities, possibly due to CHF exacerbation versus COVID-19 infection  - COVID/viral panel negative  - proBNP increased since admission  - supplemental oxygen to keep oxygen saturations above 90%  - IV Lasix to treat CHF exacerbation

## 2022-01-22 NOTE — ASSESSMENT & PLAN NOTE
Likely cause of cardiac arrhythmias in the setting of severely reduced LV EF status post ICD  Resolved  Continue to monitor BMP, magnesium

## 2022-01-22 NOTE — ASSESSMENT & PLAN NOTE
Wt Readings from Last 3 Encounters:   01/22/22 65 9 kg (145 lb 4 5 oz)   12/21/21 65 2 kg (143 lb 12 8 oz)   12/10/21 67 2 kg (148 lb 3 2 oz)     Cardiology following  Patient is admitted with V tach/VFib with ICD firing, felt to be due to electrolyte disturbances  Lasix was on hold due to an elevated creatinine  Patient with SOB and hypoxia 12/21 - CXR with groundglass opacities - fortunately COVID-19 is negative and no infectious features  Receiving IV Lasix  Monitor volume status closely  Hopefully can discharge tomorrow if volume status improves

## 2022-01-22 NOTE — ASSESSMENT & PLAN NOTE
· Has had multiple episodes of ventricular tachycardia in the past week  Medtronic ICD interrogation on 01/15 revealed 13 VHRS with multiple instances of antitachycardia pacing, 2 of them treated with shocks  Mexilitene added to regimen but patient has apparently not been able to  medication as it requires prior auth from insurance    · Presented with severe hypokalemia and hypomagnesemia now corrected - goal for K>4, Mg>2  · Currently on Amiodarone 200mg, continued   · Mexilitene added to therapy   · Continue close monitoring   Patient developed hypoxia requiring supplemental oxygen and IV Lasix, improved, is able to come off oxygen, will likely be able to discharge tomorrow

## 2022-01-22 NOTE — ASSESSMENT & PLAN NOTE
Lab Results   Component Value Date    EGFR 39 01/21/2022    EGFR 43 01/21/2022    EGFR 36 01/20/2022    CREATININE 1 53 (H) 01/21/2022    CREATININE 1 42 (H) 01/21/2022    CREATININE 1 64 (H) 01/20/2022   At baseline  Will give Lasix 20 mg IV x1 due to hypoxia, abnormal CXR findings  Monitor BMP

## 2022-01-22 NOTE — PROGRESS NOTES
Cardiology Progress Note   MD Ronaldo Feliciano MD Emmitt Jain, DO, [facility] MD Martina Cintron DO, Ish Syed DO, [facility] [place]  ----------------------------------------------------------------  [address] [place], [address] [facility] 80 y o  male MRN: [id number]  [facility] Encounter: [id number]      ASSESSMENT:    Ventricular tachycardia with shock x2 in the setting of severe cardiomyopathy and superimposed hypokalemia and hypomagnesemia   s/p Medtronic dual-chamber ICD   Hypokalemia/hypomagnesemia secondary to alcohol and diuretic use (predominantly related to alcohol use)   CAD w/ remote history of MI and angioplasty, 1989   Paroxysmal atrial fibrillation on warfarin   Chronic combined systolic and diastolic heart failure   Hypertension   Dyslipidemia   CKD stage IIIB   History of embolic CVA   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    PLAN:  Patient feels well without lightheadedness or chest discomfort  No further runs of ventricular tachycardia  Continue mexiletine 150 mg every 8 hours  Keep potassium greater than 4 and magnesium greater than 2; trend daily and replete p r n  Unfortunately he is requiring oxygen and chest x-ray shows persistent pulmonary edema today  Give Lasix 60 mg IV x1 today and re-evaluate  Potassium at appropriate level  Closely monitor renal function  Continue warfarin with goal INR of 2-3  Continue amiodarone, atorvastatin, carvedilol  When patient is off of oxygen chest x-ray clear, reasonable for discharge from CV perspective with transition oral diuretics and Outpatient follow-up with Dr Marianna Higginbotham within 2 weeks of discharge    Signed: Harpal Garcia DO, [place], [place]      History of Present Illness:  Patient seen and examined  Denies chest pain, pressure, tightness or squeezing  Denies lightheadedness, dizziness or palpitations    Denies lower extremity swelling, orthopnea or paroxysmal nocturnal dyspnea  Currently reports stenosis symptoms  Review of Systems:  Review of Systems   Constitutional: Negative for decreased appetite, fever, weight gain and weight loss  HENT: Negative for congestion and sore throat  Eyes: Negative for visual disturbance  Cardiovascular: Negative for chest pain, dyspnea on exertion, leg swelling, near-syncope and palpitations  Respiratory: Negative for cough and shortness of breath  Hematologic/Lymphatic: Negative for bleeding problem  Skin: Negative for rash  Musculoskeletal: Negative for myalgias and neck pain  Gastrointestinal: Negative for abdominal pain and nausea  Neurological: Negative for light-headedness and weakness  Psychiatric/Behavioral: Negative for depression  No Known Allergies    No current facility-administered medications on file prior to encounter       Current Outpatient Medications on File Prior to Encounter   Medication Sig    amiodarone 200 mg tablet Take 1 tablet (200 mg total) by mouth daily    atorvastatin (LIPITOR) 40 mg tablet Take 1 tablet (40 mg total) by mouth daily    carvedilol (COREG) 12 5 mg tablet Take 1 tablet (12 5 mg total) by mouth 2 (two) times a day with meals    furosemide (LASIX) 40 mg tablet One tablet (40mg) every other day, alternating with 1/2 tablet (20mg) every other day    mexiletine (MEXITIL) 150 mg capsule Take 1 capsule (150 mg total) by mouth 3 (three) times a day    warfarin (COUMADIN) 2 mg tablet Take 1 tablet daily or as directed by physician    Cholecalciferol (VITAMIN D3) 50 MCG (2000 UT) capsule Take 2,000 Units by mouth daily      Glucosamine Sulfate 1000 MG CAPS 1 capsule Every 12 hours        Current Facility-Administered Medications   Medication Dose Route Frequency Provider Last Rate    amiodarone  200 mg Oral Daily Kush Trevino MD      atorvastatin  40 mg Oral Daily Kush Trevino MD      carvedilol  12 5 mg Oral BID With Meals Jose FERREIRA Shilpi Lorenzo MD      magnesium oxide  400 mg Oral BID Trena Ashley MD      melatonin  6 mg Oral HS PRN Caesar De La Garza PA-C      mexiletine  150 mg Oral [facility] Unk Cecil, DO      [START ON 1/23/2022] potassium chloride  40 mEq Oral Daily Trena Ashley MD      warfarin  1 mg Oral Once per day on Mon Fri Nica Mendoza MD      warfarin  2 mg Oral Once per day on Sun Tue Wed Thu Sat Nica Mendoza MD              Vitals:    01/22/22 1041 01/22/22 1043 01/22/22 1044 01/22/22 1057   BP: 94/67 97/65 98/65 100/66   BP Location: Right arm      Pulse: 85 66 83 84   Resp:       Temp:       TempSrc:       SpO2: (!) 88% 90% 93% 90%   Weight:       Height:             Intake/Output Summary (Last 24 hours) at 1/22/2022 1308  Last data filed at 1/21/2022 2315  Gross per 24 hour   Intake --   Output 325 ml   Net -325 ml       Weight change: 1 1 kg (2 lb 6 8 oz)    PHYSICAL EXAMINATION:  Gen: Awake, Alert, NAD  Head/eyes: AT/NC, pupils equal and round, Anicteric  ENT: mmm  Neck: Supple, No elevated JVP, trachea midline  Resp: CTA bilaterally no w/r/r  CV: RRR +S1, S2, No m/r/g  Abd: Soft, NT/ND + BS  Ext: no LE edema bilaterally  Neuro: Follows commands, moves all extermities  Psych: Appropriate affect, normal mood, pleasant attitude, non-combative  Skin: warm; no rash, erythema or venous stasis changes on exposed skin    Lab Results:  Results from last 7 days   Lab Units 01/22/22  0447   WBC Thousand/uL 7 81   HEMOGLOBIN g/dL 11 6*   HEMATOCRIT % 35 8*   PLATELETS Thousands/uL 204     Results from last 7 days   Lab Units 01/21/22  1444   POTASSIUM mmol/L 4 9   CHLORIDE mmol/L 104   CO2 mmol/L 27   BUN mg/dL 14   CREATININE mg/dL 1 53*   CALCIUM mg/dL 7 6*   ALK PHOS U/L 70   ALT U/L 231*   AST U/L 201*     No results found for: TROPONINT          Results from last 7 days   Lab Units 01/22/22  0447   INR  2 91*       Tele:  V paced rhythm    This note was completed in part utilizing M-Modal [employer] Direct Software  Grammatical errors, random word insertions, spelling mistakes, and incomplete sentences may be an occasional consequence of this system secondary to software limitations, ambient noise, and hardware issues  If you have any questions or concerns about the content, text, or information contained within the body of this dictation, please contact the provider for clarification

## 2022-01-23 PROBLEM — R42 DIZZINESS: Status: ACTIVE | Noted: 2022-01-01

## 2022-01-23 NOTE — RESPIRATORY THERAPY NOTE
Home Oxygen Qualifying Test       Patient name: Mark Torres        : 10/31/1932   Date of Test:  2022  Diagnosis:      Home Oxygen Test:    **Medicare Guidelines require item(s) 1-5 on all ambulatory patients or 1 and 2 on non-ambulatory patients  1   Baseline SPO2 on Room Air at rest 87%  3   SPO2 on Oxygen at rest 93 % 2 lpm     4   SPO2 during exercise on Oxygen  91% a liter flow of 4 lpm     5   Exercise performed: Walked pt in hallway with walker  [x]  Supplemental Home Oxygen is indicated  []  Client does not qualify for home oxygen  Respiratory Additional Notes- Pt qualifies for home oxygen    Lea Tay, RT

## 2022-01-23 NOTE — ASSESSMENT & PLAN NOTE
Patient c/o severe dizziness when he is standing up and feels like he can fall when on his feet  Remainder of physical exam is non-focal  Hx of severely reduced LVEF, arrhthymias s/p ICD placement  Chronic alcohol user but otherwise without evidence of withdrawal  No evidence of orthostatic BP  - CT on admission without evidence of stroke  - PT/OT already on consult, will as to re-evaluate  - Will obtain MRI brain   - Continue diuresis for CHF exacerbation   - Request Neurology evaluation

## 2022-01-23 NOTE — PROGRESS NOTES
[address]  Progress Note Juve Alvarado 10/31/1932, 80 y o  male MRN: [id number]  Unit/Bed#: E4 -01 Encounter: [id number]  Primary Care Provider: Ijeoma Henley DO   Date and time admitted to hospital: 1/19/2022  4:03 AM    * Ventricular tachycardia ([address] )  Assessment & Plan  · Has had multiple episodes of ventricular tachycardia in the past week  Medtronic ICD interrogation on 01/15 revealed 13 VHRS with multiple instances of antitachycardia pacing, 2 of them treated with shocks  Mexilitene added to regimen but patient has apparently not been able to  medication as it requires prior auth from insurance    · Presented with severe hypokalemia and hypomagnesemia now corrected - goal for K>4, Mg>2  · Currently on Amiodarone 200mg, continued   · Mexilitene added to therapy   · Continue close monitoring   · Patient developed hypoxia and noted to develop CHF exacerbation with pulmonary edema, now undergoing diuresis    Dizziness  Assessment & Plan  Patient c/o severe dizziness when he is standing up and feels like he can fall when on his feet  Remainder of physical exam is non-focal  Hx of severely reduced LVEF, arrhthymias s/p ICD placement  Chronic alcohol user but otherwise without evidence of withdrawal  No evidence of orthostatic BP  - CT on admission without evidence of stroke  - PT/OT already on consult, will as to re-evaluate  - Will obtain MRI brain   - Continue diuresis for CHF exacerbation   - Request Neurology evaluation     Hypoxia  Assessment & Plan  Patient with history of CHF with severely reduced EF of 12%  He developed shortness of breath and hypoxia this morning, a stat portable chest x-ray revealed ground-glass opacities, possibly due to CHF exacerbation versus COVID-19 infection  - COVID/viral panel negative  - proBNP increased since admission  - supplemental oxygen to keep oxygen saturations above 90%  - IV Lasix to treat CHF exacerbation    Transaminitis  Assessment & Plan  It is noted that patient has chronic alcohol use drinking up to 4 shots of bourbon daily  No prior history of liver disease  LFTs slightly improved  Continue to trend  CT abdomen and pelvis with unremarkable liver findings  Counseled on alcohol cessation  Patient refused abdominal ultrasound to evaluate liver, order discontinued    Anticoagulated  Assessment & Plan  With history of Afib  Hold warfarin tonight with INR supratherapeutic at 3 3  Monitor INR    Hypomagnesemia  Assessment & Plan  Continue to monitor replace with goal above 2    Hypokalemia  Assessment & Plan  Likely cause of cardiac arrhythmias in the setting of severely reduced LV EF status post ICD  Resolved  Goal K>4  Continue to monitor BMP, magnesium    Stage 3b chronic kidney disease [facility])  Assessment & Plan  Lab Results   Component Value Date    EGFR 32 01/23/2022    EGFR 39 01/21/2022    EGFR 43 01/21/2022    CREATININE 1 80 (H) 01/23/2022    CREATININE 1 53 (H) 01/21/2022    CREATININE 1 42 (H) 01/21/2022   Fluctuating baseline  Receiving IV Lasix with evidence of worsening CHF with pulmonary edema, effusions  Monitor BMP closely    Acute on chronic combined systolic and diastolic congestive heart failure (HCC)  Assessment & Plan  Wt Readings from Last 3 Encounters:   01/23/22 64 kg (141 lb 1 5 oz)   12/21/21 65 2 kg (143 lb 12 8 oz)   12/10/21 67 2 kg (148 lb 3 2 oz)     Cardiology following  Patient is admitted with V tach/VFib with ICD firing, felt to be due to electrolyte disturbances  Lasix was on hold due to an elevated creatinine  Patient with SOB and hypoxia 12/21 - CXR with groundglass opacities - fortunately COVID-19 is negative and no infectious features  12/23 - worsening pulmonary edema  Receiving IV Lasix, dosing increased to 80 mg IV BID   Monitor volume status closely        Essential hypertension  Assessment & Plan  Continue carvedilol     PAF (paroxysmal atrial fibrillation) [facility])  Assessment & Plan  On amiodarone, carvedilol and warfarin for anticoagulation, continue  Cardiology consulted, telemetry monitoring    Hyperlipidemia  Assessment & Plan  Continue statin           VTE Pharmacologic Prophylaxis: VTE Score: 4 Moderate Risk (Score 3-4) - Pharmacological DVT Prophylaxis Ordered: warfarin (Coumadin)  Patient Centered Rounds: I performed bedside rounds with nursing staff today  Discussions with Specialists or Other Care Team Provider: Cardiology    Education and Discussions with Family / Patient: Updated  (wife) via phone  Time Spent for Care: 45 minutes  More than 50% of total time spent on counseling and coordination of care as described above  Current Length of Stay: 4 day(s)  Current Patient Status: Inpatient   Certification Statement: The patient will continue to require additional inpatient hospital stay due to IV diuresis, neurologic workup   Discharge Plan: Anticipate discharge in 48-72 hrs to home with home services  Code Status: Level 1 - Full Code    Subjective:   Patient seen and examined  He continues to express concern regarding severe dizziness especially when trying to walk, he feels that he will fall when he tries to walk  He otherwise denies focal deficits    Objective:     Vitals:   Temp (24hrs), Av 4 °F (36 3 °C), Min:97 3 °F (36 3 °C), Max:97 4 °F (36 3 °C)    Temp:  [97 3 °F (36 3 °C)-97 4 °F (36 3 °C)] 97 3 °F (36 3 °C)  HR:  [70-99] 99  Resp:  [18] 18  BP: (102-110)/(60-83) 110/60  SpO2:  [86 %-93 %] 91 %  Body mass index is 24 99 kg/m²  Input and Output Summary (last 24 hours): Intake/Output Summary (Last 24 hours) at 2022 1522  Last data filed at 2022 1636  Gross per 24 hour   Intake 480 ml   Output 1700 ml   Net -1220 ml       Physical Exam:   Physical Exam  Constitutional:       General: He is not in acute distress  HENT:      Head: Normocephalic        Comments: Mild left eyelid ecchymosis      Nose: No congestion  Eyes:      Conjunctiva/sclera: Conjunctivae normal    Cardiovascular:      Rate and Rhythm: Normal rate and regular rhythm  Heart sounds: No murmur heard  Pulmonary:      Effort: No respiratory distress  Breath sounds: No wheezing or rales  Abdominal:      General: There is no distension  Tenderness: There is no abdominal tenderness  There is no guarding  Musculoskeletal:      Right lower leg: No edema  Left lower leg: No edema  Skin:     General: Skin is warm and dry  Neurological:      General: No focal deficit present  Mental Status: He is oriented to person, place, and time  Cranial Nerves: No cranial nerve deficit  Sensory: No sensory deficit  Motor: No weakness  Coordination: Coordination normal    Psychiatric:         Mood and Affect: Mood normal           Additional Data:     Labs:  Results from last 7 days   Lab Units 01/23/22  0503   WBC Thousand/uL 6 72   HEMOGLOBIN g/dL 10 8*   HEMATOCRIT % 32 3*   PLATELETS Thousands/uL 189   NEUTROS PCT % 70   LYMPHS PCT % 17   MONOS PCT % 11   EOS PCT % 2     Results from last 7 days   Lab Units 01/23/22  0503 01/21/22  1444 01/21/22  1444   SODIUM mmol/L 133*   < > 136   POTASSIUM mmol/L 3 8   < > 4 9   CHLORIDE mmol/L 100   < > 104   CO2 mmol/L 26   < > 27   BUN mg/dL 22   < > 14   CREATININE mg/dL 1 80*   < > 1 53*   ANION GAP mmol/L 7   < > 5   CALCIUM mg/dL 7 6*   < > 7 6*   ALBUMIN g/dL  --   --  2 3*   TOTAL BILIRUBIN mg/dL  --   --  0 92   ALK PHOS U/L  --   --  70   ALT U/L  --   --  231*   AST U/L  --   --  201*   GLUCOSE RANDOM mg/dL 90   < > 111    < > = values in this interval not displayed       Results from last 7 days   Lab Units 01/23/22  0503   INR  3 33*                   Lines/Drains:  Invasive Devices  Report    Peripheral Intravenous Line            Peripheral IV 01/22/22 Left Wrist 1 day                  Telemetry:  Telemetry Orders (From admission, onward)             50 Hour Telemetry Monitoring  Continuous x 48 hours        References:    Telemetry Guidelines   Question:  Reason for 48 Hour Telemetry  Answer:  Arrhythmias Requiring Medical Therapy (eg  SVT, Vtach/fib, Bradycardia, Uncontrolled A-fib)                 Telemetry Reviewed: Atrial fibrillation  HR averaging 50s  Indication for Continued Telemetry Use: Arrthymias requiring medical therapy             Imaging: Reviewed radiology reports from this admission including: chest xray and Personally reviewed the following imaging: chest xray    Recent Cultures (last 7 days):         Last 24 Hours Medication List:   Current Facility-Administered Medications   Medication Dose Route Frequency Provider Last Rate    amiodarone  200 mg Oral Daily Sherry Meehan MD      atorvastatin  40 mg Oral Daily Sherry Meehan MD      carvedilol  12 5 mg Oral BID With Meals Sherry Meehan MD      furosemide  80 mg Intravenous BID Kendrick Nance DO      magnesium oxide  400 mg Oral BID Glen Dubin, MD      melatonin  6 mg Oral HS PRN Abdoulaye Nicholas PA-C      mexiletine  150 mg Oral Q8H [facility] Kendrick Nance DO      potassium chloride  40 mEq Oral Daily Glen Dubin, MD      warfarin  1 mg Oral Once per day on Mon Fri Sherry Meehan MD          Today, Patient Was Seen By: Argenis Garcia MD    **Please Note: This note may have been constructed using a voice recognition system  **

## 2022-01-23 NOTE — NURSING NOTE
Patient requested to attempt ambulating to entrance of room and back  RN walked with patient  Patient noted to be shuffling feet  Upon turning around to walk back to bed, patient became unsteady and required contact guard  Patient was safely transferred back to bed  Patient's oxygen saturation noted to be steady in low 90's  Patient reported feeling dizzy after ambulating to room entrance  Patient is stable and resting at this time, no signs of distress  Call bell within reach      Travis Killian RN

## 2022-01-23 NOTE — ASSESSMENT & PLAN NOTE
Likely cause of cardiac arrhythmias in the setting of severely reduced LV EF status post ICD  Resolved  Goal K>4  Continue to monitor BMP, magnesium

## 2022-01-23 NOTE — ASSESSMENT & PLAN NOTE
Lab Results   Component Value Date    EGFR 32 01/23/2022    EGFR 39 01/21/2022    EGFR 43 01/21/2022    CREATININE 1 80 (H) 01/23/2022    CREATININE 1 53 (H) 01/21/2022    CREATININE 1 42 (H) 01/21/2022   Fluctuating baseline  Receiving IV Lasix with evidence of worsening CHF with pulmonary edema, effusions  Monitor BMP closely

## 2022-01-23 NOTE — PLAN OF CARE
Problem: Potential for Falls  Goal: Patient will remain free of falls  Description: INTERVENTIONS:  - Educate patient/family on patient safety including physical limitations  - Instruct patient to call for assistance with activity   - Consult OT/PT to assist with strengthening/mobility   - Keep Call bell within reach  - Keep bed low and locked with side rails adjusted as appropriate  - Keep care items and personal belongings within reach  - Initiate and maintain comfort rounds  - Make Fall Risk Sign visible to staff  - Apply yellow socks and bracelet for high fall risk patients  - Consider moving patient to room near nurses station  Outcome: Progressing     Problem: MOBILITY - ADULT  Goal: Maintain or return to baseline ADL function  Description: INTERVENTIONS:  -  Assess patient's ability to carry out ADLs; assess patient's baseline for ADL function and identify physical deficits which impact ability to perform ADLs (bathing, care of mouth/teeth, toileting, grooming, dressing, etc )  - Assess/evaluate cause of self-care deficits   - Assess range of motion  - Assess patient's mobility; develop plan if impaired  - Assess patient's need for assistive devices and provide as appropriate  - Encourage maximum independence but intervene and supervise when necessary  - Involve family in performance of ADLs  - Assess for home care needs following discharge   - Consider OT consult to assist with ADL evaluation and planning for discharge  - Provide patient education as appropriate  Outcome: Progressing  Goal: Maintains/Returns to pre admission functional level  Description: INTERVENTIONS:  - Perform BMAT or MOVE assessment daily    - Set and communicate daily mobility goal to care team and patient/family/caregiver     - Collaborate with rehabilitation services on mobility goals if consulted  - Out of bed for toileting  - Record patient progress and toleration of activity level   Outcome: Progressing     Problem: PAIN - ADULT  Goal: Verbalizes/displays adequate comfort level or baseline comfort level  Description: Interventions:  - Encourage patient to monitor pain and request assistance  - Assess pain using appropriate pain scale  - Administer analgesics based on type and severity of pain and evaluate response  - Implement non-pharmacological measures as appropriate and evaluate response  - Consider cultural and social influences on pain and pain management  - Notify physician/advanced practitioner if interventions unsuccessful or patient reports new pain  Outcome: Progressing     Problem: INFECTION - ADULT  Goal: Absence or prevention of progression during hospitalization  Description: INTERVENTIONS:  - Assess and monitor for signs and symptoms of infection  - Monitor lab/diagnostic results  - Monitor all insertion sites, i e  indwelling lines, tubes, and drains  - Monitor endotracheal if appropriate and nasal secretions for changes in amount and color  - [place] appropriate cooling/warming therapies per order  - Administer medications as ordered  - Instruct and encourage patient and family to use good hand hygiene technique  - Identify and instruct in appropriate isolation precautions for identified infection/condition  Outcome: Progressing  Goal: Absence of fever/infection during neutropenic period  Description: INTERVENTIONS:  - Monitor WBC    Outcome: Progressing     Problem: SAFETY ADULT  Goal: Patient will remain free of falls  Description: INTERVENTIONS:  - Educate patient/family on patient safety including physical limitations  - Instruct patient to call for assistance with activity   - Consult OT/PT to assist with strengthening/mobility   - Keep Call bell within reach  - Keep bed low and locked with side rails adjusted as appropriate  - Keep care items and personal belongings within reach  - Initiate and maintain comfort rounds  - Make Fall Risk Sign visible to staff  - Apply yellow socks and bracelet for high fall risk patients  - Consider moving patient to room near nurses station  Outcome: Progressing  Goal: Maintain or return to baseline ADL function  Description: INTERVENTIONS:  -  Assess patient's ability to carry out ADLs; assess patient's baseline for ADL function and identify physical deficits which impact ability to perform ADLs (bathing, care of mouth/teeth, toileting, grooming, dressing, etc )  - Assess/evaluate cause of self-care deficits   - Assess range of motion  - Assess patient's mobility; develop plan if impaired  - Assess patient's need for assistive devices and provide as appropriate  - Encourage maximum independence but intervene and supervise when necessary  - Involve family in performance of ADLs  - Assess for home care needs following discharge   - Consider OT consult to assist with ADL evaluation and planning for discharge  - Provide patient education as appropriate  Outcome: Progressing  Goal: Maintains/Returns to pre admission functional level  Description: INTERVENTIONS:  - Perform BMAT or MOVE assessment daily    - Set and communicate daily mobility goal to care team and patient/family/caregiver     - Collaborate with rehabilitation services on mobility goals if consulted  - Out of bed for toileting  - Record patient progress and toleration of activity level   Outcome: Progressing     Problem: DISCHARGE PLANNING  Goal: Discharge to home or other facility with appropriate resources  Description: INTERVENTIONS:  - Identify barriers to discharge w/patient and caregiver  - Arrange for needed discharge resources and transportation as appropriate  - Identify discharge learning needs (meds, wound care, etc )  - Arrange for interpretive services to assist at discharge as needed  - Refer to Case Management Department for coordinating discharge planning if the patient needs post-hospital services based on physician/advanced practitioner order or complex needs related to functional status, cognitive ability, or social support system  Outcome: Progressing     Problem: Knowledge Deficit  Goal: Patient/family/caregiver demonstrates understanding of disease process, treatment plan, medications, and discharge instructions  Description: Complete learning assessment and assess knowledge base    Interventions:  - Provide teaching at level of understanding  - Provide teaching via preferred learning methods  Outcome: Progressing

## 2022-01-23 NOTE — ASSESSMENT & PLAN NOTE
Wt Readings from Last 3 Encounters:   01/23/22 64 kg (141 lb 1 5 oz)   12/21/21 65 2 kg (143 lb 12 8 oz)   12/10/21 67 2 kg (148 lb 3 2 oz)     Cardiology following  Patient is admitted with V tach/VFib with ICD firing, felt to be due to electrolyte disturbances  Lasix was on hold due to an elevated creatinine  Patient with SOB and hypoxia 12/21 - CXR with groundglass opacities - fortunately COVID-19 is negative and no infectious features  12/23 - worsening pulmonary edema  Receiving IV Lasix, dosing increased to 80 mg IV BID   Monitor volume status closely

## 2022-01-23 NOTE — NURSING NOTE
Patient requested to speak to nurse about dizziness and demanded "strong medication" to treat dizziness  RN educated patient about orthostatic blood pressures taken during daytime shift and their meaning as well as diuresing medication side effects possibly being responsible for dizziness with standing and changing positions  Patient also educated that RN's do not prescribe medications  Patient concerned about dizziness and difficulty ambulating short distances with possible discharge today  Will relay to day time RN      Osmani Zepeda RN

## 2022-01-23 NOTE — ASSESSMENT & PLAN NOTE
· Has had multiple episodes of ventricular tachycardia in the past week  Medtronic ICD interrogation on 01/15 revealed 13 VHRS with multiple instances of antitachycardia pacing, 2 of them treated with shocks  Mexilitene added to regimen but patient has apparently not been able to  medication as it requires prior auth from insurance    · Presented with severe hypokalemia and hypomagnesemia now corrected - goal for K>4, Mg>2  · Currently on Amiodarone 200mg, continued   · Mexilitene added to therapy   · Continue close monitoring   · Patient developed hypoxia and noted to develop CHF exacerbation with pulmonary edema, now undergoing diuresis

## 2022-01-23 NOTE — PLAN OF CARE
Problem: PAIN - ADULT  Goal: Verbalizes/displays adequate comfort level or baseline comfort level  Description: Interventions:  - Encourage patient to monitor pain and request assistance  - Assess pain using appropriate pain scale  - Administer analgesics based on type and severity of pain and evaluate response  - Implement non-pharmacological measures as appropriate and evaluate response  - Consider cultural and social influences on pain and pain management  - Notify physician/advanced practitioner if interventions unsuccessful or patient reports new pain  Outcome: Progressing     Problem: INFECTION - ADULT  Goal: Absence or prevention of progression during hospitalization  Description: INTERVENTIONS:  - Assess and monitor for signs and symptoms of infection  - Monitor lab/diagnostic results  - Monitor all insertion sites, i e  indwelling lines, tubes, and drains  - Monitor endotracheal if appropriate and nasal secretions for changes in amount and color  - [place] appropriate cooling/warming therapies per order  - Administer medications as ordered  - Instruct and encourage patient and family to use good hand hygiene technique  - Identify and instruct in appropriate isolation precautions for identified infection/condition  Outcome: Progressing

## 2022-01-23 NOTE — ASSESSMENT & PLAN NOTE
It is noted that patient has chronic alcohol use drinking up to 4 shots of bourbon daily  No prior history of liver disease  LFTs slightly improved  Continue to trend  CT abdomen and pelvis with unremarkable liver findings  Counseled on alcohol cessation  Patient refused abdominal ultrasound to evaluate liver, order discontinued

## 2022-01-23 NOTE — PROGRESS NOTES
Cardiology Progress Note   MD Pradeep Lepe MD Marvel Riis, DO, MD Yesy Cordova DO, Meghann Marte DO, [facility] [place]  ----------------------------------------------------------------  [address], [address] [place] [address] 80 y o  male MRN: [id number]  Unit/Bed#: E4 -01 Encounter: [id number]      ASSESSMENT:    Ventricular tachycardia with shock x2 in the setting of severe cardiomyopathy and superimposed hypokalemia and hypomagnesemia  o s/p Medtronic dual-chamber ICD   Hypokalemia/hypomagnesemia secondary to alcohol and diuretic use (predominantly related to alcohol use)   CAD w/ remote history of MI and angioplasty, 1989   Paroxysmal atrial fibrillation on warfarin   Chronic combined systolic and diastolic heart failure   Hypertension   Dyslipidemia   CKD stage IIIB   History of embolic CVA   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    PLAN:  Rising oxygen requirements and worsening bilateral airspace disease  Tested negative for COVID  Despite lower extremities not being significantly swollen, he is going into pulmonary edema  May consider repeat COVID test primary team think this is reasonable  Continue mexiletine 150 mg every 8 hours  Keep potassium greater than 4 and magnesium greater than 2  Increase Lasix to 80 mg IV b i d  Strict I/Os and daily weights  Creatinine is trending upward; closely monitor as the patient is still technically within his baseline  Continue warfarin with goal INR of 2-3  Continue amiodarone, carvedilol and statin  Titrate down oxygen as tolerated    Signed: Orestes Orona DO, [facility] [place], FACP      History of Present Illness:  Patient seen and examined  Denies chest pain, pressure, tightness or squeezing  Denies lightheadedness, dizziness or palpitations  Denies lower extremity swelling, orthopnea or paroxysmal nocturnal dyspnea        Review of Systems:  Review of Systems Constitutional: Negative for decreased appetite, fever, weight gain and weight loss  HENT: Negative for congestion and sore throat  Eyes: Negative for visual disturbance  Cardiovascular: Negative for chest pain, dyspnea on exertion, leg swelling, near-syncope and palpitations  Respiratory: Negative for cough and shortness of breath  Hematologic/Lymphatic: Negative for bleeding problem  Skin: Negative for rash  Musculoskeletal: Negative for myalgias and neck pain  Gastrointestinal: Negative for abdominal pain and nausea  Neurological: Negative for light-headedness and weakness  Psychiatric/Behavioral: Negative for depression  No Known Allergies    No current facility-administered medications on file prior to encounter       Current Outpatient Medications on File Prior to Encounter   Medication Sig    amiodarone 200 mg tablet Take 1 tablet (200 mg total) by mouth daily    atorvastatin (LIPITOR) 40 mg tablet Take 1 tablet (40 mg total) by mouth daily    carvedilol (COREG) 12 5 mg tablet Take 1 tablet (12 5 mg total) by mouth 2 (two) times a day with meals    furosemide (LASIX) 40 mg tablet One tablet (40mg) every other day, alternating with 1/2 tablet (20mg) every other day    mexiletine (MEXITIL) 150 mg capsule Take 1 capsule (150 mg total) by mouth 3 (three) times a day    warfarin (COUMADIN) 2 mg tablet Take 1 tablet daily or as directed by physician    Cholecalciferol (VITAMIN D3) 50 MCG (2000 UT) capsule Take 2,000 Units by mouth daily      Glucosamine Sulfate 1000 MG CAPS 1 capsule Every 12 hours        Current Facility-Administered Medications   Medication Dose Route Frequency Provider Last Rate    amiodarone  200 mg Oral Daily Javan Hutson MD      atorvastatin  40 mg Oral Daily Javan Hutson MD      carvedilol  12 5 mg Oral BID With Meals Javan Hutson MD      furosemide  80 mg Intravenous BID Chucky Parada DO      magnesium oxide  400 mg Oral BID Ann Marie Louise Quintanilla MD      magnesium sulfate  2 g Intravenous Once Brigida Smith MD      melatonin  6 mg Oral HS PRN Camille Ward PA-C      mexiletine  150 mg Oral [facility] Chris Montiel DO      potassium chloride  40 mEq Oral Daily Brigida Smith MD      warfarin  1 mg Oral Once per day on Mon Fri Roque Home, MD      warfarin  2 mg Oral Once per day on Sun Tue Wed Thu Sat Roque Home, MD              Vitals:    01/23/22 1120 01/23/22 1123 01/23/22 1125 01/23/22 1127   BP:       BP Location:       Pulse:       Resp:       Temp:       TempSrc:       SpO2: 93% (!) 88% (!) 88% 91%   Weight:       Height:             Intake/Output Summary (Last 24 hours) at 1/23/2022 1224  Last data filed at 1/22/2022 1636  Gross per 24 hour   Intake 480 ml   Output 1700 ml   Net -1220 ml       Weight change: -1 9 kg (-4 lb 3 oz)    PHYSICAL EXAMINATION:  Gen: Awake, Alert, NAD  Head/eyes: AT/NC, pupils equal and round, Anicteric  ENT: mmm  Neck: Supple, No elevated JVP, trachea midline  Resp: CTA bilaterally no w/r/r  CV: RRR +S1, S2, No m/r/g  Abd: Soft, NT/ND + BS  Ext: no LE edema bilaterally  Neuro: Follows commands, moves all extermities  Psych: Appropriate affect, normal mood, pleasant attitude, non-combative  Skin: warm; no rash, erythema or venous stasis changes on exposed skin    Lab Results:  Results from last 7 days   Lab Units 01/23/22  0503   WBC Thousand/uL 6 72   HEMOGLOBIN g/dL 10 8*   HEMATOCRIT % 32 3*   PLATELETS Thousands/uL 189     Results from last 7 days   Lab Units 01/23/22  0503 01/21/22  1444 01/21/22  1444   POTASSIUM mmol/L 3 8   < > 4 9   CHLORIDE mmol/L 100   < > 104   CO2 mmol/L 26   < > 27   BUN mg/dL 22   < > 14   CREATININE mg/dL 1 80*   < > 1 53*   CALCIUM mg/dL 7 6*   < > 7 6*   ALK PHOS U/L  --   --  70   ALT U/L  --   --  231*   AST U/L  --   --  201*    < > = values in this interval not displayed       No results found for: TROPONINT          Results from last 7 days   Lab Units 01/23/22  0503   INR  3 33*       Tele:  V paced rhythm    This note was completed in part utilizing M-Modal Fluency Direct Software  Grammatical errors, random word insertions, spelling mistakes, and incomplete sentences may be an occasional consequence of this system secondary to software limitations, ambient noise, and hardware issues  If you have any questions or concerns about the content, text, or information contained within the body of this dictation, please contact the provider for clarification

## 2022-01-24 NOTE — PROGRESS NOTES
[address] [place] [address]  Progress Note Rafael Lucia 10/31/1932, 80 y o  male MRN: [id number]  Unit/Bed#: E4 -01 Encounter: [id number]  Primary Care Provider: Zeferino Brito DO   Date and time admitted to hospital: 1/19/2022  4:03 AM    * Ventricular tachycardia (HCC)  Assessment & Plan  · Ventricular tachycardia in the setting of cardiomyopathy, hypokalemia and hypomagnesemia  · Currently stable on amiodarone 200 mg daily, mexiletene 150 mg Q8, Coreg 12 5 mg p o  B i d   · Medtronic ICD in place  · Magnesium and potassium levels improved    Acute on chronic combined systolic and diastolic congestive heart failure [facility])  Assessment & Plan  Wt Readings from Last 3 Encounters:   01/24/22 60 7 kg (133 lb 13 1 oz)   12/21/21 65 2 kg (143 lb 12 8 oz)   12/10/21 67 2 kg (148 lb 3 2 oz)     Improved volume status  Wean off oxygen as tolerated  COVID test negative  Further diuretics per cardiology        PAF (paroxysmal atrial fibrillation) ([facility] [address] )  Assessment & Plan  Rate is controlled on carvedilol and amiodarone  Hold warfarin tonight due to supratherapeutic INR    Dizziness  Assessment & Plan  Neurology recommendations reviewed  Dizziness likely related to cardiomyopathy/arrhythmia  Low suspicion for stroke  No need for MRI  Transaminitis  Assessment & Plan  Presumed to be Secondary to alcohol  Repeat CMP in a m  VTE Pharmacologic Prophylaxis:   Pharmacologic: warfarin  Mechanical VTE Prophylaxis in Place: yes    Patient Centered Rounds: I have performed bedside rounds with nursing staff today  Discussions with Specialists or Other Care Team Provider:  Cardiology     Education and Discussions with Family / Patient:  Spoke with wife and gave update    Time Spent for Care: 20 minutes  More than 50% of total time spent on counseling and coordination of care as described above      Current Length of Stay: 5 day(s)    Current Patient Status: Inpatient   Certification Statement: The patient will continue to require additional inpatient hospital stay due to CHF    Discharge Plan:  24-48 hours    Code Status: Level 1 - Full Code      Subjective:   Improved shortness of breath  Still on oxygen at 2 L  No fever or chills    Objective:     Vitals:   Temp (24hrs), Av °F (36 7 °C), Min:97 2 °F (36 2 °C), Max:98 9 °F (37 2 °C)    Temp:  [97 2 °F (36 2 °C)-98 9 °F (37 2 °C)] 98 9 °F (37 2 °C)  HR:  [58-75] 74  Resp:  [18] 18  BP: ()/(43-71) 122/71  SpO2:  [93 %-95 %] 95 %  Body mass index is 23 71 kg/m²  Input and Output Summary (last 24 hours): Intake/Output Summary (Last 24 hours) at 2022 1649  Last data filed at 2022 1501  Gross per 24 hour   Intake --   Output 1850 ml   Net -1850 ml       Physical Exam:     Physical Exam  Constitutional:       Appearance: He is not ill-appearing or diaphoretic  HENT:      Head: Normocephalic and atraumatic  Nose: No rhinorrhea  Eyes:      General: No scleral icterus  Cardiovascular:      Rate and Rhythm: Regular rhythm  Heart sounds: No murmur heard  No gallop  Pulmonary:      Comments: Fine crackles at bases  Abdominal:      General: Abdomen is flat  Palpations: Abdomen is soft  Musculoskeletal:      Cervical back: Neck supple  Right lower leg: No edema  Left lower leg: No edema  Skin:     General: Skin is warm and dry  Neurological:      Mental Status: He is alert and oriented to person, place, and time     Psychiatric:         Mood and Affect: Mood normal          Behavior: Behavior normal        Additional Data:     Labs:    Results from last 7 days   Lab Units 22  0514   WBC Thousand/uL 6 63   HEMOGLOBIN g/dL 11 6*   HEMATOCRIT % 33 8*   PLATELETS Thousands/uL 189   NEUTROS PCT % 68   LYMPHS PCT % 17   MONOS PCT % 11   EOS PCT % 2     Results from last 7 days   Lab Units 22  0514 22  0503 22  1444   SODIUM mmol/L 134*   < > 136   POTASSIUM mmol/L 3 5   < > 4 9 CHLORIDE mmol/L 98*   < > 104   CO2 mmol/L 29   < > 27   BUN mg/dL 20   < > 14   CREATININE mg/dL 1 92*   < > 1 53*   ANION GAP mmol/L 7   < > 5   CALCIUM mg/dL 7 7*   < > 7 6*   ALBUMIN g/dL  --   --  2 3*   TOTAL BILIRUBIN mg/dL  --   --  0 92   ALK PHOS U/L  --   --  70   ALT U/L  --   --  231*   AST U/L  --   --  201*   GLUCOSE RANDOM mg/dL 87   < > 111    < > = values in this interval not displayed  Results from last 7 days   Lab Units 01/24/22  0514   INR  3 50*                       * I Have Reviewed All Lab Data Listed Above  * Additional Pertinent Lab Tests Reviewed: All Labs Within Last 24 Hours Reviewed    Imaging:    Imaging Reports Reviewed Today Include:  Chest x-ray      Recent Cultures (last 7 days):           Last 24 Hours Medication List:   Current Facility-Administered Medications   Medication Dose Route Frequency Provider Last Rate    amiodarone  200 mg Oral Daily Bobby Pantoja MD      atorvastatin  40 mg Oral Daily Bobby Pantoja MD      carvedilol  12 5 mg Oral BID With Meals Bobby Pantoja MD      magnesium oxide  400 mg Oral BID Esther Wood MD      melatonin  6 mg Oral HS PRN Deborah Romero PA-C      mexiletine  150 mg Oral Q8H [address] Sater Numbers, DO      potassium chloride  40 mEq Oral Daily Esther Wood MD          Today, Patient Was Seen By: Gisselle Moses MD    ** Please Note: Dictation voice to text software may have been used in the creation of this document   **

## 2022-01-24 NOTE — ASSESSMENT & PLAN NOTE
Lab Results   Component Value Date    EGFR 30 01/24/2022    EGFR 32 01/23/2022    EGFR 39 01/21/2022    CREATININE 1 92 (H) 01/24/2022    CREATININE 1 80 (H) 01/23/2022    CREATININE 1 53 (H) 01/21/2022     -trending Cr/BUN/CMP  -avoid nephrotoxins

## 2022-01-24 NOTE — ASSESSMENT & PLAN NOTE
Neurology recommendations reviewed  Dizziness likely related to cardiomyopathy/arrhythmia  Low suspicion for stroke  No need for MRI

## 2022-01-24 NOTE — CONSULTS
Consultation - Neurology   April Ridge 80 y o  male MRN: [id number]  Unit/Bed#: E4 -01 Encounter: [id number]      Assessment/Plan   66-year-old male with history of arrhythmia/ventricular tachycardia with ICD placement, paroxysmal AFib on anticoagulation with Coumadin, prior TIA/CVA history, CHF/severely reduced EF, CKD, chronic alcohol use, hypertension, hyperlipidemia, presenting several days ago with subacute duration of dizziness/lightheadedness, as well as AICD firing multiple times on 01/15  Rhythm monitoring noting V-tach  Labs with metabolic derangements including low magnesium and potassium  Neurology consulted today regarding ongoing dizziness particularly with standing/ambulating; patient voices these symptoms last no more than several seconds when he stands up and then resolved/without persistence  No other focal neurologic signs or symptoms of recently (including ambulation, which is steady/stable)  His orthostatic blood pressures last night appear to be notably positive with documented BP of 65/43 while standing (from 103 SBP with sitting)  Feel this is less likely stroke and more likely related to severe cardiac function/hypoperfusion with poor EF, as well as hypotension/O2 desaturation and notable orthostasis last night with orthostatic BP check    INR on admission was therapeutic around 2 5, making posterior circulation stroke relatively less likely (although obviously with significant cerebrovascular risk factors)    Dizziness  Assessment & Plan  -no need for stroke pathway continuation  -discussed with attending, will discontinue MRI brain/MRA head and neck as minimal suspicion for cerebrovascular event  -recent echo in October 2021 noting severe cardiomyopathy with EF 12%, severely reduced systolic function, multi wall dyskinesis/akinesis  -continuing Coumadin   -INR on admission at 2 47, INR this morning at 3 5  -continue statin  -will check hemoglobin A1c and lipid panel  -continued telemetry monitoring  -continued orthostatic BP checks  -therapy evaluations  -continued metabolic derangement correction per primary team/Cardiology:   -initially hypokalemic/low magnesium on admission, both have been repleted   -monitoring CR/BUN/renal function   -transaminitis earlier this admission, trending liver enzymes  -checking orthostatic blood pressures daily,   -last night with  -cardiology team following closely with management   -on mexiletine, Lasix, Coumadin, amiodarone, carvedilol    * Ventricular tachycardia ([facility] [address] )  Assessment & Plan  -cardiology following, noted on ICD interrogation; adjusted AICD settings  -close cardiac monitoring on telemetry    PAF (paroxysmal atrial fibrillation) ([facility] [address] )  Assessment & Plan  -on Coumadin, INR monitoring as mentioned above  -on telemetry monitoring    Hypomagnesemia  Assessment & Plan  -see above  -repleted, following BMP/CMP    Hypokalemia  Assessment & Plan  -see above  -repleted, following BMP/CMP    Stage 3b chronic kidney disease [facility])  Assessment & Plan  Lab Results   Component Value Date    EGFR 30 01/24/2022    EGFR 32 01/23/2022    EGFR 39 01/21/2022    CREATININE 1 92 (H) 01/24/2022    CREATININE 1 80 (H) 01/23/2022    CREATININE 1 53 (H) 01/21/2022     -trending Cr/BUN/CMP  -avoid nephrotoxins    Will further discuss plan of care with attending neurologist      Recommendations for outpatient neurological follow up have yet to be determined  History of Present Illness     Reason for Consult / Principal Problem: Dizziness    HPI: Skip Jean-Baptiste is a 80 y o  male with history as mentioned above in assessment neurology is asked to evaluate in regards dizziness  History is primarily obtained via chart review as well as discussion with patient this morning, who is adequate historian:  Patient states for the past approximately 6 weeks, he has experienced brief episodes of dizziness when ambulating/standing up    Patient further describes the episodes as feeling foggy in my brain", when he stands up and begins to ambulate  The duration of this sensation is no more than several seconds each time; however he will have up to 10 episodes of this daily  The dizzy symptoms never exceed more than several seconds  Otherwise he denies any other focal neurologic symptoms such as headache, vision or speech abnormalities, focal weakness or sensory changes to the extremities/limbs  No clumsiness, no truncal instability with walking (walks without assistive device at home, independent with ADLs)    He ultimately presented several days ago after his AICD fired; ICD interrogation noted appropriate shock for VT/VF, he was also noted on admission with electrolyte derangements including hypokalemia and hypomagnesemia (which have been repleted), he additionally has CKD, AFib on Coumadin, and severely reduced ejection fraction/cardiomyopathy  His ICD settings were ultimately adjusted due to bradycardia this admission  He developed hypoxia and shortness of breath yesterday morning prompting a stat chest x-ray; he has been diuresed here with Lasix  Neurology was consulted yesterday given the ongoing dizziness mentioned above  Patient states he had have a stroke/TIA approximately 20 years ago; patient denies any residual deficits  Inpatient consult to Neurology  Consult performed by: Azucena Hollingsworth PA-C  Consult ordered by: Oh Romero MD          Review of Systems   Constitutional: Negative  Eyes: Negative for photophobia and visual disturbance  Respiratory: Negative for chest tightness and shortness of breath  Cardiovascular: Negative for chest pain and palpitations  Gastrointestinal: Positive for vomiting (near vomited this morning)  Negative for abdominal pain  Musculoskeletal: Negative for gait problem and neck pain  Skin: Negative  Neurological: Positive for dizziness and light-headedness   Negative for tremors, seizures, syncope, facial asymmetry, speech difficulty, weakness, numbness and headaches  All other ROS reviewed and negative  Historical Information   Past Medical History:   Diagnosis Date    Anemia     Arthritis     Coronary artery disease     CVA [facility]cerebral vascular accident) ([facility] [address] )     Hyperlipidemia     Hypertension      Past Surgical History:   Procedure Laterality Date    CARDIAC PACEMAKER PLACEMENT  05/09/2011    Medtronic dual chamber ICD implant    CORONARY ANGIOPLASTY  1989     Social History   Social History     Substance and Sexual Activity   Alcohol Use Yes    Alcohol/week: 3 0 standard drinks    Types: 3 Shots of liquor per week    Comment: daily     Social History     Substance and Sexual Activity   Drug Use Never     E-Cigarette/Vaping    E-Cigarette Use Never User      E-Cigarette/Vaping Substances    Nicotine No     THC No     CBD No     Flavoring No     Other No     Unknown No      Social History     Tobacco Use   Smoking Status Never Smoker   Smokeless Tobacco Never Used     Family History:   Family History   Problem Relation Age of Onset    Coronary artery disease Mother     Heart attack Father         MI    Stroke Sister        Review of previous medical records was completed      Meds/Allergies   current meds:   Current Facility-Administered Medications   Medication Dose Route Frequency    amiodarone tablet 200 mg  200 mg Oral Daily    atorvastatin (LIPITOR) tablet 40 mg  40 mg Oral Daily    carvedilol (COREG) tablet 12 5 mg  12 5 mg Oral BID With Meals    furosemide (LASIX) injection 80 mg  80 mg Intravenous BID    magnesium oxide (MAG-OX) tablet 400 mg  400 mg Oral BID    melatonin tablet 6 mg  6 mg Oral HS PRN    mexiletine (MEXITIL) capsule 150 mg  150 mg Oral Q8H [address]    potassium chloride oral solution 40 mEq  40 mEq Oral Daily    warfarin (COUMADIN) tablet 1 mg  1 mg Oral Once per day on Mon Fri    and PTA meds:   Prior to Admission Medications Prescriptions Last Dose Informant Patient Reported? Taking? Cholecalciferol (VITAMIN D3) 50 MCG (2000 UT) capsule Unknown at Unknown time Self Yes No   Sig: Take 2,000 Units by mouth daily     Glucosamine Sulfate 1000 MG CAPS Unknown at Unknown time Self Yes No   Si capsule Every 12 hours   amiodarone 200 mg tablet 2022 at Unknown time  No Yes   Sig: Take 1 tablet (200 mg total) by mouth daily   atorvastatin (LIPITOR) 40 mg tablet 2022 at Unknown time  No Yes   Sig: Take 1 tablet (40 mg total) by mouth daily   carvedilol (COREG) 12 5 mg tablet 2022 at Unknown time  No Yes   Sig: Take 1 tablet (12 5 mg total) by mouth 2 (two) times a day with meals   furosemide (LASIX) 40 mg tablet Past Week at Unknown time  No Yes   Sig: One tablet (40mg) every other day, alternating with 1/2 tablet (20mg) every other day   mexiletine (MEXITIL) 150 mg capsule 2022 at Unknown time  No Yes   Sig: Take 1 capsule (150 mg total) by mouth 3 (three) times a day   warfarin (COUMADIN) 2 mg tablet 2022 at Unknown time  No Yes   Sig: Take 1 tablet daily or as directed by physician      Facility-Administered Medications: None       No Known Allergies    Objective   Vitals:Blood pressure 113/58, pulse 75, temperature (!) 97 2 °F (36 2 °C), temperature source Temporal, resp  rate 18, height 5' 3" (1 6 m), weight 60 7 kg (133 lb 13 1 oz), SpO2 93 %  ,Body mass index is 23 71 kg/m²  Intake/Output Summary (Last 24 hours) at 2022 0803  Last data filed at 2022 0033  Gross per 24 hour   Intake --   Output 1150 ml   Net -1150 ml       Invasive Devices: Invasive Devices  Report    Peripheral Intravenous Line            Peripheral IV 22 Left Wrist 1 day                Physical Exam  Constitutional:       Appearance: Normal appearance  HENT:      Head: Normocephalic     Eyes:      Extraocular Movements: Extraocular movements intact and EOM normal       Conjunctiva/sclera: Conjunctivae normal  Pupils: Pupils are equal, round, and reactive to light  Cardiovascular:      Rate and Rhythm: Normal rate and regular rhythm  Pulmonary:      Effort: Pulmonary effort is normal       Breath sounds: Normal breath sounds  Comments: Nasal cannula  Abdominal:      General: There is no distension  Musculoskeletal:         General: Normal range of motion  Cervical back: Normal range of motion and neck supple  Skin:     General: Skin is warm and dry  Neurological:      General: No focal deficit present  Mental Status: He is alert  Coordination: Finger-Nose-Finger Test and Heel to [place] bill Test normal       Deep Tendon Reflexes:      Reflex Scores:       Bicep reflexes are 2+ on the right side and 2+ on the left side  Brachioradialis reflexes are 2+ on the right side and 2+ on the left side  Patellar reflexes are 2+ on the right side and 2+ on the left side  Neurologic Exam     Mental Status     Patient awake and alert, fully oriented  Performs simple calculations, names the president correctly  Speech is clear without dysarthria nor aphasia  Following commands  Cranial Nerves     CN II   Visual fields full to confrontation  CN III, IV, VI   Pupils are equal, round, and reactive to light  Extraocular motions are normal      CN V   Facial sensation intact  CN VII   Facial expression full, symmetric  CN VIII   CN VIII normal      CN IX, X   CN IX normal    CN X normal      CN XI   CN XI normal      CN XII   CN XII normal      Motor Exam   Full/age-appropriate strength in the upper and lower extremities throughout    No focal deficit or laterality on exam      Sensory Exam   Light touch normal      Gait, Coordination, and Reflexes     Coordination   Finger to nose coordination: normal  Heel to shin coordination: normal    Tremor   Resting tremor: absent  Intention tremor: absent    Reflexes   Right brachioradialis: 2+  Left brachioradialis: 2+  Right biceps: 2+  Left biceps: 2+  Right patellar: 2+  Left patellar: 2+  Did not personally ambulate, but per nursing did ambulate to the bathroom with roller walker just prior to exam:  Patient was steady on his feet, no truncal instability  Lab Results:   CBC:   Results from last 7 days   Lab Units 01/24/22  0514 01/23/22  0503 01/22/22  0447   WBC Thousand/uL 6 63 6 72 7 81   RBC Million/uL 3 61* 3 45* 3 82*   HEMOGLOBIN g/dL 11 6* 10 8* 11 6*   HEMATOCRIT % 33 8* 32 3* 35 8*   MCV fL 94 94 94   PLATELETS Thousands/uL 189 189 204   , BMP/CMP:   Results from last 7 days   Lab Units 01/24/22  0514 01/23/22  0503 01/21/22  1444 01/20/22  1952 01/20/22  0543 01/19/22  1410 01/19/22  0426   SODIUM mmol/L 134* 133* 136   < > 140   < > 139   POTASSIUM mmol/L 3 5 3 8 4 9   < > 2 8*   < > 2 1*   CHLORIDE mmol/L 98* 100 104   < > 106   < > 97*   CO2 mmol/L 29 26 27   < > 25   < > 29   BUN mg/dL 20 22 14   < > 19   < > 23   CREATININE mg/dL 1 92* 1 80* 1 53*   < > 1 46*   < > 1 96*   CALCIUM mg/dL 7 7* 7 6* 7 6*   < > 6 9*   < > 8 0*   AST U/L  --   --  201*  --  226*  --  235*   ALT U/L  --   --  231*  --  207*  --  210*   ALK PHOS U/L  --   --  70  --  52  --  64   EGFR ml/min/1 73sq m 30 32 39   < > 42   < > 29    < > = values in this interval not displayed  , Vitamin B12:   Results from last 7 days   Lab Units 01/23/22  1638   VITAMIN B 12 pg/mL 654   , HgBA1C:   , TSH:   , Coagulation:   Results from last 7 days   Lab Units 01/24/22  0514   INR  3 50*   , Lipid Profile:   , Ammonia:   , Urinalysis:       Invalid input(s): URIBILINOGEN, Drug Screen:   , Medication Drug Levels:       Invalid input(s): CARBAMAZEPINE,  PHENOBARB, LACOSAMIDE, OXCARBAZEPINE  Imaging Studies: I have personally reviewed pertinent films in PACS   XR chest portable   Final Result by Linda Lopez MD (01/23 1039)      Increasing airspace opacity bilaterally  Small effusions  Findings consistent with pulmonary edema                    Workstation performed: [id number]         XR chest portable   Final Result by Arcadio Boast, MD (01/22 1146)      Persistent pulmonary edema with small effusions and left greater than right bibasilar atelectasis  Workstation performed: [id number]         XR chest portable   Final Result by Padilla Khan MD (01/21 1135)      Bilateral pulmonary groundglass opacities which may reflect pulmonary edema or Covid 19 pneumonia  Workstation performed: [id number]         CT head without contrast   Final Result by Komal Stewart DO (01/19 0589)   1  Cerebral atrophy with chronic small vessel ischemic white matter disease and 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  No acute intracranial abnormality  2   Moderate-sized chronic-appearing left-sided subdural hygroma with mild mass effect and midline shift of approximately 4 mm to the right  If warranted, consider follow-up neurosurgical evaluation  3   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  4   Moderate cerebellar atrophy  Workstation performed: [id number]         CT facial bones without contrast   Final Result by Komal Stewart DO (01/19 2690)   No acute traumatic facial bone injuries  More chronic appearing bilateral nasal bone fractures  Workstation performed: [id number]         CT cervical spine without contrast   Final Result by Komal Stewart DO (01/19 4376)   Advanced degenerative changes of the cervical spine  No acute cervical spine fracture or traumatic malalignment  Workstation performed: [id number]         CT chest abdomen pelvis wo contrast   Final Result by Komal Stewart DO (01/19 1620)   Addendum 1 of 1 by Komal Stewart DO (01/19 7679)   ADDENDUM:      Bilateral lower lobe groundglass infiltrates with mild peribronchial    thickening    Findings most compatible with bilateral lower lobe pneumonia and bronchitis  In the setting of clinically suspected/proven COVID-19, the above lung    parenchymal findings on CT indicate low confidence level for COVID-19  Final   1  Suboptimal examination due to lack of intravenous and oral contrast material   This limits evaluation for traumatic solid or visceral organ injury  There is significant beam hardening artifact from patient's pacemaker as well as imaging the patient    with his arms at his side and his hands across his pelvis  2   No traumatic solid or visceral organ injury, however intravenous contrast material not administered  3   Severely distended urinary bladder, likely sequela of bladder outlet obstruction from prostatic enlargement  Consider follow-up neurology consultation  4   Additional incidental findings as described above  Workstation performed: [id number]         MRI inpatient order    (Results Pending)       EKG, Pathology, and Other Studies: I have personally reviewed pertinent reports  VTE Prophylaxis: Sequential compression device (Venodyne)  and Warfarin (Coumadin)    Code Status: Level 1 - Full Code    Total time spent today 45 minutes  Discussed plan of care with patient and primary team:  Suspect dizziness likely due to severe cardiac dysfunction, hypoperfusion, orthostasis, will decide if MRI brain is necessarily required for workup, continued close cardiac/respiratory monitoring

## 2022-01-24 NOTE — ASSESSMENT & PLAN NOTE
-MRI brain pending, will obtain MRA head and neck as well without contrast given his CKD/renal dysfunction   -will discuss more with attending, may cancel MRI/MRA as symptoms appear likely related to cardiac function/hypoperfusion/orthostasis; for now will leave order in  -recent echo in October noting severe cardiomyopathy with EF 12%, severely reduced systolic function, multi wall dyskinesis/akinesis  -continuing Coumadin,   -INR admission at 2 47, INR this morning at 3 5  -will check hemoglobin A1c and lipid panel  -continued neuro checks  -continued telemetry monitoring  -provide stroke education  -therapy evaluations  -continued metabolic derangement correction per primary team/Cardiology:   -initially hypokalemic/low magnesium on admission, both have been normalized   -monitoring CR/BUN/renal function   -transaminitis earlier this admission, trending liver enzymes   -  -checking orthostatic blood pressures daily,   -last night with  -cardiology team following closely with management   -on mexiletine, Lasix, Coumadin, amiodarone, carvedilol

## 2022-01-24 NOTE — ASSESSMENT & PLAN NOTE
· Ventricular tachycardia in the setting of cardiomyopathy, hypokalemia and hypomagnesemia  · Currently stable on amiodarone 200 mg daily, mexiletene 150 mg Q8, Coreg 12 5 mg p o  B i d   · Medtronic ICD in place    · Magnesium and potassium levels improved

## 2022-01-24 NOTE — PLAN OF CARE
Problem: PAIN - ADULT  Goal: Verbalizes/displays adequate comfort level or baseline comfort level  Description: Interventions:  - Encourage patient to monitor pain and request assistance  - Assess pain using appropriate pain scale  - Administer analgesics based on type and severity of pain and evaluate response  - Implement non-pharmacological measures as appropriate and evaluate response  - Consider cultural and social influences on pain and pain management  - Notify physician/advanced practitioner if interventions unsuccessful or patient reports new pain  Outcome: Progressing     Problem: INFECTION - ADULT  Goal: Absence or prevention of progression during hospitalization  Description: INTERVENTIONS:  - Assess and monitor for signs and symptoms of infection  - Monitor lab/diagnostic results  - Monitor all insertion sites, i e  indwelling lines, tubes, and drains  - Monitor endotracheal if appropriate and nasal secretions for changes in amount and color  - [place] appropriate cooling/warming therapies per order  - Administer medications as ordered  - Instruct and encourage patient and family to use good hand hygiene technique  - Identify and instruct in appropriate isolation precautions for identified infection/condition  Outcome: Progressing

## 2022-01-24 NOTE — ASSESSMENT & PLAN NOTE
Wt Readings from Last 3 Encounters:   01/24/22 60 7 kg (133 lb 13 1 oz)   12/21/21 65 2 kg (143 lb 12 8 oz)   12/10/21 67 2 kg (148 lb 3 2 oz)     Improved volume status    Wean off oxygen as tolerated  COVID test negative  Further diuretics per cardiology

## 2022-01-25 PROBLEM — Z71.89 GOALS OF CARE, COUNSELING/DISCUSSION: Status: ACTIVE | Noted: 2022-01-01

## 2022-01-25 NOTE — PROGRESS NOTES
Cardiology Progress Note   MD Leslie Taylor MD Bynum Mass, DO, [facility] MD Erasmo Cintron DO, Cynthia Fowler DO, [facility]  ----------------------------------------------------------------  [address] [place], [address] [facility] 80 y o  male MRN: [id number]  Unit/Bed#: E4 -01 Encounter: [id number]      ASSESSMENT:    Ventricular tachycardia with shock x2 in the setting of severe cardiomyopathy and superimposed hypokalemia and hypomagnesemia  o s/p Med[employer]onic dual-chamber ICD   Hypokalemia/hypomagnesemia secondary to alcohol and diuretic use (predominantly related to alcohol use)   CAD w/ remote history of MI and angioplasty, 1989   Paroxysmal atrial fibrillation on warfarin   Chronic combined systolic and diastolic heart failure   Hypertension   Dyslipidemia   CKD stage IIIB   History of embolic CVA   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    PLAN:  Patient continues to require oxygen and desaturates to around 90% on room air at rest  Recommend checking ambulatory pulse ox with physical therapy today; discussed directly with PT  Diuretics currently on hold as of yesterday due to rising creatinine  Per Nephrology, likely cardiorenal syndrome  Would be difficult to initiate inotropes due to recent ventricular tachycardia and increased risk of ventricular arrhythmias  Probable restart of oral diuretic in the coming day as per Nephrology  Continue mexiletine 150 mg q 8 hours  Continue warfarin with goal INR of 2-3; INR is mildly supratherapeutic today  Continue amiodarone, statin and carvedilol  Titrate down oxygen as tolerated  Hopeful discharge on oral diuretic in the next 24-48 hours    Signed: Ijeoma Varela DO, [facility], [facility]      History of Present Illness:  Patient seen and examined  Denies chest pain, pressure, tightness or squeezing  Denies lightheadedness, dizziness or palpitations    Denies lower extremity swelling, orthopnea or paroxysmal nocturnal dyspnea  Out of bed ambulating with PT  Review of Systems:  Review of Systems   Constitutional: Negative for decreased appetite, fever, weight gain and weight loss  HENT: Negative for congestion and sore throat  Eyes: Negative for visual disturbance  Cardiovascular: Negative for chest pain, dyspnea on exertion, leg swelling, near-syncope and palpitations  Respiratory: Negative for cough and shortness of breath  Hematologic/Lymphatic: Negative for bleeding problem  Skin: Negative for rash  Musculoskeletal: Negative for myalgias and neck pain  Gastrointestinal: Negative for abdominal pain and nausea  Neurological: Negative for light-headedness and weakness  Psychiatric/Behavioral: Negative for depression  No Known Allergies    No current facility-administered medications on file prior to encounter       Current Outpatient Medications on File Prior to Encounter   Medication Sig    amiodarone 200 mg tablet Take 1 tablet (200 mg total) by mouth daily    atorvastatin (LIPITOR) 40 mg tablet Take 1 tablet (40 mg total) by mouth daily    carvedilol (COREG) 12 5 mg tablet Take 1 tablet (12 5 mg total) by mouth 2 (two) times a day with meals    furosemide (LASIX) 40 mg tablet One tablet (40mg) every other day, alternating with 1/2 tablet (20mg) every other day    mexiletine (MEXITIL) 150 mg capsule Take 1 capsule (150 mg total) by mouth 3 (three) times a day    warfarin (COUMADIN) 2 mg tablet Take 1 tablet daily or as directed by physician    Cholecalciferol (VITAMIN D3) 50 MCG (2000 UT) capsule Take 2,000 Units by mouth daily      Glucosamine Sulfate 1000 MG CAPS 1 capsule Every 12 hours        Current Facility-Administered Medications   Medication Dose Route Frequency Provider Last Rate    amiodarone  200 mg Oral Daily Anjum Desai MD      atorvastatin  40 mg Oral Daily Anjum Desai MD      carvedilol  12 5 mg Oral BID With Meals Mya Quintanilla MD      magnesium oxide  400 mg Oral BID Brigida Smith MD      melatonin  6 mg Oral HS PRN Camille Ward PA-C      mexiletine  150 mg Oral [facility] [place] Dinesh, DO      potassium chloride  40 mEq Oral Daily Brigida Smith MD              Vitals:    01/25/22 0600 01/25/22 0813 01/25/22 1113 01/25/22 1300   BP:  97/64 117/86    BP Location:  Right arm Right arm    Pulse:  (!) 52  82   Resp:  18 18    Temp:  98 1 °F (36 7 °C) 98 3 °F (36 8 °C)    TempSrc:  Temporal Temporal    SpO2:  98% 91%    Weight: 60 1 kg (132 lb 8 oz)      Height:             Intake/Output Summary (Last 24 hours) at 1/25/2022 1329  Last data filed at 1/25/2022 1300  Gross per 24 hour   Intake 180 ml   Output 400 ml   Net -220 ml       Weight change: -0 598 kg (-1 lb 5 1 oz)    PHYSICAL EXAMINATION:  Gen: Awake, Alert, NAD  Head/eyes: AT/NC, pupils equal and round, Anicteric  ENT: mmm  Neck: Supple, No elevated JVP, trachea midline  Resp: CTA bilaterally no w/r/r  CV: RRR +S1, S2, No m/r/g  Abd: Soft, NT/ND + BS  Ext: no LE edema bilaterally  Neuro: Follows commands, moves all extermities  Psych: Appropriate affect, normal mood, pleasant attitude, non-combative  Skin: warm; no rash, erythema or venous stasis changes on exposed skin    Lab Results:  Results from last 7 days   Lab Units 01/24/22  0514   WBC Thousand/uL 6 63   HEMOGLOBIN g/dL 11 6*   HEMATOCRIT % 33 8*   PLATELETS Thousands/uL 189     Results from last 7 days   Lab Units 01/25/22  1020   POTASSIUM mmol/L 4 9   CHLORIDE mmol/L 96*   CO2 mmol/L 32   BUN mg/dL 23   CREATININE mg/dL 2 29*   CALCIUM mg/dL 8 2*   ALK PHOS U/L 84   ALT U/L 136*   AST U/L 109*     No results found for: TROPONINT          Results from last 7 days   Lab Units 01/25/22  1020   INR  3 11*       Tele:  V paced rhythm    This note was completed in part utilizing [employer] Direct Software    Grammatical errors, random word insertions, spelling mistakes, and incomplete sentences may be an occasional consequence of this system secondary to software limitations, ambient noise, and hardware issues  If you have any questions or concerns about the content, text, or information contained within the body of this dictation, please contact the provider for clarification

## 2022-01-25 NOTE — PLAN OF CARE
Problem: Potential for Falls  Goal: Patient will remain free of falls  Description: INTERVENTIONS:  - Educate patient/family on patient safety including physical limitations  - Instruct patient to call for assistance with activity   - Consult OT/PT to assist with strengthening/mobility   - Keep Call bell within reach  - Keep bed low and locked with side rails adjusted as appropriate  - Keep care items and personal belongings within reach  - Initiate and maintain comfort rounds  - Make Fall Risk Sign visible to staff  - Offer Toileting every 2 Hours, in advance of need  - Initiate/Maintain bed alarm  - Obtain necessary fall risk management equipment: alarms  - Apply yellow socks and bracelet for high fall risk patients  - Consider moving patient to room near nurses station  Outcome: Progressing     Problem: MOBILITY - ADULT  Goal: Maintain or return to baseline ADL function  Description: INTERVENTIONS:  -  Assess patient's ability to carry out ADLs; assess patient's baseline for ADL function and identify physical deficits which impact ability to perform ADLs (bathing, care of mouth/teeth, toileting, grooming, dressing, etc )  - Assess/evaluate cause of self-care deficits   - Assess range of motion  - Assess patient's mobility; develop plan if impaired  - Assess patient's need for assistive devices and provide as appropriate  - Encourage maximum independence but intervene and supervise when necessary  - Involve family in performance of ADLs  - Assess for home care needs following discharge   - Consider OT consult to assist with ADL evaluation and planning for discharge  - Provide patient education as appropriate  Outcome: Progressing  Goal: Maintains/Returns to pre admission functional level  Description: INTERVENTIONS:  - Perform BMAT or MOVE assessment daily    - Set and communicate daily mobility goal to care team and patient/family/caregiver     - Collaborate with rehabilitation services on mobility goals if consulted  - Perform Range of Motion 3 times a day  - Reposition patient every 2 hours    - Dangle patient 3 times a day  - Stand patient 3 times a day  - Ambulate patient 3 times a day  - Out of bed to chair 3 times a day   - Out of bed for meals 3 times a day  - Out of bed for toileting  - Record patient progress and toleration of activity level   Outcome: Progressing     Problem: PAIN - ADULT  Goal: Verbalizes/displays adequate comfort level or baseline comfort level  Description: Interventions:  - Encourage patient to monitor pain and request assistance  - Assess pain using appropriate pain scale  - Administer analgesics based on type and severity of pain and evaluate response  - Implement non-pharmacological measures as appropriate and evaluate response  - Consider cultural and social influences on pain and pain management  - Notify physician/advanced practitioner if interventions unsuccessful or patient reports new pain  Outcome: Progressing     Problem: INFECTION - ADULT  Goal: Absence or prevention of progression during hospitalization  Description: INTERVENTIONS:  - Assess and monitor for signs and symptoms of infection  - Monitor lab/diagnostic results  - Monitor all insertion sites, i e  indwelling lines, tubes, and drains  - Monitor endotracheal if appropriate and nasal secretions for changes in amount and color  - Labadie appropriate cooling/warming therapies per order  - Administer medications as ordered  - Instruct and encourage patient and family to use good hand hygiene technique  - Identify and instruct in appropriate isolation precautions for identified infection/condition  Outcome: Progressing  Goal: Absence of fever/infection during neutropenic period  Description: INTERVENTIONS:  - Monitor WBC    Outcome: Progressing     Problem: SAFETY ADULT  Goal: Patient will remain free of falls  Description: INTERVENTIONS:  - Educate patient/family on patient safety including physical limitations  - Instruct patient to call for assistance with activity   - Consult OT/PT to assist with strengthening/mobility   - Keep Call bell within reach  - Keep bed low and locked with side rails adjusted as appropriate  - Keep care items and personal belongings within reach  - Initiate and maintain comfort rounds  - Make Fall Risk Sign visible to staff  - Offer Toileting every 2 Hours, in advance of need  - Initiate/Maintain bed alarm  - Obtain necessary fall risk management equipment: alarms  - Apply yellow socks and bracelet for high fall risk patients  - Consider moving patient to room near nurses station  Outcome: Progressing  Goal: Maintain or return to baseline ADL function  Description: INTERVENTIONS:  -  Assess patient's ability to carry out ADLs; assess patient's baseline for ADL function and identify physical deficits which impact ability to perform ADLs (bathing, care of mouth/teeth, toileting, grooming, dressing, etc )  - Assess/evaluate cause of self-care deficits   - Assess range of motion  - Assess patient's mobility; develop plan if impaired  - Assess patient's need for assistive devices and provide as appropriate  - Encourage maximum independence but intervene and supervise when necessary  - Involve family in performance of ADLs  - Assess for home care needs following discharge   - Consider OT consult to assist with ADL evaluation and planning for discharge  - Provide patient education as appropriate  Outcome: Progressing  Goal: Maintains/Returns to pre admission functional level  Description: INTERVENTIONS:  - Perform BMAT or MOVE assessment daily    - Set and communicate daily mobility goal to care team and patient/family/caregiver  - Collaborate with rehabilitation services on mobility goals if consulted  - Perform Range of Motion 3 times a day  - Reposition patient every 2 hours    - Dangle patient 3 times a day  - Stand patient 3 times a day  - Ambulate patient 3 times a day  - Out of bed to chair 3 times a day   - Out of bed for meals 3 times a day  - Out of bed for toileting  - Record patient progress and toleration of activity level   Outcome: Progressing     Problem: DISCHARGE PLANNING  Goal: Discharge to home or other facility with appropriate resources  Description: INTERVENTIONS:  - Identify barriers to discharge w/patient and caregiver  - Arrange for needed discharge resources and transportation as appropriate  - Identify discharge learning needs (meds, wound care, etc )  - Arrange for interpretive services to assist at discharge as needed  - Refer to Case Management Department for coordinating discharge planning if the patient needs post-hospital services based on physician/advanced practitioner order or complex needs related to functional status, cognitive ability, or social support system  Outcome: Progressing     Problem: Knowledge Deficit  Goal: Patient/family/caregiver demonstrates understanding of disease process, treatment plan, medications, and discharge instructions  Description: Complete learning assessment and assess knowledge base    Interventions:  - Provide teaching at level of understanding  - Provide teaching via preferred learning methods  Outcome: Progressing

## 2022-01-25 NOTE — PHYSICAL THERAPY NOTE
PHYSICAL THERAPY NOTE          Patient Name: Mark Torres  [id number] Date: 1/25/2022 01/25/22 1055   Pain Assessment   Pain Assessment Tool 0-10   Pain Score No Pain   Restrictions/Precautions   Other Precautions Chair Alarm; Bed Alarm; Fall Risk;Hard of hearing;Telemetry;O2   General   Chart Reviewed Yes   Family/Caregiver Present No   Cognition   Overall Cognitive Status WFL   Arousal/Participation Responsive; Cooperative   Attention Difficulty attending to directions   Orientation Level Oriented to person;Oriented to place; Disoriented to time;Disoriented to situation   Memory Decreased recall of precautions;Decreased recall of recent events;Decreased short term memory   Following Commands Follows one step commands without difficulty   Subjective   Subjective I AM FAIR  Bed Mobility   Supine to Sit 5  Supervision   Additional items Increased time required;Verbal cues; Bedrails   Sit to Supine 4  Minimal assistance   Additional items Assist x 1; Increased time required;Verbal cues;LE management   Additional Comments EOB X 12 MIN WITH SUPERVISION FORWARD FLEXED POSTURE PERFORMING BLANCE ACTIITES AND SITTING TOLERANCE DURING FUNCTIONAL ACTIVITIES   Transfers   Sit to Stand 4  Minimal assistance  (RETROPULSIVE AT TIMES REQUIRING MIN ASSIST OTHER TIMES SUP  )   Additional items Assist x 1; Increased time required   Stand to Sit 4  Minimal assistance   Additional items Assist x 1; Increased time required;Verbal cues   Stand pivot 4  Minimal assistance   Additional items Assist x 1; Increased time required;Verbal cues   Additional Comments POOR SAFETY AWARENESS, TURNED AND SAT ABRUPTLY  RETROPULSION WITH SIT TO STAND TRANSFERS   Ambulation/Elevation   Gait pattern Forward Flexion   Gait Assistance 4  Minimal assist   Additional items Assist x 1;Verbal cues   Assistive Device Rolling walker   Distance 15' X2   SITTING REST IN BETWEEN GAIT TRIALS  Balance   Static Sitting Fair  (FORWRD TRUNK AND HEAD POSTURE)   Dynamic Sitting Fair   Static Standing Fair -   Dynamic Standing Poor +   Ambulatory Poor +   Endurance Deficit   Endurance Deficit Description EASILY FATIGUE, INTERMITNE REPORTS OF DIZZINESS, THOUGH PT DENIES DIZZINESS WHEN QUESTIONED PT 'S BP SITTING / 86 91% SPO2 ON rA /93 WITH STANDING SPO2 99% ON 2l NC  Activity Tolerance   Activity Tolerance Patient limited by fatigue  Rylan Doll,)   Medical Staff Made Aware REJI GUEVARA   Exercises   Hip Flexion Sitting;10 reps;AROM; Bilateral   Knee AROM Long Arc Quad Sitting;10 reps;AROM; Bilateral   Ankle Pumps Sitting;10 reps;AROM; Bilateral   Assessment   Prognosis Fair   Problem List Decreased strength;Decreased endurance; Impaired balance;Decreased mobility; Decreased cognition; Impaired judgement;Decreased safety awareness; Impaired hearing   Assessment PT  SEEN FOR pt TREATMENT SESSION  PT  REQUIRES REPETITIVE COMMANDS FOR SAFE AND PROPER MOBILITY AND TRANSFERS TECHNIQUES DUE TO PT BEING [place] AND DEMONSTRATING POOR ATTENTION TO TASKS  PT  IS EASILY FATIGUED WITH ACTIVITY, REQUIRING FREQUENT SEATED REST BREAKS  PT DEMONSTRATES RETROPULSION WITH SIT TO STAND TRANSFERS REQUIRING MIN ASSISTX1 FOR STEADYING ASSIST  POOR SAFETY, GAIT DEVIATIONS, DECREASED BALANCE, AND DECREASED OVERALL ACTIVITY TOLERANCE, FUNCTIONAL ENDURANCE, AND STRENGTH NOTED WITH ALL MOBILITY INCREASING PT'S RISK FOR FALLS  PT  IS REQUIRING INCREASED ASSISTANCE FOR ALL MOBILITY COMPARED TO LAST PT SESSION, USE OF MORE RESTRICTIVE ASSISTIVE DEVICE , REQUIRING SUPPORTIVE O2 AT 2L TO MAINTAIN SPO2 WNL AND IS FUNCTIONING WELL BELOW BASELINE LEVEL OF MOBILITY  PT IS IMPULSIVE DURING MOBILITY TRAINING  AND DEMONSTRATES POOR SAFETY AWRENESS AND POOR INSIGHT INTO CURRENT DEFICITS  The patient's AM-PAC Basic Mobility Inpatient Short Form Raw Score is 17   A Raw score of greater than 16 suggests the patient may benefit from discharge to home  Please also refer to the recommendation of the Physical Therapist for safe discharge planning  DESPITE AM- PAC SCORE PT  IS REQUIRING INCREASED ASSISTANCE FOR ALL MOBILITY COMPARED TO PREVIOUS PT SESSION, USE OF MORE RESTRICTIVE ASSISTIVE DEVICE , REQUIRING SUPPORTIVE O2 AT 2L TO MAINTAIN SPO2 TO WNL, IMPAIRMENTS IN BALANCE, SAFETY, STRENGTH, ACTIVITY TOLERANCE AND FUNCTIONAL ENDURANCE AND COGNITION AND IS FUNCTIONING WELL BELOW BASELINE LEVEL OF MOBILITY, THEREFORE RECOMMEND STR AT D/C IN ORDER TO MAXIMIZE FUNCTIONAL OUTCOMES AND INCREASED INDEPENDENCE WHEN PERFORMING MOBILITY AND ADL'S  Goals   Patient Goals TO GET BETTER AND GO HOME     STG Expiration Date 02/04/22   PT Treatment Day 1   Plan   Treatment/Interventions Functional transfer training;LE strengthening/ROM; Therapeutic exercise; Endurance training;Cognitive reorientation;Patient/family training;Equipment eval/education; Bed mobility;Gait training;OT   Progress Slow progress, decreased activity tolerance   PT Frequency Other (Comment)  (4-5X/ WEEK)   Recommendation   PT Discharge Recommendation Post acute rehabilitation services   AM-PAC Basic Mobility Inpatient   Turning in Bed Without Bedrails 3   Lying on Back to Sitting on Edge of Flat Bed 3   Moving Bed to Chair 3   Standing Up From Chair 3   Walk in Room 3   Climb 3-5 Stairs 2   Basic Mobility Inpatient Raw Score 17   Basic Mobility Standardized Score 39 67   Highest Level Of Mobility   JH-HLM Goal 5: Stand one or more mins   JH-HLM Highest Level of Mobility 6: Walk 10 steps or more   JH-HLM Goal Achieved Yes   End of Consult   Patient Position at End of Consult Supine;Bed/Chair alarm activated; All needs within reach      01/25/22 1055   Pain Assessment   Pain Assessment Tool 0-10   Pain Score No Pain   Restrictions/Precautions   Other Precautions Chair Alarm; Bed Alarm; Fall Risk;Hard of hearing;Telemetry;O2   General   Chart Reviewed Yes   Family/Caregiver Present No Cognition   Overall Cognitive Status WFL   Arousal/Participation Responsive; Cooperative   Attention Difficulty attending to directions   Orientation Level Oriented to person;Oriented to place; Disoriented to time;Disoriented to situation   Memory Decreased recall of precautions;Decreased recall of recent events;Decreased short term memory   Following Commands Follows one step commands without difficulty   Subjective   Subjective I AM FAIR  Bed Mobility   Supine to Sit 5  Supervision   Additional items Increased time required;Verbal cues; Bedrails   Sit to Supine 4  Minimal assistance   Additional items Assist x 1; Increased time required;Verbal cues;LE management   Additional Comments EOB X 12 MIN WITH SUPERVISION FORWARD FLEXED POSTURE PERFORMING BLANCE ACTIITES AND SITTING TOLERANCE DURING FUNCTIONAL ACTIVITIES   Transfers   Sit to Stand 4  Minimal assistance  (RETROPULSIVE AT TIMES REQUIRING MIN ASSIST OTHER TIMES SUP  )   Additional items Assist x 1; Increased time required   Stand to Sit 4  Minimal assistance   Additional items Assist x 1; Increased time required;Verbal cues   Stand pivot 4  Minimal assistance   Additional items Assist x 1; Increased time required;Verbal cues   Additional Comments POOR SAFETY AWARENESS, TURNED AND SAT ABRUPTLY  RETROPULSION WITH SIT TO STAND TRANSFERS   Ambulation/Elevation   Gait pattern Forward Flexion   Gait Assistance 4  Minimal assist   Additional items Assist x 1;Verbal cues   Assistive Device Rolling walker   Distance 15' X2  SITTING REST IN BETWEEN GAIT TRIALS  Balance   Static Sitting Fair  (FORWRD TRUNK AND HEAD POSTURE)   Dynamic Sitting Fair   Static Standing Fair -   Dynamic Standing Poor +   Ambulatory Poor +   Endurance Deficit   Endurance Deficit Description EASILY FATIGUE, INTERMITNE REPORTS OF DIZZINESS, THOUGH PT DENIES DIZZINESS WHEN QUESTIONED PT 'S BP SITTING / 86 91% SPO2 ON rA /93 WITH STANDING SPO2 99% ON 2l NC       Activity Tolerance Activity Tolerance Patient limited by fatigue  Shelly Garcia,)   Medical Staff Made Aware REJI GUEVARA   Exercises   Hip Flexion Sitting;10 reps;AROM; Bilateral   Knee AROM Long Arc Quad Sitting;10 reps;AROM; Bilateral   Ankle Pumps Sitting;10 reps;AROM; Bilateral   Assessment   Prognosis Fair   Problem List Decreased strength;Decreased endurance; Impaired balance;Decreased mobility; Decreased cognition; Impaired judgement;Decreased safety awareness; Impaired hearing   Assessment PT  SEEN FOR pt TREATMENT SESSION  PT  REQUIRES REPETITIVE COMMANDS FOR SAFE AND PROPER MOBILITY AND TRANSFERS TECHNIQUES DUE TO PT BEING [place] AND DEMONSTRATING POOR ATTENTION TO TASKS  PT  IS EASILY FATIGUED WITH ACTIVITY, REQUIRING FREQUENT SEATED REST BREAKS  PT DEMONSTRATES RETROPULSION WITH SIT TO STAND TRANSFERS REQUIRING MIN ASSISTX1 FOR STEADYING ASSIST  POOR SAFETY, GAIT DEVIATIONS, DECREASED BALANCE, AND DECREASED OVERALL ACTIVITY TOLERANCE, FUNCTIONAL ENDURANCE, AND STRENGTH NOTED WITH ALL MOBILITY INCREASING PT'S RISK FOR FALLS  PT  IS REQUIRING INCREASED ASSISTANCE FOR ALL MOBILITY COMPARED TO LAST PT SESSION, USE OF MORE RESTRICTIVE ASSISTIVE DEVICE , REQUIRING SUPPORTIVE O2 AT 2L TO MAINTAIN SPO2 WNL AND IS FUNCTIONING WELL BELOW BASELINE LEVEL OF MOBILITY  PT IS IMPULSIVE DURING MOBILITY TRAINING  AND DEMONSTRATES POOR SAFETY AWRENESS AND POOR INSIGHT INTO CURRENT DEFICITS  The patient's AM-PAC Basic Mobility Inpatient Short Form Raw Score is 17  A Raw score of greater than 16 suggests the patient may benefit from discharge to home  Please also refer to the recommendation of the Physical Therapist for safe discharge planning    DESPITE AM- PAC SCORE PT  IS REQUIRING INCREASED ASSISTANCE FOR ALL MOBILITY COMPARED TO PREVIOUS PT SESSION, USE OF MORE RESTRICTIVE ASSISTIVE DEVICE , REQUIRING SUPPORTIVE O2 AT 2L TO MAINTAIN SPO2 TO WNL, IMPAIRMENTS IN BALANCE, SAFETY, STRENGTH, ACTIVITY TOLERANCE AND FUNCTIONAL ENDURANCE AND COGNITION AND IS FUNCTIONING WELL BELOW BASELINE LEVEL OF MOBILITY, THEREFORE RECOMMEND STR AT D/C IN ORDER TO MAXIMIZE FUNCTIONAL OUTCOMES AND INCREASED INDEPENDENCE WHEN PERFORMING MOBILITY AND ADL'S  Goals   Patient Goals TO GET BETTER AND GO HOME     STG Expiration Date 02/04/22   PT Treatment Day 1   Plan   Treatment/Interventions Functional transfer training;LE strengthening/ROM; Therapeutic exercise; Endurance training;Cognitive reorientation;Patient/family training;Equipment eval/education; Bed mobility;Gait training;OT   Progress Slow progress, decreased activity tolerance   PT Frequency Other (Comment)  (4-5X/ WEEK)   Recommendation   PT Discharge Recommendation Post acute rehabilitation services   AM-PAC Basic Mobility Inpatient   Turning in Bed Without Bedrails 3   Lying on Back to Sitting on Edge of Flat Bed 3   Moving Bed to Chair 3   Standing Up From Chair 3   Walk in Room 3   Climb 3-5 Stairs 2   Basic Mobility Inpatient Raw Score 17   Basic Mobility Standardized Score 39 67   Highest Level Of Mobility   JH-HLM Goal 5: Stand one or more mins   JH-HLM Highest Level of Mobility 6: Walk 10 steps or more   JH-HLM Goal Achieved Yes   End of Consult   Patient Position at End of Consult Supine;Bed/Chair alarm activated; All needs within reach   [place], [place]

## 2022-01-25 NOTE — ASSESSMENT & PLAN NOTE
· Discussed overall condition with patient and wife  · He does not have a living will  · We talked about his code status, he remains undecided  · He will talk to his wife and get back to me

## 2022-01-25 NOTE — PLAN OF CARE
Chief Complaint   Patient presents with     Medication Follow-up     Patient states she is feeling better     SANIYA ARIZA at 9:05 AM on 5/17/2018.     Problem: PHYSICAL THERAPY ADULT  Goal: Performs mobility at highest level of function for planned discharge setting  See evaluation for individualized goals  Description: Treatment/Interventions: Functional transfer training,LE strengthening/ROM,Therapeutic exercise,Endurance training,Patient/family training,Equipment eval/education,Bed mobility,Gait training,Compensatory technique education,Continued evaluation,Spoke to MD,Spoke to nursing,OT  Equipment Recommended:  (recommend use of RW at home; pt has)       See flowsheet documentation for full assessment, interventions and recommendations  Outcome: Not Progressing  Note: Prognosis: Fair  Problem List: Decreased strength,Decreased endurance,Impaired balance,Decreased mobility,Decreased cognition,Impaired judgement,Decreased safety awareness,Impaired hearing  Assessment: PT  SEEN FOR pt TREATMENT SESSION  PT  REQUIRES REPETITIVE COMMANDS FOR SAFE AND PROPER MOBILITY AND TRANSFERS TECHNIQUES DUE TO PT BEING [place] AND DEMONSTRATING POOR ATTENTION TO TASKS  PT  IS EASILY FATIGUED WITH ACTIVITY, REQUIRING FREQUENT SEATED REST BREAKS  PT DEMONSTRATES RETROPULSION WITH SIT TO STAND TRANSFERS REQUIRING MIN ASSISTX1 FOR STEADYING ASSIST  POOR SAFETY, GAIT DEVIATIONS, DECREASED BALANCE, AND DECREASED OVERALL ACTIVITY TOLERANCE, FUNCTIONAL ENDURANCE, AND STRENGTH NOTED WITH ALL MOBILITY INCREASING PT'S RISK FOR FALLS  PT  IS REQUIRING INCREASED ASSISTANCE FOR ALL MOBILITY COMPARED TO LAST PT SESSION, USE OF MORE RESTRICTIVE ASSISTIVE DEVICE , REQUIRING SUPPORTIVE O2 AT 2L TO MAINTAIN SPO2 WNL AND IS FUNCTIONING WELL BELOW BASELINE LEVEL OF MOBILITY  PT IS IMPULSIVE DURING MOBILITY TRAINING  AND DEMONSTRATES POOR SAFETY AWRENESS AND POOR INSIGHT INTO CURRENT DEFICITS  The patient's AM-PAC Basic Mobility Inpatient Short Form Raw Score is 17  A Raw score of greater than 16 suggests the patient may benefit from discharge to home   Please also refer to the recommendation of the Physical Therapist for safe discharge planning  DESPITE AM- PAC SCORE PT  IS REQUIRING INCREASED ASSISTANCE FOR ALL MOBILITY COMPARED TO PREVIOUS PT SESSION, USE OF MORE RESTRICTIVE ASSISTIVE DEVICE , REQUIRING SUPPORTIVE O2 AT 2L TO MAINTAIN SPO2 TO WNL, IMPAIRMENTS IN BALANCE, SAFETY, STRENGTH, ACTIVITY TOLERANCE AND FUNCTIONAL ENDURANCE AND COGNITION AND IS FUNCTIONING WELL BELOW BASELINE LEVEL OF MOBILITY, THEREFORE RECOMMEND STR AT D/C IN ORDER TO MAXIMIZE FUNCTIONAL OUTCOMES AND INCREASED INDEPENDENCE WHEN PERFORMING MOBILITY AND ADL'S  Barriers to Discharge: Other (Comment) (decline in function)        PT Discharge Recommendation: Post acute rehabilitation services          See flowsheet documentation for full assessment

## 2022-01-25 NOTE — PROGRESS NOTES
Cardiology Progress Note   MD Christina Montaño MD Leandro Combs, DO, MD Jose Ignacio DO, Michelle Moses DO, [facility]  ----------------------------------------------------------------  [address], [address] [place] [address] 80 y o  male MRN: [id number]  Unit/Bed#: E4 -01 Encounter: [id number]      ASSESSMENT:    Ventricular tachycardia with shock x2 in the setting of severe cardiomyopathy and superimposed hypokalemia and hypomagnesemia  o s/p Med[employer]onic dual-chamber ICD   Hypokalemia/hypomagnesemia secondary to alcohol and diuretic use (predominantly related to alcohol use)   CAD w/ remote history of MI and angioplasty, 1989   Paroxysmal atrial fibrillation on warfarin   Chronic combined systolic and diastolic heart failure   Hypertension   Dyslipidemia   CKD stage IIIB   History of embolic CVA   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    PLAN:  Patient has been having respiratory failure with bilateral airspace disease on chest x-ray  Repeat chest x-ray shows persistent disease today  Creatinine continues to trend upward  Holding IV diuretics for now  Will discuss case with nephrology on consultation; consultation is placed  Continue mexiletine 150 mg q 8 hours  Continue warfarin with goal INR of 2-3  Continue amiodarone, statin and carvedilol  Titrate down oxygen as tolerated    Signed: Artis Wynn DO, [facility], [facility]      History of Present Illness:  Patient seen and examined  Denies chest pain, pressure, tightness or squeezing  Denies lightheadedness, dizziness or palpitations  Denies lower extremity swelling, orthopnea or paroxysmal nocturnal dyspnea  Resting comfortably in bed  Review of Systems:  Review of Systems   Constitutional: Negative for decreased appetite, fever, weight gain and weight loss  HENT: Negative for congestion and sore throat  Eyes: Negative for visual disturbance  Cardiovascular: Negative for chest pain, dyspnea on exertion, leg swelling, near-syncope and palpitations  Respiratory: Negative for cough and shortness of breath  Hematologic/Lymphatic: Negative for bleeding problem  Skin: Negative for rash  Musculoskeletal: Negative for myalgias and neck pain  Gastrointestinal: Negative for abdominal pain and nausea  Neurological: Negative for light-headedness and weakness  Psychiatric/Behavioral: Negative for depression  No Known Allergies    No current facility-administered medications on file prior to encounter       Current Outpatient Medications on File Prior to Encounter   Medication Sig    amiodarone 200 mg tablet Take 1 tablet (200 mg total) by mouth daily    atorvastatin (LIPITOR) 40 mg tablet Take 1 tablet (40 mg total) by mouth daily    carvedilol (COREG) 12 5 mg tablet Take 1 tablet (12 5 mg total) by mouth 2 (two) times a day with meals    furosemide (LASIX) 40 mg tablet One tablet (40mg) every other day, alternating with 1/2 tablet (20mg) every other day    mexiletine (MEXITIL) 150 mg capsule Take 1 capsule (150 mg total) by mouth 3 (three) times a day    warfarin (COUMADIN) 2 mg tablet Take 1 tablet daily or as directed by physician    Cholecalciferol (VITAMIN D3) 50 MCG (2000 UT) capsule Take 2,000 Units by mouth daily      Glucosamine Sulfate 1000 MG CAPS 1 capsule Every 12 hours        Current Facility-Administered Medications   Medication Dose Route Frequency Provider Last Rate    amiodarone  200 mg Oral Daily Bobby Pantoja MD      atorvastatin  40 mg Oral Daily Bobby Pantoja MD      carvedilol  12 5 mg Oral BID With Meals Bobby Pantoja MD      magnesium oxide  400 mg Oral BID Esther Wood MD      melatonin  6 mg Oral HS PRN Deborah Romero PA-C      mexiletine  150 mg Oral [facility] Aster Younger, DO      potassium chloride  40 mEq Oral Daily Esther Wood MD              Vitals:    01/24/22 0801 01/24/22 1100 01/24/22 1420 01/24/22 1928   BP: 106/52 91/56 122/71 112/71   BP Location: Right arm Right arm Right arm Left arm   Pulse: 72 72 74 73   Resp: 18 18 18 18   Temp: 98 °F (36 7 °C) 98 9 °F (37 2 °C)  (!) 96 9 °F (36 1 °C)   TempSrc:  Temporal  Temporal   SpO2: 94% 94% 95% 96%   Weight:       Height:             Intake/Output Summary (Last 24 hours) at 1/24/2022 2249  Last data filed at 1/24/2022 1501  Gross per 24 hour   Intake --   Output 1350 ml   Net -1350 ml       Weight change: -3 3 kg (-7 lb 4 4 oz)    PHYSICAL EXAMINATION:  Gen: Awake, Alert, NAD  Head/eyes: AT/NC, pupils equal and round, Anicteric  ENT: mmm  Neck: Supple, No elevated JVP, trachea midline  Resp: CTA bilaterally no w/r/r  CV: RRR +S1, S2, No m/r/g  Abd: Soft, NT/ND + BS  Ext: no LE edema bilaterally  Neuro: Follows commands, moves all extermities  Psych: Appropriate affect, normal mood, pleasant attitude, non-combative  Skin: warm; no rash, erythema or venous stasis changes on exposed skin    Lab Results:  Results from last 7 days   Lab Units 01/24/22  0514   WBC Thousand/uL 6 63   HEMOGLOBIN g/dL 11 6*   HEMATOCRIT % 33 8*   PLATELETS Thousands/uL 189     Results from last 7 days   Lab Units 01/24/22  0514 01/23/22  0503 01/21/22  1444   POTASSIUM mmol/L 3 5   < > 4 9   CHLORIDE mmol/L 98*   < > 104   CO2 mmol/L 29   < > 27   BUN mg/dL 20   < > 14   CREATININE mg/dL 1 92*   < > 1 53*   CALCIUM mg/dL 7 7*   < > 7 6*   ALK PHOS U/L  --   --  70   ALT U/L  --   --  231*   AST U/L  --   --  201*    < > = values in this interval not displayed  No results found for: Michel Morgan          Results from last 7 days   Lab Units 01/24/22  0514   INR  3 50*       Tele:  V paced rhythm    This note was completed in part utilizing [employer] Direct Software    Grammatical errors, random word insertions, spelling mistakes, and incomplete sentences may be an occasional consequence of this system secondary to software limitations, ambient noise, and hardware issues  If you have any questions or concerns about the content, text, or information contained within the body of this dictation, please contact the provider for clarification

## 2022-01-25 NOTE — OCCUPATIONAL THERAPY NOTE
Occupational Therapy Treatment Note:         01/25/22 1145   OT Last Visit   OT Visit Date 01/25/22   Note Type   Note Type Treatment   Restrictions/Precautions   Weight Bearing Precautions Per Order No   Other Precautions Fall Risk;Pain;Cognitive; Chair Alarm; Bed Alarm;Telemetry;Hard of hearing   Pain Assessment   Pain Assessment Tool 0-10   Pain Score No Pain   ADL   Where Assessed Edge of bed   Grooming Assistance 5  Supervision/Setup   Grooming Deficit Setup;Verbal cueing;Supervision/safety; Wash/dry hands; Wash/dry face   Grooming Comments increased Cues required to initiate activities  UB Bathing Assistance 5  Supervision/Setup   UB Bathing Deficit Setup;Verbal cueing;Supervision/safety; Increased time to complete; Chest;Right arm;Left arm; Abdomen   UB Bathing Comments cues to initiate required  LB Bathing Assistance 4  Minimal Assistance   LB Bathing Deficit Setup;Steadying;Verbal cueing;Supervision/safety; Increased time to complete;Perineal area; Buttocks;Right lower leg including foot; Left lower leg including foot   LB Bathing Comments hands on A required for safe balance with functional reach   UB Dressing Assistance 5  Supervision/Setup   UB Dressing Deficit Setup   LB Dressing Assistance 4  Minimal Assistance   LB Dressing Deficit Setup;Steadying; Requires assistive device for steadying;Verbal cueing;Supervision/safety; Increased time to complete; Don/doff R sock; Don/doff L sock; Thread RLE into underwear; Thread LLE into underwear;Pull up over hips   LB Dressing Comments slight LOB noted with clothing management instance  Toileting Assistance  4  Minimal Assistance   Toileting Deficit Setup;Steadying;Verbal cueing;Supervison/safety; Increased time to complete; Bedside commode;Perineal hygiene;Clothing management down;Clothing management up   Toileting Comments with simulation  LOB  with positional changes poor safety awareness      Functional Standing Tolerance   Time 2 mins   Activity dynamic stand balance activities  self care routine  Comments the Pt had several episodes of LOB particularly with positional changes, SPT Pt with questionable safety awareness  Initially stating, "I need to sit" with LOB, then when asked what happened Pt stated, "I don't know, I'm fine"  Bed Mobility   Supine to Sit 5  Supervision   Additional items HOB elevated; Bedrails; Increased time required;Verbal cues   Sit to Supine 4  Minimal assistance   Additional items Bedrails; Increased time required;Verbal cues;LE management   Additional Comments increased A to bring BLE into bed  Transfers   Sit to Stand 5  Supervision   Additional items Assist x 1; Armrests; Bedrails; Increased time required;Verbal cues   Stand to Sit 4  Minimal assistance   Additional items Assist x 1;Bedrails;Armrests; Increased time required;Verbal cues   Stand pivot 4  Minimal assistance   Additional items Assist x 1; Armrests; Bedrails; Increased time required;Verbal cues   Additional Comments Pt with poor insight to current deficits/poor safety wareness with functional tasks instanc with support of RW  Impulsive movements warranting need for cues to slow pace with mobility  Functional Mobility   Functional Mobility 4  Minimal assistance   Additional Comments x1   Additional items Rolling walker  (LOB with positional changes  )   Cognition   Overall Cognitive Status WFL   Arousal/Participation Responsive   Attention Difficulty attending to directions   Orientation Level Oriented to person;Oriented to place; Disoriented to situation;Oriented to time   Memory Decreased recall of recent events;Decreased short term memory;Decreased recall of precautions   Following Commands Follows one step commands without difficulty   Comments Pt [place] with need for repetitive commands  Questionable carry over of communications and reviewed techs due to poor attention to tasks with and without direct loud verbalized steps      Additional Activities   Additional Activities Other (Comment)  (reviewed current plan of care  )   Additional Activities Comments The Pt displayed questionable carry over of newly learned information  Further review of RW safety, fall prevention and current plan of care will be required  Activity Tolerance   Activity Tolerance Patient limited by fatigue   Medical Staff Made Aware reported all findings to nursing staff  Pt currently wearing 2 liters O2 NC B/P taken intially at /86, SAT 91% on RA upon greeting with O2 self removed  B/P 131/91 following functional tasks instance for several minutes  SpO2 100% on 2 liters with activity  Signs of increased dizziness with 2nd trial of functional transfers  B/P taken at EOB unmeasurable  Pt returned to supine positioning due to signs of orthostatic hypotension   Assessment   Assessment Pt was seen for skilled OT with focus on completion of self care routine, functional transfers, review of RW safety, fall prevention and review of current plan of care  The Pt initially reported, "feeling good", upon greeting this morning SBA overall to achieve EOB positioning  B/P taken with EOB positioning at 117/86  Pt agreeable to completion of self care routine  Self care routine as follows  Feeding: S Grooming: UB Bathing: S UB Dressing: S LB Bathing: Min A LB Dressing: Min A Toileting: Min A Bed Mobility: S for supine to sit EOB, increased A for return to supine positioning to bring BLE into bed  Transfers: Min A with hand over hand A required for safe balance and appropriate advancement of RW  Pt with increased fatigue following activities warranting need for frequent rest breaks through out to achieve optimal activity tolerance  See above levels of A reqiured for all functional tasks  Fluctuating B/P noted with functional tasks initially at 117/86 EOB, then 131/91 instance following functional tasks, then unable to measure with symptoms noted indicating orthostatic hypotension, dizziness and fatigue   The Pt was returned to supine positioning with HOB elevated and all needs with in reach  The patient's raw score on the AM-PAC Daily Activity inpatient short form is 18, standardized score is 38 66, less than 39 4  Patients at this level are likely to benefit from discharge to post-acute rehabilitation services  Spoke with OTR regarding Pt's need for continued therapies based on today's findings  Plan   Treatment Interventions ADL retraining;Functional transfer training;UE strengthening/ROM; Endurance training;Cognitive reorientation;Patient/family training   Goal Expiration Date 02/04/22   OT Treatment Day 1   OT Frequency 2-3x/wk   Recommendation   OT Discharge Recommendation Post acute rehabilitation services   AM-[facility] Daily Activity Inpatient   Lower Body Dressing 3   Bathing 3   Toileting 2   Upper Body Dressing 3   Grooming 3   Eating 4   Daily Activity Raw Score 18   Daily Activity Standardized Score (Calc for Raw Score >=11) 38 66   AM-[facility] Applied Cognition Inpatient   Following a Speech/Presentation 3   Understanding Ordinary Conversation 3   Taking Medications 3   Remembering Where Things Are Placed or Put Away 3   Remembering List of 4-5 Errands 3   Taking Care of Complicated Tasks 3   Applied Cognition Raw Score 18   Applied Cognition Standardized Score 38 07   Cas Graves, [address]

## 2022-01-25 NOTE — QUICK NOTE
Patient spoke with his wife regarding his living will/code status  He expressed that he wants to be a DNR and does not want to be resuscitated in the event of cardiac arrest

## 2022-01-25 NOTE — PLAN OF CARE
Problem: OCCUPATIONAL THERAPY ADULT  Goal: Performs self-care activities at highest level of function for planned discharge setting  See evaluation for individualized goals  Description: Treatment Interventions: ADL retraining,Functional transfer training,Endurance training,Patient/family training,Equipment evaluation/education,Activityengagement,Energy conservation          See flowsheet documentation for full assessment, interventions and recommendations  Outcome: Progressing  Note: Limitation: Decreased ADL status,Decreased endurance,Decreased self-care trans,Decreased high-level ADLs  Prognosis: Good  Assessment: Pt was seen for skilled OT with focus on completion of self care routine, functional transfers, review of RW safety, fall prevention and review of current plan of care  The Pt initially reported, "feeling good", upon greeting this morning SBA overall to achieve EOB positioning  B/P taken with EOB positioning at 117/86  Pt agreeable to completion of self care routine  Self care routine as follows  Feeding: S Grooming: UB Bathing: S UB Dressing: S LB Bathing: Min A LB Dressing: Min A Toileting: Min A Bed Mobility: S for supine to sit EOB, increased A for return to supine positioning to bring BLE into bed  Transfers: Min A with hand over hand A required for safe balance and appropriate advancement of RW  Pt with increased fatigue following activities warranting need for frequent rest breaks through out to achieve optimal activity tolerance  See above levels of A reqiured for all functional tasks  Fluctuating B/P noted with functional tasks initially at 117/86 EOB, then 131/91 instance following functional tasks, then unable to measure with symptoms noted indicating orthostatic hypotension, dizziness and fatigue  The Pt was returned to supine positioning with HOB elevated and all needs with in reach   The patient's raw score on the AM-PAC Daily Activity inpatient short form is 18, standardized score is 38 66, less than 39 4  Patients at this level are likely to benefit from discharge to post-acute rehabilitation services  Spoke with OTR regarding Pt's need for continued therapies based on today's findings        OT Discharge Recommendation: Post acute rehabilitation services

## 2022-01-25 NOTE — PROGRESS NOTES
[address] [place] [address]  Progress Note Rc Coombs 10/31/1932, 80 y o  male MRN: [id number]  Unit/Bed#: E4 -01 Encounter: [id number]  Primary Care Provider: Sixto Luciano DO   Date and time admitted to hospital: 1/19/2022  4:03 AM    * Ventricular tachycardia (HCC)  Assessment & Plan  · Ventricular tachycardia in the setting of cardiomyopathy, hypokalemia and hypomagnesemia  · Currently stable on amiodarone 200 mg daily, mexiletene 150 mg Q8, Coreg 12 5 mg p o  B i d   · Medtronic ICD in place  · Magnesium and potassium levels improved    Acute on chronic combined systolic and diastolic congestive heart failure (HCC)  Assessment & Plan  Diuretics are on hold due to cardiorenal syndrome  Continue Coreg 12 5 mg b i d  With hold parameters  No Ace or Arb due to acute kidney injury    PAF (paroxysmal atrial fibrillation) ([facility])  Assessment & Plan  Rate is controlled on carvedilol and amiodarone  Hold warfarin tonight due to persistent supratherapeutic INR    Acute kidney injury superimposed on chronic kidney disease ([facility] [address] )  Assessment & Plan  CHRISTY on CKD 3b  Due to cardiorenal syndrome  Nephrology evaluation reviewed  Diuretics are on hold  Avoid hypotension      Goals of care, counseling/discussion  Assessment & Plan  · Discussed overall condition with patient and wife  · He does not have a living will  · We talked about his code status, he remains undecided  · He will talk to his wife and get back to me  Dizziness  Assessment & Plan  Neurology recommendations reviewed  Dizziness likely related to cardiomyopathy/arrhythmia  Low suspicion for stroke  No need for MRI      Transaminitis  Assessment & Plan  Presumed to be Secondary to alcohol/hepatic congestion  LFTs are  improving    Hyperlipidemia  Assessment & Plan  Continue statin       VTE Pharmacologic Prophylaxis:   Pharmacologic: Warfarin (Coumadin)  Mechanical VTE Prophylaxis in Place: No    Patient Centered Rounds: I have performed bedside rounds with nursing staff today  Discussions with Specialists or Other Care Team Provider:  Nephrology    Education and Discussions with Family / Patient: Yvonnerosannacholo Gallegos spoke with wife Lizabeth Noonan    Time Spent for Care: 30 minutes  More than 50% of total time spent on counseling and coordination of care as described above  Current Length of Stay: 6 day(s)    Current Patient Status: Inpatient   Certification Statement: The patient will continue to require additional inpatient hospital stay due to chf/rubina    Discharge Plan: to be determined    Code Status: Level 1 - Full Code      Subjective:   He refused labs this am due to ecchymosis of his arms and difficulty with blood draws  He changed his mind after we talked  We talked about his code status and he was not sure  He wanted to talk to his wife about it      Objective:     Vitals:   Temp (24hrs), Av 8 °F (36 6 °C), Min:96 9 °F (36 1 °C), Max:98 3 °F (36 8 °C)    Temp:  [96 9 °F (36 1 °C)-98 3 °F (36 8 °C)] 97 7 °F (36 5 °C)  HR:  [47-93] 93  Resp:  [18] 18  BP: ()/(54-86) 103/69  SpO2:  [91 %-98 %] 96 %  Body mass index is 23 47 kg/m²  Input and Output Summary (last 24 hours): Intake/Output Summary (Last 24 hours) at 2022 1557  Last data filed at 2022 1401  Gross per 24 hour   Intake 180 ml   Output 350 ml   Net -170 ml       Physical Exam:     Physical Exam  Constitutional:       Appearance: He is not ill-appearing or diaphoretic  HENT:      Head: Normocephalic and atraumatic  Nose: No rhinorrhea  Eyes:      General: No scleral icterus  Cardiovascular:      Rate and Rhythm: Regular rhythm  Heart sounds: No murmur heard  No gallop  Pulmonary:      Comments: Faint crackles at bases  Abdominal:      General: Abdomen is flat  Palpations: Abdomen is soft  Musculoskeletal:      Cervical back: Neck supple  Right lower leg: No edema  Left lower leg: No edema     Skin:     Comments: Ecchymosis of arms   Neurological:      Mental Status: He is alert and oriented to person, place, and time  Psychiatric:         Mood and Affect: Mood normal          Behavior: Behavior normal      Additional Data:     Labs:    Results from last 7 days   Lab Units 01/24/22  0514   WBC Thousand/uL 6 63   HEMOGLOBIN g/dL 11 6*   HEMATOCRIT % 33 8*   PLATELETS Thousands/uL 189   NEUTROS PCT % 68   LYMPHS PCT % 17   MONOS PCT % 11   EOS PCT % 2     Results from last 7 days   Lab Units 01/25/22  1020   SODIUM mmol/L 133*   POTASSIUM mmol/L 4 9   CHLORIDE mmol/L 96*   CO2 mmol/L 32   BUN mg/dL 23   CREATININE mg/dL 2 29*   ANION GAP mmol/L 5   CALCIUM mg/dL 8 2*   ALBUMIN g/dL 2 5*   TOTAL BILIRUBIN mg/dL 1 06*   ALK PHOS U/L 84   ALT U/L 136*   AST U/L 109*   GLUCOSE RANDOM mg/dL 120     Results from last 7 days   Lab Units 01/25/22  1020   INR  3 11*                       * I Have Reviewed All Lab Data Listed Above  * Additional Pertinent Lab Tests Reviewed: All Labs Within Last 24 Hours Reviewed    Imaging:    Imaging Reports Reviewed Today Include: echo with ef 12 %    Recent Cultures (last 7 days):           Last 24 Hours Medication List:   Current Facility-Administered Medications   Medication Dose Route Frequency Provider Last Rate    amiodarone  200 mg Oral Daily Nikhil Burton MD      atorvastatin  40 mg Oral Daily Nikhil Burton MD      carvedilol  12 5 mg Oral BID With Meals Nikhil Burton MD      magnesium oxide  400 mg Oral BID Kiet Yousif MD      melatonin  6 mg Oral HS PRN Carolina Chao PA-C      mexiletine  150 mg Oral [facility] Haile Islas DO          Today, Patient Was Seen By: Lexis Noonan MD    ** Please Note: Dictation voice to text software may have been used in the creation of this document   **

## 2022-01-25 NOTE — ASSESSMENT & PLAN NOTE
Diuretics are on hold due to cardiorenal syndrome  Continue Coreg 12 5 mg b i d   With hold parameters  No Ace or Arb due to acute kidney injury

## 2022-01-25 NOTE — ASSESSMENT & PLAN NOTE
CHRISTY on CKD 3b  Due to cardiorenal syndrome  Nephrology evaluation reviewed  Diuretics are on hold  Avoid hypotension

## 2022-01-25 NOTE — CONSULTS
Consultation - Nephrology   Lane Valdivia 80 y o  male MRN: [id number]  Unit/Bed#: E4 -01 Encounter: [id number]    ASSESSMENT/PLAN:  Acute kidney injury on CKD IIIB:  Initially suspected secondary to volume overload as well as hemodynamics contributing with hypotension and ventricular tachycardia, secondary rise suspected secondary to aggressive diuresis along with hemodynamics  -baseline creatinine 1 2-1 6 since 2019    -presents with creatinine of 1 96   -follows with Dr Sylvia Layne    -creatinine initially with improvement with secondary rise since 01/23/2022, currently 1 92   -received IV Lasix, increased to 80 mg IV b i d  on 01/23/2022  Discontinued on 01/24   -will check UA  -check bladder scan   -recommend avoiding nephrotoxins, hypotension, IV contrast   -I/O  Hemodynamics/hypertension:  Blood pressure low at times  -currently on Coreg 12 5 mg 2 times per day  -recommend avoiding hypotension or high fluctuations in blood pressure  Hypokalemia-hypomagnesemia: In the setting of diuretic use and alcohol abuse   -repeat Mag level 1 9, potassium borderline low at 3 5   -receiving potassium chloride 40 mEq's daily  Mild hyponatremia: suspect hypervolemia   -will place on fluid restriction  -will continue to monitor  If trending downward will check secondary workup   -diuretics on hold  Ventricular tachycardia/severe cardiomyopathy:  With Medtronic dual-chamber ICD, received shock x2  Underlying electrolyte derangements with hypokalemia and hypomagnesemia  Combined CHF:  Most recent echo showed EF of 12%   -cardiology team following   -home diuretic:  Lasix 40 mg alternating 20 mg every other day   -receiving IV diuretics increased to Lasix 80 mg IV b i d  On 01/23, last dose 01/24/2022  Diuretics currently on hold   -likely resume oral Lasix tomorrow   -chest x-ray shows mild pulmonary edema   -continue daily weights, fluid restriction, strict I/O  Dizziness: ? orthost  -neurology team following   -checking orthostatic BP's  + x1  Will repeat orthostatics today    -deferring stroke pathway  Alcohol use: reports one bourbon drinks per day   -transaminitis, trending LFTs  Other:  CAD, atrial fibrillation on warfarin, dyslipidemia, embolic CVA      HISTORY OF PRESENT ILLNESS:  Requesting Physician: Flip Lion Pe*  Reason for Consult:  Acute kidney injury on CKD    Breonna Aranda is a 80y o  year old male with past medical history of CKD stage IIIB, hypertension, CHF with ICD, ischemic cardiomyopathy, CVA, AFib on Coumadin, CAD, hyperlipidemia, who was admitted to [address] [facility] after presenting with complaints of feeling dizzy and lightheaded  He is unsure what he was doing while this was occurring but states that he stopped and sat down  He denies fevers or chills  He denies chest discomfort or shortness of breath  He denies nausea or vomiting  He reports having diarrhea  He states he recently started dieting along with his wife  A renal consultation is requested today for assistance in the management of acute kidney injury on CKD      PAST MEDICAL HISTORY:  Past Medical History:   Diagnosis Date    Anemia     Arthritis     Coronary artery disease     CVA [facility]cerebral vascular accident) ([facility] [address] )     Hyperlipidemia     Hypertension        PAST SURGICAL HISTORY:  Past Surgical History:   Procedure Laterality Date    CARDIAC PACEMAKER PLACEMENT  05/09/2011    Medtronic dual chamber ICD implant    CORONARY ANGIOPLASTY  1989       ALLERGIES:  No Known Allergies    SOCIAL HISTORY:  Social History     Substance and Sexual Activity   Alcohol Use Yes    Alcohol/week: 3 0 standard drinks    Types: 3 Shots of liquor per week    Comment: daily     Social History     Substance and Sexual Activity   Drug Use Never     Social History     Tobacco Use   Smoking Status Never Smoker   Smokeless Tobacco Never Used       FAMILY HISTORY:  Family History   Problem Relation Age of Onset    Coronary artery disease Mother     Heart attack Father         MI    Stroke Sister        MEDICATIONS:    Current Facility-Administered Medications:     amiodarone tablet 200 mg, 200 mg, Oral, Daily, Bhavesh Wong MD, 200 mg at 01/25/22 0920    atorvastatin (LIPITOR) tablet 40 mg, 40 mg, Oral, Daily, Bhavesh Wong MD, 40 mg at 01/25/22 0920    carvedilol (COREG) tablet 12 5 mg, 12 5 mg, Oral, BID With Meals, Bhavesh Wong MD, 12 5 mg at 01/24/22 1643    magnesium oxide (MAG-OX) tablet 400 mg, 400 mg, Oral, BID, Ann Marie Sargent MD, 400 mg at 01/25/22 0920    melatonin tablet 6 mg, 6 mg, Oral, HS PRN, Robbie Rodriguez PA-C, 6 mg at 01/24/22 2215    mexiletine (MEXITIL) capsule 150 mg, 150 mg, Oral, Q8H [address], Gwengrace Lloyd DO, 150 mg at 01/25/22 0510    potassium chloride oral solution 40 mEq, 40 mEq, Oral, Daily, Suraj Joyner MD, 40 mEq at 01/25/22 0920    REVIEW OF SYSTEMS:  A complete review of systems was performed and found to be negative unless otherwise noted in the history of present illness  General: No fevers, chills  Cardiovascular:  - chest pain, - leg edema  Respiratory: No cough, sputum production,  - shortness of breath  Gastrointestinal:  - nausea/vomiting,  - diarrhea,  - abdominal pain  Genitourinary: No hematuria  No foamy urine    No dysuria  Neuro: + dizzy     PHYSICAL EXAM:  Current Weight: Weight - Scale: 60 1 kg (132 lb 8 oz)  First Weight: Weight - Scale: 67 kg (147 lb 11 3 oz)  Vitals:    01/24/22 1928 01/24/22 2323 01/25/22 0600 01/25/22 0813   BP: 112/71 98/54  97/64   BP Location: Left arm Left arm  Right arm   Pulse: 73 (!) 47  (!) 52   Resp: 18 18  18   Temp: (!) 96 9 °F (36 1 °C) 97 8 °F (36 6 °C)  98 1 °F (36 7 °C)   TempSrc: Temporal Temporal  Temporal   SpO2: 96% 95%  98%   Weight:   60 1 kg (132 lb 8 oz)    Height:           Intake/Output Summary (Last 24 hours) at 1/25/2022 0932  Last data filed at 1/25/2022 0847  Gross per 24 hour   Intake --   Output 700 ml   Net -700 ml     General: NAD  Skin: warm, dry, intact, no rash  HEENT: Moist mucous membranes, sclera anicteric, normocephalic, atraumatic  Neck: No apparent JVD appreciated  Chest:lung sounds clear B/L, on O2  CVS:Regular rate and rhythm, no murmer   Abdomen: Soft, round, non-tender, +BS  Extremities: No B/L LE edema present  Neuro: alert and oriented  Psych: appropriate mood and affect     Invasive Devices:      Lab Results:   Results from last 7 days   Lab Units 01/24/22  0514 01/23/22  0503 01/22/22  0447 01/21/22  1444 01/21/22  0524 01/20/22  1952 01/20/22  0543 01/19/22  1410 01/19/22  0426   WBC Thousand/uL 6 63 6 72 7 81  --    < >   < > 4 77  --  5 98   HEMOGLOBIN g/dL 11 6* 10 8* 11 6*  --    < >   < > 9 9*  --  11 7*   HEMATOCRIT % 33 8* 32 3* 35 8*  --    < >   < > 29 5*  --  33 1*   PLATELETS Thousands/uL 189 189 204  --    < >   < > 181  --  196   SODIUM mmol/L 134* 133*  --  136  --    < > 140   < > 139   POTASSIUM mmol/L 3 5 3 8  --  4 9  --    < > 2 8*   < > 2 1*   CHLORIDE mmol/L 98* 100  --  104  --    < > 106   < > 97*   CO2 mmol/L 29 26  --  27  --    < > 25   < > 29   BUN mg/dL 20 22  --  14  --    < > 19   < > 23   CREATININE mg/dL 1 92* 1 80*  --  1 53*  --    < > 1 46*   < > 1 96*   CALCIUM mg/dL 7 7* 7 6*  --  7 6*  --    < > 6 9*   < > 8 0*   MAGNESIUM mg/dL 1 9 1 6 1 9  --    < >   < > 2 0   < > 1 3*   PHOSPHORUS mg/dL  --   --   --   --   --   --  1 9*  --   --    ALK PHOS U/L  --   --   --  70  --   --  52  --  64   ALT U/L  --   --   --  231*  --   --  207*  --  210*   AST U/L  --   --   --  201*  --   --  226*  --  235*    < > = values in this interval not displayed

## 2022-01-25 NOTE — ASSESSMENT & PLAN NOTE
Rate is controlled on carvedilol and amiodarone  Hold warfarin tonight due to persistent supratherapeutic INR

## 2022-01-26 PROBLEM — R53.1 ASTHENIA DUE TO DISEASE: Status: ACTIVE | Noted: 2022-01-01

## 2022-01-26 NOTE — PROGRESS NOTES
[address]  Progress Note Jesus Emery 10/31/1932, 80 y o  male MRN: [id number]  Unit/Bed#: E4 -01 Encounter: [id number]  Primary Care Provider: Stanislaw Sierra DO   Date and time admitted to hospital: 1/19/2022  4:03 AM    * Ventricular tachycardia (HCC)  Assessment & Plan  · Ventricular tachycardia in the setting of cardiomyopathy, hypokalemia and hypomagnesemia  · Currently stable on amiodarone 200 mg daily, mexiletene 150 mg Q8, Coreg 12 5 mg p o  B i d   · Medtronic ICD in place  · Potassium levels improved    Acute on chronic combined systolic and diastolic congestive heart failure [facility])  Assessment & Plan  Discussed with cardiology  He was given digoxin to try to improve his cardiac output  Diuretics are on hold due to cardiorenal syndrome  Continue Coreg 12 5 mg b i d  With hold parameters  No Ace or Arb due to acute kidney injury    PAF (paroxysmal atrial fibrillation) (HCC)  Assessment & Plan  Rate is controlled on carvedilol and amiodarone  INR is therapeutic now  Restart warfarin 2 mg daily    Acute kidney injury superimposed on chronic kidney disease ([facility] [address] )  Assessment & Plan  CHRISTY on CKD 3b  Due to cardiorenal syndrome  Renal function is better today  Diuretics remain on hold    Goals of care, counseling/discussion  Assessment & Plan  · He change his code status to DNR yesterday  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  Assessment & Plan  Multifactorial due to above  Deconditioned with limited endurance and dizziness on exertion  Re-consult PT OT  Unsure if he is in any shape to go home  Discussed the possibility of rehab placement, he was hesitant    Hyperlipidemia  Assessment & Plan  Continue Lipitor daily      VTE Pharmacologic Prophylaxis:   Pharmacologic: Warfarin (Coumadin)  Mechanical VTE Prophylaxis in Place: Yes    Patient Centered Rounds: I have performed bedside rounds with nursing staff today      Discussions with Specialists or Other Care Team Provider:  Cardiology    Education and Discussions with Family / Patient:  Spoke with wife Wilbert Goldman and gave update    Time Spent for Care: 20 minutes  More than 50% of total time spent on counseling and coordination of care as described above  Current Length of Stay: 7 day(s)    Current Patient Status: Inpatient   Certification Statement: The patient will continue to require additional inpatient hospital stay due to CHF/CHRISTY    Discharge Plan: To be determined    Code Status: Level 3 - DNAR and DNI      Subjective:   Denies shortness of breath but still on oxygen at 2 L    + weakness and fatigue  He has not ambulated much  Objective:     Vitals:   Temp (24hrs), Av 7 °F (36 5 °C), Min:97 °F (36 1 °C), Max:98 6 °F (37 °C)    Temp:  [97 °F (36 1 °C)-98 6 °F (37 °C)] 97 4 °F (36 3 °C)  HR:  [59-86] 73  Resp:  [18] 18  BP: ()/(59-78) 115/76  SpO2:  [91 %-99 %] 95 %  Body mass index is 23 2 kg/m²  Input and Output Summary (last 24 hours): Intake/Output Summary (Last 24 hours) at 2022 1659  Last data filed at 2022 1301  Gross per 24 hour   Intake --   Output 275 ml   Net -275 ml       Physical Exam:     Physical Exam  Constitutional:       Appearance: He is not ill-appearing or diaphoretic  HENT:      Head: Normocephalic and atraumatic  Nose: No rhinorrhea  Eyes:      General: No scleral icterus  Cardiovascular:      Rate and Rhythm: Regular rhythm  Heart sounds: No murmur heard  No gallop  Pulmonary:      Effort: Pulmonary effort is normal  No respiratory distress  Breath sounds: No wheezing or rales  Abdominal:      General: Abdomen is flat  There is no distension  Palpations: Abdomen is soft  Tenderness: There is no abdominal tenderness  Musculoskeletal:      Cervical back: Neck supple  Right lower leg: Edema present  Left lower leg: Edema present  Skin:     General: Skin is warm and dry     Neurological:      Mental Status: He is alert and oriented to person, place, and time  Psychiatric:         Mood and Affect: Mood normal          Behavior: Behavior normal        Additional Data:     Labs:    Results from last 7 days   Lab Units 01/26/22  0530   WBC Thousand/uL 6 31   HEMOGLOBIN g/dL 11 8*   HEMATOCRIT % 34 5*   PLATELETS Thousands/uL 215   NEUTROS PCT % 67   LYMPHS PCT % 18   MONOS PCT % 11   EOS PCT % 2     Results from last 7 days   Lab Units 01/26/22  0530   SODIUM mmol/L 135*   POTASSIUM mmol/L 4 4   CHLORIDE mmol/L 99*   CO2 mmol/L 26   BUN mg/dL 23   CREATININE mg/dL 1 99*   ANION GAP mmol/L 10   CALCIUM mg/dL 8 2*   ALBUMIN g/dL 2 2*   TOTAL BILIRUBIN mg/dL 0 96   ALK PHOS U/L 70   ALT U/L 109*   AST U/L 85*   GLUCOSE RANDOM mg/dL 83     Results from last 7 days   Lab Units 01/26/22  0530   INR  2 78*                       * I Have Reviewed All Lab Data Listed Above  * Additional Pertinent Lab Tests Reviewed: All Labs Within Last 24 Hours Reviewed    Imaging:    Imaging Reports Reviewed Today Include:  None      Recent Cultures (last 7 days):           Last 24 Hours Medication List:   Current Facility-Administered Medications   Medication Dose Route Frequency Provider Last Rate    amiodarone  200 mg Oral Daily Kush Trevino MD      atorvastatin  40 mg Oral Daily Kush Trevino MD      carvedilol  12 5 mg Oral BID With Meals Kush Trevino MD      magnesium oxide  400 mg Oral BID Sue Sevilla MD      melatonin  6 mg Oral HS PRN Lili Diaz PA-C      mexiletine  150 mg Oral [facility] James Molina DO      warfarin  2 mg Oral Daily (warfarin) Jodi Smith MD          Today, Patient Was Seen By: Jodi Smith MD    ** Please Note: Dictation voice to text software may have been used in the creation of this document   **

## 2022-01-26 NOTE — CONSULTS
Visited with patient concern about spiritual needs called wife to see if patient's parish  came to se him  Will continue to follow patient and support family   Sister Jeff Mccoy, [address] [place] [address] Road

## 2022-01-26 NOTE — CASE MANAGEMENT
Case Management Discharge Planning Note    Patient name Alex Hall  Location [address] [address] [address] [place] [address]-* MRN [id number]  : 10/31/1932 Date 2022       Current Admission Date: 2022  Current Admission Diagnosis:Ventricular tachycardia [facility])   Patient Active Problem List    Diagnosis Date Noted    Goals of care, counseling/discussion 2022    Dizziness 2022    Hypoxia 2022    Hypokalemia 2022    Hypomagnesemia 2022    Transaminitis 2022    Bilateral lower extremity edema 2021    Hyponatremia 12/10/2021    Acute on chronic combined systolic and diastolic congestive heart failure ([facility] [address] ) 10/23/2021    Ischemic cardiomyopathy 10/09/2018    PAF (paroxysmal atrial fibrillation) ([facility] [address] ) 10/09/2018    Essential hypertension 10/09/2018    Cardiac defibrillator in situ 2016    Acute kidney injury superimposed on chronic kidney disease ([facility] [address] )     Embolic stroke ([facility] [address] )     Hyperlipidemia 2016    Old myocardial infarction 2016    Status post percutaneous transluminal coronary angioplasty 2016    Ventricular tachycardia ([facility] [address] ) 2016    Cardiomyopathy ([facility] ) 2016      LOS (days): 7  Geometric Mean LOS (GMLOS) (days): 3 50  Days to GMLOS:-3 8     OBJECTIVE:  Risk of Unplanned Readmission Score: 20         Current admission status: Inpatient   Preferred Pharmacy:   [address] [place] [address]  Phone: [phone] Fax: [phone]    [address] [address] [facility] [address]  Phone: [phone] Fax: [phone]    Primary Care Provider: Chandana Neves DO    Primary Insurance: [facility] [employer]  Secondary Insurance:     DISCHARGE DETAILS:    Spoke with spouse Bethany Herzog about patient declining STR that is recommended by PT   Per Bethany Herzog, pt is OK to come home with her if that is what he wants  Spouse is also aware that we are continuing to monitor potential need for O2 at the home with D/C if he is not off of O2 by the time he is ready for D/C  Spouse OK with need for O2 in the home if it is determined necessary

## 2022-01-26 NOTE — PROGRESS NOTES
Cardiology Progress Note   MD Jyotsna Gomez MD Dane Fresh, DO, MD Lorrie Arnett DO, Doni Painter DO, [facility]  ----------------------------------------------------------------  [address] [place], [address] [facility] 80 y o  male MRN: [id number]  Unit/Bed#: E4 -01 Encounter: [id number]      ASSESSMENT:    Ventricular tachycardia with shock x2 in the setting of severe cardiomyopathy and superimposed hypokalemia and hypomagnesemia  o s/p Medtronic dual-chamber ICD   Hypokalemia/hypomagnesemia secondary to alcohol and diuretic use (predominantly related to alcohol use)   CAD w/ remote history of MI and angioplasty, 1989   Paroxysmal atrial fibrillation on warfarin   Chronic combined systolic and diastolic heart failure   Hypertension   Dyslipidemia   CKD stage IIIB   History of embolic CVA   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    PLAN:  Patient still requiring oxygen and dyspneic with bilateral airspace disease  Restart diuretics as per Nephrology  Will try to give digoxin 0 125 mg IV x1 with renal dysfunction to help with high inotropic support  Would not use Milrinone or dobutamine due to significant increased risk of ventricular arrhythmias and patient's current hospitalization for VT with shock  Continue mexiletine 150 mg q 8 hours  No room with blood pressure to add afterload reducers  Warfarin with goal INR of 2-3  Continue amiodarone, statin and carvedilol  If patient does not improved from a volume status standpoint and renal function does not improve despite the use of digoxin, poor prognosis    Signed: Niraj Jones DO, [facility], FACP      History of Present Illness:  Patient seen and examined  Denies chest pain, pressure, tightness or squeezing  Denies lightheadedness, dizziness or palpitations  Denies lower extremity swelling, orthopnea or paroxysmal nocturnal dyspnea    Admits to some dyspnea on exertion  Review of Systems:  Review of Systems   Constitutional: Negative for decreased appetite, fever, weight gain and weight loss  HENT: Negative for congestion and sore throat  Eyes: Negative for visual disturbance  Cardiovascular: Positive for dyspnea on exertion  Negative for chest pain, leg swelling, near-syncope and palpitations  Respiratory: Positive for shortness of breath  Negative for cough  Hematologic/Lymphatic: Negative for bleeding problem  Skin: Negative for rash  Musculoskeletal: Negative for myalgias and neck pain  Gastrointestinal: Negative for abdominal pain and nausea  Neurological: Negative for light-headedness and weakness  Psychiatric/Behavioral: Negative for depression  No Known Allergies    No current facility-administered medications on file prior to encounter       Current Outpatient Medications on File Prior to Encounter   Medication Sig    amiodarone 200 mg tablet Take 1 tablet (200 mg total) by mouth daily    atorvastatin (LIPITOR) 40 mg tablet Take 1 tablet (40 mg total) by mouth daily    carvedilol (COREG) 12 5 mg tablet Take 1 tablet (12 5 mg total) by mouth 2 (two) times a day with meals    furosemide (LASIX) 40 mg tablet One tablet (40mg) every other day, alternating with 1/2 tablet (20mg) every other day    mexiletine (MEXITIL) 150 mg capsule Take 1 capsule (150 mg total) by mouth 3 (three) times a day    warfarin (COUMADIN) 2 mg tablet Take 1 tablet daily or as directed by physician    Cholecalciferol (VITAMIN D3) 50 MCG (2000 UT) capsule Take 2,000 Units by mouth daily      Glucosamine Sulfate 1000 MG CAPS 1 capsule Every 12 hours        Current Facility-Administered Medications   Medication Dose Route Frequency Provider Last Rate    amiodarone  200 mg Oral Daily Sherry Meehan MD      atorvastatin  40 mg Oral Daily Sherry Meehan MD      carvedilol  12 5 mg Oral BID With Meals Sherry Meehan MD      digoxin  125 mcg Intravenous Once Chucky Parada DO      magnesium oxide  400 mg Oral BID Nora Doran MD      melatonin  6 mg Oral HS PRN Quinn Matias PA-C      mexiletine  150 mg Oral [facility] Chucky TalDO              Vitals:    01/26/22 0300 01/26/22 0543 01/26/22 0736 01/26/22 1110   BP: 140/59  118/78 109/73   BP Location: Right arm  Right arm Right arm   Pulse: 64  60 86   Resp: 18  18 18   Temp: 98 6 °F (37 °C)  97 9 °F (36 6 °C) (!) 97 °F (36 1 °C)   TempSrc: Temporal  Temporal Temporal   SpO2: 96%  98% 99%   Weight:  59 4 kg (130 lb 15 3 oz)     Height:             Intake/Output Summary (Last 24 hours) at 1/26/2022 1231  Last data filed at 1/26/2022 1101  Gross per 24 hour   Intake 180 ml   Output 200 ml   Net -20 ml       Weight change: -0 702 kg (-1 lb 8 8 oz)    PHYSICAL EXAMINATION:  Gen: Awake, Alert, NAD  Head/eyes: AT/NC, pupils equal and round, Anicteric  ENT: mmm  Neck: Supple, No elevated JVP, trachea midline  Resp:  Decreased breath sounds bilaterally  CV: RRR +S1, S2, No m/r/g  Abd: Soft, NT/ND + BS  Ext: no LE edema bilaterally  Neuro: Follows commands, moves all extermities  Psych: Appropriate affect, normal mood, pleasant attitude, non-combative  Skin: warm; no rash, erythema or venous stasis changes on exposed skin    Lab Results:  Results from last 7 days   Lab Units 01/26/22  0530   WBC Thousand/uL 6 31   HEMOGLOBIN g/dL 11 8*   HEMATOCRIT % 34 5*   PLATELETS Thousands/uL 215     Results from last 7 days   Lab Units 01/26/22  0530   POTASSIUM mmol/L 4 4   CHLORIDE mmol/L 99*   CO2 mmol/L 26   BUN mg/dL 23   CREATININE mg/dL 1 99*   CALCIUM mg/dL 8 2*   ALK PHOS U/L 70   ALT U/L 109*   AST U/L 85*     No results found for: TROPONINT          Results from last 7 days   Lab Units 01/26/22  0530   INR  2 78*       Tele:  V paced rhythm    This note was completed in part utilizing M-Modal Fluency Direct Software    Grammatical errors, random word insertions, spelling mistakes, and incomplete sentences may be an occasional consequence of this system secondary to software limitations, ambient noise, and hardware issues  If you have any questions or concerns about the content, text, or information contained within the body of this dictation, please contact the provider for clarification

## 2022-01-26 NOTE — PROGRESS NOTES
01/26/22 1300   Clinical Encounter Type   Visited With Patient   Routine Visit Introduction   Continue Visiting Yes

## 2022-01-26 NOTE — PROGRESS NOTES
NEPHROLOGY PROGRESS NOTE   Ilia Connell 80 y o  male MRN: [id number]  Unit/Bed#: E4 -01 Encounter: [id number]        ASSESSMENT & PLAN    1  Acute kidney injury on stage IIIB chronic kidney disease-baseline creatinine 1 2-1 6 creatinine now 2 3 with diuresis-diuresis on hold creatinine slightly better a 1 9     2  Volume overloaded with elevated LFT in the setting of ventricular tachycardia and severe cardiomyopathy--cardiology notes reviewed I inotropic support with digoxin will hold diuretics as creatinine improved, weight is stable blood pressure slightly better     3  Hypokalemia-potassium is currently acceptable    4  Hypotension in the setting of cardiomyopathy-map is acceptable      Given advanced cardiomyopathy long-term goals of care should be had    SUBJECTIVE:    Patient was seen today  Sitting up,  No chest pain or shortness of breath    Review of Systems   Constitutional: Negative  HENT: Negative  Eyes: Negative  Respiratory: Negative  Cardiovascular: Negative  Gastrointestinal: Negative  Endocrine: Negative  Genitourinary: Negative  Musculoskeletal: Negative  Skin: Negative  Allergic/Immunologic: Negative  Neurological: Negative  Hematological: Negative  Psychiatric/Behavioral: Negative  All other systems reviewed and are negative        Medications:    Current Facility-Administered Medications:     amiodarone tablet 200 mg, 200 mg, Oral, Daily, Sherry Meehan MD, 200 mg at 01/26/22 0806    atorvastatin (LIPITOR) tablet 40 mg, 40 mg, Oral, Daily, Sherry Meehan MD, 40 mg at 01/26/22 0806    carvedilol (COREG) tablet 12 5 mg, 12 5 mg, Oral, BID With Meals, Sherry Meehan MD, 12 5 mg at 01/26/22 0806    magnesium oxide (MAG-OX) tablet 400 mg, 400 mg, Oral, BID, Glen Dubin, MD, 400 mg at 01/26/22 0806    melatonin tablet 6 mg, 6 mg, Oral, HS PRN, ASHLEY Gonzales, 6 mg at 01/24/22 2215    mexiletine (MEXITIL) capsule 150 mg, 150 mg, Oral, Q8H [address], Divine Szymanski DO, 150 mg at 01/26/22 1306    OBJECTIVE:      /73 (BP Location: Right arm)   Pulse 86   Temp (!) 97 °F (36 1 °C) (Temporal)   Resp 18   Ht 5' 3" (1 6 m)   Wt 59 4 kg (130 lb 15 3 oz)   SpO2 93%   BMI 23 20 kg/m²  Body mass index is 23 2 kg/m²  Physical exam:  Physical Exam  Vitals and nursing note reviewed  Constitutional:       Appearance: He is normal weight  HENT:      Head: Normocephalic and atraumatic  Nose: Nose normal       Mouth/Throat:      Mouth: Mucous membranes are dry  Pharynx: Oropharynx is clear  Cardiovascular:      Rate and Rhythm: Normal rate  Pulmonary:      Effort: Pulmonary effort is normal  No respiratory distress  Breath sounds: Normal breath sounds  Musculoskeletal:         General: Normal range of motion  Cervical back: Normal range of motion  Skin:     General: Skin is warm and dry  Neurological:      General: No focal deficit present  Mental Status: He is alert  Psychiatric:         Mood and Affect: Mood normal          Behavior: Behavior normal          Thought Content:  Thought content normal            Invasive Devices:      Lab Results:   Results from last 7 days   Lab Units 01/26/22  0530 01/25/22  1438 01/25/22  1020 01/24/22  0514 01/23/22  0503 01/22/22  0447 01/21/22  1444 01/21/22  0524 01/21/22  0524 01/20/22  1952 01/20/22  0543   WBC Thousand/uL 6 31  --   --  6 63 6 72 7 81  --   --  5 99  --  4 77   HEMOGLOBIN g/dL 11 8*  --   --  11 6* 10 8* 11 6*  --   --  11 3*  --  9 9*   HEMATOCRIT % 34 5*  --   --  33 8* 32 3* 35 8*  --   --  34 2*  --  29 5*   PLATELETS Thousands/uL 215  --   --  189 189 204  --   --  206  --  181   POTASSIUM mmol/L 4 4  --  4 9 3 5 3 8  --  4 9   < > 3 7   < > 2 8*   CHLORIDE mmol/L 99*  --  96* 98* 100  --  104   < > 105   < > 106   CO2 mmol/L 26  --  32 29 26  --  27   < > 24   < > 25   BUN mg/dL 23  --  23 20 22  --  14   < > 17   < > 19   CREATININE mg/dL 1 99*  --  2 29* 1 92* 1 80*  --  1 53*   < > 1 42*   < > 1 46*   CALCIUM mg/dL 8 2*  --  8 2* 7 7* 7 6*  --  7 6*   < > 7 5*   < > 6 9*   MAGNESIUM mg/dL  --   --   --  1 9 1 6 1 9  --   --  1 7  --  2 0   PHOSPHORUS mg/dL  --   --   --   --   --   --   --   --   --   --  1 9*   ALK PHOS U/L 70  --  84  --   --   --  70  --   --   --  52   ALT U/L 109*  --  136*  --   --   --  231*  --   --   --  207*   AST U/L 85*  --  109*  --   --   --  201*  --   --   --  226*   LEUKOCYTES UA   --  Negative  --   --   --   --   --   --   --   --   --    BLOOD UA   --  Trace-Intact*  --   --   --   --   --   --   --   --   --     < > = values in this interval not displayed  Portions of the record may have been created with voice recognition software  Occasional wrong word or "sound a like" substitutions may have occurred due to the inherent limitations of voice recognition software  Read the chart carefully and recognize, using context, where substitutions have occurred  If you have any questions, please contact the dictating provider

## 2022-01-26 NOTE — CASE MANAGEMENT
Case Management Discharge Planning Note    Patient name Juanjose Clifton  Location [address] [place] [address] [address] [place] 27-* MRN [id number]  : 10/31/1932 Date 2022       Current Admission Date: 2022  Current Admission Diagnosis:Ventricular tachycardia [facility])   Patient Active Problem List    Diagnosis Date Noted    Goals of care, counseling/discussion 2022    Dizziness 2022    Hypoxia 2022    Hypokalemia 2022    Hypomagnesemia 2022    Transaminitis 2022    Bilateral lower extremity edema 2021    Hyponatremia 12/10/2021    Acute on chronic combined systolic and diastolic congestive heart failure ([facility] [address] ) 10/23/2021    Ischemic cardiomyopathy 10/09/2018    [facility] 10/09/2018    Essential hypertension 10/09/2018    Cardiac defibrillator in situ 2016    Acute kidney injury superimposed on chronic kidney disease ([facility] [place] [facility] [address] )     [facility]olic stroke ([facility]     Hyperlipidemia 2016    Old myocardial infarction 2016    Status post percutaneous transluminal coronary angioplasty 2016    Ventricular tachycardia ([facility] ) 2016    Cardiomyopathy ([facility] ) 2016      LOS (days): 7  Geometric Mean LOS (GMLOS) (days): 3 50  Days to GMLOS:-3 7     OBJECTIVE:  Risk of Unplanned Readmission Score: 20         Current admission status: Inpatient   Preferred Pharmacy:   [address] [place] [address]  Phone: [phone] Fax: [phone] [address] [place] [address]  [address] [place] [address]  Phone: [phone] Fax: [phone]    Primary Care Provider: Sixto Luciano DO    Primary Insurance: [facility] [employer]  Secondary Insurance:     DISCHARGE DETAILS:    Discharge planning discussed with[de-identified] pt  Freedom of Choice: Yes  Comments - Freedom of Choice: V tach, pulm edema   2L O2  CHRISTY, CHF  PT recomm IP rehab  Pt refuses IP rehab  Reports he wants to go home and doesn't want Rehab or VNA  If pt on O2 for D/C needs HOME O2 EVAL and O2 set up        Were Treatment Team discharge recommendations reviewed with patient/caregiver?: Yes  Did patient/caregiver verbalize understanding of patient care needs?: Yes  Were patient/caregiver advised of the risks associated with not following Treatment Team discharge recommendations?: Yes         [address]         Is the patient interested in [facility] at discharge?: No    DME Referral Provided  Referral made for DME?: No         Would you like to participate in our [address] service program?  : No - Declined    Treatment Team Recommendation: Short Term Rehab  Discharge Destination Plan[de-identified] Home

## 2022-01-26 NOTE — ASSESSMENT & PLAN NOTE
Rate is controlled on carvedilol and amiodarone  INR is therapeutic now    Restart warfarin 2 mg daily

## 2022-01-26 NOTE — ASSESSMENT & PLAN NOTE
Discussed with cardiology  He was given digoxin to try to improve his cardiac output  Diuretics are on hold due to cardiorenal syndrome  Continue Coreg 12 5 mg b i d   With hold parameters  No Ace or Arb due to acute kidney injury

## 2022-01-26 NOTE — ASSESSMENT & PLAN NOTE
· Ventricular tachycardia in the setting of cardiomyopathy, hypokalemia and hypomagnesemia  · Currently stable on amiodarone 200 mg daily, mexiletene 150 mg Q8, Coreg 12 5 mg p o  B i d   · Medtronic ICD in place    · Potassium levels improved

## 2022-01-26 NOTE — PLAN OF CARE
Problem: Potential for Falls  Goal: Patient will remain free of falls  Description: INTERVENTIONS:  - Educate patient/family on patient safety including physical limitations  - Instruct patient to call for assistance with activity   - Consult OT/PT to assist with strengthening/mobility   - Keep Call bell within reach  - Keep bed low and locked with side rails adjusted as appropriate  - Keep care items and personal belongings within reach  - Initiate and maintain comfort rounds  - Make Fall Risk Sign visible to staff  - Offer Toileting every 3 Hours, in advance of need  - Initiate/Maintain bed alarm  - Obtain necessary fall risk management equipment  - Apply yellow socks and bracelet for high fall risk patients  - Consider moving patient to room near nurses station  Outcome: Progressing     Problem: MOBILITY - ADULT  Goal: Maintain or return to baseline ADL function  Description: INTERVENTIONS:  -  Assess patient's ability to carry out ADLs; assess patient's baseline for ADL function and identify physical deficits which impact ability to perform ADLs (bathing, care of mouth/teeth, toileting, grooming, dressing, etc )  - Assess/evaluate cause of self-care deficits   - Assess range of motion  - Assess patient's mobility; develop plan if impaired  - Assess patient's need for assistive devices and provide as appropriate  - Encourage maximum independence but intervene and supervise when necessary  - Involve family in performance of ADLs  - Assess for home care needs following discharge   - Consider OT consult to assist with ADL evaluation and planning for discharge  - Provide patient education as appropriate  Outcome: Progressing  Goal: Maintains/Returns to pre admission functional level  Description: INTERVENTIONS:  - Perform BMAT or MOVE assessment daily    - Set and communicate daily mobility goal to care team and patient/family/caregiver     - Collaborate with rehabilitation services on mobility goals if consulted  - Perform Range of Motion 3 times a day  - Reposition patient every 3 hours    - Dangle patient 3 times a day  - Stand patient 3 times a day  - Ambulate patient 3 times a day  - Out of bed to chair 3 times a day   - Out of bed for meals 3 times a day  - Out of bed for toileting  - Record patient progress and toleration of activity level   Outcome: Progressing     Problem: PAIN - ADULT  Goal: Verbalizes/displays adequate comfort level or baseline comfort level  Description: Interventions:  - Encourage patient to monitor pain and request assistance  - Assess pain using appropriate pain scale  - Administer analgesics based on type and severity of pain and evaluate response  - Implement non-pharmacological measures as appropriate and evaluate response  - Consider cultural and social influences on pain and pain management  - Notify physician/advanced practitioner if interventions unsuccessful or patient reports new pain  Outcome: Progressing     Problem: INFECTION - ADULT  Goal: Absence or prevention of progression during hospitalization  Description: INTERVENTIONS:  - Assess and monitor for signs and symptoms of infection  - Monitor lab/diagnostic results  - Monitor all insertion sites, i e  indwelling lines, tubes, and drains  - Monitor endotracheal if appropriate and nasal secretions for changes in amount and color  - [place] appropriate cooling/warming therapies per order  - Administer medications as ordered  - Instruct and encourage patient and family to use good hand hygiene technique  - Identify and instruct in appropriate isolation precautions for identified infection/condition  Outcome: Progressing  Goal: Absence of fever/infection during neutropenic period  Description: INTERVENTIONS:  - Monitor WBC    Outcome: Progressing     Problem: SAFETY ADULT  Goal: Patient will remain free of falls  Description: INTERVENTIONS:  - Educate patient/family on patient safety including physical limitations  - Instruct patient to call for assistance with activity   - Consult OT/PT to assist with strengthening/mobility   - Keep Call bell within reach  - Keep bed low and locked with side rails adjusted as appropriate  - Keep care items and personal belongings within reach  - Initiate and maintain comfort rounds  - Make Fall Risk Sign visible to staff  - Offer Toileting every 3 Hours, in advance of need  - Initiate/Maintain bed alarm  - Obtain necessary fall risk management equipment  - Apply yellow socks and bracelet for high fall risk patients  - Consider moving patient to room near nurses station  Outcome: Progressing  Goal: Maintain or return to baseline ADL function  Description: INTERVENTIONS:  -  Assess patient's ability to carry out ADLs; assess patient's baseline for ADL function and identify physical deficits which impact ability to perform ADLs (bathing, care of mouth/teeth, toileting, grooming, dressing, etc )  - Assess/evaluate cause of self-care deficits   - Assess range of motion  - Assess patient's mobility; develop plan if impaired  - Assess patient's need for assistive devices and provide as appropriate  - Encourage maximum independence but intervene and supervise when necessary  - Involve family in performance of ADLs  - Assess for home care needs following discharge   - Consider OT consult to assist with ADL evaluation and planning for discharge  - Provide patient education as appropriate  Outcome: Progressing  Goal: Maintains/Returns to pre admission functional level  Description: INTERVENTIONS:  - Perform BMAT or MOVE assessment daily    - Set and communicate daily mobility goal to care team and patient/family/caregiver  - Collaborate with rehabilitation services on mobility goals if consulted  - Perform Range of Motion 3 times a day  - Reposition patient every 3 hours    - Dangle patient 3 times a day  - Stand patient 3 times a day  - Ambulate patient 3 times a day  - Out of bed to chair 3 times a day   - Out of bed for meals 3 times a day  - Out of bed for toileting  - Record patient progress and toleration of activity level   Outcome: Progressing     Problem: DISCHARGE PLANNING  Goal: Discharge to home or other facility with appropriate resources  Description: INTERVENTIONS:  - Identify barriers to discharge w/patient and caregiver  - Arrange for needed discharge resources and transportation as appropriate  - Identify discharge learning needs (meds, wound care, etc )  - Arrange for interpretive services to assist at discharge as needed  - Refer to Case Management Department for coordinating discharge planning if the patient needs post-hospital services based on physician/advanced practitioner order or complex needs related to functional status, cognitive ability, or social support system  Outcome: Progressing     Problem: Knowledge Deficit  Goal: Patient/family/caregiver demonstrates understanding of disease process, treatment plan, medications, and discharge instructions  Description: Complete learning assessment and assess knowledge base    Interventions:  - Provide teaching at level of understanding  - Provide teaching via preferred learning methods  Outcome: Progressing

## 2022-01-26 NOTE — ASSESSMENT & PLAN NOTE
Multifactorial due to above  Deconditioned with limited endurance and dizziness on exertion  Re-consult PT OT  Unsure if he is in any shape to go home    Discussed the possibility of rehab placement, he was hesitant

## 2022-01-26 NOTE — PROGRESS NOTES
Pastoral Care Progress Note    2022  Patient: Misael Wolfe : 10/31/1932  Admission Date & Time: 2022 0403  MRN: [id number] [facility]: [id number]           Consult for patient, visited with patient was confused at times  Called wife to see if  from his parish called to see patient will continue to follow and support family

## 2022-01-26 NOTE — ASSESSMENT & PLAN NOTE
CHRISTY on CKD 3b  Due to cardiorenal syndrome  Renal function is better today    Diuretics remain on hold

## 2022-01-26 NOTE — PLAN OF CARE
Problem: Potential for Falls  Goal: Patient will remain free of falls  Description: INTERVENTIONS:  - Educate patient/family on patient safety including physical limitations  - Instruct patient to call for assistance with activity   - Consult OT/PT to assist with strengthening/mobility   - Keep Call bell within reach  - Keep bed low and locked with side rails adjusted as appropriate  - Keep care items and personal belongings within reach  - Initiate and maintain comfort rounds  - Make Fall Risk Sign visible to staff  - Offer Toileting every 2 Hours, in advance of need  - Initiate/Maintain bed alarm  - Obtain necessary fall risk management equipment: alarms  - Apply yellow socks and bracelet for high fall risk patients  - Consider moving patient to room near nurses station  Outcome: Progressing     Problem: MOBILITY - ADULT  Goal: Maintain or return to baseline ADL function  Description: INTERVENTIONS:  -  Assess patient's ability to carry out ADLs; assess patient's baseline for ADL function and identify physical deficits which impact ability to perform ADLs (bathing, care of mouth/teeth, toileting, grooming, dressing, etc )  - Assess/evaluate cause of self-care deficits   - Assess range of motion  - Assess patient's mobility; develop plan if impaired  - Assess patient's need for assistive devices and provide as appropriate  - Encourage maximum independence but intervene and supervise when necessary  - Involve family in performance of ADLs  - Assess for home care needs following discharge   - Consider OT consult to assist with ADL evaluation and planning for discharge  - Provide patient education as appropriate  Outcome: Progressing  Goal: Maintains/Returns to pre admission functional level  Description: INTERVENTIONS:  - Perform BMAT or MOVE assessment daily    - Set and communicate daily mobility goal to care team and patient/family/caregiver     - Collaborate with rehabilitation services on mobility goals if consulted  - Perform Range of Motion 3 times a day  - Reposition patient every 2 hours    - Dangle patient 3 times a day  - Stand patient 3 times a day  - Ambulate patient 3 times a day  - Out of bed to chair 3 times a day   - Out of bed for meals 3 times a day  - Out of bed for toileting  - Record patient progress and toleration of activity level   Outcome: Progressing     Problem: PAIN - ADULT  Goal: Verbalizes/displays adequate comfort level or baseline comfort level  Description: Interventions:  - Encourage patient to monitor pain and request assistance  - Assess pain using appropriate pain scale  - Administer analgesics based on type and severity of pain and evaluate response  - Implement non-pharmacological measures as appropriate and evaluate response  - Consider cultural and social influences on pain and pain management  - Notify physician/advanced practitioner if interventions unsuccessful or patient reports new pain  Outcome: Progressing     Problem: INFECTION - ADULT  Goal: Absence or prevention of progression during hospitalization  Description: INTERVENTIONS:  - Assess and monitor for signs and symptoms of infection  - Monitor lab/diagnostic results  - Monitor all insertion sites, i e  indwelling lines, tubes, and drains  - Monitor endotracheal if appropriate and nasal secretions for changes in amount and color  - [place] appropriate cooling/warming therapies per order  - Administer medications as ordered  - Instruct and encourage patient and family to use good hand hygiene technique  - Identify and instruct in appropriate isolation precautions for identified infection/condition  Outcome: Progressing  Goal: Absence of fever/infection during neutropenic period  Description: INTERVENTIONS:  - Monitor WBC    Outcome: Progressing     Problem: SAFETY ADULT  Goal: Patient will remain free of falls  Description: INTERVENTIONS:  - Educate patient/family on patient safety including physical limitations  - Instruct patient to call for assistance with activity   - Consult OT/PT to assist with strengthening/mobility   - Keep Call bell within reach  - Keep bed low and locked with side rails adjusted as appropriate  - Keep care items and personal belongings within reach  - Initiate and maintain comfort rounds  - Make Fall Risk Sign visible to staff  - Offer Toileting every 2 Hours, in advance of need  - Initiate/Maintain bed alarm  - Obtain necessary fall risk management equipment: alarms  - Apply yellow socks and bracelet for high fall risk patients  - Consider moving patient to room near nurses station  Outcome: Progressing  Goal: Maintain or return to baseline ADL function  Description: INTERVENTIONS:  -  Assess patient's ability to carry out ADLs; assess patient's baseline for ADL function and identify physical deficits which impact ability to perform ADLs (bathing, care of mouth/teeth, toileting, grooming, dressing, etc )  - Assess/evaluate cause of self-care deficits   - Assess range of motion  - Assess patient's mobility; develop plan if impaired  - Assess patient's need for assistive devices and provide as appropriate  - Encourage maximum independence but intervene and supervise when necessary  - Involve family in performance of ADLs  - Assess for home care needs following discharge   - Consider OT consult to assist with ADL evaluation and planning for discharge  - Provide patient education as appropriate  Outcome: Progressing  Goal: Maintains/Returns to pre admission functional level  Description: INTERVENTIONS:  - Perform BMAT or MOVE assessment daily    - Set and communicate daily mobility goal to care team and patient/family/caregiver  - Collaborate with rehabilitation services on mobility goals if consulted  - Perform Range of Motion 3 times a day  - Reposition patient every 2 hours    - Dangle patient 3 times a day  - Stand patient 3 times a day  - Ambulate patient 3 times a day  - Out of bed to chair 3 times a day   - Out of bed for meals 3 times a day  - Out of bed for toileting  - Record patient progress and toleration of activity level   Outcome: Progressing     Problem: DISCHARGE PLANNING  Goal: Discharge to home or other facility with appropriate resources  Description: INTERVENTIONS:  - Identify barriers to discharge w/patient and caregiver  - Arrange for needed discharge resources and transportation as appropriate  - Identify discharge learning needs (meds, wound care, etc )  - Arrange for interpretive services to assist at discharge as needed  - Refer to Case Management Department for coordinating discharge planning if the patient needs post-hospital services based on physician/advanced practitioner order or complex needs related to functional status, cognitive ability, or social support system  Outcome: Progressing     Problem: Knowledge Deficit  Goal: Patient/family/caregiver demonstrates understanding of disease process, treatment plan, medications, and discharge instructions  Description: Complete learning assessment and assess knowledge base    Interventions:  - Provide teaching at level of understanding  - Provide teaching via preferred learning methods  Outcome: Progressing

## 2022-01-27 NOTE — ASSESSMENT & PLAN NOTE
CHRISTY on CKD 3b  Due to cardiorenal syndrome  Renal function still has stabilized  Diuretics remain on hold    Possible reinstitution of diuretics in 24 hours per renal

## 2022-01-27 NOTE — PROGRESS NOTES
NEPHROLOGY PROGRESS NOTE   Mark Torres 80 y o  male MRN: [id number]  Unit/Bed#: E4 -01 Encounter: [id number]  Reason for Consult: CHRISTY on CKD IIIB    ASSESSMENT/PLAN:  Acute kidney injury on CKD IIIB:  Initially suspected secondary to volume overload as well as hemodynamics contributing with hypotension and ventricular tachycardia, secondary rise suspected secondary to aggressive diuresis along with hemodynamics  -baseline creatinine 1 2-1 6 since 2019    -presents with creatinine of 1 96   -creatinine fluctuating between 1 8-2 0    -follows with Dr Benita Masters    -creatinine initially with improvement with secondary rise since 01/23/2022   -received IV Lasix  Discontinued on 01/24 and remains on hold  Will start Torsemide 10 mg po daily  -UA:  Trace blood, 1-2 RBCs/WBCs/hyaline cast   -bladder scan negative  -recommend avoiding nephrotoxins, hypotension, IV contrast   -I/O      Hemodynamics/hypertension:  Blood pressure overall acceptable, low at times  -currently on Coreg 12 5 mg 2 times per day  -recommend avoiding hypotension or high fluctuations in blood pressure      Hypokalemia-hypomagnesemia: In the setting of diuretic use and alcohol abuse   -repeat Mag level 1 9, potassium borderline low at 3 5   -receiving potassium chloride 40 mEq's daily      Mild hyponatremia: suspect hypervolemia   -continue fluid restriction  Reduced to 1 2 L    -diuretics on hold  -consider checking secondary workup if trending downward      Ventricular tachycardia:  With Medtronic dual-chamber ICD, received shock x2  Underlying electrolyte derangements with hypokalemia and hypomagnesemia      Combined CHF/cardiomyopathy:   in the setting of ventricular tachycardia, required shock x2  Most recent echo showed EF of 12%   -cardiology team following   -home diuretic:  Lasix 40 mg alternating 20 mg every other day   -receiving IV diuretics increased to Lasix 80 mg IV b i d  On 01/23, last dose 01/24/2022  -diuretics currently on hold  Will start Torsemide 10 mg po daily  -started on digoxin to assist with ionotropic support  Avoiding Milrinone and dobutamine due to risk for ventricular arrhythmias   -chest x-ray shows mild pulmonary edema   -continue daily weights, fluid restriction, strict I/O   -will need long-term goals of care discussion      Dizziness:  -neurology team following   -checking orthostatic BP's  + x1   -deferring stroke pathway  -PT/OT following      Alcohol use: reports one bourbon drinks per day   -transaminitis, trending LFTs      Other:  CAD, atrial fibrillation on warfarin, dyslipidemia, embolic CVA    Disposition:  Awaiting medical clearance  SUBJECTIVE:  The patient is resting in his bed  He appears to be comfortable  He is currently on room air  He denies chest discomfort or shortness of breath  He reports eating and drinking      OBJECTIVE:  Current Weight: Weight - Scale: 59 8 kg (131 lb 13 4 oz)  Vitals:    01/27/22 0316 01/27/22 0515 01/27/22 0600 01/27/22 0700   BP: 118/78   122/91   BP Location: Right arm   Right arm   Pulse: 83 84  84   Resp: 18   18   Temp: 99 4 °F (37 4 °C)   97 7 °F (36 5 °C)   TempSrc: Temporal   Temporal   SpO2: 95%   97%   Weight:   59 8 kg (131 lb 13 4 oz)    Height:           Intake/Output Summary (Last 24 hours) at 1/27/2022 1009  Last data filed at 1/27/2022 0701  Gross per 24 hour   Intake 480 ml   Output 675 ml   Net -195 ml     General: NAD  Skin: warm, dry, intact, no rash  HEENT: Moist mucous membranes, sclera anicteric, normocephalic, atraumatic  Neck: No apparent JVD appreciated  Chest: lung sounds clear B/L, on RA   CVS:Regular rate and rhythm, no murmer   Abdomen: Soft, round, non-tender, +BS  Extremities: B/L LE edema present  Neuro: alert and oriented  Psych: appropriate mood and affect     Medications:    Current Facility-Administered Medications:     amiodarone tablet 200 mg, 200 mg, Oral, Daily, Oscar Interiano MD, 200 mg at 01/27/22 6319    atorvastatin (LIPITOR) tablet 40 mg, 40 mg, Oral, Daily, Valente Bernal MD, 40 mg at 01/27/22 0822    carvedilol (COREG) tablet 12 5 mg, 12 5 mg, Oral, BID With Meals, Valente Bernal MD, 12 5 mg at 01/27/22 0822    magnesium oxide (MAG-OX) tablet 400 mg, 400 mg, Oral, BID, Herber Jensen MD, 400 mg at 01/27/22 4659    melatonin tablet 6 mg, 6 mg, Oral, HS PRN, Alena Child PA-C, 6 mg at 01/24/22 2215    mexiletine (MEXITIL) capsule 150 mg, 150 mg, Oral, Q8H [facility], 150 mg at 01/27/22 6632    warfarin (COUMADIN) tablet 2 mg, 2 mg, Oral, Daily (warfarin), Benedicto Meadows MD, 2 mg at 01/26/22 1723    Laboratory Results:  Results from last 7 days   Lab Units 01/27/22  0854 01/26/22  0530 01/25/22  1020 01/24/22  0514 01/24/22  0514 01/23/22  0503 01/23/22  0503 01/22/22  0447 01/22/22  0447 01/21/22  1444 01/21/22  0524   WBC Thousand/uL  --  6 31  --   --  6 63  --  6 72   < > 7 81  --    < >   HEMOGLOBIN g/dL  --  11 8*  --   --  11 6*  --  10 8*   < > 11 6*  --    < >   HEMATOCRIT %  --  34 5*  --   --  33 8*  --  32 3*   < > 35 8*  --    < >   PLATELETS Thousands/uL  --  215  --   --  189  --  189   < > 204  --    < >   SODIUM mmol/L 132* 135* 133*   < > 134*   < > 133*  --   --  136   < >   POTASSIUM mmol/L 4 3 4 4 4 9   < > 3 5   < > 3 8  --   --  4 9   < >   CHLORIDE mmol/L 98* 99* 96*   < > 98*   < > 100  --   --  104   < >   CO2 mmol/L 29 26 32   < > 29   < > 26  --   --  27   < >   BUN mg/dL 23 23 23   < > 20   < > 22  --   --  14   < >   CREATININE mg/dL 2 01* 1 99* 2 29*   < > 1 92*   < > 1 80*  --   --  1 53*   < >   CALCIUM mg/dL 8 0* 8 2* 8 2*   < > 7 7*   < > 7 6*  --   --  7 6*   < >   MAGNESIUM mg/dL  --   --   --   --  1 9  --  1 6  --  1 9  --    < >   ALK PHOS U/L  --  70 84  --   --   --   --   --   --  70  --    ALT U/L  --  109* 136*  --   --   --   --   --   --  231*  --    AST U/L  --  85* 109*  --   --   --   --   --   --  201* --     < > = values in this interval not displayed

## 2022-01-27 NOTE — ASSESSMENT & PLAN NOTE
· Ventricular tachycardia in the setting of cardiomyopathy, hypokalemia and hypomagnesemia  · Currently stable on amiodarone 200 mg daily, mexiletene 150 mg Q8, Coreg 12 5 mg p o  B i d   · Medtronic ICD in place  · Advanced care planning was done today and the patient decided that when he goes home he does not want to be readmitted and wants to pursue home hospice    · In this case his ICD will be turned off when discharged as part of comfort care/hospice

## 2022-01-27 NOTE — ASSESSMENT & PLAN NOTE
Discussed with cardiology and nephrology  Overall prognosis is poor with cardiorenal syndrome  Diuretics have been held  Continue Coreg 12 5 mg b i d   With hold parameters  No Ace or Arb due to acute kidney injury

## 2022-01-27 NOTE — PROGRESS NOTES
01/27/22 1100   Clinical Encounter Type   Visited With Patient   Routine Visit Follow-up   Continue Visiting Yes   Sacramental Encounters   Sacrament of Sick-Anointing Anointed

## 2022-01-27 NOTE — ACP (ADVANCE CARE PLANNING)
Serious Illness Conversation    1  What is your understanding now of where you are with your illness? Prognostic Understanding: overestimates prognosis     2  How much information about what is likely to be ahead with your illness would you like to have? 3  What did you (clinician) communicate to the patient? 4  If your health situation worsens, what are your most important goals? 5  What are the biggest fears and worries about the future and your health? 6  What abilities are so critical to your life that you cannot imagine living without them? 7  What gives you strength as you think about the future with your illness? 8  If you become sicker, how much are you willing to go through for the possibility of gaining more time? Be in the hospital: No    Be in the ICU: No Live in a nursing home: No   Be on a ventilator: No    Be on dialysis: No Undergo aggressive test and/or procedures: No      9  How much does your proxy and family know about your priorities and wishes? Discussion Discussion: wants clinician to talk with family        Have talked with his wife Loren and son Parviz Junior  I told them about his overall condition and his wishes  The patient, wife and son are all in agreement with home hospice    Will plan to transition to home hospice in the next 24-48 hours

## 2022-01-27 NOTE — OCCUPATIONAL THERAPY NOTE
Occupational Therapy Progress Note     Patient Name: Patricia Figueredo  UISXH'O Date: 1/27/2022  Problem List  Principal Problem:    Ventricular tachycardia [facility])  Active Problems:    Cardiac defibrillator in situ    Acute kidney injury superimposed on chronic kidney disease ([facility] [address] )    Hyperlipidemia    PAF (paroxysmal atrial fibrillation) ([facility] )    Essential hypertension    Acute on chronic combined systolic and diastolic congestive heart failure (HCC)    Hypokalemia    Hypomagnesemia    Transaminitis    Hypoxia    Dizziness    Goals of care, counseling/discussion    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        01/27/22 1343   OT Last Visit   OT Visit Date 01/27/22   Note Type   Note Type Treatment   Restrictions/Precautions   Weight Bearing Precautions Per Order No   Other Precautions Fall Risk;Cognitive; Chair Alarm; Bed Alarm;Hard of hearing   Pain Assessment   Pain Assessment Tool 0-10   Pain Score No Pain   ADL   Where Assessed Edge of bed   Eating Assistance 7  Independent   Eating Deficit Beverage management   Eating Comments FR   LB Dressing Assistance 5  Supervision/Setup   LB Dressing Deficit Don/doff R sock; Don/doff L sock   LB Dressing Comments Donned B/L socks using tailor sit method  Toileting Comments Requested 2 pair of clean undies, but refused to change current pair  Bed Mobility   Supine to Sit 6  Modified independent   Additional items HOB elevated; Bedrails; Increased time required   Sit to Supine 6  Modified independent   Additional items Bedrails; Increased time required   Additional Comments Remains in bed w/ all needs and alarm engaged  Transfers   Sit to Stand 5  Supervision   Additional items Assist x 1; Increased time required;Verbal cues   Stand to Sit 4  Minimal assistance   Additional items Assist x 1; Increased time required;Verbal cues   Stand pivot 4  Minimal assistance   Additional items Assist x 1  (RW; Premature sit )   Additional Comments Cues for hand placement and safety   Impulsive movements  Premature sit during SPT and uncontrolled descent  Functional Mobility   Functional Mobility 4  Minimal assistance   Additional Comments Min x 1 using RW  Impulsive and + swaying during turns  Therapeutic Exercise - ROM   UE-ROM Yes   ROM- Right Upper Extremities   R Shoulder AROM; Flexion;ABduction;Horizontal ABduction   R Elbow Elbow flexion;Elbow extension   RUE ROM Comment 10 reps  Tolerated well  Declined additional reps  ROM - Left Upper Extremities    L Shoulder AROM; Flexion;ABduction;Horizontal ABduction   L Elbow Elbow flexion;Elbow extension   LUE ROM Comment 10 reps  Tolerated well  Declined additional reps  Cognition   Arousal/Participation Responsive; Other (Comment)  (Iritated )   Attention Difficulty attending to directions   Memory Decreased short term memory;Decreased recall of recent events;Decreased recall of precautions   Following Commands Follows one step commands without difficulty   Comments Jackson  Decreased carry over of education today  Decreased insight and safety  Irritated w/ this therapist     Additional Activities   Additional Activities   (STS x 3 reps w/ (S) and posterior lean  )   Activity Tolerance   Activity Tolerance Patient limited by fatigue;Treatment limited secondary to agitation   Medical Staff Made Aware Meagan HENSON    Assessment   Assessment Pt seen for OT tx session w/ focus on self care, mobility, UE exercises, and activity tolerance  Denies pain  Agreeable to session  Requested undies and OT provided and set them on table  Pt suddenly became angry stating "why don't I get what I want " OT attempted to have pt elaborate but he just increasingly became angry making fists on bed  OT attempted to calm pt and do exactly what he asked and how  Slight increase in mood noted  Performed supine to sit w/ MI  (S) to stand w/ cues for hand placement  However, needed Min to control descent  Ambulated in room w/ Min using RW  Unsteady and swaying   Premature sit during SPT  Impulsive movements  Pt changed socks w/ (S)  Declined changing undies  Tolerated UE exercises but only 10 reps  Declined any additional reps  Returned to bed due to asking but than states " is that it " OT offered additional tasks but pt declined  Pt making slow progress toward goals  Hindered by deconditioning, decreased balance, decreased mood, and decreased cognition  Recommendation for rehab remains appropriate  Remains in bed w/ alarm engaged  Plan   Treatment Interventions ADL retraining;Functional transfer training;UE strengthening/ROM; Endurance training;Patient/family training; Activityengagement   Goal Expiration Date 02/04/22   OT Treatment Day 2   OT Frequency 2-3x/wk   Recommendation   OT Discharge Recommendation Post acute rehabilitation services   AM-PAC Daily Activity Inpatient   Lower Body Dressing 3   Bathing 3   Toileting 2   Upper Body Dressing 3   Grooming 3   Eating 4   Daily Activity Raw Score 18   Daily Activity Standardized Score (Calc for Raw Score >=11) 38 66   AM-PAC Applied Cognition Inpatient   Following a Speech/Presentation 3   Understanding Ordinary Conversation 3   Taking Medications 3   Remembering Where Things Are Placed or Put Away 3   Remembering List of 4-5 Errands 2   Taking Care of Complicated Tasks 3   Applied Cognition Raw Score 17   Applied Cognition Standardized Score 36 52       Jose Carlos Mendez MS, OTR/L PRINCIPAL PROCEDURE  Procedure: Selective debridement, 20 sq cm or more  Findings and Treatment: debridement and skin graft back and wound vac, right thigh donor

## 2022-01-27 NOTE — PROGRESS NOTES
[address] [place] [address]  Progress Note Deidre Zhu 10/31/1932, 80 y o  male MRN: [id number]  Unit/Bed#: E4 -01 Encounter: [id number]  Primary Care Provider: Delmy Patient, DO   Date and time admitted to hospital: 1/19/2022  4:03 AM    * Ventricular tachycardia (HCC)  Assessment & Plan  · Ventricular tachycardia in the setting of cardiomyopathy, hypokalemia and hypomagnesemia  · Currently stable on amiodarone 200 mg daily, mexiletene 150 mg Q8, Coreg 12 5 mg p o  B i d   · Medtronic ICD in place  · Advanced care planning was done today and the patient decided that when he goes home he does not want to be readmitted and wants to pursue home hospice  · In this case his ICD will be turned off when discharged as part of comfort care/hospice    Goals of care, counseling/discussion  Assessment & Plan  · Advance care planning was done  Please see note  · He made it clear that he does not want to go to inpatient rehab  · All he wants is to go home  · If he deteriorates he wants to stay home and does not want to be readmitted to the hospital   · I told him that his this entails home hospice   · Patient wife and son are all in agreement with home hospice  · Ask case management to arrange    Acute on chronic combined systolic and diastolic congestive heart failure [facility])  Assessment & Plan  Discussed with cardiology and nephrology  Overall prognosis is poor with cardiorenal syndrome  Diuretics have been held  Continue Coreg 12 5 mg b i d  With hold parameters  No Ace or Arb due to acute kidney injury    PAF (paroxysmal atrial fibrillation) ([facility])  Assessment & Plan  Rate is controlled on carvedilol and amiodarone  INR is therapeutic  Continue warfarin 2 mg daily    Acute kidney injury superimposed on chronic kidney disease ([facility] [address] )  Assessment & Plan  CHRISTY on CKD 3b  Due to cardiorenal syndrome  Renal function still has stabilized  Diuretics remain on hold    Possible reinstitution of diuretics in 24 hours per renal    Transaminitis  Assessment & Plan  Presumed to be Secondary to alcohol/hepatic congestion  LFTs are  improving    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  Assessment & Plan  Multifactorial due to above  Patient does not want to go to inpatient rehab  Per discussion with family, they are willing to pay for additional aid at home    Hyperlipidemia  Assessment & Plan  Continue Lipitor daily for now      VTE Pharmacologic Prophylaxis:   Pharmacologic: Warfarin (Coumadin)  Mechanical VTE Prophylaxis in Place: No    Patient Centered Rounds: I have performed bedside rounds with nursing staff today  Discussions with Specialists or Other Care Team Provider:  Discussed with nephrology and cardiology    Education and Discussions with Family / Patient:  Discussed with wife James Syed and son Ren Vitale    Time Spent for Care:> 30 minutes  More than 50% of total time spent on counseling and coordination of care as described above  Current Length of Stay: 8 day(s)    Current Patient Status: Inpatient   Certification Statement: The patient will continue to require additional inpatient hospital stay due to CHF/CHRISTY  Discharge Plan    Discharge Plan:  Discharge to home with home hospice in 48 hours    Code Status: Level 3 - DNAR and DNI      Subjective:   Improved shortness of breath  Off oxygen today  Still weak and could barely stand up for orthostatic measurements  He does not want to go to inpatient rehab  He made it clear he wants to go home  We talked about his high risk for readmission given his severe cardiomyopathy and renal dysfunction    He said if he deteriorates at home he would like to die in peace at home and not come back to the hospital     Objective:     Vitals:   Temp (24hrs), Av 1 °F (36 7 °C), Min:97 3 °F (36 3 °C), Max:99 4 °F (37 4 °C)    Temp:  [97 3 °F (36 3 °C)-99 4 °F (37 4 °C)] 97 3 °F (36 3 °C)  HR:  [55-84] 70  Resp:  [18] 18  BP: ()/(53-91) 126/78  SpO2:  [95 %-97 %] 96 %  Body mass index is 23 35 kg/m²  Input and Output Summary (last 24 hours): Intake/Output Summary (Last 24 hours) at 1/27/2022 1626  Last data filed at 1/27/2022 1601  Gross per 24 hour   Intake 480 ml   Output 600 ml   Net -120 ml       Physical Exam:     Physical Exam  Constitutional:       Appearance: He is ill-appearing  HENT:      Head: Normocephalic and atraumatic  Nose: No rhinorrhea  Eyes:      General: No scleral icterus  Cardiovascular:      Rate and Rhythm: Regular rhythm  Heart sounds: No murmur heard  No gallop  Pulmonary:      Effort: Pulmonary effort is normal  No respiratory distress  Breath sounds: No wheezing  Abdominal:      General: Abdomen is flat  Palpations: Abdomen is soft  Musculoskeletal:      Right lower leg: No edema  Left lower leg: No edema  Comments: Diminished muscle mass   Skin:     Comments: A trophic skin  Scattered ecchymoses   Neurological:      Mental Status: He is alert and oriented to person, place, and time  Psychiatric:         Mood and Affect: Mood normal      Additional Data:     Labs:    Results from last 7 days   Lab Units 01/26/22  0530   WBC Thousand/uL 6 31   HEMOGLOBIN g/dL 11 8*   HEMATOCRIT % 34 5*   PLATELETS Thousands/uL 215   NEUTROS PCT % 67   LYMPHS PCT % 18   MONOS PCT % 11   EOS PCT % 2     Results from last 7 days   Lab Units 01/27/22  0854 01/26/22  0530 01/26/22  0530   SODIUM mmol/L 132*   < > 135*   POTASSIUM mmol/L 4 3   < > 4 4   CHLORIDE mmol/L 98*   < > 99*   CO2 mmol/L 29   < > 26   BUN mg/dL 23   < > 23   CREATININE mg/dL 2 01*   < > 1 99*   ANION GAP mmol/L 5   < > 10   CALCIUM mg/dL 8 0*   < > 8 2*   ALBUMIN g/dL  --   --  2 2*   TOTAL BILIRUBIN mg/dL  --   --  0 96   ALK PHOS U/L  --   --  70   ALT U/L  --   --  109*   AST U/L  --   --  85*   GLUCOSE RANDOM mg/dL 95   < > 83    < > = values in this interval not displayed       Results from last 7 days   Lab Units 01/26/22  0530   INR  2 78* * I Have Reviewed All Lab Data Listed Above  * Additional Pertinent Lab Tests Reviewed: All Labs Within Last 24 Hours Reviewed    Imaging:    Imaging Reports Reviewed Today Include:  None    Recent Cultures (last 7 days):           Last 24 Hours Medication List:   Current Facility-Administered Medications   Medication Dose Route Frequency Provider Last Rate    amiodarone  200 mg Oral Daily Ellie Fine MD      atorvastatin  40 mg Oral Daily Ellie Fine MD      carvedilol  12 5 mg Oral BID With Meals Ellie Fine MD      magnesium oxide  400 mg Oral BID Rashida Glasgow MD      melatonin  6 mg Oral HS PRN Noel Bejarano PA-C      mexiletine  150 mg Oral [facility] Genaro Desai DO      warfarin  2 mg Oral Daily (warfarin) Waldo Zavala MD          Today, Patient Was Seen By: Waldo Zavala MD    ** Please Note: Dictation voice to text software may have been used in the creation of this document   **

## 2022-01-27 NOTE — ASSESSMENT & PLAN NOTE
· Advance care planning was done  Please see note  · He made it clear that he does not want to go to inpatient rehab  · All he wants is to go home    · If he deteriorates he wants to stay home and does not want to be readmitted to the hospital   · I told him that his this entails home hospice   · Patient wife and son are all in agreement with home hospice  · Ask case management to arrange

## 2022-01-27 NOTE — PROGRESS NOTES
Cardiology         Progress Note - Cardiology   Mark Torres 80 y o  male MRN: [id number]  Unit/Bed#: E4 -01 Encounter: [id number]          Assessment/Recommendations/Discussion:   1  Ventricular tachycardia shock x2, appropriate sec severe cardiomyopathy and superimposed hypokalemia/hypomagnesemia  2  Heavy alcohol use  3  CAD, remote history of MI and angioplasty in 1989  4  Paroxysmal atrial fibrillation, on warfarin  5  Chronic systolic and diastolic CHF  6  Severe cardiomyopathy, ejection fraction 12%, Medtronic dual-chamber AICD in place  7  Hypertension  8  Dyslipidemia  9  CHRISTY on CKD    · Patient now off oxygen support, overall looking stable  Appreciate Nephrology support and recommendations  Agree with starting torsemide tomorrow  A gentleman that negative balance will be a good idea  Agree with holding off on vasopressors in the setting of frequent VT on recent AICD shocks    · No evidence of hypoperfusion or shock  · VT has quieted down  · Goals of care currently being addressed and outlined by slim attending  · Continue warfarin, amiodarone, statin, carvedilol, mexiletine        Subjective:  Patient seen and examined, overall feels well today, states he was walking around in the room earlier today and felt well          Physical Exam:  GEN:  NAD  HEENT:  MMM, NCAT, pink conjunctiva, EOMI, nonicteric sclera  CV:  Minimal JVD/HJR, RR with some irregularity, NO M/R/G, +S1/S2, NO PARASTERNAL HEAVE/THRILL, NO LE EDEMA, NO HEPATIC SYSTOLIC PULSATION, WARM EXTREMITIES  RESP:  Mild right basilar crackles  ABD:  SOFT, NT, NO GROSS ORGANOMEGALY        Vitals:   BP 95/53 (BP Location: Left arm)   Pulse 55   Temp 98 °F (36 7 °C) (Temporal)   Resp 18   Ht 5' 3" (1 6 m)   Wt 59 8 kg (131 lb 13 4 oz)   SpO2 97%   BMI 23 35 kg/m²   Vitals:    01/26/22 0543 01/27/22 0600   Weight: 59 4 kg (130 lb 15 3 oz) 59 8 kg (131 lb 13 4 oz)       Intake/Output Summary (Last 24 hours) at 1/27/2022 1530  Last data filed at 1/27/2022 0701  Gross per 24 hour   Intake 480 ml   Output 400 ml   Net 80 ml       TELEMETRY:  Ventricular paced rhythm  Lab Results:  Results from last 7 days   Lab Units 01/26/22  0530   WBC Thousand/uL 6 31   HEMOGLOBIN g/dL 11 8*   HEMATOCRIT % 34 5*   PLATELETS Thousands/uL 215     Results from last 7 days   Lab Units 01/27/22  0854 01/26/22  0530 01/26/22  0530   POTASSIUM mmol/L 4 3   < > 4 4   CHLORIDE mmol/L 98*   < > 99*   CO2 mmol/L 29   < > 26   BUN mg/dL 23   < > 23   CREATININE mg/dL 2 01*   < > 1 99*   CALCIUM mg/dL 8 0*   < > 8 2*   ALK PHOS U/L  --   --  70   ALT U/L  --   --  109*   AST U/L  --   --  85*    < > = values in this interval not displayed  Results from last 7 days   Lab Units 01/27/22  0854   POTASSIUM mmol/L 4 3   CHLORIDE mmol/L 98*   CO2 mmol/L 29   BUN mg/dL 23   CREATININE mg/dL 2 01*   CALCIUM mg/dL 8 0*           Medications:    Current Facility-Administered Medications:     amiodarone tablet 200 mg, 200 mg, Oral, Daily, Anjum Desai MD, 200 mg at 01/27/22 0822    atorvastatin (LIPITOR) tablet 40 mg, 40 mg, Oral, Daily, Anjum Desai MD, 40 mg at 01/27/22 0822    carvedilol (COREG) tablet 12 5 mg, 12 5 mg, Oral, BID With Meals, Anjum Desai MD, 12 5 mg at 01/27/22 0822    magnesium oxide (MAG-OX) tablet 400 mg, 400 mg, Oral, BID, Lida Lin MD, 400 mg at 01/27/22 2019    melatonin tablet 6 mg, 6 mg, Oral, HS PRN, Larisa Agustin PA-C, 6 mg at 01/24/22 2215    mexiletine (MEXITIL) capsule 150 mg, 150 mg, Oral, Q8H [address], Damien Dawson DO, 150 mg at 01/27/22 1313    warfarin (COUMADIN) tablet 2 mg, 2 mg, Oral, Daily (warfarin), Majorie Boxer, MD, 2 mg at 01/26/22 1723    This note was completed in part utilizing [employer] Software    Grammatical errors, random word insertions, spelling mistakes, and incomplete sentences may be an occasional consequence of this system secondary to software limitations, ambient noise, and hardware issues  If you have any questions or concerns about the content, text, or information contained within the body of this dictation, please contact the provider for clarification

## 2022-01-27 NOTE — CASE MANAGEMENT
Case Management Discharge Planning Note    Patient name Breonna Aranda  Location [place] [address] [address] [place] [address]-* MRN [id number]  : 10/31/1932 Date 2022       Current Admission Date: 2022  Current Admission Diagnosis:Ventricular tachycardia 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 2022    Goals of care, counseling/discussion 2022    Dizziness 2022    Hypoxia 2022    Hypokalemia 2022    Hypomagnesemia 2022    Transaminitis 2022    Bilateral lower extremity edema 2021    Hyponatremia 12/10/2021    Acute on chronic combined systolic and diastolic congestive heart failure ([facility] [address] ) 10/23/2021    Ischemic cardiomyopathy 10/09/2018    PAF (paroxysmal atrial fibrillation) ([facility] ) 10/09/2018    Essential hypertension 10/09/2018    Cardiac defibrillator in situ 2016    Acute kidney injury superimposed on chronic kidney disease ([facility] )     Embolic stroke ([facility] )     Hyperlipidemia 2016    Old myocardial infarction 2016    Status post percutaneous transluminal coronary angioplasty 2016    Ventricular tachycardia ([facility] ) 2016    Cardiomyopathy ([facility] ) 2016      LOS (days): 8  Geometric Mean LOS (GMLOS) (days): 3 50  Days to GMLOS:-4 9     OBJECTIVE:  Risk of Unplanned Readmission Score: 21         Current admission status: Inpatient   Preferred Pharmacy:   [address] [place] [address]  Phone: [phone] Fax: [phone]    [address] [facility] [address]  Phone: [phone] Fax: [phone]    Primary Care Provider: Aracelis Ewing DO    Primary Insurance: [facility] [employer] [employer]  Secondary Insurance:     DISCHARGE DETAILS:    Comments - Freedom of Choice: TC to spouse and informed her a list for HHA were left in the room for her  Informed her this is private pay for [facility]

## 2022-01-27 NOTE — ASSESSMENT & PLAN NOTE
Multifactorial due to above  Patient does not want to go to inpatient rehab  Per discussion with family, they are willing to pay for additional aid at home

## 2022-01-27 NOTE — PLAN OF CARE
Problem: Potential for Falls  Goal: Patient will remain free of falls  Description: INTERVENTIONS:  - Educate patient/family on patient safety including physical limitations  - Instruct patient to call for assistance with activity   - Consult OT/PT to assist with strengthening/mobility   - Keep Call bell within reach  - Keep bed low and locked with side rails adjusted as appropriate  - Keep care items and personal belongings within reach  - Initiate and maintain comfort rounds  - Make Fall Risk Sign visible to staff  - Offer Toileting every 2 Hours, in advance of need  - Initiate/Maintain bed alarm  - Obtain necessary fall risk management equipment: alarms  - Apply yellow socks and bracelet for high fall risk patients  - Consider moving patient to room near nurses station  Outcome: Progressing     Problem: MOBILITY - ADULT  Goal: Maintain or return to baseline ADL function  Description: INTERVENTIONS:  -  Assess patient's ability to carry out ADLs; assess patient's baseline for ADL function and identify physical deficits which impact ability to perform ADLs (bathing, care of mouth/teeth, toileting, grooming, dressing, etc )  - Assess/evaluate cause of self-care deficits   - Assess range of motion  - Assess patient's mobility; develop plan if impaired  - Assess patient's need for assistive devices and provide as appropriate  - Encourage maximum independence but intervene and supervise when necessary  - Involve family in performance of ADLs  - Assess for home care needs following discharge   - Consider OT consult to assist with ADL evaluation and planning for discharge  - Provide patient education as appropriate  Outcome: Progressing  Goal: Maintains/Returns to pre admission functional level  Description: INTERVENTIONS:  - Perform BMAT or MOVE assessment daily    - Set and communicate daily mobility goal to care team and patient/family/caregiver     - Collaborate with rehabilitation services on mobility goals if consulted  - Perform Range of Motion 3 times a day  - Reposition patient every 2 hours    - Dangle patient 3 times a day  - Stand patient 3 times a day  - Ambulate patient 3 times a day  - Out of bed to chair 3 times a day   - Out of bed for meals 3 times a day  - Out of bed for toileting  - Record patient progress and toleration of activity level   Outcome: Progressing     Problem: PAIN - ADULT  Goal: Verbalizes/displays adequate comfort level or baseline comfort level  Description: Interventions:  - Encourage patient to monitor pain and request assistance  - Assess pain using appropriate pain scale  - Administer analgesics based on type and severity of pain and evaluate response  - Implement non-pharmacological measures as appropriate and evaluate response  - Consider cultural and social influences on pain and pain management  - Notify physician/advanced practitioner if interventions unsuccessful or patient reports new pain  Outcome: Progressing     Problem: INFECTION - ADULT  Goal: Absence or prevention of progression during hospitalization  Description: INTERVENTIONS:  - Assess and monitor for signs and symptoms of infection  - Monitor lab/diagnostic results  - Monitor all insertion sites, i e  indwelling lines, tubes, and drains  - Monitor endotracheal if appropriate and nasal secretions for changes in amount and color  - [place] appropriate cooling/warming therapies per order  - Administer medications as ordered  - Instruct and encourage patient and family to use good hand hygiene technique  - Identify and instruct in appropriate isolation precautions for identified infection/condition  Outcome: Progressing  Goal: Absence of fever/infection during neutropenic period  Description: INTERVENTIONS:  - Monitor WBC    Outcome: Progressing     Problem: SAFETY ADULT  Goal: Patient will remain free of falls  Description: INTERVENTIONS:  - Educate patient/family on patient safety including physical limitations  - Instruct patient to call for assistance with activity   - Consult OT/PT to assist with strengthening/mobility   - Keep Call bell within reach  - Keep bed low and locked with side rails adjusted as appropriate  - Keep care items and personal belongings within reach  - Initiate and maintain comfort rounds  - Make Fall Risk Sign visible to staff  - Offer Toileting every 2 Hours, in advance of need  - Initiate/Maintain bed alarm  - Obtain necessary fall risk management equipment: alarms  - Apply yellow socks and bracelet for high fall risk patients  - Consider moving patient to room near nurses station  Outcome: Progressing  Goal: Maintain or return to baseline ADL function  Description: INTERVENTIONS:  -  Assess patient's ability to carry out ADLs; assess patient's baseline for ADL function and identify physical deficits which impact ability to perform ADLs (bathing, care of mouth/teeth, toileting, grooming, dressing, etc )  - Assess/evaluate cause of self-care deficits   - Assess range of motion  - Assess patient's mobility; develop plan if impaired  - Assess patient's need for assistive devices and provide as appropriate  - Encourage maximum independence but intervene and supervise when necessary  - Involve family in performance of ADLs  - Assess for home care needs following discharge   - Consider OT consult to assist with ADL evaluation and planning for discharge  - Provide patient education as appropriate  Outcome: Progressing  Goal: Maintains/Returns to pre admission functional level  Description: INTERVENTIONS:  - Perform BMAT or MOVE assessment daily    - Set and communicate daily mobility goal to care team and patient/family/caregiver  - Collaborate with rehabilitation services on mobility goals if consulted  - Perform Range of Motion 3 times a day  - Reposition patient every 2 hours    - Dangle patient 3 times a day  - Stand patient 3 times a day  - Ambulate patient 3 times a day  - Out of bed to chair 3 times a day   - Out of bed for meals 3 times a day  - Out of bed for toileting  - Record patient progress and toleration of activity level   Outcome: Progressing     Problem: DISCHARGE PLANNING  Goal: Discharge to home or other facility with appropriate resources  Description: INTERVENTIONS:  - Identify barriers to discharge w/patient and caregiver  - Arrange for needed discharge resources and transportation as appropriate  - Identify discharge learning needs (meds, wound care, etc )  - Arrange for interpretive services to assist at discharge as needed  - Refer to Case Management Department for coordinating discharge planning if the patient needs post-hospital services based on physician/advanced practitioner order or complex needs related to functional status, cognitive ability, or social support system  Outcome: Progressing     Problem: Knowledge Deficit  Goal: Patient/family/caregiver demonstrates understanding of disease process, treatment plan, medications, and discharge instructions  Description: Complete learning assessment and assess knowledge base    Interventions:  - Provide teaching at level of understanding  - Provide teaching via preferred learning methods  Outcome: Progressing

## 2022-01-27 NOTE — CASE MANAGEMENT
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    Goals of care, counseling/discussion 2022    Dizziness 2022    Hypoxia 2022    Hypokalemia 2022    Hypomagnesemia 2022    Transaminitis 2022    Bilateral lower extremity edema 2021    Hyponatremia 12/10/2021    Acute on chronic combined systolic and diastolic congestive heart failure ([facility] ) 10/23/2021    Ischemic cardiomyopathy 10/09/2018    [facility]ox[facility]mal [facility] 10/09/2018    Essential hypertension 10/09/2018    Cardiac defibrillator in situ 2016    Acute kidney injury superimposed on chronic kidney disease ([facility] )     Embolic stroke ([facility] )     Hyperlipidemia 2016    Old myocardial infarction 2016    Status post percutaneous transluminal coronary angioplasty 2016    Ventricular tachycardia ([facility] ) 2016    Cardiomyopathy ([facility] 2016      LOS (days): 8  Geometric Mean LOS (GMLOS) (days): 3 50  Days to GMLOS:-4 9     OBJECTIVE:  Risk of Unplanned Readmission Score: 21         Current admission status: Inpatient   Preferred Pharmacy:   [address] [place] [address]  Phone: [phone] Fax: [phone] [address] [place] [address]  [id number] [facility] [address]  Phone: [phone] Fax: [phone]    Primary Care Provider: Nuris Sung DO    Primary Insurance: [facility]  Secondary Insurance:     DISCHARGE DETAILS:    Discharge planning discussed with[de-identified] pt  Freedom of Choice: Yes  Comments - Freedom of Choice: V tach, pulm edema  2L O2  PT recomm IP rehab  Pt refuses IP rehab & VNA  If pt on O2 for D/C NEEDS HOME O2 EVAL and O2 set up  [facility] hospice accepted   Family asking for Home health Aides, provided list to them     Were Treatment Team discharge recommendations reviewed with patient/caregiver?: Yes  Did patient/caregiver verbalize understanding of patient care needs?: Yes  Were patient/caregiver advised of the risks associated with not following Treatment Team discharge recommendations?: Yes    Treatment Team Recommendation: Hospice  Discharge Destination Plan[de-identified] Hospice

## 2022-01-27 NOTE — PLAN OF CARE
Problem: MOBILITY - ADULT  Goal: Maintain or return to baseline ADL function  Description: INTERVENTIONS:  -  Assess patient's ability to carry out ADLs; assess patient's baseline for ADL function and identify physical deficits which impact ability to perform ADLs (bathing, care of mouth/teeth, toileting, grooming, dressing, etc )  - Assess/evaluate cause of self-care deficits   - Assess range of motion  - Assess patient's mobility; develop plan if impaired  - Assess patient's need for assistive devices and provide as appropriate  - Encourage maximum independence but intervene and supervise when necessary  - Involve family in performance of ADLs  - Assess for home care needs following discharge   - Consider OT consult to assist with ADL evaluation and planning for discharge  - Provide patient education as appropriate  Outcome: Not Progressing  Goal: Maintains/Returns to pre admission functional level  Description: INTERVENTIONS:  - Perform BMAT or MOVE assessment daily    - Set and communicate daily mobility goal to care team and patient/family/caregiver  - Collaborate with rehabilitation services on mobility goals if consulted  - Reposition patient every 2 hours    - Stand patient 3 times a day  - Ambulate patient 3 times a day  - Out of bed to chair 3 times a day   - Out of bed for meals 3 times a day  - Out of bed for toileting  - Record patient progress and toleration of activity level   Outcome: Not Progressing     Problem: SAFETY ADULT  Goal: Maintain or return to baseline ADL function  Description: INTERVENTIONS:  -  Assess patient's ability to carry out ADLs; assess patient's baseline for ADL function and identify physical deficits which impact ability to perform ADLs (bathing, care of mouth/teeth, toileting, grooming, dressing, etc )  - Assess/evaluate cause of self-care deficits   - Assess range of motion  - Assess patient's mobility; develop plan if impaired  - Assess patient's need for assistive devices and provide as appropriate  - Encourage maximum independence but intervene and supervise when necessary  - Involve family in performance of ADLs  - Assess for home care needs following discharge   - Consider OT consult to assist with ADL evaluation and planning for discharge  - Provide patient education as appropriate  Outcome: Not Progressing

## 2022-01-27 NOTE — PLAN OF CARE
Problem: OCCUPATIONAL THERAPY ADULT  Goal: Performs self-care activities at highest level of function for planned discharge setting  See evaluation for individualized goals  Description: Treatment Interventions: ADL retraining,Functional transfer training,Endurance training,Patient/family training,Equipment evaluation/education,Activityengagement,Energy conservation          See flowsheet documentation for full assessment, interventions and recommendations  Outcome: Progressing  Note: Limitation: Decreased ADL status,Decreased endurance,Decreased self-care trans,Decreased high-level ADLs  Prognosis: Good  Assessment: Pt seen for OT tx session w/ focus on self care, mobility, UE exercises, and activity tolerance  Denies pain  Agreeable to session  Requested undies and OT provided and set them on table  Pt suddenly became angry stating "why don't I get what I want " OT attempted to have pt elaborate but he just increasingly became angry making fists on bed  OT attempted to calm pt and do exactly what he asked and how  Slight increase in mood noted  Performed supine to sit w/ MI  (S) to stand w/ cues for hand placement  However, needed Min to control descent  Ambulated in room w/ Min using RW  Unsteady and swaying  Premature sit during SPT  Impulsive movements  Pt changed socks w/ (S)  Declined changing undies  Tolerated UE exercises but only 10 reps  Declined any additional reps  Returned to bed due to asking but than states " is that it " OT offered additional tasks but pt declined  Pt making slow progress toward goals  Hindered by deconditioning, decreased balance, decreased mood, and decreased cognition  Recommendation for rehab remains appropriate  Remains in bed w/ alarm engaged        OT Discharge Recommendation: Post acute rehabilitation services

## 2022-01-27 NOTE — PROGRESS NOTES
Pastoral Care Progress Note    2022  Patient: Cash Whiteside : 10/31/1932  Admission Date & Time: 2022 0403  MRN: [id number] CSN: [id number]                     Short visit with patient, called best griffin came in yesterday to anoint patient  Will continue  to follow patient   As needed

## 2022-01-28 NOTE — PHYSICAL THERAPY NOTE
Physical Therapy Treatment Note     01/28/22 1041   PT Last Visit   PT Visit Date 01/28/22   Note Type   Note Type Treatment   Pain Assessment   Pain Assessment Tool 0-10   Pain Score No Pain   Restrictions/Precautions   Weight Bearing Precautions Per Order No   Other Precautions Cognitive; Bed Alarm; Fall Risk;Hard of hearing   General   Chart Reviewed Yes   Family/Caregiver Present No   Subjective   Subjective Pt  in bed supine upon entry  Agreeable to PT   Bed Mobility   Supine to Sit 6  Modified independent   Additional items Bedrails; Increased time required   Sit to Supine 6  Modified independent   Additional items Bedrails   Transfers   Sit to Stand 5  Supervision   Additional items Assist x 1;Verbal cues; Increased time required   Stand to Sit 5  Supervision   Additional items Impulsive;Verbal cues   Stand pivot 4  Minimal assistance   Additional items Verbal cues; Impulsive   Ambulation/Elevation   Gait pattern Excessively slow; Inconsistent ru;Decreased foot clearance; Forward Flexion;Decreased heel strike;Decreased toe off  (LE instability and unsteadiness)   Gait Assistance 4  Minimal assist   Additional items Assist x 1;Verbal cues   Assistive Device Rolling walker;None   Distance 10ft with No AD, 80ft, 140ft, 60ft   Balance   Static Sitting Fair   Dynamic Standing Poor +   Ambulatory Poor +   Endurance Deficit   Endurance Deficit Yes   Endurance Deficit Description pt  reported dizziness  BP read 124/74 mmHg  Activity Tolerance   Activity Tolerance Patient tolerated treatment well   Nurse Made Aware Yes   Exercises   THR Supine;Sitting;Bilateral;AROM;20 reps   Assessment   Prognosis Fair   Problem List Decreased strength;Decreased endurance; Impaired balance;Decreased mobility; Decreased coordination;Decreased cognition; Impaired judgement;Decreased safety awareness   Assessment Pt  able to tolerate more activities this PT session  pt  reported dizziness  BP read 124/74 mmHg   Pt  reports fatigue though no visible distress noted  Possibly self limits? Pt  dennis impulsive for pivoting and descending to EOB post ambualtion  Pt  unsteady during ambulation and several mild imbalances posteriorly  pt  is a risk of fall with LE instability and unsteadiness  pt  needed cues for hand placement during tarnsfers  Decreased HS and foot clearance noted during ambulation  pt  positioned back in bed in chair position and all needs within reach  Requested patient and reported to RN to ambulate patient with staff 2-3x/day  Will continue to follow during the stay to [facility] functional mobility and safety   Barriers to Discharge None   Goals   Patient Goals None reported   STG Expiration Date 02/04/22   PT Treatment Day 2   Plan   Treatment/Interventions Functional transfer training;LE strengthening/ROM; Therapeutic exercise;Patient/family training;Equipment eval/education; Bed mobility;Gait training;Spoke to nursing   Progress Progressing toward goals   PT Frequency   (4-5x/week)   Recommendation   PT Discharge Recommendation   (HHPT pending progress in PT)   Equipment Recommended [facility] Recommended Wheeled walker   Arabella [address] 435   Turning in Bed Without Bedrails 4   Lying on Back to Sitting on Edge of Flat Bed 4   Moving Bed to Chair 4   Standing Up From Chair 4   Walk in Room 3   Climb 3-5 Stairs 3   Basic Mobility Inpatient Raw Score 22   Basic Mobility Standardized Score 47 4   Highest Level Of Mobility   JH-HLM Goal 7: Walk 25 feet or more   JH-HLM Highest Level of Mobility 8: Walk 250 feet ot more   JH-HLM Goal Achieved Yes   End of Consult   Patient Position at End of Consult Supine;Bed/Chair alarm activated; All needs within reach         Ruby Santiago PTA    An AM-PAC basic mobility standardized score less than 42 9 suggest the patient may benefit from discharge to post-acute rehab services

## 2022-01-28 NOTE — PROGRESS NOTES
Cardiology         Progress Note - Cardiology   Karie White 80 y o  male MRN: [id number]  Unit/Bed#: E4 -01 Encounter: [id number]          Assessment/Recommendations/Discussion:   1  Ventricular tachycardia shock x2, appropriate sec severe cardiomyopathy and superimposed hypokalemia/hypomagnesemia  2  Heavy alcohol use  3  CAD, remote history of MI and angioplasty in 1989  4  Paroxysmal atrial fibrillation, on warfarin  5  Chronic systolic and diastolic CHF  6  Severe cardiomyopathy, ejection fraction 12%, [employer] dual-chamber AICD in place  7  Hypertension  8  Dyslipidemia  9  CHRISTY on CKD    · Stable cardiovascular status, now off oxygen  Appreciate nephrologist support  Torsemide started today, agree with the same  Hopefully gentle net negative balance  · Telemetry remains quiet  · Consider palliative care consultation to better outline goals of care  · Continue warfarin, amiodarone, statin, carvedilol, mexiletine    · Okay for discharge tomorrow from cardiovascular standpoint unless any changes          Subjective:  Patient seen and examined, denies chest pain, shortness of breath          Physical Exam:  GEN:  NAD  HEENT:  MMM, NCAT, pink conjunctiva, EOMI, nonicteric sclera  CV:  NO JVD/HJR, RR, NO M/R/G, +S1/S2, NO PARASTERNAL HEAVE/THRILL, NO LE EDEMA, NO HEPATIC SYSTOLIC PULSATION, WARM EXTREMITIES  RESP:  CTAB/L  ABD:  SOFT, NT, NO GROSS ORGANOMEGALY        Vitals:   /68 (BP Location: Right arm)   Pulse (!) 51   Temp (!) 97 °F (36 1 °C) (Temporal)   Resp 18   Ht 5' 3" (1 6 m)   Wt 59 8 kg (131 lb 13 4 oz)   SpO2 97%   BMI 23 35 kg/m²   Vitals:    01/27/22 0600 01/28/22 0600   Weight: 59 8 kg (131 lb 13 4 oz) 59 8 kg (131 lb 13 4 oz)       Intake/Output Summary (Last 24 hours) at 1/28/2022 1413  Last data filed at 1/28/2022 1324  Gross per 24 hour   Intake 720 ml   Output 800 ml   Net -80 ml       TELEMETRY:  Ventricular paced rhythm  Lab Results:  Results from last 7 days Lab Units 01/26/22  0530   WBC Thousand/uL 6 31   HEMOGLOBIN g/dL 11 8*   HEMATOCRIT % 34 5*   PLATELETS Thousands/uL 215     Results from last 7 days   Lab Units 01/27/22  0854 01/26/22  0530 01/26/22  0530   POTASSIUM mmol/L 4 3   < > 4 4   CHLORIDE mmol/L 98*   < > 99*   CO2 mmol/L 29   < > 26   BUN mg/dL 23   < > 23   CREATININE mg/dL 2 01*   < > 1 99*   CALCIUM mg/dL 8 0*   < > 8 2*   ALK PHOS U/L  --   --  70   ALT U/L  --   --  109*   AST U/L  --   --  85*    < > = values in this interval not displayed  Results from last 7 days   Lab Units 01/27/22  0854   POTASSIUM mmol/L 4 3   CHLORIDE mmol/L 98*   CO2 mmol/L 29   BUN mg/dL 23   CREATININE mg/dL 2 01*   CALCIUM mg/dL 8 0*           Medications:    Current Facility-Administered Medications:     amiodarone tablet 200 mg, 200 mg, Oral, Daily, Erasmo Finnegan MD, 200 mg at 01/28/22 0810    atorvastatin (LIPITOR) tablet 40 mg, 40 mg, Oral, Daily, Erasmo Finnegan MD, 40 mg at 01/28/22 0810    carvedilol (COREG) tablet 12 5 mg, 12 5 mg, Oral, BID With Meals, Erasmo Finnegan MD, 12 5 mg at 01/28/22 0810    magnesium oxide (MAG-OX) tablet 400 mg, 400 mg, Oral, BID, Valeri Dillon MD, 400 mg at 01/28/22 0810    melatonin tablet 6 mg, 6 mg, Oral, HS PRN, Jesse Rg PA-C, 6 mg at 01/27/22 2111    mexiletine (MEXITIL) capsule 150 mg, 150 mg, Oral, Q8H [facility], Kristina Dalton DO, 150 mg at 01/28/22 1344    torsemide (DEMADEX) tablet 10 mg, 10 mg, Oral, Daily, Dali Coughlin DO    warfarin (COUMADIN) tablet 2 mg, 2 mg, Oral, Daily (warfarin), Micaela Deluna MD, 2 mg at 01/27/22 1715    This note was completed in part utilizing M-Modal Fluency Direct Software  Grammatical errors, random word insertions, spelling mistakes, and incomplete sentences may be an occasional consequence of this system secondary to software limitations, ambient noise, and hardware issues    If you have any questions or concerns about the content, text, or information contained within the body of this dictation, please contact the provider for clarification

## 2022-01-28 NOTE — TELEPHONE ENCOUNTER
Wife is requesting call from Dr Shahana [place] about patient who is to be discharged with hospice and she is not in agreement of only PCP following  Please call her

## 2022-01-28 NOTE — CASE MANAGEMENT
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    Goals of care, counseling/discussion 2022    Dizziness 2022    Hypoxia 2022    Hypokalemia 2022    Hypomagnesemia 2022    Transaminitis 2022    Bilateral lower extremity edema 2021    Hyponatremia 12/10/2021    Acute on chronic combined systolic and diastolic congestive heart failure ([facility] ) 10/23/2021    Ischemic cardiomyopathy 10/09/2018    PAF (paroxysmal atrial fibrillation) ([facility] ) 10/09/2018    Essential hypertension 10/09/2018    Cardiac defibrillator in situ 2016    Acute kidney injury superimposed on chronic kidney disease ([facility] )     Embolic stroke ([facility] ) 3979    Hyperlipidemia 2016    Old myocardial infarction 2016    Status post percutaneous transluminal coronary angioplasty 2016    Ventricular tachycardia ([facility] ) 2016    Cardiomyopathy ([facility] ) 2016      LOS (days): 9  Geometric Mean LOS (GMLOS) (days): 3 50  Days to GMLOS:-5 9     OBJECTIVE:  Risk of Unplanned Readmission Score: 21         Current admission status: Inpatient   Preferred Pharmacy:   [address] [place] [address]  Phone: [phone] Fax: [phone]    [facility] [address] [address]  [address] [facility] [address]  Phone: [phone] Fax: [phone]    Primary Care Provider: Stanislaw Sierra DO    Primary Insurance: [facility]  Secondary Insurance:     DISCHARGE DETAILS:    Discharge planning discussed with[de-identified] Jose Armando Cardenas of Choice: Yes  Comments - Freedom of Choice: Faxed list of HHA to son [email or web address] per his request   Pt agreeable to VNA for nursing after speaking with son and refusing hospice earlier today    Ref's sent for VNA  CM contacted family/caregiver?: Yes  Were Treatment Team discharge recommendations reviewed with patient/caregiver?: Yes  Did patient/caregiver verbalize understanding of patient care needs?: Yes  Were patient/caregiver advised of the risks associated with not following Treatment Team discharge recommendations?: Yes         Requested [address]         Is the patient interested in Lianna Acosta at discharge?: Yes  [address] requested[de-identified] [address] Name[de-identified] Other  [address] [place] [address] Provider[de-identified] PCP  Home Health Services Needed[de-identified] Oxygen Via Nasal Cannula,Other (comment) (medical management)  Homebound Criteria Met[de-identified] Requires the Assistance of Another Person for Safe Ambulation or to Leave the Home,Uses an Assist Device (i e  cane, walker, etc)  Supporting Clincal Findings[de-identified] Fatigues Easliy in Short Distances,Limited Endurance,Requires Oxygen,Dyspnea with Exertion    DME Referral Provided  Referral made for DME?: No         Treatment Team Recommendation: Home with [address]  Discharge Destination Plan[de-identified] Home with [address]

## 2022-01-28 NOTE — TELEPHONE ENCOUNTER
Called and talked to her  She was much more reasonable that I expected her to be  She expressed that she did not want hospice for Gene but wanted him to try to continue to live  I told her that was Gene's decision and she needed to discuss it with him

## 2022-01-28 NOTE — ASSESSMENT & PLAN NOTE
CHRISTY on CKD 3b  Due to cardiorenal syndrome  Renal function still has stabilized  Torsemide was restarted today

## 2022-01-28 NOTE — ASSESSMENT & PLAN NOTE
Advance care planning was done yesterday  He said he wanted to pursue home hospice  For today he change his mind  Discussed with wife and son who are willing to help take care of him at home    Discussed with case management regarding helping the family obtain home health aides

## 2022-01-28 NOTE — ASSESSMENT & PLAN NOTE
· Ventricular tachycardia in the setting of cardiomyopathy, hypokalemia and hypomagnesemia  · Currently stable on amiodarone 200 mg daily, mexiletene 150 mg Q8, Coreg 12 5 mg p o  B i d   · Medtronic ICD in place    · The patient decided on home hospice yesterday but changed his mind today and wants to pursue regular care  · Follow-up with Dr Aranza Machado his primary cardiology after discharge

## 2022-01-28 NOTE — CASE MANAGEMENT
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    Goals of care, counseling/discussion 2022    Dizziness 2022    Hypoxia 2022    Hypokalemia 2022    Hypomagnesemia 2022    Transaminitis 2022    Bilateral lower extremity edema 2021    Hyponatremia 12/10/2021    Acute on chronic combined systolic and diastolic congestive heart failure ([facility] ) 10/23/2021    Ischemic cardiomyopathy 10/09/2018    PAF (paroxysmal atrial fibrillation) ([facility] ) 10/09/2018    Essential hypertension 10/09/2018    Cardiac defibrillator in situ 2016    Acute kidney injury superimposed on chronic kidney disease ([facility] )     Embolic stroke ([facility] )     Hyperlipidemia 2016    Old myocardial infarction 2016    Status post percutaneous transluminal coronary angioplasty 2016    Ventricular tachycardia ([facility] ) 2016    [facility]opathy [facility] 2016      LOS (days): 9  Geometric Mean LOS (GMLOS) (days): 3 50  Days to GMLOS:-5 9     OBJECTIVE:  Risk of Unplanned Readmission Score: 21         Current admission status: Inpatient   Preferred Pharmacy:   [address] [place] [address] Mariam Le [address]  Phone: [phone] Fax: [phone] [address] [place] [address] 5 [facility] 68  [address] [facility] [address]  Phone: [phone] Fax: [phone]    Primary Care Provider: Zeina Davenport DO    Primary Insurance: [facility]  Secondary Insurance:     DISCHARGE DETAILS:    Patient refusing Hospice today   Spoke with Ney Chan - [phone] (BRIANNE) and explained to him that the recommendation for him is to have PT and VNA as he may need O2 when he goes home  Pt refusing IP rehab  VNA was offered to the patient but the patient has been refusing  Rosalina Saldivar spoke with patient and patient reports he will agree to VNA for nursing but no PT as he is now not going home with hospice   Referrals sent for VNA

## 2022-01-28 NOTE — ASSESSMENT & PLAN NOTE
Multifactorial due to above  Patient does not want to go to inpatient rehab  DC with home therapy in 24 hours

## 2022-01-28 NOTE — CASE MANAGEMENT
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    Goals of care, counseling/discussion 2022    Dizziness 2022    Hypoxia 2022    Hypokalemia 2022    Hypomagnesemia 2022    Transaminitis 2022    Bilateral lower extremity edema 2021    Hyponatremia 12/10/2021    Acute on chronic combined systolic and diastolic congestive heart failure ([facility] ) 10/23/2021    Ischemic cardiomyopathy 10/09/2018    PAF (paroxysmal atrial fibrillation) ([facility] ) 10/09/2018    Essential hypertension 10/09/2018    Cardiac defibrillator in situ 2016    Acute kidney injury superimposed on chronic kidney disease ([facility] )     Embolic stroke ([facility] )     Hyperlipidemia 2016    Old myocardial infarction 2016    Status post percutaneous transluminal coronary angioplasty 2016    Ventricular tachycardia ([facility] ) 2016    Cardiomyopathy ([facility] ) 2016      LOS (days): 9  Geometric Mean LOS (GMLOS) (days): 3 50  Days to GMLOS:-5 9     OBJECTIVE:  Risk of Unplanned Readmission Score: 21         Current admission status: Inpatient   Preferred Pharmacy:   [address] [place] [address]  Phone: [phone] Fax: [phone] [address] [place] [address] [address] [place] [address] 92 1466 [address] [place] [address]   Phone: [phone] Fax: [phone]    Primary Care Provider: Merlyn Chavez DO    Primary Insurance: [facility]  Secondary Insurance:     DISCHARGE DETAILS:       [facility] accepted    FAX AVS WHEN D/C

## 2022-01-28 NOTE — PLAN OF CARE
Problem: Potential for Falls  Goal: Patient will remain free of falls  Description: INTERVENTIONS:  - Educate patient/family on patient safety including physical limitations  - Instruct patient to call for assistance with activity   - Consult OT/PT to assist with strengthening/mobility   - Keep Call bell within reach  - Keep bed low and locked with side rails adjusted as appropriate  - Keep care items and personal belongings within reach  - Initiate and maintain comfort rounds  - Make Fall Risk Sign visible to staff  - Offer Toileting every 2 Hours, in advance of need  - Initiate/Maintain bed alarm  - Obtain necessary fall risk management equipment: bed alarm  - Apply yellow socks and bracelet for high fall risk patients  - Consider moving patient to room near nurses station  Outcome: Progressing     Problem: MOBILITY - ADULT  Goal: Maintain or return to baseline ADL function  Description: INTERVENTIONS:  - Educate patient/family on patient safety including physical limitations  - Instruct patient to call for assistance with activity   - Consult OT/PT to assist with strengthening/mobility   - Keep Call bell within reach  - Keep bed low and locked with side rails adjusted as appropriate  - Keep care items and personal belongings within reach  - Initiate and maintain comfort rounds  - Make Fall Risk Sign visible to staff  - Offer Toileting every 2 Hours, in advance of need  - Initiate/Maintain bed alarm  - Obtain necessary fall risk management equipment: bed alarm  - Apply yellow socks and bracelet for high fall risk patients  - Consider moving patient to room near nurses station  Outcome: Progressing  Goal: Maintains/Returns to pre admission functional level  Description: INTERVENTIONS:  - Perform BMAT or MOVE assessment daily    - Set and communicate daily mobility goal to care team and patient/family/caregiver     - Collaborate with rehabilitation services on mobility goals if consulted  - Perform Range of Motion 3 times a day  - Reposition patient every 2 hours    - Dangle patient 3 times a day  - Stand patient 3 times a day  - Ambulate patient 3 times a day  - Out of bed to chair 3 times a day   - Out of bed for meals 3 times a day  - Out of bed for toileting  - Record patient progress and toleration of activity level   Outcome: Progressing     Problem: PAIN - ADULT  Goal: Verbalizes/displays adequate comfort level or baseline comfort level  Description: Interventions:  - Encourage patient to monitor pain and request assistance  - Assess pain using appropriate pain scale  - Administer analgesics based on type and severity of pain and evaluate response  - Implement non-pharmacological measures as appropriate and evaluate response  - Consider cultural and social influences on pain and pain management  - Notify physician/advanced practitioner if interventions unsuccessful or patient reports new pain  Outcome: Progressing     Problem: INFECTION - ADULT  Goal: Absence or prevention of progression during hospitalization  Description: INTERVENTIONS:  - Assess and monitor for signs and symptoms of infection  - Monitor lab/diagnostic results  - Monitor all insertion sites, i e  indwelling lines, tubes, and drains  - Monitor endotracheal if appropriate and nasal secretions for changes in amount and color  - Max appropriate cooling/warming therapies per order  - Administer medications as ordered  - Instruct and encourage patient and family to use good hand hygiene technique  - Identify and instruct in appropriate isolation precautions for identified infection/condition  Outcome: Progressing     Problem: SAFETY ADULT  Goal: Patient will remain free of falls  Description: INTERVENTIONS:  - Educate patient/family on patient safety including physical limitations  - Instruct patient to call for assistance with activity   - Consult OT/PT to assist with strengthening/mobility   - Keep Call bell within reach  - Keep bed low and locked with side rails adjusted as appropriate  - Keep care items and personal belongings within reach  - Initiate and maintain comfort rounds  - Make Fall Risk Sign visible to staff  - Offer Toileting every 2 Hours, in advance of need  - Initiate/Maintain bed alarm  - Obtain necessary fall risk management equipment: bed alarm  - Apply yellow socks and bracelet for high fall risk patients  - Consider moving patient to room near nurses station  Outcome: Progressing  Goal: Maintain or return to baseline ADL function  Description: INTERVENTIONS:  - Educate patient/family on patient safety including physical limitations  - Instruct patient to call for assistance with activity   - Consult OT/PT to assist with strengthening/mobility   - Keep Call bell within reach  - Keep bed low and locked with side rails adjusted as appropriate  - Keep care items and personal belongings within reach  - Initiate and maintain comfort rounds  - Make Fall Risk Sign visible to staff  - Offer Toileting every 2 Hours, in advance of need  - Initiate/Maintain bed alarm  - Obtain necessary fall risk management equipment: bed alarm  - Apply yellow socks and bracelet for high fall risk patients  - Consider moving patient to room near nurses station  Outcome: Progressing  Goal: Maintains/Returns to pre admission functional level  Description: INTERVENTIONS:  - Perform BMAT or MOVE assessment daily    - Set and communicate daily mobility goal to care team and patient/family/caregiver  - Collaborate with rehabilitation services on mobility goals if consulted  - Perform Range of Motion 3 times a day  - Reposition patient every 2 hours    - Dangle patient 3 times a day  - Stand patient 3 times a day  - Ambulate patient 3 times a day  - Out of bed to chair 3 times a day   - Out of bed for meals 3 times a day  - Out of bed for toileting  - Record patient progress and toleration of activity level   Outcome: Progressing     Problem: DISCHARGE PLANNING  Goal: Discharge to home or other facility with appropriate resources  Description: INTERVENTIONS:  - Identify barriers to discharge w/patient and caregiver  - Arrange for needed discharge resources and transportation as appropriate  - Identify discharge learning needs (meds, wound care, etc )  - Arrange for interpretive services to assist at discharge as needed  - Refer to Case Management Department for coordinating discharge planning if the patient needs post-hospital services based on physician/advanced practitioner order or complex needs related to functional status, cognitive ability, or social support system  Outcome: Progressing     Problem: Knowledge Deficit  Goal: Patient/family/caregiver demonstrates understanding of disease process, treatment plan, medications, and discharge instructions  Description: Complete learning assessment and assess knowledge base    Interventions:  - Provide teaching at level of understanding  - Provide teaching via preferred learning methods  Outcome: Progressing     Problem: Prexisting or High Potential for Compromised Skin Integrity  Goal: Skin integrity is maintained or improved  Description: INTERVENTIONS:  - Identify patients at risk for skin breakdown  - Assess and monitor skin integrity  - Assess and monitor nutrition and hydration status  - Monitor labs   - Assess for incontinence   - Turn and reposition patient  - Assist with mobility/ambulation  - Relieve pressure over bony prominences  - Avoid friction and shearing  - Provide appropriate hygiene as needed including keeping skin clean and dry  - Evaluate need for skin moisturizer/barrier cream  - Collaborate with interdisciplinary team   - Patient/family teaching  - Consider wound care consult   Outcome: Progressing

## 2022-01-28 NOTE — PROGRESS NOTES
[address] [place] [address]  Progress Note Yannick Cosby 10/31/1932, 80 y o  male MRN: [id number]  Unit/Bed#: E4 -01 Encounter: [id number]  Primary Care Provider: Sumaya Velasquez DO   Date and time admitted to hospital: 1/19/2022  4:03 AM    * Ventricular tachycardia (HCC)  Assessment & Plan  · Ventricular tachycardia in the setting of cardiomyopathy, hypokalemia and hypomagnesemia  · Currently stable on amiodarone 200 mg daily, mexiletene 150 mg Q8, Coreg 12 5 mg p o  B i d   · Medtronic ICD in place  · The patient decided on home hospice yesterday but changed his mind today and wants to pursue regular care  · Follow-up with Dr Contreras Shaneka his primary cardiology after discharge    Goals of care, counseling/discussion  Assessment & Plan  Advance care planning was done yesterday  He said he wanted to pursue home hospice  For today he change his mind  Discussed with wife and son who are willing to help take care of him at home  Discussed with case management regarding helping the family obtain home health aides    Acute on chronic combined systolic and diastolic congestive heart failure [facility])  Assessment & Plan  Discussed with cardiology and nephrology  Overall prognosis is poor with cardiorenal syndrome  Continue Coreg 12 5 mg b i d  With hold parameters  Diuretics will be restarted today    The patient changes mind regarding home hospice and plan to DC home with regular care//home PT/VNA    PAF (paroxysmal atrial fibrillation) ([facility])  Assessment & Plan  Rate is controlled on carvedilol and amiodarone  INR is therapeutic  Continue warfarin 2 mg daily    Acute kidney injury superimposed on chronic kidney disease ([facility] )  Assessment & Plan  CHRISTY on CKD 3b  Due to cardiorenal syndrome  Renal function still has stabilized  Patient will be restarted on torsemide 10 mg daily    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  Assessment & Plan  Multifactorial due to above  Patient does not want to go to inpatient rehab  DC with home therapy in 24 hours      VTE Pharmacologic Prophylaxis:   Pharmacologic: Warfarin (Coumadin)  Mechanical VTE Prophylaxis in Place: No    Patient Centered Rounds: I have performed bedside rounds with nursing staff today  Discussions with Specialists or Other Care Team Provider:  Discussed with cardiology Dr Otto Nguyen, Nephrology Dr Prince Bonds, case management, hospice liaison    Education and Discussions with Family / Patient:  Spoke with wife Paul Hitchcock and son Alexandra Burrell  Time Spent for Care:> 30 minutes  More than 50% of total time spent on counseling and coordination of care as described above  Current Length of Stay: 9 day(s)    Current Patient Status: Inpatient   Certification Statement: The patient will continue to require additional inpatient hospital stay due to CHF    Discharge Plan:  In 24 hours    Code Status: Level 3 - DNAR and DNI    Subjective:   He had therapy today and denied dizziness  He felt weak though  He said he does not want hospice and wants to try therapy at home  He said he change his mind  Objective:     Vitals:   Temp (24hrs), Av 7 °F (36 5 °C), Min:97 °F (36 1 °C), Max:99 5 °F (37 5 °C)    Temp:  [97 °F (36 1 °C)-99 5 °F (37 5 °C)] 97 °F (36 1 °C)  HR:  [51-77] 51  Resp:  [18] 18  BP: ()/(54-78) 125/68  SpO2:  [95 %-98 %] 97 %  Body mass index is 23 35 kg/m²  Input and Output Summary (last 24 hours): Intake/Output Summary (Last 24 hours) at 2022 1416  Last data filed at 2022 1324  Gross per 24 hour   Intake 720 ml   Output 800 ml   Net -80 ml       Physical Exam:     Physical Exam  Constitutional:       Appearance: He is not ill-appearing or diaphoretic  HENT:      Head: Normocephalic and atraumatic  Nose: No rhinorrhea  Cardiovascular:      Rate and Rhythm: Regular rhythm  Heart sounds: No murmur heard  No gallop  Pulmonary:      Effort: Pulmonary effort is normal  No respiratory distress  Breath sounds:  No wheezing or rales  Abdominal:      General: Abdomen is flat  Palpations: Abdomen is soft  Musculoskeletal:      Cervical back: Neck supple  Right lower leg: No edema  Left lower leg: No edema  Skin:     General: Skin is warm and dry  Findings: Bruising present  Neurological:      Mental Status: He is alert and oriented to person, place, and time  Psychiatric:         Mood and Affect: Mood normal          Behavior: Behavior normal      Additional Data:     Labs:    Results from last 7 days   Lab Units 01/26/22  0530   WBC Thousand/uL 6 31   HEMOGLOBIN g/dL 11 8*   HEMATOCRIT % 34 5*   PLATELETS Thousands/uL 215   NEUTROS PCT % 67   LYMPHS PCT % 18   MONOS PCT % 11   EOS PCT % 2     Results from last 7 days   Lab Units 01/27/22  0854 01/26/22  0530 01/26/22  0530   SODIUM mmol/L 132*   < > 135*   POTASSIUM mmol/L 4 3   < > 4 4   CHLORIDE mmol/L 98*   < > 99*   CO2 mmol/L 29   < > 26   BUN mg/dL 23   < > 23   CREATININE mg/dL 2 01*   < > 1 99*   ANION GAP mmol/L 5   < > 10   CALCIUM mg/dL 8 0*   < > 8 2*   ALBUMIN g/dL  --   --  2 2*   TOTAL BILIRUBIN mg/dL  --   --  0 96   ALK PHOS U/L  --   --  70   ALT U/L  --   --  109*   AST U/L  --   --  85*   GLUCOSE RANDOM mg/dL 95   < > 83    < > = values in this interval not displayed  Results from last 7 days   Lab Units 01/26/22  0530   INR  2 78*                       * I Have Reviewed All Lab Data Listed Above  * Additional Pertinent Lab Tests Reviewed:  All Labs Within Last 24 Hours Reviewed    Imaging:    Imaging Reports Reviewed Today Include:  None    Recent Cultures (last 7 days):           Last 24 Hours Medication List:   Current Facility-Administered Medications   Medication Dose Route Frequency Provider Last Rate    amiodarone  200 mg Oral Daily Erasmo Finnegan MD      atorvastatin  40 mg Oral Daily Erasmo Finnegan MD      carvedilol  12 5 mg Oral BID With Meals Erasmo Finnegan MD      magnesium oxide  400 mg Oral BID Tobin Toro MD      melatonin  6 mg Oral HS PRN Álvaro ASHLEY Chatterjee      mexiletine  150 mg Oral [facility] Erika Shrestha,       torsemide  10 mg Oral Daily Olga Ch DO      warfarin  2 mg Oral Daily (warfarin) Nichol Hall MD          Today, Patient Was Seen By: Nichol Hall MD    ** Please Note: Dictation voice to text software may have been used in the creation of this document   **

## 2022-01-28 NOTE — PLAN OF CARE
Problem: MOBILITY - ADULT  Goal: Maintain or return to baseline ADL function  Description: INTERVENTIONS:  -  Assess patient's ability to carry out ADLs; assess patient's baseline for ADL function and identify physical deficits which impact ability to perform ADLs (bathing, care of mouth/teeth, toileting, grooming, dressing, etc )  - Assess/evaluate cause of self-care deficits   - Assess range of motion  - Assess patient's mobility; develop plan if impaired  - Assess patient's need for assistive devices and provide as appropriate  - Encourage maximum independence but intervene and supervise when necessary  - Involve family in performance of ADLs  - Assess for home care needs following discharge   - Consider OT consult to assist with ADL evaluation and planning for discharge  - Provide patient education as appropriate  Outcome: Not Progressing     Problem: SAFETY ADULT  Goal: Maintain or return to baseline ADL function  Description: INTERVENTIONS:  -  Assess patient's ability to carry out ADLs; assess patient's baseline for ADL function and identify physical deficits which impact ability to perform ADLs (bathing, care of mouth/teeth, toileting, grooming, dressing, etc )  - Assess/evaluate cause of self-care deficits   - Assess range of motion  - Assess patient's mobility; develop plan if impaired  - Assess patient's need for assistive devices and provide as appropriate  - Encourage maximum independence but intervene and supervise when necessary  - Involve family in performance of ADLs  - Assess for home care needs following discharge   - Consider OT consult to assist with ADL evaluation and planning for discharge  - Provide patient education as appropriate  Outcome: Not Progressing    Problem: PAIN - ADULT  Goal: Verbalizes/displays adequate comfort level or baseline comfort level  Description: Interventions:  - Encourage patient to monitor pain and request assistance  - Assess pain using appropriate pain scale  - Administer analgesics based on type and severity of pain and evaluate response  - Implement non-pharmacological measures as appropriate and evaluate response  - Consider cultural and social influences on pain and pain management  - Notify physician/advanced practitioner if interventions unsuccessful or patient reports new pain  Outcome: Progressing     Problem: SAFETY ADULT  Goal: Patient will remain free of falls  Description: INTERVENTIONS:  - Educate patient/family on patient safety including physical limitations  - Instruct patient to call for assistance with activity   - Consult OT/PT to assist with strengthening/mobility   - Keep Call bell within reach  - Keep bed low and locked with side rails adjusted as appropriate  - Keep care items and personal belongings within reach  - Initiate and maintain comfort rounds  - Make Fall Risk Sign visible to staff  - Offer Toileting every 2 Hours, in advance of need  - Initiate/Maintain bed alarm  - Obtain necessary fall risk management equipment: walker, socks  - Apply yellow socks and bracelet for high fall risk patients  - Consider moving patient to room near nurses station  Outcome: Progressing

## 2022-01-28 NOTE — HOSPICE NOTE
Referral received and goals of care and hospice medicare benefit discussed with spouse Tarah Aiken, son via phone call and pt via bedside visit per family request   Pt seems conflicted with goals of care  Pt stated someone spoke to him in regards to rehab this morning and he may want to try that  Pt is approved for routine/home hospice should he decided on that but would need icd turned off prior to dc  Liaison to continue to follow

## 2022-01-28 NOTE — PLAN OF CARE
Problem: PHYSICAL THERAPY ADULT  Goal: Performs mobility at highest level of function for planned discharge setting  See evaluation for individualized goals  Description: Treatment/Interventions: Functional transfer training,LE strengthening/ROM,Therapeutic exercise,Endurance training,Patient/family training,Equipment eval/education,Bed mobility,Gait training,Compensatory technique education,Continued evaluation,Spoke to MD,Spoke to nursing,OT  Equipment Recommended:  (recommend use of RW at home; pt has)       See flowsheet documentation for full assessment, interventions and recommendations  Outcome: Progressing  Note: Prognosis: Fair  Problem List: Decreased strength,Decreased endurance,Impaired balance,Decreased mobility,Decreased coordination,Decreased cognition,Impaired judgement,Decreased safety awareness  Assessment: Pt  able to tolerate more activities this PT session  pt  reported dizziness  BP read 124/74 mmHg  Pt  reports fatigue though no visible distress noted  Possibly self limits? Pt  dennis impulsive for pivoting and descending to EOB post ambualtion  Pt  unsteady during ambulation and several mild imbalances posteriorly  pt  is a risk of fall with LE instability and unsteadiness  pt  needed cues for hand placement during tarnsfers  Decreased HS and foot clearance noted during ambulation  pt  positioned back in bed in chair position and all needs within reach  Requested patient and reported to RN to ambulate patient with staff 2-3x/day  Will continue to follow during the stay to [facility] functional mobility and safety  Barriers to Discharge: None        PT Discharge Recommendation:  (HHPT pending progress in PT)          See flowsheet documentation for full assessment

## 2022-01-28 NOTE — ASSESSMENT & PLAN NOTE
Discussed with cardiology and nephrology  Overall prognosis is poor with cardiorenal syndrome  Continue Coreg 12 5 mg b i d   With hold parameters  Torsemide was restarted today  The patient changes mind regarding home hospice and plan to DC home with regular care//home PT/VNA

## 2022-01-29 PROBLEM — E83.42 HYPOMAGNESEMIA: Status: RESOLVED | Noted: 2022-01-01 | Resolved: 2022-01-01

## 2022-01-29 PROBLEM — E87.6 HYPOKALEMIA: Status: RESOLVED | Noted: 2022-01-01 | Resolved: 2022-01-01

## 2022-01-29 PROBLEM — R09.02 HYPOXIA: Status: RESOLVED | Noted: 2022-01-01 | Resolved: 2022-01-01

## 2022-01-29 NOTE — DISCHARGE SUMMARY
[facility] [place] [facility]  Discharge- Cincinnatidoyle Etiennel 10/31/1932, 80 y o  male MRN: [id number]  Unit/Bed#: E4 -01 Encounter: [id number]  Primary Care Provider: Delmy Patient, DO   Date and time admitted to hospital: 1/19/2022  4:03 AM    Acute on chronic combined systolic and diastolic congestive heart failure [facility])  Assessment & Plan  Discussed with cardiology and nephrology  Overall prognosis is poor with cardiorenal syndrome however his was not ready to pursue home hospice  He wants to go home and pursue regular care with home PT and VNA  Outpatient follow-up with Dr Aranza Machado scheduled on 02/02/2022  Continue Coreg 12 5 mg b i d  , torsemide 10 mg daily with magnesium supplement    Goals of care discussion was done during this admission  He initially agreed to home hospice but changed his mind yesterday 01/28/2022  Per discussion with Cardiology and Nephrology, long-term prognosis is poor given EF of 12% and elevated creatinine from cardiorenal syndrome    Goals of care, counseling/discussion  Assessment & Plan  Advance care planning was done yesterday  He said he wanted to pursue home hospice  For today he change his mind  Discussed with wife and son who are willing to help take care of him at home  Discussed with case management regarding helping the family obtain home health aides    PAF (paroxysmal atrial fibrillation) ([facility] [address] )  Assessment & Plan  Rate is controlled on carvedilol and amiodarone  INR is therapeutic  Continue warfarin 2 mg daily    Acute kidney injury superimposed on chronic kidney disease ([facility] [address] )  Assessment & Plan  CHRISTY on CKD 3b  Due to cardiorenal syndrome  Creatinine has stabilized  Continue torsemide 10 mg daily on discharge  Discussed with nephrology      * Ventricular tachycardia (HCC)  Assessment & Plan  · Ventricular tachycardia in the setting of cardiomyopathy, hypokalemia and hypomagnesemia  · Currently stable on amiodarone 200 mg daily, mexiletene 150 mg Q8, Coreg 12 5 mg p o  B i d   · [employer]onic ICD in place  · Follow-up with Dr Laura Medina his primary cardiologist after discharge    Transaminitis  Assessment & Plan  Presumed to be Secondary to alcohol/hepatic congestion  LFTs continue to improve    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  Assessment & Plan  Multifactorial due to above  Patient does not want to go to inpatient rehab  DC with VNA and home therapies    Essential hypertension  Assessment & Plan  Continue carvedilol 12 5 mg b i d  Hyperlipidemia  Assessment & Plan  Continue Lipitor daily       Discharging Physician / Practitioner: Sarah Garza MD  PCP: Magdiel Corona DO  Admission Date:   Admission Orders (From admission, onward)     Ordered        01/19/22 0635  INPATIENT ADMISSION  Once                      Discharge Date: 01/29/22    Medical Problems             Resolved Problems  Date Reviewed: 1/29/2022          Resolved    Hypokalemia 1/29/2022     Resolved by  Sarah Garza MD    Hypomagnesemia 1/29/2022     Resolved by  Sarah Garza MD    Hypoxia 1/29/2022     Resolved by  Sarah Garza MD                Consultations During Hospital Stay:  · Cardiology  · Nephrology    Procedures Performed:   · None    Significant Findings / Test Results:   · Chest x-ray-mild pulmonary edema    Incidental Findings:   · None     Test Results Pending at Discharge (will require follow up): · None     Outpatient Tests Requested:  · None    Complications:  None    Reason for Admission:  Dizziness    Hospital Course:     Chris Barrientos is a 80 y o  male patient who originally presented to the hospital on 1/19/2022 due to dizziness and lightheadedness for 1 week  This was proceeded by a 1 and half week history of diarrhea  He was found to have significant hypokalemia, hypo magnesemia and elevated creatinine  He was admitted by the critical care service due to ventricular tachycardia    He was seen by cardiology and nephrology  Ventricular tachycardia was felt to be precipitated by hypokalemia and hypomagnesemia on top of his underlying cardiomyopathy with EF of 12%  His electrolytes were repleted  Medications were adjusted  He stayed in the hospital for total of 10 days  Goals of care discussion was done multiple times during this admission  He was made aware that his prognosis was poor given his cardiomyopathy which is now affecting his renal function  He initially agreed to home hospice but ultimately changed his mind  He also did not want to go to short-term rehab  He only agreed to go home with home therapies  He will continue his previous regimen except furosemide was switched to torsemide 10 mg daily  He also will be discharged on once daily magnesium supplement  Please see above list of diagnoses and related plan for additional information  Condition at Discharge: good     Discharge Day Visit / Exam:     Subjective:  No CP   No SOB  " I want to go home " "I would like to go home by ambulance  That's what I prefer "  Vitals: Blood Pressure: 116/64 (01/29/22 0801)  Pulse: 55 (01/29/22 0801)  Temperature: 97 6 °F (36 4 °C) (01/29/22 0801)  Temp Source: Temporal (01/29/22 0801)  Respirations: 18 (01/29/22 0801)  Height: 5' 3" (160 cm) (01/19/22 0704)  Weight - Scale: 59 5 kg (131 lb 2 8 oz) (01/29/22 0551)  SpO2: 96 % (01/29/22 0801)  Exam:   Physical Exam  Constitutional:       Appearance: He is not ill-appearing or diaphoretic  HENT:      Head: Normocephalic and atraumatic  Nose: No rhinorrhea  Eyes:      General: No scleral icterus  Cardiovascular:      Rate and Rhythm: Regular rhythm  Heart sounds: No murmur heard  No gallop  Pulmonary:      Effort: Pulmonary effort is normal  No respiratory distress  Breath sounds: No wheezing or rales  Abdominal:      General: Abdomen is flat  Palpations: Abdomen is soft     Musculoskeletal:      Cervical back: Neck supple  Right lower leg: No edema  Left lower leg: No edema  Skin:     General: Skin is warm  Neurological:      Mental Status: He is alert  Comments: Awake alert cooperative, follows command   Psychiatric:         Mood and Affect: Mood normal          Behavior: Behavior normal      Discussion with Family:  Spoke with wife Scarlet Robertson and gave update    Discharge instructions/Information to patient and family:   See after visit summary for information provided to patient and family  Provisions for Follow-Up:  See after visit summary for information related to follow-up care and any pertinent home health orders  Disposition:     Home with VNA Services (Reminder: Complete face to face encounter)    Planned Readmission: no     Discharge Statement:  I spent >30 minutes discharging the patient  This time was spent on the day of discharge  I had direct contact with the patient on the day of discharge  Greater than 50% of the total time was spent examining patient, answering all patient questions, arranging and discussing plan of care with patient as well as directly providing post-discharge instructions  Additional time then spent on discharge activities  Discharge Medications:  See after visit summary for reconciled discharge medications provided to patient and family        ** Please Note: This note has been constructed using a voice recognition system **

## 2022-01-29 NOTE — ASSESSMENT & PLAN NOTE
Discussed with cardiology and nephrology  Overall prognosis is poor with cardiorenal syndrome however his was not ready to pursue home hospice  He wants to go home and pursue regular care with home PT and VNA  Outpatient follow-up with Dr Jass Miramontes scheduled on 02/02/2022  Continue Coreg 12 5 mg b i d  , torsemide 10 mg daily with magnesium supplement    Goals of care discussion was done during this admission  He initially agreed to home hospice but changed his mind yesterday 01/28/2022    Per discussion with Cardiology and Nephrology, long-term prognosis is poor given EF of 12% and elevated creatinine from cardiorenal syndrome

## 2022-01-29 NOTE — ASSESSMENT & PLAN NOTE
Multifactorial due to above  Patient does not want to go to inpatient rehab  DC with VNA and home therapies

## 2022-01-29 NOTE — PLAN OF CARE
Problem: Potential for Falls  Goal: Patient will remain free of falls  Description: INTERVENTIONS:  - Educate patient/family on patient safety including physical limitations  - Instruct patient to call for assistance with activity   - Consult OT/PT to assist with strengthening/mobility   - Keep Call bell within reach  - Keep bed low and locked with side rails adjusted as appropriate  - Keep care items and personal belongings within reach  - Initiate and maintain comfort rounds  - Make Fall Risk Sign visible to staff  - Offer Toileting every 2 Hours, in advance of need  - Initiate/Maintain bed alarm  - Obtain necessary fall risk management equipment: bed alarm  - Apply yellow socks and bracelet for high fall risk patients  - Consider moving patient to room near nurses station  Outcome: Progressing     Problem: PAIN - ADULT  Goal: Verbalizes/displays adequate comfort level or baseline comfort level  Description: Interventions:  - Encourage patient to monitor pain and request assistance  - Assess pain using appropriate pain scale  - Administer analgesics based on type and severity of pain and evaluate response  - Implement non-pharmacological measures as appropriate and evaluate response  - Consider cultural and social influences on pain and pain management  - Notify physician/advanced practitioner if interventions unsuccessful or patient reports new pain  Outcome: Progressing     Problem: Prexisting or High Potential for Compromised Skin Integrity  Goal: Skin integrity is maintained or improved  Description: INTERVENTIONS:  - Identify patients at risk for skin breakdown  - Assess and monitor skin integrity  - Assess and monitor nutrition and hydration status  - Monitor labs   - Assess for incontinence   - Turn and reposition patient  - Assist with mobility/ambulation  - Relieve pressure over bony prominences  - Avoid friction and shearing  - Provide appropriate hygiene as needed including keeping skin clean and dry  - Evaluate need for skin moisturizer/barrier cream  - Collaborate with interdisciplinary team   - Patient/family teaching  - Consider wound care consult   Outcome: Progressing     Problem: SAFETY ADULT  Goal: Patient will remain free of falls  Description: INTERVENTIONS:  - Educate patient/family on patient safety including physical limitations  - Instruct patient to call for assistance with activity   - Consult OT/PT to assist with strengthening/mobility   - Keep Call bell within reach  - Keep bed low and locked with side rails adjusted as appropriate  - Keep care items and personal belongings within reach  - Initiate and maintain comfort rounds  - Make Fall Risk Sign visible to staff  - Offer Toileting every 2 Hours, in advance of need  - Initiate/Maintain bed alarm  - Obtain necessary fall risk management equipment: bed alarm  - Apply yellow socks and bracelet for high fall risk patients  - Consider moving patient to room near nurses station  Outcome: Progressing     Problem: MOBILITY - ADULT  Goal: Maintain or return to baseline ADL function  Description: INTERVENTIONS:  - Educate patient/family on patient safety including physical limitations  - Instruct patient to call for assistance with activity   - Consult OT/PT to assist with strengthening/mobility   - Keep Call bell within reach  - Keep bed low and locked with side rails adjusted as appropriate  - Keep care items and personal belongings within reach  - Initiate and maintain comfort rounds  - Make Fall Risk Sign visible to staff  - Offer Toileting every 2 Hours, in advance of need  - Initiate/Maintain bed alarm  - Obtain necessary fall risk management equipment: bed alarm  - Apply yellow socks and bracelet for high fall risk patients  - Consider moving patient to room near nurses station  Outcome: Not Progressing

## 2022-01-29 NOTE — CASE MANAGEMENT
Case Management Discharge Planning Note    Patient name Chris Barrientos  Location [place] [address] [address] [place] [address] MRN [id number]  : 10/31/1932 Date 2022       Current Admission Date: 2022  Current Admission Diagnosis:Ventricular tachycardia 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 2022    Goals of care, counseling/discussion 2022    Dizziness 2022    Transaminitis 2022    Bilateral lower extremity edema 2021    Hyponatremia 12/10/2021    Acute on chronic combined systolic and diastolic congestive heart failure ([facility] [address] ) 10/23/2021    Ischemic cardiomyopathy 10/09/2018    PAF (paroxysmal atrial fibrillation) ([facility] ) 10/09/2018    Essential hypertension 10/09/2018    Cardiac defibrillator in situ 2016    Acute kidney injury superimposed on chronic kidney disease ([facility] [address] ) [address]    Embolic stroke ([facility] )     Hyperlipidemia 2016    Old myocardial infarction 2016    Status post percutaneous transluminal coronary angioplasty 2016    [facility] ) 2016    Cardiomyopathy ([facility] ) 2016      LOS (days): 10  Geometric Mean LOS (GMLOS) (days): 3 50  Days to GMLOS:-6 8     OBJECTIVE:  Risk of Unplanned Readmission Score: 18         Current admission status: Inpatient   Preferred Pharmacy:   [address] [place] [address], [address] [place] [address]  Phone: [phone] Fax: [phone]    [address] [facility] [address] [facility] [address] [address]  Phone: [phone] Fax: [phone]    Primary Care Provider: Magdiel Corona DO    Primary Insurance: [facility] [employer]  Secondary Insurance:     DISCHARGE DETAILS:          Comments - Freedom of Choice: Informed Spouse, MD and RN of  time         Transported by [employer] and Unit #): Kristian w/c [facility] of Transport (Time): 1300

## 2022-01-29 NOTE — CASE MANAGEMENT
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 2022    Goals of care, counseling/discussion 2022    Dizziness 2022    Hypoxia 2022    Hypokalemia 2022    Hypomagnesemia 2022    Transaminitis 2022    Bilateral lower extremity edema 2021    Hyponatremia 12/10/2021    Acute on chronic combined systolic and diastolic congestive heart failure ([facility] [address] ) 10/23/2021    Ischemic cardiomyopathy 10/09/2018    PAF (paroxysmal atrial fibrillation) ([facility] ) 10/09/2018    Essential hypertension 10/09/2018    Cardiac defibrillator in situ 2016    Acute kidney injury superimposed on chronic kidney disease ([facility] )     Embolic stroke ([facility] [facility] )     Hyperlipidemia 2016    Old myocardial infarction 2016    Status post percutaneous transluminal coronary angioplasty 2016    Ventricular tachycardia ([facility] [facility] ) 2016    [facility] 2016      LOS (days): 10  Geometric Mean LOS (GMLOS) (days): 3 50  Days to GMLOS:-6 7     OBJECTIVE:  Risk of Unplanned Readmission Score: 18         Current admission status: Inpatient   Preferred Pharmacy:   [address] [place] [address]  Phone: [phone] Fax: [phone]    [facility] [address] [facility] [address] [facility] [address]  Phone: [phone] Fax: [phone]    Primary Care Provider: Jhon Diallo DO    Primary Insurance: [facility] [employer]  Secondary Insurance:     DISCHARGE DETAILS:          Comments - Freedom of Choice: LSW covering on Saturday   MD will be discharging pt home today with VNA  Will fax AVS once discharge order is written  Met with pt and he reports he will need to use w/c van ride for home  Made pt aware he will get billed for this service       Transport at Discharge : Wheelchair van  Dispatcher Contacted: Yes        ETA of Transport (Date): 01/29/22     IMM Given (Date):: 01/29/22  IMM Given to[de-identified] Patient (Read IMM, copy given and copy put in bin to be scan )

## 2022-01-29 NOTE — PLAN OF CARE
Problem: PAIN - ADULT  Goal: Verbalizes/displays adequate comfort level or baseline comfort level  Description: Interventions:  - Encourage patient to monitor pain and request assistance  - Assess pain using appropriate pain scale  - Administer analgesics based on type and severity of pain and evaluate response  - Implement non-pharmacological measures as appropriate and evaluate response  - Consider cultural and social influences on pain and pain management  - Notify physician/advanced practitioner if interventions unsuccessful or patient reports new pain  Outcome: Progressing     Problem: INFECTION - ADULT  Goal: Absence or prevention of progression during hospitalization  Description: INTERVENTIONS:  - Assess and monitor for signs and symptoms of infection  - Monitor lab/diagnostic results  - Monitor all insertion sites, i e  indwelling lines, tubes, and drains  - Monitor endotracheal if appropriate and nasal secretions for changes in amount and color  - [place] appropriate cooling/warming therapies per order  - Administer medications as ordered  - Instruct and encourage patient and family to use good hand hygiene technique  - Identify and instruct in appropriate isolation precautions for identified infection/condition  Outcome: Progressing

## 2022-01-29 NOTE — ASSESSMENT & PLAN NOTE
CHRISTY on CKD 3b  Due to cardiorenal syndrome  Creatinine has stabilized  Continue torsemide 10 mg daily on discharge  Discussed with nephrology

## 2022-01-29 NOTE — ASSESSMENT & PLAN NOTE
· Ventricular tachycardia in the setting of cardiomyopathy, hypokalemia and hypomagnesemia  · Currently stable on amiodarone 200 mg daily, mexiletene 150 mg Q8, Coreg 12 5 mg p o  B i d   · Medtronic ICD in place    · Follow-up with Dr Anna Wheeler his primary cardiologist after discharge

## 2022-01-31 NOTE — TELEPHONE ENCOUNTER
Wife called stating patient has OV on Wed  Labs needed she is concerned   ovarian was made 1/17 prior to recent hospitalization   cancelled this appt pt was just d/c on Saturday (this was appt made prior to this hosptial stay) will review with Dr Keyur Madrid when follow up should be made   the patient decided against hospice for now sent home with home PT and nurse  Please advise    thanks

## 2022-02-01 NOTE — TELEPHONE ENCOUNTER
Spoke with patient and he would like to be seen in March, unfortunately  Dr Cecy Hitchcock does not have any openings   I did place patient on a wait list  I offered an appointment with the NP, but he would like to see Dr Cecy Hitchcock

## 2022-02-01 NOTE — TELEPHONE ENCOUNTER
Lucio Brown called me about what happened to him last night  He said he almost fell out of bed  He said he felt his heart rate and it was high for about 15 min  He thinks its related to the mexiletine he takes 3x day  He has a visiting nurse coming in today and I told Enrique Moore and his wife that she will be checking him over and to tell her everything that has been happening to him   They said ok

## 2022-02-03 NOTE — TELEPHONE ENCOUNTER
Belkys Beard called again and said he thinks its the mexiletine because its after the 3rd dose of it and during the night that he is getting dizzy   Please advise

## 2022-02-03 NOTE — TELEPHONE ENCOUNTER
Patient called, asking who he needs to call, states he did download his device  I advised he does not need to call anyone, once the device clinic downloads his information, it will be sent to Dr Roseanna Chatterjee will inform us with next step  Patient states he will be calling every hour  I advised he should not call every hour that we will let him know with Dr Stefanie Jose recommendations, that he is in the hospital today, hopefully will hear this afternoon  Patient states his BP was 129/89 and HR 44

## 2022-02-03 NOTE — TELEPHONE ENCOUNTER
Please call the device Clinic in ask them to check his pacemaker and see if he had any arrhythmias on the night in question    Have them send me a message regarding their findings

## 2022-02-04 NOTE — PROGRESS NOTES
NON-BILLABLE CARELINK TRANSMISSION REQUEST PER DR Marino Mathis  BATTERY VOLTAGE ADEQUATE (6 9 YRS)  AP: 58 3%  : 70 6% (>40%~DDDR/60~MVP-OFF)  ALL AVAILABLE LEAD PARAMETERS WITHIN NORMAL LIMITS  NO SIGNIFICANT HIGH RATE EPISODES  SINCE LAST CHECKED ON 2/1/22  OPTI-VOL WITHIN NORMAL LIMITS; HOWEVER, NOTE THAT OBSERVATION DOES ALERT FOR HIGH RISK FOR  HEART FAILURE DUE TO RECENT SHOCKS, NIGHT TIME HEART RATE  PT TAKES DEMADEX, MEXILETINE, AMIODARONE, COREG, WARFARIN  EF: 12% (ECHO 10/24/21)   TASK TO DR Andrea Fagan ICD  [facility]

## 2022-02-05 PROBLEM — N18.4 CHRONIC KIDNEY DISEASE, STAGE 4 (SEVERE) (HCC): Status: ACTIVE | Noted: 2022-01-01

## 2022-02-10 NOTE — PROGRESS NOTES
Called patient gave instructions ask to recheck on 2/16  Pt agreed  Pt states eating okay  Will hold x2 then lmg sat sun mon 2 on Tuesday recheck on 2/16

## 2022-02-11 NOTE — PROGRESS NOTES
[facility]  [address] [facility], [place] [address] [facility]  Phone#  [phone]   Fax#  [phone]  *-*-*-*-*-*-*-*-*-*-*-*-*-*-*-*-*-*-*-*-*-*-*-*-*-*-*-*-*-*-*-*-*-*-*-*-*-*-*-*-*-*-*-*-*-*-*-*-*-*-*-*-*-*                                   Cardiology Follow Up      ENCOUNTER DATE: 22 9:51 AM  PATIENT NAME: Mark Torres   : 10/31/1932    MRN: [id number]  AGE:89 y o  SEX: male  [address] Tito Paulino MD     PRIMARY CARE PHYSICIAN: Capri Woo DO    ACTIVE DIAGNOSIS THIS VISIT  1  VT (ventricular tachycardia) (HCC)  POCT ECG   2  Ischemic cardiomyopathy     3  Old myocardial infarction     4  PAF (paroxysmal atrial fibrillation) ([address] )     5  Acute on chronic combined systolic and diastolic congestive heart failure ([address] )     6  Mixed hyperlipidemia     7  Cardiac defibrillator in situ     8  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 ([address] )     9  Essential hypertension     10  Bilateral lower extremity edema     11  Chronic kidney disease, stage 4 (severe) ([address] )       ACTIVE PROBLEM LIST  Patient Active Problem List   Diagnosis    Cardiac defibrillator in situ    Acute kidney injury superimposed on chronic kidney disease ([address] )    Embolic stroke ([address] )    Hyperlipidemia    Old myocardial infarction    Status post percutaneous transluminal coronary angioplasty    Ventricular tachycardia (HCC)    Ischemic cardiomyopathy    PAF (paroxysmal atrial fibrillation) ([address] )    Essential hypertension    Cardiomyopathy ([address] )    Acute on chronic combined systolic and diastolic congestive heart failure (HCC)    Hyponatremia    Bilateral lower extremity edema    Transaminitis    Dizziness    Goals of care, counseling/discussion    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    Chronic kidney disease, stage 4 (severe) ([address] )       CARDIOLOGY SPECIALTY COMMENTS  Patient was 1st seen May 23, 2016   He had moved from [place] and was seeking to establish cardiology care  In 1989, he had a myocardial infarction treated by [facility] resulting in an ischemic cardiomyopathy with an ejection fraction of 35%  He has a Medtronic ICD which was placed on May 9th, 2011 in [place]  He is on chronic oral anticoagulation  He has a history of paroxysmal atrial fibrillation with a TIA/CVA in the past  Prior history includes myocardial infarction, ventricular tachycardia, TIA/CVA, GI bleed, hypertension and hyperlipidemia  INTERVAL HISTORY:          Patient has received numerous recent ICD shocks prior to previous visit  patient has received no further ICD shocks  His ICD is reporting "NOTE THAT OBSERVATION DOES ALERT FOR HIGH RISK FOR  HEART FAILURE DUE TO RECENT SHOCKS, NIGHT TIME HEART RATE "  He was placed on mexiletine to try to reduce his episodes of ventricular tachycardia  He is already on amiodarone  Initially on mexiletine 150 mg q 8 H, he had dizziness and was unsteady walking  However now on q 12h he seems to be tolerating the medicine without difficulty  He is tolerating the mexiletine without dizziness or loss of balance  He has no other cardiac complaints  Patient denies chest discomfort or shortness of breath  Patient has no palpitations  Patient denies symptoms of dizziness, lightheadedness or near-syncope/syncope  Patient denies leg edema  Patient denies symptoms of orthopnea or paroxysmal nocturnal dyspnea  DISCUSSION/PLAN:            1  Continue present medications    2  return in 6 weeks   3   EKG on return    Lab Studies:    Lab Results   Component Value Date    CHOLESTEROL 192 04/27/2021    CHOLESTEROL 158 12/12/2019     Lab Results   Component Value Date    TRIG 109 04/27/2021    TRIG 105 12/12/2019     Lab Results   Component Value Date    HDL 62 04/27/2021    HDL 52 12/12/2019     Lab Results   Component Value Date    LDLCALC 108 (H) 04/27/2021    [address] 12/12/2019         Lab Results   Component Value Date    NTBNP 1,757 (H) 02/10/2022    NTBNP 4,679 (H) 01/21/2022    NTBNP 1,058 (H) 01/19/2022       Lab Results   Component Value Date    EGFR 16 02/10/2022    EGFR 28 01/29/2022    EGFR 28 01/27/2022    SODIUM 134 (L) 02/10/2022    SODIUM 131 (L) 01/29/2022    SODIUM 132 (L) 01/27/2022    K 3 3 (L) 02/10/2022    K 4 3 01/29/2022    K 4 3 01/27/2022    CL 95 (L) 02/10/2022    CL 99 (L) 01/29/2022    CL 98 (L) 01/27/2022    CO2 31 02/10/2022    CO2 24 01/29/2022    CO2 29 01/27/2022    BUN 45 (H) 02/10/2022    BUN 24 01/29/2022    BUN 23 01/27/2022    CREATININE 3 14 (H) 02/10/2022    CREATININE 2 04 (H) 01/29/2022    CREATININE 2 01 (H) 01/27/2022     Lab Results   Component Value Date    WBC 7 54 02/10/2022    WBC 6 31 01/26/2022    WBC 6 63 01/24/2022    HGB 13 4 02/10/2022    HGB 11 8 (L) 01/26/2022    HGB 11 6 (L) 01/24/2022    HCT 40 9 02/10/2022    HCT 34 5 (L) 01/26/2022    HCT 33 8 (L) 01/24/2022    MCV 97 02/10/2022    MCV 95 01/26/2022    MCV 94 01/24/2022    MCH 31 6 02/10/2022    MCH 32 4 01/26/2022    MCH 32 1 01/24/2022    MCHC 32 8 02/10/2022    MCHC 34 2 01/26/2022    MCHC 34 3 01/24/2022     02/10/2022     01/26/2022     01/24/2022      Lab Results   Component Value Date    CALCIUM 9 1 02/10/2022    CALCIUM 7 7 (L) 01/29/2022    CALCIUM 8 0 (L) 01/27/2022    AST 85 (H) 01/26/2022     (H) 01/25/2022     (H) 01/21/2022     (H) 01/26/2022     (H) 01/25/2022     (H) 01/21/2022    ALKPHOS 70 01/26/2022    ALKPHOS 84 01/25/2022    ALKPHOS 70 01/21/2022    MG 1 8 02/10/2022    MG 1 9 01/24/2022    MG 1 6 01/23/2022     Lab Results   Component Value Date    TROPONINI <0 02 10/23/2021       Lab Results   Component Value Date    FERRITIN 368 12/12/2019    IRON 85 12/12/2019    TIBC 276 12/12/2019     Results for orders placed or performed in visit on 02/11/22   POCT ECG    Narrative     Atrial sensed and ventricular paced rhythm with premature ventricular contractions    QRS duration 218 milliseconds, QT/QTC interval 586/599 milliseconds  Corrected QT for QRS duration 458/469  Abnormal EKG  Current Outpatient Medications:     amiodarone 200 mg tablet, Take 1 tablet (200 mg total) by mouth daily, Disp: 90 tablet, Rfl: 3    atorvastatin (LIPITOR) 40 mg tablet, Take 1 tablet (40 mg total) by mouth daily, Disp: 90 tablet, Rfl: 3    carvedilol (COREG) 12 5 mg tablet, Take 1 tablet (12 5 mg total) by mouth 2 (two) times a day with meals, Disp: 180 tablet, Rfl: 3    Cholecalciferol (VITAMIN D3) 50 MCG (2000 UT) capsule, Take 2,000 Units by mouth daily  , Disp: , Rfl:     Glucosamine Sulfate 1000 MG CAPS, 1 capsule Every 12 hours, Disp: , Rfl:     magnesium oxide (MAG-OX) 400 mg, Take 1 tablet (400 mg total) by mouth daily, Disp: 90 tablet, Rfl: 3    mexiletine (MEXITIL) 150 mg capsule, Take 1 capsule (150 mg total) by mouth 2 (two) times a day, Disp: 180 capsule, Rfl: 3    torsemide (DEMADEX) 10 mg tablet, Take 1 tablet (10 mg total) by mouth daily, Disp: 90 tablet, Rfl: 3    warfarin (COUMADIN) 2 mg tablet, Take 1 tablet daily or as directed by physician, Disp: 150 tablet, Rfl: 3  No Known Allergies    Past Medical History:   Diagnosis Date    Anemia     Arthritis     Coronary artery disease     CVA [facility]cerebral vascular accident) [facility]     Hyperlipidemia     Hypertension      Social History     Socioeconomic History    Marital status: /Civil Union     Spouse name: Not on file    Number of children: Not on file    Years of education: Not on file    Highest education level: Not on file   Occupational History    Not on file   Tobacco Use    Smoking status: Never Smoker    Smokeless tobacco: Never Used   Vaping Use    Vaping Use: Never used   Substance and Sexual Activity    Alcohol use:  Yes     Alcohol/week: 3 0 standard drinks     Types: 3 Shots of liquor per week     Comment: daily    Drug use: Never    Sexual activity: Not on file   Other Topics Concern    Not on file   Social History Narrative    Not on file     Social Determinants of Health     Financial Resource Strain: Not on file   Food Insecurity: Unknown    Worried About Running Out of Food in the Last Year: Never true    Leonie of Food in the Last Year: Not on file   Transportation Needs: No Transportation Needs    Lack of Transportation (Medical): No    Lack of Transportation (Non-Medical): No   Physical Activity: Not on file   Stress: Not on file   Social Connections: Not on file   Intimate Partner Violence: Not on file   Housing Stability: Unknown    Unable to Pay for Housing in the Last Year: No    Number of Places Lived in the Last Year: Not on file    Unstable Housing in the Last Year: Not on file      Family History   Problem Relation Age of Onset    Coronary artery disease Mother     Heart attack Father         MI    Stroke Sister      Past Surgical History:   Procedure Laterality Date    CARDIAC PACEMAKER PLACEMENT  05/09/2011    Medtronic dual chamber ICD implant    CORONARY ANGIOPLASTY  1989       PREVIOUS WEIGHTS:   Wt Readings from Last 10 Encounters:   02/11/22 56 1 kg (123 lb 9 6 oz)   01/29/22 59 5 kg (131 lb 2 8 oz)   12/21/21 65 2 kg (143 lb 12 8 oz)   12/10/21 67 2 kg (148 lb 3 2 oz)   12/10/21 67 8 kg (149 lb 8 oz)   11/16/21 64 5 kg (142 lb 3 2 oz)   10/26/21 63 1 kg (139 lb 1 8 oz)   10/23/21 68 kg (150 lb)   05/14/21 69 3 kg (152 lb 12 8 oz)   11/02/20 69 5 kg (153 lb 3 2 oz)        Review of Systems:  Review of Systems   Constitutional: Negative for activity change  Respiratory: Negative for cough, chest tightness, shortness of breath and wheezing  Cardiovascular: Negative for chest pain, palpitations and leg swelling  Musculoskeletal: Negative for gait problem  Skin: Negative for color change  Neurological: Negative for dizziness, tremors, syncope, weakness, light-headedness and headaches  Psychiatric/Behavioral: Negative for agitation and confusion         Physical Exam:  BP 98/56   Pulse 63   Resp 16   Ht 5' 3" (1 6 m)   Wt 56 1 kg (123 lb 9 6 oz)   BMI 21 89 kg/m²     Physical Exam  Vitals reviewed  Constitutional:       General: He is not in acute distress  Appearance: He is well-developed  HENT:      Head: Normocephalic and atraumatic  Neck:      Thyroid: No thyromegaly  Vascular: No carotid bruit or JVD  Trachea: No tracheal deviation  Cardiovascular:      Rate and Rhythm: Normal rate and regular rhythm  Pulses: Normal pulses  Heart sounds: Normal heart sounds  No murmur heard  No friction rub  No gallop  Pulmonary:      Effort: Pulmonary effort is normal  No respiratory distress  Breath sounds: Normal breath sounds  No wheezing, rhonchi or rales  Chest:      Chest wall: No tenderness  Musculoskeletal:      Cervical back: Normal range of motion and neck supple  Right lower leg: No edema  Left lower leg: No edema  Skin:     General: Skin is warm and dry  Neurological:      General: No focal deficit present  Mental Status: He is alert and oriented to person, place, and time  Psychiatric:         Mood and Affect: Mood normal          Behavior: Behavior normal          Thought Content: Thought content normal          Judgment: Judgment normal          -------------------------------------------------------------------------------   CARDIAC EP DEVICE REPORT  Results for orders placed in visit on 02/03/22    Cardiac EP device report    Narrative  MDT-DUAL CHAMBER ICD (AAIR-DDDR MODE)/ ACTIVE SYSTEM IS MRI CONDITIONAL  NON-BILLABLE CARELINK TRANSMISSION REQUEST PER DR Delmy Miles  BATTERY VOLTAGE ADEQUATE (6 9 YRS)  AP: 58 3%  : 70 6% (>40%~DDDR/60~MVP-OFF)  ALL AVAILABLE LEAD PARAMETERS WITHIN NORMAL LIMITS  NO SIGNIFICANT HIGH RATE EPISODES  SINCE LAST CHECKED ON 2/1/22   OPTI-VOL WITHIN NORMAL LIMITS; HOWEVER, NOTE THAT OBSERVATION DOES ALERT FOR HIGH RISK FOR  HEART FAILURE DUE TO RECENT SHOCKS, NIGHT TIME HEART RATE  PT TAKES DEMADEX, MEXILETINE, AMIODARONE, COREG, WARFARIN  EF: 12% (ECHO 10/24/21)  TASK TO DR Michael Boston   APPROPRIATELY FUNCTIONING ICD  [address]      Results for orders placed in visit on 02/01/22    Cardiac EP device report    Narrative  MDT-DUAL CHAMBER ICD (AAIR-DDDR MODE)/ ACTIVE SYSTEM IS MRI CONDITIONAL  NB/PT CONCERN-CARELINK TRANSMISSION: BATTERY STATUS "7 YRS " AP 62%  98%  ALL AVAILABLE LEAD PARAMETERS WITHIN NORMAL LIMITS  NO SIGNIFICANT HIGH RATE EPISODES  NORMAL DEVICE FUNCTION  NC      ======================================================  Imaging:   I have personally reviewed pertinent reports  I spent 40 minutes on the patient's office visit  This time was spent on the day of the visit  I had direct contact with the patient in the office on the day of the visit  Greater than 50% of the total time was spent obtaining a history, examining patient, answering all patient questions, arranging and discussing plan of care with patient as well as directly providing instructions  Additional time then spent on orders and office chart  Portions of the record may have been created with voice recognition software  Occasional wrong word or "sound a like" substitutions may have occurred due to the inherent limitations of voice recognition software  Read the chart carefully and recognize, using context, where substitutions have occurred      SIGNATURES:   Isaías Hendrix MD

## 2022-02-11 NOTE — PROGRESS NOTES
Patient called, states he will not be able to get his blood work done until [employer] 2/17/22  Advised he should take 2 mg on Tues and Wed  Patient verbalizes understanding

## 2022-02-15 NOTE — PROGRESS NOTES
Results for orders placed or performed in visit on 02/15/22   Cardiac EP device report    Narrative    MDT-DUAL CHAMBER ICD (AAIR-DDDR MODE)/ ACTIVE SYSTEM IS MRI CONDITIONAL  CARELINK TRANSMISSION: BATTERY VOLTAGE ADEQUATE (6 8 YRS)  AP-99%, -97% (>40% [email or web address] OFF)  ALL AVAILABLE LEAD PARAMETERS WITHIN NORMAL LIMITS  NO SIGNIFICANT HIGH RATE EPISODES  OPTI-VOL WITHIN NORMAL LIMITS  NORMAL DEVICE FUNCTION   GV

## 2022-02-17 NOTE — PROGRESS NOTES
Spoke with patient, advised INR good, but on the higher end, will have patient take 1 mg tonight, then 1 mg Mon Wed Fri and 2 mg other days   Will recheck in 2 weeks 3/3/22

## 2022-02-20 NOTE — TELEPHONE ENCOUNTER
Please call patient and tell him that after reviewing his chart I decided that we should stop the torsemide that he is taking 10 mg daily  His kidneys have been deteriorating in his potassium is down to 3 3  Tell him to stop his torsemide and set it aside somewhere so he does not take it by mistake  Tell him not to throw it out, he may need it in the future  Tell him I would like him to go for a blood test in approximately 2 weeks and see if his kidneys are improving  I placed an order for AFib nonfasting BMP in 2 weeks

## 2022-02-21 NOTE — TELEPHONE ENCOUNTER
[address]  [address] [facility] [address] [facility]  Authorization for Surgical Operation and Procedure     Date:___________                                                                                                         Time:__________ Phone call to patient per Dr Charan Rojas MD 14 hours ago (6:28 PM)     MELANIE       Please call patient and tell him that after reviewing his chart I decided that we should stop the torsemide that he is taking 10 mg daily  His kidneys have been deteriorating in his potassium is down to 3 3      Tell him to stop his torsemide and set it aside somewhere so he does not take it by mistake  Tell him not to throw it out, he may need it in the future      Tell him I would like him to go for a blood test in approximately 2 weeks and see if his kidneys are improving  I placed an order for AFib nonfasting BMP in 2 weeks              Spoke with wife  Gave her Home draw phone# [phone]  Gene to have INR 3/3/22  BMP in two weeks  Wife acknowledged and understood  4.   Should the need arise during my operation or immediate post-operative period, I also consent to the administration of blood and/or blood products.   Further, I understand that despite careful testing and screening of blood or blood products by toño 8.   I recognize that in the event my procedure results in extended X-Ray/fluoroscopy time, I may develop a skin reaction. 9.  If I have a Do Not Attempt Resuscitation (DNAR) order in place, that status will be suspended while in the operating room, proc 1. ILiz Smoker agree to be cared for by an anesthesiologist, who is specially trained to monitor me and give me medicine to put me to sleep or keep me comfortable during my procedure    I understand that my anesthesiologist is not an employee or ag 5. My doctor has explained to me other choices available to me for my care (alternatives).   6. Pregnant Patients (“epidural”):  I understand that the risks of having an epidural (medicine given into my back to help control pain during labor), include itchi

## 2022-03-03 NOTE — PROGRESS NOTES
Spoke with patient, advised INR good,will continue same dose, 1 mg Mon Wed Fri, 2 mg other days, will recheck in 3 weeks 3/24/22

## 2022-03-07 NOTE — TELEPHONE ENCOUNTER
Patient wanted to move appt from Thurs to [place], unfortunately, Oza Klinefelter is NOT in the patients area on Mondays   Patient will keep original appt

## 2022-03-07 NOTE — TELEPHONE ENCOUNTER
Please call patient and tell him that his kidney function is much better and he should stay off torsemide, his diuretic    However, tell him that his potassium is still low and this is dangerous for arrhythmias which potentially could give him defibrillator shocks  I would like him to begin K Dur 10 mEq 2 times a day  I have sent a prescription to his pharmacy for K Dur I sent it to the [facility] he needs to pick it up and begin the medicine ASAP    I placed an order for him to have a non fast BMP repeated in 2 weeks  It is very important that he have this blood test done

## 2022-03-07 NOTE — PROGRESS NOTES
Patient's potassium is 3 4 and his creatinine has increased from 3 14 -> 1 72 since he has discontinued his diuretic, torsemide  With the improvement in his renal function, will begin potassium supplementation K Dur 10 mEq b i d     Will not resume torsemide at this time

## 2022-03-11 NOTE — TELEPHONE ENCOUNTER
Pt called to verify dosing of mexiletene  Bottle said TID but is to take BID due to not tolerating q 8 hrs Dr Bibi Montes De Oca changed to q 12 hours  Pt understood

## 2022-03-14 NOTE — TELEPHONE ENCOUNTER
PC from patient with not much information at all  Mari Roper states he is short of breath even just sitting  It "comes and goes"  It has been going on for a week and a half  He states no edema anywhere and SOB is his only symptom  He wasn't sure if he takes torsemide, but when I said he should be taking it, his response was "well if it is on the list, then I am taking it"  ( He has an appointment on 3/25/22  ) Please advise

## 2022-03-15 NOTE — ED PROVIDER NOTES
History  Chief Complaint   Patient presents with    Shortness of Breath     intermittant SOB x 1 week  denies CP, nausea, vomiting, fevers       History provided by:  Patient   used: No    Medical Problem - Major  Location:  Dyspnea descibed as more like orthopnea but now with exertion as well  H/O significant cardiomyopathy with EF 12%  Has pacer/aicd  Not presently taking demedex as he was told to stop  No fever  Not vaccinated for covid  Severity:  Moderate  Onset quality:  Gradual  Duration:  1 week  Timing:  Intermittent  Progression:  Worsening  Chronicity:  New  Relieved by:  Rest  Worsened by:  Laying down and exertion  Ineffective treatments:  Taking his other meds, just not demedex  Associated symptoms: shortness of breath    Associated symptoms: no abdominal pain, no chest pain, no congestion, no cough, no fever, no headaches, no nausea, no rhinorrhea, no sore throat and no vomiting      Wt Readings from Last 3 Encounters:   03/15/22 64 5 kg (142 lb 3 2 oz)   22 56 1 kg (123 lb 9 6 oz)   22 59 5 kg (131 lb 2 8 oz)     Prior to Admission Medications   Prescriptions Last Dose Informant Patient Reported? Taking?    Cholecalciferol (VITAMIN D3) 50 MCG (2000 UT) capsule  Self Yes Yes   Sig: Take 2,000 Units by mouth daily     Glucosamine Sulfate 1000 MG CAPS  Self Yes Yes   Si capsule Every 12 hours   amiodarone 200 mg tablet  Self No Yes   Sig: Take 1 tablet (200 mg total) by mouth daily   atorvastatin (LIPITOR) 40 mg tablet  Self No Yes   Sig: Take 1 tablet (40 mg total) by mouth daily   carvedilol (COREG) 12 5 mg tablet  Self No Yes   Sig: Take 1 tablet (12 5 mg total) by mouth 2 (two) times a day with meals   magnesium oxide (MAG-OX) 400 mg  Self No Yes   Sig: Take 1 tablet (400 mg total) by mouth daily   mexiletine (MEXITIL) 150 mg capsule  Self No Yes   Sig: Take 1 capsule (150 mg total) by mouth 2 (two) times a day   potassium chloride (K-DUR,KLOR-CON) 10 mEq tablet No Yes   Sig: Take 1 tablet (10 mEq total) by mouth 2 (two) times a day   torsemide (DEMADEX) 10 mg tablet Not Taking at past x2 weeks Self No No   Sig: Take 1 tablet (10 mg total) by mouth daily   Patient not taking: Reported on 3/15/2022    warfarin (COUMADIN) 2 mg tablet  Self No Yes   Sig: Take 1 tablet daily or as directed by physician      Facility-Administered Medications: None       Past Medical History:   Diagnosis Date    Anemia     Arthritis     Coronary artery disease     [facility]A [facility]cerebral vascular accident) ([facility] [address] )     Hyperlipidemia     Hypertension        Past Surgical History:   Procedure Laterality Date    CARDIAC PACEMAKER PLACEMENT  05/09/2011    [employer]onic dual chamber ICD implant    CORONARY ANGIOPLASTY  1989       Family History   Problem Relation Age of Onset    Coronary artery disease Mother     Heart attack Father         MI    Stroke Sister      I have reviewed and agree with the history as documented  E-Cigarette/Vaping    E-Cigarette Use Never User      E-Cigarette/Vaping Substances    Nicotine No     THC No     CBD No     Flavoring No     Other No     Unknown No      Social History     Tobacco Use    Smoking status: Never Smoker    Smokeless tobacco: Never Used   Vaping Use    Vaping Use: Never used   Substance Use Topics    Alcohol use: Yes     Alcohol/week: 3 0 standard drinks     Types: 3 Shots of liquor per week     Comment: daily    Drug use: Never       Review of Systems   Constitutional: Negative for chills and fever  HENT: Negative for congestion, rhinorrhea and sore throat  Respiratory: Positive for shortness of breath  Negative for cough and chest tightness  Cardiovascular: Positive for leg swelling  Negative for chest pain  Gastrointestinal: Negative for abdominal pain, nausea and vomiting  Genitourinary: Negative for difficulty urinating and dysuria  Musculoskeletal: Negative for gait problem     Neurological: Negative for weakness, numbness and headaches  All other systems reviewed and are negative  Physical Exam  Physical Exam  Vitals and nursing note reviewed  Constitutional:       General: He is not in acute distress  Appearance: Normal appearance  He is well-developed  He is not ill-appearing, toxic-appearing or diaphoretic  HENT:      Head: Normocephalic and atraumatic  Right Ear: Hearing normal  No drainage or swelling  Left Ear: Hearing normal  No drainage or swelling  Eyes:      General: Lids are normal          Right eye: No discharge  Left eye: No discharge  Conjunctiva/sclera: Conjunctivae normal    Neck:      Vascular: No JVD  Trachea: Trachea normal    Cardiovascular:      Rate and Rhythm: Normal rate and regular rhythm  Pulses: Normal pulses  Heart sounds: Normal heart sounds  No murmur heard  No friction rub  No gallop  Pulmonary:      Effort: Pulmonary effort is normal  No respiratory distress  Breath sounds: No stridor  Rales present  No wheezing  Abdominal:      Palpations: Abdomen is soft  Tenderness: There is no abdominal tenderness  There is no guarding or rebound  Musculoskeletal:         General: No tenderness  Normal range of motion  Cervical back: Normal range of motion  Right lower leg: Edema present  Left lower leg: Edema present  Skin:     General: Skin is warm and dry  Coloration: Skin is not pale  Findings: No rash  Neurological:      General: No focal deficit present  Mental Status: He is alert  GCS: GCS eye subscore is 4  GCS verbal subscore is 5  GCS motor subscore is 6  Cranial Nerves: No cranial nerve deficit  Sensory: No sensory deficit  Motor: No abnormal muscle tone  Psychiatric:         Mood and Affect: Mood normal          Speech: Speech normal          Behavior: Behavior is cooperative           Vital Signs  ED Triage Vitals   Temperature Pulse Respirations Blood Pressure SpO2 03/15/22 1019 03/15/22 1019 03/15/22 1019 03/15/22 1027 03/15/22 1019   98 2 °F (36 8 °C) 83 17 148/98 95 %      Temp Source Heart Rate Source Patient Position - Orthostatic VS BP Location FiO2 (%)   03/15/22 1019 03/15/22 1019 03/15/22 1027 03/15/22 1027 --   Oral Monitor Sitting Left arm       Pain Score       --                  Vitals:    03/15/22 1019 03/15/22 1027   BP:  148/98   Pulse: 83    Patient Position - Orthostatic VS:  Sitting         Visual Acuity      ED Medications  Medications   magnesium sulfate 2 g/50 mL IVPB (premix) 2 g (has no administration in time range)   potassium chloride (K-DUR,KLOR-CON) CR tablet 40 mEq (has no administration in time range)   furosemide (LASIX) injection 40 mg (has no administration in time range)   furosemide (LASIX) injection 40 mg (40 mg Intravenous Given 3/15/22 1142)       Diagnostic Studies  Results Reviewed     Procedure Component Value Units Date/Time    HS Troponin I 4hr [[id number]]     Lab Status: No result Specimen: Blood     COVID/FLU/RSV - 2 hour TAT [[id number]]  (Normal) Collected: 03/15/22 1053    Lab Status: Final result Specimen: Nares from Nose Updated: 03/15/22 1155     SARS-CoV-2 Negative     INFLUENZA A PCR Negative     INFLUENZA B PCR Negative     RSV PCR Negative    Narrative:      FOR PEDIATRIC PATIENTS - copy/paste COVID Guidelines URL to browser: [email or web address]    SARS-CoV-2 assay is a Nucleic Acid Amplification assay intended for the  qualitative detection of nucleic acid from SARS-CoV-2 in nasopharyngeal  swabs  Results are for the presumptive identification of SARS-CoV-2 RNA  Positive results are indicative of infection with SARS-CoV-2, the virus  causing COVID-19, but do not rule out bacterial infection or co-infection  with other viruses   Laboratories within the [place] and its  territories are required to report all positive results to the appropriate  public health authorities  Negative results do not preclude SARS-CoV-2  infection and should not be used as the sole basis for treatment or other  patient management decisions  Negative results must be combined with  clinical observations, patient history, and epidemiological information  This test has not been FDA cleared or approved  This test has been authorized by FDA under an Emergency Use Authorization  (EUA)  This test is only authorized for the duration of time the  declaration that circumstances exist justifying the authorization of the  emergency use of an in vitro diagnostic tests for detection of SARS-CoV-2  virus and/or diagnosis of COVID-19 infection under section 564(b)(1) of  the Act, 21 U  S C  312KHO-1(C)(5), unless the authorization is terminated  or revoked sooner  The test has been validated but independent review by FDA  and CLIA is pending  Test performed using [employer] Gene[employer]pert: This RT-PCR assay targets N2,  a region unique to SARS-CoV-2  A conserved region in the E-gene was chosen  for pan-Sarbecovirus detection which includes SARS-CoV-2      Magnesium [[id number]]  (Normal) Collected: 03/15/22 1059    Lab Status: Final result Specimen: Blood from Arm, Right Updated: 03/15/22 1133     Magnesium 1 6 mg/dL     NT-BNP PRO [[id number]]  (Abnormal) Collected: 03/15/22 1059    Lab Status: Final result Specimen: Blood from Arm, Right Updated: 03/15/22 1133     NT-proBNP 4,693 pg/mL     HS Troponin I 2hr [[id number]]     Lab Status: No result Specimen: Blood     HS Troponin 0hr (reflex protocol) [[id number]]  (Normal) Collected: 03/15/22 1059    Lab Status: Final result Specimen: Blood from Arm, Right Updated: 03/15/22 1133     hs TnI 0hr 20 ng/L     Comprehensive metabolic panel [[id number]]  (Abnormal) Collected: 03/15/22 1059    Lab Status: Final result Specimen: Blood from Arm, Right Updated: 03/15/22 1127     Sodium 141 mmol/L      Potassium 3 9 mmol/L      Chloride 106 mmol/L      CO2 26 mmol/L      ANION GAP 9 mmol/L      BUN 13 mg/dL      Creatinine 1 89 mg/dL      Glucose 99 mg/dL      Calcium 8 1 mg/dL      Corrected Calcium 9 2 mg/dL      AST 56 U/L      ALT 78 U/L      Alkaline Phosphatase 71 U/L      Total Protein 5 5 g/dL      Albumin 2 6 g/dL      Total Bilirubin 0 95 mg/dL      eGFR 30 ml/min/1 73sq m     Narrative:      National Kidney Disease Foundation guidelines for Chronic Kidney Disease (CKD):     Stage 1 with normal or high GFR (GFR > 90 mL/min/1 73 square meters)    Stage 2 Mild CKD (GFR = 60-89 mL/min/1 73 square meters)    Stage 3A Moderate CKD (GFR = 45-59 mL/min/1 73 square meters)    Stage 3B Moderate CKD (GFR = 30-44 mL/min/1 73 square meters)    Stage 4 Severe CKD (GFR = 15-29 mL/min/1 73 square meters)    Stage 5 End Stage CKD (GFR <15 mL/min/1 73 square meters)  Note: GFR calculation is accurate only with a steady state creatinine    Protime-INR [[id number]]  (Abnormal) Collected: 03/15/22 1059    Lab Status: Final result Specimen: Blood from Arm, Right Updated: 03/15/22 1123     Protime 27 8 seconds      INR 2 73    APTT [[id number]]  (Normal) Collected: 03/15/22 1059    Lab Status: Final result Specimen: Blood from Arm, Right Updated: 03/15/22 1123     PTT 36 seconds     CBC and differential [[id number]]  (Abnormal) Collected: 03/15/22 1059    Lab Status: Final result Specimen: Blood from Arm, Right Updated: 03/15/22 1106     WBC 5 46 Thousand/uL      RBC 3 67 Million/uL      Hemoglobin 12 1 g/dL      Hematocrit 35 5 %      MCV 97 fL      MCH 33 0 pg      MCHC 34 1 g/dL      RDW 17 5 %      MPV 10 5 fL      Platelets 759 Thousands/uL      nRBC 0 /100 WBCs      Neutrophils Relative 62 %      Immat GRANS % 0 %      Lymphocytes Relative 23 %      Monocytes Relative 12 %      Eosinophils Relative 2 %      Basophils Relative 1 %      Neutrophils Absolute 3 37 Thousands/µL      Immature Grans Absolute 0 02 Thousand/uL      Lymphocytes Absolute 1 28 Thousands/µL      Monocytes Absolute 0 65 Thousand/µL      Eosinophils Absolute 0 11 Thousand/µL      Basophils Absolute 0 03 Thousands/µL                  XR chest 1 view portable   ED Interpretation by Negrita Lopez MD (03/15 1222)   I have personally reviewed the x-ray and my findings are: vascular congestion  Procedures  ECG 12 Lead Documentation Only    Date/Time: 3/15/2022 10:48 AM  Performed by: Negrita Lopez MD  Authorized by: Negrita Lopez MD     Indications / Diagnosis:  Dyspnea  ECG reviewed by me, the ED Provider: yes    Patient location:  ED  Interpretation:     Interpretation: non-specific    Rate:     ECG rate:  87  Rhythm:     Rhythm: paced    Pacing:     Capture:  Intermittent  QRS:     QRS axis:  Left    QRS intervals: Wide  Conduction:     Conduction: abnormal      Abnormal conduction: complete LBBB    ST segments:     ST segments:  Non-specific  T waves:     T waves: non-specific               ED Course  ED Course as of 03/15/22 1223   e Mar 15, 2022   1056 Echo 10/21:  Interpretation Summary       ·  Left Ventricle: Left ventricular cavity size is mildly dilated  The left ventricular ejection fraction is 12% by visual estimation  Systolic function is severely reduced  Unable to assess diastolic function  Left atrial filling pressure is elevated  ·  The following segments are dyskinetic: basal inferoseptal, mid anteroseptal, mid inferoseptal and apical septal   ·  The following segments are akinetic: basal anteroseptal, basal inferolateral, mid inferior, mid inferolateral, apical inferior and apex  ·  The following segments are hypokinetic: basal anterior, mid anterior, mid anterolateral, apical anterior and apical lateral   ·  Other segments could not be evaluated  ·  IVS: There is abnormal septal motion consistent with right ventricular pacing  ·  Right Ventricle: Systolic function is moderately reduced  ·  Left Atrium: The atrium is mildly dilated  ·  Mitral Valve:  There is mild regurgitation with a centrally directed jet  ·  Tricuspid Valve: There is mild regurgitation  The right ventricular systolic pressure is mildly to moderately elevated  ·  Pulmonic Valve: There is mild regurgitation  ·  Aorta: The aortic root is mildly dilated  The ascending aorta is mildly dilated                                                 MDM  Number of Diagnoses or Management Options  CHF (congestive heart failure) (HCC)  Dyspnea  Renal insufficiency  Diagnosis management comments: History of acute on chronic heart failure with worsening heart failure  He has been off of his diuretics for least a week  His edema and weight gain rales on exam   He was given a dose of Lasix  He has severe cardiomyopathy  Cardiology was consulted  His BNP is elevated  Troponin negative  He does have a pacemaker 0 and was intermittently paced on his EKG  Case discussed with Internal Medicine regarding admission         Amount and/or Complexity of Data Reviewed  Clinical lab tests: ordered and reviewed  Tests in the radiology section of CPT®: ordered and reviewed  Tests in the medicine section of CPT®: ordered and reviewed  Review and summarize past medical records: yes  Discuss the patient with other providers: yes  Independent visualization of images, tracings, or specimens: yes    Patient Progress  Patient progress: stable      Disposition  Final diagnoses:   Dyspnea   CHF (congestive heart failure) ([place] [address] )   Renal insufficiency     Time reflects when diagnosis was documented in both MDM as applicable and the Disposition within this note     Time User Action Codes Description Comment    3/15/2022 10:58 AM Xavier Ground Add [R06 00] Dyspnea     3/15/2022 11:58 AM Xavier Ground Add [I50 9] CHF (congestive heart failure) ([facility] )     3/15/2022 11:58 AM Xavier Ground Add [N28 9] Renal insufficiency       ED Disposition     ED Disposition Condition Date/Time Comment    Admit Stable Tue Mar 15, 2022 11:57 AM Case was discussed with maria luisa   and the patient's admission status was agreed to be Admission Status: inpatient status to the service of Dr Silvia Riggins   Follow-up Information    None         Patient's Medications   Discharge Prescriptions    No medications on file       No discharge procedures on file      PDMP Review     None          ED Provider  Electronically Signed by           Lori Gill MD  03/15/22 0215

## 2022-03-15 NOTE — TELEPHONE ENCOUNTER
Tell him to go to the ED  2  Tell him to bring all his medication bottles to both the ED and his next office visit  3  Does he have visiting nurses? If he is agreeable, they may be able to resolve some of the problems we have with him

## 2022-03-15 NOTE — ASSESSMENT & PLAN NOTE
Blood pressure is currently well controlled    Patient complaining of some dizziness at home and hence will check orthostatic vitals to rule out orthostasis  Continue Coreg

## 2022-03-15 NOTE — ED NOTES
Rn called E 4 informed Vandana Moarn Rn pt is ordered telemetry monitoring       Emi Sheth RN  03/15/22 3061

## 2022-03-15 NOTE — CONSULTS
Consult - Cardiology   Chris Barrientos 80 y o  male MRN: [id number]  Unit/Bed#: E4 -01 Encounter: [id number]        Reason For Consult:  Acute heart failure             ASSESSMENT:  Acute on chronic combined systolic and diastolic congestive heart failure  Severe cardiomyopathy   - LVEF 12%, following segments are dyskinetic: Basal inferoseptal, mid anteroseptal, mid inferoseptal, and apical septal  Following segments are akinetic: basal anteroseptal, basal inferolateral, mid inferior, mid inferolateral, apical inferior and apex  Following segments are hypokinetic: Basal anterior, mid anterior, mid anterolateral, apical anterior and apical lateral  Left atrium mildly dilated, mild TR, mild OK, aortic root mildly dilated with the ascending aorta mildly dilated, 10/24/21    - prior discharge weight 59 5 kg on 1/29/22  Medtronic dual-chamber ICD in-situ  Ventricular tachycardia with shock x 2    - in the setting of severe cardiomyopathy/electrolyte derangements    - Rx, amiodarone 200 mg daily, mexiletine 150 mg BID  Coronary artery disease   - remote history myocardial infarction and angioplasty, 1989  Paroxysmal atrial fibrillation   - AV blocking Rx, carvedilol 12 5 mg BID    - anticoagulation with warfarin  Hypertension  Dyslipidemia   - Rx, atorvastatin 40 mg daily  - most recent lipid panel from 4/27/21: Cholesterol 192, triglycerides 109, HDL 62,   Chronic kidney disease, stage IIIB  History of embolic CVA  Alcohol use      PLAN/ DISCUSSION:     · NT pro BNP 4693; S/P furosemide 40 mg IV x 1 upon admission  Will initiate BID dosing  · Follow- up chest x-ray when completed  · Continue outpatient carvedilol 12 5 mg BID, amiodarone 200 mg daily, mexiletine 150 mg BID  · Continue anticoagulation with warfarin  · Monitor volume status with strict intake/output, daily weight  · Negative cardiac enzymes thus far with high sensitivity troponin of 20    · Monitor renal function and electrolytes closely; maintain potassium > 4, magnesium > 2    · Most recent potassium 3 9, will provide K-Dur 40 mEq x 1   · Most recent magnesium 1 6, will provide magnesium sulfate 2 g IVPB  · Repeat ECG ordered to be placed in the chart  History Of Present Illness:  80-year-old male presented to [facility] with complaints of increased bilateral lower extremity edema in addition to shortness of breath over the past 2 weeks  Patient also endorses to dietary indiscretions and enjoys salt intake  Per wife, post recent hospital discharge, patient was initially lightheaded, however this has since resolved  Upon assessment and evaluation, patient is resting on the stretcher comfortably  Patient denies shortness of breath, chest pain, dizziness, lightheadedness, syncope, presyncope, palpitations  He does endorse increased bilateral lower extremity edema  Please see significant cardiac history and problems enumerated above  Patient follows with Dr Jenny Patricio with Cardiology as an outpatient  To note, patient was recently hospitalized on 1/19/22 through 1/29/22 due to dizziness and lightheadedness for 1 week  This was preceded by a week and a half of diarrhea  Patient was found to have significant electrolyte derangements including hypokalemia, hypomagnesemia in addition to elevated creatinine  He was admitted by the critical care service due to ventricular tachycardia  Ventricular tachycardia was felt to be precipitated by electrolyte derangements with underlying cardiomyopathy of EF of 12%  Patient's medications were adjusted prior to discharge  It appears that patient had discontinued his torsemide on 2/20/22 due to worsening renal function and hypokalemia        Past Medical History:        Past Medical History:   Diagnosis Date    Anemia     Arthritis     Coronary artery disease     CVA [facility]cerebral vascular accident) ([facility] [address] )     Hyperlipidemia     Hypertension       Past Surgical History: Procedure Laterality Date    CARDIAC PACEMAKER PLACEMENT  05/09/2011    Medtronic dual chamber ICD implant    CORONARY ANGIOPLASTY  1989        Allergy:        No Known Allergies    Medications:       Prior to Admission medications    Medication Sig Start Date End Date Taking?  Authorizing Provider   amiodarone 200 mg tablet Take 1 tablet (200 mg total) by mouth daily 12/21/21   Jhony Hawkins MD   atorvastatin (LIPITOR) 40 mg tablet Take 1 tablet (40 mg total) by mouth daily 12/21/21   Jhony Hawkins MD   carvedilol (COREG) 12 5 mg tablet Take 1 tablet (12 5 mg total) by mouth 2 (two) times a day with meals 12/21/21   Jhony Hawkins MD   Cholecalciferol (VITAMIN D3) 50 MCG (2000 UT) capsule Take 2,000 Units by mouth daily      Historical Provider, MD   Glucosamine Sulfate 1000 MG CAPS 1 capsule Every 12 hours    Historical Provider, MD   magnesium oxide (MAG-OX) 400 mg Take 1 tablet (400 mg total) by mouth daily 2/8/22   Jhony Hawkins MD   mexiletine (MEXITIL) 150 mg capsule Take 1 capsule (150 mg total) by mouth 2 (two) times a day 2/8/22   Jhony Hawkins MD   potassium chloride (K-DUR,KLOR-CON) 10 mEq tablet Take 1 tablet (10 mEq total) by mouth 2 (two) times a day 3/7/22   Jhony Hawkins MD   torsemide [facility]) 10 mg tablet Take 1 tablet (10 mg total) by mouth daily 2/8/22   Jhony Hawkins MD   warfarin (COUMADIN) 2 mg tablet Take 1 tablet daily or as directed by physician 12/21/21   Jhony Hawkins MD       Family History:     Family History   Problem Relation Age of Onset    Coronary artery disease Mother     Heart attack Father         MI    Stroke Sister         Social History:       Social History     Socioeconomic History    Marital status: /Civil Union     Spouse name: None    Number of children: None    Years of education: None    Highest education level: None   Occupational History    None   Tobacco Use    Smoking status: Never Smoker    Smokeless tobacco: Never Used   Vaping Use  Vaping Use: Never used   Substance and Sexual Activity    Alcohol use: Yes     Alcohol/week: 3 0 standard drinks     Types: 3 Shots of liquor per week     Comment: daily    Drug use: Never    Sexual activity: None   Other Topics Concern    None   Social History Narrative    None     Social Determinants of Health     Financial Resource Strain: Not on file   Food Insecurity: Unknown    Worried About Running Out of Food in the Last Year: Never true    Leonie of Food in the Last Year: Not on file   Transportation Needs: No Transportation Needs    Lack of Transportation (Medical): No    Lack of Transportation (Non-Medical): No   Physical Activity: Not on file   Stress: Not on file   Social Connections: Not on file   Intimate Partner Violence: Not on file   Housing Stability: Unknown    Unable to Pay for Housing in the Last Year: No    Number of Places Lived in the Last Year: Not on file    Unstable Housing in the Last Year: Not on file       ROS:  Negative except otherwise aforementioned above  Remainder review of systems is negative    Exam:  General:  alert, oriented and in no distress, cooperative  Head: Normocephalic, atraumatic  Eyes:  EOMI  Pupils - equal, round, reactive to accomodation  No icterus  Normal Conjunctiva  Oropharynx: moist and normal-appearing mucosa  Neck: supple, symmetrical, trachea midline  Heart: regular rate, regular rhythm, S1, S2   Respiratory effort / Chest Inspection: unlabored  Lungs: rales noted bilateral bases   Abdomen: flat, normal findings: bowel sounds normal and soft, non-tender  Lower Limbs:  ++ bilateral lower extremity edema     DATA:      ECG:                 AV dual-paced rhythm with Premature ventricular complexes  Abnormal ECG  When compared with ECG of 19-JAN-2022 09:00,  Premature ventricular complexes are now Present  Vent   rate has increased BY  20 BPM  Confirmed by Clau Pro ([id number]) on 2/21/2022 9:05:30 AM    Echocardiogram completed on 10/24/21: Left Ventricle: Left ventricular cavity size is mildly dilated  The left ventricular ejection fraction is 12% by visual estimation  Systolic function is severely reduced  Unable to assess diastolic function  Left atrial filling pressure is elevated  The following segments are dyskinetic: basal inferoseptal, mid anteroseptal, mid inferoseptal and apical septal     The following segments are akinetic: basal anteroseptal, basal inferolateral, mid inferior, mid inferolateral, apical inferior and apex  The following segments are hypokinetic: basal anterior, mid anterior, mid anterolateral, apical anterior and apical lateral  Other segments could not be evaluated  IVS: There is abnormal septal motion consistent with right ventricular pacing  Right Ventricle: Systolic function is moderately reduced  Left Atrium: The atrium is mildly dilated  Mitral Valve: There is mild regurgitation with a centrally directed jet  Tricuspid Valve: There is mild regurgitation  The right ventricular systolic pressure is mildly to moderately elevated  Pulmonic Valve: There is mild regurgitation  Aorta: The aortic root is mildly dilated  The ascending aorta is mildly dilated  Ischemic Testing/ stress test completed on 10/24/18:  SUMMARY:  -  Stress results: Target heart rate was not achieved  There was resting hypertension with an appropriate blood pressure response to stress  There was no chest pain during stress  IMPRESSIONS: 1  Abnormal myocardial perfusion scan  2  Evidence of extensive, severe intensity fixed perfusion abnormalities without evidence of ischemia    3  Dilated left ventricular cavity with severely reduced left ventricular systolic function  Ejection fraction calculated as 29%  Diagnostic sensitivity was limited by submaximal stress  Weights:     Wt Readings from Last 3 Encounters:   03/15/22 64 5 kg (142 lb 3 2 oz)   02/11/22 56 1 kg (123 lb 9 6 oz)   01/29/22 59 5 kg (131 lb 2 8 oz) , Body mass index is 25 19 kg/m²           Lab Studies:             Results from last 7 days   Lab Units 03/15/22  1059   WBC Thousand/uL 5 46   HEMOGLOBIN g/dL 12 1   HEMATOCRIT % 35 5*   PLATELETS Thousands/uL 150   ,   Results from last 7 days   Lab Units 03/15/22  1059   POTASSIUM mmol/L 3 9   CHLORIDE mmol/L 106   CO2 mmol/L 26   BUN mg/dL 13   CREATININE mg/dL 1 89*   CALCIUM mg/dL 8 1*   ALK PHOS U/L 71   ALT U/L 78   AST U/L 56*

## 2022-03-15 NOTE — ASSESSMENT & PLAN NOTE
Lab Results   Component Value Date    EGFR 30 03/15/2022    EGFR 34 03/03/2022    EGFR 16 02/10/2022    CREATININE 1 89 (H) 03/15/2022    CREATININE 1 72 (H) 03/03/2022    CREATININE 3 14 (H) 02/10/2022   Baseline creatinine is 1 8-2  Currently creatinine is back to baseline her previously it was little elevated up to 3 14 when torsemide was held  Restarted on diuretics with Lasix 40 mg IV b i d  And monitor renal function closely during diuresis

## 2022-03-15 NOTE — ASSESSMENT & PLAN NOTE
Wt Readings from Last 3 Encounters:   03/15/22 64 5 kg (142 lb 3 2 oz)   02/11/22 56 1 kg (123 lb 9 6 oz)   01/29/22 59 5 kg (131 lb 2 8 oz)      He has a severe ischemic cardiomyopathy with LVEF of approximately 12% (as per echo done 10/2021)  He has dual chamber AICD   Patient stopped taking torsemide 2 weeks ago due to worsening renal function as per his cardiologist   Creatinine had gone up to 3 at that time whereas his baseline is 1 8-2  Patient states he gained weight from 59 kilos to 64 5 kilos today  Also complaining of worsening shortness of breath especially on laying flat and dizziness  Chest x-ray shows mild pulmonary edema    Discussed with Cardiology  Will start on Lasix 40 mg IV b i d  monitor renal function closely during diuresis  Continue Coreg and statin therapy  EKG reviewed  Repeat 2D echo to be ordered if needed by Cardiology

## 2022-03-15 NOTE — H&P
[facility]  H&P- Emma Page 10/31/1932, 80 y o  male MRN: [id number]  Unit/Bed#: ED 31 Encounter: [id number]  Primary Care Provider: Howard Ramirez DO   Date and time admitted to hospital: 3/15/2022 10:21 AM    * Acute on chronic combined systolic and diastolic congestive heart failure [facility])  Assessment & Plan  Wt Readings from Last 3 Encounters:   03/15/22 64 5 kg (142 lb 3 2 oz)   02/11/22 56 1 kg (123 lb 9 6 oz)   01/29/22 59 5 kg (131 lb 2 8 oz)      He has a severe ischemic cardiomyopathy with LVEF of approximately 12% (as per echo done 10/2021)  He has dual chamber AICD   Patient stopped taking torsemide 2 weeks ago due to worsening renal function as per his cardiologist   Creatinine had gone up to 3 at that time whereas his baseline is 1 8-2  Patient states he gained weight from 59 kilos to 64 5 kilos today  Also complaining of worsening shortness of breath especially on laying flat and dizziness  Chest x-ray shows mild pulmonary edema  Discussed with Cardiology  Will start on Lasix 40 mg IV b i d  monitor renal function closely during diuresis  Continue Coreg and statin therapy  EKG reviewed  Repeat 2D echo to be ordered if needed by Cardiology        Chronic kidney disease, stage 4 (severe) [facility])  Assessment & Plan  Lab Results   Component Value Date    EGFR 30 03/15/2022    EGFR 34 03/03/2022    EGFR 16 02/10/2022    CREATININE 1 89 (H) 03/15/2022    CREATININE 1 72 (H) 03/03/2022    CREATININE 3 14 (H) 02/10/2022   Baseline creatinine is 1 8-2  Currently creatinine is back to baseline her previously it was little elevated up to 3 14 when torsemide was held  Restarted on diuretics with Lasix 40 mg IV b i d  And monitor renal function closely during diuresis  Essential hypertension  Assessment & Plan  Blood pressure is currently well controlled    Patient complaining of some dizziness at home and hence will check orthostatic vitals to rule out orthostasis  Continue Coreg    PAF (paroxysmal atrial fibrillation) ([facility])  Assessment & Plan  Currently rate controlled with Coreg 12 5 mg twice daily and continue Coumadin as per home regimen anticoagulation (2 mg daily except for Friday and Monday when he takes 1 mg daily)    V-tach [facility])  Assessment & Plan  History of V-tach in the past and status post AICD  Continue amiodarone and mexiletine as per home regimen    Hyperlipidemia, mixed  Assessment & Plan  Continue statin therapy      VTE Prophylaxis: Warfarin (Coumadin)  / sequential compression device   Code Status:  DNR/DNI  POLST: There is no POLST form on file for this patient (pre-hospital)  Discussion with family:  Discussed with wife at bedside    Anticipated Length of Stay:  Patient will be admitted on an Inpatient basis with an anticipated length of stay of  more than 2 midnights  Justification for Hospital Stay:  Acute on chronic combined systolic and diastolic CHF exacerbation    Total Time for Visit, including Counseling / Coordination of Care: 60 minutes  Greater than 50% of this total time spent on direct patient counseling and coordination of care  Chief Complaint:   Shortness of breath    History of Present Illness:    Mane Jaeger is a 80 y o  male who presents with shortness of breath for the last 2 weeks  Patient states that his torsemide was stopped 2 weeks ago secondary to worsening renal function and since then he has slowly progressively having worsening shortness of breath and some dizziness and lightheadedness at times  Denies any chest pain or nausea vomiting or abdominal pain or fevers or chills  Also states that his weight has gone up to 64 5 kilos from 59 kilos  Denies any falls or device firing    Review of Systems:    Review of Systems   Constitutional: Positive for appetite change and fatigue  Negative for chills and fever  HENT: Negative for hearing loss, sore throat and trouble swallowing      Eyes: Negative for photophobia, discharge and visual disturbance  Respiratory: Positive for shortness of breath  Negative for chest tightness  Cardiovascular: Negative for chest pain and palpitations  Gastrointestinal: Negative for abdominal pain, blood in stool and vomiting  Endocrine: Negative for polydipsia and polyuria  Genitourinary: Negative for difficulty urinating, dysuria, flank pain and hematuria  Musculoskeletal: Negative for back pain and gait problem  Skin: Negative for rash  Allergic/Immunologic: Negative for environmental allergies and food allergies  Neurological: Positive for dizziness and weakness  Negative for seizures, syncope and headaches  Hematological: Does not bruise/bleed easily  Psychiatric/Behavioral: Negative for behavioral problems  All other systems reviewed and are negative  Past Medical and Surgical History:     Past Medical History:   Diagnosis Date    Anemia     Arthritis     Coronary artery disease     [facility]cere[facility] vascular accident) ([facility] [address]     Hyperlipidemia     Hypertension        Past Surgical History:   Procedure Laterality Date    CARDIAC PACEMAKER PLACEMENT  05/09/2011    Medtronic dual chamber ICD implant    CORONARY ANGIOPLASTY  1989       Meds/Allergies:    Prior to Admission medications    Medication Sig Start Date End Date Taking?  Authorizing Provider   amiodarone 200 mg tablet Take 1 tablet (200 mg total) by mouth daily 12/21/21  Yes Viky Wang MD   atorvastatin (LIPITOR) 40 mg tablet Take 1 tablet (40 mg total) by mouth daily 12/21/21  Yes Viky Wang MD   carvedilol (COREG) 12 5 mg tablet Take 1 tablet (12 5 mg total) by mouth 2 (two) times a day with meals 12/21/21  Yes Viky Wang MD   Cholecalciferol (VITAMIN D3) 50 MCG (2000 UT) capsule Take 2,000 Units by mouth daily     Yes Historical Provider, MD   Glucosamine Sulfate 1000 MG CAPS 1 capsule Every 12 hours   Yes Historical Provider, MD   magnesium oxide (MAG-OX) 400 mg Take 1 tablet (400 mg total) by mouth daily 2/8/22  Yes Anthony Vasques MD   mexiletine (MEXITIL) 150 mg capsule Take 1 capsule (150 mg total) by mouth 2 (two) times a day 2/8/22  Yes Anthony Vasques MD   potassium chloride (K-DUR,KLOR-CON) 10 mEq tablet Take 1 tablet (10 mEq total) by mouth 2 (two) times a day 3/7/22  Yes Anthony Vasques MD   warfarin (COUMADIN) 2 mg tablet Take 1 tablet daily or as directed by physician 12/21/21  Yes Anthony Vasques MD   torsemide [facility]) 10 mg tablet Take 1 tablet (10 mg total) by mouth daily  Patient not taking: Reported on 3/15/2022  2/8/22   Anthony Vasques MD     I have reviewed home medications with patient personally  Allergies: No Known Allergies    Social History:     Marital Status: /Civil Union   Social History     Substance and Sexual Activity   Alcohol Use Yes    Alcohol/week: 3 0 standard drinks    Types: 3 Shots of liquor per week    Comment: daily     Social History     Tobacco Use   Smoking Status Never Smoker   Smokeless Tobacco Never Used     Social History     Substance and Sexual Activity   Drug Use Never       Family History:    Family History   Problem Relation Age of Onset    Coronary artery disease Mother     Heart attack Father         MI    Stroke Sister        Physical Exam:     Vitals:   Blood Pressure: 148/98 (03/15/22 1027)  Pulse: 83 (03/15/22 1019)  Temperature: 98 2 °F (36 8 °C) (03/15/22 1019)  Temp Source: Oral (03/15/22 1019)  Respirations: 17 (03/15/22 1019)  Weight - Scale: 64 5 kg (142 lb 3 2 oz) (03/15/22 1040)  SpO2: 95 % (03/15/22 1019)    Physical Exam  Vitals and nursing note reviewed  Constitutional:       Appearance: Normal appearance  Comments: Frail elderly male   HENT:      Head: Normocephalic and atraumatic  Right Ear: External ear normal       Left Ear: External ear normal       Nose: Nose normal       Mouth/Throat:      Pharynx: Oropharynx is clear  Cardiovascular:      Rate and Rhythm: Normal rate  Rhythm irregular  Heart sounds: Normal heart sounds  Pulmonary:      Effort: Pulmonary effort is normal       Comments: Moderate air entry bilaterally decreased breath sounds bilateral bases  Abdominal:      General: Bowel sounds are normal       Palpations: Abdomen is soft  Tenderness: There is no abdominal tenderness  Musculoskeletal:         General: Normal range of motion  Cervical back: Normal range of motion and neck supple  Skin:     General: Skin is warm and dry  Capillary Refill: Capillary refill takes less than 2 seconds  Neurological:      General: No focal deficit present  Mental Status: He is alert and oriented to person, place, and time  Psychiatric:         Mood and Affect: Mood normal              Additional Data:     Lab Results: I have personally reviewed pertinent reports  Results from last 7 days   Lab Units 03/15/22  1059   WBC Thousand/uL 5 46   HEMOGLOBIN g/dL 12 1   HEMATOCRIT % 35 5*   PLATELETS Thousands/uL 150   NEUTROS PCT % 62   LYMPHS PCT % 23   MONOS PCT % 12   EOS PCT % 2     Results from last 7 days   Lab Units 03/15/22  1059   SODIUM mmol/L 141   POTASSIUM mmol/L 3 9   CHLORIDE mmol/L 106   CO2 mmol/L 26   BUN mg/dL 13   CREATININE mg/dL 1 89*   ANION GAP mmol/L 9   CALCIUM mg/dL 8 1*   ALBUMIN g/dL 2 6*   TOTAL BILIRUBIN mg/dL 0 95   ALK PHOS U/L 71   ALT U/L 78   AST U/L 56*   GLUCOSE RANDOM mg/dL 99     Results from last 7 days   Lab Units 03/15/22  1059   INR  2 73*                   Imaging: I have personally reviewed pertinent reports  XR chest 1 view portable   ED Interpretation by Galen Dawson MD (03/15 1222)   I have personally reviewed the x-ray and my findings are: vascular congestion  EKG, Pathology, and Other Studies Reviewed on Admission:   · EKG: Av dual paced rhythm with wide QRS    Allscripts / Epic Records Reviewed: Yes     ** Please Note: This note has been constructed using a voice recognition system   **

## 2022-03-15 NOTE — ASSESSMENT & PLAN NOTE
Currently rate controlled with Coreg 12 5 mg twice daily and continue Coumadin as per home regimen anticoagulation (2 mg daily except for Friday and Monday when he takes 1 mg daily)

## 2022-03-15 NOTE — TELEPHONE ENCOUNTER
Relayed Dr Keon Low message to him and he agreed to go to ER when the SOB starts today  I asked that the visiting nurse that comes in, call and give report with vitals and condition  Cecelia Wong agreed to have them do that also  He will take medications to ER

## 2022-03-15 NOTE — ASSESSMENT & PLAN NOTE
History of V-tach in the past and status post AICD    Continue amiodarone and mexiletine as per home regimen

## 2022-03-16 NOTE — CASE MANAGEMENT
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 2022    Goals of care, counseling/discussion 2022    Dizziness 2022    Transaminitis 2022    Bilateral lower extremity edema 2021    Hyponatremia 12/10/2021    Acute on chronic combined systolic and diastolic congestive heart failure ([facility] ) 10/23/2021    Ischemic cardiomyopathy 10/09/2018    PAF (paroxysmal atrial fibrillation) ([facility] ) 10/09/2018    Essential hypertension 10/09/2018    Cardiac defibrillator in situ 2016    Acute kidney injury superimposed on chronic kidney disease ([facility] )     Embolic stroke ([place] [address] ) 460    Hyperlipidemia, mixed 2016    Old myocardial infarction 2016    Status post percutaneous transluminal coronary angioplasty 2016    V-tach ([facility] ) 2016    Cardiomyopathy ([facility] ) 2016      LOS (days): 1  Geometric Mean LOS (GMLOS) (days): 3 80  Days to GMLOS:2 7     OBJECTIVE:    Risk of Unplanned Readmission Score: 21         Current admission status: Inpatient       Preferred Pharmacy:   [address] [place] [address]  Phone: [phone] Fax: [phone]    [place] [address]  [address] [facility] [address]  Phone: [phone] Fax: [phone]    Primary Care Provider: Hansel Connor DO    Primary Insurance: [facility]  Secondary Insurance:     ASSESSMENT:  [address], [facility] - Spouse   Primary Phone: [phone] (Home)               Advance Directives  Does patient have a [address]?: No  Was patient offered paperwork?: Yes (Refused)  Does patient currently have a Health Care decision maker?: Yes, please see Health Care Proxy section  Does patient have Advance Directives?: No  Was patient offered paperwork?: Yes (Refused)  Primary Contact: Ezra Plaza (Spouse) [address] [place] [address] [address] [phone] (H)              Patient Information  Admitted from[de-identified] Home  Mental Status: Alert  During Assessment patient was accompanied by: Not accompanied during assessment  Assessment information provided by[de-identified] Patient  Primary Caregiver: Self  Support Systems: Spouse/significant other  [place] of Residence: [facility] [address] [facility] do you live in?: [address] entry access options   Select all that apply : Ramp  Type of Current Residence: Apartment  Floor Level: 1  Upon entering residence, is there a bedroom on the main floor (no further steps)?: Yes  Upon entering residence, is there a bathroom on the main floor (no further steps)?: Yes  In the last 12 months, was there a time when you were not able to pay the mortgage or rent on time?: No  In the last 12 months, how many places have you lived?: 1  In the last 12 months, was there a time when you did not have a steady place to sleep or slept in a shelter (including now)?: No  Homeless/housing insecurity resource given?: N/A  Living Arrangements: Lives w/ Spouse/significant other  Is patient a ?: Yes ([employer])  Is patient active with [facility] [employer])?: No    Activities of Daily Living Prior to Admission  Functional Status: Independent  Ambulates independently?: Yes  Does patient use assisted devices?: No (Depending how he feels, he will use walker)  Does patient currently own DME?: Yes  What DME does the patient currently own?: Straight Cane,Walker,Shower Chair,Bedside Commode  Does patient have a history of Outpatient Therapy (PT/OT)?: No  Does the patient have a history of Short-Term Rehab?: No  Does patient have a history of HHC?: Yes  Does patient currently have [facility]?: No         Patient Information Continued  Income Source: Pension/FCI  Does patient have prescription coverage?: Yes  Within the past 12 months, you worried that your food would run out before you got the money to buy more : Never true  Within the past 12 months, the food you bought just didnt last and you didnt have money to get more : Never true  Food insecurity resource given?: N/A  Does patient receive dialysis treatments?: No  Does patient have a history of substance abuse?: Yes  Historical substance use preference: Alcohol/ETOH (2-3 shots a day of liquor)  History of Withdrawal Symptoms: Denies past symptoms  Is patient currently in treatment for substance abuse?: No  Patient declined treatment information  Does patient have a history of Mental Health Diagnosis?: No      Means of Transportation  Means of Transport to Appts[de-identified] Drives Self  In the past 12 months, has lack of transportation kept you from medical appointments or from getting medications?: No  In the past 12 months, has lack of transportation kept you from meetings, work, or from getting things needed for daily living?: No  Was application for public transport provided?: N/A        DISCHARGE DETAILS:    Discharge planning discussed with[de-identified] pt  Freedom of Choice: Yes  Comments - Freedom of Choice: Met with pt to complete assessment and discuss [facility] for CHF  Pt is agreeable and RENEAVNA accepted the case       Were Treatment Team discharge recommendations reviewed with patient/caregiver?: Yes  Did patient/caregiver verbalize understanding of patient care needs?: Yes  Were patient/caregiver advised of the risks associated with not following Treatment Team discharge recommendations?: Yes    [address]         Is the patient interested in Lianna Acosta at discharge?: Yes  [address] requested[de-identified] [address] Name[de-identified] [facility] Provider[de-identified] PCP  Home Health Services Needed[de-identified] Heart Failure Management  Homebound Criteria Met[de-identified] Uses an Assist Device (i e  cane, walker, etc),Requires the Assistance of Another Person for Safe Ambulation or to Leave the Home  Supporting Clincal Findings[de-identified] Limited Endurance,Fatigues Easliy in Short Distances    Would you like to participate in our [address] service program?  : Yes

## 2022-03-16 NOTE — ASSESSMENT & PLAN NOTE
Wt [place] from Last 3 Encounters:   03/16/22 61 6 kg (135 lb 12 8 oz)   02/11/22 56 1 kg (123 lb 9 6 oz)   01/29/22 59 5 kg (131 lb 2 8 oz)      He has a severe ischemic cardiomyopathy with LVEF of approximately 12% (as per echo done 10/2021)  He has dual chamber AICD   Patient stopped taking torsemide 2 weeks ago due to worsening renal function as per his cardiologist   Creatinine had gone up to 3 at that time whereas his baseline is 1 8-2  Patient states he gained weight from 59 kilos to 64 5 kilos today  Also complaining of worsening shortness of breath especially on laying flat and dizziness  Chest x-ray shows mild pulmonary edema  Discussed with Cardiology  Will start on Lasix 40 mg IV b i d  monitor renal function closely during diuresis  Continue Coreg and statin therapy  EKG reviewed  Repeat 2D echo to be ordered if needed by Cardiology  3/16:  Clinically improving  Diuresing well    Will continue IV Lasix today and transition to oral diuretics tomorrow

## 2022-03-16 NOTE — PLAN OF CARE
Problem: PAIN - ADULT  Goal: Verbalizes/displays adequate comfort level or baseline comfort level  Description: Interventions:  - Encourage patient to monitor pain and request assistance  - Assess pain using appropriate pain scale  - Administer analgesics based on type and severity of pain and evaluate response  - Implement non-pharmacological measures as appropriate and evaluate response  - Consider cultural and social influences on pain and pain management  - Notify physician/advanced practitioner if interventions unsuccessful or patient reports new pain  Outcome: Progressing     Problem: INFECTION - ADULT  Goal: Absence or prevention of progression during hospitalization  Description: INTERVENTIONS:  - Assess and monitor for signs and symptoms of infection  - Monitor lab/diagnostic results  - Monitor all insertion sites, i e  indwelling lines, tubes, and drains  - Monitor endotracheal if appropriate and nasal secretions for changes in amount and color  - [place] appropriate cooling/warming therapies per order  - Administer medications as ordered  - Instruct and encourage patient and family to use good hand hygiene technique  - Identify and instruct in appropriate isolation precautions for identified infection/condition  Outcome: Progressing  Goal: Absence of fever/infection during neutropenic period  Description: INTERVENTIONS:  - Monitor WBC    Outcome: Progressing     Problem: SAFETY ADULT  Goal: Patient will remain free of falls  Description: INTERVENTIONS:  - Educate patient/family on patient safety including physical limitations  - Instruct patient to call for assistance with activity   - Consult OT/PT to assist with strengthening/mobility   - Keep Call bell within reach  - Keep bed low and locked with side rails adjusted as appropriate  - Keep care items and personal belongings within reach  - Initiate and maintain comfort rounds  - Make Fall Risk Sign visible to staff  - Offer Toileting every 3 Hours, in advance of need  - Initiate/Maintain bed alarm  - Obtain necessary fall risk management equipment  - Apply yellow socks and bracelet for high fall risk patients  - Consider moving patient to room near nurses station  Outcome: Progressing  Goal: Maintain or return to baseline ADL function  Description: INTERVENTIONS:  -  Assess patient's ability to carry out ADLs; assess patient's baseline for ADL function and identify physical deficits which impact ability to perform ADLs (bathing, care of mouth/teeth, toileting, grooming, dressing, etc )  - Assess/evaluate cause of self-care deficits   - Assess range of motion  - Assess patient's mobility; develop plan if impaired  - Assess patient's need for assistive devices and provide as appropriate  - Encourage maximum independence but intervene and supervise when necessary  - Involve family in performance of ADLs  - Assess for home care needs following discharge   - Consider OT consult to assist with ADL evaluation and planning for discharge  - Provide patient education as appropriate  Outcome: Progressing  Goal: Maintains/Returns to pre admission functional level  Description: INTERVENTIONS:  - Perform BMAT or MOVE assessment daily    - Set and communicate daily mobility goal to care team and patient/family/caregiver  - Collaborate with rehabilitation services on mobility goals if consulted  - Perform Range of Motion 3 times a day  - Reposition patient every 3 hours    - Dangle patient 3 times a day  - Stand patient 3 times a day  - Ambulate patient 3 times a day  - Out of bed to chair 3 times a day   - Out of bed for meals 3 times a day  - Out of bed for toileting  - Record patient progress and toleration of activity level   Outcome: Progressing     Problem: DISCHARGE PLANNING  Goal: Discharge to home or other facility with appropriate resources  Description: INTERVENTIONS:  - Identify barriers to discharge w/patient and caregiver  - Arrange for needed discharge resources and transportation as appropriate  - Identify discharge learning needs (meds, wound care, etc )  - Arrange for interpretive services to assist at discharge as needed  - Refer to Case Management Department for coordinating discharge planning if the patient needs post-hospital services based on physician/advanced practitioner order or complex needs related to functional status, cognitive ability, or social support system  Outcome: Progressing     Problem: Knowledge Deficit  Goal: Patient/family/caregiver demonstrates understanding of disease process, treatment plan, medications, and discharge instructions  Description: Complete learning assessment and assess knowledge base  Interventions:  - Provide teaching at level of understanding  - Provide teaching via preferred learning methods  Outcome: Progressing     Problem: Nutrition/Hydration-ADULT  Goal: Nutrient/Hydration intake appropriate for improving, restoring or maintaining nutritional needs  Description: Monitor and assess patient's nutrition/hydration status for malnutrition  Collaborate with interdisciplinary team and initiate plan and interventions as ordered  Monitor patient's weight and dietary intake as ordered or per policy  Utilize nutrition screening tool and intervene as necessary  Determine patient's food preferences and provide high-protein, high-caloric foods as appropriate       INTERVENTIONS:  - Monitor oral intake, urinary output, labs, and treatment plans  - Assess nutrition and hydration status and recommend course of action  - Evaluate amount of meals eaten  - Assist patient with eating if necessary   - Allow adequate time for meals  - Recommend/ encourage appropriate diets, oral nutritional supplements, and vitamin/mineral supplements  - Order, calculate, and assess calorie counts as needed  - Recommend, monitor, and adjust tube feedings and TPN/PPN based on assessed needs  - Assess need for intravenous fluids  - Provide specific nutrition/hydration education as appropriate  - Include patient/family/caregiver in decisions related to nutrition  Outcome: Progressing

## 2022-03-16 NOTE — PLAN OF CARE
Problem: PAIN - ADULT  Goal: Verbalizes/displays adequate comfort level or baseline comfort level  Description: Interventions:  - Encourage patient to monitor pain and request assistance  - Assess pain using appropriate pain scale  - Administer analgesics based on type and severity of pain and evaluate response  - Implement non-pharmacological measures as appropriate and evaluate response  - Consider cultural and social influences on pain and pain management  - Notify physician/advanced practitioner if interventions unsuccessful or patient reports new pain  Outcome: Progressing     Problem: INFECTION - ADULT  Goal: Absence or prevention of progression during hospitalization  Description: INTERVENTIONS:  - Assess and monitor for signs and symptoms of infection  - Monitor lab/diagnostic results  - Monitor all insertion sites, i e  indwelling lines, tubes, and drains  - Monitor endotracheal if appropriate and nasal secretions for changes in amount and color  - [place] appropriate cooling/warming therapies per order  - Administer medications as ordered  - Instruct and encourage patient and family to use good hand hygiene technique  - Identify and instruct in appropriate isolation precautions for identified infection/condition  Outcome: Progressing        Problem: SAFETY ADULT  Goal: Patient will remain free of falls  Description: INTERVENTIONS:  - Educate patient/family on patient safety including physical limitations  - Instruct patient to call for assistance with activity   - Consult OT/PT to assist with strengthening/mobility   - Keep Call bell within reach  - Keep bed low and locked with side rails adjusted as appropriate  - Keep care items and personal belongings within reach  - Initiate and maintain comfort rounds  - Make Fall Risk Sign visible to staff  - Offer Toileting every 2 Hours, in advance of need  - Initiate/Maintain bed/chair alarm as needed  - Obtain necessary fall risk management equipment: wheeled walker  - Apply yellow socks and bracelet for high fall risk patients  - Consider moving patient to room near nurses station  Outcome: Progressing     Problem: SAFETY ADULT  Goal: Maintain or return to baseline ADL function  Description: INTERVENTIONS:  -  Assess patient's ability to carry out ADLs; assess patient's baseline for ADL function and identify physical deficits which impact ability to perform ADLs (bathing, care of mouth/teeth, toileting, grooming, dressing, etc )  - Assess/evaluate cause of self-care deficits   - Assess range of motion  - Assess patient's mobility; develop plan if impaired  - Assess patient's need for assistive devices and provide as appropriate  - Encourage maximum independence but intervene and supervise when necessary  - Involve family in performance of ADLs  - Assess for home care needs following discharge   - Consider OT consult to assist with ADL evaluation and planning for discharge  - Provide patient education as appropriate  Outcome: Progressing     Problem: SAFETY ADULT  Goal: Maintains/Returns to pre admission functional level  Description: INTERVENTIONS:  - Perform BMAT or MOVE assessment daily    - Set and communicate daily mobility goal to care team and patient/family/caregiver  - Collaborate with rehabilitation services on mobility goals if consulted  - Perform Range of Motion 4 times a day  - Reposition patient every 2 hours  - Dangle patient 4 times a day  - Stand patient 4 times a day  - Ambulate patient 3 times a day  - Out of bed to chair for meals  - Out of bed for toileting  - Record patient progress and toleration of activity level   Outcome: Progressing     Problem: Nutrition/Hydration-ADULT  Goal: Nutrient/Hydration intake appropriate for improving, restoring or maintaining nutritional needs  Description: Monitor and assess patient's nutrition/hydration status for malnutrition   Collaborate with interdisciplinary team and initiate plan and interventions as ordered  Monitor patient's weight and dietary intake as ordered or per policy  Utilize nutrition screening tool and intervene as necessary  Determine patient's food preferences and provide high-protein, high-caloric foods as appropriate       INTERVENTIONS:  - Monitor oral intake, urinary output, labs, and treatment plans  - Assess nutrition and hydration status and recommend course of action  - Evaluate amount of meals eaten  - Assist patient with eating if necessary   - Allow adequate time for meals  - Recommend/ encourage appropriate diets, oral nutritional supplements, and vitamin/mineral supplements  - Order, calculate, and assess calorie counts as needed  - Recommend, monitor, and adjust tube feedings and TPN/PPN based on assessed needs  - Assess need for intravenous fluids  - Provide specific nutrition/hydration education as appropriate  - Include patient/family/caregiver in decisions related to nutrition  Outcome: Progressing      Problem: CARDIOVASCULAR - ADULT  Goal: Maintains optimal cardiac output and hemodynamic stability  Description: INTERVENTIONS:  - Monitor I/O, vital signs and rhythm  - Monitor for S/S and trends of decreased cardiac output  - Administer and titrate ordered vasoactive medications to optimize hemodynamic stability  - Assess quality of pulses, skin color and temperature  - Assess for signs of decreased coronary artery perfusion  - Instruct patient to report change in severity of symptoms  Outcome: Progressing     Problem: CARDIOVASCULAR - ADULT  Goal: Absence of cardiac dysrhythmias or at baseline rhythm  Description: INTERVENTIONS:  - Continuous cardiac monitoring, vital signs, obtain 12 lead EKG if ordered  - Administer antiarrhythmic and heart rate control medications as ordered  - Monitor electrolytes and administer replacement therapy as ordered  Outcome: Progressing     Problem: RESPIRATORY - ADULT  Goal: Achieves optimal ventilation and oxygenation  Description: INTERVENTIONS:  - Assess for changes in respiratory status  - Assess for changes in mentation and behavior  - Position to facilitate oxygenation and minimize respiratory effort  - Oxygen administered by appropriate delivery if ordered  - Initiate smoking cessation education as indicated  - Encourage broncho-pulmonary hygiene including cough, deep breathe, Incentive Spirometry  - Assess the need for suctioning and aspirate as needed  - Assess and instruct to report SOB or any respiratory difficulty  - Respiratory Therapy support as indicated  Outcome: Progressing     Problem: METABOLIC, FLUID AND ELECTROLYTES - ADULT  Goal: Electrolytes maintained within normal limits  Description: INTERVENTIONS:  - Monitor labs and assess patient for signs and symptoms of electrolyte imbalances  - Administer electrolyte replacement as ordered  - Monitor response to electrolyte replacements, including repeat lab results as appropriate  - Instruct patient on fluid and nutrition as appropriate  Outcome: Progressing     Problem: METABOLIC, FLUID AND ELECTROLYTES - ADULT  Goal: Fluid balance maintained  Description: INTERVENTIONS:  - Monitor labs   - Monitor I/O and WT  - Instruct patient on fluid and nutrition as appropriate  - Assess for signs & symptoms of volume excess or deficit  Outcome: Progressing        Problem: DISCHARGE PLANNING  Goal: Discharge to home or other facility with appropriate resources  Description: INTERVENTIONS:  - Identify barriers to discharge w/patient and caregiver  - Arrange for needed discharge resources and transportation as appropriate  - Identify discharge learning needs (meds, wound care, etc )  - Arrange for interpretive services to assist at discharge as needed  - Refer to Case Management Department for coordinating discharge planning if the patient needs post-hospital services based on physician/advanced practitioner order or complex needs related to functional status, cognitive ability, or social support system  Outcome: Progressing     Problem: Knowledge Deficit  Goal: Patient/family/caregiver demonstrates understanding of disease process, treatment plan, medications, and discharge instructions  Description: Complete learning assessment and assess knowledge base    Interventions:  - Provide teaching at level of understanding  - Provide teaching via preferred learning methods  Outcome: Progressing

## 2022-03-16 NOTE — CASE MANAGEMENT
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 2022    Goals of care, counseling/discussion 2022    Dizziness 2022    Transaminitis 2022    Bilateral lower extremity edema 2021    Hyponatremia 12/10/2021    Acute on chronic combined systolic and diastolic congestive heart failure ([facility] ) 10/23/2021    Ischemic cardiomyopathy 10/09/2018    PAF (paroxysmal atrial fibrillation) ([facility] ) 10/09/2018    Essential hypertension 10/09/2018    Cardiac defibrillator in situ 2016    Acute kidney injury superimposed on chronic kidney disease ([facility] )     Embolic stroke [facility] )     Hyperlipidemia, mixed 2016    Old myocardial infarction 2016    Status post percutaneous transluminal coronary angioplasty 2016    V-tach ([facility] ) 2016    Cardiomyopathy ([facility] ) 2016      LOS (days): 1  Geometric Mean LOS (GMLOS) (days): 3 80  Days to GMLOS:2 7     OBJECTIVE:  Risk of Unplanned Readmission Score: 21         Current admission status: Inpatient   Preferred Pharmacy:   [address] [place] [address]  Phone: [phone] Fax: [phone] [address] [place] [address]  [address] [facility] [address]  Phone: [phone] Fax: [phone]    Primary Care Provider: Merlyn Chavez DO    Primary Insurance: [facility]  Secondary Insurance:     DISCHARGE DETAILS:    Discharge planning discussed with[de-identified] pt  Freedom of Choice: Yes  Comments - Freedom of Choice: Met with pt to complete assessment and discuss VNA for CHF  Pt is agreeable and SEJAL accepted the case  TC to spouse to discuss discharge planning  Spouse stated they got a big bill from the hospital on the last visit  Spouse did not know if the VNA was covered on that bill or not  Recommend spouse call the phone on the bill if they have questions  Spouse stated she was a  and knows how to read a bill  Spouse stated she will discuss with her spouse and will call this LSW back if they do not want VNA    CM contacted family/caregiver?: Yes  Were Treatment Team discharge recommendations reviewed with patient/caregiver?: Yes  Did patient/caregiver verbalize understanding of patient care needs?: Yes  Were patient/caregiver advised of the risks associated with not following Treatment Team discharge recommendations?: Yes      Requested [address]         Is the patient interested in [facility] at discharge?: Yes  [address] requested[de-identified] [address] Name[de-identified] [facility] Provider[de-identified] PCP  Home Health Services Needed[de-identified] Heart Failure Management  Homebound Criteria Met[de-identified] Uses an Assist Device (i e  cane, walker, etc),Requires the Assistance of Another Person for Safe Ambulation or to Leave the Home  Supporting Clincal Findings[de-identified] Limited Endurance,Fatigues Easliy in Short Distances    Would you like to participate in our [address] service program?  : Yes

## 2022-03-16 NOTE — ASSESSMENT & PLAN NOTE
Lab Results   Component Value Date    EGFR 29 03/16/2022    EGFR 30 03/15/2022    EGFR 34 03/03/2022    CREATININE 1 94 (H) 03/16/2022    CREATININE 1 89 (H) 03/15/2022    CREATININE 1 72 (H) 03/03/2022   Baseline creatinine is 1 8-2  Currently creatinine is back to baseline her previously it was little elevated up to 3 14 when torsemide was held  Restarted on diuretics with Lasix 40 mg IV b i d  And monitor renal function closely during diuresis

## 2022-03-16 NOTE — PROGRESS NOTES
Cardiology Progress Note   MD Shea Mack MD Roe Quick, DO, [address] [facility] [place], MD Darryll Mcardle, DO, Eloise Dye DO, [facility]  ----------------------------------------------------------------  [address] [place], [address] [facility] 80 y o  male MRN: [id number]  Unit/Bed#: E4 -01 Encounter: [id number]      ASSESSMENT:   · Acute combined systolic and diastolic heart failure  · History of Ventricular tachycardia with shock x2 in the setting of severe cardiomyopathy and superimposed hypokalemia and hypomagnesemia  ? s/p [employer] dual-chamber ICD  · Hypokalemia/hypomagnesemia secondary to alcohol and diuretic use (predominantly related to alcohol use)  · CAD w/ remote history of MI and angioplasty, 1989  · Paroxysmal atrial fibrillation on warfarin  · Hypertension  · Dyslipidemia  · LVEF 12%, mild LV dilatation, diastolic dysfunction, regional wall motion abnormalities, paradoxical septal wall motion consistent with conduction abnormality, moderately reduced RV function, mild LA dilatation, mild MR/TR/NM w/ PASP 45-50 mmHg, mild aortic dilatation October 2021  · CKD stage IIIB  · History of embolic CVA  · History of Alcohol use     PLAN:  Weights coming down approximately 8 lb in the last 24 hours   Lower extremity swelling improving  Continue Lasix 40 mg IV b i d  Strict I/Os and daily weights   Still not at his dry weight of 59 5 kg  Keep potassium greater than 4 and magnesium greater than 2 aggressively given prior ventricular tachycardia on multiple agents   Continue carvedilol, amiodarone and mexiletine  Warfarin therapy with goal INR of 2-3   Hopeful transition to oral diuretic in the next 48-72 hours    Signed: David Rodriguez DO, [facility], [facility]      History of Present Illness:  Patient seen and examined  Admits to improved breathing and lower extremity edema  Denies chest pain, pressure, tightness or squeezing    Denies lightheadedness, dizziness or palpitations  Denies orthopnea  Review of Systems:  Review of Systems   Constitutional: Negative for decreased appetite, fever, weight gain and weight loss  HENT: Negative for congestion and sore throat  Eyes: Negative for visual disturbance  Cardiovascular: Negative for chest pain, dyspnea on exertion, leg swelling, near-syncope and palpitations  Respiratory: Negative for cough and shortness of breath  Hematologic/Lymphatic: Negative for bleeding problem  Skin: Negative for rash  Musculoskeletal: Negative for myalgias and neck pain  Gastrointestinal: Negative for abdominal pain and nausea  Neurological: Negative for light-headedness and weakness  Psychiatric/Behavioral: Negative for depression  No Known Allergies    No current facility-administered medications on file prior to encounter       Current Outpatient Medications on File Prior to Encounter   Medication Sig    amiodarone 200 mg tablet Take 1 tablet (200 mg total) by mouth daily    atorvastatin (LIPITOR) 40 mg tablet Take 1 tablet (40 mg total) by mouth daily    carvedilol (COREG) 12 5 mg tablet Take 1 tablet (12 5 mg total) by mouth 2 (two) times a day with meals    Cholecalciferol (VITAMIN D3) 50 MCG (2000 UT) capsule Take 2,000 Units by mouth daily      Glucosamine Sulfate 1000 MG CAPS 1 capsule Every 12 hours    magnesium oxide (MAG-OX) 400 mg Take 1 tablet (400 mg total) by mouth daily    mexiletine (MEXITIL) 150 mg capsule Take 1 capsule (150 mg total) by mouth 2 (two) times a day    potassium chloride (K-DUR,KLOR-CON) 10 mEq tablet Take 1 tablet (10 mEq total) by mouth 2 (two) times a day    warfarin (COUMADIN) 2 mg tablet Take 1 tablet daily or as directed by physician    torsemide (DEMADEX) 10 mg tablet Take 1 tablet (10 mg total) by mouth daily (Patient not taking: Reported on 3/15/2022 )        Current Facility-Administered Medications   Medication Dose Route Frequency Provider Last Rate    acetaminophen  650 mg Oral Q6H PRN Jessee Horne MD      amiodarone  200 mg Oral Daily Jessee Horne MD      atorvastatin  40 mg Oral QPM Jessee Horne MD      calcium carbonate  1,000 mg Oral Daily PRN Jessee Horne MD      carvedilol  12 5 mg Oral BID With Meals Jessee Horne MD      cholecalciferol  1,000 Units Oral Daily Jessee Horne MD      furosemide  40 mg Intravenous BID (diuretic) WANDER Patten      magnesium oxide  400 mg Oral Daily Jessee Horne MD      mexiletine  150 mg Oral Q12H [address] Jessee Horne MD      [START ON 3/18/2022] warfarin  1 mg Oral Once per day on Sun Fri Jessee Horne MD      warfarin  2 mg Oral Once per day on Mon Tue Wed Thu Sat Jessee Horne MD              Vitals:    03/15/22 2310 03/16/22 0301 03/16/22 0600 03/16/22 0727   BP: 134/71 113/62  159/97   BP Location: Right arm Left arm  Right arm   Pulse: 60 66  67   Resp: 18 18 18   Temp: (!) 97 2 °F (36 2 °C) 97 9 °F (36 6 °C)  97 6 °F (36 4 °C)   TempSrc: Temporal Temporal  Temporal   SpO2: 95% 95%  94%   Weight:   61 6 kg (135 lb 12 8 oz)    Height:             Intake/Output Summary (Last 24 hours) at 3/16/2022 1009  Last data filed at 3/16/2022 5774  Gross per 24 hour   Intake --   Output 2775 ml   Net -2775 ml       Weight change:     PHYSICAL EXAMINATION:  Gen: Awake, Alert, NAD  Head/eyes: AT/NC, pupils equal and round, Anicteric  ENT: mmm  Neck: Supple, No elevated JVP, trachea midline  Resp: CTA bilaterally no w/r/r  CV: irreg irreg +S1, S2, No m/r/g  Abd: Soft, NT/ND + BS  Ext: +1 pitting LE edema bilaterally  Neuro:  Follows commands, moves all extermities  Psych: Appropriate affect, happy mood, pleasant attitude, non-combative  Skin: warm; no rash, erythema or venous stasis changes on exposed skin    Lab Results:  Results from last 7 days   Lab Units 03/16/22  0506   WBC Thousand/uL 6 12   HEMOGLOBIN g/dL 12 2   HEMATOCRIT % 36 8   PLATELETS Thousands/uL 145*     Results from last 7 days   Lab Units 03/16/22  0506 03/15/22  1059 03/15/22  1059   POTASSIUM mmol/L 3 5   < > 3 9   CHLORIDE mmol/L 103   < > 106   CO2 mmol/L 25   < > 26   BUN mg/dL 14   < > 13   CREATININE mg/dL 1 94*   < > 1 89*   CALCIUM mg/dL 8 6   < > 8 1*   ALK PHOS U/L  --   --  71   ALT U/L  --   --  78   AST U/L  --   --  56*    < > = values in this interval not displayed  No results found for: Dillon Rust          Results from last 7 days   Lab Units 03/16/22  0506   INR  2 38*       Tele: SR w/ V paced complexes and APCs    This note was completed in part utilizing M-Modal Fluency Direct Software  Grammatical errors, random word insertions, spelling mistakes, and incomplete sentences may be an occasional consequence of this system secondary to software limitations, ambient noise, and hardware issues  If you have any questions or concerns about the content, text, or information contained within the body of this dictation, please contact the provider for clarification

## 2022-03-16 NOTE — PROGRESS NOTES
[address] [place] [address]  Progress Note Alia Haynes 10/31/1932, 80 y o  male MRN: [id number]  Unit/Bed#: E4 -01 Encounter: [id number]  Primary Care Provider: Merlyn Chavez DO   Date and time admitted to hospital: 3/15/2022 10:21 AM    * Acute on chronic combined systolic and diastolic congestive heart failure [facility])  Assessment & Plan  Wt Readings from Last 3 Encounters:   03/16/22 61 6 kg (135 lb 12 8 oz)   02/11/22 56 1 kg (123 lb 9 6 oz)   01/29/22 59 5 kg (131 lb 2 8 oz)      He has a severe ischemic cardiomyopathy with LVEF of approximately 12% (as per echo done 10/2021)  He has dual chamber AICD   Patient stopped taking torsemide 2 weeks ago due to worsening renal function as per his cardiologist   Creatinine had gone up to 3 at that time whereas his baseline is 1 8-2  Patient states he gained weight from 59 kilos to 64 5 kilos today  Also complaining of worsening shortness of breath especially on laying flat and dizziness  Chest x-ray shows mild pulmonary edema  Discussed with Cardiology  Will start on Lasix 40 mg IV b i d  monitor renal function closely during diuresis  Continue Coreg and statin therapy  EKG reviewed  Repeat 2D echo to be ordered if needed by Cardiology  3/16:  Clinically improving  Diuresing well  Will continue IV Lasix today and transition to oral diuretics tomorrow        Chronic kidney disease, stage 4 (severe) [facility])  Assessment & Plan  Lab Results   Component Value Date    EGFR 29 03/16/2022    EGFR 30 03/15/2022    EGFR 34 03/03/2022    CREATININE 1 94 (H) 03/16/2022    CREATININE 1 89 (H) 03/15/2022    CREATININE 1 72 (H) 03/03/2022   Baseline creatinine is 1 8-2  Currently creatinine is back to baseline her previously it was little elevated up to 3 14 when torsemide was held  Restarted on diuretics with Lasix 40 mg IV b i d  And monitor renal function closely during diuresis      Essential hypertension  Assessment & Plan  Blood pressure is currently well controlled  Patient complaining of some dizziness at home and hence will check orthostatic vitals to rule out orthostasis  Continue Coreg    PAF (paroxysmal atrial fibrillation) (HCC)  Assessment & Plan  Currently rate controlled with Coreg 12 5 mg twice daily and continue Coumadin as per home regimen anticoagulation (2 mg daily except for Friday and Monday when he takes 1 mg daily)    V-tach [facility])  Assessment & Plan  History of V-tach in the past and status post AICD  Continue amiodarone and mexiletine as per home regimen    Hyperlipidemia, mixed  Assessment & Plan  Continue statin therapy      VTE Pharmacologic Prophylaxis:   Pharmacologic: Warfarin (Coumadin)  Mechanical VTE Prophylaxis in Place: Yes    Patient Centered Rounds: I have performed bedside rounds with nursing staff today  Discussions with Specialists or Other Care Team Provider:  Discussed with Cardiology    Education and Discussions with Family / Patient:  Discussed with patient at bedside will update wife    Time Spent for Care: 30 minutes  More than 50% of total time spent on counseling and coordination of care as described above  Current Length of Stay: 1 day(s)    Current Patient Status: Inpatient   Certification Statement: The patient will continue to require additional inpatient hospital stay due to Acute on chronic heart failure    Discharge Plan:  Anticipate discharge home in 24-48 hours    Code Status: Level 3 - DNAR and DNI      Subjective:   Patient denies any chest pain or shortness of breath or abdominal pain  He states he is starting to feel better and he is peeing more now  Denies any dizziness    Objective:     Vitals:   Temp (24hrs), Av 5 °F (36 4 °C), Min:97 °F (36 1 °C), Max:97 9 °F (36 6 °C)    Temp:  [97 °F (36 1 °C)-97 9 °F (36 6 °C)] 97 6 °F (36 4 °C)  HR:  [60-93] 72  Resp:  [18] 18  BP: (112-159)/() 120/74  SpO2:  [91 %-95 %] 95 %  Body mass index is 24 84 kg/m²       Input and Output Summary (last 24 hours): Intake/Output Summary (Last 24 hours) at 3/16/2022 1209  Last data filed at 3/16/2022 3012  Gross per 24 hour   Intake --   Output 2775 ml   Net -2775 ml       Physical Exam:     Physical Exam  Vitals and nursing note reviewed  Constitutional:       Appearance: Normal appearance  HENT:      Head: Normocephalic and atraumatic  Right Ear: External ear normal       Left Ear: External ear normal       Nose: Nose normal       Mouth/Throat:      Pharynx: Oropharynx is clear  Eyes:      Pupils: Pupils are equal, round, and reactive to light  Cardiovascular:      Rate and Rhythm: Normal rate and regular rhythm  Heart sounds: Normal heart sounds  Pulmonary:      Effort: Pulmonary effort is normal       Comments: Moderate air entry bilaterally with mild decreased breath sounds bilateral bases  Abdominal:      General: Bowel sounds are normal       Palpations: Abdomen is soft  Tenderness: There is no abdominal tenderness  Musculoskeletal:         General: Normal range of motion  Cervical back: Normal range of motion and neck supple  Skin:     General: Skin is warm and dry  Capillary Refill: Capillary refill takes less than 2 seconds  Neurological:      General: No focal deficit present  Mental Status: He is alert and oriented to person, place, and time  Psychiatric:         Mood and Affect: Mood normal            Additional Data:     Labs:    Results from last 7 days   Lab Units 03/16/22  0506 03/15/22  1059 03/15/22  1059   WBC Thousand/uL 6 12   < > 5 46   HEMOGLOBIN g/dL 12 2   < > 12 1   HEMATOCRIT % 36 8   < > 35 5*   PLATELETS Thousands/uL 145*   < > 150   NEUTROS PCT %  --   --  62   LYMPHS PCT %  --   --  23   MONOS PCT %  --   --  12   EOS PCT %  --   --  2    < > = values in this interval not displayed       Results from last 7 days   Lab Units 03/16/22  0506 03/15/22  1059 03/15/22  1059   SODIUM mmol/L 140   < > 141   POTASSIUM mmol/L 3 5 < > 3 9   CHLORIDE mmol/L 103   < > 106   CO2 mmol/L 25   < > 26   BUN mg/dL 14   < > 13   CREATININE mg/dL 1 94*   < > 1 89*   ANION GAP mmol/L 12   < > 9   CALCIUM mg/dL 8 6   < > 8 1*   ALBUMIN g/dL  --   --  2 6*   TOTAL BILIRUBIN mg/dL  --   --  0 95   ALK PHOS U/L  --   --  71   ALT U/L  --   --  78   AST U/L  --   --  56*   GLUCOSE RANDOM mg/dL 101   < > 99    < > = values in this interval not displayed  Results from last 7 days   Lab Units 03/16/22  0506   INR  2 38*                       * I Have Reviewed All Lab Data Listed Above  * Additional Pertinent Lab Tests Reviewed: Gianfranco 66 Admission Reviewed    Imaging:    Imaging Reports Reviewed Today Include:  Chest x-ray  Imaging Personally Reviewed by Myself Includes:  Chest x-ray    Recent Cultures (last 7 days):           Last 24 Hours Medication List:   Current Facility-Administered Medications   Medication Dose Route Frequency Provider Last Rate    acetaminophen  650 mg Oral Q6H PRN Bell Alfred MD      amiodarone  200 mg Oral Daily Bell Alfred MD      atorvastatin  40 mg Oral QPM Bell Alfred MD      calcium carbonate  1,000 mg Oral Daily PRN Bell Alfred MD      carvedilol  12 5 mg Oral BID With Meals Bell Alfred MD      cholecalciferol  1,000 Units Oral Daily Bell Alfred MD      furosemide  40 mg Intravenous BID (diuretic) WANDER Patten      magnesium oxide  400 mg Oral Daily Bell Alfred MD      mexiletine  150 mg Oral Q12H [address] Bell Alfred MD      [START ON 3/18/2022] warfarin  1 mg Oral Once per day on Sun Fri Bell Alfred MD      warfarin  2 mg Oral Once per day on Mon Tue Wed Thu Sat Bell Alfred MD          Today, Patient Was Seen By: Bell Alfred MD    ** Please Note: Dictation voice to text software may have been used in the creation of this document   **

## 2022-03-16 NOTE — UTILIZATION REVIEW
Initial Clinical Review    Admission: Date/Time/Statement:   Admission Orders (From admission, onward)     Ordered        03/15/22 1158  Inpatient Admission  Once                      Orders Placed This Encounter   Procedures    Inpatient Admission     Standing Status:   Standing     Number of Occurrences:   1     Order Specific Question:   Level of Care     Answer:   Med Surg [16]     Order Specific Question:   Estimated length of stay     Answer:   More than 2 Midnights     Order Specific Question:   Certification     Answer:   I certify that inpatient services are medically necessary for this patient for a duration of greater than two midnights  See H&P and MD Progress Notes for additional information about the patient's course of treatment  ED Arrival Information     Expected Arrival Acuity    - 3/15/2022 10:11 Urgent         Means of arrival Escorted by Service Admission type    Wheelchair Family Member Hospitalist Urgent         Arrival complaint    sob        Chief Complaint   Patient presents with    Shortness of Breath     intermittant SOB x 1 week  denies CP, nausea, vomiting, fevers     Initial Presentation:   Mr Brunilda Coello is an 80 yom who presents to the ED from home with c/o SOB, intermittent dizziness and lightheadedness and weight gain of 5 5 kg x 2 weeks  Reports Torsemide was stopped 2 wks ago d/t worsening renal function  On exam he has moderate air entry bilat with decreased breath sounds bilat bases  PMH: severe CKD stage 4, HTN, HLD on statin, PAF on Coreg and Coumadin - rate controlled, VT with AICD  Imaging showed mild pulm edema  He is admitted to INPATIENT status with Acute on chronic combined CHF -  was started on IV Lasix 40 mg BID, continue Coreg and statin, will repeat Echo  Severe CKD - monitor renal function with increased diuresis  HTN - check orthostatics        3/15 Cardio Consult - Acute combined systolic and diastolic heart failure with elevated proBNP and recently off Torsemide  IV Lasix 40 mg BID, continue Carvedilol, Amiodarone, Mexiletine, Warfarin  ECG is rate controlled, trend troponins but doubt ACS  Date: 3/16   Day 2:   Continue IV Lasix 40 mg BID  Creat up to 1 94 today  Diuresing well  Transition to oral diuretic 3/17  No c/o CP, SOB, abd pain  Pt states he feels improvement with good urinary output  No dizziness  ED Triage Vitals   Temperature Pulse Respirations Blood Pressure SpO2   03/15/22 1019 03/15/22 1019 03/15/22 1019 03/15/22 1027 03/15/22 1019   98 2 °F (36 8 °C) 83 17 148/98 95 %      Temp Source Heart Rate Source Patient Position - Orthostatic VS BP Location FiO2 (%)   03/15/22 1019 03/15/22 1019 03/15/22 1027 03/15/22 1027 --   Oral Monitor Sitting Left arm       Pain Score       03/15/22 1256       No Pain          Wt Readings from Last 1 Encounters:   03/16/22 61 6 kg (135 lb 12 8 oz)     Additional Vital Signs:   03/16/22 0727 97 6 °F (36 4 °C) 67 18 159/97 119 94 % None (Room air) Lying   03/16/22 0301 97 9 °F (36 6 °C) 66 18 113/62 81 95 % None (Room air) Lying   03/15/22 2310 97 2 °F (36 2 °C) Abnormal  60 18 134/71 95 95 % None (Room air) Lying   03/15/22 1905 97 °F (36 1 °C) Abnormal  93 18 146/100 114 91 % None (Room air) Lying   03/15/22 1636 -- 83 -- 134/95 -- -- -- --   03/15/22 1530 97 8 °F (36 6 °C) 61 18 112/61 81 95 % None (Room air) Lying   03/15/22 1300 -- -- -- -- -- -- None (Room air) --   03/15/22 1246 -- 84 18 155/115 Abnormal  123 -- -- Sitting     Pertinent Labs/Diagnostic Test Results:     3/15 ECG - A fib w/ ventricular paced rhythm, LBBB    XR chest 1 view portable   Final Result by Eloisa Martinez MD (03/16 0715)      Mild cardiomegaly with multifocal patchy airspace disease and borderline interstitial thickening with leading differential considerations given to multifocal pneumonia versus pulmonary edema  Continued short interval follow-up to resolution is    recommended       Results from last 7 days   Lab Units 03/15/22  1053   SARS-COV-2  Negative     Results from last 7 days   Lab Units 03/16/22  0506 03/15/22  1059   WBC Thousand/uL 6 12 5 46   HEMOGLOBIN g/dL 12 2 12 1   HEMATOCRIT % 36 8 35 5*   PLATELETS Thousands/uL 145* 150   NEUTROS ABS Thousands/µL  --  3 37         Results from last 7 days   Lab Units 03/16/22  0506 03/15/22  1059   SODIUM mmol/L 140 141   POTASSIUM mmol/L 3 5 3 9   CHLORIDE mmol/L 103 106   CO2 mmol/L 25 26   ANION GAP mmol/L 12 9   BUN mg/dL 14 13   CREATININE mg/dL 1 94* 1 89*   EGFR ml/min/1 73sq m 29 30   CALCIUM mg/dL 8 6 8 1*   MAGNESIUM mg/dL  --  1 6     Results from last 7 days   Lab Units 03/15/22  1059   AST U/L 56*   ALT U/L 78   ALK PHOS U/L 71   TOTAL PROTEIN g/dL 5 5*   ALBUMIN g/dL 2 6*   TOTAL BILIRUBIN mg/dL 0 95         Results from last 7 days   Lab Units 03/16/22  0506 03/15/22  1059   GLUCOSE RANDOM mg/dL 101 99     Results from last 7 days   Lab Units 03/15/22  1500 03/15/22  1343 03/15/22  1059   HS TNI 0HR ng/L  --   --  20   HS TNI 2HR ng/L  --  19  --    HSTNI D2 ng/L  --  -1  --    HS TNI 4HR ng/L 16  --   --    HSTNI D4 ng/L -4  --   --          Results from last 7 days   Lab Units 03/16/22  0506 03/15/22  1059   PROTIME seconds 25 0* 27 8*   INR  2 38* 2 73*   PTT seconds  --  36     Results from last 7 days   Lab Units 03/16/22  0506   TSH 3RD GENERATON uIU/mL 4 450*     Results from last 7 days   Lab Units 03/15/22  1059   NT-PRO BNP pg/mL 4,693*     Results from last 7 days   Lab Units 03/15/22  1053   INFLUENZA A PCR  Negative   INFLUENZA B PCR  Negative   RSV PCR  Negative     ED Treatment:   Medication Administration from 03/15/2022 1010 to 03/15/2022 1239    Date/Time Order Dose Route Action   03/15/2022 1142 furosemide (LASIX) injection 40 mg 40 mg Intravenous Given        Past Medical History:   Diagnosis Date    Anemia     Arthritis     Coronary artery disease     CVA [facility]cerebral vascular accident) [facility]     Hyperlipidemia     Hypertension Present on Admission:   Hyperlipidemia, mixed   [facility]oxysmal atrial fibrillation) [facility]   Essential hypertension   Acute on chronic combined systolic and diastolic congestive heart failure (HCC)   Chronic kidney disease, stage 4 (severe) ([facility])      Admitting Diagnosis: CHF (congestive heart failure) ([facility]) [I50 9]  Dyspnea [R06 00]  SOB (shortness of breath) [R06 02]  Renal insufficiency [N28 9]  Age/Sex: 80 y o  male  Admission Orders:  Scheduled Medications:  amiodarone, 200 mg, Oral, Daily  atorvastatin, 40 mg, Oral, QPM  carvedilol, 12 5 mg, Oral, BID With Meals  cholecalciferol, 1,000 Units, Oral, Daily  furosemide, 40 mg, Intravenous, BID (diuretic)  magnesium oxide, 400 mg, Oral, Daily  mexiletine, 150 mg, Oral, Q12H [address]  [START ON 3/18/2022] warfarin, 1 mg, Oral, Once per day on Sun Fri  warfarin, 2 mg, Oral, Once per day on Mon Tue Wed Thu Sat      Continuous IV Infusions:     PRN Meds:  acetaminophen, 650 mg, Oral, Q6H PRN  calcium carbonate, 1,000 mg, Oral, Daily PRN    Tele  Fluid restriction  IP CONSULT TO CARDIOLOGY    Network Utilization Review Department  ATTENTION: Please call with any questions or concerns to [phone] and carefully listen to the prompts so that you are directed to the right person  All voicemails are confidential   Cleotis Dirk all requests for admission clinical reviews, approved or denied determinations and any other requests to dedicated fax number below belonging to the campus where the patient is receiving treatment   List of dedicated fax numbers for the Facilities:  [address] [place] [address] DENIALS (Administrative/Medical Necessity) [phone]   [address] (Maternity/NICU/Pediatrics) 261 [facility] [place] [address] [place] [address] [facility] [id number] [place] [address] Arabella [address] 1481 [address] [place] [address] [phone]

## 2022-03-17 NOTE — CASE MANAGEMENT
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 2022    Goals of care, counseling/discussion 2022    Dizziness 2022    Transaminitis 2022    Bilateral lower extremity edema 2021    Hyponatremia 12/10/2021    Acute on chronic combined systolic and diastolic congestive heart failure ([facility] ) 10/23/2021    Ischemic cardiomyopathy 10/09/2018    PAF (paroxysmal atrial fibrillation) ([facility] ) 10/09/2018    Essential hypertension 10/09/2018    Cardiac defibrillator in situ 2016    Acute kidney injury superimposed on chronic kidney disease ([facility] )     Embolic stroke ([facility]     Hyperlipidemia, mixed 2016    Old myocardial infarction 2016    Status post percutaneous transluminal coronary angioplasty 2016    V-tach ([facility] ) 2016    Cardiomyopathy ([facility] ) 2016      LOS (days): 2  Geometric Mean LOS (GMLOS) (days): 3 80  Days to GMLOS:1 8     OBJECTIVE:  Risk of Unplanned Readmission Score: 25         Current admission status: Inpatient   Preferred Pharmacy:   [address] [place] [address]  Phone: [phone] Fax: [phone] [address] [place] [address]  [address] [facility] [address]  Phone: [phone] Fax: [phone]    Primary Care Provider: Suzette Tristan DO    Primary Insurance: [facility]  Secondary Insurance:     DISCHARGE DETAILS:          Comments - Freedom of Choice: Met with pt and he is agreeable to [facility] for RN  Pt stated he will need a ride home and need to the [facility]

## 2022-03-17 NOTE — NURSING NOTE
Patient d/c to home with referral to [facility]  Transportation provided via [employer]  Provided CHF booklet and reviewed CHF education

## 2022-03-17 NOTE — PLAN OF CARE
Problem: PAIN - ADULT  Goal: Verbalizes/displays adequate comfort level or baseline comfort level  Description: Interventions:  - Encourage patient to monitor pain and request assistance  - Assess pain using appropriate pain scale  - Administer analgesics based on type and severity of pain and evaluate response  - Implement non-pharmacological measures as appropriate and evaluate response  - Consider cultural and social influences on pain and pain management  - Notify physician/advanced practitioner if interventions unsuccessful or patient reports new pain  Outcome: Progressing     Problem: INFECTION - ADULT  Goal: Absence or prevention of progression during hospitalization  Description: INTERVENTIONS:  - Assess and monitor for signs and symptoms of infection  - Monitor lab/diagnostic results  - Monitor all insertion sites, i e  indwelling lines, tubes, and drains  - Monitor endotracheal if appropriate and nasal secretions for changes in amount and color  - [place] appropriate cooling/warming therapies per order  - Administer medications as ordered  - Instruct and encourage patient and family to use good hand hygiene technique  - Identify and instruct in appropriate isolation precautions for identified infection/condition  Outcome: Progressing     Problem: SAFETY ADULT  Goal: Patient will remain free of falls  Description: INTERVENTIONS:  - Educate patient/family on patient safety including physical limitations  - Instruct patient to call for assistance with activity   - Consult OT/PT to assist with strengthening/mobility   - Keep Call bell within reach  - Keep bed low and locked with side rails adjusted as appropriate  - Keep care items and personal belongings within reach  - Initiate and maintain comfort rounds  - Make Fall Risk Sign visible to staff  - Offer Toileting every 2 Hours, in advance of need  - Initiate/Maintain bed/chair alarm as needed  - Obtain necessary fall risk management equipment: wheeled walker  - Apply yellow socks and bracelet for high fall risk patients  - Consider moving patient to room near nurses station  Outcome: Progressing     Problem: SAFETY ADULT  Goal: Maintain or return to baseline ADL function  Description: INTERVENTIONS:  -  Assess patient's ability to carry out ADLs; assess patient's baseline for ADL function and identify physical deficits which impact ability to perform ADLs (bathing, care of mouth/teeth, toileting, grooming, dressing, etc )  - Assess/evaluate cause of self-care deficits   - Assess range of motion  - Assess patient's mobility; develop plan if impaired  - Assess patient's need for assistive devices and provide as appropriate  - Encourage maximum independence but intervene and supervise when necessary  - Involve family in performance of ADLs  - Assess for home care needs following discharge   - Consider OT consult to assist with ADL evaluation and planning for discharge  - Provide patient education as appropriate  Outcome: Progressing     Problem: SAFETY ADULT  Goal: Maintains/Returns to pre admission functional level  Description: INTERVENTIONS:  - Perform BMAT or MOVE assessment daily    - Set and communicate daily mobility goal to care team and patient/family/caregiver  - Collaborate with rehabilitation services on mobility goals if consulted  - Perform Range of Motion 4 times a day  - Encourage patient to reposition patient every 2 hours  - Dangle patient 4 times a day  - Stand patient 4 times a day  - Ambulate patient 3 times a day  - Out of bed to chair for meals  - Out of bed for toileting  - Record patient progress and toleration of activity level   Outcome: Progressing     Problem: Nutrition/Hydration-ADULT  Goal: Nutrient/Hydration intake appropriate for improving, restoring or maintaining nutritional needs  Description: Monitor and assess patient's nutrition/hydration status for malnutrition   Collaborate with interdisciplinary team and initiate plan and interventions as ordered  Monitor patient's weight and dietary intake as ordered or per policy  Utilize nutrition screening tool and intervene as necessary  Determine patient's food preferences and provide high-protein, high-caloric foods as appropriate       INTERVENTIONS:  - Monitor oral intake, urinary output, labs, and treatment plans  - Assess nutrition and hydration status and recommend course of action  - Evaluate amount of meals eaten  - Assist patient with eating if necessary   - Allow adequate time for meals  - Recommend/ encourage appropriate diets, oral nutritional supplements, and vitamin/mineral supplements  - Order, calculate, and assess calorie counts as needed  - Recommend, monitor, and adjust tube feedings and TPN/PPN based on assessed needs  - Assess need for intravenous fluids  - Provide specific nutrition/hydration education as appropriate  - Include patient/family/caregiver in decisions related to nutrition  Outcome: Progressing     Problem: CARDIOVASCULAR - ADULT  Goal: Maintains optimal cardiac output and hemodynamic stability  Description: INTERVENTIONS:  - Monitor I/O, vital signs and rhythm  - Monitor for S/S and trends of decreased cardiac output  - Administer and titrate ordered vasoactive medications to optimize hemodynamic stability  - Assess quality of pulses, skin color and temperature  - Assess for signs of decreased coronary artery perfusion  - Instruct patient to report change in severity of symptoms  Outcome: Progressing     Problem: CARDIOVASCULAR - ADULT  Goal: Absence of cardiac dysrhythmias or at baseline rhythm  Description: INTERVENTIONS:  - Continuous cardiac monitoring, vital signs, obtain 12 lead EKG if ordered  - Administer antiarrhythmic and heart rate control medications as ordered  - Monitor electrolytes and administer replacement therapy as ordered  Outcome: Progressing     Problem: RESPIRATORY - ADULT  Goal: Achieves optimal ventilation and oxygenation  Description: INTERVENTIONS:  - Assess for changes in respiratory status  - Assess for changes in mentation and behavior  - Position to facilitate oxygenation and minimize respiratory effort  - Oxygen administered by appropriate delivery if ordered  - Initiate smoking cessation education as indicated  - Encourage broncho-pulmonary hygiene including cough, deep breathe, Incentive Spirometry  - Assess the need for suctioning and aspirate as needed  - Assess and instruct to report SOB or any respiratory difficulty  - Respiratory Therapy support as indicated  Outcome: Progressing     Problem: METABOLIC, FLUID AND ELECTROLYTES - ADULT  Goal: Electrolytes maintained within normal limits  Description: INTERVENTIONS:  - Monitor labs and assess patient for signs and symptoms of electrolyte imbalances  - Administer electrolyte replacement as ordered  - Monitor response to electrolyte replacements, including repeat lab results as appropriate  - Instruct patient on fluid and nutrition as appropriate  Outcome: Progressing     Problem: METABOLIC, FLUID AND ELECTROLYTES - ADULT  Goal: Fluid balance maintained  Description: INTERVENTIONS:  - Monitor labs   - Monitor I/O and WT  - Instruct patient on fluid and nutrition as appropriate  - Assess for signs & symptoms of volume excess or deficit  Outcome: Progressing     Problem: DISCHARGE PLANNING  Goal: Discharge to home or other facility with appropriate resources  Description: INTERVENTIONS:  - Identify barriers to discharge w/patient and caregiver  - Arrange for needed discharge resources and transportation as appropriate  - Identify discharge learning needs (meds, wound care, etc )  - Arrange for interpretive services to assist at discharge as needed  - Refer to Case Management Department for coordinating discharge planning if the patient needs post-hospital services based on physician/advanced practitioner order or complex needs related to functional status, cognitive ability, or social support system  Outcome: Progressing     Problem: Knowledge Deficit  Goal: Patient/family/caregiver demonstrates understanding of disease process, treatment plan, medications, and discharge instructions  Description: Complete learning assessment and assess knowledge base    Interventions:  - Provide teaching at level of understanding  - Provide teaching via preferred learning methods  Outcome: Progressing

## 2022-03-17 NOTE — PROGRESS NOTES
Cardiology         Progress Note - Cardiology   Christean Matter 80 y o  male MRN: [id number]  Unit/Bed#: E4 -01 Encounter: [id number]          Assessment/Recommendations/Discussion:   · Acute combined systolic and diastolic heart failure  · History of Ventricular tachycardia with shock x2 in the setting of severe cardiomyopathy and superimposed hypokalemia and hypomagnesemia  ? s/p Med[employer]onic dual-chamber ICD  · Hypokalemia/hypomagnesemia secondary to alcohol and diuretic use (predominantly related to alcohol use)  · CAD w/ remote history of MI and angioplasty, 1989  · Paroxysmal atrial fibrillation on warfarin  · Hypertension  · Dyslipidemia  · LVEF 12%, mild LV dilatation, diastolic dysfunction, regional wall motion abnormalities, paradoxical septal wall motion consistent with conduction abnormality, moderately reduced RV function, mild LA dilatation, mild MR/TR/AR w/ PASP 45-50 mmHg, mild aortic dilatation October 2021  · CKD stage IIIB  · History of embolic CVA  · History of Alcohol use      · Patient feeling well today,  Euvolemic on examination  Will transition to p o  Diuretic, torsemide 5 mg daily  April he had increased creatinine and hypokalemia on 10 mg of torsemide, therefore will try lower dose  Strongly recommend low-sodium diet  Can take additional torsemide as needed  · Recommend BMP in 1 week  Will arrange outpatient follow-up in 1-2 weeks  · Continue carvedilol, amiodarone, mexiletine  · Continue warfarin  · Okay to discharge today from cardiac standpoint    Discussed with Dr Hill Mitchell:  Patient seen and examined, feels well, denies chest pain, shortness of breath          Physical Exam:  GEN:  NAD  HEENT:  MMM, NCAT, pink conjunctiva, EOMI, nonicteric sclera  CV:  NO JVD/HJR, RR, NO M/R/G, +S1/S2, NO PARASTERNAL HEAVE/THRILL, NO LE EDEMA, NO HEPATIC SYSTOLIC PULSATION, WARM EXTREMITIES  RESP:  CTAB/L  ABD:  SOFT, NT, NO GROSS ORGANOMEGALY        Vitals:   /78 (BP Location: Right arm)   Pulse 66   Temp (!) 97 3 °F (36 3 °C) (Temporal)   Resp 19   Ht 5' 2" (1 575 m)   Wt 58 5 kg (129 lb)   SpO2 95%   BMI 23 59 kg/m²   Vitals:    03/16/22 0600 03/17/22 0600   Weight: 61 6 kg (135 lb 12 8 oz) 58 5 kg (129 lb)       Intake/Output Summary (Last 24 hours) at 3/17/2022 1034  Last data filed at 3/17/2022 0830  Gross per 24 hour   Intake 840 ml   Output 2200 ml   Net -1360 ml       TELEMETRY:  Paced rhythm  Lab Results:  Results from last 7 days   Lab Units 03/16/22  0506   WBC Thousand/uL 6 12   HEMOGLOBIN g/dL 12 2   HEMATOCRIT % 36 8   PLATELETS Thousands/uL 145*     Results from last 7 days   Lab Units 03/17/22  0554 03/16/22  0506 03/15/22  1059   POTASSIUM mmol/L 3 0*   < > 3 9   CHLORIDE mmol/L 101   < > 106   CO2 mmol/L 27   < > 26   BUN mg/dL 15   < > 13   CREATININE mg/dL 2 14*   < > 1 89*   CALCIUM mg/dL 8 2*   < > 8 1*   ALK PHOS U/L  --   --  71   ALT U/L  --   --  78   AST U/L  --   --  56*    < > = values in this interval not displayed       Results from last 7 days   Lab Units 03/17/22  0554   POTASSIUM mmol/L 3 0*   CHLORIDE mmol/L 101   CO2 mmol/L 27   BUN mg/dL 15   CREATININE mg/dL 2 14*   CALCIUM mg/dL 8 2*           Medications:    Current Facility-Administered Medications:     acetaminophen (TYLENOL) tablet 650 mg, 650 mg, Oral, Q6H PRN, Lis Anderson MD    amiodarone tablet 200 mg, 200 mg, Oral, Daily, Lis Anderson MD, 200 mg at 03/17/22 0819    atorvastatin (LIPITOR) tablet 40 mg, 40 mg, Oral, QPM, Lis Anderson MD, 40 mg at 03/16/22 1806    calcium carbonate (TUMS) chewable tablet 1,000 mg, 1,000 mg, Oral, Daily PRN, Lis Anderson MD    carvedilol (COREG) tablet 12 5 mg, 12 5 mg, Oral, BID With Meals, Lis Anderson MD, 12 5 mg at 03/17/22 0819    cholecalciferol (VITAMIN D3) tablet 1,000 Units, 1,000 Units, Oral, Daily, Lis Anderson MD, 1,000 Units at 03/17/22 0819    docusate sodium (COLACE) capsule 100 mg, 100 mg, Oral, BID, WANDER Lemons, 100 mg at 03/16/22 2034    magnesium oxide (MAG-OX) tablet 400 mg, 400 mg, Oral, Daily, Jackelyn Healy MD, 400 mg at 03/17/22 0820    mexiletine (MEXITIL) capsule 150 mg, 150 mg, Oral, Q12H [address], Jackelyn Healy MD, 150 mg at 03/17/22 0819    potassium chloride (K-DUR,KLOR-CON) CR tablet 40 mEq, 40 mEq, Oral, Once, Jackelyn Healy MD    senna (SENOKOT) tablet 17 2 mg, 2 tablet, Oral, HS PRN, Robin Ramirez, CRNP, 17 2 mg at 03/16/22 2034    [START ON 3/18/2022] warfarin (COUMADIN) tablet 1 mg, 1 mg, Oral, Once per day on Sun Fri, Lidya Solorio MD    warfarin (COUMADIN) tablet 2 mg, 2 mg, Oral, Once per day on Mon Tue Wed Thu Sat, Jackelyn Healy MD, 2 mg at 03/16/22 1806    This note was completed in part utilizing [employer] Direct Software  Grammatical errors, random word insertions, spelling mistakes, and incomplete sentences may be an occasional consequence of this system secondary to software limitations, ambient noise, and hardware issues  If you have any questions or concerns about the content, text, or information contained within the body of this dictation, please contact the provider for clarification

## 2022-03-17 NOTE — ASSESSMENT & PLAN NOTE
Lab Results   Component Value Date    EGFR 26 03/17/2022    EGFR 29 03/16/2022    EGFR 30 03/15/2022    CREATININE 2 14 (H) 03/17/2022    CREATININE 1 94 (H) 03/16/2022    CREATININE 1 89 (H) 03/15/2022   Baseline creatinine is 1 8-2  Currently creatinine is back to baseline her previously it was little elevated up to 3 14 when torsemide was held  Restarted on diuretics with Lasix 40 mg IV b i d  And monitor renal function closely during diuresis    DC IV Lasix today and start on oral torsemide 5 mg daily from tomorrow outpatient monitoring of BMP and adjustment as per cardiology

## 2022-03-17 NOTE — PLAN OF CARE
Problem: PAIN - ADULT  Goal: Verbalizes/displays adequate comfort level or baseline comfort level  Description: Interventions:  - Encourage patient to monitor pain and request assistance  - Assess pain using appropriate pain scale  - Administer analgesics based on type and severity of pain and evaluate response  - Implement non-pharmacological measures as appropriate and evaluate response  - Consider cultural and social influences on pain and pain management  - Notify physician/advanced practitioner if interventions unsuccessful or patient reports new pain  Outcome: Progressing     Problem: INFECTION - ADULT  Goal: Absence or prevention of progression during hospitalization  Description: INTERVENTIONS:  - Assess and monitor for signs and symptoms of infection  - Monitor lab/diagnostic results  - Monitor all insertion sites, i e  indwelling lines, tubes, and drains  - Monitor endotracheal if appropriate and nasal secretions for changes in amount and color  - [place] appropriate cooling/warming therapies per order  - Administer medications as ordered  - Instruct and encourage patient and family to use good hand hygiene technique  - Identify and instruct in appropriate isolation precautions for identified infection/condition  Outcome: Progressing  Goal: Absence of fever/infection during neutropenic period  Description: INTERVENTIONS:  - Monitor WBC    Outcome: Progressing     Problem: SAFETY ADULT  Goal: Patient will remain free of falls  Description: INTERVENTIONS:  - Educate patient/family on patient safety including physical limitations  - Instruct patient to call for assistance with activity   - Consult OT/PT to assist with strengthening/mobility   - Keep Call bell within reach  - Keep bed low and locked with side rails adjusted as appropriate  - Keep care items and personal belongings within reach  - Initiate and maintain comfort rounds  - Make Fall Risk Sign visible to staff  - Offer Toileting every 3 Hours, in advance of need  - Initiate/Maintain bed alarm  - Obtain necessary fall risk management equipmen  - Apply yellow socks and bracelet for high fall risk patients  - Consider moving patient to room near nurses station  Outcome: Progressing  Goal: Maintain or return to baseline ADL function  Description: INTERVENTIONS:  -  Assess patient's ability to carry out ADLs; assess patient's baseline for ADL function and identify physical deficits which impact ability to perform ADLs (bathing, care of mouth/teeth, toileting, grooming, dressing, etc )  - Assess/evaluate cause of self-care deficits   - Assess range of motion  - Assess patient's mobility; develop plan if impaired  - Assess patient's need for assistive devices and provide as appropriate  - Encourage maximum independence but intervene and supervise when necessary  - Involve family in performance of ADLs  - Assess for home care needs following discharge   - Consider OT consult to assist with ADL evaluation and planning for discharge  - Provide patient education as appropriate  Outcome: Progressing  Goal: Maintains/Returns to pre admission functional level  Description: INTERVENTIONS:  - Perform BMAT or MOVE assessment daily    - Set and communicate daily mobility goal to care team and patient/family/caregiver  - Collaborate with rehabilitation services on mobility goals if consulted  - Perform Range of Motion 3 times a day  - Reposition patient every 3 hours    - Dangle patient 3 times a day  - Stand patient 3 times a day  - Ambulate patient 3 times a day  - Out of bed to chair 3 times a day   - Out of bed for meals 3 times a day  - Out of bed for toileting  - Record patient progress and toleration of activity level   Outcome: Progressing     Problem: DISCHARGE PLANNING  Goal: Discharge to home or other facility with appropriate resources  Description: INTERVENTIONS:  - Identify barriers to discharge w/patient and caregiver  - Arrange for needed discharge resources and transportation as appropriate  - Identify discharge learning needs (meds, wound care, etc )  - Arrange for interpretive services to assist at discharge as needed  - Refer to Case Management Department for coordinating discharge planning if the patient needs post-hospital services based on physician/advanced practitioner order or complex needs related to functional status, cognitive ability, or social support system  Outcome: Progressing     Problem: Knowledge Deficit  Goal: Patient/family/caregiver demonstrates understanding of disease process, treatment plan, medications, and discharge instructions  Description: Complete learning assessment and assess knowledge base  Interventions:  - Provide teaching at level of understanding  - Provide teaching via preferred learning methods  Outcome: Progressing     Problem: Nutrition/Hydration-ADULT  Goal: Nutrient/Hydration intake appropriate for improving, restoring or maintaining nutritional needs  Description: Monitor and assess patient's nutrition/hydration status for malnutrition  Collaborate with interdisciplinary team and initiate plan and interventions as ordered  Monitor patient's weight and dietary intake as ordered or per policy  Utilize nutrition screening tool and intervene as necessary  Determine patient's food preferences and provide high-protein, high-caloric foods as appropriate       INTERVENTIONS:  - Monitor oral intake, urinary output, labs, and treatment plans  - Assess nutrition and hydration status and recommend course of action  - Evaluate amount of meals eaten  - Assist patient with eating if necessary   - Allow adequate time for meals  - Recommend/ encourage appropriate diets, oral nutritional supplements, and vitamin/mineral supplements  - Order, calculate, and assess calorie counts as needed  - Recommend, monitor, and adjust tube feedings and TPN/PPN based on assessed needs  - Assess need for intravenous fluids  - Provide specific nutrition/hydration education as appropriate  - Include patient/family/caregiver in decisions related to nutrition  Outcome: Progressing     Problem: Potential for Falls  Goal: Patient will remain free of falls  Description: INTERVENTIONS:  - Educate patient/family on patient safety including physical limitations  - Instruct patient to call for assistance with activity   - Consult OT/PT to assist with strengthening/mobility   - Keep Call bell within reach  - Keep bed low and locked with side rails adjusted as appropriate  - Keep care items and personal belongings within reach  - Initiate and maintain comfort rounds  - Make Fall Risk Sign visible to staff  - Offer Toileting every 2 Hours, in advance of need  - Initiate/Maintain bed alarm  - Obtain necessary fall risk management equipment:   - Apply yellow socks and bracelet for high fall risk patients  - Consider moving patient to room near nurses station  Outcome: Progressing     Problem: CARDIOVASCULAR - ADULT  Goal: Maintains optimal cardiac output and hemodynamic stability  Description: INTERVENTIONS:  - Monitor I/O, vital signs and rhythm  - Monitor for S/S and trends of decreased cardiac output  - Administer and titrate ordered vasoactive medications to optimize hemodynamic stability  - Assess quality of pulses, skin color and temperature  - Assess for signs of decreased coronary artery perfusion  - Instruct patient to report change in severity of symptoms  Outcome: Progressing  Goal: Absence of cardiac dysrhythmias or at baseline rhythm  Description: INTERVENTIONS:  - Continuous cardiac monitoring, vital signs, obtain 12 lead EKG if ordered  - Administer antiarrhythmic and heart rate control medications as ordered  - Monitor electrolytes and administer replacement therapy as ordered  Outcome: Progressing     Problem: RESPIRATORY - ADULT  Goal: Achieves optimal ventilation and oxygenation  Description: INTERVENTIONS:  - Assess for changes in respiratory status  - Assess for changes in mentation and behavior  - Position to facilitate oxygenation and minimize respiratory effort  - Oxygen administered by appropriate delivery if ordered  - Initiate smoking cessation education as indicated  - Encourage broncho-pulmonary hygiene including cough, deep breathe, Incentive Spirometry  - Assess the need for suctioning and aspirate as needed  - Assess and instruct to report SOB or any respiratory difficulty  - Respiratory Therapy support as indicated  Outcome: Progressing     Problem: METABOLIC, FLUID AND ELECTROLYTES - ADULT  Goal: Electrolytes maintained within normal limits  Description: INTERVENTIONS:  - Monitor labs and assess patient for signs and symptoms of electrolyte imbalances  - Administer electrolyte replacement as ordered  - Monitor response to electrolyte replacements, including repeat lab results as appropriate  - Instruct patient on fluid and nutrition as appropriate  Outcome: Progressing  Goal: Fluid balance maintained  Description: INTERVENTIONS:  - Monitor labs   - Monitor I/O and WT  - Instruct patient on fluid and nutrition as appropriate  - Assess for signs & symptoms of volume excess or deficit  Outcome: Progressing

## 2022-03-17 NOTE — CASE MANAGEMENT
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 2022    Goals of care, counseling/discussion 2022    Dizziness 2022    Transaminitis 2022    Bilateral lower extremity edema 2021    Hyponatremia 12/10/2021    Acute on chronic combined systolic and diastolic congestive heart failure ([facility] ) 10/23/2021    Ischemic cardiomyopathy 10/09/2018    PAF (paroxysmal atrial fibrillation) ([place] [address] ) 10/09/2018    Essential hypertension 10/09/2018    Cardiac defibrillator in situ 2016    Acute kidney injury superimposed on chronic kidney disease ([facility] )     Embolic stroke ([place] [address] ) 8166    Hyperlipidemia, mixed 2016    Old myocardial infarction 2016    Status post percutaneous transluminal coronary angioplasty 2016    V-tach ([facility] ) 2016    Cardiomyopathy ([facility] ) 2016      LOS (days): 2  Geometric Mean LOS (GMLOS) (days): 3 80  Days to GMLOS:1 8     OBJECTIVE:  Risk of Unplanned Readmission Score: 25         Current admission status: Inpatient   Preferred Pharmacy:   [address] [place] [id number]  Phone: [phone] Fax: [phone] [address] [place] [address]  [address] [place] [address]  Phone: [phone] Fax: [phone]    Primary Care Provider: Yayo Quintana DO    Primary Insurance: [facility] [employer]  Secondary Insurance:     DISCHARGE DETAILS:          Comments - Freedom of Choice: MD has dischaged pt home today with [facility]  Pt signed the SANDEEP form and copy put in bin to be scan into chart  RN and spouse are aware of  time  Transport at Discharge :  Other (Comment) (SANDEEP)  Dispatcher Contacted: Yes     Transported by [employer] and Unit #): SANDEEP  ETA of Transport (Date): 03/17/22  ETA of Transport (Time): 1300

## 2022-03-17 NOTE — ASSESSMENT & PLAN NOTE
Wt Readings from Last 3 Encounters:   03/17/22 58 5 kg (129 lb)   02/11/22 56 1 kg (123 lb 9 6 oz)   01/29/22 59 5 kg (131 lb 2 8 oz)      He has a severe ischemic cardiomyopathy with LVEF of approximately 12% (as per echo done 10/2021)  He has dual chamber AICD   Patient stopped taking torsemide 2 weeks ago due to worsening renal function as per his cardiologist   Creatinine had gone up to 3 at that time whereas his baseline is 1 8-2  Patient states he gained weight from 59 kilos to 64 5 kilos today  Also complaining of worsening shortness of breath especially on laying flat and dizziness  Chest x-ray shows mild pulmonary edema  Discussed with Cardiology  Will start on Lasix 40 mg IV b i d  monitor renal function closely during diuresis  Continue Coreg and statin therapy  EKG reviewed  Repeat 2D echo to be ordered if needed by Cardiology  3/16:  Clinically improving  Diuresing well  Will continue IV Lasix today and transition to oral diuretics tomorrow  3/17:  Down to baseline weight  Start on torsemide 5 mg daily starting tomorrow  Replace potassium today    Discharge home today with outpatient follow-up with Cardiology and BMP in 1 week

## 2022-03-17 NOTE — UTILIZATION REVIEW
Continued Stay Review    Date: 3/17/22  REQUEST FOR ADDITIONAL INFORMATION                        Current Patient Class: IP Current Level of Care: MS     HPI:89 y o  male initially admitted on 3/15 with     Assessment/Plan:     3/15 H&P   History of Present Illness:     Eliza Frye is a 80 y o  male who presents with shortness of breath for the last 2 weeks  Patient states that his torsemide was stopped 2 weeks ago secondary to worsening renal function and since then he has slowly progressively having worsening shortness of breath and some dizziness and lightheadedness at times  Denies any chest pain or nausea vomiting or abdominal pain or fevers or chills  Also states that his weight has gone up to 64 5 kilos from 59 kilos  Denies any falls or device firing     Review of Systems:   Review of Systems   Constitutional: Positive for appetite change and fatigue  Negative for chills and fever  HENT: Negative for hearing loss, sore throat and trouble swallowing  Eyes: Negative for photophobia, discharge and visual disturbance  Respiratory: Positive for shortness of breath  Negative for chest tightness  Cardiovascular: Negative for chest pain and palpitations  Gastrointestinal: Negative for abdominal pain, blood in stool and vomiting  Endocrine: Negative for polydipsia and polyuria  Genitourinary: Negative for difficulty urinating, dysuria, flank pain and hematuria  Musculoskeletal: Negative for back pain and gait problem  Skin: Negative for rash  Allergic/Immunologic: Negative for environmental allergies and food allergies  Neurological: Positive for dizziness and weakness  Negative for seizures, syncope and headaches  Hematological: Does not bruise/bleed easily  Psychiatric/Behavioral: Negative for behavioral problems  All other systems reviewed and are negative  Physical Exam  Vitals and nursing note reviewed  Constitutional:       Appearance: Normal appearance  Comments: Frail elderly male   HENT:      Head: Normocephalic and atraumatic  Right Ear: External ear normal       Left Ear: External ear normal       Nose: Nose normal       Mouth/Throat:      Pharynx: Oropharynx is clear  Cardiovascular:      Rate and Rhythm: Normal rate  Rhythm irregular  Heart sounds: Normal heart sounds  Pulmonary:      Effort: Pulmonary effort is normal       Comments: Moderate air entry bilaterally decreased breath sounds bilateral bases  Abdominal:      General: Bowel sounds are normal       Palpations: Abdomen is soft  Tenderness: There is no abdominal tenderness  Musculoskeletal:         General: Normal range of motion  Cervical back: Normal range of motion and neck supple  Skin:     General: Skin is warm and dry  Capillary Refill: Capillary refill takes less than 2 seconds  Neurological:      General: No focal deficit present  Mental Status: He is alert and oriented to person, place, and time  Psychiatric:         Mood and Affect: Mood normal       * Acute on chronic combined systolic and diastolic congestive heart failure [facility])  Assessment & Plan      Wt Readings from Last 3 Encounters:   03/15/22 64 5 kg (142 lb 3 2 oz)   02/11/22 56 1 kg (123 lb 9 6 oz)   01/29/22 59 5 kg (131 lb 2 8 oz)       He has a severe ischemic cardiomyopathy with LVEF of approximately 12% (as per echo done 10/2021)  He has dual chamber AICD   Patient stopped taking torsemide 2 weeks ago due to worsening renal function as per his cardiologist   Creatinine had gone up to 3 at that time whereas his baseline is 1 8-2  Patient states he gained weight from 59 kilos to 64 5 kilos today  Also complaining of worsening shortness of breath especially on laying flat and dizziness  Chest x-ray shows mild pulmonary edema    Discussed with Cardiology  Will start on Lasix 40 mg IV b i d  monitor renal function closely during diuresis  Continue Coreg and statin therapy  EKG reviewed  Repeat 2D echo to be ordered if needed by Cardiology      Chronic kidney disease, stage 4 (severe) [facility])  Assessment & Plan        Lab Results   Component Value Date     EGFR 30 03/15/2022     EGFR 34 03/03/2022     EGFR 16 02/10/2022     CREATININE 1 89 (H) 03/15/2022     CREATININE 1 72 (H) 03/03/2022     CREATININE 3 14 (H) 02/10/2022   Baseline creatinine is 1 8-2  Currently creatinine is back to baseline her previously it was little elevated up to 3 14 when torsemide was held  Restarted on diuretics with Lasix 40 mg IV b i d  And monitor renal function closely during diuresis      Essential hypertension  Assessment & Plan  Blood pressure is currently well controlled  Patient complaining of some dizziness at home and hence will check orthostatic vitals to rule out orthostasis  Continue Coreg     PAF (paroxysmal atrial fibrillation) (HCC)  Assessment & Plan  Currently rate controlled with Coreg 12 5 mg twice daily and continue Coumadin as per home regimen anticoagulation (2 mg daily except for Friday and Monday when he takes 1 mg daily)     V-tach [facility])  Assessment & Plan  History of V-tach in the past and status post AICD  Continue amiodarone and mexiletine as per home regimen     Hyperlipidemia, mixed  Assessment & Plan  Continue statin therapy  VTE Prophylaxis: Warfarin (Coumadin)  / sequential compression device   Code Status:  DNR/DNI  POLST: There is no POLST form on file for this patient (pre-hospital)  Discussion with family:  Discussed with wife at bedside     Anticipated Length of Stay:  Patient will be admitted on an Inpatient basis with an anticipated length of stay of  more than 2 midnights     Justification for Hospital Stay:  Acute on chronic combined systolic and diastolic CHF exacerbation      3/17 Progress Note:  Assessment/Recommendations/Discussion:   · Acute combined systolic and diastolic heart failure  · History of Ventricular tachycardia with shock x2 in the setting of severe cardiomyopathy and superimposed hypokalemia and hypomagnesemia  ? s/p [employer] dual-chamber ICD  · Hypokalemia/hypomagnesemia secondary to alcohol and diuretic use (predominantly related to alcohol use)  · CAD w/ remote history of MI and angioplasty, 1989  · Paroxysmal atrial fibrillation on warfarin  · Hypertension  · Dyslipidemia  · LVEF 12%, mild LV dilatation, diastolic dysfunction, regional wall motion abnormalities, paradoxical septal wall motion consistent with conduction abnormality, moderately reduced RV function, mild LA dilatation, mild MR/TR/HI w/ PASP 45-50 mmHg, mild aortic dilatation October 2021  · CKD stage IIIB  · History of embolic CVA  · History of Alcohol use      · Patient feeling well today,  Euvolemic on examination  Will transition to p o  Diuretic, torsemide 5 mg daily  April he had increased creatinine and hypokalemia on 10 mg of torsemide, therefore will try lower dose  Strongly recommend low-sodium diet  Can take additional torsemide as needed  · Recommend BMP in 1 week  Will arrange outpatient follow-up in 1-2 weeks  · Continue carvedilol, amiodarone, mexiletine  · Continue warfarin  · Okay to discharge today from cardiac standpoint  Discussed with Dr Sowmya Armstrong     3/17 DISCHARGE SUMMARY :  Kelli Ozuna is a 80 y o  male patient who originally presented to the hospital on 3/15/2022 due to shortness of breath  Patient found have acute on chronic systolic CHF exacerbation due to recently holding diuretics outpatient due to worsening renal function  Patient gained 5-6 kilos  Placed on IV diuresis following which he has rapidly improved is now back down to his baseline weight  Will discharge him on torsemide 5 mg daily with outpatient BMP monitoring  Continue potassium supplementation try to keep close to 4 due to known history of V-tach  No episodes noted this admission and stable on mexiletine and amiodarone      Vital Signs:   03/17/22 0700 97 3 °F (36 3 °C) Abnormal  66 19 150/78 -- 95 % None (Room air) Lying   03/17/22 0249 97 2 °F (36 2 °C) Abnormal  61 17 110/61 80 97 % None (Room air) Lying   03/16/22 2253 97 7 °F (36 5 °C) 71 19 134/90 98 95 % -- Lying   03/16/22 1900 97 4 °F (36 3 °C) Abnormal  61 16 150/97 116 98 % None (Room air) Lying   03/16/22 1509 97 3 °F (36 3 °C) Abnormal  62 18 136/94 110 94 % None (Room air) Lying   03/16/22 1109 97 6 °F (36 4 °C) 72 18 120/74 91 95 % None (Room air) Lying   03/16/22 0727 97 6 °F (36 4 °C) 67 18 159/97 119 94 % None (Room air) Lying   03/16/22 0301 97 9 °F (36 6 °C) 66 18 113/62 81 95 % None (Room air) Lying   03/15/22 2310 97 2 °F (36 2 °C) Abnormal  60 18 134/71 95 95 % None (Room air) Lying   03/15/22 1905 97 °F (36 1 °C) Abnormal  93 18 146/100 114 91 % None (Room air) Lying   03/15/22 1636 -- 83 -- 134/95 -- -- -- --   03/15/22 1530 97 8 °F (36 6 °C) 61 18 112/61 81 95 % None (Room air) Lying   03/15/22 1300 -- -- -- -- -- -- None (Room air) --   03/15/22 1246 -- 84 18 155/115 Abnormal  123 -- -- Sitting   03/15/22 1104 -- -- -- -- -- -- None (Room air) --   03/15/22 1027 -- -- -- 148/98 -- -- -- Sitting   03/15/22 1019 98 2 °F (36 8 °C) 83 17 --  -- 95 % None (Room air) --     Pertinent Labs/Diagnostic Results:     3/15 CXR - Mild cardiomegaly with multifocal patchy airspace disease and borderline interstitial thickening with leading differential considerations given to multifocal pneumonia versus pulmonary edema  Continued short interval follow-up to resolution is recommended      3/15 ECG - Atrial fibrillation with frequent AV dual-paced complexes  Left axis deviation  Left bundle branch block  Abnormal ECG  When compared with ECG of 15-MAR-2022 10:40,  Previous ECG has undetermined rhythm, needs review    Results from last 7 days   Lab Units 03/15/22  1053   SARS-COV-2  Negative     Results from last 7 days   Lab Units 03/16/22  0506 03/15/22  1059   WBC Thousand/uL 6 12 5 46   HEMOGLOBIN g/dL 12 2 12 1   HEMATOCRIT % 36 8 35 5*   PLATELETS Thousands/uL 145* 150   NEUTROS ABS Thousands/µL  --  3 37         Results from last 7 days   Lab Units 03/17/22  0554 03/16/22  0506 03/15/22  1059   SODIUM mmol/L 141 140 141   POTASSIUM mmol/L 3 0* 3 5 3 9   CHLORIDE mmol/L 101 103 106   CO2 mmol/L 27 25 26   ANION GAP mmol/L 13 12 9   BUN mg/dL 15 14 13   CREATININE mg/dL 2 14* 1 94* 1 89*   EGFR ml/min/1 73sq m 26 29 30   CALCIUM mg/dL 8 2* 8 6 8 1*   MAGNESIUM mg/dL 1 7  --  1 6     Results from last 7 days   Lab Units 03/15/22  1059   AST U/L 56*   ALT U/L 78   ALK PHOS U/L 71   TOTAL PROTEIN g/dL 5 5*   ALBUMIN g/dL 2 6*   TOTAL BILIRUBIN mg/dL 0 95         Results from last 7 days   Lab Units 03/17/22  0554 03/16/22  0506 03/15/22  1059   GLUCOSE RANDOM mg/dL 86 101 99     Results from last 7 days   Lab Units 03/15/22  1500 03/15/22  1343 03/15/22  1059   HS TNI 0HR ng/L  --   --  20   HS TNI 2HR ng/L  --  19  --    HSTNI D2 ng/L  --  -1  --    HS TNI 4HR ng/L 16  --   --    HSTNI D4 ng/L -4  --   --          Results from last 7 days   Lab Units 03/16/22  0506 03/15/22  1059   PROTIME seconds 25 0* 27 8*   INR  2 38* 2 73*   PTT seconds  --  36     Results from last 7 days   Lab Units 03/16/22  0506   TSH 3RD GENERATON uIU/mL 4 450*     Results from last 7 days   Lab Units 03/15/22  1059   NT-PRO BNP pg/mL 4,693*     Results from last 7 days   Lab Units 03/15/22  1053   INFLUENZA A PCR  Negative   INFLUENZA B PCR  Negative   RSV PCR  Negative       Medications:   Scheduled Medications:  amiodarone, 200 mg, Oral, Daily  atorvastatin, 40 mg, Oral, QPM  carvedilol, 12 5 mg, Oral, BID With Meals  cholecalciferol, 1,000 Units, Oral, Daily  docusate sodium, 100 mg, Oral, BID  magnesium oxide, 400 mg, Oral, Daily  mexiletine, 150 mg, Oral, Q12H [address]  [START ON 3/18/2022] warfarin, 1 mg, Oral, Once per day on Sun Fri  warfarin, 2 mg, Oral, Once per day on Mon Tue Wed Thu Sat      Continuous IV Infusions:     PRN Meds:  acetaminophen, 650 mg, Oral, Q6H PRN  calcium carbonate, 1,000 mg, Oral, Daily PRN  senna, 2 tablet, Oral, HS PRN -x 1 3/16    Discharge Plan: home     Network Utilization Review Department  ATTENTION: Please call with any questions or concerns to [phone] and carefully listen to the prompts so that you are directed to the right person  All voicemails are confidential   Lynette Kussmaul all requests for admission clinical reviews, approved or denied determinations and any other requests to dedicated fax number below belonging to the campus where the patient is receiving treatment   List of dedicated fax numbers for the Facilities:  [address] DENIALS (Administrative/Medical Necessity) [phone]   [address] (Maternity/NICU/Pediatrics) [phone]   [address]  [address] [facility] [place] [address] [address] [place] [address] [address] [place] [address] [phone]

## 2022-03-17 NOTE — DISCHARGE INSTRUCTIONS
Heart Failure   WHAT YOU NEED TO KNOW:   Heart failure is a condition that does not allow your heart to fill or pump properly  Not enough oxygen in your blood gets to your organs and tissues  Fluid may not move through your body properly  Fluid builds up and causes swelling and trouble breathing  This is known as congestive heart failure  Heart failure may start in the left or right ventricle  Heart failure is often caused by damage or injury to your heart  The damage may be caused by other heart problems, diabetes, or high blood pressure  The damage may have also been caused by an infection  Heart failure is a long-term condition that tends to get worse over time  It is important to manage your health to improve your quality of life  DISCHARGE INSTRUCTIONS:   Call your local emergency number (911 in the [address] [facility]) if:   · You have any of the following signs of a heart attack:      ? Squeezing, pressure, or pain in your chest    ? You may  also have any of the following:     § Discomfort or pain in your back, neck, jaw, stomach, or arm    § Shortness of breath    § Nausea or vomiting    § Lightheadedness or a sudden cold sweat      Call your doctor if:   · Your heartbeat is fast, slow, or uneven all the time  · You have symptoms of worsening heart failure:      ? Shortness of breath at rest, at night, or that is getting worse in any way    ? Weight gain of 3 or more pounds (1 4 kg) in a day, or more than your healthcare provider says is okay    ? More swelling in your legs or ankles    ? Abdominal pain or swelling    ? More coughing    ? Loss of appetite    ? Feeling tired all the time    · You feel hopeless or depressed, or you have lost interest in things you used to enjoy  · You often feel worried or afraid  · You have questions or concerns about your condition or care  Medicines:   · Medicines  may be given to help regulate your heart rhythm and lower your blood pressure   You may also need medicines to help decrease extra fluids  Medicines, such as NSAIDs, may be stopped if they are causing your heart failure to become worse  Do not stop any of your medicines on your own  · Take your medicine as directed  Contact your healthcare provider if you think your medicine is not helping or if you have side effects  Tell him or her if you are allergic to any medicine  Keep a list of the medicines, vitamins, and herbs you take  Include the amounts, and when and why you take them  Bring the list or the pill bottles to follow-up visits  Carry your medicine list with you in case of an emergency  Go to cardiac rehab if directed:  Cardiac rehab is a program run by specialists who will help you safely strengthen your heart  In the program you will learn about exercise, relaxation, stress management, and heart-healthy nutrition  Manage swelling from extra fluid:   · Elevate (raise) your legs above the level of your heart  This will help with fluid that builds up in your legs or ankles  Elevate your legs as often as possible during the day  Prop your legs on pillows or blankets to keep them elevated comfortably  Try not to stand for long periods of time during the day  Move around to keep your blood circulating  · Limit sodium (salt)  Ask how much sodium you can have each day  Your healthcare provider may give you a limit, such as 2,300 milligrams (mg) a day  Your provider or a dietitian can teach you how to read food labels for the number of mg in a food  He or she can also help you find ways to have less salt  For example, if you add salt to food as you cook, do not add more at the table  · Drink liquids as directed  You may need to limit the amount of liquid you drink within 24 hours  Your healthcare provider will tell you how much liquid to have and which liquids are best for you  He or she may tell you to limit liquid to 1 5 to 2 liters in a day   He or she will also tell you how often to drink liquid throughout the day  · Weigh yourself every morning  Use the same scale, in the same spot  Do this after you use the bathroom, but before you eat or drink  Wear the same type of clothing each time  Write down your weight and call your healthcare provider if you have a sudden weight gain  Swelling and weight gain are signs of fluid buildup  Manage heart failure: Your quality of life may improve with treatment and the following:  · Do not smoke  Nicotine and other chemicals in cigarettes and cigars can cause lung and heart damage  Ask your healthcare provider for information if you currently smoke and need help to quit  E-cigarettes or smokeless tobacco still contain nicotine  Talk to your healthcare provider before you use these products  · Do not drink alcohol or use illegal drugs  Alcohol and drugs can increase your risk for high blood pressure, diabetes, and coronary artery disease  · Eat heart-healthy foods  Heart-healthy foods include fruits, vegetables, lean meat (such as beef, chicken, or pork), and low-fat dairy products  Fatty fish such as salmon and tuna are also heart healthy  Other heart-healthy foods include walnuts, whole-grain breads, beans, and cooked beans  Replace butter and margarine with heart-healthy oils such as olive oil or canola oil  Your provider or a dietitian can help you create heart-healthy meal plans  · Manage any chronic health conditions you have  These include high blood pressure, diabetes, obesity, high cholesterol, metabolic syndrome, and COPD  You will have fewer symptoms if you manage these health conditions  Follow your healthcare provider's recommendations and follow up with him or her regularly  · Maintain a healthy weight  Being overweight can increase your risk for high blood pressure, diabetes, and coronary artery disease  These conditions can make your symptoms worse  Ask your healthcare provider how much you should weigh  Ask him or her to help you create a weight loss plan if you are overweight  · Stay active  Activity can help keep your symptoms from getting worse  Walking is a type of physical activity that helps maintain your strength and improve your mood  Physical activity also helps you manage your weight  Work with your healthcare provider to create an exercise plan that is right for you  · Get vaccines as directed  The flu and pneumonia can be severe for a person who has heart failure  Vaccines protect you from these infections  Get a flu shot every year as soon as it is recommended, usually in September or October  You may also need the pneumonia vaccine  Your healthcare provider can tell you if you need other vaccines, and when to get them  Follow up with your doctor within 7 days and as directed: You may need to return for other tests  You may need home health care  A healthcare provider will monitor your vital signs, weight, and make sure your medicines are working  Write down your questions so you remember to ask them during your visits  Join a support group:  Heart failure can be difficult to manage  It may be helpful to talk with others who have heart failure  You may learn how to better manage your condition or get emotional support  For more information:  · [address]  [place] , [place]   Phone: 8- [phone]  Web Address: https://[employer]ix/  [address] \[facility] [place] [facility]\"" 2022 Information is for End User's use only and may not be sold, redistributed or otherwise used for commercial purposes  All illustrations and images included in CareNotes® are the copyrighted property of [employer]  or [address] [place], [place] [address]mil[address]   The above information is an  only  It is not intended as medical advice for individual conditions or treatments   Talk to your doctor, nurse or pharmacist before following any medical regimen to see if it is safe and effective for you

## 2022-03-17 NOTE — DISCHARGE SUMMARY
[address] [facility]  Discharge- Carlos [facility] 10/31/1932, 80 y o  male MRN: [id number]  Unit/Bed#: E4 -01 Encounter: [id number]  Primary Care Provider: Suzette Tristan DO   Date and time admitted to hospital: 3/15/2022 10:21 AM    * Acute on chronic combined systolic and diastolic congestive heart failure [facility])  Assessment & Plan  Wt Readings from Last 3 Encounters:   03/17/22 58 5 kg (129 lb)   02/11/22 56 1 kg (123 lb 9 6 oz)   01/29/22 59 5 kg (131 lb 2 8 oz)      He has a severe ischemic cardiomyopathy with LVEF of approximately 12% (as per echo done 10/2021)  He has dual chamber AICD   Patient stopped taking torsemide 2 weeks ago due to worsening renal function as per his cardiologist   Creatinine had gone up to 3 at that time whereas his baseline is 1 8-2  Patient states he gained weight from 59 kilos to 64 5 kilos today  Also complaining of worsening shortness of breath especially on laying flat and dizziness  Chest x-ray shows mild pulmonary edema  Discussed with Cardiology  Will start on Lasix 40 mg IV b i d  monitor renal function closely during diuresis  Continue Coreg and statin therapy  EKG reviewed  Repeat 2D echo to be ordered if needed by Cardiology  3/16:  Clinically improving  Diuresing well  Will continue IV Lasix today and transition to oral diuretics tomorrow  3/17:  Down to baseline weight  Start on torsemide 5 mg daily starting tomorrow  Replace potassium today  Discharge home today with outpatient follow-up with Cardiology and BMP in 1 week        Chronic kidney disease, stage 4 (severe) [facility])  Assessment & Plan  Lab Results   Component Value Date    EGFR 26 03/17/2022    EGFR 29 03/16/2022    EGFR 30 03/15/2022    CREATININE 2 14 (H) 03/17/2022    CREATININE 1 94 (H) 03/16/2022    CREATININE 1 89 (H) 03/15/2022   Baseline creatinine is 1 8-2    Currently creatinine is back to baseline her previously it was little elevated up to 3 14 when torsemide was held  Restarted on diuretics with Lasix 40 mg IV b i d  And monitor renal function closely during diuresis  DC IV Lasix today and start on oral torsemide 5 mg daily from tomorrow outpatient monitoring of BMP and adjustment as per cardiology    Essential hypertension  Assessment & Plan  Blood pressure is currently well controlled  Patient complaining of some dizziness at home and hence will check orthostatic vitals to rule out orthostasis  Continue Coreg  Currently stable    PAF (paroxysmal atrial fibrillation) ([facility])  Assessment & Plan  Currently rate controlled with Coreg 12 5 mg twice daily and continue Coumadin as per home regimen anticoagulation (2 mg daily except for Friday and Monday when he takes 1 mg daily)    V-tach [facility])  Assessment & Plan  History of V-tach in the past and status post AICD  Continue amiodarone and mexiletine as per home regimen    Hyperlipidemia, mixed  Assessment & Plan  Continue statin therapy      Discharging Physician / Practitioner: Joan Garza MD  PCP: Calderon Chavez DO  Admission Date:   Admission Orders (From admission, onward)     Ordered        03/15/22 1158  Inpatient Admission  Once                      Discharge Date: 03/17/22    Medical Problems             Resolved Problems  Date Reviewed: 3/17/2022    None                Consultations During Hospital Stay:  · Cardiology    Procedures Performed:   · none    Significant Findings / Test Results:   XR chest 1 view portable    Result Date: 3/16/2022  Impression: Mild cardiomegaly with multifocal patchy airspace disease and borderline interstitial thickening with leading differential considerations given to multifocal pneumonia versus pulmonary edema  Continued short interval follow-up to resolution is recommended  The study was marked in [facility] for immediate notification   Workstation performed: [id number]     Incidental Findings:   · none    Test Results Pending at Discharge (will require follow up):   · none Outpatient Tests Requested:  · Bmp in 1 week    Complications: none    Reason for Admission:  Shortness of breath    Hospital Course:     Gloria Cannon is a 80 y o  male patient who originally presented to the hospital on 3/15/2022 due to shortness of breath  Patient found have acute on chronic systolic CHF exacerbation due to recently holding diuretics outpatient due to worsening renal function  Patient gained 5-6 kilos  Placed on IV diuresis following which he has rapidly improved is now back down to his baseline weight  Will discharge him on torsemide 5 mg daily with outpatient BMP monitoring  Continue potassium supplementation try to keep close to 4 due to known history of V-tach  No episodes noted this admission and stable on mexiletine and amiodarone    Please see above list of diagnoses and related plan for additional information  Condition at Discharge: good     Discharge Day Visit / Exam:     Subjective:  Patient denies any chest pain or shortness of breath or abdominal pain or dizziness today  Feels well  Vitals: Blood Pressure: 150/78 (03/17/22 0700)  Pulse: 66 (03/17/22 0700)  Temperature: (!) 97 3 °F (36 3 °C) (03/17/22 0700)  Temp Source: Temporal (03/17/22 0700)  Respirations: 19 (03/17/22 0700)  Height: 5' 2" (157 5 cm) (03/15/22 1246)  Weight - Scale: 58 5 kg (129 lb) (03/17/22 0600)  SpO2: 95 % (03/17/22 0700)  Exam:   Physical Exam  Vitals and nursing note reviewed  Constitutional:       Appearance: Normal appearance  HENT:      Head: Normocephalic and atraumatic  Right Ear: External ear normal       Left Ear: External ear normal       Nose: Nose normal       Mouth/Throat:      Pharynx: Oropharynx is clear  Eyes:      Pupils: Pupils are equal, round, and reactive to light  Cardiovascular:      Rate and Rhythm: Normal rate and regular rhythm  Heart sounds: Normal heart sounds     Pulmonary:      Effort: Pulmonary effort is normal       Breath sounds: Normal breath sounds  Abdominal:      General: Bowel sounds are normal       Palpations: Abdomen is soft  Tenderness: There is no abdominal tenderness  Musculoskeletal:         General: Normal range of motion  Cervical back: Normal range of motion and neck supple  Skin:     General: Skin is warm and dry  Capillary Refill: Capillary refill takes less than 2 seconds  Neurological:      General: No focal deficit present  Mental Status: He is alert and oriented to person, place, and time  Psychiatric:         Mood and Affect: Mood normal          Discussion with Family:  Discussed with wife    Discharge instructions/Information to patient and family:   See after visit summary for information provided to patient and family  Provisions for Follow-Up Care:  See after visit summary for information related to follow-up care and any pertinent home health orders  Disposition:     Home with VNA Services (Reminder: Complete face to face encounter)    For Discharges to [facility] SNF:   · Not Applicable to this Patient - Not Applicable to this Patient    Planned Readmission: none     Discharge Statement:  I spent 35 minutes discharging the patient  This time was spent on the day of discharge  I had direct contact with the patient on the day of discharge  Greater than 50% of the total time was spent examining patient, answering all patient questions, arranging and discussing plan of care with patient as well as directly providing post-discharge instructions  Additional time then spent on discharge activities  Discharge Medications:  See after visit summary for reconciled discharge medications provided to patient and family        ** Please Note: This note has been constructed using a voice recognition system **

## 2022-03-17 NOTE — ASSESSMENT & PLAN NOTE
Blood pressure is currently well controlled    Patient complaining of some dizziness at home and hence will check orthostatic vitals to rule out orthostasis  Continue Coreg  Currently stable

## 2022-03-24 NOTE — PROGRESS NOTES
Pt called told hold x2 days then 1 mg daily recheck 1 week  3/31/22 Pt stated will see what we can do

## 2022-03-25 NOTE — PROGRESS NOTES
[facility]  [id number] [facility], [place] [address] [facility]  Phone#  [phone]   Fax#  [phone]  *-*-*-*-*-*-*-*-*-*-*-*-*-*-*-*-*-*-*-*-*-*-*-*-*-*-*-*-*-*-*-*-*-*-*-*-*-*-*-*-*-*-*-*-*-*-*-*-*-*-*-*-*-*                                   Cardiology Follow Up      ENCOUNTER DATE: 22 1:50 PM  PATIENT NAME: Ilia Connell   : 10/31/1932    MRN: [id number]  AGE:89 y o  SEX: male  [address] Tito Paulino MD     PRIMARY CARE PHYSICIAN: Mamie Patricio DO    ACTIVE DIAGNOSIS THIS VISIT  1  V-tach ([address] )     2  PAF (paroxysmal atrial fibrillation) (HCC)  POCT ECG   3  Ischemic cardiomyopathy     4  Acute on chronic combined systolic and diastolic congestive heart failure ([address] )     5  Old myocardial infarction     6  Hyperlipidemia, mixed     7  Cardiac defibrillator in situ     8  Acute kidney injury superimposed on chronic kidney disease ([address] )     9  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 ([address] )     10  Status post percutaneous transluminal coronary angioplasty     11  Essential hypertension     12  Bilateral lower extremity edema     13   Chronic kidney disease, stage 4 (severe) ([address] )       ACTIVE PROBLEM LIST  Patient Active Problem List   Diagnosis    Cardiac defibrillator in situ    Acute kidney injury superimposed on chronic kidney disease ([address] )    Embolic stroke ([address] )    Hyperlipidemia, mixed    Old myocardial infarction    Status post percutaneous transluminal coronary angioplasty    V-tach ([address] )    Ischemic cardiomyopathy    PAF (paroxysmal atrial fibrillation) ([address] )    Essential hypertension    Cardiomyopathy ([address] )    Acute on chronic combined systolic and diastolic congestive heart failure (HCC)    Hyponatremia    Bilateral lower extremity edema    Transaminitis    Dizziness    Goals of care, counseling/discussion    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    Chronic kidney disease, stage 4 (severe) ([address] )       CARDIOLOGY SPECIALTY COMMENTS  Patient was 1st seen May 23, 2016  He had moved from [place] and was seeking to establish cardiology care  In 1989, he had a myocardial infarction treated by [facility] resulting in an ischemic cardiomyopathy with an ejection fraction of 35%  He has a Medtronic ICD which was placed on May 9th, 2011 in [place]  He is on chronic oral anticoagulation  He has a history of paroxysmal atrial fibrillation with a TIA/CVA in the past  Prior history includes myocardial infarction, ventricular tachycardia, TIA/CVA, GI bleed, hypertension and hyperlipidemia  10/24/2018 nuclear stress test: LVEF 29% with dyskinesis of the apex and anterior walls  Extensive severe intensity fixed defect of the entire anterior wall and apex extending into the distal lateral wall and distal septum no ischemia    10/24/2021 mildly dilated LV with estimated EF 12% unable to assess diastolic function  Wall motion abnormalities  RV systolic function moderately reduced  Left atrial enlargement  Mild mitral, tricuspid and pulmonic regurgitation  Mildly dilated aortic root and ascending aorta  INTERVAL HISTORY:          Patient with a severe ischemic cardiomyopathy with ejection fraction of 12% or less returns  He is feeling reasonably well  His episodes of ventricular tachycardia requiring defibrillator shocks appear to be controlled on amiodarone and mexiletine  He is developing progressive kidney injury and has now reached stage 5 kidney disease with a GFR of 18 and a creatinine of 2 87  He has 1+ bilateral pretibial edema above his socks  His lungs are clear  DISCUSSION/PLAN:            1  Discontinue torsemide 5 mg  2  Reduce potassium to 10 mEq daily from b i d   3  Return in 6 weeks  4  nonfasting BMP in 5 weeks 1 week prior to return    5   EKG on return    Lab Studies:    Lab Results   Component Value Date    CHOLESTEROL 192 04/27/2021    CHOLESTEROL 158 12/12/2019     Lab Results   Component Value Date    TRIG 109 04/27/2021    TRIG 105 12/12/2019     Lab Results   Component Value Date    HDL 62 04/27/2021    HDL 52 12/12/2019     Lab Results   Component Value Date    LDLCALC 108 (H) 04/27/2021    LDLCALC 85 12/12/2019       Lab Results   Component Value Date    NTBNP 4,693 (H) 03/15/2022    NTBNP 1,757 (H) 02/10/2022    NTBNP 4,679 (H) 01/21/2022       Lab Results   Component Value Date    EGFR 18 03/24/2022    EGFR 26 03/17/2022    EGFR 29 03/16/2022    SODIUM 138 03/24/2022    SODIUM 141 03/17/2022    SODIUM 140 03/16/2022    K 4 4 03/24/2022    K 3 0 (L) 03/17/2022    K 3 5 03/16/2022     03/24/2022     03/17/2022     03/16/2022    CO2 27 03/24/2022    CO2 27 03/17/2022    CO2 25 03/16/2022    BUN 35 (H) 03/24/2022    BUN 15 03/17/2022    BUN 14 03/16/2022    CREATININE 2 87 (H) 03/24/2022    CREATININE 2 14 (H) 03/17/2022    CREATININE 1 94 (H) 03/16/2022     Lab Results   Component Value Date    WBC 6 12 03/16/2022    WBC 5 46 03/15/2022    WBC 7 54 02/10/2022    HGB 12 2 03/16/2022    HGB 12 1 03/15/2022    HGB 13 4 02/10/2022    HCT 36 8 03/16/2022    HCT 35 5 (L) 03/15/2022    HCT 40 9 02/10/2022    MCV 99 (H) 03/16/2022    MCV 97 03/15/2022    MCV 97 02/10/2022    MCH 32 8 03/16/2022    MCH 33 0 03/15/2022    MCH 31 6 02/10/2022    MCHC 33 2 03/16/2022    MCHC 34 1 03/15/2022    MCHC 32 8 02/10/2022     (L) 03/16/2022     03/15/2022     02/10/2022      Lab Results   Component Value Date    CALCIUM 8 7 03/24/2022    CALCIUM 8 2 (L) 03/17/2022    CALCIUM 8 6 03/16/2022    AST 56 (H) 03/15/2022    AST 85 (H) 01/26/2022     (H) 01/25/2022    ALT 78 03/15/2022     (H) 01/26/2022     (H) 01/25/2022    ALKPHOS 71 03/15/2022    ALKPHOS 70 01/26/2022    ALKPHOS 84 01/25/2022    MG 1 7 03/17/2022    MG 1 6 03/15/2022    MG 1 8 02/10/2022       Lab Results   Component Value Date    TROPONINI <0 02 10/23/2021       Lab Results   Component Value Date    FERRITIN 368 12/12/2019    IRON 85 12/12/2019    TIBC 276 12/12/2019     Results for orders placed or performed in visit on 03/25/22   POCT ECG    Narrative     Atrial fibrillation with ventricular paced rhythm and occasional premature ventricular contractions which may be fusion complexes  Abnormal EKG           Current Outpatient Medications:     amiodarone 200 mg tablet, Take 1 tablet (200 mg total) by mouth daily, Disp: 90 tablet, Rfl: 3    atorvastatin (LIPITOR) 40 mg tablet, Take 1 tablet (40 mg total) by mouth daily, Disp: 90 tablet, Rfl: 3    carvedilol (COREG) 12 5 mg tablet, Take 1 tablet (12 5 mg total) by mouth 2 (two) times a day with meals, Disp: 180 tablet, Rfl: 3    Cholecalciferol (VITAMIN D3) 50 MCG (2000 UT) capsule, Take 2,000 Units by mouth daily  , Disp: , Rfl:     magnesium oxide (MAG-OX) 400 mg, Take 1 tablet (400 mg total) by mouth daily, Disp: 90 tablet, Rfl: 3    mexiletine (MEXITIL) 150 mg capsule, Take 1 capsule (150 mg total) by mouth 2 (two) times a day, Disp: 180 capsule, Rfl: 3    potassium chloride (K-DUR,KLOR-CON) 10 mEq tablet, Take 1 tablet (10 mEq total) by mouth 2 (two) times a day, Disp: 180 tablet, Rfl: 1    torsemide (DEMADEX) 5 MG tablet, Take 1 tablet (5 mg total) by mouth daily, Disp: 90 tablet, Rfl: 1    warfarin (COUMADIN) 2 mg tablet, Take 1 tablet daily or as directed by physician, Disp: 150 tablet, Rfl: 3    Glucosamine Sulfate 1000 MG CAPS, 1 capsule Every 12 hours (Patient not taking: Reported on 3/25/2022 ), Disp: , Rfl:   No Known Allergies    Past Medical History:   Diagnosis Date    Anemia     Arthritis     Coronary artery disease     [facility]re[facility]     Hyperlipidemia     Hypertension      Social History     Socioeconomic History    Marital status: /Civil Union     Spouse name: Not on file    Number of children: Not on file    Years of education: Not on file    Highest education level: Not on file   Occupational History    Not on file   Tobacco Use    Smoking status: Never Smoker    Smokeless tobacco: Never Used   Vaping Use    Vaping Use: Never used   Substance and Sexual Activity    Alcohol use: Yes     Alcohol/week: 3 0 standard drinks     Types: 3 Shots of liquor per week     Comment: daily    Drug use: Never    Sexual activity: Not on file   Other Topics Concern    Not on file   Social History Narrative    Not on file     Social Determinants of Health     Financial Resource Strain: Not on file   Food Insecurity: No Food Insecurity    Worried About Running Out of Food in the Last Year: Never true    Leonie of Food in the Last Year: Never true   Transportation Needs: No Transportation Needs    Lack of Transportation (Medical): No    Lack of Transportation (Non-Medical): No   Physical Activity: Not on file   Stress: Not on file   Social Connections: Not on file   Intimate Partner Violence: Not on file   Housing Stability: Low Risk     Unable to Pay for Housing in the Last Year: No    Number of Places Lived in the Last Year: 1    Unstable Housing in the Last Year: No      Family History   Problem Relation Age of Onset    Coronary artery disease Mother     Heart attack Father         MI    Stroke Sister      Past Surgical History:   Procedure Laterality Date    CARDIAC PACEMAKER PLACEMENT  05/09/2011    Medtronic dual chamber ICD implant    CORONARY ANGIOPLASTY  1989       PREVIOUS WEIGHTS:   Wt Readings from Last 10 Encounters:   03/25/22 74 4 kg (164 lb)   03/17/22 58 5 kg (129 lb)   02/11/22 56 1 kg (123 lb 9 6 oz)   01/29/22 59 5 kg (131 lb 2 8 oz)   12/21/21 65 2 kg (143 lb 12 8 oz)   12/10/21 67 2 kg (148 lb 3 2 oz)   12/10/21 67 8 kg (149 lb 8 oz)   11/16/21 64 5 kg (142 lb 3 2 oz)   10/26/21 63 1 kg (139 lb 1 8 oz)   10/23/21 68 kg (150 lb)        Review of Systems:  Review of Systems   Constitutional: Negative for activity change     Respiratory: Negative for cough, chest tightness, shortness of breath and wheezing  Cardiovascular: Negative for chest pain, palpitations and leg swelling  Musculoskeletal: Negative for gait problem  Skin: Negative for color change  Neurological: Negative for dizziness, tremors, syncope, weakness, light-headedness and headaches  Psychiatric/Behavioral: Negative for agitation and confusion  Physical Exam:  /74   Pulse 76   Ht 5' 2" (1 575 m)   Wt 74 4 kg (164 lb)   BMI 30 00 kg/m²     Physical Exam  Vitals reviewed  Constitutional:       General: He is not in acute distress  Appearance: He is well-developed  HENT:      Head: Normocephalic and atraumatic  Neck:      Thyroid: No thyromegaly  Vascular: No carotid bruit or JVD  Trachea: No tracheal deviation  Cardiovascular:      Rate and Rhythm: Normal rate and regular rhythm  Pulses: Normal pulses  Heart sounds: Normal heart sounds  No murmur heard  No friction rub  No gallop  Pulmonary:      Effort: Pulmonary effort is normal  No respiratory distress  Breath sounds: Normal breath sounds  No wheezing, rhonchi or rales  Chest:      Chest wall: No tenderness  Musculoskeletal:      Cervical back: Normal range of motion and neck supple  Right lower leg: No edema  Left lower leg: No edema  Skin:     General: Skin is warm and dry  Neurological:      General: No focal deficit present  Mental Status: He is alert and oriented to person, place, and time  Psychiatric:         Mood and Affect: Mood normal          Behavior: Behavior normal          Thought Content: Thought content normal          Judgment: Judgment normal          -------------------------------------------------------------------------------   CARDIAC EP DEVICE REPORT  Results for orders placed in visit on 02/15/22    Cardiac EP device report    Narrative  MDT-DUAL CHAMBER ICD (AAIR-DDDR MODE)/ ACTIVE SYSTEM IS MRI CONDITIONAL  CARELINK TRANSMISSION: BATTERY VOLTAGE ADEQUATE (6 8 YRS)   AP-99%, -97% (>40% [email or web address] OFF)  ALL AVAILABLE LEAD PARAMETERS WITHIN NORMAL LIMITS  NO SIGNIFICANT HIGH RATE EPISODES  OPTI-VOL WITHIN NORMAL LIMITS  NORMAL DEVICE FUNCTION  GV      Results for orders placed in visit on 02/03/22    Cardiac EP device report    Narrative  MDT-DUAL CHAMBER ICD (AAIR-DDDR MODE)/ ACTIVE SYSTEM IS MRI CONDITIONAL  NON-BILLABLE CARELINK TRANSMISSION REQUEST PER DR Delmy Miles  BATTERY VOLTAGE ADEQUATE (6 9 YRS)  AP: 58 3%  : 70 6% (>40%~DDDR/60~MVP-OFF)  ALL AVAILABLE LEAD PARAMETERS WITHIN NORMAL LIMITS  NO SIGNIFICANT HIGH RATE EPISODES  SINCE LAST CHECKED ON 2/1/22  OPTI-VOL WITHIN NORMAL LIMITS; HOWEVER, NOTE THAT OBSERVATION DOES ALERT FOR HIGH RISK FOR  HEART FAILURE DUE TO RECENT SHOCKS, NIGHT TIME HEART RATE  PT TAKES DEMADEX, MEXILETINE, AMIODARONE, COREG, WARFARIN  EF: 12% (ECHO 10/24/21)  TASK TO DR Delmy Miles   APPROPRIATELY FUNCTIONING ICD  [address]      ======================================================  Imaging:   I have personally reviewed pertinent reports  I spent 40 minutes on the patient's office visit  This time was spent on the day of the visit  I had direct contact with the patient in the office on the day of the visit  Greater than 50% of the total time was spent obtaining a history, examining patient, answering all patient questions, arranging and discussing plan of care with patient as well as directly providing instructions  Additional time then spent on orders and office chart  Portions of the record may have been created with voice recognition software  Occasional wrong word or "sound a like" substitutions may have occurred due to the inherent limitations of voice recognition software  Read the chart carefully and recognize, using context, where substitutions have occurred      SIGNATURES:   Inderjit More MD
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
## 2022-03-27 PROBLEM — I50.42 CHRONIC COMBINED SYSTOLIC AND DIASTOLIC CONGESTIVE HEART FAILURE (HCC): Status: ACTIVE | Noted: 2021-01-01

## 2022-03-27 PROBLEM — R33.9 URINARY RETENTION: Status: ACTIVE | Noted: 2022-01-01

## 2022-03-27 NOTE — H&P
[address]  H&P- Robin Batter 10/31/1932, 80 y o  male MRN: [id number]  Unit/Bed#: ED 28 Encounter: [id number]  Primary Care Provider: Jayce Bass DO   Date and time admitted to hospital: 3/27/2022 11:36 AM    * Fall  Assessment & Plan  80year old male presenting after suffering from a mechanical fall twice today  Denies any loss consciousness or presyncopal symptoms  Noted during documentation, that patient was bradycardic and hypotensive upon EMS arrival   See rest of the treatment plan below  - PT OT  - telemetry  - interrogation to assess Vtach events / defibrillation which may have caused his presentation    Urinary retention  Assessment & Plan  S/p straight cath  - urinary retention protocol    Chronic kidney disease, stage 4 (severe) [facility])  Assessment & Plan  Lab Results   Component Value Date    EGFR 18 03/27/2022    EGFR 18 03/24/2022    EGFR 26 03/17/2022    CREATININE 2 89 (H) 03/27/2022    CREATININE 2 87 (H) 03/24/2022    CREATININE 2 14 (H) 03/17/2022     Stable  Does not need acute kidney injury criteria at this time  Goals of care, counseling/discussion  Assessment & Plan  DNR/dni    Transaminitis  Assessment & Plan  Possibly alcohol use half glass of bourbon & water / congestive hepatopathy or transient ischemia?  - Trend for now  - acute hepatitis panel    Hypokalemia  Assessment & Plan  Replete prn    Chronic combined systolic and diastolic congestive heart failure (HCC)  Assessment & Plan  Wt Readings from Last 3 Encounters:   03/25/22 74 4 kg (164 lb)   03/17/22 58 5 kg (129 lb)   02/11/22 56 1 kg (123 lb 9 6 oz)     LVEF 12%, mild LV dilatation, diastolic dysfunction, regional wall motion abnormalities, paradoxical septal wall motion consistent with conduction abnormality, moderately reduced RV function, mild LA dilatation, mild MR/TR/NM w/ PASP 45-50 mmHg, mild aortic dilatation October 2021    Weight above baseline two days ago   Denies shortness of breath  Repeat daily weights here  - Cardiology following  - Continue Coreg  - Torsemide seems to have been discontinued during his last follow-up appointment        Essential hypertension  Assessment & Plan  Continue coreg BID    PAF (paroxysmal atrial fibrillation) ([facility])  Assessment & Plan  Rate control: Carvedilol  AC: Warfarin 2mg S-S-T-Th Warfarin 1mg MWF  (AC might need to be readdressed if physical therapy suspects compromised ability to maintain his balance / gait)  V-tach [facility])  Assessment & Plan    Per cardiology note 3/25/2022: "His episodes of ventricular tachycardia requiring defibrillator shocks appear to be controlled on amiodarone and mexiletine "    Patient reports that he is not taking amiodarone, but is taking mexiletine  Old myocardial infarction  Assessment & Plan  CAD w/ remote history of MI and angioplasty, 1989      Hyperlipidemia, mixed  Assessment & Plan  Continue statin    VTE Prophylaxis: Heparin  / sequential compression device   Code Status: dnr/dni  Discussion with family: wife    Anticipated Length of Stay:  Patient will be admitted on an Inpatient basis with an anticipated length of stay of  > 2 midnights  Justification for Hospital Stay:  Cardiology evaluation, acute hepatitis panel, GI evaluation, PT OT evaluation, interrogation    Total Time for Visit, including Counseling / Coordination of Care: 70 minutes  Greater than 50% of this total time spent on direct patient counseling and coordination of care  Chief Complaint:   fall    History of Present Illness:    Gus Mendoza is a 80 y o  male who presents with fall  Patient is an 19-year-old male with a history of chronic combined systolic and diastolic heart failure, ventricular tachycardia status post Medtronic dual-chamber ICD, CAD with remote history of myocardial infarction and angioplasty, paroxysmal atrial fibrillation on warfarin, hypertension, alcohol use, and hyperlipidemia      History was obtained through his emergency room provider, the patient himself , and his spouse  His wife reports that patient has significantly decreased his alcohol intake lately  She reports that he likely had less than half a glass yesterday  Today, patient reports accidentally falling today  He denies any presyncopal or postictal symptoms  I spoke to his wife who witnessed the entire event and reports that this was likely due to an accident when he lost his balance, but concedes that she is not sure if his heart condition may have played a role since it happened twice this morning alone  EMS was contacted   He was notably hypotensive and bradycardic to the 30s  No interventions was done  He was brought into the ED for further evaluation  He seems to be confused, but oriented  At this moment in time, besides his head trauma causing him some headache he denies any other symptoms  Review of Systems:    Review of Systems   Constitutional: Negative for chills and fever  HENT: Negative for congestion  Eyes: Negative for visual disturbance  Respiratory: Negative for cough, chest tightness, shortness of breath and wheezing  Cardiovascular: Negative for chest pain, palpitations and leg swelling  Gastrointestinal: Negative for abdominal pain, diarrhea, nausea and vomiting  Endocrine: Negative for polydipsia  Genitourinary: Positive for difficulty urinating  Musculoskeletal: Positive for gait problem  Neurological: Positive for weakness and headaches  Negative for syncope  Psychiatric/Behavioral: Negative for agitation         Past Medical and Surgical History:     Past Medical History:   Diagnosis Date    Anemia     Arthritis     Coronary artery disease     CVA [facility]cerebral vascular accident) ([facility] [address] )     Hyperlipidemia     Hypertension        Past Surgical History:   Procedure Laterality Date    CARDIAC PACEMAKER PLACEMENT  05/09/2011    Medtronic dual chamber ICD implant    CORONARY ANGIOPLASTY  1989       Meds/Allergies:    Prior to Admission medications    Medication Sig Start Date End Date Taking? Authorizing Provider   amiodarone 200 mg tablet Take 1 tablet (200 mg total) by mouth daily 12/21/21   Sheron Stevenson MD   atorvastatin (LIPITOR) 40 mg tablet Take 1 tablet (40 mg total) by mouth daily 12/21/21   Sheron Stevenson MD   carvedilol (COREG) 12 5 mg tablet Take 1 tablet (12 5 mg total) by mouth 2 (two) times a day with meals 3/25/22   Sheron Stevenson MD   Cholecalciferol (VITAMIN D3) 50 MCG (2000 UT) capsule Take 2,000 Units by mouth daily      Historical Provider, MD   Glucosamine Sulfate 1000 MG CAPS 1 capsule Every 12 hours  Patient not taking: Reported on 3/25/2022     Historical Provider, MD   magnesium oxide (MAG-OX) 400 mg Take 1 tablet (400 mg total) by mouth daily 2/8/22   Sheron Stevenson MD   mexiletine (MEXITIL) 150 mg capsule Take 1 capsule (150 mg total) by mouth 2 (two) times a day 2/8/22   Sheron Stevenson MD   potassium chloride (K-DUR,KLOR-CON) 10 mEq tablet Take 1 tablet (10 mEq total) by mouth daily 3/25/22   Sheron Stevenson MD   warfarin (COUMADIN) 2 mg tablet Take 1 tablet daily or as directed by physician 12/21/21   Sheron Stevenson MD     I have reviewed home medications with patient personally      Allergies: No Known Allergies    Social History:     Marital Status: /Civil Union   Substance Use History:   Social History     Substance and Sexual Activity   Alcohol Use Yes    Alcohol/week: 3 0 standard drinks    Types: 3 Shots of liquor per week    Comment: daily     Social History     Tobacco Use   Smoking Status Never Smoker   Smokeless Tobacco Never Used     Social History     Substance and Sexual Activity   Drug Use Never       Family History:    Family History   Problem Relation Age of Onset    Coronary artery disease Mother     Heart attack Father         MI    Stroke Sister        Physical Exam:     Vitals:   Blood Pressure: 104/74 (03/27/22 1300)  Pulse: 66 (03/27/22 1300)  Temperature: (!) 97 4 °F (36 3 °C) (03/27/22 1142)  Temp Source: Oral (03/27/22 1142)  Respirations: 20 (03/27/22 1300)  SpO2: 96 % (03/27/22 1300)    Physical Exam  Vitals reviewed  Constitutional:       General: He is not in acute distress  Appearance: He is not ill-appearing  HENT:      Head: Normocephalic  Nose: Nose normal       Mouth/Throat:      Mouth: Mucous membranes are moist    Eyes:      General: No scleral icterus  Cardiovascular:      Rate and Rhythm: Normal rate  Pulmonary:      Effort: Pulmonary effort is normal  No respiratory distress  Breath sounds: No wheezing or rales  Abdominal:      General: There is no distension  Palpations: Abdomen is soft  Tenderness: There is no abdominal tenderness  Musculoskeletal:      Right lower leg: No edema  Left lower leg: No edema  Skin:     General: Skin is warm  Neurological:      Mental Status: He is alert  Motor: No weakness  Comments: Oriented to self, place   Psychiatric:         Mood and Affect: Mood normal          Behavior: Behavior normal        Additional Data:     Lab Results: I have personally reviewed pertinent reports  Results from last 7 days   Lab Units 03/27/22  1145   WBC Thousand/uL 11 04*   HEMOGLOBIN g/dL 11 2*   HEMATOCRIT % 34 0*   PLATELETS Thousands/uL 100*   NEUTROS PCT % 87*   LYMPHS PCT % 5*   MONOS PCT % 7   EOS PCT % 0     Results from last 7 days   Lab Units 03/27/22  1145   SODIUM mmol/L 134*   POTASSIUM mmol/L 3 3*   CHLORIDE mmol/L 98*   CO2 mmol/L 29   BUN mg/dL 38*   CREATININE mg/dL 2 89*   ANION GAP mmol/L 7   CALCIUM mg/dL 8 4   ALBUMIN g/dL 2 5*   TOTAL BILIRUBIN mg/dL 2 46*   ALK PHOS U/L 63   ALT U/L 503*   AST U/L 465*   GLUCOSE RANDOM mg/dL 106     Results from last 7 days   Lab Units 03/27/22  1145   INR  2 01*                   Imaging: I have personally reviewed pertinent reports        CT head without contrast   Final Result by Pérez Mendoza Ai Valdez MD (03/27 1216)      No acute intracranial abnormality  Workstation performed: [id number]         CT cervical spine without contrast   Final Result by Nettie Trinidad MD (03/27 1222)      No cervical spine fracture or traumatic malalignment  Workstation performed: [id number]             EKG, Pathology, and Other Studies Reviewed on Admission:   · EKG: lad, LBBB, pacemaker spikes, wide qrs rhythm    Allscripts / Epic Records Reviewed: Yes     ** Please Note: This note has been constructed using a voice recognition system   **

## 2022-03-27 NOTE — ASSESSMENT & PLAN NOTE
Rate control: Carvedilol  AC: Warfarin 2mg S-S-T-Th Warfarin 1mg MWF  (AC might need to be readdressed if physical therapy suspects compromised ability to maintain his balance / gait)

## 2022-03-27 NOTE — ASSESSMENT & PLAN NOTE
Possibly alcohol use half glass of bourbon & water / congestive hepatopathy or transient ischemia?  - Trend for now  - acute hepatitis panel

## 2022-03-27 NOTE — ASSESSMENT & PLAN NOTE
Lab Results   Component Value Date    EGFR 18 03/27/2022    EGFR 18 03/24/2022    EGFR 26 03/17/2022    CREATININE 2 89 (H) 03/27/2022    CREATININE 2 87 (H) 03/24/2022    CREATININE 2 14 (H) 03/17/2022     Stable  Does not need acute kidney injury criteria at this time

## 2022-03-27 NOTE — ASSESSMENT & PLAN NOTE
Per cardiology note 3/25/2022: "His episodes of ventricular tachycardia requiring defibrillator shocks appear to be controlled on amiodarone and mexiletine "    Patient reports that he is not taking amiodarone, but is taking mexiletine

## 2022-03-27 NOTE — CONSULTS
Cardiology Consultation  MD Lisa Chavez MD  [facility] HospitalStanislavDO reyes, MD Karin Sanderson DO, Raymond Browne DO, [facility] [place]  ----------------------------------------------------------------  [address], [address] [facility] 80 y o  male MRN: [id number]  Unit/Bed#: ED 28 Encounter: [id number]      Reason for Consultation:  Precordial chest pain/elevated troponin      ASSESSMENT:   · Fall  · Precordial chest pain   · Elevated troponin likely nonischemic myocardial injury  · Transaminitis  · Chronic combined systolic and diastolic heart failure (dry weight 59 5 kg)  ?  History of Ventricular tachycardia s/p Medtronic dual-chamber ICD  · CAD w/ remote history of MI and angioplasty, 1989  · Paroxysmal atrial fibrillation on warfarin  · Hypertension  · Dyslipidemia  · LVEF 12%, mild LV dilatation, diastolic dysfunction, regional wall motion abnormalities, paradoxical septal wall motion consistent with conduction abnormality, moderately reduced RV function, mild LA dilatation, mild MR/TR/ME w/ PASP 45-50 mmHg, mild aortic dilatation October 2021  · Acute kidney injury on CKD stage IIIB  · History of embolic CVA  · History of Alcohol use      PLAN:  Patient had fall at home and a bed with head trauma  Reportedly had some chest discomfort, but he is not admitting to chest pain at this time  Creatinine is elevated presumably from urinary retention based on findings on CT abdomen pelvis with bladder distension and hydronephrosis; over 1 L removed following straight cath  Troponins are trending downward; doubt ACS and likely related to acute renal failure with baseline severe cardiomyopathy  Hold diuretics for now  Continue carvedilol and mexiletine  Hold amiodarone for now with significant transaminitis  INR is currently therapeutic; continue warfarin with goal INR of 2-3  Would check ICD interrogation for completeness given history of VT - order placed to interrogate device  Recommend eventually restarting on very low-dose torsemide either daily or every Monday Wednesday Friday at discharge  Further recommendations to follow interrogation    Signed: Pepper Shah DO, [facility] [place], [facility]      History of Present Illness:  Ashly Jean is a 80 y o  male with ischemic cardiomyopathy, chronic combined systolic and diastolic heart failure with severely reduced systolic function, CAD, paroxysmal atrial fibrillation, hypertension, dyslipidemia, CVA and alcohol use reportedly fell out of bed this morning and 911 was called due to the fall  When EMS arrived, the patient was found have a heart rate in the 30s reportedly with hypotension  He also admitted to some chest discomfort  He was brought to [place] [id number] where paced rhythm was noted on ECG  Patient was unable to give significant history on his symptoms and denied any of the chest pain or shortness of breath  He was found have a abrasion of his far head  Troponin was found to be minimally elevated at 68 for which we have been consulted  Of note, his creatinine was found to be 2 89  He was recently hospitalized in mid March 2022 due to acute decompensation of heart failure  He was placed on IV Lasix and when at his dry weight he was transition to torsemide 5 mg daily  He was subsequently discharged home  Denies any lightheadedness, dizziness or palpitations  Review of Systems:  Review of Systems   Constitutional: Negative for decreased appetite, fever, weight gain and weight loss  HENT: Negative for congestion and sore throat  Eyes: Negative for visual disturbance  Cardiovascular: Positive for chest pain  Negative for dyspnea on exertion, leg swelling, near-syncope and palpitations  Respiratory: Negative for cough and shortness of breath  Hematologic/Lymphatic: Negative for bleeding problem  Skin: Negative for rash  Musculoskeletal: Positive for falls   Negative for myalgias and neck pain  Gastrointestinal: Negative for abdominal pain and nausea  Neurological: Negative for light-headedness and weakness  Psychiatric/Behavioral: Negative for depression  Past Medical History:   Diagnosis Date    Anemia     Arthritis     Coronary artery disease     CVA (cerebral vascular accident) ([facility] [address] )     Hyperlipidemia     Hypertension        Past Surgical History:   Procedure Laterality Date    CARDIAC PACEMAKER PLACEMENT  05/09/2011    Medtronic dual chamber ICD implant    CORONARY ANGIOPLASTY  1989       Social History     Socioeconomic History    Marital status: /Civil Union     Spouse name: None    Number of children: None    Years of education: None    Highest education level: None   Occupational History    None   Tobacco Use    Smoking status: Never Smoker    Smokeless tobacco: Never Used   Vaping Use    Vaping Use: Never used   Substance and Sexual Activity    Alcohol use: Yes     Alcohol/week: 3 0 standard drinks     Types: 3 Shots of liquor per week     Comment: daily    Drug use: Never    Sexual activity: None   Other Topics Concern    None   Social History Narrative    None     Social Determinants of Health     Financial Resource Strain: Not on file   Food Insecurity: No Food Insecurity    Worried About Running Out of Food in the Last Year: Never true    Leonie of Food in the Last Year: Never true   Transportation Needs: No Transportation Needs    Lack of Transportation (Medical): No    Lack of Transportation (Non-Medical):  No   Physical Activity: Not on file   Stress: Not on file   Social Connections: Not on file   Intimate Partner Violence: Not on file   Housing Stability: Low Risk     Unable to Pay for Housing in the Last Year: No    Number of Places Lived in the Last Year: 1    Unstable Housing in the Last Year: No       Family History   Problem Relation Age of Onset    Coronary artery disease Mother     Heart attack Father MI    Stroke Sister        No Known Allergies    No current facility-administered medications on file prior to encounter  Current Outpatient Medications on File Prior to Encounter   Medication Sig    amiodarone 200 mg tablet Take 1 tablet (200 mg total) by mouth daily    atorvastatin (LIPITOR) 40 mg tablet Take 1 tablet (40 mg total) by mouth daily    carvedilol (COREG) 12 5 mg tablet Take 1 tablet (12 5 mg total) by mouth 2 (two) times a day with meals    Cholecalciferol (VITAMIN D3) 50 MCG (2000 UT) capsule Take 2,000 Units by mouth daily      Glucosamine Sulfate 1000 MG CAPS 1 capsule Every 12 hours (Patient not taking: Reported on 3/25/2022 )    magnesium oxide (MAG-OX) 400 mg Take 1 tablet (400 mg total) by mouth daily    mexiletine (MEXITIL) 150 mg capsule Take 1 capsule (150 mg total) by mouth 2 (two) times a day    potassium chloride (K-DUR,KLOR-CON) 10 mEq tablet Take 1 tablet (10 mEq total) by mouth daily    warfarin (COUMADIN) 2 mg tablet Take 1 tablet daily or as directed by physician            No current facility-administered medications for this encounter  Vitals:    03/27/22 1245 03/27/22 1300 03/27/22 1315 03/27/22 1330   BP: 118/66 104/74 127/77 112/72   BP Location: Right arm Right arm Right arm Right arm   Pulse: 72 66 72 68   Resp: 20 20 (!) 23 20   Temp:       TempSrc:       SpO2: 94% 96% 96% 96%       No intake/output data recorded  Intake/Output Summary (Last 24 hours) at 3/27/2022 1414  Last data filed at 3/27/2022 1401  Gross per 24 hour   Intake --   Output 1000 ml   Net -1000 ml       Weight change:     PHYSICAL EXAMINATION:  Gen: Awake, Alert, NAD  Head/eyes:  Left forehead laceration and swelling, pupils equal and round, Anicteric  ENT: mmm  Neck: Supple, No elevated JVP, trachea midline  Resp: CTA bilaterally no w/r/r  CV: RRR +S1, S2, No m/r/g  Abd: Soft, NT/ND + BS  Ext: no LE edema bilaterally  Neuro:  Follows commands, moves all extermities  Psych: Appropriate affect, normal mood, pleasant attitude, non-combative  Skin: warm; no rash, erythema or venous stasis changes on exposed skin    Lab Results:  Results from last 7 days   Lab Units 22  1145   WBC Thousand/uL 11 04*   HEMOGLOBIN g/dL 11 2*   HEMATOCRIT % 34 0*   PLATELETS Thousands/uL 100*     Results from last 7 days   Lab Units 22  1145   POTASSIUM mmol/L 3 3*   CHLORIDE mmol/L 98*   CO2 mmol/L 29   BUN mg/dL 38*   CREATININE mg/dL 2 89*   CALCIUM mg/dL 8 4   ALK PHOS U/L 63   ALT U/L 503*   AST U/L 465*     No results found for: TROPONINT              Results from last 7 days   Lab Units 22  1145   INR  2 01*           Results for orders placed during the hospital encounter of 10/24/18    Echo complete with contrast if indicated    Narrative  [address]  [address]    Transthoracic Echocardiogram  2D, M-mode, Doppler, and Color Doppler    Study date:  24-Oct-2018    Patient: Natividad Hook  MR number: [id number]  Account number: [de-identified]  : 31-Oct-1932  Age: 80 years  Gender: Male  Status: Outpatient  Location: [address] [place] [address]  Height: 63 in  Weight: 157 lb  BP: 100/ 60 mmHg    Indications: Ischemic cardiomyopathy, coronary artery disease  Diagnoses: I25 5 - Ischemic cardiomyopathy    Sonographer:  Josefina Beck  Primary Physician:  Lucho Gee DO  Referring Physician:  Nathaniel Pascual MD  Group:  [facility]  Interpreting Physician:  [facility] Ford MD    IMPRESSIONS:  Borderline dilated left ventricular cavity with some thinning of the apex and anterior and anteroseptal walls, severely reduced left ventricular systolic function with akinesis of the distal anterior, anteroseptal and apical wall and  hypokinesis of other regions  Ejection fraction is estimated as around 25-30%  There is grade 1 diastolic dysfunction  Mild left atrial cavity enlargement    Aortic valve sclerosis, no aortic stenosis or regurgitation  Mitral annular calcification, trace mitral valve regurgitation  Mild tricuspid valve regurgitation  No obvious pulmonary hypertension  No pericardial effusion  Compared to previous echocardiogram from May 27, 2016 there is overall no significant change  Previously noted borderline pulmonary hypertension is not currently appreciable  SUMMARY    LEFT VENTRICLE:  Borderline dilated left ventricular cavity, normal wall thickness with thinning of the mid to distal anterior wall apex and anteroseptal walls, severely reduced left ventricular systolic function with akinesis of the mid to distal anterior  wall, anteroseptal wall and apex extending to distal inferolateral walls, hypokinesis of other regions  Ejection fraction is estimated as around 25-30%  Grade 1 diastolic dysfunction  Estimated left atrial pressure is normal     RIGHT VENTRICLE:  Normal right ventricular size and systolic function  Normal estimated right ventricular systolic pressure  LEFT ATRIUM:  Mild left atrial cavity enlargement  RIGHT ATRIUM:  Normal right atrial cavity size  MITRAL VALVE:  Mild mitral annular calcification and leaflet sclerosis, trace mitral valve regurgitation  AORTIC VALVE:  Trace mitral valve regurgitation  TRICUSPID VALVE:  Mild tricuspid valve regurgitation  PULMONIC VALVE:  No significant pulmonic valve regurgitation  AORTA:  Aortic root and proximal ascending aorta are normal in size on 2D imaging  IVC, HEPATIC VEINS:  Inferior vena cava is normal in size and demonstrates appropriate respiratory phasic changes in diameter  PERICARDIUM:  Trace pericardial effusion with some organized material close to the apex, possible thrombus versus anterior fat pad  HISTORY: PRIOR HISTORY: Ventricular tachycardia, atrial fibrillation, cerebral vascular accident, defibrillator, HLD  PROCEDURE: The study was performed in the [facility] was a routine study  The transthoracic approach was used  The study included complete 2D imaging, M-mode, complete spectral Doppler, and color Doppler  The heart rate was  70 bpm, at the start of the study  Images were obtained from the parasternal, apical, subcostal, and suprasternal notch acoustic windows  Image quality was adequate  LEFT VENTRICLE: Borderline dilated left ventricular cavity, normal wall thickness with thinning of the mid to distal anterior wall apex and anteroseptal walls, severely reduced left ventricular systolic function with akinesis of the mid to  distal anterior wall, anteroseptal wall and apex extending to distal inferolateral walls, hypokinesis of other regions  Ejection fraction is estimated as around 25-30%  Grade 1 diastolic dysfunction  Estimated left atrial pressure is  normal     RIGHT VENTRICLE: Normal right ventricular size and systolic function  Normal estimated right ventricular systolic pressure  LEFT ATRIUM: Mild left atrial cavity enlargement  RIGHT ATRIUM: Normal right atrial cavity size  MITRAL VALVE: Mild mitral annular calcification and leaflet sclerosis, trace mitral valve regurgitation  AORTIC VALVE: Trace mitral valve regurgitation  TRICUSPID VALVE: Mild tricuspid valve regurgitation  PULMONIC VALVE: No significant pulmonic valve regurgitation  PERICARDIUM: Trace pericardial effusion with some organized material close to the apex, possible thrombus versus anterior fat pad  AORTA: Aortic root and proximal ascending aorta are normal in size on 2D imaging  SYSTEMIC VEINS: IVC: Inferior vena cava is normal in size and demonstrates appropriate respiratory phasic changes in diameter      SYSTEM MEASUREMENT TABLES    2D  %FS: 19 %  Ao Diam: 3 61 cm  EDV(Teich): 144 29 ml  EF(Teich): 38 79 %  ESV(Teich): 88 33 ml  IVSd: 1 04 cm  LA Area: 9 18 cm2  LA Diam: 4 59 cm  LVEDV MOD A4C: 104 87 ml  LVEF MOD A4C: 37 99 %  LVESV MOD A4C: 65 03 ml  LVIDd: 5 45 cm  LVIDs: 4 41 cm  LVLd A4C: 8 58 cm  LVLs A4C: 8 55 cm  LVPWd: 0 95 cm  RA Area: 11 11 cm2  RVIDd: 3 9 cm  SV MOD A4C: 39 83 ml  SV(Teich): 55 96 ml    CW  AV Vmax: 1 01 m/s  AV maxP 08 mmHg    MM  TAPSE: 2 27 cm    PW  E': 0 04 m/s  E/E': 14 25  MV A Chinedu: 0 93 m/s  MV Dec Candler: 3 38 m/s2  MV DecT: 164 17 ms  MV E Chinedu: 0 56 m/s  MV E/A Ratio: 0 6  MV PHT: 47 61 ms  MVA By PHT: 4 62 cm2  PRend P 51 mmHg  PRend Vmax: 0 79 m/s  PV Vmax: 0 65 m/s  PV maxP 71 mmHg  TR Vmax: 1 2 m/s  TR maxP 77 mmHg    [facility] Accredited Echocardiography Laboratory    Prepared and electronically signed by    Darion Bautista MD  Signed 24-Oct-2018 13:25:41    No results found for this or any previous visit  Results for orders placed during the hospital encounter of 10/24/18    NM myocardial perfusion spect (stress and/or rest)    [place] [address] [address]    Rest/Stress Gated SPECT Myocardial Perfusion Imaging After Regadenoson    Patient: Monse Montemayor  MR number: [id number]  Account number: [de-identified]  : 10/31/1932  Age: 80 years  Gender: Male  Status: Outpatient  Location: [facility]  Height: 63 in  Weight: 157 lb  BP: 150/ 90 mmHg    Diagnosis: I25 10 - Atherosclerotic heart disease of native coronary artery without angina pectoris, I25 2 - Old myocardial infarction, I25 5 - Ischemic cardiomyopathy, I47 2 - Ventricular tachycardia    RN:  Misael Pollock RN  Referring Physician:  Leonard Soto MD  Group:  [facility]  Report Prepared By[de-identified]  Misael Pollock RN  Interpreting Physician:  Darion Bautista MD    INDICATIONS: Evaluation of known coronary artery disease  Ischemic cardiomyopathy    HISTORY: The patient is a 80year old  male  Chest pain status: no chest pain   Coronary artery disease risk factors: dyslipidemia, hypertension, and family history of premature coronary artery disease  Cardiovascular history:  coronary artery disease, prior myocardial infarction, congestive heart failure, arrhythmia, stroke, ventricular tachycardia, and ischemic cardiomyopathy  ischemic cardiomyopathy Prior cardiovascular procedures: percutaneous transluminal  coronary angioplasty (on 1989), coronary artery bypass grafting, and implantable cardioverter defibrillator procedure  Medications: a beta blocker, an ACE inhibitor/ARB, a diuretic, aspirin, and a lipid lowering agent  Previous test  results: abnormal resting echocardiogram     REST ECG: Sinus rhythm, first-degree AV block, evidence of prior anteroseptal infarction with nonspecific ST T wave abnormalities at rest     PROCEDURE: The study was performed in the [facility]  A regadenoson infusion pharmacologic stress test was performed  Gated SPECT myocardial perfusion imaging was performed after stress and at rest  Systolic blood pressure was  150 mmHg, at the start of the study  Diastolic blood pressure was 90 mmHg, at the start of the study  The heart rate was 72 bpm, at the start of the study  Regadenoson protocol:  HR bpm SBP mmHg DBP mmHg Symptoms  Baseline 72 150 90 --  1 min 77 -- -- dyspnea  2 min 80 100 60 same as above  3 min 81 -- -- subsiding  4 min 76 136 80 none  5 min 67 -- -- none  6 min 64 -- -- none  7 min 65 150 80 none  8 min 65 -- -- none  9 min 63 -- -- none  10 min 63 140 80 none  No medications or fluids given  STRESS SUMMARY: Duration of pharmacologic stress was 10 min  Maximal heart rate during stress was 81 bpm ( 60 % of maximal predicted heart rate)  The heart rate response to stress was normal  There was resting hypertension with an  appropriate blood pressure response to stress  The rate-pressure product for the peak heart rate and blood pressure was 58669  There was no chest pain during stress  The stress test was terminated due to protocol completion   No abnormal ST  T wave changes with regadenoson injection  No significant arrhythmia noted  MYOCARDIAL PERFUSION IMAGING:  The image quality was fair  Rotating projection images reveal mild diaphragmatic attenuation and mild subdiaphragmatic activity  The left ventricular cavity is mildly dilated  End systolic volume measures 732 mL and end-diastolic volume measures 205 mL  Review of resting and post regadenoson SPECT imaging demonstrates large size, severe, fixed perfusion abnormality involving the entire anterior wall, apex, distal lateral wall, distal anteroseptal wall and septum  There is no definite  ischemia noted  GATED SPECT:  Gated SPECT imaging demonstrates severely reduced left ventricular systolic function with dyskinesis of the apex and the distal anterior wall and hypokinesis of other walls  Ejection fraction is calculated as 29%  SUMMARY:  -  Stress results: Target heart rate was not achieved  There was resting hypertension with an appropriate blood pressure response to stress  There was no chest pain during stress  IMPRESSIONS: 1  Abnormal myocardial perfusion scan  2  Evidence of extensive, severe intensity fixed perfusion abnormalities without evidence of ischemia  3  Dilated left ventricular cavity with severely reduced left ventricular systolic function  Ejection fraction calculated as 29%  Diagnostic sensitivity was limited by submaximal stress  Prepared and signed by    Robert Dean MD  Signed 10/24/2018 18:15:42    No results found for this or any previous visit  CXR: No results found for this or any previous visit  Results for orders placed during the hospital encounter of 03/15/22    XR chest 1 view portable    Narrative  CHEST    INDICATION:   dyspnea      COMPARISON:  Chest x-ray dated 1/24/2022    EXAM PERFORMED/VIEWS:  XR CHEST PORTABLE      FINDINGS:    There is multifocal patchy airspace disease predominantly perihilar and bibasilar distribution with borderline interstitial thickening for which differential diagnosis includes multifocal pneumonia versus pulmonary edema with chronic lung changes not  excluded  There are possible small pleural effusions  There is no pneumothorax  There is likely mild cardiomegaly  Mediastinum is unremarkable  Atherosclerotic aortic calcifications are noted  Left-sided transvenous AICD is noted  Osseous structures appear within normal limits for patient age  Impression  Mild cardiomegaly with multifocal patchy airspace disease and borderline interstitial thickening with leading differential considerations given to multifocal pneumonia versus pulmonary edema  Continued short interval follow-up to resolution is  recommended  The study was marked in [facility] for immediate notification  Workstation performed: [id number]      ECG:  Ventricular paced rhythm 71 beats per minute    This note was completed in part utilizing [employer] Software  Grammatical errors, random word insertions, spelling mistakes, and incomplete sentences may be an occasional consequence of this system secondary to software limitations, ambient noise, and hardware issues  If you have any questions or concerns about the content, text, or information contained within the body of this dictation, please contact the provider for clarification

## 2022-03-27 NOTE — ASSESSMENT & PLAN NOTE
Wt Readings from Last 3 Encounters:   03/25/22 74 4 kg (164 lb)   03/17/22 58 5 kg (129 lb)   02/11/22 56 1 kg (123 lb 9 6 oz)     LVEF 12%, mild LV dilatation, diastolic dysfunction, regional wall motion abnormalities, paradoxical septal wall motion consistent with conduction abnormality, moderately reduced RV function, mild LA dilatation, mild MR/TR/WY w/ PASP 45-50 mmHg, mild aortic dilatation October 2021    Weight above baseline two days ago  Denies shortness of breath  Repeat daily weights here    - Cardiology following  - Continue Coreg  - Torsemide seems to have been discontinued during his last follow-up appointment

## 2022-03-27 NOTE — ED PROVIDER NOTES
History  Chief Complaint   Patient presents with    Fall     EMS call for fall, when EMS arrived patient had heart rate in the 82'G with low systolic blood pressure  Patient denies chest pain or any other symptoms  Patient has abrasions to forehead  69-year-old male with history of atrial fibrillation on Coumadin, V-tach, EF of 12%, dual chamber pacemaker, chronic renal failure presents to the emergency department initially calling for a fall out of bed this morning  When EMS arrived his heart rate was in the 30s and he was hypotensive  No intervention was done  There was also a complaint of chest pain and a call for pre-hospital mi alert, however the EKG shows paced bundle branch block with no signs of Sgarbossa  criteria  Therefore, no MI alert called  Patient currently has no complaints  He does seem somewhat confused but there is no history of dementia on his past medical records  He denies headache, chest pain or shortness of breath  History provided by:  Patient  History limited by: Possible dementia     used: No    Fall  Mechanism of injury comment:  Out of bed  Injury location:  Head/neck  Head/neck injury location:  Head  Incident location:  Home  Time since incident:  1 hour  Arrived directly from scene: yes    Tetanus status:  Unknown  Prior to arrival data:     Blood loss:  None    Responsiveness at scene:  Alert    Orientation at scene:  Person, place and situation    Loss of consciousness: no      Amnesic to event: no      Breathing interventions:  None    Fluids administered:  None    Cardiac interventions:  None    Medications administered:  None    Immobilization:  None  Associated symptoms: no abdominal pain, no back pain, no blindness, no chest pain, no difficulty breathing, no headaches, no hearing loss, no loss of consciousness, no nausea, no neck pain, no seizures and no vomiting    Risk factors: anticoagulation therapy and CAD        Prior to Admission Medications   Prescriptions Last Dose Informant Patient Reported? Taking? Cholecalciferol (VITAMIN D3) 50 MCG ( UT) capsule  Self Yes No   Sig: Take 2,000 Units by mouth daily     Glucosamine Sulfate 1000 MG CAPS  Self Yes No   Si capsule Every 12 hours   Patient not taking: Reported on 3/25/2022    amiodarone 200 mg tablet  Self No No   Sig: Take 1 tablet (200 mg total) by mouth daily   atorvastatin (LIPITOR) 40 mg tablet  Self No No   Sig: Take 1 tablet (40 mg total) by mouth daily   carvedilol (COREG) 12 5 mg tablet   No No   Sig: Take 1 tablet (12 5 mg total) by mouth 2 (two) times a day with meals   magnesium oxide (MAG-OX) 400 mg  Self No No   Sig: Take 1 tablet (400 mg total) by mouth daily   mexiletine (MEXITIL) 150 mg capsule  Self No No   Sig: Take 1 capsule (150 mg total) by mouth 2 (two) times a day   potassium chloride (K-DUR,KLOR-CON) 10 mEq tablet   No No   Sig: Take 1 tablet (10 mEq total) by mouth daily   warfarin (COUMADIN) 2 mg tablet  Self No No   Sig: Take 1 tablet daily or as directed by physician      Facility-Administered Medications: None       Past Medical History:   Diagnosis Date    Anemia     Arthritis     Coronary artery disease     CVA [facility]cerebral vascular accident) ([facility] [address] [facility]     Hyperlipidemia     Hypertension        Past Surgical History:   Procedure Laterality Date    CARDIAC PACEMAKER PLACEMENT  2011    Medtronic dual chamber ICD implant    CORONARY ANGIOPLASTY         Family History   Problem Relation Age of Onset    Coronary artery disease Mother     Heart attack Father         MI    Stroke Sister      I have reviewed and agree with the history as documented      E-Cigarette/Vaping    E-Cigarette Use Never User      E-Cigarette/Vaping Substances    Nicotine No     THC No     CBD No     Flavoring No     Other No     Unknown No      Social History     Tobacco Use    Smoking status: Never Smoker    Smokeless tobacco: Never Used   Vaping Use    Vaping Use: Never used   Substance Use Topics    Alcohol use: Yes     Alcohol/week: 3 0 standard drinks     Types: 3 Shots of liquor per week     Comment: daily    Drug use: Never       Review of Systems   Constitutional: Negative  HENT: Negative  Negative for hearing loss  Eyes: Negative  Negative for blindness  Respiratory: Negative  Cardiovascular: Negative  Negative for chest pain  Gastrointestinal: Negative  Negative for abdominal pain, nausea and vomiting  Genitourinary: Negative  Musculoskeletal: Negative for back pain and neck pain  Skin: Negative  Allergic/Immunologic: Negative  Neurological: Negative for dizziness, tremors, seizures, loss of consciousness, syncope, facial asymmetry, speech difficulty, weakness, light-headedness, numbness and headaches  Hematological: Negative  Psychiatric/Behavioral: Negative  All other systems reviewed and are negative  Physical Exam  Physical Exam  Vitals and nursing note reviewed  Constitutional:       General: He is awake  He is not in acute distress  Appearance: Normal appearance  He is well-developed and normal weight  He is not ill-appearing, toxic-appearing or diaphoretic  HENT:      Head: Normocephalic  Abrasion present  No raccoon eyes, Martin's sign or contusion  Right Ear: External ear normal       Left Ear: External ear normal       Nose: Nose normal    Eyes:      Conjunctiva/sclera: Conjunctivae normal       Pupils: Pupils are equal, round, and reactive to light  Neck:      Thyroid: No thyromegaly  Vascular: No JVD  Cardiovascular:      Rate and Rhythm: Normal rate and regular rhythm  Heart sounds: Normal heart sounds  No murmur heard  No gallop  Pulmonary:      Effort: Pulmonary effort is normal  No respiratory distress  Breath sounds: Normal breath sounds  No stridor  No wheezing, rhonchi or rales  Chest:      Chest wall: No tenderness     Abdominal:      General: Bowel sounds are normal  There is no distension  Palpations: Abdomen is soft  There is no mass  Tenderness: There is no abdominal tenderness  Hernia: No hernia is present  Musculoskeletal:         General: No tenderness, deformity or signs of injury  Normal range of motion  Cervical back: Normal range of motion and neck supple  No tenderness  No spinous process tenderness  Right lower leg: No edema  Left lower leg: No edema  Lymphadenopathy:      Cervical: No cervical adenopathy  Skin:     General: Skin is warm and dry  Coloration: Skin is not jaundiced or pale  Findings: No bruising, erythema, lesion or rash  Neurological:      General: No focal deficit present  Mental Status: He is alert  He is disoriented  Cranial Nerves: No cranial nerve deficit  Motor: No weakness  Deep Tendon Reflexes: Reflexes are normal and symmetric  Psychiatric:         Mood and Affect: Mood normal          Behavior: Behavior is cooperative           Vital Signs  ED Triage Vitals   Temperature Pulse Respirations Blood Pressure SpO2   03/27/22 1142 03/27/22 1142 03/27/22 1142 03/27/22 1142 03/27/22 1142   (!) 97 4 °F (36 3 °C) 68 19 105/65 95 %      Temp Source Heart Rate Source Patient Position - Orthostatic VS BP Location FiO2 (%)   03/27/22 1142 03/27/22 1142 03/27/22 1200 03/27/22 1142 --   Oral Monitor Lying Right arm       Pain Score       03/27/22 1142       No Pain           Vitals:    03/27/22 1315 03/27/22 1330 03/27/22 1445 03/27/22 1522   BP: 127/77 112/72 130/69 116/74   Pulse: 72 68 84 63   Patient Position - Orthostatic VS: Lying Lying Lying Lying         Visual Acuity      ED Medications  Medications   atorvastatin (LIPITOR) tablet 40 mg (has no administration in time range)   carvedilol (COREG) tablet 12 5 mg (has no administration in time range)   mexiletine (MEXITIL) capsule 150 mg (has no administration in time range)   heparin (porcine) subcutaneous injection 5,000 Units (has no administration in time range)   potassium chloride oral solution 20 mEq (20 mEq Oral Given 3/27/22 1557)   tetanus-diphtheria-acellular pertussis (BOOSTRIX) IM injection 0 5 mL (0 5 mL Intramuscular Given 3/27/22 1213)       Diagnostic Studies  Results Reviewed     Procedure Component Value Units Date/Time    CK (with reflex to MB) [[id number]] Collected: 03/27/22 1145    Lab Status: In process Specimen: Blood from Arm, Right Updated: 03/27/22 1536    TSH, 3rd generation [[id number]] Collected: 03/27/22 1145    Lab Status:  In process Specimen: Blood from Arm, Right Updated: 03/27/22 1536    Urine Microscopic [[id number]]  (Abnormal) Collected: 03/27/22 1426    Lab Status: Final result Specimen: Urine, Suprapubic catheter Updated: 03/27/22 1455     RBC, UA None Seen /hpf      WBC, UA 2-4 /hpf      Epithelial Cells None Seen /hpf      Bacteria, UA Innumerable /hpf     Urine Macroscopic, POC [[id number]]  (Abnormal) Collected: 03/27/22 1426    Lab Status: Final result Specimen: Urine Updated: 03/27/22 1427     Color, UA Yellow     Clarity, UA Cloudy     pH, UA 5 5     Leukocytes, UA Negative     Nitrite, UA Negative     Protein, UA Negative mg/dl      Glucose, UA Negative mg/dl      Ketones, UA Negative mg/dl      Urobilinogen, UA 0 2 E U /dl      Bilirubin, UA Negative     Blood, UA Small     Specific [place], UA 1 015    Narrative:      CLINITEK RESULT    HS Troponin I 2hr [[id number]]  (Abnormal) Collected: 03/27/22 1326    Lab Status: Final result Specimen: Blood from Arm, Right Updated: 03/27/22 1406     hs TnI 2hr 59 ng/L      Delta 2hr hsTnI -9 ng/L     Comprehensive metabolic panel [[id number]]  (Abnormal) Collected: 03/27/22 1145    Lab Status: Final result Specimen: Blood from Arm, Right Updated: 03/27/22 1224     Sodium 134 mmol/L      Potassium 3 3 mmol/L      Chloride 98 mmol/L      CO2 29 mmol/L      ANION GAP 7 mmol/L      BUN 38 mg/dL      Creatinine 2 89 mg/dL      Glucose 106 mg/dL      Calcium 8 4 mg/dL      Corrected Calcium 9 6 mg/dL       U/L       U/L      Alkaline Phosphatase 63 U/L      Total Protein 5 4 g/dL      Albumin 2 5 g/dL      Total Bilirubin 2 46 mg/dL      eGFR 18 ml/min/1 73sq m     Narrative:      National Kid[employer] guidelines for Chronic Kidney Disease (CKD):     Stage 1 with normal or high GFR (GFR > 90 mL/min/1 73 square meters)    Stage 2 Mild CKD (GFR = 60-89 mL/min/1 73 square meters)    Stage 3A Moderate CKD (GFR = 45-59 mL/min/1 73 square meters)    Stage 3B Moderate CKD (GFR = 30-44 mL/min/1 73 square meters)    Stage 4 Severe CKD (GFR = 15-29 mL/min/1 73 square meters)    Stage 5 End Stage CKD (GFR <15 mL/min/1 73 square meters)  Note: GFR calculation is accurate only with a steady state creatinine    HS Troponin 0hr (reflex protocol) [[id number]]  (Abnormal) Collected: 03/27/22 1145    Lab Status: Final result Specimen: Blood from Arm, Right Updated: 03/27/22 1223     hs TnI 0hr 68 ng/L     HS Troponin I 4hr [[id number]]     Lab Status: No result Specimen: Blood     Protime-INR [[id number]]  (Abnormal) Collected: 03/27/22 1145    Lab Status: Final result Specimen: Blood from Arm, Right Updated: 03/27/22 1213     Protime 22 2 seconds      INR 2 01    APTT [[id number]]  (Abnormal) Collected: 03/27/22 1145    Lab Status: Final result Specimen: Blood from Arm, Right Updated: 03/27/22 1213     PTT 44 seconds     CBC and differential [[id number]]  (Abnormal) Collected: 03/27/22 1145    Lab Status: Final result Specimen: Blood from Arm, Right Updated: 03/27/22 1209     WBC 11 04 Thousand/uL      RBC 3 40 Million/uL      Hemoglobin 11 2 g/dL      Hematocrit 34 0 %       fL      MCH 32 9 pg      MCHC 32 9 g/dL      RDW 17 5 %      MPV 11 4 fL      Platelets 976 Thousands/uL      nRBC 0 /100 WBCs      Neutrophils Relative 87 %      Immat GRANS % 1 %      Lymphocytes Relative 5 %      Monocytes Relative 7 %      Eosinophils Relative 0 %      Basophils Relative 0 %      Neutrophils Absolute 9 59 Thousands/µL      Immature Grans Absolute 0 06 Thousand/uL      Lymphocytes Absolute 0 60 Thousands/µL      Monocytes Absolute 0 76 Thousand/µL      Eosinophils Absolute 0 00 Thousand/µL      Basophils Absolute 0 03 Thousands/µL                  CT chest abdomen pelvis wo contrast   Final Result by Jj Moss MD (03/27 1352)      Massive bladder distention causing mild bilateral hydroureteronephrosis  Prostamegaly  No acute findings in the chest       New 4 mm right lower lobe nodule  Follow-up with repeat CT chest in one year if the patient is considered high risk for lung cancer  The study was marked in [facility] for immediate notification  Workstation performed: [id number]         CT head without contrast   Final Result by Mariella Montesinos MD (03/27 1216)      No acute intracranial abnormality  Workstation performed: [id number]         CT cervical spine without contrast   Final Result by Mariella Montesinos MD (03/27 1222)      No cervical spine fracture or traumatic malalignment                     Workstation performed: [id number]                    Procedures  ECG 12 Lead Documentation Only    Date/Time: 3/27/2022 11:53 AM  Performed by: Alma James DO  Authorized by: Alma James DO     Indications / Diagnosis:  Chest pain  ECG reviewed by me, the ED Provider: yes    Patient location:  ED  Previous ECG:     Previous ECG:  Compared to current    Similarity:  No change  Interpretation:     Interpretation: abnormal    Rate:     ECG rate:  74    ECG rate assessment: normal    Rhythm:     Rhythm: paced    Pacing:     Capture:  Complete    Type of pacing:  AV  Ectopy:     Ectopy: none    Conduction:     Conduction: abnormal      Abnormal conduction: complete LBBB    ST segments:     ST segments:  Normal  T waves:     T waves: normal               ED Course  ED Course as of 03/27/22 1608   Sun Mar 27, 2022   1241 Re-evaluated abdominal exam secondary to elevated liver enzymes and bilirubin  Patient again confirms no abdominal pain  There is no of abdominal tenderness on exam                                             MDM  Number of Diagnoses or Management Options  Diagnosis management comments: 40-year-old male presents to the ED initially calling EMS for fall out of bed this morning  On their arrival they noted that his heart rate was in the 30s and his blood pressure was in the 70s  No intervention was done  He there was also possibly a complaint of chest pain  They were concerned about the EKG and called for pre-hospital mi alert, however on review of the EKG is consistent with a paced, bundle branch block rhythm without Sgarbossa criteria to suggest acute MI  On arrival to the ED patient has no complaints  He is however anticoagulated and he does have an abrasion to the forehead  Will do cardiac workup, place patient on the monitor and CT head and C-spine to rule out trauma  Will update tetanus         Amount and/or Complexity of Data Reviewed  Clinical lab tests: ordered and reviewed  Tests in the radiology section of CPT®: ordered and reviewed  Review and summarize past medical records: yes  Independent visualization of images, tracings, or specimens: yes        Disposition  Final diagnoses:   Chest pain   Elevated troponin I level   Minor head injury, initial encounter   Transaminitis     Time reflects when diagnosis was documented in both MDM as applicable and the Disposition within this note     Time User Action Codes Description Comment    3/27/2022  1:07 PM Marcelo Elder A Add [R07 9] Chest pain     3/27/2022  1:07 PM Marcelo Elder A Add [R77 8] Elevated troponin I level     3/27/2022  1:07 PM Marcelo Elder A Add [Y43 [id number]] Minor head injury, initial encounter     3/27/2022  1:07 PM Marcelo Elder A Add [R74 01] Transaminitis       ED Disposition ED Disposition Condition Date/Time Comment    Admit Awais Nash Mar 27, 2022  1:07 PM Case was discussed with dr Moi Smith and the patient's admission status was agreed to be Admission Status: inpatient status to the service of Dr Vasquez Yates          Follow-up Information    None         Current Discharge Medication List      CONTINUE these medications which have NOT CHANGED    Details   amiodarone 200 mg tablet Take 1 tablet (200 mg total) by mouth daily  Qty: 90 tablet, Refills: 3    Associated Diagnoses: Ventricular tachycardia (HCC)      atorvastatin (LIPITOR) 40 mg tablet Take 1 tablet (40 mg total) by mouth daily  Qty: 90 tablet, Refills: 3    Associated Diagnoses: Mixed hyperlipidemia      carvedilol (COREG) 12 5 mg tablet Take 1 tablet (12 5 mg total) by mouth 2 (two) times a day with meals  Qty: 180 tablet, Refills: 3    Associated Diagnoses: Ischemic cardiomyopathy      Cholecalciferol (VITAMIN D3) 50 MCG (2000 UT) capsule Take 2,000 Units by mouth daily        Glucosamine Sulfate 1000 MG CAPS 1 capsule Every 12 hours      magnesium oxide (MAG-OX) 400 mg Take 1 tablet (400 mg total) by mouth daily  Qty: 90 tablet, Refills: 3    Associated Diagnoses: Cardiomyopathy, unspecified type (HCC)      mexiletine (MEXITIL) 150 mg capsule Take 1 capsule (150 mg total) by mouth 2 (two) times a day  Qty: 180 capsule, Refills: 3    Associated Diagnoses: VT (ventricular tachycardia) (HCC)      potassium chloride (K-DUR,KLOR-CON) 10 mEq tablet Take 1 tablet (10 mEq total) by mouth daily  Qty: 90 tablet, Refills: 1    Associated Diagnoses: Hypokalemia      warfarin (COUMADIN) 2 mg tablet Take 1 tablet daily or as directed by physician  Qty: 150 tablet, Refills: 3    Associated Diagnoses: PAF (paroxysmal atrial fibrillation) ([facility])             No discharge procedures on file      PDMP Review     None          ED Provider  Electronically Signed by           Criselda Gordon DO  03/27/22 Jeny Mai DO  03/27/22 [address]

## 2022-03-27 NOTE — PLAN OF CARE
Problem: Potential for Falls  Goal: Patient will remain free of falls  Description: INTERVENTIONS:  - Educate patient/family on patient safety including physical limitations  - Instruct patient to call for assistance with activity   - Consult OT/PT to assist with strengthening/mobility   - Keep Call bell within reach  - Keep bed low and locked with side rails adjusted as appropriate  - Keep care items and personal belongings within reach  - Initiate and maintain comfort rounds  - Make Fall Risk Sign visible to staff  - Offer Toileting every  Hours, in advance of need  - Initiate/Maintain alarm  - Obtain necessary fall risk management equipment:   - Apply yellow socks and bracelet for high fall risk patients  - Consider moving patient to room near nurses station  Outcome: Progressing     Problem: MOBILITY - ADULT  Goal: Maintain or return to baseline ADL function  Description: INTERVENTIONS:  -  Assess patient's ability to carry out ADLs; assess patient's baseline for ADL function and identify physical deficits which impact ability to perform ADLs (bathing, care of mouth/teeth, toileting, grooming, dressing, etc )  - Assess/evaluate cause of self-care deficits   - Assess range of motion  - Assess patient's mobility; develop plan if impaired  - Assess patient's need for assistive devices and provide as appropriate  - Encourage maximum independence but intervene and supervise when necessary  - Involve family in performance of ADLs  - Assess for home care needs following discharge   - Consider OT consult to assist with ADL evaluation and planning for discharge  - Provide patient education as appropriate  Outcome: Progressing  Goal: Maintains/Returns to pre admission functional level  Description: INTERVENTIONS:  - Perform BMAT or MOVE assessment daily    - Set and communicate daily mobility goal to care team and patient/family/caregiver     - Collaborate with rehabilitation services on mobility goals if consulted  - Perform Range of Motion  times a day  - Reposition patient every  hours    - Dangle patient  times a day  - Stand patient  times a day  - Ambulate patient  times a day  - Out of bed to chair  times a day   - Out of bed for meals  times a day  - Out of bed for toileting  - Record patient progress and toleration of activity level   Outcome: Progressing     Problem: CARDIOVASCULAR - ADULT  Goal: Maintains optimal cardiac output and hemodynamic stability  Description: INTERVENTIONS:  - Monitor I/O, vital signs and rhythm  - Monitor for S/S and trends of decreased cardiac output  - Administer and titrate ordered vasoactive medications to optimize hemodynamic stability  - Assess quality of pulses, skin color and temperature  - Assess for signs of decreased coronary artery perfusion  - Instruct patient to report change in severity of symptoms  Outcome: Progressing  Goal: Absence of cardiac dysrhythmias or at baseline rhythm  Description: INTERVENTIONS:  - Continuous cardiac monitoring, vital signs, obtain 12 lead EKG if ordered  - Administer antiarrhythmic and heart rate control medications as ordered  - Monitor electrolytes and administer replacement therapy as ordered  Outcome: Progressing     Problem: GENITOURINARY - ADULT  Goal: Maintains or returns to baseline urinary function  Description: INTERVENTIONS:  - Assess urinary function  - Encourage oral fluids to ensure adequate hydration if ordered  - Administer IV fluids as ordered to ensure adequate hydration  - Administer ordered medications as needed  - Offer frequent toileting  - Follow urinary retention protocol if ordered  Outcome: Progressing  Goal: Absence of urinary retention  Description: INTERVENTIONS:  - Assess patients ability to void and empty bladder  - Monitor I/O  - Bladder scan as needed  - Discuss with physician/AP medications to alleviate retention as needed  - Discuss catheterization for long term situations as appropriate  Outcome: Progressing  Goal: Urinary catheter remains patent  Description: INTERVENTIONS:  - Assess patency of urinary catheter  - If patient has a chronic mejia, consider changing catheter if non-functioning  - Follow guidelines for intermittent irrigation of non-functioning urinary catheter  Outcome: Progressing     Problem: SKIN/TISSUE INTEGRITY - ADULT  Goal: Skin Integrity remains intact(Skin Breakdown Prevention)  Description: Assess:  -Perform Sloan assessment every   -Clean and moisturize skin every   -Inspect skin when repositioning, toileting, and assisting with ADLS  -Assess under medical devices such as  every   -Assess extremities for adequate circulation and sensation     Bed Management:  -Have minimal linens on bed & keep smooth, unwrinkled  -Change linens as needed when moist or perspiring  -Avoid sitting or lying in one position for more than  hours while in bed  -Keep HOB at degrees     Toileting:  -Offer bedside commode  -Assess for incontinence every   -Use incontinent care products after each incontinent episode such as     Activity:  -Mobilize patient  times a day  -Encourage activity and walks on unit  -Encourage or provide ROM exercises   -Turn and reposition patient every  Hours  -Use appropriate equipment to lift or move patient in bed  -Instruct/ Assist with weight shifting every  when out of bed in chair  -Consider limitation of chair time  hour intervals    Skin Care:  -Avoid use of baby powder, tape, friction and shearing, hot water or constrictive clothing  -Relieve pressure over bony prominences using   -Do not massage red bony areas    Next Steps:  -Teach patient strategies to minimize risks such as    -Consider consults to  interdisciplinary teams such as   Outcome: Progressing  Goal: Incision(s), wounds(s) or drain site(s) healing without S/S of infection  Description: INTERVENTIONS  - Assess and document dressing, incision, wound bed, drain sites and surrounding tissue  - Provide patient and family education  - Perform skin care/dressing changes every   Outcome: Progressing  Goal: Pressure injury heals and does not worsen  Description: Interventions:  - Implement low air loss mattress or specialty surface (Criteria met)  - Apply silicone foam dressing  - Instruct/assist with weight shifting every  minutes when in chair   - Limit chair time to  hour intervals  - Use special pressure reducing interventions such as  when in chair   - Apply fecal or urinary incontinence containment device   - Perform passive or active ROM every   - Turn and reposition patient & offload bony prominences every  hours   - Utilize friction reducing device or surface for transfers   - Consider consults to  interdisciplinary teams such as   - Use incontinent care products after each incontinent episode such as  - Consider nutrition services referral as needed  Outcome: Progressing     Problem: MUSCULOSKELETAL - ADULT  Goal: Maintain or return mobility to safest level of function  Description: INTERVENTIONS:  - Assess patient's ability to carry out ADLs; assess patient's baseline for ADL function and identify physical deficits which impact ability to perform ADLs (bathing, care of mouth/teeth, toileting, grooming, dressing, etc )  - Assess/evaluate cause of self-care deficits   - Assess range of motion  - Assess patient's mobility  - Assess patient's need for assistive devices and provide as appropriate  - Encourage maximum independence but intervene and supervise when necessary  - Involve family in performance of ADLs  - Assess for home care needs following discharge   - Consider OT consult to assist with ADL evaluation and planning for discharge  - Provide patient education as appropriate  Outcome: Progressing  Goal: Maintain proper alignment of affected body part  Description: INTERVENTIONS:  - Support, maintain and protect limb and body alignment  - Provide patient/ family with appropriate education  Outcome: Progressing Problem: PAIN - ADULT  Goal: Verbalizes/displays adequate comfort level or baseline comfort level  Description: Interventions:  - Encourage patient to monitor pain and request assistance  - Assess pain using appropriate pain scale  - Administer analgesics based on type and severity of pain and evaluate response  - Implement non-pharmacological measures as appropriate and evaluate response  - Consider cultural and social influences on pain and pain management  - Notify physician/advanced practitioner if interventions unsuccessful or patient reports new pain  Outcome: Progressing     Problem: INFECTION - ADULT  Goal: Absence or prevention of progression during hospitalization  Description: INTERVENTIONS:  - Assess and monitor for signs and symptoms of infection  - Monitor lab/diagnostic results  - Monitor all insertion sites, i e  indwelling lines, tubes, and drains  - Monitor endotracheal if appropriate and nasal secretions for changes in amount and color  - [place] appropriate cooling/warming therapies per order  - Administer medications as ordered  - Instruct and encourage patient and family to use good hand hygiene technique  - Identify and instruct in appropriate isolation precautions for identified infection/condition  Outcome: Progressing  Goal: Absence of fever/infection during neutropenic period  Description: INTERVENTIONS:  - Monitor WBC    Outcome: Progressing     Problem: DISCHARGE PLANNING  Goal: Discharge to home or other facility with appropriate resources  Description: INTERVENTIONS:  - Identify barriers to discharge w/patient and caregiver  - Arrange for needed discharge resources and transportation as appropriate  - Identify discharge learning needs (meds, wound care, etc )  - Arrange for interpretive services to assist at discharge as needed  - Refer to Case Management Department for coordinating discharge planning if the patient needs post-hospital services based on physician/advanced practitioner order or complex needs related to functional status, cognitive ability, or social support system  Outcome: Progressing     Problem: Knowledge Deficit  Goal: Patient/family/caregiver demonstrates understanding of disease process, treatment plan, medications, and discharge instructions  Description: Complete learning assessment and assess knowledge base    Interventions:  - Provide teaching at level of understanding  - Provide teaching via preferred learning methods  Outcome: Progressing

## 2022-03-27 NOTE — ASSESSMENT & PLAN NOTE
80year old male presenting after suffering from a mechanical fall twice today  Denies any loss consciousness or presyncopal symptoms  Noted during documentation, that patient was bradycardic and hypotensive upon EMS arrival   See rest of the treatment plan below    - PT OT  - telemetry  - interrogation to assess Vtach events / defibrillation which may have caused his presentation

## 2022-03-28 NOTE — PLAN OF CARE
Problem: PHYSICAL THERAPY ADULT  Goal: Performs mobility at highest level of function for planned discharge setting  See evaluation for individualized goals  Description: Treatment/Interventions: Functional transfer training,LE strengthening/ROM,Therapeutic exercise,Endurance training,Patient/family training,Equipment eval/education,Bed mobility,Gait training,Compensatory technique education,Continued evaluation,Spoke to nursing  Equipment Recommended: Luis Gamboa       See flowsheet documentation for full assessment, interventions and recommendations  Note: Prognosis: Fair  Problem List: Decreased strength,Decreased range of motion,Decreased endurance,Impaired balance,Decreased mobility,Decreased coordination,Decreased cognition,Impaired judgement,Decreased safety awareness,Impaired hearing  Assessment: Shanta Price is a 80 y o  male with hx of chronic CHF, V-tach s/p ICD, CAD with remote hx of MI and angioplasty, PAF, HTN, etoh use and hyperlipidemia  Presents with fall  Noted to be hypotensive and bradycardic  + hypokalemia  Cardiology consult noted with elevated troponin likely nonischemic myocardial injury  PT consulted  Activity as tolerated orders  Prior to admission resides with spouse in apt with elevator access  Reports ambulation without AD, + hx of multiple falls-notes 20 falls but independent prior to admission  Currently presents with impairments in cognition, activity tolerance, impaired safety awareness and judgement, decreased balance, functional mobility, joint ROM, posture, coordination and locomotion  Gait with RW with wide stance, decreased foot clearance and step length, decreased fluency and coordination  Guidance for RW management needed and cues for safety  Increased risk for falls given impairments and will benefit from skilled PT in order to address impairments and optimize functional outcomes  The patient's AM-PAC Basic Mobility Inpatient Short Form Raw Score is 14   A Raw score of less than or equal to 16 suggests the patient may benefit from discharge to post-acute rehabilitation services  Please also refer to the recommendation of the Physical Therapist for safe discharge planning  At this time STR is recommended  PT Discharge Recommendation: Post acute rehabilitation services          See flowsheet documentation for full assessment

## 2022-03-28 NOTE — CONSULTS
Consultation -  [facility]  Kasey Armstrong 80 y o  male MRN: [id number]  Unit/Bed#: E4 -01 Encounter: [id number]        Inpatient consult to gastroenterology  Consult performed by: Gokul Michael PA-C  Consult ordered by: Payal Mcneill MD        ASSESSMENT and PLAN:      80-year-old male with history of chronic combined systolic and diastolic CHF, severe cardiomyopathy, LVEF 12%, history of V tach s/p ICD, CAD s/p remote hx of MI and angioplasty, afib on warfarin, CKD stage 4, previous embolic CVA, alcohol abuse admitted after suffering a fall, seen by GI for elevated liver enzymes  1) Elevated liver enzymes with hyperblirubinemia: Hepatocellular pattern  , , Alk Phos 54, T bili 2 75 (increased from yesterday)  INR elevated in the setting of warfarin  Enzymes were also elevated back in January when he was admitted with V tach and CHF exacerbation  At that time, AST/ALT in the 200 range and these numbers eventually improved  CT scan of liver from 3/27 was unremarkable and acute hep panel negative  Differential includes congestive hepatopathy, ischemic injury related to hypotension, drug induced liver injury from amiodarone     -agree with holding amiodarone and avoiding other hepatotoxic drugs  -appreciate cardiology and SLIM management of cardiac issues  -avoid hypotension  -monitor liver enzymes    Patient was seen and examined by Dr Myron Nina  All walker medical decisions were made by Dr Myron Nina  Thank you for allowing us to participate in the care of this present patient  We will follow-up with you closely        Reason for Consult / Principal Problem: elevated liver enzymes    HPI: 80-year-old male with history of chronic combined systolic and diastolic congestive heart failure, severe cardiomyopathy, LVEF 12%, history of V tach s/p ICD, CAD s/p remote hx of MI and angioplasty 1989, afib on warfarin, CKD stage 4, previous embolic CVA, alcohol abuse presenting after suffering a fall at home  He was bradycardic and hypotensive on arrival  He was found to have elevated troponins  He is being evaluated by cardiology but they doubt ACS  They are planning to interrogate his ICD  He denies any abdominal pain, nausea, vomiting, jaundice, scleral icterus, diarrhea, constipation, blood in the stool  He takes amiodarone and mexilitine for his heart  He does not take tylenol or ibuprofen  REVIEW OF SYSTEMS:    CONSTITUTIONAL: Denies any fever, chills, or rigors  Good appetite, and no recent weight loss  HEENT: No earache or tinnitus  Denies hearing loss or visual disturbances  CARDIOVASCULAR: No chest pain or palpitations  RESPIRATORY: Denies any cough, hemoptysis, shortness of breath or dyspnea on exertion  GASTROINTESTINAL: As noted in the History of Present Illness  GENITOURINARY: No problems with urination  Denies any hematuria or dysuria  NEUROLOGIC: No dizziness or vertigo, denies headaches  MUSCULOSKELETAL: Denies any muscle or joint pain  SKIN: Denies skin rashes or itching  ENDOCRINE: Denies excessive thirst  Denies intolerance to heat or cold  PSYCHOSOCIAL: Denies depression or anxiety  Denies any recent memory loss         Historical Information   Past Medical History:   Diagnosis Date    Anemia     Arthritis     Coronary artery disease     CVA [facility]cere[facility] vascular accident[facility] [address] )     Hyperlipidemia     Hypertension      Past Surgical History:   Procedure Laterality Date    CARDIAC PACEMAKER PLACEMENT  05/09/2011    [employer] dual chamber ICD implant    CORONARY ANGIOPLASTY  1989     Social History   Social History     Substance and Sexual Activity   Alcohol Use Yes    Alcohol/week: 3 0 standard drinks    Types: 3 Shots of liquor per week    Comment: daily     Social History     Substance and Sexual Activity   Drug Use Never     Social History     Tobacco Use   Smoking Status Never Smoker   Smokeless Tobacco Never Used     Family History   Problem Relation Age of Onset    Coronary artery disease Mother     Heart attack Father         MI    Stroke Sister        Meds/Allergies     Medications Prior to Admission   Medication    amiodarone 200 mg tablet    atorvastatin (LIPITOR) 40 mg tablet    carvedilol (COREG) 12 5 mg tablet    Cholecalciferol (VITAMIN D3) 50 MCG (2000 UT) capsule    Glucosamine Sulfate 1000 MG CAPS    magnesium oxide (MAG-OX) 400 mg    mexiletine (MEXITIL) 150 mg capsule    potassium chloride (K-DUR,KLOR-CON) 10 mEq tablet    warfarin (COUMADIN) 2 mg tablet     Current Facility-Administered Medications   Medication Dose Route Frequency    carvedilol (COREG) tablet 12 5 mg  12 5 mg Oral BID With Meals    mexiletine (MEXITIL) capsule 150 mg  150 mg Oral Q12H [address]    nystatin (MYCOSTATIN) powder   Topical BID    warfarin (COUMADIN) tablet 1 mg  1 mg Oral Daily (warfarin)       No Known Allergies        Objective     Blood pressure 91/54, pulse 70, temperature 98 4 °F (36 9 °C), temperature source Temporal, resp  rate 20, height 5' 2" (1 575 m), weight 57 9 kg (127 lb 10 3 oz), SpO2 96 %        Intake/Output Summary (Last 24 hours) at 3/28/2022 1437  Last data filed at 3/28/2022 1425  Gross per 24 hour   Intake 480 ml   Output 1400 ml   Net -920 ml         PHYSICAL EXAM:      General Appearance:   Alert, chronically ill appearing elderly male, cooperative, no distress, appears stated age    HEENT:   Normocephalic, atraumatic, anicteric      Neck:  Supple, symmetrical, trachea midline, no adenopathy   Lungs:   Clear to auscultation bilaterally   Heart[de-identified]   S1 and S2 normal; regular rate and rhythm   Abdomen:   Soft, non-tender, non-distended; normal bowel sounds   Genitalia:   Deferred    Rectal:   Deferred    Extremities:  No cyanosis, clubbing or edema    Pulses:  2+ and symmetric all extremities    Skin:  Skin color, texture, turgor normal, no rashes or lesions    Lymph nodes:  No palpable cervical lymphadenopathy        Lab Results:   Results from last 7 days   Lab Units 03/28/22  0617 03/27/22  1145 03/27/22  1145   WBC Thousand/uL 7 23   < > 11 04*   HEMOGLOBIN g/dL 11 1*   < > 11 2*   HEMATOCRIT % 33 8*   < > 34 0*   PLATELETS Thousands/uL 81*   < > 100*   NEUTROS PCT %  --   --  87*   LYMPHS PCT %  --   --  5*   MONOS PCT %  --   --  7   EOS PCT %  --   --  0    < > = values in this interval not displayed  Results from last 7 days   Lab Units 03/28/22  0617   POTASSIUM mmol/L 4 2   CHLORIDE mmol/L 101   CO2 mmol/L 22   BUN mg/dL 44*   CREATININE mg/dL 3 02*   CALCIUM mg/dL 8 3   ALK PHOS U/L 54   ALT U/L 585*   AST U/L 515*     Results from last 7 days   Lab Units 03/28/22  0617   INR  2 11*           Imaging Studies: I have personally reviewed pertinent imaging studies  CT chest abdomen pelvis wo contrast    Result Date: 3/27/2022  Impression: Massive bladder distention causing mild bilateral hydroureteronephrosis  Prostamegaly  No acute findings in the chest  New 4 mm right lower lobe nodule  Follow-up with repeat CT chest in one year if the patient is considered high risk for lung cancer  The study was marked in [facility] for immediate notification  Workstation performed: [id number]     CT head without contrast    Result Date: 3/27/2022  Impression: No acute intracranial abnormality  Workstation performed: [id number]     CT cervical spine without contrast    Result Date: 3/27/2022  Impression: No cervical spine fracture or traumatic malalignment    Workstation performed: [id number]

## 2022-03-28 NOTE — ASSESSMENT & PLAN NOTE
Wt Readings from Last 3 Encounters:   03/28/22 57 9 kg (127 lb 10 3 oz)   03/25/22 74 4 kg (164 lb)   03/17/22 58 5 kg (129 lb)     LVEF 12%, mild LV dilatation, diastolic dysfunction, regional wall motion abnormalities, paradoxical septal wall motion consistent with conduction abnormality, moderately reduced RV function, mild LA dilatation, mild MR/TR/AK w/ PASP 45-50 mmHg, mild aortic dilatation October 2021    Weight above baseline two days ago  Denies shortness of breath  Repeat daily weights here  - Cardiology following  - Continue Coreg  - Torsemide seems to have been discontinued during his last follow-up appointment  Will defer need for further diuresis to cardiology

## 2022-03-28 NOTE — ASSESSMENT & PLAN NOTE
Lab Results   Component Value Date    EGFR 17 03/28/2022    EGFR 18 03/27/2022    EGFR 18 03/24/2022    CREATININE 3 02 (H) 03/28/2022    CREATININE 2 89 (H) 03/27/2022    CREATININE 2 87 (H) 03/24/2022     Stable  Does not need acute kidney injury criteria at this time (1 5x baseline or >0 3 over 48 hours)  Baseline should be around 1 9-2 1

## 2022-03-28 NOTE — ASSESSMENT & PLAN NOTE
S/p multiple straight cath   Now mejia catheter in place  - continue urinary retention protocol  - Urology consult

## 2022-03-28 NOTE — PLAN OF CARE
Problem: OCCUPATIONAL THERAPY ADULT  Goal: Performs self-care activities at highest level of function for planned discharge setting  See evaluation for individualized goals  Description: Treatment Interventions: ADL retraining,Functional transfer training,UE strengthening/ROM,Endurance training,Cognitive reorientation,Patient/family training,Neuromuscular reeducation          See flowsheet documentation for full assessment, interventions and recommendations  Outcome: Progressing  Note: Limitation: Decreased ADL status,Decreased UE ROM,Decreased UE strength,Decreased Safe judgement during ADL,Decreased cognition,Decreased endurance,Decreased self-care trans,Decreased high-level ADLs  Prognosis: Fair  Assessment: Patient is an 80year old male admitted following fall at home  Patient diagnosed with urinary retension(required mejia placement), CKD, stage 3, transminititis, dyslipidemia, CHF and hypertension  Patient also with hx of V-Tach with defibrilator and hypertension  Patient is a ? historian, very fatigued and unable to remain alert for extended periods of time with OT this evening, required Mod verbal cues to stay awake but was participatory  Patient is alert and oriented to person/place only, having noted difficulty with recalling personal  information  Patient currently presents with decreased activity tolerance, decreased sitting balance, generalized muscle weakness impacting occupational performance in areas of bathing, dressing, tolielting, self-feeding, grooming and functional mobility  From OT standpoint, anticiapte need for post-acute rehab before discharge home with wife      Recommendation: PT consult,Geriatric Consult  OT Discharge Recommendation: Post acute rehabilitation services  OT - OK to Discharge: No

## 2022-03-28 NOTE — PLAN OF CARE
Problem: Potential for Falls  Goal: Patient will remain free of falls  Description: INTERVENTIONS:  - Educate patient/family on patient safety including physical limitations  - Instruct patient to call for assistance with activity   - Consult OT/PT to assist with strengthening/mobility   - Keep Call bell within reach  - Keep bed low and locked with side rails adjusted as appropriate  - Keep care items and personal belongings within reach  - Initiate and maintain comfort rounds  - Make Fall Risk Sign visible to staff  - Offer Toileting every 2 Hours, in advance of need  - Initiate/Maintain bed and chair alarm  - Obtain necessary fall risk management equipment: bed and chair alarm  - Apply yellow socks and bracelet for high fall risk patients  - Consider moving patient to room near nurses station  Outcome: Progressing     Problem: MOBILITY - ADULT  Goal: Maintain or return to baseline ADL function  Description: INTERVENTIONS:  -  Assess patient's ability to carry out ADLs; assess patient's baseline for ADL function and identify physical deficits which impact ability to perform ADLs (bathing, care of mouth/teeth, toileting, grooming, dressing, etc )  - Assess/evaluate cause of self-care deficits   - Assess range of motion  - Assess patient's mobility; develop plan if impaired  - Assess patient's need for assistive devices and provide as appropriate  - Encourage maximum independence but intervene and supervise when necessary  - Involve family in performance of ADLs  - Assess for home care needs following discharge   - Consider OT consult to assist with ADL evaluation and planning for discharge  - Provide patient education as appropriate  Outcome: Progressing  Goal: Maintains/Returns to pre admission functional level  Description: INTERVENTIONS:  - Perform BMAT or MOVE assessment daily    - Set and communicate daily mobility goal to care team and patient/family/caregiver     - Collaborate with rehabilitation services on mobility goals if consulted  - Perform Range of Motion 3 times a day  - Reposition patient every 2 hours    - Dangle patient 3 times a day  - Stand patient 3 times a day  - Ambulate patient 3 times a day  - Out of bed to chair 3 times a day   - Out of bed for meals 3 times a day  - Out of bed for toileting  - Record patient progress and toleration of activity level   Outcome: Progressing     Problem: CARDIOVASCULAR - ADULT  Goal: Maintains optimal cardiac output and hemodynamic stability  Description: INTERVENTIONS:  - Monitor I/O, vital signs and rhythm  - Monitor for S/S and trends of decreased cardiac output  - Administer and titrate ordered vasoactive medications to optimize hemodynamic stability  - Assess quality of pulses, skin color and temperature  - Assess for signs of decreased coronary artery perfusion  - Instruct patient to report change in severity of symptoms  Outcome: Progressing  Goal: Absence of cardiac dysrhythmias or at baseline rhythm  Description: INTERVENTIONS:  - Continuous cardiac monitoring, vital signs, obtain 12 lead EKG if ordered  - Administer antiarrhythmic and heart rate control medications as ordered  - Monitor electrolytes and administer replacement therapy as ordered  Outcome: Progressing     Problem: GENITOURINARY - ADULT  Goal: Maintains or returns to baseline urinary function  Description: INTERVENTIONS:  - Assess urinary function  - Encourage oral fluids to ensure adequate hydration if ordered  - Administer IV fluids as ordered to ensure adequate hydration  - Administer ordered medications as needed  - Offer frequent toileting  - Follow urinary retention protocol if ordered  Outcome: Progressing  Goal: Absence of urinary retention  Description: INTERVENTIONS:  - Assess patients ability to void and empty bladder  - Monitor I/O  - Bladder scan as needed  - Discuss with physician/AP medications to alleviate retention as needed  - Discuss catheterization for long term situations as appropriate  Outcome: Progressing     Problem: SKIN/TISSUE INTEGRITY - ADULT  Goal: Incision(s), wounds(s) or drain site(s) healing without S/S of infection  Description: INTERVENTIONS  - Assess and document dressing, incision, wound bed, drain sites and surrounding tissue  - Provide patient and family education  - Perform skin care/dressing changes every shift  Outcome: Progressing  Goal: Pressure injury heals and does not worsen  Description: Interventions:  - Implement low air loss mattress or specialty surface (Criteria met)  - Apply silicone foam dressing  - Instruct/assist with weight shifting every 60 minutes when in chair   - Limit chair time to 2 hour intervals  - Use special pressure reducing interventions such as waffle cushion when in chair   - Apply fecal or urinary incontinence containment device   - Perform passive or active ROM every 2 hours  - Turn and reposition patient & offload bony prominences every 2 hours   - Utilize friction reducing device or surface for transfers   - Consider consults to  interdisciplinary teams such as wound consult  - Consider nutrition services referral as needed  Outcome: Progressing     Problem: MUSCULOSKELETAL - ADULT  Goal: Maintain or return mobility to safest level of function  Description: INTERVENTIONS:  - Assess patient's ability to carry out ADLs; assess patient's baseline for ADL function and identify physical deficits which impact ability to perform ADLs (bathing, care of mouth/teeth, toileting, grooming, dressing, etc )  - Assess/evaluate cause of self-care deficits   - Assess range of motion  - Assess patient's mobility  - Assess patient's need for assistive devices and provide as appropriate  - Encourage maximum independence but intervene and supervise when necessary  - Involve family in performance of ADLs  - Assess for home care needs following discharge   - Consider OT consult to assist with ADL evaluation and planning for discharge  - Provide patient education as appropriate  Outcome: Progressing  Goal: Maintain proper alignment of affected body part  Description: INTERVENTIONS:  - Support, maintain and protect limb and body alignment  - Provide patient/ family with appropriate education  Outcome: Progressing     Problem: PAIN - ADULT  Goal: Verbalizes/displays adequate comfort level or baseline comfort level  Description: Interventions:  - Encourage patient to monitor pain and request assistance  - Assess pain using appropriate pain scale  - Administer analgesics based on type and severity of pain and evaluate response  - Implement non-pharmacological measures as appropriate and evaluate response  - Consider cultural and social influences on pain and pain management  - Notify physician/advanced practitioner if interventions unsuccessful or patient reports new pain  Outcome: Progressing     Problem: INFECTION - ADULT  Goal: Absence or prevention of progression during hospitalization  Description: INTERVENTIONS:  - Assess and monitor for signs and symptoms of infection  - Monitor lab/diagnostic results  - Monitor all insertion sites, i e  indwelling lines, tubes, and drains  - Monitor endotracheal if appropriate and nasal secretions for changes in amount and color  - [place] appropriate cooling/warming therapies per order  - Administer medications as ordered  - Instruct and encourage patient and family to use good hand hygiene technique  - Identify and instruct in appropriate isolation precautions for identified infection/condition  Outcome: Progressing     Problem: DISCHARGE PLANNING  Goal: Discharge to home or other facility with appropriate resources  Description: INTERVENTIONS:  - Identify barriers to discharge w/patient and caregiver  - Arrange for needed discharge resources and transportation as appropriate  - Identify discharge learning needs (meds, wound care, etc )  - Arrange for interpretive services to assist at discharge as needed  - Refer to Case Management Department for coordinating discharge planning if the patient needs post-hospital services based on physician/advanced practitioner order or complex needs related to functional status, cognitive ability, or social support system  Outcome: Progressing     Problem: Knowledge Deficit  Goal: Patient/family/caregiver demonstrates understanding of disease process, treatment plan, medications, and discharge instructions  Description: Complete learning assessment and assess knowledge base    Interventions:  - Provide teaching at level of understanding  - Provide teaching via preferred learning methods  Outcome: Progressing     Problem: Prexisting or High Potential for Compromised Skin Integrity  Goal: Skin integrity is maintained or improved  Description: INTERVENTIONS:  - Identify patients at risk for skin breakdown  - Assess and monitor skin integrity  - Assess and monitor nutrition and hydration status  - Monitor labs   - Assess for incontinence   - Turn and reposition patient  - Assist with mobility/ambulation  - Relieve pressure over bony prominences  - Avoid friction and shearing  - Provide appropriate hygiene as needed including keeping skin clean and dry  - Evaluate need for skin moisturizer/barrier cream  - Collaborate with interdisciplinary team   - Patient/family teaching  - Consider wound care consult   Outcome: Progressing

## 2022-03-28 NOTE — QUICK NOTE
Was advised by Pharmacy that Mexiletine may also play a role with his transaminitis, but was advised against discontinuing it abruptly without cardiology approval  Will reach out to cardiology to determine next step in management

## 2022-03-28 NOTE — OCCUPATIONAL THERAPY NOTE
03/28/22 1747   OT Last Visit   OT Visit Date 03/28/22   Note Type   Note type Evaluation   Restrictions/Precautions   Weight Bearing Precautions Per Order No   Other Precautions Cognitive; Chair Alarm; Bed Alarm; Restraints   Pain Assessment   Pain Assessment Tool 0-10   Pain Score No Pain   Home Living   Type of Home Apartment   Home Layout One level;Elevator   Bathroom Shower/Tub Walk-in shower   Bathroom Toilet Standard   Bathroom Accessibility Accessible   Additional Comments Patient having difficulty with understanding questions and recqalling personal information    Prior Function   Level of [place] Independent with ADLs and functional mobility   Lives With Spouse  Eliana Morrison )   Receives Help From Family  (children involved but live out of town, closest [place]mi away)   ADL Assistance Independent   IADLs Needs assistance  (wife manages IADLs)   Lifestyle   Autonomy   (? historian, reports I with ADLs)   Reciprocal Relationships   (Resides with wife, reports she manages "okay" and helps him )   Service to Others   (Retired  )   Intrinsic Gratification   (Reports spending time watching TV)   Psychosocial   Psychosocial (WDL)   (pleasant and cooperative but decreased level of arousal)   Patient Behaviors/Mood Brightens with approach; Calm   Subjective   Subjective   ("Is there anything else I can help you with?")   ADL   Where Assessed Edge of bed   Eating Assistance 4  Minimal Assistance   Grooming Assistance 2  Maximal Assistance   UB Bathing Assistance 2  Maximal Assistance   LB Bathing Assistance 2  Maximal Assistance   UB Dressing Assistance 2  Maximal Assistance   LB Dressing Assistance 2  Maximal [address]  1  Total Assistance   Toileting Deficit   (mejia catheter)   Additional Comments poor sitting balance and postural control    Bed Mobility   Sit to Supine 3  Moderate assistance   Additional items Assist x 1; Increased time required   Transfers   Additional Comments (not assessed out of bed, put self back in bed after sitting )   Balance   Static Sitting Fair -   Dynamic Sitting Poor   Activity Tolerance   Activity Tolerance Patient limited by fatigue   Medical Staff Made Aware   (Spoke with RN )   Nurse Made Aware   (Josiane Glass, for OT artis )   RUE Assessment   RUE Assessment   (no AROM or strength in R shoulder, reports having a "tear" )   RUE Strength   R Elbow Flexion 3+/5   R Elbow Extension 3+/5   Hand Function   Gross Motor Coordination Impaired   Cognition   Overall Cognitive Status Impaired   Arousal/Participation Responsive;Lethargic;Persistent stimuli required   Attention Attends with cues to redirect   Orientation Level Oriented to person;Oriented to place   Memory Decreased recall of biographical information;Decreased recall of recent events;Decreased recall of precautions   Following Commands   (will continue to montitor functional cogntition )   Assessment   Limitation Decreased ADL status; Decreased UE ROM; Decreased UE strength;Decreased Safe judgement during ADL;Decreased cognition;Decreased endurance;Decreased self-care trans;Decreased high-level ADLs   Prognosis Fair   Assessment Patient is an 80year old male admitted following fall at home  Patient diagnosed with urinary retension(required mejia placement), CKD, stage 3, transminititis, dyslipidemia, CHF and hypertension  Patient also with hx of V-Tach with defibrilator and hypertension  Patient is a ? historian, very fatigued and unable to remain alert for extended periods of time with OT this evening, required Mod verbal cues to stay awake but was participatory  Patient is alert and oriented to person/place only, having noted difficulty with recalling personal  information    Patient currently presents with decreased activity tolerance, decreased sitting balance, generalized muscle weakness impacting occupational performance in areas of bathing, dressing, tolielting, self-feeding, grooming and functional mobility  From OT standpoint, anticiapte need for post-acute rehab before discharge home with wife  Goals   Patient Goals "Go home"    LTG Time Frame 3-7   Long Term Goal #1 1  Patient will be I with bed mobility for ADL tasks seated EOB; 2  Patient will be I with grooming and self-feeding with set-up; 3  Patient will increase bathing skills to Min A  for sponge bathing EOB or in stance at the sink; 3  Patient will increase UB/LB dressing to Min A level 4  Patient will increase toileting to SBA level ; 5  Continue to montior functional cogntiion and safety for discharge planning   Long Term Goal #2 6  Patient will increase upright sitting activity tolerance to 30 min with G dynamic balance and posture    Plan   Treatment Interventions ADL retraining;Functional transfer training;UE strengthening/ROM; Endurance training;Cognitive reorientation;Patient/family training;Neuromuscular reeducation   Goal Expiration Date 04/04/22   OT Treatment Day   (Monday )   Additional Treatment Session   Start Time 9653   End Time 0765   Recommendation   Recommendation PT consult;Geriatric Consult   OT Discharge Recommendation Post acute rehabilitation services   OT - OK to Discharge No   AM-PAC Daily Activity Inpatient   Lower Body Dressing 2   Bathing 2   Toileting 1   Upper Body Dressing 2   Grooming 2   Eating 2   Daily Activity Raw Score 11   Daily Activity Standardized Score (Calc for Raw Score >=11) 29 04   Shawn Aranda OT

## 2022-03-28 NOTE — ASSESSMENT & PLAN NOTE
Multifactorial  Etiologies include congestive hepatopathy, ischemic injury related to hypotension, drug induced liver injury from amiodarone  - Appreciate GI recommendations  - Acute hepatitis panel: WNL  - Continue monitoring for now  Avoid hepatotoxic agents (Amiodarone)  Will defer to cardiology if Mexiletine will continue to remain necessary   Was informed by pharmacy that this too maybe hepatotoxic, but advised against rapid withdrawal

## 2022-03-28 NOTE — UTILIZATION REVIEW
Initial Clinical Review    Admission: Date/Time/Statement:   Admission Orders (From admission, onward)     Ordered        03/27/22 1308  Inpatient Admission  Once                      Orders Placed This Encounter   Procedures    Inpatient Admission     Standing Status:   Standing     Number of Occurrences:   1     Order Specific Question:   Level of Care     Answer:   Med Surg [16]     Order Specific Question:   Estimated length of stay     Answer:   More than 2 Midnights     Order Specific Question:   Certification     Answer:   I certify that inpatient services are medically necessary for this patient for a duration of greater than two midnights  See H&P and MD Progress Notes for additional information about the patient's course of treatment  ED Arrival Information     Expected Arrival Acuity    - 3/27/2022 11:36 Emergent         Means of arrival Escorted by Service Admission type    Ambulance [facility] Emergency         Arrival complaint    Fall        Chief Complaint   Patient presents with    Fall     EMS call for fall, when EMS arrived patient had heart rate in the 00'N with low systolic blood pressure  Patient denies chest pain or any other symptoms  Patient has abrasions to forehead  Initial Presentation: 80 y o  male who presents to the ED via EMS post fall x2 with no precipitating events or LOC  + head strike with headache  When EMS arrived pt was bradycardic in the 30s - no intervention  In the ED he was confused but oriented to self and place on exam   Was straight cathed for over 1L  PMH: Chronic combined CHF, VT s/p ICD, CAD, PAF on Warfarin, HTN, ETOH use - with significant decrease in intake lately, HLD  He is admitted to INPATIENT status with Fall - PT/OT evals, Tele, interrogate ICD, as defib may have caused presentation  Urinary retention - monitor for retention  Transaminitis - trend and acute hepatitis panel  Hypokalemia - 3 3 - replete and trend  Chronic combined CHF - Coreg, Cardio consult  VT - pt is not taking Amiodarone but is taking Mexiletine  3/27 Cardio Consult - Fall with precordial CP, elevated troponin likely nonischemic myocardial injury, combined CHF, CAD, PAF on Coumadin, HTN - troponins trending down, hold diuretics, continue Carvedilol and Mexiletine, hold Amiodarone d/t transaminitis, ICD interrogation, restart very low dose Torsemide daily or MWF at d/c  Date: 3/28  Day 2:   Per Pharmacy, Mexiletine affects transaminitis, should not d/c abruptly, will check with Cardio how to handle  3/28 GI Consult - Elevated liver enzymes with hyperblirubinemia: Hepatocellular pattern  CT scan of liver from 3/27 was unremarkable and acute hep panel negative  Differential includes congestive hepatopathy, ischemic injury related to hypotension, drug induced liver injury from amiodarone  Agree to holding Amiodarone, avoid hypotension, monitor enzymes          ED Triage Vitals   Temperature Pulse Respirations Blood Pressure SpO2   03/27/22 1142 03/27/22 1142 03/27/22 1142 03/27/22 1142 03/27/22 1142   (!) 97 4 °F (36 3 °C) 68 19 105/65 95 %      Temp Source Heart Rate Source Patient Position - Orthostatic VS BP Location FiO2 (%)   03/27/22 1142 03/27/22 1142 03/27/22 1200 03/27/22 1142 --   Oral Monitor Lying Right arm       Pain Score       03/27/22 1142       No Pain          Wt Readings from Last 1 Encounters:   03/28/22 57 9 kg (127 lb 10 3 oz)     Additional Vital Signs:   03/28/22 0725 99 2 °F (37 3 °C) 62 20 106/63 76 95 % None (Room air) Lying   03/28/22 0409 -- 62 20 91/70 79 96 % None (Room air) Sitting   03/28/22 0002 -- 61 20 92/58 77 -- -- --   03/27/22 2203 -- 63 20 104/66 78 95 % -- Lying   03/27/22 1950 -- -- -- -- -- 93 % -- --   03/27/22 1948 98 5 °F (36 9 °C) 60 22 82/52 Abnormal  58 93 % -- Lying   03/27/22 1522 98 2 °F (36 8 °C) 63 22 116/74 79 93 % None (Room air) Lying   03/27/22 1445 -- 84 26 Abnormal  130/69 94 92 % None (Room air) Lying   03/27/22 1330 -- 68 20 112/72 88 96 % None (Room air) Lying   03/27/22 1315 -- 72 23 Abnormal  127/77 92 96 % None (Room air) Lying   03/27/22 1300 -- 66 20 104/74 86 96 % None (Room air) Lying   03/27/22 1245 -- 72 20 118/66 86 94 % None (Room air) Lying   03/27/22 1230 -- 72 18 108/62 81 94 % None (Room air) Lying   03/27/22 1219 -- 73 20 114/61 -- 94 % None (Room air) Lying   03/27/22 1216 -- -- -- -- -- -- None (Room air) --   03/27/22 1215 -- 70 18 97/55 71 96 % None (Room air) Lying   03/27/22 1200 -- 71 20 105/56 -- 96 % None (Room air) Lying     Pertinent Labs/Diagnostic Test Results:     3/27 ECG -  atrial sensed ventricular paced rhythm with intermittent AV sequential paced rhythm  Abnormal ECG  3/27 ECG -   Suspected atrial fibrillation with ventricular paced rhythm with appropriate capture of paced beats  CT chest abdomen pelvis wo contrast   Final Result by Randy Caldwell MD (03/27 1352)      Massive bladder distention causing mild bilateral hydroureteronephrosis  Prostamegaly  No acute findings in the chest       New 4 mm right lower lobe nodule  Follow-up with repeat CT chest in one year if the patient is considered high risk for lung cancer  CT head without contrast   Final Result by Fernandez Arita MD (03/27 1216)      No acute intracranial abnormality  CT cervical spine without contrast   Final Result by Fernandez Arita MD (03/27 1222)      No cervical spine fracture or traumatic malalignment           Results from last 7 days   Lab Units 03/28/22  0617 03/27/22  1145   WBC Thousand/uL 7 23 11 04*   HEMOGLOBIN g/dL 11 1* 11 2*   HEMATOCRIT % 33 8* 34 0*   PLATELETS Thousands/uL 81* 100*   NEUTROS ABS Thousands/µL  --  9 59*         Results from last 7 days   Lab Units 03/28/22  0617 03/27/22  1145 03/24/22  0851   SODIUM mmol/L 136 134* 138   POTASSIUM mmol/L 4 2 3 3* 4 4   CHLORIDE mmol/L 101 98* 105   CO2 mmol/L 22 29 27   ANION GAP mmol/L 13 7 6   BUN mg/dL 44* 38* 35*   CREATININE mg/dL 3 02* 2 89* 2 87*   EGFR ml/min/1 73sq m 17 18 18   CALCIUM mg/dL 8 3 8 4 8 7   MAGNESIUM mg/dL 1 8  --   --    PHOSPHORUS mg/dL 4 3*  --   --      Results from last 7 days   Lab Units 03/28/22  0617 03/27/22  1145   AST U/L 515* 465*   ALT U/L 585* 503*   ALK PHOS U/L 54 63   TOTAL PROTEIN g/dL 4 8* 5 4*   ALBUMIN g/dL 2 0* 2 5*   TOTAL BILIRUBIN mg/dL 2 74* 2 46*         Results from last 7 days   Lab Units 03/28/22  0617 03/27/22  1145 03/24/22  0851   GLUCOSE RANDOM mg/dL 72 106 105     Results from last 7 days   Lab Units 03/27/22  1145   CK TOTAL U/L 135     Results from last 7 days   Lab Units 03/27/22  1622 03/27/22  1326 03/27/22  1145   HS TNI 0HR ng/L  --   --  68*   HS TNI 2HR ng/L  --  59*  --    HSTNI D2 ng/L  --  -9  --    HS TNI 4HR ng/L 50*  --   --    HSTNI D4 ng/L -18  --   --          Results from last 7 days   Lab Units 03/28/22  0617 03/27/22  1145 03/24/22  0851   PROTIME seconds 23 0* 22 2* 34 2*   INR  2 11* 2 01* 3 62*   PTT seconds  --  44*  --      Results from last 7 days   Lab Units 03/27/22  1145   TSH 3RD GENERATON uIU/mL 1 456     Results from last 7 days   Lab Units 03/27/22  1426   CLARITY UA  Cloudy   COLOR UA  Yellow   SPEC GRAV UA  1 015   PH UA  5 5   GLUCOSE UA mg/dl Negative   KETONES UA mg/dl Negative   BLOOD UA  Small*   PROTEIN UA mg/dl Negative   NITRITE UA  Negative   BILIRUBIN UA  Negative   UROBILINOGEN UA E U /dl 0 2   LEUKOCYTES UA  Negative   WBC UA /hpf 2-4   RBC UA /hpf None Seen   BACTERIA UA /hpf Innumerable*   EPITHELIAL CELLS WET PREP /hpf None Seen     ED Treatment:   Medication Administration from 03/27/2022 1136 to 03/27/2022 1511    Date/Time Order Dose Route Action   03/27/2022 1213 tetanus-diphtheria-acellular pertussis (BOOSTRIX) IM injection 0 5 mL 0 5 mL Intramuscular Given        Past Medical History:   Diagnosis Date    Anemia     Arthritis     Coronary artery disease     [facility]     Hyperlipidemia     Hypertension      Present on Admission:   Chronic kidney disease, stage 4 (severe) ([facility] )   Essential hypertension   Hyperlipidemia, mixed   Transaminitis   Chronic combined systolic and diastolic congestive heart failure (HCC)   PAF (paroxysmal atrial fibrillation) (HCC)   V-tach (HCC)    Admitting Diagnosis: Bradycardia [R00 1]  Chest pain [R07 9]  Hypotension [I95 9]  Transaminitis [R74 01]  Elevated troponin I level [R77 8]  Minor head injury, initial encounter [S09 90XA]  Age/Sex: 80 y o  male  Admission Orders:  Scheduled Medications:  carvedilol, 12 5 mg, Oral, BID With Meals  mexiletine, 150 mg, Oral, Q12H CORTEZ  nystatin, , Topical, BID  warfarin, 1 mg, Oral, Daily (warfarin)      Continuous IV Infusions:     PRN Meds:     Tele  Urinary retention monitoring  ICD interrogation  IP CONSULT TO CARDIOLOGY  IP CONSULT TO CASE MANAGEMENT  IP CONSULT TO GASTROENTEROLOGY    Network Utilization Review Department  ATTENTION: Please call with any questions or concerns to [phone] and carefully listen to the prompts so that you are directed to the right person  All voicemails are confidential   Serge Stephenson all requests for admission clinical reviews, approved or denied determinations and any other requests to dedicated fax number below belonging to the campus where the patient is receiving treatment   List of dedicated fax numbers for the Facilities:  [address] DENIALS (Administrative/Medical Necessity) [phone]   [address] (Maternity/NICU/Pediatrics) [phone] [address] [phone]   [address]  [address] [facility] [address] [place] [address] [address] [facility] [phone] [address] [address] Arabella [address] [phone]

## 2022-03-28 NOTE — ASSESSMENT & PLAN NOTE
80year old male presenting after suffering from a mechanical fall twice today  Denies any loss consciousness or presyncopal symptoms  Noted during documentation, that patient was bradycardic and hypotensive upon EMS arrival   See rest of the treatment plan below    - PT OT recommending rehab  - telemetry  - interrogation to assess Vtach events / defibrillation which may have caused his presentation

## 2022-03-28 NOTE — PHYSICAL THERAPY NOTE
PT EVALUATION 09:45-10:00    80 y o     [id number]    Bradycardia [R00 1]  Chest pain [R07 9]  Hypotension [I95 9]  Transaminitis [R74 01]  Elevated troponin I level [R77 8]  Minor head injury, initial encounter [S09 90XA]    Past Medical History:   Diagnosis Date    Anemia     Arthritis     Coronary artery disease     [facility]cere[facility]) [facility]     Hyperlipidemia     Hypertension          Past Surgical History:   Procedure Laterality Date    CARDIAC PACEMAKER PLACEMENT  05/09/2011    Medtronic dual chamber ICD implant    CORONARY ANGIOPLASTY  1989 03/28/22 0945   PT Last Visit   PT Visit Date 03/28/22   Note Type   Note type Evaluation   Pain Assessment   Pain Score No Pain   Restrictions/Precautions   Weight Bearing Precautions Per Order No   Other Precautions Cognitive; Chair Alarm; Bed Alarm;Telemetry; Fall Risk   Home Living   Type of Home Apartment   Home Layout Elevator; One level   Bathroom Shower/Tub Walk-in shower   Bathroom Toilet Standard   Bathroom Accessibility Accessible   Home Equipment Walker   Additional Comments Poor historian  Reports living with wife in apt  Denies driving  Prior Function   Level of [place] Independent with ADLs and functional mobility  (per patient )   Lives With Spouse   Receives Help From Family   ADL Assistance Independent   IADLs Needs assistance   Falls in the last 6 months >10  (Reports 20 falls)   Vocational Retired   Comments Reports independence without AD use prior to admission but notes access to RW if needed  General   Additional Pertinent History Pt is 81 y/o male admitted with fall  PT consulted  Family/Caregiver Present No   Cognition   Overall Cognitive Status Impaired   Arousal/Participation Responsive   Orientation Level Oriented to person;Oriented to place;Oriented to time  (+ month, date  (-) year)   Following Commands Follows one step commands with increased time or repetition   Comments Flat affect   Minimal verbalization  Subjective   Subjective " I want to go there"  pointing to bed  RUE Assessment   RUE Assessment X  (proximal limitations-? rotator cuff involvement?, griip 4-/5)   RUE Strength   R Elbow Extension 4-/5   R Elbow Flexion 4-/5   R Shoulder Flexion   (self assists to elevate, can maintain in flexion ( ? RTC ))   LUE Assessment   LUE Assessment WFL  (groslsy 4-/5)   RLE Assessment   RLE Assessment WFL  (grossly 4-/5)   LLE Assessment   LLE Assessment WFL  (grossly 4-/5)   Bed Mobility   Sit to Supine 4  Minimal assistance   Additional items Assist x 1; Increased time required;Verbal cues;LE management   Additional Comments Increased time to complete  Poor placement in bed with assist needed to reposition  Transfers   Sit to Stand 4  Minimal assistance   Additional items Assist x 1; Increased time required;Verbal cues   Stand to Sit 4  Minimal assistance   Additional items Assist x 1; Increased time required;Verbal cues  (cues for hand placement  )   Additional Comments Increased time to complete sit to stand transition  Increased time to steady  Ambulation/Elevation   Gait pattern Decreased foot clearance; Improper Weight shift; Wide FARRAH; Inconsistent ru; Foward flexed   Gait Assistance 3  Moderate assist  (moderate VS and guidance of RW  )   Additional items Assist x 1;Verbal cues; Tactile cues   Assistive Device Rolling walker   Distance 15'x1  Unsteady  Cues to remain inside FARRAH of RW, guidance for management upon approach to bed   + unsteady  Balance   Static Sitting Fair   Dynamic Sitting Fair -   Static Standing Poor +   Dynamic Standing Poor   Ambulatory Poor   Endurance Deficit   Endurance Deficit Yes   Endurance Deficit Description fatigue, weakness  Activity Tolerance   Activity Tolerance Patient limited by fatigue   Medical Staff Made Aware Kathy Cutler       Nurse Made Aware yes   Assessment   Prognosis Fair   Problem List Decreased strength;Decreased range of motion;Decreased endurance; Impaired balance;Decreased mobility; Decreased coordination;Decreased cognition; Impaired judgement;Decreased safety awareness; Impaired hearing   Assessment Wilmer Ross is a 80 y o  male with hx of chronic CHF, V-tach s/p ICD, CAD with remote hx of MI and angioplasty, PAF, HTN, etoh use and hyperlipidemia  Presents with fall  Noted to be hypotensive and bradycardic  + hypokalemia  Cardiology consult noted with elevated troponin likely nonischemic myocardial injury  PT consulted  Activity as tolerated orders  Prior to admission resides with spouse in apt with elevator access  Reports ambulation without AD, + hx of multiple falls-notes 20 falls but independent prior to admission  Currently presents with impairments in cognition, activity tolerance, impaired safety awareness and judgement, decreased balance, functional mobility, joint ROM, posture, coordination and locomotion  Gait with RW with wide stance, decreased foot clearance and step length, decreased fluency and coordination  Guidance for RW management needed and cues for safety  Increased risk for falls given impairments and will benefit from skilled PT in order to address impairments and optimize functional outcomes  The patient's AM-PAC Basic Mobility Inpatient Short Form Raw Score is 14  A Raw score of less than or equal to 16 suggests the patient may benefit from discharge to post-acute rehabilitation services  Please also refer to the recommendation of the Physical Therapist for safe discharge planning  At this time STR is recommended  Goals   Patient Goals go to bed  STG Expiration Date 04/07/22   Short Term Goal #1 10 days: 1)  Pt will perform bed mobility with Eduardo demonstrating appropriate technique 100% of the time in order to improve function  2)  Perform all transfers with Eduardo demonstrating safe and appropriate technique 100% of the time in order to improve ability to negotiate safely in home environment  3) Amb with least restrictive AD > 150'x1 with mod I in order to demonstrate ability to negotiate in home environment  4)  Improve overall strength and balance 1/2 grade in order to optimize ability to perform functional tasks and reduce fall risk  5) Increase activity tolerance to 30 minutes in order to improve endurance to functional tasks  6) PT for ongoing patient and family/caregiver education, DME needs and d/c planning in order to promote highest level of function in least restrictive environment  Plan   Treatment/Interventions Functional transfer training;LE strengthening/ROM; Therapeutic exercise; Endurance training;Patient/family training;Equipment eval/education; Bed mobility;Gait training; Compensatory technique education;Continued evaluation;Spoke to nursing   PT Frequency 3-5x/wk   Recommendation   PT Discharge Recommendation Post acute rehabilitation services   Equipment Recommended [facility] Recommended Wheeled walker  (reports has  )   AM-PAC Basic Mobility Inpatient   Turning in Bed Without Bedrails 3   Lying on Back to Sitting on Edge of Flat Bed 3   Moving Bed to Chair 2   Standing Up From Chair 2   Walk in Room 2   Climb 3-5 Stairs 2   Basic Mobility Inpatient Raw Score 14   Basic Mobility Standardized Score 35 55   Highest Level Of Mobility   JH-HL Goal 4: Move to chair/commode   JH-HLM Highest Level of Mobility 6: Walk 10 steps or more   JH-HLM Goal Achieved Yes   End of Consult   Patient Position at End of Consult Supine;Bed/Chair alarm activated; All needs within reach     Hx/personal factors: co-morbidities, advanced age, telemetry, use of AD, dec cognition, h/o of falls and fall risk  Examination: dec mobility, dec balance, dec endurance, dec amb, risk for falls, dec cognition, assessed body system, balance, endurance, amb, D/C disposition & fall risk, impairements in locomotion, musculoskeletal, balance, endurance, posture, coordination  Clinical: unpredictable (ongoing medical status, abnormal lab values, risk for falls and imaging test/result pending)  Complexity: high      Yelena Mclain, PT

## 2022-03-28 NOTE — ASSESSMENT & PLAN NOTE
Rate control: Carvedilol  AC: Warfarin 2mg S-S-T-Th Warfarin 1mg MWF  Will maintain him at 1mg daily for now given therapeutic levels  [facility] FREDDIE might need to be readdressed if physical therapy suspects compromised ability to maintain his balance / gait)      Recent Labs     03/27/22  1145 03/28/22  0617   INR 2 01* 2 11*

## 2022-03-28 NOTE — PROGRESS NOTES
[address] [place] [address]  Progress Note Edward Copeland 10/31/1932, 80 y o  male MRN: [id number]  Unit/Bed#: E4 -Amandeep Encounter: [id number]  Primary Care Provider: Andrew Kohler DO   Date and time admitted to hospital: 3/27/2022 11:36 AM    * Fall  Assessment & Plan  80year old male presenting after suffering from a mechanical fall twice today  Denies any loss consciousness or presyncopal symptoms  Noted during documentation, that patient was bradycardic and hypotensive upon EMS arrival   See rest of the treatment plan below  - PT OT recommending rehab  - telemetry  - interrogation to assess Vtach events / defibrillation which may have caused his presentation    Urinary retention  Assessment & Plan  S/p multiple straight cath  Now mejia catheter in place  - continue urinary retention protocol  - Urology consult    Chronic kidney disease, stage 4 (severe) [facility])  Assessment & Plan  Lab Results   Component Value Date    EGFR 17 03/28/2022    EGFR 18 03/27/2022    EGFR 18 03/24/2022    CREATININE 3 02 (H) 03/28/2022    CREATININE 2 89 (H) 03/27/2022    CREATININE 2 87 (H) 03/24/2022     Stable  Does not need acute kidney injury criteria at this time (1 5x baseline or >0 3 over 48 hours)  Baseline should be around 1 9-2 1  Goals of care, counseling/discussion  Assessment & Plan  DNR/dni    Transaminitis  Assessment & Plan  Multifactorial  Etiologies include congestive hepatopathy, ischemic injury related to hypotension, drug induced liver injury from amiodarone  - Appreciate GI recommendations  - Acute hepatitis panel: WNL  - Continue monitoring for now  Avoid hepatotoxic agents (Amiodarone)  Will defer to cardiology if Mexiletine will continue to remain necessary   Was informed by pharmacy that this too maybe hepatotoxic, but advised against rapid withdrawal     Hypokalemia  Assessment & Plan  Replete prn    Recent Labs     03/27/22  1145 03/28/22  0617   K 3 3* 4 2   MG  --  1 8 Chronic combined systolic and diastolic congestive heart failure (HCC)  Assessment & Plan  Wt Readings from Last 3 Encounters:   03/28/22 57 9 kg (127 lb 10 3 oz)   03/25/22 74 4 kg (164 lb)   03/17/22 58 5 kg (129 lb)     LVEF 12%, mild LV dilatation, diastolic dysfunction, regional wall motion abnormalities, paradoxical septal wall motion consistent with conduction abnormality, moderately reduced RV function, mild LA dilatation, mild MR/TR/HI w/ PASP 45-50 mmHg, mild aortic dilatation October 2021    Weight above baseline two days ago  Denies shortness of breath  Repeat daily weights here  - Cardiology following  - Continue Coreg  - Torsemide seems to have been discontinued during his last follow-up appointment  Will defer need for further diuresis to cardiology  Essential hypertension  Assessment & Plan  Continue coreg BID    PAF (paroxysmal atrial fibrillation) (HCC)  Assessment & Plan  Rate control: Carvedilol  AC: Warfarin 2mg S-S-T-Th Warfarin 1mg MWF  Will maintain him at 1mg daily for now given therapeutic levels  [facility] SCR[facility]EN might need to be readdressed if physical therapy suspects compromised ability to maintain his balance / gait)  Recent Labs     03/27/22  1145 03/28/22  0617   INR 2 01* 2 11*         V-tach [facility])  Assessment & Plan    Per cardiology note 3/25/2022: "His episodes of ventricular tachycardia requiring defibrillator shocks appear to be controlled on amiodarone and mexiletine "    Patient reports that he is not taking amiodarone, but is taking mexiletine  Hyperlipidemia, mixed  Assessment & Plan  Continue statin      VTE Pharmacologic Prophylaxis:   Pharmacologic: Warfarin (Coumadin)  Mechanical VTE Prophylaxis in Place: Yes    Discussions with Specialists or Other Care Team Provider: nursing    Education and Discussions with Family / Patient: patient, wife    Time Spent for Care: 30 minutes    More than 50% of total time spent on counseling and coordination of care as described above  Current Length of Stay: 1 day(s)    Current Patient Status: Inpatient   Certification Statement: The patient will continue to require additional inpatient hospital stay due to cardiology reevaluation, transaminitis, possible rehab    Discharge Plan: 24-48 hours    Code Status: Level 3 - DNAR and DNI      Subjective:   Patient seen and examined at bedside  Comfortable  Pt/ot recommending rehab  Updated his wife about his treatment plan  Objective:     Vitals:   Temp (24hrs), Av 7 °F (37 1 °C), Min:98 4 °F (36 9 °C), Max:99 2 °F (37 3 °C)    Temp:  [98 4 °F (36 9 °C)-99 2 °F (37 3 °C)] 98 8 °F (37 1 °C)  HR:  [60-82] 82  Resp:  [18-22] 18  BP: ()/(52-70) 107/67  SpO2:  [93 %-96 %] 95 %  Body mass index is 23 35 kg/m²  Input and Output Summary (last 24 hours): Intake/Output Summary (Last 24 hours) at 3/28/2022 1549  Last data filed at 3/28/2022 1425  Gross per 24 hour   Intake 480 ml   Output 1400 ml   Net -920 ml       Physical Exam:     Physical Exam  Vitals reviewed  Constitutional:       General: He is not in acute distress  HENT:      Head: Normocephalic  Nose: Nose normal       Mouth/Throat:      Mouth: Mucous membranes are moist    Eyes:      General: No scleral icterus  Cardiovascular:      Rate and Rhythm: Normal rate  Pulmonary:      Effort: Pulmonary effort is normal  No respiratory distress  Abdominal:      General: There is no distension  Palpations: Abdomen is soft  Tenderness: There is no abdominal tenderness  There is no rebound  Musculoskeletal:      Right lower leg: No edema  Left lower leg: No edema  Skin:     General: Skin is warm  Neurological:      Mental Status: He is alert  He is disoriented        Comments: Oriented to place, but not to date and time   Psychiatric:         Mood and Affect: Mood normal          Behavior: Behavior normal        dditional Data:     Labs:    Results from last 7 days   Lab Units 03/28/22  0617 03/27/22  1145 03/27/22  1145   WBC Thousand/uL 7 23   < > 11 04*   HEMOGLOBIN g/dL 11 1*   < > 11 2*   HEMATOCRIT % 33 8*   < > 34 0*   PLATELETS Thousands/uL 81*   < > 100*   NEUTROS PCT %  --   --  87*   LYMPHS PCT %  --   --  5*   MONOS PCT %  --   --  7   EOS PCT %  --   --  0    < > = values in this interval not displayed  Results from last 7 days   Lab Units 03/28/22  0617   SODIUM mmol/L 136   POTASSIUM mmol/L 4 2   CHLORIDE mmol/L 101   CO2 mmol/L 22   BUN mg/dL 44*   CREATININE mg/dL 3 02*   ANION GAP mmol/L 13   CALCIUM mg/dL 8 3   ALBUMIN g/dL 2 0*   TOTAL BILIRUBIN mg/dL 2 74*   ALK PHOS U/L 54   ALT U/L 585*   AST U/L 515*   GLUCOSE RANDOM mg/dL 72     Results from last 7 days   Lab Units 03/28/22  0617   INR  2 11*                       * I Have Reviewed All Lab Data Listed Above  * Additional Pertinent Lab Tests Reviewed: Gianfranco 66 Admission Reviewed    Imaging:    Imaging Reports Reviewed Today Include: no new imaging    Recent Cultures (last 7 days):           Last 24 Hours Medication List:   Current Facility-Administered Medications   Medication Dose Route Frequency Provider Last Rate    carvedilol  12 5 mg Oral BID With Meals Denae Fields MD      mexiletine  150 mg Oral Q12H Michel Cheung MD      nystatin   Topical BID Denae Fields MD      warfarin  1 mg Oral Daily (warfarin) Denae Fields MD          Today, Patient Was Seen By: La Oleary MD    ** Please Note: Dictation voice to text software may have been used in the creation of this document   **

## 2022-03-28 NOTE — UTILIZATION REVIEW
Inpatient Admission Authorization Request   NOTIFICATION OF INPATIENT ADMISSION/INPATIENT AUTHORIZATION REQUEST   SERVICING FACILITY:   [address] [place] [address], [facility], [facility]  Tax ID: [id number]  NPI: [id number]  Place of Service: Inpatient [address] [facility]  [address]  Place of Service Code: 24     ATTENDING PROVIDER:  Attending Name and NPI#: Isabella Nazario Md [[id number]]  Address: [address] [place] [address], [facility], [facility]  Phone: [phone]     UTILIZATION REVIEW CONTACT:  Georgina Hays Utilization   Network Utilization Review Department  Phone: [phone]  Fax: [phone]  Email: [email or web address]     PHYSICIAN ADVISORY SERVICES:  FOR [id number] REVIEW - MEDICAL NECESSITY DENIAL  Phone: [phone]  Fax: [phone]  Email: [email or web address]     TYPE OF REQUEST:  Inpatient Status     ADMISSION INFORMATION:  ADMISSION DATE/TIME: 3/27/22  1:08 PM  PATIENT DIAGNOSIS CODE/DESCRIPTION:  Bradycardia [R00 1]  Chest pain [R07 9]  Hypotension [I95 9]  Transaminitis [R74 01]  Elevated troponin I level [R77 8]  Minor head injury, initial encounter [S09 90XA]  DISCHARGE DATE/TIME: No discharge date for patient encounter  IMPORTANT INFORMATION:  Please contact the Georgina Hays directly with any questions or concerns regarding this request  Department voicemails are confidential     Send requests for admission clinical reviews, concurrent reviews, approvals, and administrative denials due to lack of clinical to fax [phone]

## 2022-03-28 NOTE — PROGRESS NOTES
Cardiology Progress Note   MD Emily Chowdary MD Romona Postin, DO, MD Sander Oliver DO, Ariadne Rushing DO, [facility] [place]  ----------------------------------------------------------------  [address], [address] [place] [address] 80 y o  male MRN: [id number]  Unit/Bed#: E4 -01 Encounter: [id number]      ASSESSMENT:   · Fall  · Precordial chest pain   · Elevated troponin likely nonischemic myocardial injury  · Transaminitis  · Chronic combined systolic and diastolic heart failure (dry weight 59 5 kg)  ? History of Ventricular tachycardia s/p Med[employer] dual-chamber ICD  · CAD w/ remote history of MI and angioplasty, 1989  · Paroxysmal atrial fibrillation on warfarin  · Hypertension  · Dyslipidemia  · LVEF 12%, mild LV dilatation, diastolic dysfunction, regional wall motion abnormalities, paradoxical septal wall motion consistent with conduction abnormality, moderately reduced RV function, mild LA dilatation, mild MR/TR/AL w/ PASP 45-50 mmHg, mild aortic dilatation October 2021  · Acute kidney injury on CKD stage IIIB  · History of embolic CVA  · History of Alcohol use      PLAN:  Creatinine continues to trend upward  There are no no episodes ventricular or atrial arrhythmias noted on interrogation  Troponin continues to trend downward likely related to acute renal dysfunction with baseline cardiomyopathy  Restarted on oral diuretics when renal function has improved  Amiodarone held due to transaminitis  Continue mexiletine and carvedilol  Restart amiodarone when transaminitis completely resolved  Continue warfarin with goal INR of 2-3;  Management of warfarin as per hospitalist service  Eventual restart torsemide at discharge either daily dosing or every Monday Wednesday Friday  No further inpatient CV testing  Outpatient follow-up with primary cardiologist following discharge  Will see further inpt PRN    Signed: Tamanna Onofre DO, FACC, FACP      History of Present Illness:  Patient seen and examined  Denies chest pain, pressure, tightness or squeezing  Denies lightheadedness, dizziness or palpitations  Denies lower extremity swelling, orthopnea or paroxysmal nocturnal dyspnea  Review of Systems:  Review of Systems   Constitutional: Negative for decreased appetite, fever, weight gain and weight loss  HENT: Negative for congestion and sore throat  Eyes: Negative for visual disturbance  Cardiovascular: Negative for chest pain, dyspnea on exertion, leg swelling, near-syncope and palpitations  Respiratory: Negative for cough and shortness of breath  Hematologic/Lymphatic: Negative for bleeding problem  Skin: Negative for rash  Musculoskeletal: Negative for myalgias and neck pain  Gastrointestinal: Negative for abdominal pain and nausea  Neurological: Negative for light-headedness and weakness  Psychiatric/Behavioral: Negative for depression  No Known Allergies    No current facility-administered medications on file prior to encounter       Current Outpatient Medications on File Prior to Encounter   Medication Sig    amiodarone 200 mg tablet Take 1 tablet (200 mg total) by mouth daily    atorvastatin (LIPITOR) 40 mg tablet Take 1 tablet (40 mg total) by mouth daily    carvedilol (COREG) 12 5 mg tablet Take 1 tablet (12 5 mg total) by mouth 2 (two) times a day with meals    Cholecalciferol (VITAMIN D3) 50 MCG (2000 UT) capsule Take 2,000 Units by mouth daily      Glucosamine Sulfate 1000 MG CAPS 1 capsule Every 12 hours (Patient not taking: Reported on 3/25/2022 )    magnesium oxide (MAG-OX) 400 mg Take 1 tablet (400 mg total) by mouth daily    mexiletine (MEXITIL) 150 mg capsule Take 1 capsule (150 mg total) by mouth 2 (two) times a day    potassium chloride (K-DUR,KLOR-CON) 10 mEq tablet Take 1 tablet (10 mEq total) by mouth daily    warfarin (COUMADIN) 2 mg tablet Take 1 tablet daily or as directed by physician        Current Facility-Administered Medications   Medication Dose Route Frequency Provider Last Rate    carvedilol  12 5 mg Oral BID With Meals Rosio Sanchez MD      mexiletine  150 mg Oral Q12H [facility] Rosio Sanchez MD      nystatin   Topical BID Rosio Sanchez MD      warfarin  1 mg Oral Daily (warfarin) Rosio Sanchez MD              Vitals:    03/28/22 0409 03/28/22 0725 03/28/22 1100 03/28/22 1500   BP: 91/70 106/63 91/54 107/67   BP Location: Right leg Left arm Left arm Left arm   Pulse: 62 62 70 82   Resp: 20 20 20 18   Temp:  99 2 °F (37 3 °C) 98 4 °F (36 9 °C) 98 8 °F (37 1 °C)   TempSrc:  Temporal Temporal Temporal   SpO2: 96% 95% 96% 95%   Weight: 57 9 kg (127 lb 10 3 oz)      Height:         Body mass index is 23 35 kg/m²  Intake/Output Summary (Last 24 hours) at 3/28/2022 1851  Last data filed at 3/28/2022 1813  Gross per 24 hour   Intake 720 ml   Output 400 ml   Net 320 ml       Weight change:     PHYSICAL EXAMINATION:  Gen: Awake, Alert, NAD  Head/eyes:  Left forehead laceration and swelling, pupils equal and round, Anicteric  ENT: mmm  Neck: Supple, No elevated JVP, trachea midline  Resp: CTA bilaterally no w/r/r  CV: RRR +S1, S2, No m/r/g  Abd: Soft, NT/ND + BS  Ext: no LE edema bilaterally  Neuro:  Follows commands, moves all extermities  Psych: Appropriate affect, happy mood, pleasant attitude, non-combative  Skin: warm; no rash, erythema or venous stasis changes on exposed skin    Lab Results:  Results from last 7 days   Lab Units 03/28/22  0617   WBC Thousand/uL 7 23   HEMOGLOBIN g/dL 11 1*   HEMATOCRIT % 33 8*   PLATELETS Thousands/uL 81*     Results from last 7 days   Lab Units 03/28/22  0617   POTASSIUM mmol/L 4 2   CHLORIDE mmol/L 101   CO2 mmol/L 22   BUN mg/dL 44*   CREATININE mg/dL 3 02*   CALCIUM mg/dL 8 3   ALK PHOS U/L 54   ALT U/L 585*   AST U/L 515*     No results found for: TROPONINT      Results from last 7 days   Lab Units 03/27/22  1145   CK TOTAL U/L 135 Results from last 7 days   Lab Units 03/28/22  0617   INR  2 11*       Tele:  AV paced rhythm    This note was completed in part utilizing M-Modal Fluency Direct Software  Grammatical errors, random word insertions, spelling mistakes, and incomplete sentences may be an occasional consequence of this system secondary to software limitations, ambient noise, and hardware issues  If you have any questions or concerns about the content, text, or information contained within the body of this dictation, please contact the provider for clarification

## 2022-03-28 NOTE — TELEPHONE ENCOUNTER
3/28 - Called @653 - spoke to pt wife - pt is in the hospital she will call when he gets out to schedule

## 2022-03-29 PROBLEM — R77.8 ELEVATED TROPONIN: Status: ACTIVE | Noted: 2022-01-01

## 2022-03-29 PROBLEM — R91.1 LUNG NODULE: Status: ACTIVE | Noted: 2022-01-01

## 2022-03-29 PROBLEM — R41.0 CONFUSION: Status: ACTIVE | Noted: 2022-01-01

## 2022-03-29 NOTE — PLAN OF CARE
Problem: Potential for Falls  Goal: Patient will remain free of falls  Description: INTERVENTIONS:  - Educate patient/family on patient safety including physical limitations  - Instruct patient to call for assistance with activity   - Consult OT/PT to assist with strengthening/mobility   - Keep Call bell within reach  - Keep bed low and locked with side rails adjusted as appropriate  - Keep care items and personal belongings within reach  - Initiate and maintain comfort rounds  - Make Fall Risk Sign visible to staff  - Offer Toileting every 2 Hours, in advance of need  - Initiate/Maintain bed and chair alarm  - Obtain necessary fall risk management equipment: bed and chair alarm  - Apply yellow socks and bracelet for high fall risk patients  - Consider moving patient to room near nurses station  Outcome: Progressing     Problem: MOBILITY - ADULT  Goal: Maintain or return to baseline ADL function  Description: INTERVENTIONS:  -  Assess patient's ability to carry out ADLs; assess patient's baseline for ADL function and identify physical deficits which impact ability to perform ADLs (bathing, care of mouth/teeth, toileting, grooming, dressing, etc )  - Assess/evaluate cause of self-care deficits   - Assess range of motion  - Assess patient's mobility; develop plan if impaired  - Assess patient's need for assistive devices and provide as appropriate  - Encourage maximum independence but intervene and supervise when necessary  - Involve family in performance of ADLs  - Assess for home care needs following discharge   - Consider OT consult to assist with ADL evaluation and planning for discharge  - Provide patient education as appropriate  Outcome: Progressing  Goal: Maintains/Returns to pre admission functional level  Description: INTERVENTIONS:  - Perform BMAT or MOVE assessment daily    - Set and communicate daily mobility goal to care team and patient/family/caregiver     - Collaborate with rehabilitation services on mobility goals if consulted  - Perform Range of Motion 3 times a day  - Reposition patient every 2 hours    - Dangle patient 3 times a day  - Stand patient 3 times a day  - Ambulate patient 3 times a day  - Out of bed to chair 3 times a day   - Out of bed for meals 3 times a day  - Out of bed for toileting  - Record patient progress and toleration of activity level   Outcome: Progressing     Problem: CARDIOVASCULAR - ADULT  Goal: Maintains optimal cardiac output and hemodynamic stability  Description: INTERVENTIONS:  - Monitor I/O, vital signs and rhythm  - Monitor for S/S and trends of decreased cardiac output  - Administer and titrate ordered vasoactive medications to optimize hemodynamic stability  - Assess quality of pulses, skin color and temperature  - Assess for signs of decreased coronary artery perfusion  - Instruct patient to report change in severity of symptoms  Outcome: Progressing  Goal: Absence of cardiac dysrhythmias or at baseline rhythm  Description: INTERVENTIONS:  - Continuous cardiac monitoring, vital signs, obtain 12 lead EKG if ordered  - Administer antiarrhythmic and heart rate control medications as ordered  - Monitor electrolytes and administer replacement therapy as ordered  Outcome: Progressing     Problem: GENITOURINARY - ADULT  Goal: Maintains or returns to baseline urinary function  Description: INTERVENTIONS:  - Assess urinary function  - Encourage oral fluids to ensure adequate hydration if ordered  - Administer IV fluids as ordered to ensure adequate hydration  - Administer ordered medications as needed  - Offer frequent toileting  - Follow urinary retention protocol if ordered  Outcome: Progressing  Goal: Absence of urinary retention  Description: INTERVENTIONS:  - Assess patients ability to void and empty bladder  - Monitor I/O  - Bladder scan as needed  - Discuss with physician/AP medications to alleviate retention as needed  - Discuss catheterization for long term situations as appropriate  Outcome: Progressing     Problem: SKIN/TISSUE INTEGRITY - ADULT  Goal: Incision(s), wounds(s) or drain site(s) healing without S/S of infection  Description: INTERVENTIONS  - Assess and document dressing, incision, wound bed, drain sites and surrounding tissue  - Provide patient and family education  - Perform skin care/dressing changes every shift  Outcome: Progressing  Goal: Pressure injury heals and does not worsen  Description: Interventions:  - Implement low air loss mattress or specialty surface (Criteria met)  - Apply silicone foam dressing  - Instruct/assist with weight shifting every 60 minutes when in chair   - Limit chair time to 2 hour intervals  - Use special pressure reducing interventions such as waffle cushion when in chair   - Apply fecal or urinary incontinence containment device   - Perform passive or active ROM every 2 hours  - Turn and reposition patient & offload bony prominences every 2 hours   - Utilize friction reducing device or surface for transfers   - Consider consults to  interdisciplinary teams such as wound consult  - Consider nutrition services referral as needed  Outcome: Progressing     Problem: MUSCULOSKELETAL - ADULT  Goal: Maintain or return mobility to safest level of function  Description: INTERVENTIONS:  - Assess patient's ability to carry out ADLs; assess patient's baseline for ADL function and identify physical deficits which impact ability to perform ADLs (bathing, care of mouth/teeth, toileting, grooming, dressing, etc )  - Assess/evaluate cause of self-care deficits   - Assess range of motion  - Assess patient's mobility  - Assess patient's need for assistive devices and provide as appropriate  - Encourage maximum independence but intervene and supervise when necessary  - Involve family in performance of ADLs  - Assess for home care needs following discharge   - Consider OT consult to assist with ADL evaluation and planning for discharge  - Provide patient education as appropriate  Outcome: Progressing  Goal: Maintain proper alignment of affected body part  Description: INTERVENTIONS:  - Support, maintain and protect limb and body alignment  - Provide patient/ family with appropriate education  Outcome: Progressing     Problem: PAIN - ADULT  Goal: Verbalizes/displays adequate comfort level or baseline comfort level  Description: Interventions:  - Encourage patient to monitor pain and request assistance  - Assess pain using appropriate pain scale  - Administer analgesics based on type and severity of pain and evaluate response  - Implement non-pharmacological measures as appropriate and evaluate response  - Consider cultural and social influences on pain and pain management  - Notify physician/advanced practitioner if interventions unsuccessful or patient reports new pain  Outcome: Progressing     Problem: INFECTION - ADULT  Goal: Absence or prevention of progression during hospitalization  Description: INTERVENTIONS:  - Assess and monitor for signs and symptoms of infection  - Monitor lab/diagnostic results  - Monitor all insertion sites, i e  indwelling lines, tubes, and drains  - Monitor endotracheal if appropriate and nasal secretions for changes in amount and color  - Vanleer appropriate cooling/warming therapies per order  - Administer medications as ordered  - Instruct and encourage patient and family to use good hand hygiene technique  - Identify and instruct in appropriate isolation precautions for identified infection/condition  Outcome: Progressing     Problem: DISCHARGE PLANNING  Goal: Discharge to home or other facility with appropriate resources  Description: INTERVENTIONS:  - Identify barriers to discharge w/patient and caregiver  - Arrange for needed discharge resources and transportation as appropriate  - Identify discharge learning needs (meds, wound care, etc )  - Arrange for interpretive services to assist at discharge as needed  - Refer to Case Management Department for coordinating discharge planning if the patient needs post-hospital services based on physician/advanced practitioner order or complex needs related to functional status, cognitive ability, or social support system  Outcome: Progressing     Problem: Knowledge Deficit  Goal: Patient/family/caregiver demonstrates understanding of disease process, treatment plan, medications, and discharge instructions  Description: Complete learning assessment and assess knowledge base    Interventions:  - Provide teaching at level of understanding  - Provide teaching via preferred learning methods  Outcome: Progressing     Problem: Prexisting or High Potential for Compromised Skin Integrity  Goal: Skin integrity is maintained or improved  Description: INTERVENTIONS:  - Identify patients at risk for skin breakdown  - Assess and monitor skin integrity  - Assess and monitor nutrition and hydration status  - Monitor labs   - Assess for incontinence   - Turn and reposition patient  - Assist with mobility/ambulation  - Relieve pressure over bony prominences  - Avoid friction and shearing  - Provide appropriate hygiene as needed including keeping skin clean and dry  - Evaluate need for skin moisturizer/barrier cream  - Collaborate with interdisciplinary team   - Patient/family teaching  - Consider wound care consult   Outcome: Progressing     Problem: SAFETY,RESTRAINT: NV/NON-SELF DESTRUCTIVE BEHAVIOR  Goal: Remains free of harm/injury (restraint for non violent/non self-detsructive behavior)  Description: INTERVENTIONS:  - Instruct patient/family regarding restraint use   - Assess and monitor physiologic and psychological status   - Provide interventions and comfort measures to meet assessed patient needs   - Identify and implement measures to help patient regain control  - Assess readiness for release of restraint   Outcome: Progressing  Goal: Returns to optimal restraint-free functioning  Description: INTERVENTIONS:  - Assess the patient's behavior and symptoms that indicate continued need for restraint  - Identify and implement measures to help patient regain control  - Assess readiness for release of restraint   Outcome: Progressing

## 2022-03-29 NOTE — ASSESSMENT & PLAN NOTE
Rate control: Carvedilol  · AC: Warfarin 2mg S-S-T-Th Warfarin 1mg MWF    Was changed to 1 mg daily here given therapeutic levels however now slightly subtherapeutic, will adjust dose back to home regimen as of 3/29  · Monitor INR  · Consider eventual discussion regarding risks vs benefits if continues to fall    Recent Labs     03/27/22  1145 03/28/22  0617 03/29/22  0554   INR 2 01* 2 11* 1 75*

## 2022-03-29 NOTE — CONSULTS
Consultation - Nephrology   Gonzalo Evans 80 y o  male MRN: [id number]  Unit/Bed#: E4 -01 Encounter: [id number]    ASSESSMENT/PLAN:  Acute kidney injury (POA) on CKD stage IIIB:  Suspected post renal etiology with bilateral hydroureteronephrosis, possible prerenal component with episodes of hypotension and bradycardia with possible progression to ATN  -baseline creatinine 1 1-1 6, more recently up to 1 9   -presents with creatinine of 2 89   -increasing creatinine today to 3 68   -follows with Dr Sesar Smith    -continue Damico catheter   -no indication for diuretics  -consider small amount of IVF  Will discuss with Dr Sesar Smith  -UA with small blood, negative for protein, innumerable bacteria  -CPK levels normal   -recommend avoiding nephrotoxins, hypotension, IV contrast   -I/O  Acute urinary retention/bilateral hydroureteronephrosis:  Status post Damico catheter placement  History of BPH  -urology team following  Difficulty with Damico insertion   -consider additional of flomax  -CT scan showed massive bladder distention causing bilateral hydroureteronephrosis  -monitoring for postobstructive diuresis  Hypertension:  Blood pressure with periods in the 90s, overall stable and acceptable  -currently on Coreg 12 5 mg b i d   -consider decreasing Coreg to 6 25 mg b i d  If blood pressure remains low  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%  History of ICD  -cardiology team following   -not on OP diuretics  Previously on Torsemide 5 mg po daily  Stopped by OP cardiology on 03/25/2022   -cautious with IVF due to low EF    -will continue to check daily weights, monitor I/O, placed on fluid restriction  Mechanical fall:  Denies loss of consciousness or hitting head  May need rehab at discharge  Further management per primary care team   CPK levels normal     Transaminitis:  -GI team following  Acute hepatitis panel negative  Suspected secondary to amiodarone  Also on mexiletine  Trending LFTs  Elevated total bili  Other:  Lung nodule, atrial fibrillation on Coumadin, CAD, hyperlipidemia, CVA  HISTORY OF PRESENT ILLNESS:  Requesting Physician: Winston Dwyer MD  Reason for Consult: CHRISTY (POA) on CKD IIIB    Gertrude Carey is a 80y o  year old male with history of CKD stage IIIB, hypertension, combined CHF, CVA, atrial fibrillation, alcohol use, and multiple other comorbidities, who was admitted to [address] after presenting with mechanical fall  The patient denies feeling dizzy or lightheaded  He denies loss of consciousness  He denies any his head  He reports losing his balance and falling  He was noted to be hypotensive and bradycardic in the ambulance  A renal consultation is requested today for assistance in the management of acute kidney injury      PAST MEDICAL HISTORY:  Past Medical History:   Diagnosis Date    Anemia     Arthritis     Coronary artery disease     CVA (cerebral vascular accident) ([facility] [address] )     Hyperlipidemia     Hypertension        PAST SURGICAL HISTORY:  Past Surgical History:   Procedure Laterality Date    CARDIAC PACEMAKER PLACEMENT  05/09/2011    [employer]tronic dual chamber ICD implant    CORONARY ANGIOPLASTY  1989       ALLERGIES:  No Known Allergies    SOCIAL HISTORY:  Social History     Substance and Sexual Activity   Alcohol Use Yes    Alcohol/week: 3 0 standard drinks    Types: 3 Shots of liquor per week    Comment: daily     Social History     Substance and Sexual Activity   Drug Use Never     Social History     Tobacco Use   Smoking Status Never Smoker   Smokeless Tobacco Never Used       FAMILY HISTORY:  Family History   Problem Relation Age of Onset    Coronary artery disease Mother     Heart attack Father         MI    Stroke Sister        MEDICATIONS:    Current Facility-Administered Medications:     carvedilol (COREG) tablet 12 5 mg, 12 5 mg, Oral, BID With Meals, Rick Ayon MD, 12 5 mg at 03/29/22 0938    mexiletine (MEXITIL) capsule 150 mg, 150 mg, Oral, Q12H [facility], Faina Crandall MD, 150 mg at 03/29/22 3900    nystatin (MYCOSTATIN) powder, , Topical, BID, Faina Crandall MD, Given at 03/29/22 0947    [START ON 3/30/2022] warfarin (COUMADIN) tablet 1 mg, 1 mg, Oral, Once per day on Mon Wed Fri, Santa Guardado PA-C    warfarin (COUMADIN) tablet 2 mg, 2 mg, Oral, Once per day on Sun Tue Thu Sat, Santa Guardado PA-C    REVIEW OF SYSTEMS:  A complete review of systems was performed and found to be negative unless otherwise noted in the history of present illness  General: No fevers, chills  Cardiovascular:  - chest pain, - leg edema  Respiratory: No cough, sputum production,  - shortness of breath  Gastrointestinal:  - nausea/vomiting,  - diarrhea,  - abdominal pain  Genitourinary: No hematuria  No foamy urine  No dysuria    PHYSICAL EXAM:  Current Weight: Weight - Scale: 59 3 kg (130 lb 11 7 oz)  First Weight: Weight - Scale: 74 4 kg (164 lb 0 4 oz)  Vitals:    03/28/22 2318 03/29/22 0309 03/29/22 0600 03/29/22 0723   BP: 97/57 120/65  113/58   BP Location: Left arm Left arm  Left arm   Pulse: 58 82  63   Resp: 18 (!) 24  16   Temp: 99 1 °F (37 3 °C) 97 6 °F (36 4 °C)  97 6 °F (36 4 °C)   TempSrc: Temporal Temporal  Temporal   SpO2: 100% 94%  90%   Weight:   59 3 kg (130 lb 11 7 oz)    Height:           Intake/Output Summary (Last 24 hours) at 3/29/2022 1000  Last data filed at 3/28/2022 1813  Gross per 24 hour   Intake 480 ml   Output 400 ml   Net 80 ml     General: NAD  Skin: warm, dry, intact, no rash  HEENT: Moist mucous membranes, sclera anicteric, normocephalic, atraumatic  Neck: No apparent JVD appreciated  Chest:lung sounds clear B/L, on RA   CVS:Regular rate and rhythm, no murmer   Abdomen: Soft, round, non-tender, +BS, mejia with yellow urine   Extremities: No B/L LE edema present  Neuro: alert and oriented x 2, forgetful  Psych: appropriate mood and affect     Invasive Devices:   Urethral Catheter 16 Fr   (Active)   Reasons to continue Urinary Catheter  Acute urinary retention/obstruction failing urinary retention protocol 03/28/22 1105   Goal for Removal Will consult urology 03/28/22 1105   Site Assessment Clean;Skin intact 03/28/22 1105   Collection Container Standard drainage bag 03/28/22 1105   Securement Method Securing device (Describe) 03/28/22 1105   Output (mL) 400 mL 03/28/22 1425     Lab Results:   Results from last 7 days   Lab Units 03/29/22  0554 03/28/22  0617 03/27/22  1145   WBC Thousand/uL  --  7 23 11 04*   HEMOGLOBIN g/dL  --  11 1* 11 2*   HEMATOCRIT %  --  33 8* 34 0*   PLATELETS Thousands/uL  --  81* 100*   SODIUM mmol/L 135* 136 134*   POTASSIUM mmol/L 4 4 4 2 3 3*   CHLORIDE mmol/L 100 101 98*   CO2 mmol/L 21 22 29   BUN mg/dL 57* 44* 38*   CREATININE mg/dL 3 68* 3 02* 2 89*   CALCIUM mg/dL 8 0* 8 3 8 4   MAGNESIUM mg/dL 1 8 1 8  --    PHOSPHORUS mg/dL  --  4 3*  --    ALK PHOS U/L 53 54 63   ALT U/L 496* 585* 503*   AST U/L 320* 515* 465*

## 2022-03-29 NOTE — CONSULTS
Consult - Urology   Denis [facility] 10/31/1932, 80 y o  male MRN: [id number]    Unit/Bed#: E4 -01 Encounter: [id number]    Urinary retention  Assessment & Plan  · CT on 03/27/2022 with massive bladder distension and associated bilateral hydronephrosis  · UA with innumerable bacteria but negative for blood, nitrites, or leukocytes  Low suspicion of acute cystitis  · Indwelling Damico catheter placed on 03/28 following multiple straight catheterizations, two with 1L output  · Renal ultrasound ordered for tomorrow morning for re-evaluation of bilateral hydronephrosis 48 hours following Damico insertion  · Will follow peripherally for US results  · Given massive bladder distention and large volumes returned with Damico insertions, would recommend maintaining Damico catheter x2 weeks  · Do not recommend alpha blockade at this time given patient's age and increased fall risk  · Will arrange outpatient void trial and office visit to establish care for his prostatomegaly also evident on CT  Subjective:   Patient is an 80-year-old male with history of AFib managed on Coumadin, ventricular tachycardia s/p Medtronic dual-chamber ICD, CHF, CAD with remote history of myocardial infarction and angioplasty, HTN and HLD who presented to the ED on 03/27 following mechanical fall  Urology was consulted after patient failed urinary retention protocol  Patient was straight catheterized for 1 L twice  CT on 03/27 did reveal massive bladder dilation resulting in bilateral hydronephrosis as well as evidence of prostatomegaly  Patient is pleasantly confused during encounter this morning  He states he has never had issues with urinary retention or difficulty voiding in the past   He is unsure if he had urinary symptoms prior to Damico catheter insertion  He denies any abdominal or flank pain  Review of Systems   Constitutional: Negative for chills and fever  HENT: Negative  Respiratory: Negative  Cardiovascular: Negative  Gastrointestinal: Negative for abdominal pain, nausea and vomiting  Genitourinary: Negative for difficulty urinating (per patient), dysuria, flank pain, frequency, hematuria and urgency  Musculoskeletal: Negative  Skin: Negative  Neurological: Negative  Objective:  Vitals: Blood pressure 109/57, pulse 83, temperature 98 °F (36 7 °C), temperature source Temporal, resp  rate 18, height 5' 2" (1 575 m), weight 59 3 kg (130 lb 11 7 oz), SpO2 92 %  ,Body mass index is 23 91 kg/m²  Physical Exam  Vitals reviewed  Constitutional:       General: He is not in acute distress  Appearance: He is normal weight  He is not toxic-appearing  Comments: Nursing at bedside with patient this morning   HENT:      Head: Normocephalic and atraumatic  Eyes:      Conjunctiva/sclera: Conjunctivae normal    Cardiovascular:      Rate and Rhythm: Normal rate and regular rhythm  Heart sounds: Normal heart sounds  Pulmonary:      Effort: Pulmonary effort is normal  No respiratory distress  Breath sounds: Normal breath sounds  Abdominal:      General: There is no distension  Palpations: Abdomen is soft  Tenderness: There is no abdominal tenderness  There is no right CVA tenderness, left CVA tenderness, guarding or rebound  Genitourinary:     Comments: Damico catheter patent draining clear yellow urine  Musculoskeletal:      Cervical back: Normal range of motion  Comments: Bilateral upper extremity restraints in place  Neurological:      Mental Status: He is alert  Comments: Pleasantly confused during encounter this morning  Psychiatric:      Comments: Mild agitation during this morning's encounter         Imaging:  3/27/22 CT CHEST, ABDOMEN AND PELVIS WITHOUT IV CONTRAST     INDICATION:   Transaminitis      COMPARISON:  CT chest abdomen pelvis 1/19/2022      TECHNIQUE: CT examination of the chest, abdomen and pelvis was performed without intravenous contrast   Axial, sagittal, and coronal 2D reformatted images were created from the source data and submitted for interpretation      Radiation dose length product (DLP) for this visit:  542 mGy-cm   This examination, like all CT scans performed in the [facility], was performed utilizing techniques to minimize radiation dose exposure, including the use of iterative   reconstruction and automated exposure control       Enteric contrast was administered       FINDINGS:     CHEST     LUNGS:  Bilateral dependent hypoventilatory changes  New 4 mm right lower lobe nodule #3/57     PLEURA:  Trace pleural effusions      HEART/GREAT VESSELS: Cardiomegaly  Unchanged ascending aortic ectasia measuring 42 mm  Coronary artery calcifications  No pericardial effusion      MEDIASTINUM AND GUILLERMO:  Scattered calcified mediastinal lymph nodes      CHEST WALL AND LOWER NECK:   Left anterior chest wall dual-lead cardiac device with intact appearing leads      ABDOMEN     LIVER/BILIARY TREE:  Unremarkable      GALLBLADDER:  No calcified gallstones  No pericholecystic inflammatory change      SPLEEN:  Scattered splenic granulomas      PANCREAS:  Unremarkable      ADRENAL GLANDS:  Unremarkable      KIDNEYS/URETERS:  Mild bilateral hydroureteronephrosis without obstructive calculi on the basis of bladder distention      STOMACH AND BOWEL:  Unremarkable      APPENDIX:  No findings to suggest appendicitis      ABDOMINOPELVIC CAVITY:  No ascites  No pneumoperitoneum    No lymphadenopathy      VESSELS:  Unremarkable for patient's age      PELVIS     REPRODUCTIVE ORGANS:  Prostamegaly      URINARY BLADDER:  Marked bladder distention increased from the prior study      ABDOMINAL WALL/INGUINAL REGIONS:  Unremarkable      OSSEOUS STRUCTURES:  No acute fracture or destructive osseous lesion      IMPRESSION:     Massive bladder distention causing mild bilateral hydroureteronephrosis      Prostamegaly      No acute findings in the chest      New 4 mm right lower lobe nodule  Follow-up with repeat CT chest in one year if the patient is considered high risk for lung cancer        Labs:  Recent Labs     03/27/22  1145 03/28/22  0617 03/29/22  0554   WBC 11 04* 7 23 6 88     Recent Labs     03/27/22  1145 03/28/22  0617 03/29/22  0554   HGB 11 2* 11 1* 11 4*       Recent Labs     03/27/22  1145 03/28/22  0617 03/29/22  0554   CREATININE 2 89* 3 02* 3 68*       Microbiology:  Component      Latest Ref Rng & Units 3/27/2022   Color, UA       Yellow   Clarity, UA       Cloudy   pH, UA      4 5 - 8 0 5 5   Leukocytes, UA      Negative Negative   Nitrite, UA      Negative Negative   POCT URINE PROTEIN      Negative mg/dl Negative   Glucose, UA      Negative mg/dl Negative   Ketones, UA      Negative mg/dl Negative   SL AMB POCT UROBILINOGEN      0 2, 1 0 E U /dl E U /dl 0 2   Bilirubin, UA      Negative Negative   Blood, UA      Negative Small (A)   SL AMB SPECIFIC GRAVITY-URINE      1 003 - 1 030 1 015     Component      Latest Ref Rng & Units 3/27/2022   RBC, UA      None Seen, 2-4 /hpf None Seen   WBC, UA      None Seen, 2-4, 5-60 /hpf 2-4   Epithelial Cells      None Seen, Occasional /hpf None Seen   Bacteria, UA      None Seen, Occasional /hpf Innumerable (A)     History:  Social History     Socioeconomic History    Marital status: /Civil Union     Spouse name: None    Number of children: None    Years of education: None    Highest education level: None   Occupational History    None   Tobacco Use    Smoking status: Never Smoker    Smokeless tobacco: Never Used   Vaping Use    Vaping Use: Never used   Substance and Sexual Activity    Alcohol use:  Yes     Alcohol/week: 3 0 standard drinks     Types: 3 Shots of liquor per week     Comment: daily    Drug use: Never    Sexual activity: None   Other Topics Concern    None   Social History Narrative    None     Social Determinants of Health     Financial Resource Strain: Not on file Food Insecurity: No Food Insecurity    Worried About Running Out of Food in the Last Year: Never true    Leonie of Food in the Last Year: Never true   Transportation Needs: No Transportation Needs    Lack of Transportation (Medical): No    Lack of Transportation (Non-Medical): No   Physical Activity: Not on file   Stress: Not on file   Social Connections: Not on file   Intimate Partner Violence: Not on file   Housing Stability: Low Risk     Unable to Pay for Housing in the Last Year: No    Number of Places Lived in the Last Year: 1    Unstable Housing in the Last Year: No     Financial Resource Strain: Not on file   Food Insecurity: No Food Insecurity    Worried About [address] in the Last Year: Never true    [address] in the Last Year: Never true   Transportation Needs: No Transportation Needs    Lack of Transportation (Medical): No    Lack of Transportation (Non-Medical):  No   Physical Activity: Not on file   Stress: Not on file   Social Connections: Not on file   Intimate Partner Violence: Not on file   Housing Stability: Low Risk     Unable to Pay for Housing in the Last Year: No    Number of Places Lived in the Last Year: 1    Unstable Housing in the Last Year: No      Diagnosis Date    Anemia     Arthritis     Coronary artery disease     CVA [facility]cerebral vascular accident) ([facility] [address] )     Hyperlipidemia     Hypertension      Past Surgical History:   Procedure Laterality Date    CARDIAC PACEMAKER PLACEMENT  05/09/2011    Medtronic dual chamber ICD implant    CORONARY ANGIOPLASTY  1989     Family History   Problem Relation Age of Onset    Coronary artery disease Mother     Heart attack Father         MI    Stroke Sister        [place]  Date: 3/29/2022 Time: 12:18 PM

## 2022-03-29 NOTE — ASSESSMENT & PLAN NOTE
CT on arrival showed massive bladder distention causing mild bilateral hydroureter nephrosis and prostatomegaly  · S/p multiple straight catheterizations earlier in hospital stay, s/p Damico catheter placement 3/28  · Urology consult for input, appreciate

## 2022-03-29 NOTE — ASSESSMENT & PLAN NOTE
Presented after suffering from a mechanical fall x2  Denied any loss consciousness or presyncopal symptoms  CT head and CT C-spine negative for acute injury  Noted during documentation, that patient was bradycardic and hypotensive upon EMS arrival     See rest of the treatment plan below    · Continue with telemetry  · ICD interrogation done on 3/28--appears negative from my read  · PT/OT recommending rehab once stable however wife unsure, will have CM reach out

## 2022-03-29 NOTE — ASSESSMENT & PLAN NOTE
Noted incidentally on imaging, new, 4 mm right lower lobe nodule  · Recommend repeat CT chest in 1 year

## 2022-03-29 NOTE — ASSESSMENT & PLAN NOTE
Wt Readings from Last 3 Encounters:   03/29/22 59 3 kg (130 lb 11 7 oz)   03/25/22 74 4 kg (164 lb)   03/17/22 58 5 kg (129 lb)     LVEF 12%, mild LV dilatation, diastolic dysfunction, regional wall motion abnormalities, paradoxical septal wall motion consistent with conduction abnormality, moderately reduced RV function, mild LA dilatation, mild MR/TR/GA w/ PASP 45-50 mmHg, mild aortic dilatation October 2021  · Patient with ICD in place  · Appreciate cardiology input  · Continue Coreg, oral diuretics currently on hold given acute kidney injury superimposed on CKD  · Will defer diuretic dosing to cards/renal

## 2022-03-29 NOTE — ASSESSMENT & PLAN NOTE
CAD w/ remote history of MI and angioplasty, 1989  · Continue Coreg  · Not on aspirin as he takes warfarin chronically  · Statin on hold given transaminitis

## 2022-03-29 NOTE — ASSESSMENT & PLAN NOTE
Lab Results   Component Value Date    EGFR 13 03/29/2022    EGFR 17 03/28/2022    EGFR 18 03/27/2022    CREATININE 3 68 (H) 03/29/2022    CREATININE 3 02 (H) 03/28/2022    CREATININE 2 89 (H) 03/27/2022     Baseline creatine unclear  In Jan 2022 was felt to be around 1 9    Presented with creatinine 2 89  · Creatinine has continued to trend upward, 3 68 today  · Possibly related to urinary retention, s/p mejia placement on 3/28  · Diuretics on hold  · Will consult nephrology

## 2022-03-29 NOTE — ASSESSMENT & PLAN NOTE
Noted on 3/29, non-focal exam   Patient's wife states that he is forgetful at times but has no diagnosed history of dementia  Appears per prior notes patient was a bit confused as well    · CT head negative on arrival  · ?secondary to hospitalization, restraints etc  · Reorient, redirect as able  · Correct electrolyte/metabolic derangements

## 2022-03-29 NOTE — ASSESSMENT & PLAN NOTE
Per cards note 3/25/2022: "His episodes of ventricular tachycardia requiring defibrillator shocks appear to be controlled on amiodarone and mexiletine "  · Amio on hold as above, continues on mexiletine  · Continue telemetry  · ICD interrogation appears without events  · Keep potassium greater than 4, magnesium greater than 2

## 2022-03-29 NOTE — ASSESSMENT & PLAN NOTE
Multifactorial  Etiologies include congestive hepatopathy, ischemic injury related to hypotension, drug induced liver injury from amiodarone  Liver appeared unremarkable on CT without biliary dilatation  · Appreciate GI input--they feel this is most likely congestive hepatopathy, may also be element of DILI 2/2 amiodarone    · Amiodarone is on hold  · Acute hepatitis panel negative  · Cards following--defer to them whether Mexiletine will continue to remain necessary, prior provider was informed by pharmacy that this too maybe hepatotoxic, but advised against rapid withdrawal   · Daily CMP

## 2022-03-29 NOTE — ASSESSMENT & PLAN NOTE
BP low-normal  · Continue Coreg 12 5 mg BID  · Consider reduced dose in setting of acute kidney injury/borderline blood pressures

## 2022-03-29 NOTE — PLAN OF CARE
Problem: Potential for Falls  Goal: Patient will remain free of falls  Description: INTERVENTIONS:  - Educate patient/family on patient safety including physical limitations  - Instruct patient to call for assistance with activity   - Consult OT/PT to assist with strengthening/mobility   - Keep Call bell within reach  - Keep bed low and locked with side rails adjusted as appropriate  - Keep care items and personal belongings within reach  - Initiate and maintain comfort rounds  - Make Fall Risk Sign visible to staff  - Offer Toileting every 2 Hours, in advance of need  - Initiate/Maintain bed and chair alarm  - Obtain necessary fall risk management equipment: bed and chair alarm  - Apply yellow socks and bracelet for high fall risk patients  - Consider moving patient to room near nurses station  Outcome: Progressing     Problem: MOBILITY - ADULT  Goal: Maintain or return to baseline ADL function  Description: INTERVENTIONS:  -  Assess patient's ability to carry out ADLs; assess patient's baseline for ADL function and identify physical deficits which impact ability to perform ADLs (bathing, care of mouth/teeth, toileting, grooming, dressing, etc )  - Assess/evaluate cause of self-care deficits   - Assess range of motion  - Assess patient's mobility; develop plan if impaired  - Assess patient's need for assistive devices and provide as appropriate  - Encourage maximum independence but intervene and supervise when necessary  - Involve family in performance of ADLs  - Assess for home care needs following discharge   - Consider OT consult to assist with ADL evaluation and planning for discharge  - Provide patient education as appropriate  Outcome: Progressing  Goal: Maintains/Returns to pre admission functional level  Description: INTERVENTIONS:  - Perform BMAT or MOVE assessment daily    - Set and communicate daily mobility goal to care team and patient/family/caregiver     - Collaborate with rehabilitation services on mobility goals if consulted  - Perform Range of Motion 3 times a day  - Reposition patient every 2 hours    - Dangle patient 3 times a day  - Stand patient 3 times a day  - Ambulate patient 3 times a day  - Out of bed to chair 3 times a day   - Out of bed for meals 3 times a day  - Out of bed for toileting  - Record patient progress and toleration of activity level   Outcome: Progressing     Problem: CARDIOVASCULAR - ADULT  Goal: Maintains optimal cardiac output and hemodynamic stability  Description: INTERVENTIONS:  - Monitor I/O, vital signs and rhythm  - Monitor for S/S and trends of decreased cardiac output  - Administer and titrate ordered vasoactive medications to optimize hemodynamic stability  - Assess quality of pulses, skin color and temperature  - Assess for signs of decreased coronary artery perfusion  - Instruct patient to report change in severity of symptoms  Outcome: Progressing  Goal: Absence of cardiac dysrhythmias or at baseline rhythm  Description: INTERVENTIONS:  - Continuous cardiac monitoring, vital signs, obtain 12 lead EKG if ordered  - Administer antiarrhythmic and heart rate control medications as ordered  - Monitor electrolytes and administer replacement therapy as ordered  Outcome: Progressing     Problem: GENITOURINARY - ADULT  Goal: Maintains or returns to baseline urinary function  Description: INTERVENTIONS:  - Assess urinary function  - Encourage oral fluids to ensure adequate hydration if ordered  - Administer IV fluids as ordered to ensure adequate hydration  - Administer ordered medications as needed  - Offer frequent toileting  - Follow urinary retention protocol if ordered  Outcome: Progressing  Goal: Absence of urinary retention  Description: INTERVENTIONS:  - Assess patients ability to void and empty bladder  - Monitor I/O  - Bladder scan as needed  - Discuss with physician/AP medications to alleviate retention as needed  - Discuss catheterization for long term situations as appropriate  Outcome: Progressing     Problem: SKIN/TISSUE INTEGRITY - ADULT  Goal: Incision(s), wounds(s) or drain site(s) healing without S/S of infection  Description: INTERVENTIONS  - Assess and document dressing, incision, wound bed, drain sites and surrounding tissue  - Provide patient and family education  - Perform skin care/dressing changes every shift  Outcome: Progressing  Goal: Pressure injury heals and does not worsen  Description: Interventions:  - Implement low air loss mattress or specialty surface (Criteria met)  - Apply silicone foam dressing  - Instruct/assist with weight shifting every 60 minutes when in chair   - Limit chair time to 2 hour intervals  - Use special pressure reducing interventions such as waffle cushion when in chair   - Apply fecal or urinary incontinence containment device   - Perform passive or active ROM every 2 hours  - Turn and reposition patient & offload bony prominences every 2 hours   - Utilize friction reducing device or surface for transfers   - Consider consults to  interdisciplinary teams such as wound consult  - Consider nutrition services referral as needed  Outcome: Progressing     Problem: MUSCULOSKELETAL - ADULT  Goal: Maintain or return mobility to safest level of function  Description: INTERVENTIONS:  - Assess patient's ability to carry out ADLs; assess patient's baseline for ADL function and identify physical deficits which impact ability to perform ADLs (bathing, care of mouth/teeth, toileting, grooming, dressing, etc )  - Assess/evaluate cause of self-care deficits   - Assess range of motion  - Assess patient's mobility  - Assess patient's need for assistive devices and provide as appropriate  - Encourage maximum independence but intervene and supervise when necessary  - Involve family in performance of ADLs  - Assess for home care needs following discharge   - Consider OT consult to assist with ADL evaluation and planning for discharge  - Provide patient education as appropriate  Outcome: Progressing  Goal: Maintain proper alignment of affected body part  Description: INTERVENTIONS:  - Support, maintain and protect limb and body alignment  - Provide patient/ family with appropriate education  Outcome: Progressing     Problem: PAIN - ADULT  Goal: Verbalizes/displays adequate comfort level or baseline comfort level  Description: Interventions:  - Encourage patient to monitor pain and request assistance  - Assess pain using appropriate pain scale  - Administer analgesics based on type and severity of pain and evaluate response  - Implement non-pharmacological measures as appropriate and evaluate response  - Consider cultural and social influences on pain and pain management  - Notify physician/advanced practitioner if interventions unsuccessful or patient reports new pain  Outcome: Progressing     Problem: INFECTION - ADULT  Goal: Absence or prevention of progression during hospitalization  Description: INTERVENTIONS:  - Assess and monitor for signs and symptoms of infection  - Monitor lab/diagnostic results  - Monitor all insertion sites, i e  indwelling lines, tubes, and drains  - Monitor endotracheal if appropriate and nasal secretions for changes in amount and color  - [place] appropriate cooling/warming therapies per order  - Administer medications as ordered  - Instruct and encourage patient and family to use good hand hygiene technique  - Identify and instruct in appropriate isolation precautions for identified infection/condition  Outcome: Progressing     Problem: DISCHARGE PLANNING  Goal: Discharge to home or other facility with appropriate resources  Description: INTERVENTIONS:  - Identify barriers to discharge w/patient and caregiver  - Arrange for needed discharge resources and transportation as appropriate  - Identify discharge learning needs (meds, wound care, etc )  - Arrange for interpretive services to assist at discharge as needed  - Refer to Case Management Department for coordinating discharge planning if the patient needs post-hospital services based on physician/advanced practitioner order or complex needs related to functional status, cognitive ability, or social support system  Outcome: Progressing     Problem: Knowledge Deficit  Goal: Patient/family/caregiver demonstrates understanding of disease process, treatment plan, medications, and discharge instructions  Description: Complete learning assessment and assess knowledge base    Interventions:  - Provide teaching at level of understanding  - Provide teaching via preferred learning methods  Outcome: Progressing

## 2022-03-29 NOTE — ASSESSMENT & PLAN NOTE
Lab Results   Component Value Date    EGFR 13 03/29/2022    EGFR 17 03/28/2022    EGFR 18 03/27/2022    CREATININE 3 68 (H) 03/29/2022    CREATININE 3 02 (H) 03/28/2022    CREATININE 2 89 (H) 03/27/2022     Stable  Does not need acute kidney injury criteria at this time (1 5x baseline or >0 3 over 48 hours)  Baseline should be around 1 9-2 1

## 2022-03-29 NOTE — PLAN OF CARE
Problem: Potential for Falls  Goal: Patient will remain free of falls  Description: INTERVENTIONS:  - Educate patient/family on patient safety including physical limitations  - Instruct patient to call for assistance with activity   - Consult OT/PT to assist with strengthening/mobility   - Keep Call bell within reach  - Keep bed low and locked with side rails adjusted as appropriate  - Keep care items and personal belongings within reach  - Initiate and maintain comfort rounds  - Make Fall Risk Sign visible to staff  - Offer Toileting every 2 Hours, in advance of need  - Initiate/Maintain bed and chair alarm  - Obtain necessary fall risk management equipment: bed and chair alarm  - Apply yellow socks and bracelet for high fall risk patients  - Consider moving patient to room near nurses station  Outcome: Progressing     Problem: MOBILITY - ADULT  Goal: Maintain or return to baseline ADL function  Description: INTERVENTIONS:  -  Assess patient's ability to carry out ADLs; assess patient's baseline for ADL function and identify physical deficits which impact ability to perform ADLs (bathing, care of mouth/teeth, toileting, grooming, dressing, etc )  - Assess/evaluate cause of self-care deficits   - Assess range of motion  - Assess patient's mobility; develop plan if impaired  - Assess patient's need for assistive devices and provide as appropriate  - Encourage maximum independence but intervene and supervise when necessary  - Involve family in performance of ADLs  - Assess for home care needs following discharge   - Consider OT consult to assist with ADL evaluation and planning for discharge  - Provide patient education as appropriate  Outcome: Progressing  Goal: Maintains/Returns to pre admission functional level  Description: INTERVENTIONS:  - Perform BMAT or MOVE assessment daily    - Set and communicate daily mobility goal to care team and patient/family/caregiver     - Collaborate with rehabilitation services on mobility goals if consulted  - Perform Range of Motion 3 times a day  - Reposition patient every 2 hours    - Dangle patient 3 times a day  - Stand patient 3 times a day  - Ambulate patient 3 times a day  - Out of bed to chair 3 times a day   - Out of bed for meals 3 times a day  - Out of bed for toileting  - Record patient progress and toleration of activity level   Outcome: Progressing     Problem: CARDIOVASCULAR - ADULT  Goal: Maintains optimal cardiac output and hemodynamic stability  Description: INTERVENTIONS:  - Monitor I/O, vital signs and rhythm  - Monitor for S/S and trends of decreased cardiac output  - Administer and titrate ordered vasoactive medications to optimize hemodynamic stability  - Assess quality of pulses, skin color and temperature  - Assess for signs of decreased coronary artery perfusion  - Instruct patient to report change in severity of symptoms  Outcome: Progressing  Goal: Absence of cardiac dysrhythmias or at baseline rhythm  Description: INTERVENTIONS:  - Continuous cardiac monitoring, vital signs, obtain 12 lead EKG if ordered  - Administer antiarrhythmic and heart rate control medications as ordered  - Monitor electrolytes and administer replacement therapy as ordered  Outcome: Progressing     Problem: GENITOURINARY - ADULT  Goal: Maintains or returns to baseline urinary function  Description: INTERVENTIONS:  - Assess urinary function  - Encourage oral fluids to ensure adequate hydration if ordered  - Administer IV fluids as ordered to ensure adequate hydration  - Administer ordered medications as needed  - Offer frequent toileting  - Follow urinary retention protocol if ordered  Outcome: Progressing  Goal: Absence of urinary retention  Description: INTERVENTIONS:  - Assess patients ability to void and empty bladder  - Monitor I/O  - Bladder scan as needed  - Discuss with physician/AP medications to alleviate retention as needed  - Discuss catheterization for long term situations as appropriate  Outcome: Progressing     Problem: SKIN/TISSUE INTEGRITY - ADULT  Goal: Incision(s), wounds(s) or drain site(s) healing without S/S of infection  Description: INTERVENTIONS  - Assess and document dressing, incision, wound bed, drain sites and surrounding tissue  - Provide patient and family education  - Perform skin care/dressing changes every shift  Outcome: Progressing  Goal: Pressure injury heals and does not worsen  Description: Interventions:  - Implement low air loss mattress or specialty surface (Criteria met)  - Apply silicone foam dressing  - Instruct/assist with weight shifting every 60 minutes when in chair   - Limit chair time to 2 hour intervals  - Use special pressure reducing interventions such as waffle cushion when in chair   - Apply fecal or urinary incontinence containment device   - Perform passive or active ROM every 2 hours  - Turn and reposition patient & offload bony prominences every 2 hours   - Utilize friction reducing device or surface for transfers   - Consider consults to  interdisciplinary teams such as wound consult  - Consider nutrition services referral as needed  Outcome: Progressing     Problem: MUSCULOSKELETAL - ADULT  Goal: Maintain or return mobility to safest level of function  Description: INTERVENTIONS:  - Assess patient's ability to carry out ADLs; assess patient's baseline for ADL function and identify physical deficits which impact ability to perform ADLs (bathing, care of mouth/teeth, toileting, grooming, dressing, etc )  - Assess/evaluate cause of self-care deficits   - Assess range of motion  - Assess patient's mobility  - Assess patient's need for assistive devices and provide as appropriate  - Encourage maximum independence but intervene and supervise when necessary  - Involve family in performance of ADLs  - Assess for home care needs following discharge   - Consider OT consult to assist with ADL evaluation and planning for discharge  - Provide patient education as appropriate  Outcome: Progressing  Goal: Maintain proper alignment of affected body part  Description: INTERVENTIONS:  - Support, maintain and protect limb and body alignment  - Provide patient/ family with appropriate education  Outcome: Progressing     Problem: PAIN - ADULT  Goal: Verbalizes/displays adequate comfort level or baseline comfort level  Description: Interventions:  - Encourage patient to monitor pain and request assistance  - Assess pain using appropriate pain scale  - Administer analgesics based on type and severity of pain and evaluate response  - Implement non-pharmacological measures as appropriate and evaluate response  - Consider cultural and social influences on pain and pain management  - Notify physician/advanced practitioner if interventions unsuccessful or patient reports new pain  Outcome: Progressing

## 2022-03-29 NOTE — ASSESSMENT & PLAN NOTE
· CT on 03/27/2022 with massive bladder distension and associated bilateral hydronephrosis  · UA with innumerable bacteria but negative for blood, nitrites, or leukocytes  Low suspicion of acute cystitis  · Indwelling Damico catheter placed on 03/28 following multiple straight catheterizations, two with 1L output  · Renal ultrasound ordered for tomorrow morning for re-evaluation of bilateral hydronephrosis 48 hours following Damico insertion  · Will follow peripherally for US results  · Given massive bladder distention and large volumes returned with Damico insertions, would recommend maintaining Damico catheter x2 weeks  · Do not recommend alpha blockade at this time given patient's age and increased fall risk  · Will arrange outpatient void trial and office visit to establish care for his prostatomegaly also evident on CT

## 2022-03-29 NOTE — PROGRESS NOTES
[facility]  Progress Note Daniela Henry 10/31/1932, 80 y o  male MRN: [id number]  Unit/Bed#: E4 -Amandeep Encounter: [id number]  Primary Care Provider: Edwar Perez DO   Date and time admitted to hospital: 3/27/2022 11:36 AM    * Fall  Assessment & Plan  Presented after suffering from a mechanical fall x2  Denied any loss consciousness or presyncopal symptoms  CT head and CT C-spine negative for acute injury  Noted during documentation, that patient was bradycardic and hypotensive upon EMS arrival     See rest of the treatment plan below  · Continue with telemetry  · ICD interrogation done on 3/28--appears negative from my read  · PT/OT recommending rehab once stable however wife unsure, will have CM reach out    Confusion  Assessment & Plan  Noted on 3/29, non-focal exam   Patient's wife states that he is forgetful at times but has no diagnosed history of dementia  Appears per prior notes patient was a bit confused as well  · CT head negative on arrival  · ?secondary to hospitalization, restraints etc  · Reorient, redirect as able  · Correct electrolyte/metabolic derangements     Urinary retention  Assessment & Plan  CT on arrival showed massive bladder distention causing mild bilateral hydroureter nephrosis and prostatomegaly  · S/p multiple straight catheterizations earlier in hospital stay, s/p Damico catheter placement 3/28  · Urology consult for input, appreciate      Transaminitis  Assessment & Plan  Multifactorial  Etiologies include congestive hepatopathy, ischemic injury related to hypotension, drug induced liver injury from amiodarone  Liver appeared unremarkable on CT without biliary dilatation  · Appreciate GI input--they feel this is most likely congestive hepatopathy, may also be element of DILI 2/2 amiodarone    · Amiodarone is on hold  · Acute hepatitis panel negative  · Cards following--defer to them whether Mexiletine will continue to remain necessary, prior provider was informed by pharmacy that this too maybe hepatotoxic, but advised against rapid withdrawal   · Daily CMP    Acute kidney injury superimposed on chronic kidney disease [facility])  Assessment & Plan  Lab Results   Component Value Date    EGFR 13 03/29/2022    EGFR 17 03/28/2022    EGFR 18 03/27/2022    CREATININE 3 68 (H) 03/29/2022    CREATININE 3 02 (H) 03/28/2022    CREATININE 2 89 (H) 03/27/2022     Baseline creatine unclear  In Jan 2022 was felt to be around 1 9    Presented with creatinine 2 89  · Creatinine has continued to trend upward, 3 68 today  · Possibly related to urinary retention, s/p mejia placement on 3/28  · Diuretics on hold  · Will consult nephrology    Elevated troponin  Assessment & Plan  · Likely nonischemic myocardial injury per Cardiology    Lung nodule  Assessment & Plan  Noted incidentally on imaging, new, 4 mm right lower lobe nodule  · Recommend repeat CT chest in 1 year    Goals of care, counseling/discussion  Assessment & Plan  Patient DNR/DNI  · May require further goals of care discussions given multiple co-morbidities/advanced age    Chronic combined systolic and diastolic congestive heart failure (HCC)  Assessment & Plan  Wt Readings from Last 3 Encounters:   03/29/22 59 3 kg (130 lb 11 7 oz)   03/25/22 74 4 kg (164 lb)   03/17/22 58 5 kg (129 lb)     LVEF 12%, mild LV dilatation, diastolic dysfunction, regional wall motion abnormalities, paradoxical septal wall motion consistent with conduction abnormality, moderately reduced RV function, mild LA dilatation, mild MR/TR/LA w/ PASP 45-50 mmHg, mild aortic dilatation October 2021  · Patient with ICD in place  · Appreciate cardiology input  · Continue Coreg, oral diuretics currently on hold given acute kidney injury superimposed on CKD  · Will defer diuretic dosing to cards/renal        Essential hypertension  Assessment & Plan  BP low-normal  · Continue Coreg 12 5 mg BID  · Consider reduced dose in setting of acute kidney injury/borderline blood pressures    PAF (paroxysmal atrial fibrillation) ([facility])  Assessment & Plan  Rate control: Carvedilol  · AC: Warfarin 2mg S-S-T-Th Warfarin 1mg MWF  Was changed to 1 mg daily here given therapeutic levels however now slightly subtherapeutic, will adjust dose back to home regimen as of 3/29  · Monitor INR  · Consider eventual discussion regarding risks vs benefits if continues to fall    Recent Labs     03/27/22  1145 03/28/22  0617 03/29/22  0554   INR 2 01* 2 11* 1 75*         V-tach [facility])  Assessment & Plan  Per cards note 3/25/2022: "His episodes of ventricular tachycardia requiring defibrillator shocks appear to be controlled on amiodarone and mexiletine "  · Amio on hold as above, continues on mexiletine  · Continue telemetry  · ICD interrogation appears without events  · Keep potassium greater than 4, magnesium greater than 2    CAD (coronary artery disease)  Assessment & Plan  CAD w/ remote history of MI and angioplasty, 1989  · Continue Coreg  · Not on aspirin as he takes warfarin chronically  · Statin on hold given transaminitis      Hyperlipidemia, mixed  Assessment & Plan  · Statin on hold in setting of transaminitis        VTE Pharmacologic Prophylaxis:   Moderate Risk (Score 3-4) - Pharmacological DVT Prophylaxis Ordered: warfarin (Coumadin)  Patient Centered Rounds: I performed bedside rounds with nursing staff today  Discussions with Specialists or Other Care Team Provider: appreciate all specialists  Education and Discussions with Family / Patient: Updated  (wife) via phone  Time Spent for Care: 30 minutes  More than 50% of total time spent on counseling and coordination of care as described above      Current Length of Stay: 2 day(s)  Current Patient Status: Inpatient   Certification Statement: The patient will continue to require additional inpatient hospital stay due to Acute kidney injury, transaminitis, confusion  Discharge Plan: Anticipate discharge in >72 hrs to Rehab if agreeable    Code Status: Level 3 - DNAR and DNI    Subjective:   Patient confused this morning  He required soft mitts overnight as he was trying to pull out his Damico catheter per nursing  Patient asking to go home and seems confused as to why he is in the hospital   He denies any shortness of breath, dizziness, lightheadedness, chest pain  No abdominal pain  Objective:     Vitals:   Temp (24hrs), Av 3 °F (36 8 °C), Min:97 6 °F (36 4 °C), Max:99 1 °F (37 3 °C)    Temp:  [97 6 °F (36 4 °C)-99 1 °F (37 3 °C)] 97 6 °F (36 4 °C)  HR:  [58-82] 63  Resp:  [16-24] 16  BP: ()/(54-75) 113/58  SpO2:  [90 %-100 %] 90 %  Body mass index is 23 91 kg/m²  Input and Output Summary (last 24 hours): Intake/Output Summary (Last 24 hours) at 3/29/2022 0952  Last data filed at 3/28/2022 1813  Gross per 24 hour   Intake 480 ml   Output 400 ml   Net 80 ml       Physical Exam:   Physical Exam  Vitals and nursing note reviewed  Constitutional:       General: He is not in acute distress  Comments: On room air, underweight  Soft mitts in place   Cardiovascular:      Rate and Rhythm: Rhythm irregular  Pulmonary:      Effort: No respiratory distress  Breath sounds: No wheezing or rales  Abdominal:      General: There is no distension  Tenderness: There is no abdominal tenderness  Genitourinary:     Comments: Damico catheter in place, urine dark yellow  Musculoskeletal:      Right lower leg: No edema  Left lower leg: No edema  Neurological:      Comments: Oriented to self, knows month however not year    Knows president, follows basic commands   Psychiatric:         Mood and Affect: Mood normal           Additional Data:     Labs:  Results from last 7 days   Lab Units 22  0617 22  1145 22  1145   WBC Thousand/uL 7 23   < > 11 04*   HEMOGLOBIN g/dL 11 1*   < > 11 2*   HEMATOCRIT % 33 8*   < > 34 0*   PLATELETS Thousands/uL 81*   < > 100* NEUTROS PCT %  --   --  87*   LYMPHS PCT %  --   --  5*   MONOS PCT %  --   --  7   EOS PCT %  --   --  0    < > = values in this interval not displayed  Results from last 7 days   Lab Units 03/29/22  0554   SODIUM mmol/L 135*   POTASSIUM mmol/L 4 4   CHLORIDE mmol/L 100   CO2 mmol/L 21   BUN mg/dL 57*   CREATININE mg/dL 3 68*   ANION GAP mmol/L 14*   CALCIUM mg/dL 8 0*   ALBUMIN g/dL 2 0*   TOTAL BILIRUBIN mg/dL 2 69*   ALK PHOS U/L 53   ALT U/L 496*   AST U/L 320*   GLUCOSE RANDOM mg/dL 102     Results from last 7 days   Lab Units 03/29/22  0554   INR  1 75*                   Lines/Drains:  Invasive Devices  Report    Peripheral Intravenous Line            Peripheral IV 03/27/22 Right Forearm 1 day          Drain            Urethral Catheter 16 Fr  <1 day              Urinary Catheter:  Goal for removal: Voiding trial when ambulation improves           Telemetry:  Telemetry Orders (From admission, onward)             48 Hour Telemetry Monitoring  Continuous x 48 hours        References:    Telemetry Guidelines   Question:  Reason for 48 Hour Telemetry  Answer:  Arrhythmias Requiring Medical Therapy (eg  SVT, Vtach/fib, Bradycardia, Uncontrolled A-fib)                 Telemetry Reviewed: paced   Indication for Continued Telemetry Use: Syncope             Imaging: No pertinent imaging reviewed      Recent Cultures (last 7 days):         Last 24 Hours Medication List:   Current Facility-Administered Medications   Medication Dose Route Frequency Provider Last Rate    carvedilol  12 5 mg Oral BID With Meals Rosio Sanchez MD      mexiletine  150 mg Oral Q12H [address] Rosio Sanchez MD      nystatin   Topical BID MD Calli Stapless ON 3/30/2022] warfarin  1 mg Oral Once per day on Mon Wed Fri Santa Guardado PA-C      warfarin  2 mg Oral Once per day on Sun Tue Thu Sat Golden Brown PA-C          Today, Patient Was Seen By: Golden Brown PA-C    **Please Note: This note may have been constructed using a voice recognition system  **

## 2022-03-30 NOTE — PROGRESS NOTES
Progress Note - Cardiology   Denis Quinn 80 y o  male MRN: [id number]  Unit/Bed#: E4 -01 Encounter: [id number]          Assessment / Plan  Nonsyncopal fall (chief complaint)   Pre-hospital hypotension reported by EMS  Nonischemic myocardial injury  Transaminitis  CKD 3B-4, CHRISTY (POA   Baseline creatinine likely 1 6-2 1:  2 89 on arrival  BPH, Urinary retention, bilateral hydronephrosis  HFrEF, ischemic cardiomyopathy:     Estimated dry weight 59 5 kg   Echo 10/2021:  EF 12%  Hx ventricular tachycardia, indwelling Medtronic dual-chamber ICD (implant 2011, Gen change 2018):      February/March 2020:  Sustained and nonsustained ventricular tachycardia with appropriate ICD therapy --> initiated on amiodarone   January 2022:  Ventricular tachycardia/ICD shocks--> mexiletine added to amiodarone  CAD: MI (1989)--> cardiomyopathy (EF 35%)  Paroxysmal atrial fibrillation:     Warfarin anticoagulation  Hypertension  Dyslipidemia  Ethanol abuse  Hx embolic CVA    Potassium 4 7, BUN 74, creatinine 3 85  , , alkaline phosphatase 47      # Seen by Nephrology noting continued worsening of creatinine (3 02-->3 68-->3 85) with details in their note  --> renal replacement therapy discussed with patient and spouse with collective decision against renal replacement therapy and consideration of palliative care with transition to hospice if renal function does not show sign improvement by tomorrow    # Cardiology asked to revisit patient regarding recommendations - Rx or otherwise  · Patient with longstanding history of cardiomyopathy with most recent echocardiogram from October 2021 showing significantly reduced LVEF-12% which puts him at continued risk for decompensated CHF noting presently low normal BP and acceptable room air saturation despite some signs of volume overload in the context of his oliguric renal failure ~~> volume management per Nephrology and as noted above  · With current blood pressure parameters Coreg is held  · Patient with history of ventricular tachycardia breaking through single agent (amiodarone) required addition of mexiletine earlier this year  Currently, because of transaminitis, amiodarone is being held ~~> would suggest continued holding though if patient were to have recurrent VT or VF, Rx should be restarted  · No other cardiac testing (ischemic, echo, or otherwise, is likely to bring benefit to this patient's current treatment strategy  · If patient decides in favor of hospice, his ICD can be reprogrammed to turn off device therapies      Subjective:  Patient with worsening renal dysfunction and contemplation of hospice therapy we were asked to visit the patient for any additional recommendations  When seen the patient is lethargic and arousable than not able to provide meaningful conversation  Vitals:  Vitals:    03/29/22 0600 03/30/22 0544   Weight: 59 3 kg (130 lb 11 7 oz) 58 2 kg (128 lb 4 9 oz)   ,  Vitals:    03/30/22 0300 03/30/22 0544 03/30/22 0738 03/30/22 1136   BP: 125/80  115/66 99/62   BP Location: Right arm  Left arm Left arm   Pulse: 60  72 62   Resp: 20  20 20   Temp: 98 3 °F (36 8 °C)  97 7 °F (36 5 °C) 97 6 °F (36 4 °C)   TempSrc: Temporal  Temporal Temporal   SpO2: 90%  90% 91%   Weight:  58 2 kg (128 lb 4 9 oz)     Height:           Exam:  General:  Lethargic    Arousable but not able to follow command or provide meaningful conversation  Heart:  Regular with controlled rate  Lungs:  Some bibasilar rales right greater than left  Lower Limbs:  No edema           Telemetry:       Sinus rhythm without significant dysrhythmia    Medications:    Current Facility-Administered Medications:     albumin human (FLEXBUMIN) 25 % injection 25 g, 25 g, Intravenous, Q6H, Arturo Bartlett MD    carvedilol (COREG) tablet 12 5 mg, 12 5 mg, Oral, BID With Meals, WANDER Charlton, 12 5 mg at 03/29/22 1809    mexiletine (MEXITIL) capsule 150 mg, 150 mg, Oral, Q12H [facility] [place] [facility], Kendrick Pina MD Rob, 150 mg at 03/30/22 1010    nystatin (MYCOSTATIN) powder, , Topical, BID, Rosario Meeks MD, Given at 03/30/22 1025    warfarin (COUMADIN) tablet 1 mg, 1 mg, Oral, Once per day on Mon Wed Fri, Santa Guardado PA-C    warfarin (COUMADIN) tablet 2 mg, 2 mg, Oral, Once per day on Sun Tue Thu Sat, Jacqueline Lakhani PA-C, 2 mg at 03/29/22 1809      Labs/Data:        Results from last 7 days   Lab Units 03/30/22  0544 03/29/22  0554 03/28/22  0617   WBC Thousand/uL 6 35 6 88 7 23   HEMOGLOBIN g/dL 11 5* 11 4* 11 1*   HEMATOCRIT % 34 5* 33 9* 33 8*   PLATELETS Thousands/uL 72* 86* 81*     Results from last 7 days   Lab Units 03/30/22  0544 03/29/22  0554 03/28/22  0617 03/27/22  1145 03/27/22  1145   POTASSIUM mmol/L 4 7 4 4 4 2   < > 3 3*   CHLORIDE mmol/L 99* 100 101   < > 98*   CO2 mmol/L 22 21 22   < > 29   BUN mg/dL 74* 57* 44*   < > 38*   CK TOTAL U/L  --   --   --   --  135    < > = values in this interval not displayed

## 2022-03-30 NOTE — PLAN OF CARE
Problem: Potential for Falls  Goal: Patient will remain free of falls  Description: INTERVENTIONS:  - Educate patient/family on patient safety including physical limitations  - Instruct patient to call for assistance with activity   - Consult OT/PT to assist with strengthening/mobility   - Keep Call bell within reach  - Keep bed low and locked with side rails adjusted as appropriate  - Keep care items and personal belongings within reach  - Initiate and maintain comfort rounds  - Make Fall Risk Sign visible to staff  - Offer Toileting every 2 Hours, in advance of need  - Initiate/Maintain bed and chair alarm  - Obtain necessary fall risk management equipment: bed and chair alarm  - Apply yellow socks and bracelet for high fall risk patients  - Consider moving patient to room near nurses station  Outcome: Progressing     Problem: CARDIOVASCULAR - ADULT  Goal: Maintains optimal cardiac output and hemodynamic stability  Description: INTERVENTIONS:  - Monitor I/O, vital signs and rhythm  - Monitor for S/S and trends of decreased cardiac output  - Administer and titrate ordered vasoactive medications to optimize hemodynamic stability  - Assess quality of pulses, skin color and temperature  - Assess for signs of decreased coronary artery perfusion  - Instruct patient to report change in severity of symptoms  Outcome: Progressing  Goal: Absence of cardiac dysrhythmias or at baseline rhythm  Description: INTERVENTIONS:  - Continuous cardiac monitoring, vital signs, obtain 12 lead EKG if ordered  - Administer antiarrhythmic and heart rate control medications as ordered  - Monitor electrolytes and administer replacement therapy as ordered  Outcome: Progressing     Problem: SKIN/TISSUE INTEGRITY - ADULT  Goal: Incision(s), wounds(s) or drain site(s) healing without S/S of infection  Description: INTERVENTIONS  - Assess and document dressing, incision, wound bed, drain sites and surrounding tissue  - Provide patient and family education  - Perform skin care/dressing changes every shift  Outcome: Progressing  Goal: Pressure injury heals and does not worsen  Description: Interventions:  - Implement low air loss mattress or specialty surface (Criteria met)  - Apply silicone foam dressing  - Instruct/assist with weight shifting every 60 minutes when in chair   - Limit chair time to 2 hour intervals  - Use special pressure reducing interventions such as waffle cushion when in chair   - Apply fecal or urinary incontinence containment device   - Perform passive or active ROM every 2 hours  - Turn and reposition patient & offload bony prominences every 2 hours   - Utilize friction reducing device or surface for transfers   - Consider consults to  interdisciplinary teams such as wound consult  - Consider nutrition services referral as needed  Outcome: Progressing     Problem: MUSCULOSKELETAL - ADULT  Goal: Maintain proper alignment of affected body part  Description: INTERVENTIONS:  - Support, maintain and protect limb and body alignment  - Provide patient/ family with appropriate education  Outcome: Progressing     Problem: PAIN - ADULT  Goal: Verbalizes/displays adequate comfort level or baseline comfort level  Description: Interventions:  - Encourage patient to monitor pain and request assistance  - Assess pain using appropriate pain scale  - Administer analgesics based on type and severity of pain and evaluate response  - Implement non-pharmacological measures as appropriate and evaluate response  - Consider cultural and social influences on pain and pain management  - Notify physician/advanced practitioner if interventions unsuccessful or patient reports new pain  Outcome: Progressing     Problem: INFECTION - ADULT  Goal: Absence or prevention of progression during hospitalization  Description: INTERVENTIONS:  - Assess and monitor for signs and symptoms of infection  - Monitor lab/diagnostic results  - Monitor all insertion sites, i e  indwelling lines, tubes, and drains  - Monitor endotracheal if appropriate and nasal secretions for changes in amount and color  - [place] appropriate cooling/warming therapies per order  - Administer medications as ordered  - Instruct and encourage patient and family to use good hand hygiene technique  - Identify and instruct in appropriate isolation precautions for identified infection/condition  Outcome: Progressing     Problem: DISCHARGE PLANNING  Goal: Discharge to home or other facility with appropriate resources  Description: INTERVENTIONS:  - Identify barriers to discharge w/patient and caregiver  - Arrange for needed discharge resources and transportation as appropriate  - Identify discharge learning needs (meds, wound care, etc )  - Arrange for interpretive services to assist at discharge as needed  - Refer to Case Management Department for coordinating discharge planning if the patient needs post-hospital services based on physician/advanced practitioner order or complex needs related to functional status, cognitive ability, or social support system  Outcome: Progressing     Problem: Knowledge Deficit  Goal: Patient/family/caregiver demonstrates understanding of disease process, treatment plan, medications, and discharge instructions  Description: Complete learning assessment and assess knowledge base    Interventions:  - Provide teaching at level of understanding  - Provide teaching via preferred learning methods  Outcome: Progressing     Problem: Prexisting or High Potential for Compromised Skin Integrity  Goal: Skin integrity is maintained or improved  Description: INTERVENTIONS:  - Identify patients at risk for skin breakdown  - Assess and monitor skin integrity  - Assess and monitor nutrition and hydration status  - Monitor labs   - Assess for incontinence   - Turn and reposition patient  - Assist with mobility/ambulation  - Relieve pressure over bony prominences  - Avoid friction and shearing  - Provide appropriate hygiene as needed including keeping skin clean and dry  - Evaluate need for skin moisturizer/barrier cream  - Collaborate with interdisciplinary team   - Patient/family teaching  - Consider wound care consult   Outcome: Progressing     Problem: SAFETY,RESTRAINT: NV/NON-SELF DESTRUCTIVE BEHAVIOR  Goal: Remains free of harm/injury (restraint for non violent/non self-detsructive behavior)  Description: INTERVENTIONS:  - Instruct patient/family regarding restraint use   - Assess and monitor physiologic and psychological status   - Provide interventions and comfort measures to meet assessed patient needs   - Identify and implement measures to help patient regain control  - Assess readiness for release of restraint   Outcome: Progressing  Goal: Returns to optimal restraint-free functioning  Description: INTERVENTIONS:  - Assess the patient's behavior and symptoms that indicate continued need for restraint  - Identify and implement measures to help patient regain control  - Assess readiness for release of restraint   Outcome: Progressing     Problem: Nutrition/Hydration-ADULT  Goal: Nutrient/Hydration intake appropriate for improving, restoring or maintaining nutritional needs  Description: Monitor and assess patient's nutrition/hydration status for malnutrition  Collaborate with interdisciplinary team and initiate plan and interventions as ordered  Monitor patient's weight and dietary intake as ordered or per policy  Utilize nutrition screening tool and intervene as necessary  Determine patient's food preferences and provide high-protein, high-caloric foods as appropriate       INTERVENTIONS:  - Monitor oral intake, urinary output, labs, and treatment plans  - Assess nutrition and hydration status and recommend course of action  - Evaluate amount of meals eaten  - Assist patient with eating if necessary   - Allow adequate time for meals  - Recommend/ encourage appropriate diets, oral nutritional supplements, and vitamin/mineral supplements  - Order, calculate, and assess calorie counts as needed  - Recommend, monitor, and adjust tube feedings and TPN/PPN based on assessed needs  - Assess need for intravenous fluids  - Provide specific nutrition/hydration education as appropriate  - Include patient/family/caregiver in decisions related to nutrition  Outcome: Progressing

## 2022-03-30 NOTE — QUICK NOTE
43-year-old male failed urinary retention protocol  Damico catheter placed 327 after two prior high-volume straight catheterizations  He has acute kidney injury and chronic kidney disease and bilateral hydronephrosis seen on initial ultrasound  Recommendations- repeat renal ultrasound in two or three days to assess resolution of hydronephrosis expected with Damico catheter  Maintain Damico catheter for two weeks for very high volume retention and deconditioning  Office follow-up will be arranged for that time frame  High fall risk and geriatric age I do not recommend alpha blockers for medical management   Palliation discussion began today among primary care team

## 2022-03-30 NOTE — ASSESSMENT & PLAN NOTE
Patient DNR/DNI  · Patient is not able to make informed medical decisions at this time as he is encephalopathic likely uremic in nature   · Wife making medical decisions at this time and agrees to not pursue RRT at this time   · Discussed hospice with wife although she appears to lack insight about the situation, unclear if she truly understands the situation  Appreciate palliative medicine assistance in regards to further driving discussion for hospice     · Wife did clearly state that she would only be agreeable to home hospice   · CM made aware and following

## 2022-03-30 NOTE — ASSESSMENT & PLAN NOTE
Multifactorial  Etiologies include congestive hepatopathy, ischemic injury related to hypotension, drug induced liver injury from amiodarone  Liver appeared unremarkable on CT without biliary dilatation  · Appreciate GI input--they feel this is most likely congestive hepatopathy, may also be element of DILI 2/2 amiodarone  · Acute hepatitis panel negative  · Cards following--recommend to stop amiodarone and stop mexiletine     · If patient were to have recurrent VT or VF, Rx should be restarted  · Daily CMP- LFTs downtrending

## 2022-03-30 NOTE — ASSESSMENT & PLAN NOTE
Rate control: Carvedilol  · AC: Warfarin 2mg S-S-T-Th Warfarin 1mg MWF    Was changed to 1 mg daily here given therapeutic levels however now slightly subtherapeutic, will adjust dose back to home regimen as of 3/29  · Monitor INR  · Consider eventual discussion regarding risks vs benefits if continues to fall    Recent Labs     03/28/22  0617 03/29/22  0554 03/30/22  0544   INR 2 11* 1 75* 2 04*

## 2022-03-30 NOTE — ASSESSMENT & PLAN NOTE
Presented after suffering from a mechanical fall x2  Denied any loss consciousness or presyncopal symptoms  CT head and CT C-spine negative for acute injury  Noted during documentation, that patient was bradycardic and hypotensive upon EMS arrival     See rest of the treatment plan below    · Continue with telemetry  · ICD interrogation done on 3/28--appears negative from my read  · PT/OT recommending rehab, now consideration for home hospice, wife does not want patient at [facility]

## 2022-03-30 NOTE — ASSESSMENT & PLAN NOTE
Lab Results   Component Value Date    EGFR 13 03/30/2022    EGFR 13 03/29/2022    EGFR 17 03/28/2022    CREATININE 3 85 (H) 03/30/2022    CREATININE 3 68 (H) 03/29/2022    CREATININE 3 02 (H) 03/28/2022     Baseline creatine unclear  In Jan 2022 was felt to be around 1 9  Presented with creatinine 2 89  · Likely 2/2 cardiorenal syndrome/ischemic injury/obstructive uropathy   · Creatinine has continued to trend upward, 3 85 today, now with confusion concerning for uremic encephalopathy   · S/p mejia placement on 3/28  · Diuretics on hold  · Albumin 25g IV q6h x 2 doses today by nephrology   · Patient is not an ideal candidate for RRT per nephrology due to comorbidities/low EF 12%  Patient is not able to make informed medical decisions at this time, wife agreeable to not pursue RRT  Consideration for home hospice, palliative consult placed

## 2022-03-30 NOTE — ASSESSMENT & PLAN NOTE
CT on arrival showed massive bladder distention causing mild bilateral hydroureter nephrosis and prostatomegaly  · S/p multiple straight catheterizations earlier in hospital stay, s/p Damico catheter placement 3/28  · Urology consult for input, appreciate   · Damico catheter should remain in place for at least 2 weeks  · Repeat renal US: pending

## 2022-03-30 NOTE — CONSULTS
Consultation - [address]  80 y o   male  [address] [address] /E4 -*   MRN: [id number]  Encounter: [id number]    ASSESSMENT:    Patient Active Problem List   Diagnosis    Cardiac defibrillator in situ    Acute [facility] superimposed on chronic [facility] disease [facility]    [facility]olic stroke ([facility] )    Hyperlipidemia, mixed    CAD (coronary artery disease)    Status post percutaneous transluminal coronary angioplasty    V-tach ([facility] )    Ischemic cardiomyopathy    PAF (paroxysmal atrial fibrillation) ([facility] )    Essential hypertension    Cardiomyopathy (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    Fall    Urinary retention    Lung nodule    Elevated troponin    Confusion       Active problems addressed:  · Uremic encephalopathy  · CHRISTY, worsening  · CKD4   · Chronic combined systolic and diastolic CHF (EF 61%)  · VTach s/p medtronic dual chamber ICD  · Fall  · Goals of care    Consult is for goals of care    PLAN:    1  Goals:    At this time, family - including wife and daughter/Sierra are almost 100% sure they want hospice, pending more family discusion  But agreeable to place referral now to get process started fopr home hospice with option to cancel later on if they changed their minds   Daughter asked for prognosis which I said may be hours to days based on a PPS of 10% from his currently clinical state, labs AT [facility]  May change day to day   Family made aware that we are at the point now where there are limited medical interventions that we can offer safely, to provide him significant improvement   Hospice was strongly recommended to allow nature and the natural trajectories of both heart and kidney issues to happen while accepting services on to alleviate any suffering for however long it takes  · Daughter aware that we will try our best to get him home but he needs to be hemodynamically stable to survive the transport home for us to be able to do that  If further decline were to happen while here, we can start process of comfort while here  For now, he will remain a level 3, continue process for medical stabilization   D/w SLIM via TT    Code status: Level 3 - DNAR and DNI   Decisional apparatus:  Patient does not have capacity to make medical decisions on my exam today  If such capacity is lost, patient's substitute decision maker would default to wife by [place]  Advance Directive / Living Will / POLST:  None on file     2  Social Support:   Supportive listening provided   Time spent providing emotional support to both wife and daughter    We appreciate the opportunity to participate in this patient's care  We will continue to follow  Please do not hesitate to contact our on-call provider through our clinic answering service at [phone] should you have acute symptom control concerns  IDENTIFICATION:  Inpatient consult to Palliative Care  Consult performed by: Samantha Lim MD  Consult ordered by: Inocencio Evangelista PA-C        Reason for Consult / Principal Problem: goals     HISTORY OF PRESENT ILLNESS:    Tavo Langford is a 80 y o  male with palliative diagnoses of uremic encephalopathy, acute on CKD4,   chronic combined systolic and diastolic CHF (EF 31%), who was admitted after a fall at home on 3/27  Upon arrival to the ED, he was noted to be bradycardic to the 30s and hypotensive  Trauma work up was negative for acute issues  He was noted to be in CHRISTY thought due to urinary retention as appreciated on his CT AP  He also has transaminitis  Cardio consulted who interrogated ICD  GI consulted for transiminitis, observing trend  Urology on board who manages mejia cath for urinary retention   Nephro got involved on 3/29 when patient developed worsening CHRISTY with ongoing confusion, concerning for uremic encephalopathy  Given his co-morbidities, particularly a very poor EF of 12%, he was deemed a poor candidate for RRT  [place] consulted for [address]  Saw patient in his room  Encephalopathic  Did not open eyes, though tried to open them  He did turn his head towards the sound of my voice, but basically was largely unresponsive, did not talk back or follow commands  First spoke to wife  Explained the pathophysiology of his CHRISTY which is what nephro said - likely secondary to cardiorenal syndrome plus ischemic injury secondary to hemodynamics + obstructive uropathy (now with Damico)  We discussed how his poorly functioning heart at an EF of 12% likely will not tolerate HD safely, HD may then do more harm than good, and he may end up dying during it  Given this, it appears that we have no other good options to fix his heart and kidneys  We are also seeing the consequences of slowly deteriorating kidney function, inculding uremic encephalopathy  Given this, I highly recommend hospice to focus on end of life cares to alleviate any suffering while we allow the natural trajectories of his heart and kidney issues to happen, with the expectation that he will eventually succumb to it  Wife was insistent he already has this service in place - referring to VNA/HHA  We differentiated VNA/HHA vs hospice but at the end of the day, I was not confident she was understanding the whole extent of our conversation  She did mention wanting him home, asked me how much hospice will cost  Towards the end while I tried to emphasize use of morphine as part of end of life, she then seem unable to comprehend that and so asked me to give her time to speak to the rest of the family  Because I wasn't sure if she will be able to do so eloquently, I offered to call one of her kids  She gave me Sierra's number  I then called Belkis Tsang and again explained above   Belkis Tsang demonstrated a very good understanding, judgement and insight into the patient's medical condition after we again went through his CHRISTY pathophysiology  She understands why we are recommending hospice at this time, given HD is not a safe or effective option  She asked about prognosis  She also said she wants him home  She agreed to me placing the referral so we can get this process started as soon as possible to get him home  She will also speak to her mother about this  She seems surprised when I mentioned my concerns about her mother not fully understanding what we were discussing with her throughout the day  Daughter aware that we will try our best to get him home but he needs to be hemodynamically stable to survive the transport home for us to be able to do that  If further decline were to happen while here, we can start process of comfort while here  For now, he will remain a level 3, continue process for medical stabilization  Interview and exam limited by: encephalopathy    Review of Systems   Unable to perform ROS: Mental status change       Past Medical History:   Diagnosis Date    Anemia     Arthritis     Coronary artery disease     CVA [facility]cerebral vascular accident) ([facility] )     Hyperlipidemia     Hypertension      Past Surgical History:   Procedure Laterality Date    CARDIAC PACEMAKER PLACEMENT  05/09/2011    Medtronic dual chamber ICD implant    CORONARY ANGIOPLASTY  1989     Social History     Socioeconomic History    Marital status: /Civil Union     Spouse name: Not on file    Number of children: Not on file    Years of education: Not on file    Highest education level: Not on file   Occupational History    Not on file   Tobacco Use    Smoking status: Never Smoker    Smokeless tobacco: Never Used   Vaping Use    Vaping Use: Never used   Substance and Sexual Activity    Alcohol use:  Yes     Alcohol/week: 3 0 standard drinks     Types: 3 Shots of liquor per week     Comment: daily    Drug use: Never    Sexual activity: Not on file   Other Topics Concern    Not on file   Social History Narrative    Not on file     Social Determinants of Health     Financial Resource Strain: Not on file   Food Insecurity: No Food Insecurity    Worried About Running Out of Food in the Last Year: Never true    Leonie of Food in the Last Year: Never true   Transportation Needs: No Transportation Needs    Lack of Transportation (Medical): No    Lack of Transportation (Non-Medical): No   Physical Activity: Not on file   Stress: Not on file   Social Connections: Not on file   Intimate Partner Violence: Not on file   Housing Stability: Low Risk     Unable to Pay for Housing in the Last Year: No    Number of Places Lived in the Last Year: 1    Unstable Housing in the Last Year: No     Family History   Problem Relation Age of Onset    Coronary artery disease Mother     Heart attack Father         MI    Stroke Sister        MEDICATIONS / ALLERGIES:  all current active meds have been reviewed    No Known Allergies    OBJECTIVE:  BP 99/62 (BP Location: Left arm)   Pulse 62   Temp 97 6 °F (36 4 °C) (Temporal)   Resp 20   Ht 5' 2" (1 575 m)   Wt 58 2 kg (128 lb 4 9 oz)   SpO2 91%   BMI 23 47 kg/m²   Physical Exam:  Constitutional: Appears thin, frail looking  Sick looking but not toxic appearing  Encephalopathic, appears comfortable  In no acute physical or emotional distress  Head: Normocephalic and atraumatic  Temporal, submaxillary and periorbital mm wasting  Eyes: Eyes closed, lids normal    Neck: No visible adenopathy or masses  Respiratory: Effort normal  No stridor  No respiratory distress  Breathing through mouth  Gastrointestinal: No abdominal distension  Musculoskeletal: No edema  Neurological: Encephalopathic, tried to open eyes, but couldn't  Not able to follow commands or talk back,   Skin: Dry, no diaphoresis  Psychiatric: not agitated  Lab Results: I have personally reviewed pertinent labs    Imaging Studies: I have personally reviewed pertinent reports  EKG, Pathology, and Other Studies: I have personally reviewed pertinent reports  Counseling / Coordination of Care:  Counseling / Coordination of Care  Total floor / unit time spent today 60+ minutes  Greater than 50% of total time was spent with the patient and / or family counseling and / or coordination of care  A description of the counseling / coordination of care: provided medical updates, discussed palliative care, discussed hospice care, determined competency, determined goals of care, determined POA, determined social/family support, discussed plans of care, discussed symptom management, provided psychosocial support       MD [address] and Supportive Care

## 2022-03-30 NOTE — ASSESSMENT & PLAN NOTE
Noted on 3/29, non-focal exam   Patient's wife states that he is forgetful at times but has no diagnosed history of dementia  Appears per prior notes patient was a bit confused as well    · CT head negative on arrival  · Concerning for uremic encephalopathy- patient is not a good candidate for RRT per nephrology, considering hospice, pending palliative consult    · Reorient, redirect as able  · Correct electrolyte/metabolic derangements

## 2022-03-30 NOTE — CASE MANAGEMENT
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 2022    Goals of care, counseling/discussion 2022    Dizziness 2022    Transaminitis 2022    Bilateral lower extremity edema 2021    Hyponatremia 12/10/2021    Chronic combined systolic and diastolic congestive heart failure ([facility] ) 10/23/2021    Ischemic cardiomyopathy 10/09/2018    PA[facility]oxysmal atrial fibrillation) ([facility] ) 10/09/2018    Essential hypertension 10/09/2018    Cardiac defibrillator in situ 2016    Acute kidney injury superimposed on chronic kidney disease ([facility] ) 4190    Embolic stroke ([facility] )     Hyperlipidemia, mixed 2016    CAD (coronary artery disease) 2016    Status post percutaneous transluminal coronary angioplasty 2016    V-tach ([facility] ) 2016    Cardiomyopathy ([facility] ) 2016      LOS (days): 3  Geometric Mean LOS (GMLOS) (days): 3 10  Days to GMLOS:0     OBJECTIVE:  Risk of Unplanned Readmission Score: 37         Current admission status: Inpatient   Preferred Pharmacy:   [address] [place] [address]  Phone: [phone] Fax: [phone]    [address]   [address] [place] [id number]  Phone: [phone] Fax: [phone]    Primary Care Provider: Scott Tay DO    Primary Insurance: [facility]  Secondary Insurance:     DISCHARGE DETAILS:    Comments - Freedom of Choice: Rec a message from ASHLEY that pt's family is agreeable to home hospice and a referral was made today   is dtjuliet Covarrubias at [phone] or [phone]  Need to follow up with assessment

## 2022-03-30 NOTE — PROGRESS NOTES
[address] [place] [address]  Progress Note Jameson Alcocer 10/31/1932, 80 y o  male MRN: [id number]  Unit/Bed#: E4 -01 Encounter: [id number]  Primary Care Provider: Ezequiel Espino DO   Date and time admitted to hospital: 3/27/2022 11:36 AM    * Fall  Assessment & Plan  Presented after suffering from a mechanical fall x2  Denied any loss consciousness or presyncopal symptoms  CT head and CT C-spine negative for acute injury  Noted during documentation, that patient was bradycardic and hypotensive upon EMS arrival     See rest of the treatment plan below  · Continue with telemetry  · ICD interrogation done on 3/28--appears negative from my read  · PT/OT recommending rehab, now consideration for home hospice, wife does not want patient at [facility]    Acute kidney injury superimposed on chronic kidney disease [facility])  Assessment & Plan  Lab Results   Component Value Date    EGFR 13 03/30/2022    EGFR 13 03/29/2022    EGFR 17 03/28/2022    CREATININE 3 85 (H) 03/30/2022    CREATININE 3 68 (H) 03/29/2022    CREATININE 3 02 (H) 03/28/2022     Baseline creatine unclear  In Jan 2022 was felt to be around 1 9  Presented with creatinine 2 89  · Likely 2/2 cardiorenal syndrome/ischemic injury/obstructive uropathy   · Creatinine has continued to trend upward, 3 85 today, now with confusion concerning for uremic encephalopathy   · S/p mejia placement on 3/28  · Diuretics on hold  · Albumin 25g IV q6h x 2 doses today by nephrology   · Patient is not an ideal candidate for RRT per nephrology due to comorbidities/low EF 12%  Patient is not able to make informed medical decisions at this time, wife agreeable to not pursue RRT  Consideration for home hospice, palliative consult placed       Goals of care, counseling/discussion  Assessment & Plan  Patient DNR/DNI  · Patient is not able to make informed medical decisions at this time as he is encephalopathic likely uremic in nature   · Wife making medical decisions at this time and agrees to not pursue RRT at this time   · Discussed hospice with wife although she appears to lack insight about the situation, unclear if she truly understands the situation  Appreciate palliative medicine assistance in regards to further driving discussion for hospice  · Wife did clearly state that she would only be agreeable to home hospice   · CM made aware and following      Chronic combined systolic and diastolic congestive heart failure (HCC)  Assessment & Plan  Wt Readings from Last 3 Encounters:   03/30/22 58 2 kg (128 lb 4 9 oz)   03/25/22 74 4 kg (164 lb)   03/17/22 58 5 kg (129 lb)     LVEF 12%, mild LV dilatation, diastolic dysfunction, regional wall motion abnormalities, paradoxical septal wall motion consistent with conduction abnormality, moderately reduced RV function, mild LA dilatation, mild MR/TR/ID w/ PASP 45-50 mmHg, mild aortic dilatation October 2021  · Patient with ICD in place  · Appreciate cardiology input  · Continue Coreg with holding parameters, oral diuretics currently on hold given acute kidney injury superimposed on CKD  · Will defer diuretic dosing to cards/renal        Confusion  Assessment & Plan  Noted on 3/29, non-focal exam   Patient's wife states that he is forgetful at times but has no diagnosed history of dementia  Appears per prior notes patient was a bit confused as well    · CT head negative on arrival  · Concerning for uremic encephalopathy- patient is not a good candidate for RRT per nephrology, considering hospice, pending palliative consult    · Reorient, redirect as able  · Correct electrolyte/metabolic derangements     Elevated troponin  Assessment & Plan  · Likely nonischemic myocardial injury per Cardiology    Lung nodule  Assessment & Plan  Noted incidentally on imaging, new, 4 mm right lower lobe nodule  · Recommend repeat CT chest in 1 year    Urinary retention  Assessment & Plan  CT on arrival showed massive bladder distention causing mild bilateral hydroureter nephrosis and prostatomegaly  · S/p multiple straight catheterizations earlier in hospital stay, s/p Damico catheter placement 3/28  · Urology consult for input, appreciate   · Damico catheter should remain in place for at least 2 weeks  · Repeat renal US: pending         Transaminitis  Assessment & Plan  Multifactorial  Etiologies include congestive hepatopathy, ischemic injury related to hypotension, drug induced liver injury from amiodarone  Liver appeared unremarkable on CT without biliary dilatation  · Appreciate GI input--they feel this is most likely congestive hepatopathy, may also be element of DILI 2/2 amiodarone  · Acute hepatitis panel negative  · Cards following--recommend to stop amiodarone and stop mexiletine  · If patient were to have recurrent VT or VF, Rx should be restarted  · Daily CMP- LFTs downtrending     Essential hypertension  Assessment & Plan  BP low-normal  · Continue Coreg 12 5 mg BID  · Consider reduced dose in setting of acute kidney injury/borderline blood pressures    PAF (paroxysmal atrial fibrillation) (HCC)  Assessment & Plan  Rate control: Carvedilol  · AC: Warfarin 2mg S-S-T-Th Warfarin 1mg MWF    Was changed to 1 mg daily here given therapeutic levels however now slightly subtherapeutic, will adjust dose back to home regimen as of 3/29  · Monitor INR  · Consider eventual discussion regarding risks vs benefits if continues to fall    Recent Labs     03/28/22  0617 03/29/22  0554 03/30/22  0544   INR 2 11* 1 75* 2 04*         V-tach [facility])  Assessment & Plan  Per cards note 3/25/2022: "His episodes of ventricular tachycardia requiring defibrillator shocks appear to be controlled on amiodarone and mexiletine "  · Cardiology stopped amiodarone and mexiletine  · If patient has recurrent VT or VF, Rx should be restarted   · Continue telemetry  · ICD interrogation appears without events  · Keep potassium greater than 4, magnesium greater than 2    CAD (coronary artery disease)  Assessment & Plan  CAD w/ remote history of MI and angioplasty,   · Continue Coreg  · Not on aspirin as he takes warfarin chronically  · Statin on hold given transaminitis      Hyperlipidemia, mixed  Assessment & Plan  · Statin on hold in setting of transaminitis          VTE Pharmacologic Prophylaxis:   High Risk (Score >/= 5) - Pharmacological DVT Prophylaxis Ordered: warfarin (Coumadin)  Sequential Compression Devices Ordered  Patient Centered Rounds: I performed bedside rounds with nursing staff today  Discussions with Specialists or Other Care Team Provider: Urbano Romero palliative, cards     Education and Discussions with Family / Patient: Updated  (wife) via phone  Time Spent for Care: 45 minutes  More than 50% of total time spent on counseling and coordination of care as described above  Current Length of Stay: 3 day(s)  Current Patient Status: Inpatient   Certification Statement: The patient will continue to require additional inpatient hospital stay due to consideration for hospice   Discharge Plan: Anticipate discharge in 48-72 hrs to [place] [address] vs  rehab vs home hospice     Code Status: Level 3 - DNAR and DNI    Subjective:   Patient was in bed with met restraints  Patient is confused, only oriented to self  Unable to tell me where he is or what day/month/year it is  Also appears lethargic  Long discussion with wife over the phone about consideration for home hospice  Objective:     Vitals:   Temp (24hrs), Av 1 °F (36 7 °C), Min:97 6 °F (36 4 °C), Max:99 1 °F (37 3 °C)    Temp:  [97 6 °F (36 4 °C)-99 1 °F (37 3 °C)] 97 6 °F (36 4 °C)  HR:  [] 62  Resp:  [16-22] 20  BP: ()/(54-80) 99/62  SpO2:  [90 %-98 %] 91 %  Body mass index is 23 47 kg/m²  Input and Output Summary (last 24 hours):      Intake/Output Summary (Last 24 hours) at 3/30/2022 1452  Last data filed at 3/29/2022 1824  Gross per 24 hour   Intake --   Output 600 ml   Net -600 ml       Physical Exam:   Physical Exam  Constitutional:       General: He is not in acute distress  Comments: Chronically ill-appearing   HENT:      Mouth/Throat:      Mouth: Mucous membranes are dry  Eyes:      General: No scleral icterus  Cardiovascular:      Rate and Rhythm: Normal rate and regular rhythm  Heart sounds: Normal heart sounds  Pulmonary:      Breath sounds: Normal breath sounds  Abdominal:      General: Abdomen is flat  Bowel sounds are normal       Palpations: Abdomen is soft  Musculoskeletal:      Right lower leg: No edema  Left lower leg: No edema  Skin:     General: Skin is warm  Neurological:      Mental Status: He is alert  He is disoriented  Additional Data:     Labs:  Results from last 7 days   Lab Units 03/30/22  0544 03/29/22  0554 03/29/22  0554   WBC Thousand/uL 6 35   < > 6 88   HEMOGLOBIN g/dL 11 5*   < > 11 4*   HEMATOCRIT % 34 5*   < > 33 9*   PLATELETS Thousands/uL 72*   < > 86*   BANDS PCT %  --   --  1   NEUTROS PCT % 80*  --   --    LYMPHS PCT % 8*  --   --    LYMPHO PCT %  --   --  6*   MONOS PCT % 11  --   --    MONO PCT %  --   --  7   EOS PCT % 0  --  0    < > = values in this interval not displayed       Results from last 7 days   Lab Units 03/30/22  0544   SODIUM mmol/L 134*   POTASSIUM mmol/L 4 7   CHLORIDE mmol/L 99*   CO2 mmol/L 22   BUN mg/dL 74*   CREATININE mg/dL 3 85*   ANION GAP mmol/L 13   CALCIUM mg/dL 7 7*   ALBUMIN g/dL 1 8*   TOTAL BILIRUBIN mg/dL 2 62*   ALK PHOS U/L 47   ALT U/L 387*   AST U/L 260*   GLUCOSE RANDOM mg/dL 103     Results from last 7 days   Lab Units 03/30/22  0544   INR  2 04*     Results from last 7 days   Lab Units 03/29/22  1130   POC GLUCOSE mg/dl 104               Lines/Drains:  Invasive Devices  Report    Peripheral Intravenous Line            Peripheral IV 03/27/22 Right Forearm 3 days          Drain            Urethral Catheter 16 Fr  2 days              Urinary Catheter:  Goal for removal: N/A- Discharging with Damico           Telemetry:  Telemetry Orders (From admission, onward)             48 Hour Telemetry Monitoring  Continuous x 48 hours        References:    Telemetry Guidelines   Question:  Reason for 48 Hour Telemetry  Answer:  Arrhythmias Requiring Medical Therapy (eg  SVT, Vtach/fib, Bradycardia, Uncontrolled A-fib)                 Telemetry Reviewed: Normal Sinus Rhythm  Indication for Continued Telemetry Use: Arrthymias requiring medical therapy             Imaging: No pertinent imaging reviewed  Recent Cultures (last 7 days):         Last 24 Hours Medication List:   Current Facility-Administered Medications   Medication Dose Route Frequency Provider Last Rate    albumin human  25 g Intravenous Q6H Arturo Bartlett MD      carvedilol  3 125 mg Oral BID With Meals Mallory Chaudhry MD      nystatin   Topical BID Kadi Roman MD      warfarin  1 mg Oral Once per day on Mon Wed Fri Santa Guardado PA-C      warfarin  2 mg Oral Once per day on Sun Tue Thu Sat Cynthia Clarke PA-C          Today, Patient Was Seen By: Pretty Slade PA-C    **Please Note: This note may have been constructed using a voice recognition system  **

## 2022-03-30 NOTE — ASSESSMENT & PLAN NOTE
Per cards note 3/25/2022: "His episodes of ventricular tachycardia requiring defibrillator shocks appear to be controlled on amiodarone and mexiletine "  · Cardiology stopped amiodarone and mexiletine  · If patient has recurrent VT or VF, Rx should be restarted   · Continue telemetry  · ICD interrogation appears without events  · Keep potassium greater than 4, magnesium greater than 2

## 2022-03-30 NOTE — PROGRESS NOTES
Follow up Consultation    Nephrology   Gertrude Carey 80 y o  male MRN: [id number]  Unit/Bed#: E4 -01 Encounter: [id number]      Physician Requesting Consult: Winston Dwyer MD        ASSESSMENT/PLAN:  70-year-old male with multiple comorbidities including CKD stage IIIB/4, hypertension, CHF, CVA, AFib, alcohol use admitted status post fall  Nephrology consulted for evaluation management of acute kidney injury      AOX3  S1,s2  Lungs clear  Abd soft  Dry mucous membranes  No lower extremity edema     Acute kidney injury (POA) on CKD stage IIIB/4:  CHRISTY most likely secondary to cardiorenal syndrome plus ischemic injury secondary to hemodynamics + obstructive uropathy (now with Damico) plus recent episode of acute kidney injury without complete recovery on 03/24/2022  After review of records [facility] as well as Care everywhere it appears that the patient has a baseline Creatinine of 1 6-2 1 mg/dL  patient was admitted with a creatinine of 2 89 mg/dL on 03/27/2022  patient's creatinine today is at 3 85 mg/dL, continues to worsen  CT abdomen from 03/27/2022 showing mild bilateral hydroureteronephrosis without obstructive calculi  Marked bladder distension increased from prior  Complete plasmalyte @ 50cc/hr for 1 L then DC  D/w patient on 03/29 he is not an ideal candidate for RRT due to his underlying comorbidities especially his low EF  He agrees and understands  I d/w him about possible hospice and he deferred the discussion to his wife  I called and spoke to patient's spouse rediscussed patient's renal status updated her on worsening renal parameters and potential patient with uremia today due to worsening mental status and worsening creatinine/BUN  Reiterated how renal replacement therapy may be detrimental in his situation due to his poor heart function explained all risks and benefits  She agrees and does not wish to pursue with dialysis  Wishes for the patient to come home    I discussed with her potential meeting with palliative care and arrangements for home with hospice  She is in agreement for meeting up with palliative  I am not quite sure if she clearly understands the critical nature of this current situation and about hospice due to lack of insight  She reports he will call the patient and talked him on the phone I did tell her that patient is not communicating well with me today  Will give albumin 25 g IV Q 6 x 2 doses today  Recommend palliative care consultation to help transitioning patient to home with hospice if no improvement in renal parameters tomorrow  Recommend cardiology consultation for follow-up  check BMP in a m  Await renal recovery  Optimize hemodynamic status to avoid delay in renal recovery  Place on a renal diet when allowed diet order  Avoid nephrotoxins, adjust meds to appropriate GFR  Strict I/O  Daily weights  Urinary retention protocol if patient does not have a Damico  Most likely has underlying CKD secondary to cardiorenal syndrome plus age-related nephron loss plus prior episode of acute kidney injury non dialysis requiring  as an outpatient for nephrology patient follows up with Dr GWEN Powers     Blood pressure/hypertension:  current medications:  Coreg 12 5 mg p o  B i d   recommendations:  Hold diuretics for now, will give albumin 25 g IV Q 6 x 2 doses today to help with intravascular volume shifts  Optimize hemodynamics  Maintain MAP > 65mmHg  Avoid BP fluctuations      H/H/anemia:  most recent hemoglobin at 11 5 grams/deciliter  maintain hemoglobin greater than 8 grams/deciliter     Acid-base electrolytes:  Electrolytes:    Hyponatremia:   Most recent sodium at 134 mEq  Remains stable  Continue to monitor for now     Acid-base:    Most recent bicarb at 22        Other medical problems:  Proteinuria: Most recent UA with no protein as of 03/27/2022  Urinary retention/bilateral hydro ureteronephrosis:  Management per primary team   Follow-up with Urology  To maintain Damico catheter for now  CHF:  Management per primary team   Follow-up with cardiology  Most recent echo from 10/24/2021 with EF of 12%  Hold diuretics , complete IV fluids and give albumin x2 doses  For now      Thanks for the consult  Will continue to follow  Please call with questions/ concerns  Above-mentioned orders and Plan in terms of acute kidney injury was discussed with the team in depth via phone  l also spoke to patient's spouse in detail    Noah Najera MD, FASN, 3/30/2022, 11:45 AM              Objective :   Patient seen and examined in his room blood pressures occasional dips overnight maintains in the low 100s remains afebrile  Damico with good urine output visualized in the room however yesterday's did not have accurate output documented currently appears to be confused not answering appropriately has mittens in place  Was like this yesterday earlier in the morning however during the day mental status had improved  Unable to appropriately participate in review of systems or exam      PHYSICAL EXAM  BP 99/62 (BP Location: Left arm)   Pulse 62   Temp 97 6 °F (36 4 °C) (Temporal)   Resp 20   Ht 5' 2" (1 575 m)   Wt 58 2 kg (128 lb 4 9 oz)   SpO2 91%   BMI 23 47 kg/m²   Temp (24hrs), Av 1 °F (36 7 °C), Min:97 6 °F (36 4 °C), Max:99 1 °F (37 3 °C)        Intake/Output Summary (Last 24 hours) at 3/30/2022 1145  Last data filed at 3/29/2022 1824  Gross per 24 hour   Intake --   Output 600 ml   Net -600 ml       I/O last 24 hours: In: -   Out: 600 [Urine:600]      Current Weight: Weight - Scale: 58 2 kg (128 lb 4 9 oz)  First Weight: Weight - Scale: 74 4 kg (164 lb 0 4 oz)  Physical Exam  Vitals and nursing note reviewed  Constitutional:       General: He is not in acute distress  Appearance: Normal appearance  He is normal weight  He is ill-appearing  He is not toxic-appearing  HENT:      Head: Normocephalic and atraumatic        Mouth/Throat:      Pharynx: Oropharynx is clear  No oropharyngeal exudate  Eyes:      General: No scleral icterus  Conjunctiva/sclera: Conjunctivae normal    Cardiovascular:      Rate and Rhythm: Normal rate  Heart sounds: Normal heart sounds  No friction rub  Pulmonary:      Effort: Pulmonary effort is normal  No respiratory distress  Breath sounds: Normal breath sounds  No stridor  No wheezing  Abdominal:      General: There is no distension  Palpations: Abdomen is soft  There is no mass  Tenderness: There is no abdominal tenderness  Musculoskeletal:         General: No swelling  Cervical back: Normal range of motion and neck supple  No rigidity  Skin:     General: Skin is warm  Coloration: Skin is not jaundiced  Neurological:      General: No focal deficit present  Mental Status: He is alert  He is disoriented  Psychiatric:         Mood and Affect: Mood normal          Behavior: Behavior normal              Review of Systems   Unable to perform ROS: Mental status change   Respiratory: Negative for cough and shortness of breath  Cardiovascular: Negative for leg swelling  Neurological: Negative for headaches  Psychiatric/Behavioral: Positive for confusion  Negative for agitation  Scheduled Meds:  Current Facility-Administered Medications   Medication Dose Route Frequency Provider Last Rate    albumin human  25 g Intravenous Q6H Aldair Kendall MD      carvedilol  12 5 mg Oral BID With Meals WANDER Platt      mexiletine  150 mg Oral Q12H [address] Rosario Meeks MD      nystatin   Topical BID Rosario Meeks MD      warfarin  1 mg Oral Once per day on Mon Wed Fri Jacqueline Lakhani PA-C      warfarin  2 mg Oral Once per day on Sun Tue Thu Sat Jacqueline Lakhani PA-C         PRN Meds:     Continuous Infusions:       Invasive Devices:      Invasive Devices  Report    Peripheral Intravenous Line            Peripheral IV 03/27/22 Right Forearm 3 days          Drain            Urethral Catheter 16 Fr  2 days                  LABORATORY:    Results from last 7 days   Lab Units 03/30/22  0544 03/29/22  0554 03/28/22  0617 03/27/22  1145 03/24/22  0851   WBC Thousand/uL 6 35 6 88 7 23 11 04*  --    HEMOGLOBIN g/dL 11 5* 11 4* 11 1* 11 2*  --    HEMATOCRIT % 34 5* 33 9* 33 8* 34 0*  --    PLATELETS Thousands/uL 72* 86* 81* 100*  --    POTASSIUM mmol/L 4 7 4 4 4 2 3 3* 4 4   CHLORIDE mmol/L 99* 100 101 98* 105   CO2 mmol/L 22 21 22 29 27   BUN mg/dL 74* 57* 44* 38* 35*   CREATININE mg/dL 3 85* 3 68* 3 02* 2 89* 2 87*   CALCIUM mg/dL 7 7* 8 0* 8 3 8 4 8 7   MAGNESIUM mg/dL  --  1 8 1 8  --   --    PHOSPHORUS mg/dL 4 9*  --  4 3*  --   --       rest all reviewed    RADIOLOGY:  CT chest abdomen pelvis wo contrast   Final Result by Jarred Jenkins MD (03/27 1352)      Massive bladder distention causing mild bilateral hydroureteronephrosis  Prostamegaly  No acute findings in the chest       New 4 mm right lower lobe nodule  Follow-up with repeat CT chest in one year if the patient is considered high risk for lung cancer  The study was marked in [facility] for immediate notification  Workstation performed: [id number]         CT head without contrast   Final Result by Hernan Melendez MD (03/27 1216)      No acute intracranial abnormality  Workstation performed: [id number]         CT cervical spine without contrast   Final Result by Hernan Melendez MD (03/27 1222)      No cervical spine fracture or traumatic malalignment  Workstation performed: [id number]         US kidney and bladder    (Results Pending)     Rest all reviewed    Portions of the record may have been created with voice recognition software  Occasional wrong word or "sound a like" substitutions may have occurred due to the inherent limitations of voice recognition software  Read the chart carefully and recognize, using context, where substitutions have occurred  If you have any questions, please contact the dictating provider

## 2022-03-30 NOTE — ASSESSMENT & PLAN NOTE
Wt Readings from Last 3 Encounters:   03/30/22 58 2 kg (128 lb 4 9 oz)   03/25/22 74 4 kg (164 lb)   03/17/22 58 5 kg (129 lb)     LVEF 12%, mild LV dilatation, diastolic dysfunction, regional wall motion abnormalities, paradoxical septal wall motion consistent with conduction abnormality, moderately reduced RV function, mild LA dilatation, mild MR/TR/NV w/ PASP 45-50 mmHg, mild aortic dilatation October 2021  · Patient with ICD in place  · Appreciate cardiology input  · Continue Coreg with holding parameters, oral diuretics currently on hold given acute kidney injury superimposed on CKD  · Will defer diuretic dosing to cards/renal

## 2022-03-31 PROBLEM — G93.40 ENCEPHALOPATHY ACUTE: Status: ACTIVE | Noted: 2022-01-01

## 2022-03-31 PROBLEM — R50.9 FEVER: Status: ACTIVE | Noted: 2022-01-01

## 2022-03-31 NOTE — ASSESSMENT & PLAN NOTE
· Noted tmax of 102 4 this am   · Discussed with daughter- will add CTX for now until we determine plan of care moving forward   Will hold off on further imaging or cultures at this time since they are planning for home hospice

## 2022-03-31 NOTE — ASSESSMENT & PLAN NOTE
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

## 2022-03-31 NOTE — ASSESSMENT & PLAN NOTE
· worsening uremic encephalopathy- patient is not a good candidate for RRT per nephrology, considering hospice home vs  facility pending hospice liaison discussion

## 2022-03-31 NOTE — CASE MANAGEMENT
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 2022    Goals of care, counseling/discussion 2022    Dizziness 2022    Transaminitis 2022    Bilateral lower extremity edema 2021    Hyponatremia 12/10/2021    Chronic combined systolic and diastolic congestive heart failure ([facility] ) 10/23/2021    Ischemic cardiomyopathy 10/09/2018    PAF (paroxysmal atrial fibrillation) ([facility] ) 10/09/2018    Essential hypertension 10/09/2018    Cardiac defibrillator in situ 2016    [facility] on [facility]     Embolic stroke ([facility] )     Hyperlipidemia, mixed 2016    CAD (coronary artery disease) 2016    Status post percutaneous transluminal coronary angioplasty 2016    V-tach ([facility] ) 2016    Cardiomyopathy ([facility] ) 2016      LOS (days): 4  Geometric Mean LOS (GMLOS) (days): 3 10  Days to GMLOS:-1     OBJECTIVE:  Risk of Unplanned Readmission Score: 37         Current admission status: Inpatient   Preferred Pharmacy:   [address] [place] [address]  Phone: [phone] Fax: [phone]    [address] [place] [address] [address] [place] [address]  [address] [facility] [address]  Phone: [phone] Fax: [phone]    Primary Care Provider: Nathaniel Wells DO    Primary Insurance: [facility] [employer] REP  Secondary Insurance:     DISCHARGE DETAILS:  Comments - Freedom of Choice: Pt will need his ICD turn off if signing onto hospice and ASHLEY is aware of this

## 2022-03-31 NOTE — PROGRESS NOTES
[facility]  Progress Note [facility] [place] 10/31/1932, 80 y o  male MRN: [id number]  Unit/Bed#: E4 -Amandeep Encounter: [id number]  Primary Care Provider: Tami Desai DO   Date and time admitted to hospital: 3/27/2022 11:36 AM    Addendum 3/31/22 1200: met with wife and daughter at bedside  Discussed current medical status, worsening encephalopathy, fever  Family requesting information on palliative care vs  Hospice, this was explained and family would like more information on home hospice  Will request hospice liason come to discuss at bedside with the family  CM notified  Family is agreeable with avoiding further workup for fever at this time  Pt appears comfortable at this time in no acute distress, sleeping  Wife is agreeable with DNR level 4 status  * Fall  Assessment & Plan  Presented after suffering from a mechanical fall x2  Denied any loss consciousness or presyncopal symptoms  CT head and CT C-spine negative for acute injury  Noted during documentation, that patient was bradycardic and hypotensive upon EMS arrival     See rest of the treatment plan below  · Continue with telemetry  · ICD interrogation done on 3/28-  negative   · PT/OT recommending rehab, now consideration for home hospice, wife does not want patient at SNF    Encephalopathy acute  Assessment & Plan  · worsening uremic encephalopathy- patient is not a good candidate for RRT per nephrology, considering hospice home vs  facility pending hospice liaison discussion     Acute kidney injury superimposed on chronic kidney disease [facility])  Assessment & Plan  Lab Results   Component Value Date    EGFR 12 03/31/2022    EGFR 13 03/30/2022    EGFR 13 03/29/2022    CREATININE 3 91 (H) 03/31/2022    CREATININE 3 85 (H) 03/30/2022    CREATININE 3 68 (H) 03/29/2022     Baseline creatine unclear  In Jan 2022 was felt to be around 1 9    Presented with creatinine 2 89 now 3 91  · Likely 2/2 cardiorenal syndrome/ischemic injury/obstructive uropathy   · worsening uremic encephalopathy   · S/p mejia placement on 3/28  · Diuretics on hold  · Patient is not an ideal candidate for RRT per nephrology due to comorbidities/low EF 12%  Patient is not able to make informed medical decisions at this time, wife agreeable to not pursue RRT  Consideration for home hospice, palliative consult placed- still awaiting official decision from family if they would like to pursue home hospice vs  Facility     Fever  Assessment & Plan  · Noted tmax of 102 4 this am   · Discussed with daughter- will add CTX for now until we determine plan of care moving forward   Will hold off on further imaging or cultures at this time since they are planning for home hospice     Chronic combined systolic and diastolic congestive heart failure (HCC)  Assessment & Plan  Wt Readings from Last 3 Encounters:   03/31/22 57 kg (125 lb 10 6 oz)   03/25/22 74 4 kg (164 lb)   03/17/22 58 5 kg (129 lb)     LVEF 12%, mild LV dilatation, diastolic dysfunction, regional wall motion abnormalities, paradoxical septal wall motion consistent with conduction abnormality, moderately reduced RV function, mild LA dilatation, mild MR/TR/VT w/ PASP 45-50 mmHg, mild aortic dilatation October 2021  · Patient with ICD in place- will need to be turned off if he pursues hospice   · Appreciate cardiology input  · Continue Coreg with holding parameters, oral diuretics currently on hold given acute kidney injury superimposed on CKD  · Will defer diuretic dosing to cards/renal        Elevated troponin  Assessment & Plan  · Likely nonischemic myocardial injury per Cardiology    Lung nodule  Assessment & Plan  Noted incidentally on imaging, new, 4 mm right lower lobe nodule  · Recommend repeat CT chest in 1 year    Urinary retention  Assessment & Plan  CT on arrival showed massive bladder distention causing mild bilateral hydroureter nephrosis and prostatomegaly  · S/p multiple straight catheterizations earlier in hospital stay, s/p Damico catheter placement 3/28  · Urology consult for input, appreciate   · Damico catheter should remain in place for at least 2 weeks  · Repeat renal US noted        Goals of care, counseling/discussion  Assessment & Plan  Patient DNR/DNI  · Patient is not able to make informed medical decisions at this time as 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   · Wife making medical decisions at this time and agrees to not pursue RRT at this time   · Discussed hospice with wife although she appears to lack insight about the situation, unclear if she truly understands the situation  Appreciate palliative medicine assistance in regards to further driving discussion for hospice  · Wife did clearly state that she would only be agreeable to home hospice   · CM made aware and following      Transaminitis  Assessment & Plan  Multifactorial  Etiologies include congestive hepatopathy, ischemic injury related to hypotension, drug induced liver injury from amiodarone  Liver appeared unremarkable on CT without biliary dilatation  · Appreciate GI input--they feel this is most likely congestive hepatopathy, may also be element of DILI 2/2 amiodarone  · Acute hepatitis panel negative  · Cards following--recommend to stop amiodarone and stop mexiletine  · If patient were to have recurrent VT or VF, Rx should be restarted    Essential hypertension  Assessment & Plan  · Continue Coreg 12 5 mg BID    PAF (paroxysmal atrial fibrillation) (HCC)  Assessment & Plan  Rate control: Carvedilol  · AC: Warfarin 2mg S-S-T-Th Warfarin 1mg MWF    Was changed to 1 mg daily here given therapeutic levels however now slightly subtherapeutic, will adjust dose back to home regimen as of 3/29  · Monitor INR  · Consider eventual discussion regarding risks vs benefits if continues to fall    Recent Labs     03/29/22  0554 03/30/22  0544 03/31/22  0611   INR 1 75* 2 04* 2 61*         V-tach [facility])  Assessment & Plan  Per cards note 3/25/2022: "His episodes of ventricular tachycardia requiring defibrillator shocks appear to be controlled on amiodarone and mexiletine "  · Cardiology stopped amiodarone and mexiletine  · If patient has recurrent VT or VF, Rx should be restarted   · Continue telemetry  · ICD interrogation appears without events  · Keep potassium greater than 4, magnesium greater than 2    CAD (coronary artery disease)  Assessment & Plan  CAD w/ remote history of MI and angioplasty,   · Continue Coreg  · Not on aspirin as he takes warfarin chronically  · Statin on hold given transaminitis      Hyperlipidemia, mixed  Assessment & Plan  · Statin on hold in setting of transaminitis        VTE Pharmacologic Prophylaxis: VTE Score: 4 Moderate Risk (Score 3-4) - Pharmacological DVT Prophylaxis Ordered: warfarin (Coumadin)  Patient Centered Rounds: I performed bedside rounds with nursing staff today  Discussions with Specialists or Other Care Team Provider: d/w RN d/w Dr Susan Riddle, d/w martinez carrion     Education and Discussions with Family / Patient: Updated  (daughter) via phone  Time Spent for Care: 30 minutes  More than 50% of total time spent on counseling and coordination of care as described above  Current Length of Stay: 4 day(s)  Current Patient Status: Inpatient   Certification Statement: The patient will continue to require additional inpatient hospital stay due to pending family decision for hospice   Discharge Plan: Anticipate discharge in 24-48 hrs to home hospice vs  facility     Code Status: Level 4 - Comfort Care    Subjective:   Pt is a poor historian, moaning in bed not able to verbalize name, location, year or if he is having pain where it is located       Objective:     Vitals:   Temp (24hrs), Av °F (37 8 °C), Min:97 3 °F (36 3 °C), Max:102 4 °F (39 1 °C)    Temp:  [97 3 °F (36 3 °C)-102 4 °F (39 1 °C)] 99 °F (37 2 °C)  HR:  [61-81] 64  Resp:  [20] 20  BP: (106-118)/(56-75) 110/68  SpO2:  [92 %-98 %] 97 %  Body mass index is 22 98 kg/m²  Input and Output Summary (last 24 hours): Intake/Output Summary (Last 24 hours) at 3/31/2022 1507  Last data filed at 3/31/2022 1401  Gross per 24 hour   Intake --   Output 1550 ml   Net -1550 ml       Physical Exam:   Physical Exam  Vitals reviewed  Constitutional:       General: He is in acute distress  Appearance: He is cachectic  He is ill-appearing and toxic-appearing  HENT:      Mouth/Throat:      Mouth: Mucous membranes are dry  Eyes:      Conjunctiva/sclera: Conjunctivae normal    Cardiovascular:      Rate and Rhythm: Normal rate and regular rhythm  Pulses: Normal pulses  Heart sounds: Normal heart sounds  No murmur heard  Pulmonary:      Effort: No respiratory distress  Breath sounds: Normal breath sounds  No wheezing or rales  Comments: 2 liters nc  Abdominal:      General: Bowel sounds are normal  There is no distension  Palpations: Abdomen is soft  Tenderness: There is abdominal tenderness  Musculoskeletal:         General: Swelling present  No tenderness  Right lower leg: Edema (trace) present  Left lower leg: Edema (trace) present  Skin:     General: Skin is warm and dry  Findings: No erythema or rash  Neurological:      Mental Status: He is easily aroused  He is lethargic, disoriented and confused     Psychiatric:         Attention and Perception: Attention normal           Additional Data:     Labs:  Results from last 7 days   Lab Units 03/31/22  0611 03/30/22  0544 03/30/22  0544 03/29/22  0554 03/29/22  0554   WBC Thousand/uL 6 26   < > 6 35   < > 6 88   HEMOGLOBIN g/dL 10 4*   < > 11 5*   < > 11 4*   HEMATOCRIT % 30 5*   < > 34 5*   < > 33 9*   PLATELETS Thousands/uL 77*   < > 72*   < > 86*   BANDS PCT %  --   --   --   --  1   NEUTROS PCT %  --   --  80*  --   --    LYMPHS PCT %  --   --  8*  --   --    LYMPHO PCT %  --   --   --   --  6* MONOS PCT %  --   --  11  --   --    MONO PCT %  --   --   --   --  7   EOS PCT %  --   --  0  --  0    < > = values in this interval not displayed  Results from last 7 days   Lab Units 03/31/22  0611   SODIUM mmol/L 138   POTASSIUM mmol/L 4 2   CHLORIDE mmol/L 102   CO2 mmol/L 24   BUN mg/dL 90*   CREATININE mg/dL 3 91*   ANION GAP mmol/L 12   CALCIUM mg/dL 8 1*   ALBUMIN g/dL 2 4*   TOTAL BILIRUBIN mg/dL 3 40*   ALK PHOS U/L 38*   ALT U/L 290*   AST U/L 215*   GLUCOSE RANDOM mg/dL 107     Results from last 7 days   Lab Units 03/31/22  0611   INR  2 61*     Results from last 7 days   Lab Units 03/29/22  1130   POC GLUCOSE mg/dl 104               Lines/Drains:  Invasive Devices  Report    Peripheral Intravenous Line            Peripheral IV 03/31/22 Left;Ventral (anterior) Forearm <1 day          Drain            Urethral Catheter 16 Fr  3 days              Urinary Catheter:  Goal for removal: N/A - Chronic Damico           Telemetry:  Telemetry Orders (From admission, onward)             48 Hour Telemetry Monitoring  Continuous x 48 hours        Expiring   References:    Telemetry Guidelines   Question:  Reason for 48 Hour Telemetry  Answer:  Arrhythmias Requiring Medical Therapy (eg  SVT, Vtach/fib, Bradycardia, Uncontrolled A-fib)                 Telemetry Reviewed: Normal Sinus Rhythm  Indication for Continued Telemetry Use: No indication for continued use  Will discontinue                Imaging: Reviewed radiology reports from this admission including: chest xray, CT head and ct cervical spine     Recent Cultures (last 7 days):         Last 24 Hours Medication List:   Current Facility-Administered Medications   Medication Dose Route Frequency Provider Last Rate    acetaminophen  650 mg Rectal Q6H PRN Vivian Robles PA-C      carvedilol  3 125 mg Oral BID With Meals Katt Laureano MD      cefTRIAXone  1,000 mg Intravenous Q24H WANDER Forbes 1,000 mg (03/31/22 1012)    HYDROmorphone  0 2 mg Intravenous Q3H PRN WANDER Pitts      nystatin   Topical BID Rick Ayon MD      warfarin  1 mg Oral Once per day on Mon Wed Fri Santa Guardado PA-C      warfarin  2 mg Oral Once per day on Sun Tue Thu Sat Brigitte Luna PA-C          Today, Patient Was Seen By: WANDER Pitts    **Please Note: This note may have been constructed using a voice recognition system  **

## 2022-03-31 NOTE — HOSPICE NOTE
Hospice Liaison met with Wife, Cha Arisa and daughter, Ginger Hughes  Home/routine hospice care explained and questions answered  Patient is approved for home hospice  Just awaiting family to make a decision as to what they want  Wife appeared a bit confused about things  Daughter seemed a bit hesitant about removing aggressive measures  Liaison made them aware we would need to disconnect ICD prior to hospice starting  Will await word from family or CM to proceed

## 2022-03-31 NOTE — PROGRESS NOTES
Follow up Consultation    Nephrology   Ashly Jean 80 y o  male MRN: [id number]  Unit/Bed#: E4 -01 Encounter: [id number]      Physician Requesting Consult: Eula Cisneros MD        ASSESSMENT/PLAN:  80-year-old male with multiple comorbidities including CKD stage IIIB/4, hypertension, CHF, CVA, AFib, alcohol use admitted status post fall  Nephrology consulted for evaluation management of acute kidney injury         Acute kidney injury (POA) on CKD stage IIIB/4:  CHRISTY most likely secondary to cardiorenal syndrome plus ischemic injury secondary to hemodynamics + obstructive uropathy (now with Damico) plus recent episode of acute kidney injury without complete recovery on 03/24/2022  After review of records In [facility] as well as Care everywhere it appears that the patient has a baseline Creatinine of 1 6-2 1 mg/dL  patient was admitted with a creatinine of 2 89 mg/dL on 03/27/2022  patient's creatinine today is at 3 91 mg/dL, unfortunately continues to worsen rising worsening azotemia BUN of 90  CT abdomen from 03/27/2022 showing mild bilateral hydroureteronephrosis without obstructive calculi  Marked bladder distension increased from prior  Avoid further IV fluids as clinically minimally hypervolemic  D/w patient on 03/29 he is not an ideal candidate for RRT due to his underlying comorbidities especially his low EF  He agrees and understands  I d/w him about possible hospice and he deferred the discussion to his wife  I tried calling patient's spouse to update her from a renal perspective today however had to leave a message and able to reach her  As per prior discussions agrees with not pursuing dialysis  Plan is for hospice  Palliative care following  Await renal recovery  Optimize hemodynamic status to avoid delay in renal recovery  Place on a renal diet when allowed diet order  Avoid nephrotoxins, adjust meds to appropriate GFR  Strict I/O    Daily weights  Urinary retention protocol if patient does not have a Damico  Most likely has underlying CKD secondary to cardiorenal syndrome plus age-related nephron loss plus prior episode of acute kidney injury non dialysis requiring  as an outpatient for nephrology patient follows up with Dr GWEN Sparks     Blood pressure/hypertension:  current medications:  Coreg 3 125 mg p o  B i d   recommendations:  Hold diuretics for now,   Optimize hemodynamics  Maintain MAP > 65mmHg  Avoid BP fluctuations      H/H/anemia:  most recent hemoglobin at 10 4 grams/deciliter  maintain hemoglobin greater than 8 grams/deciliter     Acid-base electrolytes:  Electrolytes:    Hyponatremia:   Most recent sodium at 138 mEq  Remains stable  Continue to monitor for now     Acid-base:    Most recent bicarb at 24        Other medical problems:  Proteinuria: Most recent UA with no protein as of 03/27/2022  Urinary retention/bilateral hydro ureteronephrosis:  Management per primary team   Follow-up with Urology  To maintain Damico catheter for now  Fevers: On Rocephin  Management primary team  CHF:  Management per primary team   Follow-up with cardiology  Most recent echo from 10/24/2021 with EF of 12%  Hold diuretics hold further IV fluids    Thanks for the consult  Will sign office patient will be going for hospice  Please call with questions/ concerns  Above-mentioned orders and Plan in terms of acute kidney injury was discussed with the team in depth   Also called and tried to talk to patient's spouse to update her from renal perspective unfortunately had to leave a message so that she can call me back  Ivory Staples MD, [facility], 3/31/2022, 10:59 AM              Objective :   Patient seen and examined in his room remains altered not communicating T-max 102 4  Urine output 1 3 L plus  Patient seen by palliative care plan for possible home with hospice if patient is stable versus inpatient hospice      PHYSICAL EXAM  /56 (BP Location: Right arm)   Pulse 81   Temp 99 °F (37 2 °C) (Temporal)   Resp 20   Ht 5' 2" (1 575 m)   Wt 57 kg (125 lb 10 6 oz)   SpO2 97%   BMI 22 98 kg/m²   Temp (24hrs), Av 7 °F (37 6 °C), Min:97 3 °F (36 3 °C), Max:102 4 °F (39 1 °C)        Intake/Output Summary (Last 24 hours) at 3/31/2022 1059  Last data filed at 3/31/2022 4104  Gross per 24 hour   Intake --   Output 1300 ml   Net -1300 ml       I/O last 24 hours: In: -   Out: 1300 [Urine:1300]      Current Weight: Weight - Scale: 57 kg (125 lb 10 6 oz)  First Weight: Weight - Scale: 74 4 kg (164 lb 0 4 oz)  Physical Exam  Vitals and nursing note reviewed  Constitutional:       General: He is not in acute distress  Appearance: Normal appearance  He is normal weight  He is ill-appearing  He is not toxic-appearing or diaphoretic  HENT:      Head: Normocephalic and atraumatic  Mouth/Throat:      Pharynx: Oropharynx is clear  No oropharyngeal exudate  Eyes:      General: No scleral icterus  Conjunctiva/sclera: Conjunctivae normal    Cardiovascular:      Rate and Rhythm: Normal rate  Heart sounds: Normal heart sounds  No friction rub  Pulmonary:      Effort: Pulmonary effort is normal  No respiratory distress  Breath sounds: Normal breath sounds  No stridor  No wheezing  Abdominal:      General: There is no distension  Palpations: Abdomen is soft  There is no mass  Tenderness: There is no abdominal tenderness  Musculoskeletal:         General: No swelling  Cervical back: Normal range of motion and neck supple  No rigidity  Skin:     General: Skin is warm  Coloration: Skin is not jaundiced  Neurological:      General: No focal deficit present  Mental Status: He is alert  He is disoriented     Psychiatric:         Mood and Affect: Mood normal          Behavior: Behavior normal              Review of Systems   Unable to perform ROS: Mental status change       Scheduled Meds:  Current Facility-Administered Medications   Medication Dose Route Frequency Provider Last Rate    acetaminophen  650 mg Rectal Q6H PRN Munira Catsillo PA-C      carvedilol  3 125 mg Oral BID With Meals Aniya Geiger MD      cefTRIAXone  1,000 mg Intravenous Q24H Gerarda Grade, CRNP 1,000 mg (03/31/22 1012)    HYDROmorphone  0 2 mg Intravenous Q3H PRN Gerarda Grade, CRNP      nystatin   Topical BID Freddie Cantor MD      warfarin  1 mg Oral Once per day on Mon Wed Fri Santa Guardado PA-C      warfarin  2 mg Oral Once per day on Sun Tue Thu Sat Ulices Rodriguez PA-C         PRN Meds:     Continuous Infusions:       Invasive Devices: Invasive Devices  Report    Peripheral Intravenous Line            Peripheral IV 03/31/22 Left;Ventral (anterior) Forearm <1 day          Drain            Urethral Catheter 16 Fr  2 days                  LABORATORY:    Results from last 7 days   Lab Units 03/31/22  0611 03/30/22  0544 03/29/22  0554 03/28/22  0617 03/27/22  1145   WBC Thousand/uL 6 26 6 35 6 88 7 23 11 04*   HEMOGLOBIN g/dL 10 4* 11 5* 11 4* 11 1* 11 2*   HEMATOCRIT % 30 5* 34 5* 33 9* 33 8* 34 0*   PLATELETS Thousands/uL 77* 72* 86* 81* 100*   POTASSIUM mmol/L 4 2 4 7 4 4 4 2 3 3*   CHLORIDE mmol/L 102 99* 100 101 98*   CO2 mmol/L 24 22 21 22 29   BUN mg/dL 90* 74* 57* 44* 38*   CREATININE mg/dL 3 91* 3 85* 3 68* 3 02* 2 89*   CALCIUM mg/dL 8 1* 7 7* 8 0* 8 3 8 4   MAGNESIUM mg/dL  --   --  1 8 1 8  --    PHOSPHORUS mg/dL 5 1* 4 9*  --  4 3*  --       rest all reviewed    RADIOLOGY:  US kidney and bladder   Final Result by Robel Madsen MD (03/30 1657)   Limited study  1   No hydronephrosis  2   Bladder is distended despite the presence of Damico catheter and there is marked post void residual volume  Diffuse bladder wall thickening and trabeculation may be related to chronic outlet obstruction from prostatomegaly  Nonspecific bladder    debris suggested        3   Incidentally noted gallbladder sludge and nonspecific edema along the perihepatic gallbladder wall   Sonographic Gomes's sign could not be determined due to patient's clinical condition  Probable trace perihepatic ascites  The study was marked in [facility] for immediate notification  Workstation performed: [id number]         CT chest abdomen pelvis wo contrast   Final Result by Randy Caldwell MD (03/27 1352)      Massive bladder distention causing mild bilateral hydroureteronephrosis  Prostamegaly  No acute findings in the chest       New 4 mm right lower lobe nodule  Follow-up with repeat CT chest in one year if the patient is considered high risk for lung cancer  The study was marked in [facility] for immediate notification  Workstation performed: [id number]         CT head without contrast   Final Result by Fernandez Arita MD (03/27 1216)      No acute intracranial abnormality  Workstation performed: [id number]         CT cervical spine without contrast   Final Result by Fernandez Arita MD (03/27 1222)      No cervical spine fracture or traumatic malalignment  Workstation performed: [id number]           Rest all reviewed    Portions of the record may have been created with voice recognition software  Occasional wrong word or "sound a like" substitutions may have occurred due to the inherent limitations of voice recognition software  Read the chart carefully and recognize, using context, where substitutions have occurred  If you have any questions, please contact the dictating provider

## 2022-03-31 NOTE — ASSESSMENT & PLAN NOTE
Wt Readings from Last 3 Encounters:   03/31/22 57 kg (125 lb 10 6 oz)   03/25/22 74 4 kg (164 lb)   03/17/22 58 5 kg (129 lb)     LVEF 12%, mild LV dilatation, diastolic dysfunction, regional wall motion abnormalities, paradoxical septal wall motion consistent with conduction abnormality, moderately reduced RV function, mild LA dilatation, mild MR/TR/TN w/ PASP 45-50 mmHg, mild aortic dilatation October 2021  · Patient with ICD in place- will need to be turned off if he pursues hospice   · Appreciate cardiology input  · Continue Coreg with holding parameters, oral diuretics currently on hold given acute kidney injury superimposed on CKD  · Will defer diuretic dosing to cards/renal

## 2022-03-31 NOTE — CASE MANAGEMENT
Case Management Discharge Planning Note    Patient name Laura Guerrero  Location [address] [address] /E4 -* MRN [id number]  : 10/31/1932 Date 3/31/2022       Current Admission Date: 3/27/2022  Current Admission Diagnosis:Fall   Patient Active Problem List    Diagnosis Date Noted    Fever 2022    Lung nodule 2022    Elevated troponin 2022    Encephalopathy acute 2022    Fall 2022    Urinary retention 2022    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 2022    Goals of care, counseling/discussion 2022    Dizziness 2022    Transaminitis 2022    Bilateral lower extremity edema 2021    Hyponatremia 12/10/2021    Chronic combined systolic and diastolic congestive heart failure ([facility] ) 10/23/2021    Ischemic cardiomyopathy 10/09/2018    PA[facility]oxysmal [facility] fibrillation[facility] 10/09/2018    Essential hypertension 10/09/2018    Cardiac defibrillator in situ 2016    Acute kidney injury superimposed on chronic kidney disease ([facility] )     Embolic stroke ([facility] )     Hyperlipidemia, mixed 2016    CAD (coronary artery disease) 2016    Status post percutaneous transluminal coronary angioplasty 2016    V-tach ([facility] ) 2016    Cardiomyopathy ([facility] ) 2016      LOS (days): 4  Geometric Mean LOS (GMLOS) (days): 3 10  Days to GMLOS:-1     OBJECTIVE:  Risk of Unplanned Readmission Score: 37         Current admission status: Inpatient   Preferred Pharmacy:   [address] [place] [address], [address] [place] [address]  Phone: [phone] Fax: [phone]    [address] [address]  [address] [place] [address]  Phone: [phone] Fax: [phone]    Primary Care Provider: Ning Galdamez DO    Primary Insurance: [facility] [employer]  Secondary Insurance:     DISCHARGE DETAILS:    Comments - Freedom of Choice: [address] Liaison reports family needs time to think about home hospice and would to discuss with other family members first  Will continue to follow

## 2022-03-31 NOTE — CASE MANAGEMENT
Case Management Assessment & Discharge Planning Note    Patient name Sandra Lucia  Location [address] [address] /E4 -* MRN [id number]  : 10/31/1932 Date 3/31/2022       Current Admission Date: 3/27/2022  Current Admission Diagnosis:Fall   Patient Active Problem List    Diagnosis Date Noted    Fever 2022    Lung nodule 2022    Elevated troponin 2022    Encephalopathy acute 2022    Fall 2022    Urinary retention 2022    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 2022    Goals of care, counseling/discussion 2022    Dizziness 2022    Transaminitis 2022    Bilateral lower extremity edema 2021    Hyponatremia 12/10/2021    Chronic combined systolic and diastolic congestive heart failure ([facility] ) 10/23/2021    Ischemic cardiomyopathy 10/09/2018    PAF (paroxysmal atrial fibrillation) ([facility] ) 10/09/2018    Essential hypertension 10/09/2018    Cardiac defibrillator in situ 2016    Acute kidney [facility] superimposed on chronic kidney disease [facility] )     Embolic stroke ([facility] ) 9043    Hyperlipidemia, mixed 2016    CAD (coronary artery disease) 2016    Status post percutaneous transluminal coronary angioplasty 2016    V-tach ([facility] ) 2016    Cardiomyopathy ([facility] ) 2016      LOS (days): 4  Geometric Mean LOS (GMLOS) (days): 3 10  Days to GMLOS:-1 1     OBJECTIVE:  PATIENT READMITTED [address]  of Unplanned Readmission Score: 38         Current admission status: Inpatient       Preferred Pharmacy:   [address] [place] [address], [address] [place] [address]  Phone: [phone] Fax: [phone]    [address] [place] [address] [address] [facility] [place] [address]  Phone: [phone] Fax: [phone]    Primary Care Provider: Shelli Arreaga DO    Primary Insurance: [facility] [employer]  Secondary Insurance: ASSESSMENT:  [address] [place] [address] Representative - Spouse   Primary Phone: [phone] (Home)               Advance Directives  Does patient have a [address] [facility] [address]?: No  Was patient offered paperwork?: Yes (Refused)  Does patient currently have a Health Care decision maker?: Yes, please see Health Care Proxy section  Does patient have Advance Directives?: No  Was patient offered paperwork?: Yes  Primary Contact: Yoselin Charlton (Spouse)  [phone] (H)         Readmission Root Cause  30 Day Readmission: Yes  Who directed you to return to the hospital?: [facility]  Did you understand whom to contact if you had questions or problems?: Yes  Did you get your prescriptions before you left the hospital?: Yes  Were you able to get your prescriptions filled when you left the hospital?: Yes  Did you take your medications as prescribed?: Yes  During previous admission, was a post-acute recommendation made?: Yes  What post-acute resources were offered?: [address]  Patient was readmitted due to: fall  Action Plan: [address] vs home with [facility]    Patient Information  Admitted from[de-identified] Home  Mental Status: Alert  During Assessment patient was accompanied by: Not accompanied during assessment  Assessment information provided by[de-identified] Patient  Primary Caregiver: Self  Support Systems: Spouse/significant other  [place] of Residence: [address] [place] [address] do you live in?: [address] [place] [address] entry access options   Select all that apply : Ramp  Type of Current Residence: Apartment  Floor Level: 1  Upon entering residence, is there a bedroom on the main floor (no further steps)?: Yes  Upon entering residence, is there a bathroom on the main floor (no further steps)?: Yes  In the last 12 months, was there a time when you were not able to pay the mortgage or rent on time?: No  In the last 12 months, how many places have you lived?: 1  In the last 12 months, was there a time when you did not have a steady place to sleep or slept in a shelter (including now)?: No  Homeless/housing insecurity resource given?: N/A  Living Arrangements: Lives w/ Spouse/significant other  Is patient a ?: Yes ([employer])  Is patient active with [facility])?: No    Activities of Daily Living Prior to Admission  Functional Status: Independent  Completes ADLs independently?: Yes  Ambulates independently?: Yes (Depending how he feels, he will use walker)  Does patient use assisted devices?: Yes  Assisted Devices (DME) used: Idalia Stephen  Does patient currently own DME?: Yes  What DME does the patient currently own?: Straight Cane,Walker,Shower Chair,Bedside Commode  Does patient have a history of Outpatient Therapy (PT/OT)?: No  Does the patient have a history of Short-Term Rehab?: No  Does patient have a history of HHC?: No  Does patient currently have [address]?: Yes    Current Home Health Care  Type of Current Home Care Services: Home PT,Nurse visit  Current Home Health Agency[de-identified] [address] [place] [address] Provider[de-identified] PCP    Patient Information Continued  Income Source: Pension/senior care  Does patient have prescription coverage?: Yes  Within the past 12 months, you worried that your food would run out before you got the money to buy more : Never true  Within the past 12 months, the food you bought just didnt last and you didnt have money to get more : Never true  Food insecurity resource given?: N/A  Does patient receive dialysis treatments?: No  Does patient have a history of substance abuse?: Yes  Historical substance use preference: Alcohol/ETOH (2-3 shots a day of liquor)  History of Withdrawal Symptoms: Denies past symptoms  Does patient have a history of Mental Health Diagnosis?: No         Means of Transportation  Means of Transport to Appts[de-identified] Drives Self  In the past 12 months, has lack of transportation kept you from medical appointments or from getting medications?: No  In the past 12 months, has lack of transportation kept you from meetings, work, or from getting things needed for daily living?: No  Was application for public transport provided?: N/A        DISCHARGE DETAILS:    Discharge planning discussed with[de-identified] [address] liaison  Freedom of Choice: Yes  Comments - Freedom of Choice: Waiting on family decision it they want [facility] at home    CM contacted family/caregiver?: No- see comments ( Hospice Liaison reach out to spouse and dtr about home hospice and they need time to make determination )  Were Treatment Team discharge recommendations reviewed with patient/caregiver?: Yes  Did patient/caregiver verbalize understanding of patient care needs?: Yes  Were patient/caregiver advised of the risks associated with not following Treatment Team discharge recommendations?: Yes

## 2022-03-31 NOTE — ASSESSMENT & PLAN NOTE
Presented after suffering from a mechanical fall x2  Denied any loss consciousness or presyncopal symptoms  CT head and CT C-spine negative for acute injury  Noted during documentation, that patient was bradycardic and hypotensive upon EMS arrival     See rest of the treatment plan below    · Continue with telemetry  · ICD interrogation done on 3/28-  negative   · PT/OT recommending rehab, now consideration for home hospice, wife does not want patient at [facility]

## 2022-03-31 NOTE — ASSESSMENT & PLAN NOTE
Multifactorial  Etiologies include congestive hepatopathy, ischemic injury related to hypotension, drug induced liver injury from amiodarone  Liver appeared unremarkable on CT without biliary dilatation  · Appreciate GI input--they feel this is most likely congestive hepatopathy, may also be element of DILI 2/2 amiodarone  · Acute hepatitis panel negative  · Cards following--recommend to stop amiodarone and stop mexiletine     · If patient were to have recurrent VT or VF, Rx should be restarted

## 2022-03-31 NOTE — ASSESSMENT & PLAN NOTE
Rate control: Carvedilol  · AC: Warfarin 2mg S-S-T-Th Warfarin 1mg MWF    Was changed to 1 mg daily here given therapeutic levels however now slightly subtherapeutic, will adjust dose back to home regimen as of 3/29  · Monitor INR  · Consider eventual discussion regarding risks vs benefits if continues to fall    Recent Labs     03/29/22  0554 03/30/22  0544 03/31/22  0611   INR 1 75* 2 04* 2 61*

## 2022-03-31 NOTE — ASSESSMENT & PLAN NOTE
CT on arrival showed massive bladder distention causing mild bilateral hydroureter nephrosis and prostatomegaly  · S/p multiple straight catheterizations earlier in hospital stay, s/p Damico catheter placement 3/28  · Urology consult for input, appreciate   · Damico catheter should remain in place for at least 2 weeks  · Repeat renal US noted

## 2022-03-31 NOTE — ASSESSMENT & PLAN NOTE
Lab Results   Component Value Date    EGFR 12 03/31/2022    EGFR 13 03/30/2022    EGFR 13 03/29/2022    CREATININE 3 91 (H) 03/31/2022    CREATININE 3 85 (H) 03/30/2022    CREATININE 3 68 (H) 03/29/2022     Baseline creatine unclear  In Jan 2022 was felt to be around 1 9  Presented with creatinine 2 89 now 3 91  · Likely 2/2 cardiorenal syndrome/ischemic injury/obstructive uropathy   · worsening uremic encephalopathy   · S/p mejia placement on 3/28  · Diuretics on hold  · Patient is not an ideal candidate for RRT per nephrology due to comorbidities/low EF 12%  Patient is not able to make informed medical decisions at this time, wife agreeable to not pursue RRT   Consideration for home hospice, palliative consult placed- still awaiting official decision from family if they would like to pursue home hospice vs  Facility

## 2022-03-31 NOTE — PHYSICAL THERAPY NOTE
PHYSICAL THERAPY NOTE          Patient Name: Flip Hauser  ACNAYWonM Date: 3/31/2022  Review of POC  Plan is home with hospice  At present no further IPPT needs apparent  Will d/c PT  Please advise should needs change     Debo Huynh PT

## 2022-03-31 NOTE — PLAN OF CARE
Problem: Potential for Falls  Goal: Patient will remain free of falls  Description: INTERVENTIONS:  - Educate patient/family on patient safety including physical limitations  - Instruct patient to call for assistance with activity   - Consult OT/PT to assist with strengthening/mobility   - Keep Call bell within reach  - Keep bed low and locked with side rails adjusted as appropriate  - Keep care items and personal belongings within reach  - Initiate and maintain comfort rounds  - Make Fall Risk Sign visible to staff  - Offer Toileting every 2 Hours, in advance of need  - Initiate/Maintain bed and chair alarm  - Obtain necessary fall risk management equipment: bed and chair alarm  - Apply yellow socks and bracelet for high fall risk patients  - Consider moving patient to room near nurses station  Outcome: Progressing     Problem: MOBILITY - ADULT  Goal: Maintain or return to baseline ADL function  Description: INTERVENTIONS:  -  Assess patient's ability to carry out ADLs; assess patient's baseline for ADL function and identify physical deficits which impact ability to perform ADLs (bathing, care of mouth/teeth, toileting, grooming, dressing, etc )  - Assess/evaluate cause of self-care deficits   - Assess range of motion  - Assess patient's mobility; develop plan if impaired  - Assess patient's need for assistive devices and provide as appropriate  - Encourage maximum independence but intervene and supervise when necessary  - Involve family in performance of ADLs  - Assess for home care needs following discharge   - Consider OT consult to assist with ADL evaluation and planning for discharge  - Provide patient education as appropriate  Outcome: Progressing  Goal: Maintains/Returns to pre admission functional level  Description: INTERVENTIONS:  - Perform BMAT or MOVE assessment daily    - Set and communicate daily mobility goal to care team and patient/family/caregiver     - Collaborate with rehabilitation services on mobility goals if consulted  - Perform Range of Motion 3 times a day  - Reposition patient every 2 hours    - Dangle patient 3 times a day  - Stand patient 3 times a day  - Ambulate patient 3 times a day  - Out of bed to chair 3 times a day   - Out of bed for meals 3 times a day  - Out of bed for toileting  - Record patient progress and toleration of activity level   Outcome: Progressing     Problem: CARDIOVASCULAR - ADULT  Goal: Maintains optimal cardiac output and hemodynamic stability  Description: INTERVENTIONS:  - Monitor I/O, vital signs and rhythm  - Monitor for S/S and trends of decreased cardiac output  - Administer and titrate ordered vasoactive medications to optimize hemodynamic stability  - Assess quality of pulses, skin color and temperature  - Assess for signs of decreased coronary artery perfusion  - Instruct patient to report change in severity of symptoms  Outcome: Progressing  Goal: Absence of cardiac dysrhythmias or at baseline rhythm  Description: INTERVENTIONS:  - Continuous cardiac monitoring, vital signs, obtain 12 lead EKG if ordered  - Administer antiarrhythmic and heart rate control medications as ordered  - Monitor electrolytes and administer replacement therapy as ordered  Outcome: Progressing     Problem: GENITOURINARY - ADULT  Goal: Maintains or returns to baseline urinary function  Description: INTERVENTIONS:  - Assess urinary function  - Encourage oral fluids to ensure adequate hydration if ordered  - Administer IV fluids as ordered to ensure adequate hydration  - Administer ordered medications as needed  - Offer frequent toileting  - Follow urinary retention protocol if ordered  Outcome: Progressing  Goal: Absence of urinary retention  Description: INTERVENTIONS:  - Assess patients ability to void and empty bladder  - Monitor I/O  - Bladder scan as needed  - Discuss with physician/AP medications to alleviate retention as needed  - Discuss catheterization for long term situations as appropriate  Outcome: Progressing     Problem: SKIN/TISSUE INTEGRITY - ADULT  Goal: Incision(s), wounds(s) or drain site(s) healing without S/S of infection  Description: INTERVENTIONS  - Assess and document dressing, incision, wound bed, drain sites and surrounding tissue  - Provide patient and family education  - Perform skin care/dressing changes every shift  Outcome: Progressing  Goal: Pressure injury heals and does not worsen  Description: Interventions:  - Implement low air loss mattress or specialty surface (Criteria met)  - Apply silicone foam dressing  - Instruct/assist with weight shifting every 60 minutes when in chair   - Limit chair time to 2 hour intervals  - Use special pressure reducing interventions such as waffle cushion when in chair   - Apply fecal or urinary incontinence containment device   - Perform passive or active ROM every 2 hours  - Turn and reposition patient & offload bony prominences every 2 hours   - Utilize friction reducing device or surface for transfers   - Consider consults to  interdisciplinary teams such as wound consult  - Consider nutrition services referral as needed  Outcome: Progressing     Problem: MUSCULOSKELETAL - ADULT  Goal: Maintain or return mobility to safest level of function  Description: INTERVENTIONS:  - Assess patient's ability to carry out ADLs; assess patient's baseline for ADL function and identify physical deficits which impact ability to perform ADLs (bathing, care of mouth/teeth, toileting, grooming, dressing, etc )  - Assess/evaluate cause of self-care deficits   - Assess range of motion  - Assess patient's mobility  - Assess patient's need for assistive devices and provide as appropriate  - Encourage maximum independence but intervene and supervise when necessary  - Involve family in performance of ADLs  - Assess for home care needs following discharge   - Consider OT consult to assist with ADL evaluation and planning for discharge  - Provide patient education as appropriate  Outcome: Progressing  Goal: Maintain proper alignment of affected body part  Description: INTERVENTIONS:  - Support, maintain and protect limb and body alignment  - Provide patient/ family with appropriate education  Outcome: Progressing     Problem: PAIN - ADULT  Goal: Verbalizes/displays adequate comfort level or baseline comfort level  Description: Interventions:  - Encourage patient to monitor pain and request assistance  - Assess pain using appropriate pain scale  - Administer analgesics based on type and severity of pain and evaluate response  - Implement non-pharmacological measures as appropriate and evaluate response  - Consider cultural and social influences on pain and pain management  - Notify physician/advanced practitioner if interventions unsuccessful or patient reports new pain  Outcome: Progressing     Problem: INFECTION - ADULT  Goal: Absence or prevention of progression during hospitalization  Description: INTERVENTIONS:  - Assess and monitor for signs and symptoms of infection  - Monitor lab/diagnostic results  - Monitor all insertion sites, i e  indwelling lines, tubes, and drains  - Monitor endotracheal if appropriate and nasal secretions for changes in amount and color  - [place]lin appropriate cooling/warming therapies per order  - Administer medications as ordered  - Instruct and encourage patient and family to use good hand hygiene technique  - Identify and instruct in appropriate isolation precautions for identified infection/condition  Outcome: Progressing     Problem: DISCHARGE PLANNING  Goal: Discharge to home or other facility with appropriate resources  Description: INTERVENTIONS:  - Identify barriers to discharge w/patient and caregiver  - Arrange for needed discharge resources and transportation as appropriate  - Identify discharge learning needs (meds, wound care, etc )  - Arrange for interpretive services to assist at discharge as needed  - Refer to Case Management Department for coordinating discharge planning if the patient needs post-hospital services based on physician/advanced practitioner order or complex needs related to functional status, cognitive ability, or social support system  Outcome: Progressing     Problem: Knowledge Deficit  Goal: Patient/family/caregiver demonstrates understanding of disease process, treatment plan, medications, and discharge instructions  Description: Complete learning assessment and assess knowledge base    Interventions:  - Provide teaching at level of understanding  - Provide teaching via preferred learning methods  Outcome: Progressing     Problem: Prexisting or High Potential for Compromised Skin Integrity  Goal: Skin integrity is maintained or improved  Description: INTERVENTIONS:  - Identify patients at risk for skin breakdown  - Assess and monitor skin integrity  - Assess and monitor nutrition and hydration status  - Monitor labs   - Assess for incontinence   - Turn and reposition patient  - Assist with mobility/ambulation  - Relieve pressure over bony prominences  - Avoid friction and shearing  - Provide appropriate hygiene as needed including keeping skin clean and dry  - Evaluate need for skin moisturizer/barrier cream  - Collaborate with interdisciplinary team   - Patient/family teaching  - Consider wound care consult   Outcome: Progressing     Problem: SAFETY,RESTRAINT: NV/NON-SELF DESTRUCTIVE BEHAVIOR  Goal: Remains free of harm/injury (restraint for non violent/non self-detsructive behavior)  Description: INTERVENTIONS:  - Instruct patient/family regarding restraint use   - Assess and monitor physiologic and psychological status   - Provide interventions and comfort measures to meet assessed patient needs   - Identify and implement measures to help patient regain control  - Assess readiness for release of restraint   Outcome: Progressing  Goal: Returns to optimal restraint-free functioning  Description: INTERVENTIONS:  - Assess the patient's behavior and symptoms that indicate continued need for restraint  - Identify and implement measures to help patient regain control  - Assess readiness for release of restraint   Outcome: Progressing     Problem: Nutrition/Hydration-ADULT  Goal: Nutrient/Hydration intake appropriate for improving, restoring or maintaining nutritional needs  Description: Monitor and assess patient's nutrition/hydration status for malnutrition  Collaborate with interdisciplinary team and initiate plan and interventions as ordered  Monitor patient's weight and dietary intake as ordered or per policy  Utilize nutrition screening tool and intervene as necessary  Determine patient's food preferences and provide high-protein, high-caloric foods as appropriate       INTERVENTIONS:  - Monitor oral intake, urinary output, labs, and treatment plans  - Assess nutrition and hydration status and recommend course of action  - Evaluate amount of meals eaten  - Assist patient with eating if necessary   - Allow adequate time for meals  - Recommend/ encourage appropriate diets, oral nutritional supplements, and vitamin/mineral supplements  - Order, calculate, and assess calorie counts as needed  - Recommend, monitor, and adjust tube feedings and TPN/PPN based on assessed needs  - Assess need for intravenous fluids  - Provide specific nutrition/hydration education as appropriate  - Include patient/family/caregiver in decisions related to nutrition  Outcome: Progressing

## 2022-03-31 NOTE — QUICK NOTE
Patient appears to be going into hospice  Will sign off  If further cardiac help is needed, please re-consult Cardiology

## 2022-03-31 NOTE — HOSPICE NOTE
Hospice referral received a the end of the day yesterday  Patient is approved for routine/home hospice at this time  Liaison awaiting arrival of patient's daughter to the hospital today at the direction of Dr Jordan Tiwari  Plans to meet with family early afternoon today if they are willing to talk to me

## 2022-03-31 NOTE — PLAN OF CARE
Problem: Potential for Falls  Goal: Patient will remain free of falls  Description: INTERVENTIONS:  - Educate patient/family on patient safety including physical limitations  - Instruct patient to call for assistance with activity   - Consult OT/PT to assist with strengthening/mobility   - Keep Call bell within reach  - Keep bed low and locked with side rails adjusted as appropriate  - Keep care items and personal belongings within reach  - Initiate and maintain comfort rounds  - Make Fall Risk Sign visible to staff  - Offer Toileting every 2 Hours, in advance of need  - Initiate/Maintain bed and chair alarm  - Obtain necessary fall risk management equipment: bed and chair alarm  - Apply yellow socks and bracelet for high fall risk patients  - Consider moving patient to room near nurses station  Outcome: Progressing     Problem: MOBILITY - ADULT  Goal: Maintain or return to baseline ADL function  Description: INTERVENTIONS:  -  Assess patient's ability to carry out ADLs; assess patient's baseline for ADL function and identify physical deficits which impact ability to perform ADLs (bathing, care of mouth/teeth, toileting, grooming, dressing, etc )  - Assess/evaluate cause of self-care deficits   - Assess range of motion  - Assess patient's mobility; develop plan if impaired  - Assess patient's need for assistive devices and provide as appropriate  - Encourage maximum independence but intervene and supervise when necessary  - Involve family in performance of ADLs  - Assess for home care needs following discharge   - Consider OT consult to assist with ADL evaluation and planning for discharge  - Provide patient education as appropriate  Outcome: Progressing  Goal: Maintains/Returns to pre admission functional level  Description: INTERVENTIONS:  - Perform BMAT or MOVE assessment daily    - Set and communicate daily mobility goal to care team and patient/family/caregiver     - Collaborate with rehabilitation services on mobility goals if consulted  - Perform Range of Motion 3 times a day  - Reposition patient every 2 hours    - Dangle patient 3 times a day  - Stand patient 3 times a day  - Ambulate patient 3 times a day  - Out of bed to chair 3 times a day   - Out of bed for meals 3 times a day  - Out of bed for toileting  - Record patient progress and toleration of activity level   Outcome: Progressing     Problem: CARDIOVASCULAR - ADULT  Goal: Maintains optimal cardiac output and hemodynamic stability  Description: INTERVENTIONS:  - Monitor I/O, vital signs and rhythm  - Monitor for S/S and trends of decreased cardiac output  - Administer and titrate ordered vasoactive medications to optimize hemodynamic stability  - Assess quality of pulses, skin color and temperature  - Assess for signs of decreased coronary artery perfusion  - Instruct patient to report change in severity of symptoms  Outcome: Progressing  Goal: Absence of cardiac dysrhythmias or at baseline rhythm  Description: INTERVENTIONS:  - Continuous cardiac monitoring, vital signs, obtain 12 lead EKG if ordered  - Administer antiarrhythmic and heart rate control medications as ordered  - Monitor electrolytes and administer replacement therapy as ordered  Outcome: Progressing     Problem: GENITOURINARY - ADULT  Goal: Maintains or returns to baseline urinary function  Description: INTERVENTIONS:  - Assess urinary function  - Encourage oral fluids to ensure adequate hydration if ordered  - Administer IV fluids as ordered to ensure adequate hydration  - Administer ordered medications as needed  - Offer frequent toileting  - Follow urinary retention protocol if ordered  Outcome: Progressing  Goal: Absence of urinary retention  Description: INTERVENTIONS:  - Assess patients ability to void and empty bladder  - Monitor I/O  - Bladder scan as needed  - Discuss with physician/AP medications to alleviate retention as needed  - Discuss catheterization for long term situations as appropriate  Outcome: Progressing     Problem: SKIN/TISSUE INTEGRITY - ADULT  Goal: Incision(s), wounds(s) or drain site(s) healing without S/S of infection  Description: INTERVENTIONS  - Assess and document dressing, incision, wound bed, drain sites and surrounding tissue  - Provide patient and family education  - Perform skin care/dressing changes every shift  Outcome: Progressing  Goal: Pressure injury heals and does not worsen  Description: Interventions:  - Implement low air loss mattress or specialty surface (Criteria met)  - Apply silicone foam dressing  - Instruct/assist with weight shifting every 60 minutes when in chair   - Limit chair time to 2 hour intervals  - Use special pressure reducing interventions such as waffle cushion when in chair   - Apply fecal or urinary incontinence containment device   - Perform passive or active ROM every 2 hours  - Turn and reposition patient & offload bony prominences every 2 hours   - Utilize friction reducing device or surface for transfers   - Consider consults to  interdisciplinary teams such as wound consult  - Consider nutrition services referral as needed  Outcome: Progressing     Problem: MUSCULOSKELETAL - ADULT  Goal: Maintain or return mobility to safest level of function  Description: INTERVENTIONS:  - Assess patient's ability to carry out ADLs; assess patient's baseline for ADL function and identify physical deficits which impact ability to perform ADLs (bathing, care of mouth/teeth, toileting, grooming, dressing, etc )  - Assess/evaluate cause of self-care deficits   - Assess range of motion  - Assess patient's mobility  - Assess patient's need for assistive devices and provide as appropriate  - Encourage maximum independence but intervene and supervise when necessary  - Involve family in performance of ADLs  - Assess for home care needs following discharge   - Consider OT consult to assist with ADL evaluation and planning for discharge  - Provide patient education as appropriate  Outcome: Progressing  Goal: Maintain proper alignment of affected body part  Description: INTERVENTIONS:  - Support, maintain and protect limb and body alignment  - Provide patient/ family with appropriate education  Outcome: Progressing

## 2022-03-31 NOTE — CASE MANAGEMENT
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    Goals of care, counseling/discussion 2022    Dizziness 2022    Transaminitis 2022    Bilateral lower extremity edema 2021    Hyponatremia 12/10/2021    Chronic combined systolic and diastolic congestive heart failure ([facility] [facility] ) 10/23/2021    Ischemic cardiomyopathy 10/09/2018    PAF (paroxysmal atrial fibrillation) ([facility] [facility] ) 10/09/2018    Essential hypertension 10/09/2018    Cardiac defibrillator in situ 2016    Acute kidney injury superimposed on chronic kidney disease ([facility] [address] [facility] )     Embolic stroke ([facility] [address] [facility] )     Hyperlipidemia, mixed 2016    CAD (coronary artery disease) 2016    Status post percutaneous transluminal coronary angioplasty 2016    V-tach ([facility] [facility] ) 2016    Cardiomyopathy ([facility] [address] [facility] ) 2016      LOS (days): 4  Geometric Mean LOS (GMLOS) (days): 3 10  Days to GMLOS:-1 1     OBJECTIVE:  Risk of Unplanned Readmission Score: 38         Current admission status: Inpatient   Preferred Pharmacy:   [facility], [address] [place] [address]  Phone: [phone] Fax: [phone] [address] [place] [address]  [address] [place] [id number]  Phone: [phone] Fax: [phone]    Primary Care Provider: Tami Desai DO    Primary Insurance: [facility] [employer] [employer]  Secondary Insurance:     DISCHARGE DETAILS:  Freedom of Choice: Yes  Comments - Freedom of Choice: Waiting on family decision it they want [facility] at home       Were Treatment Team discharge recommendations reviewed with patient/caregiver?: Yes  Did patient/caregiver verbalize understanding of patient care needs?: Yes  Were patient/caregiver advised of the risks associated with not following Treatment Team discharge recommendations?: Yes

## 2022-04-01 NOTE — ASSESSMENT & PLAN NOTE
CAD w/ remote history of MI and angioplasty, 1989  · No longer taking PO meds: coumadin or coreg d/c  · Statin on hold given transaminitis

## 2022-04-01 NOTE — ASSESSMENT & PLAN NOTE
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

## 2022-04-01 NOTE — ASSESSMENT & PLAN NOTE
· worsening uremic encephalopathy- patient is not a good candidate for RRT per nephrology, considering hospice home vs  facility pending further hospice liaison discussion

## 2022-04-01 NOTE — OCCUPATIONAL THERAPY NOTE
Occupational Therapy         Patient Name: Helen Salgado  YVEMB'B Date: 4/1/2022      Review of POC  Plan is home with hospice  At present no further IPOT needs apparent  Will d/c OT  Please advise should needs change  Harsha Correa OT

## 2022-04-01 NOTE — PLAN OF CARE
Problem: Potential for Falls  Goal: Patient will remain free of falls  Description: INTERVENTIONS:  - Educate patient/family on patient safety including physical limitations  - Instruct patient to call for assistance with activity   - Consult OT/PT to assist with strengthening/mobility   - Keep Call bell within reach  - Keep bed low and locked with side rails adjusted as appropriate  - Keep care items and personal belongings within reach  - Initiate and maintain comfort rounds  - Make Fall Risk Sign visible to staff  - Offer Toileting every 2 Hours, in advance of need  - Initiate/Maintain bed and chair alarm  - Obtain necessary fall risk management equipment: bed and chair alarm  - Apply yellow socks and bracelet for high fall risk patients  - Consider moving patient to room near nurses station  Outcome: Progressing     Problem: MOBILITY - ADULT  Goal: Maintain or return to baseline ADL function  Description: INTERVENTIONS:  -  Assess patient's ability to carry out ADLs; assess patient's baseline for ADL function and identify physical deficits which impact ability to perform ADLs (bathing, care of mouth/teeth, toileting, grooming, dressing, etc )  - Assess/evaluate cause of self-care deficits   - Assess range of motion  - Assess patient's mobility; develop plan if impaired  - Assess patient's need for assistive devices and provide as appropriate  - Encourage maximum independence but intervene and supervise when necessary  - Involve family in performance of ADLs  - Assess for home care needs following discharge   - Consider OT consult to assist with ADL evaluation and planning for discharge  - Provide patient education as appropriate  Outcome: Progressing  Goal: Maintains/Returns to pre admission functional level  Description: INTERVENTIONS:  - Perform BMAT or MOVE assessment daily    - Set and communicate daily mobility goal to care team and patient/family/caregiver     - Collaborate with rehabilitation services on mobility goals if consulted  - Perform Range of Motion 3 times a day  - Reposition patient every 2 hours    - Dangle patient 3 times a day  - Stand patient 3 times a day  - Ambulate patient 3 times a day  - Out of bed to chair 3 times a day   - Out of bed for meals 3 times a day  - Out of bed for toileting  - Record patient progress and toleration of activity level   Outcome: Progressing     Problem: CARDIOVASCULAR - ADULT  Goal: Maintains optimal cardiac output and hemodynamic stability  Description: INTERVENTIONS:  - Monitor I/O, vital signs and rhythm  - Monitor for S/S and trends of decreased cardiac output  - Administer and titrate ordered vasoactive medications to optimize hemodynamic stability  - Assess quality of pulses, skin color and temperature  - Assess for signs of decreased coronary artery perfusion  - Instruct patient to report change in severity of symptoms  Outcome: Progressing  Goal: Absence of cardiac dysrhythmias or at baseline rhythm  Description: INTERVENTIONS:  - Continuous cardiac monitoring, vital signs, obtain 12 lead EKG if ordered  - Administer antiarrhythmic and heart rate control medications as ordered  - Monitor electrolytes and administer replacement therapy as ordered  Outcome: Progressing     Problem: GENITOURINARY - ADULT  Goal: Maintains or returns to baseline urinary function  Description: INTERVENTIONS:  - Assess urinary function  - Encourage oral fluids to ensure adequate hydration if ordered  - Administer IV fluids as ordered to ensure adequate hydration  - Administer ordered medications as needed  - Offer frequent toileting  - Follow urinary retention protocol if ordered  Outcome: Progressing  Goal: Absence of urinary retention  Description: INTERVENTIONS:  - Assess patients ability to void and empty bladder  - Monitor I/O  - Bladder scan as needed  - Discuss with physician/AP medications to alleviate retention as needed  - Discuss catheterization for long term situations as appropriate  Outcome: Progressing     Problem: SKIN/TISSUE INTEGRITY - ADULT  Goal: Incision(s), wounds(s) or drain site(s) healing without S/S of infection  Description: INTERVENTIONS  - Assess and document dressing, incision, wound bed, drain sites and surrounding tissue  - Provide patient and family education  - Perform skin care/dressing changes every shift  Outcome: Progressing  Goal: Pressure injury heals and does not worsen  Description: Interventions:  - Implement low air loss mattress or specialty surface (Criteria met)  - Apply silicone foam dressing  - Instruct/assist with weight shifting every 60 minutes when in chair   - Limit chair time to 2 hour intervals  - Use special pressure reducing interventions such as waffle cushion when in chair   - Apply fecal or urinary incontinence containment device   - Perform passive or active ROM every 2 hours  - Turn and reposition patient & offload bony prominences every 2 hours   - Utilize friction reducing device or surface for transfers   - Consider consults to  interdisciplinary teams such as wound consult  - Consider nutrition services referral as needed  Outcome: Progressing     Problem: MUSCULOSKELETAL - ADULT  Goal: Maintain or return mobility to safest level of function  Description: INTERVENTIONS:  - Assess patient's ability to carry out ADLs; assess patient's baseline for ADL function and identify physical deficits which impact ability to perform ADLs (bathing, care of mouth/teeth, toileting, grooming, dressing, etc )  - Assess/evaluate cause of self-care deficits   - Assess range of motion  - Assess patient's mobility  - Assess patient's need for assistive devices and provide as appropriate  - Encourage maximum independence but intervene and supervise when necessary  - Involve family in performance of ADLs  - Assess for home care needs following discharge   - Consider OT consult to assist with ADL evaluation and planning for discharge  - Provide patient education as appropriate  Outcome: Progressing  Goal: Maintain proper alignment of affected body part  Description: INTERVENTIONS:  - Support, maintain and protect limb and body alignment  - Provide patient/ family with appropriate education  Outcome: Progressing     Problem: PAIN - ADULT  Goal: Verbalizes/displays adequate comfort level or baseline comfort level  Description: Interventions:  - Encourage patient to monitor pain and request assistance  - Assess pain using appropriate pain scale  - Administer analgesics based on type and severity of pain and evaluate response  - Implement non-pharmacological measures as appropriate and evaluate response  - Consider cultural and social influences on pain and pain management  - Notify physician/advanced practitioner if interventions unsuccessful or patient reports new pain  Outcome: Progressing     Problem: INFECTION - ADULT  Goal: Absence or prevention of progression during hospitalization  Description: INTERVENTIONS:  - Assess and monitor for signs and symptoms of infection  - Monitor lab/diagnostic results  - Monitor all insertion sites, i e  indwelling lines, tubes, and drains  - Monitor endotracheal if appropriate and nasal secretions for changes in amount and color  - [place] appropriate cooling/warming therapies per order  - Administer medications as ordered  - Instruct and encourage patient and family to use good hand hygiene technique  - Identify and instruct in appropriate isolation precautions for identified infection/condition  Outcome: Progressing     Problem: DISCHARGE PLANNING  Goal: Discharge to home or other facility with appropriate resources  Description: INTERVENTIONS:  - Identify barriers to discharge w/patient and caregiver  - Arrange for needed discharge resources and transportation as appropriate  - Identify discharge learning needs (meds, wound care, etc )  - Arrange for interpretive services to assist at discharge as needed  - Refer to Case Management Department for coordinating discharge planning if the patient needs post-hospital services based on physician/advanced practitioner order or complex needs related to functional status, cognitive ability, or social support system  Outcome: Progressing     Problem: Knowledge Deficit  Goal: Patient/family/caregiver demonstrates understanding of disease process, treatment plan, medications, and discharge instructions  Description: Complete learning assessment and assess knowledge base    Interventions:  - Provide teaching at level of understanding  - Provide teaching via preferred learning methods  Outcome: Progressing     Problem: Prexisting or High Potential for Compromised Skin Integrity  Goal: Skin integrity is maintained or improved  Description: INTERVENTIONS:  - Identify patients at risk for skin breakdown  - Assess and monitor skin integrity  - Assess and monitor nutrition and hydration status  - Monitor labs   - Assess for incontinence   - Turn and reposition patient  - Assist with mobility/ambulation  - Relieve pressure over bony prominences  - Avoid friction and shearing  - Provide appropriate hygiene as needed including keeping skin clean and dry  - Evaluate need for skin moisturizer/barrier cream  - Collaborate with interdisciplinary team   - Patient/family teaching  - Consider wound care consult   Outcome: Progressing     Problem: SAFETY,RESTRAINT: NV/NON-SELF DESTRUCTIVE BEHAVIOR  Goal: Remains free of harm/injury (restraint for non violent/non self-detsructive behavior)  Description: INTERVENTIONS:  - Instruct patient/family regarding restraint use   - Assess and monitor physiologic and psychological status   - Provide interventions and comfort measures to meet assessed patient needs   - Identify and implement measures to help patient regain control  - Assess readiness for release of restraint   Outcome: Progressing  Goal: Returns to optimal restraint-free functioning  Description: INTERVENTIONS:  - Assess the patient's behavior and symptoms that indicate continued need for restraint  - Identify and implement measures to help patient regain control  - Assess readiness for release of restraint   Outcome: Progressing     Problem: Nutrition/Hydration-ADULT  Goal: Nutrient/Hydration intake appropriate for improving, restoring or maintaining nutritional needs  Description: Monitor and assess patient's nutrition/hydration status for malnutrition  Collaborate with interdisciplinary team and initiate plan and interventions as ordered  Monitor patient's weight and dietary intake as ordered or per policy  Utilize nutrition screening tool and intervene as necessary  Determine patient's food preferences and provide high-protein, high-caloric foods as appropriate       INTERVENTIONS:  - Monitor oral intake, urinary output, labs, and treatment plans  - Assess nutrition and hydration status and recommend course of action  - Evaluate amount of meals eaten  - Assist patient with eating if necessary   - Allow adequate time for meals  - Recommend/ encourage appropriate diets, oral nutritional supplements, and vitamin/mineral supplements  - Order, calculate, and assess calorie counts as needed  - Recommend, monitor, and adjust tube feedings and TPN/PPN based on assessed needs  - Assess need for intravenous fluids  - Provide specific nutrition/hydration education as appropriate  - Include patient/family/caregiver in decisions related to nutrition  Outcome: Progressing

## 2022-04-01 NOTE — PROGRESS NOTES
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   · Wife making medical decisions at this time and agrees to not pursue RRT at this time   · Palliative care consult appreciated   · Wife did clearly state that she would only be agreeable to home hospice- awaiting final decision until able to discuss with entire family as they are concerned she may not be able to care for him at home alone and may require a facility   · CM made aware and following      Encephalopathy acute  Assessment & Plan  · worsening uremic encephalopathy- patient is not a good candidate for RRT per nephrology, considering hospice home vs  facility pending further hospice liaison discussion     Chronic combined systolic and diastolic congestive heart failure ([address] )  Assessment & Plan  Wt Readings from Last 3 Encounters:   03/31/22 57 kg (125 lb 10 6 oz)   03/25/22 74 4 kg (164 lb)   03/17/22 58 5 kg (129 lb)     LVEF 12%, mild LV dilatation, diastolic dysfunction, regional wall motion abnormalities, paradoxical septal wall motion consistent with conduction abnormality, moderately reduced RV function, mild LA dilatation, mild MR/TR/RI w/ PASP 45-50 mmHg, mild aortic dilatation October 2021  · Patient with ICD in place- will need to be turned off if he pursues hospice, family would like to present when they shut it off today   · Appreciate cardiology input   · Coreg ordered but has been held- no longer taking PO meds   · Lasix on hold         Acute kidney injury superimposed on chronic kidney disease [facility])  Assessment & Plan  Lab Results Component Value Date    EGFR 12 03/31/2022    EGFR 13 03/30/2022    EGFR 13 03/29/2022    CREATININE 3 91 (H) 03/31/2022    CREATININE 3 85 (H) 03/30/2022    CREATININE 3 68 (H) 03/29/2022     Baseline creatine unclear  In Jan 2022 was felt to be around 1 9  Presented with creatinine 2 89 now 3 91   · Likely 2/2 cardiorenal syndrome/ischemic injury/obstructive uropathy   · worsening uremic encephalopathy   · S/p mejia placement on 3/28- maintain  · Diuretics on hold  · Patient is not an ideal candidate for RRT per nephrology due to comorbidities/low EF 12%  Patient is not able to make informed medical decisions at this time, wife agreeable to not pursue RRT  Consideration for home hospice, palliative following- still awaiting official decision from family if they would like to pursue home hospice vs  Facility     Fever  Assessment & Plan  · Noted tmax of 102 4 3/31  · Discussed with daughter yesterday- CTX added until final determination of hospice is made- d c at discharge   · No cultures or further imaging at this time agreed upon by wife     * Fall  Assessment & Plan  Presented after suffering from a mechanical fall x2  Denied any loss consciousness or presyncopal symptoms  CT head and CT C-spine negative for acute injury  Noted during documentation, that patient was bradycardic and hypotensive upon EMS arrival     See rest of the treatment plan below    · ICD interrogation done on 3/28-  negative   · PT/OT recommending rehab, now consideration for home hospice    Elevated troponin  Assessment & Plan  · Likely nonischemic myocardial injury per Cardiology    Lung nodule  Assessment & Plan  Noted incidentally on imaging, new, 4 mm right lower lobe nodule  · Recommend repeat CT chest in 1 year    Urinary retention  Assessment & Plan  CT on arrival showed massive bladder distention causing mild bilateral hydroureter nephrosis and prostatomegaly  · S/p multiple straight catheterizations earlier in hospital stay, s/p Damico catheter placement 3/28  · Urology consult for input, appreciate   · Damico catheter should remain in place   · Repeat renal US noted        Transaminitis  Assessment & Plan  Multifactorial  Etiologies include congestive hepatopathy, ischemic injury related to hypotension, drug induced liver injury from amiodarone  Liver appeared unremarkable on CT without biliary dilatation  · Appreciate GI input--they feel this is most likely congestive hepatopathy, may also be element of DILI 2/2 amiodarone  · Acute hepatitis panel negative  · Cards following--recommend to stop amiodarone and stop mexiletine  · If patient were to have recurrent VT or VF, Rx should be restarted    Essential hypertension  Assessment & Plan  · Coreg 12 5 mg BID held as he is no longer taking PO meds- d/c   · BP lower limits of normal     PAF (paroxysmal atrial fibrillation) (HCC)  Assessment & Plan  · Coumadin and coreg currently on hold since he is no longer taking PO meds     Recent Labs     03/30/22  0544 03/31/22  0611   INR 2 04* 2 61*         V-tach [facility])  Assessment & Plan  Per cards note 3/25/2022: "His episodes of ventricular tachycardia requiring defibrillator shocks appear to be controlled on amiodarone and mexiletine "  · Cardiology stopped amiodarone and mexiletine  · ICD interrogation appeareed without events  · Plan to turn off ICD today when family arrives     CAD (coronary artery disease)  Assessment & Plan  CAD w/ remote history of MI and angioplasty, 1989  · No longer taking PO meds: coumadin or coreg d/c  · Statin on hold given transaminitis      Hyperlipidemia, mixed  Assessment & Plan  · Statin on hold in setting of transaminitis        VTE Pharmacologic Prophylaxis: VTE Score: 4 Moderate Risk (Score 3-4) - Pharmacological DVT Prophylaxis Contraindicated  Sequential Compression Devices Ordered  Patient Centered Rounds: I performed bedside rounds with nursing staff today    Discussions with Specialists or Other Care Team Provider: d/w RN d/w hospice liason    Education and Discussions with Family / Patient: Updated  (daughter) via phone  Time Spent for Care: 30 minutes  More than 50% of total time spent on counseling and coordination of care as described above  Current Length of Stay: 5 day(s)  Current Patient Status: Inpatient   Certification Statement: The patient will continue to require additional inpatient hospital stay due to pending placement home vs  facility  Discharge Plan: Anticipate discharge in 24-48 hrs to home vs  facility     Code Status: Level 4 - Comfort Care    Subjective:   Pt lying in bed, appears in no acute distress, nonverbal, opens eyes to speech  Per RN pt was aggravated overnight but improved as of this am  He is no longer taking PO meds or food  Objective:     Vitals:   Temp (24hrs), Av 8 °F (37 1 °C), Min:98 2 °F (36 8 °C), Max:99 4 °F (37 4 °C)    Temp:  [98 2 °F (36 8 °C)-99 4 °F (37 4 °C)] 98 2 °F (36 8 °C)  HR:  [64-86] 86  Resp:  [20-24] 24  BP: ()/(59-70) 104/70  SpO2:  [92 %-97 %] 97 %  Body mass index is 22 98 kg/m²  Input and Output Summary (last 24 hours): Intake/Output Summary (Last 24 hours) at 2022 0904  Last data filed at 3/31/2022 1401  Gross per 24 hour   Intake --   Output 250 ml   Net -250 ml       Physical Exam:   Physical Exam  Vitals and nursing note reviewed  Constitutional:       Appearance: He is cachectic  HENT:      Head: Normocephalic and atraumatic  Cardiovascular:      Rate and Rhythm: Normal rate and regular rhythm  Heart sounds: Murmur heard  Pulmonary:      Effort: No respiratory distress  Breath sounds: Normal breath sounds  Comments: Poor effort 2LNC  Abdominal:      General: Bowel sounds are normal  There is no distension  Palpations: Abdomen is soft  Genitourinary:     Comments: Small amounts of tea colored urine in mejia   Musculoskeletal:         General: No swelling     Skin: General: Skin is warm and dry  Neurological:      Mental Status: He is lethargic  Comments: Nonverbal, opens eyes to speech, moves all 4 extremities spontaneously  Additional Data:     Labs:  Results from last 7 days   Lab Units 03/31/22  0611 03/30/22  0544 03/30/22  0544 03/29/22  0554 03/29/22  0554   WBC Thousand/uL 6 26   < > 6 35   < > 6 88   HEMOGLOBIN g/dL 10 4*   < > 11 5*   < > 11 4*   HEMATOCRIT % 30 5*   < > 34 5*   < > 33 9*   PLATELETS Thousands/uL 77*   < > 72*   < > 86*   BANDS PCT %  --   --   --   --  1   NEUTROS PCT %  --   --  80*  --   --    LYMPHS PCT %  --   --  8*  --   --    LYMPHO PCT %  --   --   --   --  6*   MONOS PCT %  --   --  11  --   --    MONO PCT %  --   --   --   --  7   EOS PCT %  --   --  0  --  0    < > = values in this interval not displayed  Results from last 7 days   Lab Units 03/31/22  0611   SODIUM mmol/L 138   POTASSIUM mmol/L 4 2   CHLORIDE mmol/L 102   CO2 mmol/L 24   BUN mg/dL 90*   CREATININE mg/dL 3 91*   ANION GAP mmol/L 12   CALCIUM mg/dL 8 1*   ALBUMIN g/dL 2 4*   TOTAL BILIRUBIN mg/dL 3 40*   ALK PHOS U/L 38*   ALT U/L 290*   AST U/L 215*   GLUCOSE RANDOM mg/dL 107     Results from last 7 days   Lab Units 03/31/22  0611   INR  2 61*     Results from last 7 days   Lab Units 03/29/22  1130   POC GLUCOSE mg/dl 104               Lines/Drains:  Invasive Devices  Report    Peripheral Intravenous Line            Peripheral IV 03/31/22 Left;Ventral (anterior) Forearm 1 day          Drain            Urethral Catheter 16 Fr  3 days              Urinary Catheter:  Goal for removal: N/A - Chronic Damico               Imaging: No pertinent imaging reviewed      Recent Cultures (last 7 days):         Last 24 Hours Medication List:   Current Facility-Administered Medications   Medication Dose Route Frequency Provider Last Rate    acetaminophen  650 mg Rectal Q6H PRN Arvin Bello PA-C      cefTRIAXone  1,000 mg Intravenous Q24H WANDER Curran 1,000 mg (04/01/22 0830)    LORazepam  0 5 mg Intravenous Q4H PRN WANDER Joseph      morphine injection  2 mg Intravenous Q1H PRN Marie Lombardi PA-C      Morphine Sulfate (Concentrate)  5 mg Oral Q4H PRN WANDER Joseph      nystatin   Topical BID Mi Bonds MD          Today, Patient Was Seen By: WANDER Joseph    **Please Note: This note may have been constructed using a voice recognition system  **

## 2022-04-01 NOTE — ASSESSMENT & PLAN NOTE
Presented after suffering from a mechanical fall x2  Denied any loss consciousness or presyncopal symptoms  CT head and CT C-spine negative for acute injury  Noted during documentation, that patient was bradycardic and hypotensive upon EMS arrival     See rest of the treatment plan below    · ICD interrogation done on 3/28-  negative   · PT/OT recommending rehab, now consideration for home hospice

## 2022-04-01 NOTE — CASE MANAGEMENT
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 2022    Goals of care, counseling/discussion 2022    Dizziness 2022    Transaminitis 2022    Bilateral lower extremity edema 2021    Hyponatremia 12/10/2021    Chronic combined systolic and diastolic congestive heart failure ([facility] ) 10/23/2021    Ischemic cardiomyopathy 10/09/2018    PA[facility]oxysmal atrial fibrillation) [facility] ) 10/09/2018    Essential hypertension 10/09/2018    Cardiac defibrillator in situ 2016    Acute kidney injury superimposed on chronic kidney disease ([facility] )     Embolic stroke ([facility] )     Hyperlipidemia, mixed 2016    CAD (coronary artery disease) 2016    Status post percutaneous transluminal coronary angioplasty 2016    V-tach ([facility] ) 2016    Cardiomyopathy ([facility] ) 2016      LOS (days): 5  Geometric Mean LOS (GMLOS) (days): 3 10  Days to GMLOS:-1 8     OBJECTIVE:  Risk of Unplanned Readmission Score: 38         Current admission status: Inpatient   Preferred Pharmacy:   [address] [place] [address], [address] [place] [address]  Phone: [phone] Fax: [phone]    [address] [place] [address]  [address] [facility] [address]  Phone: [phone] Fax: [phone]    Primary Care Provider: Joel Carey DO    Primary Insurance: [facility] [employer]  Secondary Insurance:     DISCHARGE DETAILS:    Comments - Freedom of Choice: ASHLEY reports family now wants [facility] and informed liaison  Family coming in when ICD is turned off

## 2022-04-01 NOTE — ASSESSMENT & PLAN NOTE
Lab Results   Component Value Date    EGFR 12 03/31/2022    EGFR 13 03/30/2022    EGFR 13 03/29/2022    CREATININE 3 91 (H) 03/31/2022    CREATININE 3 85 (H) 03/30/2022    CREATININE 3 68 (H) 03/29/2022     Baseline creatine unclear  In Jan 2022 was felt to be around 1 9  Presented with creatinine 2 89 now 3 91   · Likely 2/2 cardiorenal syndrome/ischemic injury/obstructive uropathy   · worsening uremic encephalopathy   · S/p mejia placement on 3/28- maintain  · Diuretics on hold  · Patient is not an ideal candidate for RRT per nephrology due to comorbidities/low EF 12%  Patient is not able to make informed medical decisions at this time, wife agreeable to not pursue RRT   Consideration for home hospice, palliative following- still awaiting official decision from family if they would like to pursue home hospice vs  Facility

## 2022-04-01 NOTE — CASE MANAGEMENT
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 ) 10/23/2021    Ischemic cardiomyopathy 10/09/2018    PA[facility]oxysmal [facility]) [facility] 10/09/2018    Essential hypertension 10/09/2018    Cardiac defibrillator in situ 2016    Acute kidney injury superimposed on chronic kidney disease ([facility] ) 59/15/8390    Embolic stroke ([facility] )     Hyperlipidemia, mixed 2016    CAD (coronary artery disease) 2016    Status post percutaneous transluminal coronary angioplasty 2016    V-tach ([facility] ) 2016    Cardiomyopathy ([facility] ) 2016      LOS (days): 5  Geometric Mean LOS (GMLOS) (days): 3 10  Days to GMLOS:-2     OBJECTIVE:  Risk of Unplanned Readmission Score: 38         Current admission status: Inpatient   Preferred Pharmacy:   [address] [place] [address]  Phone: [phone] Fax: [phone]    [address] [place] [address] [address] [address]  [facility] [address]  Phone: [phone] Fax: [phone]    Primary Care Provider: Tami Desai DO    Primary Insurance: [facility] [employer]  Secondary Insurance:     DISCHARGE DETAILS:    Discharge planning discussed with[de-identified] dtr and spouse     Comments - Freedom of Choice: [facility] form completed and put on chart  Referral made for BLS for Sunday         Transport at Discharge : BLS Ambulance  Dispatcher Contacted: Yes     ETA of Transport (Date): 04/03/22

## 2022-04-01 NOTE — ASSESSMENT & PLAN NOTE
Wt Readings from Last 3 Encounters:   03/31/22 57 kg (125 lb 10 6 oz)   03/25/22 74 4 kg (164 lb)   03/17/22 58 5 kg (129 lb)     LVEF 12%, mild LV dilatation, diastolic dysfunction, regional wall motion abnormalities, paradoxical septal wall motion consistent with conduction abnormality, moderately reduced RV function, mild LA dilatation, mild MR/TR/WV w/ PASP 45-50 mmHg, mild aortic dilatation October 2021  · Hospice care

## 2022-04-01 NOTE — ASSESSMENT & PLAN NOTE
Per cards note 3/25/2022: "His episodes of ventricular tachycardia requiring defibrillator shocks appear to be controlled on amiodarone and mexiletine "  · Cardiology stopped amiodarone and mexiletine  · ICD interrogation appeareed without events

## 2022-04-01 NOTE — ASSESSMENT & PLAN NOTE
Patient DNR/DNI level 4  · Patient is not able to make informed medical decisions at this time as he is encephalopathic uremic    · Wife making medical decisions at this time and agrees to not pursue RRT at this time   · Palliative care consult appreciated   · Plan is transfer home with hospice 4/3   [facility]   Needs to go with patient tomorrow

## 2022-04-01 NOTE — PLAN OF CARE
Problem: Potential for Falls  Goal: Patient will remain free of falls  Description: INTERVENTIONS:  - Educate patient/family on patient safety including physical limitations  - Instruct patient to call for assistance with activity   - Consult OT/PT to assist with strengthening/mobility   - Keep Call bell within reach  - Keep bed low and locked with side rails adjusted as appropriate  - Keep care items and personal belongings within reach  - Initiate and maintain comfort rounds  - Make Fall Risk Sign visible to staff  - Offer Toileting every 2 Hours, in advance of need  - Initiate/Maintain bed and chair alarm  - Obtain necessary fall risk management equipment: bed and chair alarm  - Apply yellow socks and bracelet for high fall risk patients  - Consider moving patient to room near nurses station  Outcome: Progressing     Problem: MOBILITY - ADULT  Goal: Maintain or return to baseline ADL function  Description: INTERVENTIONS:  -  Assess patient's ability to carry out ADLs; assess patient's baseline for ADL function and identify physical deficits which impact ability to perform ADLs (bathing, care of mouth/teeth, toileting, grooming, dressing, etc )  - Assess/evaluate cause of self-care deficits   - Assess range of motion  - Assess patient's mobility; develop plan if impaired  - Assess patient's need for assistive devices and provide as appropriate  - Encourage maximum independence but intervene and supervise when necessary  - Involve family in performance of ADLs  - Assess for home care needs following discharge   - Consider OT consult to assist with ADL evaluation and planning for discharge  - Provide patient education as appropriate  Outcome: Progressing  Goal: Maintains/Returns to pre admission functional level  Description: INTERVENTIONS:  - Perform BMAT or MOVE assessment daily    - Set and communicate daily mobility goal to care team and patient/family/caregiver     - Collaborate with rehabilitation services on mobility goals if consulted  - Perform Range of Motion 3 times a day  - Reposition patient every 2 hours    - Dangle patient 3 times a day  - Stand patient 3 times a day  - Ambulate patient 3 times a day  - Out of bed to chair 3 times a day   - Out of bed for meals 3 times a day  - Out of bed for toileting  - Record patient progress and toleration of activity level   Outcome: Progressing     Problem: CARDIOVASCULAR - ADULT  Goal: Maintains optimal cardiac output and hemodynamic stability  Description: INTERVENTIONS:  - Monitor I/O, vital signs and rhythm  - Monitor for S/S and trends of decreased cardiac output  - Administer and titrate ordered vasoactive medications to optimize hemodynamic stability  - Assess quality of pulses, skin color and temperature  - Assess for signs of decreased coronary artery perfusion  - Instruct patient to report change in severity of symptoms  Outcome: Progressing  Goal: Absence of cardiac dysrhythmias or at baseline rhythm  Description: INTERVENTIONS:  - Continuous cardiac monitoring, vital signs, obtain 12 lead EKG if ordered  - Administer antiarrhythmic and heart rate control medications as ordered  - Monitor electrolytes and administer replacement therapy as ordered  Outcome: Progressing     Problem: GENITOURINARY - ADULT  Goal: Maintains or returns to baseline urinary function  Description: INTERVENTIONS:  - Assess urinary function  - Encourage oral fluids to ensure adequate hydration if ordered  - Administer IV fluids as ordered to ensure adequate hydration  - Administer ordered medications as needed  - Offer frequent toileting  - Follow urinary retention protocol if ordered  Outcome: Progressing  Goal: Absence of urinary retention  Description: INTERVENTIONS:  - Assess patients ability to void and empty bladder  - Monitor I/O  - Bladder scan as needed  - Discuss with physician/AP medications to alleviate retention as needed  - Discuss catheterization for long term situations as appropriate  Outcome: Progressing     Problem: SKIN/TISSUE INTEGRITY - ADULT  Goal: Incision(s), wounds(s) or drain site(s) healing without S/S of infection  Description: INTERVENTIONS  - Assess and document dressing, incision, wound bed, drain sites and surrounding tissue  - Provide patient and family education  - Perform skin care/dressing changes every shift  Outcome: Progressing  Goal: Pressure injury heals and does not worsen  Description: Interventions:  - Implement low air loss mattress or specialty surface (Criteria met)  - Apply silicone foam dressing  - Instruct/assist with weight shifting every 60 minutes when in chair   - Limit chair time to 2 hour intervals  - Use special pressure reducing interventions such as waffle cushion when in chair   - Apply fecal or urinary incontinence containment device   - Perform passive or active ROM every 2 hours  - Turn and reposition patient & offload bony prominences every 2 hours   - Utilize friction reducing device or surface for transfers   - Consider consults to  interdisciplinary teams such as wound consult  - Consider nutrition services referral as needed  Outcome: Progressing     Problem: MUSCULOSKELETAL - ADULT  Goal: Maintain or return mobility to safest level of function  Description: INTERVENTIONS:  - Assess patient's ability to carry out ADLs; assess patient's baseline for ADL function and identify physical deficits which impact ability to perform ADLs (bathing, care of mouth/teeth, toileting, grooming, dressing, etc )  - Assess/evaluate cause of self-care deficits   - Assess range of motion  - Assess patient's mobility  - Assess patient's need for assistive devices and provide as appropriate  - Encourage maximum independence but intervene and supervise when necessary  - Involve family in performance of ADLs  - Assess for home care needs following discharge   - Consider OT consult to assist with ADL evaluation and planning for discharge  - Provide patient education as appropriate  Outcome: Progressing  Goal: Maintain proper alignment of affected body part  Description: INTERVENTIONS:  - Support, maintain and protect limb and body alignment  - Provide patient/ family with appropriate education  Outcome: Progressing     Problem: PAIN - ADULT  Goal: Verbalizes/displays adequate comfort level or baseline comfort level  Description: Interventions:  - Encourage patient to monitor pain and request assistance  - Assess pain using appropriate pain scale  - Administer analgesics based on type and severity of pain and evaluate response  - Implement non-pharmacological measures as appropriate and evaluate response  - Consider cultural and social influences on pain and pain management  - Notify physician/advanced practitioner if interventions unsuccessful or patient reports new pain  Outcome: Progressing     Problem: INFECTION - ADULT  Goal: Absence or prevention of progression during hospitalization  Description: INTERVENTIONS:  - Assess and monitor for signs and symptoms of infection  - Monitor lab/diagnostic results  - Monitor all insertion sites, i e  indwelling lines, tubes, and drains  - Monitor endotracheal if appropriate and nasal secretions for changes in amount and color  - Rio appropriate cooling/warming therapies per order  - Administer medications as ordered  - Instruct and encourage patient and family to use good hand hygiene technique  - Identify and instruct in appropriate isolation precautions for identified infection/condition  Outcome: Progressing     Problem: DISCHARGE PLANNING  Goal: Discharge to home or other facility with appropriate resources  Description: INTERVENTIONS:  - Identify barriers to discharge w/patient and caregiver  - Arrange for needed discharge resources and transportation as appropriate  - Identify discharge learning needs (meds, wound care, etc )  - Arrange for interpretive services to assist at discharge as needed  - Refer to Case Management Department for coordinating discharge planning if the patient needs post-hospital services based on physician/advanced practitioner order or complex needs related to functional status, cognitive ability, or social support system  Outcome: Progressing     Problem: Knowledge Deficit  Goal: Patient/family/caregiver demonstrates understanding of disease process, treatment plan, medications, and discharge instructions  Description: Complete learning assessment and assess knowledge base    Interventions:  - Provide teaching at level of understanding  - Provide teaching via preferred learning methods  Outcome: Progressing     Problem: Prexisting or High Potential for Compromised Skin Integrity  Goal: Skin integrity is maintained or improved  Description: INTERVENTIONS:  - Identify patients at risk for skin breakdown  - Assess and monitor skin integrity  - Assess and monitor nutrition and hydration status  - Monitor labs   - Assess for incontinence   - Turn and reposition patient  - Assist with mobility/ambulation  - Relieve pressure over bony prominences  - Avoid friction and shearing  - Provide appropriate hygiene as needed including keeping skin clean and dry  - Evaluate need for skin moisturizer/barrier cream  - Collaborate with interdisciplinary team   - Patient/family teaching  - Consider wound care consult   Outcome: Progressing     Problem: SAFETY,RESTRAINT: NV/NON-SELF DESTRUCTIVE BEHAVIOR  Goal: Remains free of harm/injury (restraint for non violent/non self-detsructive behavior)  Description: INTERVENTIONS:  - Instruct patient/family regarding restraint use   - Assess and monitor physiologic and psychological status   - Provide interventions and comfort measures to meet assessed patient needs   - Identify and implement measures to help patient regain control  - Assess readiness for release of restraint   Outcome: Progressing  Goal: Returns to optimal restraint-free functioning  Description: INTERVENTIONS:  - Assess the patient's behavior and symptoms that indicate continued need for restraint  - Identify and implement measures to help patient regain control  - Assess readiness for release of restraint   Outcome: Progressing     Problem: Nutrition/Hydration-ADULT  Goal: Nutrient/Hydration intake appropriate for improving, restoring or maintaining nutritional needs  Description: Monitor and assess patient's nutrition/hydration status for malnutrition  Collaborate with interdisciplinary team and initiate plan and interventions as ordered  Monitor patient's weight and dietary intake as ordered or per policy  Utilize nutrition screening tool and intervene as necessary  Determine patient's food preferences and provide high-protein, high-caloric foods as appropriate       INTERVENTIONS:  - Monitor oral intake, urinary output, labs, and treatment plans  - Assess nutrition and hydration status and recommend course of action  - Evaluate amount of meals eaten  - Assist patient with eating if necessary   - Allow adequate time for meals  - Recommend/ encourage appropriate diets, oral nutritional supplements, and vitamin/mineral supplements  - Order, calculate, and assess calorie counts as needed  - Recommend, monitor, and adjust tube feedings and TPN/PPN based on assessed needs  - Assess need for intravenous fluids  - Provide specific nutrition/hydration education as appropriate  - Include patient/family/caregiver in decisions related to nutrition  Outcome: Progressing

## 2022-04-01 NOTE — ASSESSMENT & PLAN NOTE
CT on arrival showed massive bladder distention causing mild bilateral hydroureter nephrosis and prostatomegaly  · S/p multiple straight catheterizations earlier in hospital stay, s/p Damico catheter placement 3/28  · Urology consult for input, appreciate   · Damico catheter should remain in place for comfort  · Repeat renal US noted

## 2022-04-01 NOTE — ASSESSMENT & PLAN NOTE
Wt Readings from Last 3 Encounters:   03/31/22 57 kg (125 lb 10 6 oz)   03/25/22 74 4 kg (164 lb)   03/17/22 58 5 kg (129 lb)     LVEF 12%, mild LV dilatation, diastolic dysfunction, regional wall motion abnormalities, paradoxical septal wall motion consistent with conduction abnormality, moderately reduced RV function, mild LA dilatation, mild MR/TR/NV w/ PASP 45-50 mmHg, mild aortic dilatation October 2021  · Patient with ICD in place- will need to be turned off if he pursues hospice, family would like to present when they shut it off today   · Appreciate cardiology input   · Coreg ordered but has been held- no longer taking PO meds   · Lasix on hold

## 2022-04-01 NOTE — ASSESSMENT & PLAN NOTE
Per cards note 3/25/2022: "His episodes of ventricular tachycardia requiring defibrillator shocks appear to be controlled on amiodarone and mexiletine "  · Cardiology stopped amiodarone and mexiletine  · ICD interrogation appeareed without events  · ICD deactivated 4/1

## 2022-04-01 NOTE — ASSESSMENT & PLAN NOTE
· Coumadin and coreg currently on hold since he is no longer taking PO meds     Recent Labs     03/30/22  0544 03/31/22  0611   INR 2 04* 2 61*

## 2022-04-01 NOTE — CASE MANAGEMENT
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    Goals of care, counseling/discussion 2022    Dizziness 2022    Transaminitis 2022    Bilateral lower extremity edema 2021    Hyponatremia 12/10/2021    Chronic combined systolic and diastolic congestive heart failure ([facility] ) 10/23/2021    Ischemic cardiomyopathy 10/09/2018    [facility] 10/09/2018    Essential hypertension 10/09/2018    Cardiac defibrillator in situ 2016    Acute kidney injury superimposed on chronic kidney disease ([facility] )     Embolic stroke [facility]     Hyperlipidemia, mixed 2016    CAD (coronary artery disease) 2016    Status post percutaneous transluminal coronary angioplasty 2016    V-tach ([facility] ) 2016    Cardiomyopathy ([facility] ) 2016      LOS (days): 5  Geometric Mean LOS (GMLOS) (days): 3 10  Days to GMLOS:-2     OBJECTIVE:  Risk of Unplanned Readmission Score: 38         Current admission status: Inpatient   Preferred Pharmacy:   [address] [place] [address], [address] [place], [place] [id number]  Phone: [phone] Fax: [phone]    [address] - [address] [facility] [address] [place] [address]  Phone: [phone] Fax: [phone]    Primary Care Provider: Niurka Isidro DO    Primary Insurance: [facility] [employer] [employer]  Secondary Insurance:     DISCHARGE DETAILS:    Discharge planning discussed with[de-identified] dtr and spouse     Comments - Freedom of Choice: Met wtih spouse and dtr in room  They have decided on [facility] on Sunday and would like transport set up for round 1100 on Sunday  Hospital equipment set to deliver late Saturday to home  Gave dtr the [facility] list  PA-C & RN are aware all scripts need to go to [address] and be picked up before the close on Sunday         Transport at Discharge : [facility]  Dispatcher Contacted: Yes     ETA of Transport (Date): 04/03/22

## 2022-04-01 NOTE — ASSESSMENT & PLAN NOTE
· Noted tmax of 102 4 3/31  · Discussed with daughter yesterday- CTX added until final determination of hospice is made- d c at discharge   · No cultures or further imaging at this time agreed upon by wife

## 2022-04-01 NOTE — ASSESSMENT & PLAN NOTE
Lab Results   Component Value Date    EGFR 12 03/31/2022    EGFR 13 03/30/2022    EGFR 13 03/29/2022    CREATININE 3 91 (H) 03/31/2022    CREATININE 3 85 (H) 03/30/2022    CREATININE 3 68 (H) 03/29/2022     Baseline creatine unclear  In Jan 2022 was felt to be around 1 9  Presented with creatinine 2 89 now 3 91   · Likely 2/2 cardiorenal syndrome/ischemic injury/obstructive uropathy   · worsening uremic encephalopathy   · S/p mejia placement on 3/28- maintain  · Diuretics on hold  · Patient is not an ideal candidate for RRT per nephrology due to comorbidities/low EF 12%  Patient is not able to make informed medical decisions at this time, wife agreeable to not pursue RRT     · Hospice care

## 2022-04-01 NOTE — ASSESSMENT & PLAN NOTE
CT on arrival showed massive bladder distention causing mild bilateral hydroureter nephrosis and prostatomegaly  · S/p multiple straight catheterizations earlier in hospital stay, s/p Damico catheter placement 3/28  · Urology consult for input, appreciate   · Damico catheter should remain in place   · Repeat renal US noted

## 2022-04-02 NOTE — CASE MANAGEMENT
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    Goals of care, counseling/discussion 2022    Dizziness 2022    Transaminitis 2022    Bilateral lower extremity edema 2021    Hyponatremia 12/10/2021    Chronic combined systolic and diastolic congestive heart failure ([facility] ) 10/23/2021    Ischemic cardiomyopathy 10/09/2018    PAF (paroxysmal atrial fibrillation) ([facility] ) 10/09/2018    Essential hypertension 10/09/2018    Cardiac defibrillator in situ 2016    Acute kidney injury superimposed on chronic kidney disease ([facility] )     [facility]     Hyperlipidemia, mixed 2016    CAD (coronary artery disease) 2016    Status post percutaneous transluminal coronary angioplasty 2016    V-tach ([facility] ) 2016    Cardiomyopathy ([facility] ) 2016      LOS (days): 6  Geometric Mean LOS (GMLOS) (days): 3 10  Days to GMLOS:-2 8     OBJECTIVE:  Risk of Unplanned Readmission Score: 38         Current admission status: Inpatient   Preferred Pharmacy:   [facility], [address]  [address] [address]  Phone: [phone] Fax: [phone]    [address] [facility] [address] [facility] [address] [address]  Phone: [phone] Fax: [address], [address] [place], [place] [id number] Antonio Wilkins TM developed sx's on 9-16-20.  Tm to go for testing and sx monitor on the live well tevin.  Email sent to manager.      Yanet Mccauley is here for Initial evaluation of potential COVID-19 exposure.      Dear Team Member,    Per your recent telephone screening with Employee Health:    Your lab appointment was scheduled during today's call.    You are to remain off work and isolate yourself from  public places except to seek medical care    Complete the symptom tracker daily on Care Companion to comply with your actively monitoring status    YOU MAY NOT RETURN TO WORK WITHOUT CLEARANCE FROM EMPLOYEE HEALTH.  It is not necessary to call Employee Health.  The Central COVID Team will contact you at the appropriate interval for clearance.    Off work start date: 09/16/20        Notify Employee Health if you have a marked increase  in symptomswhile you are off.[phone] or email [email or web address].    For [place] Employees, please follow this link to view a copy of the consent form:  [place] Consent Form     For [place] Employees, please follow this link to view a copy of the consent form:   [place] Consent Form     Thank you,    Employee Health Central COVID Team    [phone]    Hours: M-F [id number] Saturday - please answer your phone if an outside line is calling, regardless of hours we work 7days a week and will follow up as appropriate.     [email or web address]    Cc: Manager       [id number]  Phone: [phone] Fax: [phone]    Primary Care Provider: Edwar Perez DO    Primary Insurance: [facility] [employer]  Secondary Insurance:     DISCHARGE DETAILS:    Comments - Freedom of Choice: Aundrea Gordon will  tomorrow 4/3/22 at 1100  Fax [facility] form to [phone] today  ASHLEY, RN, hospice liaison and spouse aware of  time          Transported by [employer] and Unit #): [place]     ETA of Transport (Time): 1100

## 2022-04-02 NOTE — PLAN OF CARE
Problem: Potential for Falls  Goal: Patient will remain free of falls  Description: INTERVENTIONS:  - Educate patient/family on patient safety including physical limitations  - Instruct patient to call for assistance with activity   - Consult OT/PT to assist with strengthening/mobility   - Keep Call bell within reach  - Keep bed low and locked with side rails adjusted as appropriate  - Keep care items and personal belongings within reach  - Initiate and maintain comfort rounds  - Make Fall Risk Sign visible to staff  - Offer Toileting every 2 Hours, in advance of need  - Initiate/Maintain bed and chair alarm  - Obtain necessary fall risk management equipment: bed and chair alarm  - Apply yellow socks and bracelet for high fall risk patients  - Consider moving patient to room near nurses station  Outcome: Progressing     Problem: MOBILITY - ADULT  Goal: Maintain or return to baseline ADL function  Description: INTERVENTIONS:  -  Assess patient's ability to carry out ADLs; assess patient's baseline for ADL function and identify physical deficits which impact ability to perform ADLs (bathing, care of mouth/teeth, toileting, grooming, dressing, etc )  - Assess/evaluate cause of self-care deficits   - Assess range of motion  - Assess patient's mobility; develop plan if impaired  - Assess patient's need for assistive devices and provide as appropriate  - Encourage maximum independence but intervene and supervise when necessary  - Involve family in performance of ADLs  - Assess for home care needs following discharge   - Consider OT consult to assist with ADL evaluation and planning for discharge  - Provide patient education as appropriate  Outcome: Progressing  Goal: Maintains/Returns to pre admission functional level  Description: INTERVENTIONS:  - Perform BMAT or MOVE assessment daily    - Set and communicate daily mobility goal to care team and patient/family/caregiver     - Collaborate with rehabilitation services on mobility goals if consulted  - Perform Range of Motion 3 times a day  - Reposition patient every 2 hours    - Dangle patient 3 times a day  - Stand patient 3 times a day  - Ambulate patient 3 times a day  - Out of bed to chair 3 times a day   - Out of bed for meals 3 times a day  - Out of bed for toileting  - Record patient progress and toleration of activity level   Outcome: Progressing     Problem: CARDIOVASCULAR - ADULT  Goal: Maintains optimal cardiac output and hemodynamic stability  Description: INTERVENTIONS:  - Monitor I/O, vital signs and rhythm  - Monitor for S/S and trends of decreased cardiac output  - Administer and titrate ordered vasoactive medications to optimize hemodynamic stability  - Assess quality of pulses, skin color and temperature  - Assess for signs of decreased coronary artery perfusion  - Instruct patient to report change in severity of symptoms  Outcome: Progressing  Goal: Absence of cardiac dysrhythmias or at baseline rhythm  Description: INTERVENTIONS:  - Continuous cardiac monitoring, vital signs, obtain 12 lead EKG if ordered  - Administer antiarrhythmic and heart rate control medications as ordered  - Monitor electrolytes and administer replacement therapy as ordered  Outcome: Progressing     Problem: GENITOURINARY - ADULT  Goal: Maintains or returns to baseline urinary function  Description: INTERVENTIONS:  - Assess urinary function  - Encourage oral fluids to ensure adequate hydration if ordered  - Administer IV fluids as ordered to ensure adequate hydration  - Administer ordered medications as needed  - Offer frequent toileting  - Follow urinary retention protocol if ordered  Outcome: Progressing  Goal: Absence of urinary retention  Description: INTERVENTIONS:  - Assess patients ability to void and empty bladder  - Monitor I/O  - Bladder scan as needed  - Discuss with physician/AP medications to alleviate retention as needed  - Discuss catheterization for long term situations as appropriate  Outcome: Progressing     Problem: SKIN/TISSUE INTEGRITY - ADULT  Goal: Incision(s), wounds(s) or drain site(s) healing without S/S of infection  Description: INTERVENTIONS  - Assess and document dressing, incision, wound bed, drain sites and surrounding tissue  - Provide patient and family education  - Perform skin care/dressing changes every shift  Outcome: Progressing  Goal: Pressure injury heals and does not worsen  Description: Interventions:  - Implement low air loss mattress or specialty surface (Criteria met)  - Apply silicone foam dressing  - Instruct/assist with weight shifting every 60 minutes when in chair   - Limit chair time to 2 hour intervals  - Use special pressure reducing interventions such as waffle cushion when in chair   - Apply fecal or urinary incontinence containment device   - Perform passive or active ROM every 2 hours  - Turn and reposition patient & offload bony prominences every 2 hours   - Utilize friction reducing device or surface for transfers   - Consider consults to  interdisciplinary teams such as wound consult  - Consider nutrition services referral as needed  Outcome: Progressing     Problem: MUSCULOSKELETAL - ADULT  Goal: Maintain or return mobility to safest level of function  Description: INTERVENTIONS:  - Assess patient's ability to carry out ADLs; assess patient's baseline for ADL function and identify physical deficits which impact ability to perform ADLs (bathing, care of mouth/teeth, toileting, grooming, dressing, etc )  - Assess/evaluate cause of self-care deficits   - Assess range of motion  - Assess patient's mobility  - Assess patient's need for assistive devices and provide as appropriate  - Encourage maximum independence but intervene and supervise when necessary  - Involve family in performance of ADLs  - Assess for home care needs following discharge   - Consider OT consult to assist with ADL evaluation and planning for discharge  - Provide patient education as appropriate  Outcome: Progressing  Goal: Maintain proper alignment of affected body part  Description: INTERVENTIONS:  - Support, maintain and protect limb and body alignment  - Provide patient/ family with appropriate education  Outcome: Progressing     Problem: PAIN - ADULT  Goal: Verbalizes/displays adequate comfort level or baseline comfort level  Description: Interventions:  - Encourage patient to monitor pain and request assistance  - Assess pain using appropriate pain scale  - Administer analgesics based on type and severity of pain and evaluate response  - Implement non-pharmacological measures as appropriate and evaluate response  - Consider cultural and social influences on pain and pain management  - Notify physician/advanced practitioner if interventions unsuccessful or patient reports new pain  Outcome: Progressing     Problem: INFECTION - ADULT  Goal: Absence or prevention of progression during hospitalization  Description: INTERVENTIONS:  - Assess and monitor for signs and symptoms of infection  - Monitor lab/diagnostic results  - Monitor all insertion sites, i e  indwelling lines, tubes, and drains  - Monitor endotracheal if appropriate and nasal secretions for changes in amount and color  - [place] appropriate cooling/warming therapies per order  - Administer medications as ordered  - Instruct and encourage patient and family to use good hand hygiene technique  - Identify and instruct in appropriate isolation precautions for identified infection/condition  Outcome: Progressing     Problem: DISCHARGE PLANNING  Goal: Discharge to home or other facility with appropriate resources  Description: INTERVENTIONS:  - Identify barriers to discharge w/patient and caregiver  - Arrange for needed discharge resources and transportation as appropriate  - Identify discharge learning needs (meds, wound care, etc )  - Arrange for interpretive services to assist at discharge as needed  - Refer to Case Management Department for coordinating discharge planning if the patient needs post-hospital services based on physician/advanced practitioner order or complex needs related to functional status, cognitive ability, or social support system  Outcome: Progressing     Problem: Knowledge Deficit  Goal: Patient/family/caregiver demonstrates understanding of disease process, treatment plan, medications, and discharge instructions  Description: Complete learning assessment and assess knowledge base    Interventions:  - Provide teaching at level of understanding  - Provide teaching via preferred learning methods  Outcome: Progressing     Problem: Prexisting or High Potential for Compromised Skin Integrity  Goal: Skin integrity is maintained or improved  Description: INTERVENTIONS:  - Identify patients at risk for skin breakdown  - Assess and monitor skin integrity  - Assess and monitor nutrition and hydration status  - Monitor labs   - Assess for incontinence   - Turn and reposition patient  - Assist with mobility/ambulation  - Relieve pressure over bony prominences  - Avoid friction and shearing  - Provide appropriate hygiene as needed including keeping skin clean and dry  - Evaluate need for skin moisturizer/barrier cream  - Collaborate with interdisciplinary team   - Patient/family teaching  - Consider wound care consult   Outcome: Progressing     Problem: SAFETY,RESTRAINT: NV/NON-SELF DESTRUCTIVE BEHAVIOR  Goal: Remains free of harm/injury (restraint for non violent/non self-detsructive behavior)  Description: INTERVENTIONS:  - Instruct patient/family regarding restraint use   - Assess and monitor physiologic and psychological status   - Provide interventions and comfort measures to meet assessed patient needs   - Identify and implement measures to help patient regain control  - Assess readiness for release of restraint   Outcome: Progressing  Goal: Returns to optimal restraint-free functioning  Description: INTERVENTIONS:  - Assess the patient's behavior and symptoms that indicate continued need for restraint  - Identify and implement measures to help patient regain control  - Assess readiness for release of restraint   Outcome: Progressing     Problem: Nutrition/Hydration-ADULT  Goal: Nutrient/Hydration intake appropriate for improving, restoring or maintaining nutritional needs  Description: Monitor and assess patient's nutrition/hydration status for malnutrition  Collaborate with interdisciplinary team and initiate plan and interventions as ordered  Monitor patient's weight and dietary intake as ordered or per policy  Utilize nutrition screening tool and intervene as necessary  Determine patient's food preferences and provide high-protein, high-caloric foods as appropriate       INTERVENTIONS:  - Monitor oral intake, urinary output, labs, and treatment plans  - Assess nutrition and hydration status and recommend course of action  - Evaluate amount of meals eaten  - Assist patient with eating if necessary   - Allow adequate time for meals  - Recommend/ encourage appropriate diets, oral nutritional supplements, and vitamin/mineral supplements  - Order, calculate, and assess calorie counts as needed  - Recommend, monitor, and adjust tube feedings and TPN/PPN based on assessed needs  - Assess need for intravenous fluids  - Provide specific nutrition/hydration education as appropriate  - Include patient/family/caregiver in decisions related to nutrition  Outcome: Progressing     Problem: COPING  Goal: Pt/Family able to verbalize concerns and demonstrate effective coping strategies  Description: INTERVENTIONS:  - Assist patient/family to identify coping skills, available support systems and cultural and spiritual values  - Provide emotional support, including active listening and acknowledgement of concerns of patient and caregivers  - Reduce environmental stimuli, as able  - Provide patient education  - Assess for spiritual pain/suffering and initiate spiritual care, including notification of Pastoral Care or candace based community as needed  - Assess effectiveness of coping strategies  Outcome: Progressing  Goal: Will report anxiety at manageable levels  Description: INTERVENTIONS:  - Administer medication as ordered  - Teach and encourage coping skills  - Provide emotional support  - Assess patient/family for anxiety and ability to cope  Outcome: Progressing     Problem: DEATH & DYING  Goal: Pt/Family communicate acceptance of impending death and expresses psychological comfort and peace  Description: INTERVENTIONS:  - Assess patient/family anxiety and grief process related to end of life issues  - Provide emotional, spiritual and psychosocial support  - Provide information about the patients health status with consideration of family and cultural values  - Communicate willingness to discuss death and facilitate grief process  with patient/family as appropriate  - Emphasize sustaining relationships within family system and community, or candace/spiritual traditions  - Initiate Spiritual Care, Pastoral care or other ancillary consults as needed  - Refer to community support groups as appropriate  Outcome: Progressing     Problem: DECISION MAKING  Goal: Pt/Family able to effectively weigh alternatives and participate in decision making related to treatment and care  Description: INTERVENTIONS:  - Identify decision maker  - Determine when there are differences among patient's view, family's view, and healthcare provider's view of patient condition and care goals  - Facilitate patient/family articulation of goals for care  - Help patient/family identify pros/cons of alternative solutions  - Provide information as requested by patient/family  - Respect patient/family rights related to privacy and sharing information   - Serve as a liaison between patient, family and health care team  - Initiate consults as appropriate (Ethics Team, Palliative Care, Family Care Conference, etc )  Outcome: Progressing     Problem: SPIRITUAL CARE  Goal: Pt/Family able to move forward in process of forgiving self, others and/or higher power  Description: INTERVENTIONS:  - Assist patient with any spiritual needs/requests such as communion, confession, anointing, etc  - Explore guilt and help patient/family identify possible spiritual/cultural beliefs and values  - Explore possibilities of making amends & reconciliation with self, others, and/or a greater power  - Guide patient/family in identifying painful feelings  - Help patient explore and identify spiritual beliefs, cultural understandings or values that may help or hinder letting go of issue  - Help patient explore feelings of anger, bitterness, resentment, anxiety   Help patient/family identify and examine the situation in which these feelings are experienced  - Help patient/family identify destructive displacement of feelings onto other individuals  - Refer patient to formal counseling and/or to candace community for further support as needed or per request  Outcome: Progressing  Goal: Patient feels balance and connection with others and/or higher power that empowers the self during times of loss, guilt and fear  Description: INTERVENTIONS:  - Create safety for patient through empathic presence and non-judgmental listening  - Encourage patient to explore his/her values, beliefs and/or spiritual images and practices  - Encourage use of breath work, imagery, meditation, relaxation, reiki to ease distress and provide healing  - Encourage use of cultural and spiritual celebrations and rituals  - Facilitate discussion that helps patient sort out spiritual concerns  - Help patient identify where meaning/hope/comfort & strength are in his/her life  - Refer patient to candace community for assistance, as appropriate  - Respond to patient/family need for prayer/ritual/sacrament/ceremony  Outcome: Progressing

## 2022-04-02 NOTE — ASSESSMENT & PLAN NOTE
Patient DNR/DNI level 4  · Patient is not able to make informed medical decisions at this time as he is encephalopathic uremic    · Wife making medical decisions at this time and agrees to not pursue RRT at this time   · Palliative care consult appreciated   · Plan is transfer home with hospice 4/3   [facility]

## 2022-04-02 NOTE — PROGRESS NOTES
[address] [place] [address]  Progress Note Radha Sides 10/31/1932, 80 y o  male MRN: [id number]  Unit/Bed#: E4 -01 Encounter: [id number]  Primary Care Provider: Graciela Estrada DO   Date and time admitted to hospital: 3/27/2022 11:36 AM    * Goals of care, counseling/discussion  Assessment & Plan  Patient DNR/DNI level 4  · Patient is not able to make informed medical decisions at this time as he is encephalopathic uremic    · Wife making medical decisions at this time and agrees to not pursue RRT at this time   · Palliative care consult appreciated   · Plan is transfer home with hospice 4/3      Acute kidney injury superimposed on chronic kidney disease [facility])  Assessment & Plan  Lab Results   Component Value Date    EGFR 12 03/31/2022    EGFR 13 03/30/2022    EGFR 13 03/29/2022    CREATININE 3 91 (H) 03/31/2022    CREATININE 3 85 (H) 03/30/2022    CREATININE 3 68 (H) 03/29/2022     Baseline creatine unclear  In Jan 2022 was felt to be around 1 9  Presented with creatinine 2 89 now 3 91   · Likely 2/2 cardiorenal syndrome/ischemic injury/obstructive uropathy   · worsening uremic encephalopathy   · S/p mejia placement on 3/28- maintain  · Diuretics on hold  · Patient is not an ideal candidate for RRT per nephrology due to comorbidities/low EF 12%  Patient is not able to make informed medical decisions at this time, wife agreeable to not pursue RRT     · Hospice care    V-tach [facility])  Assessment & Plan  Per cards note 3/25/2022: "His episodes of ventricular tachycardia requiring defibrillator shocks appear to be controlled on amiodarone and mexiletine "  · Cardiology stopped amiodarone and mexiletine  · ICD interrogation appeareed without events  · ICD deactivated 4/1    Chronic combined systolic and diastolic congestive heart failure [facility])  Assessment & Plan  Wt Readings from Last 3 Encounters:   03/31/22 57 kg (125 lb 10 6 oz)   03/25/22 74 4 kg (164 lb)   03/17/22 58 5 kg (129 lb) LVEF 12%, mild LV dilatation, diastolic dysfunction, regional wall motion abnormalities, paradoxical septal wall motion consistent with conduction abnormality, moderately reduced RV function, mild LA dilatation, mild MR/TR/MO w/ PASP 45-50 mmHg, mild aortic dilatation October 2021  · Hospice care        Urinary retention  Assessment & Plan  CT on arrival showed massive bladder distention causing mild bilateral hydroureter nephrosis and prostatomegaly  · S/p multiple straight catheterizations earlier in hospital stay, s/p Damico catheter placement 3/28  · Urology consult for input, appreciate   · Damico catheter should remain in place for comfort  · Repeat renal US noted        Fever  Assessment & Plan  · Noted tmax of 102 4 3/31  · Hospice care  · Ceftriaxone discontinued      Encephalopathy acute  Assessment & Plan  · worsening uremic encephalopathy- patient is not a good candidate for RRT per nephrology, considering hospice home vs  facility pending further hospice liaison discussion     Elevated troponin  Assessment & Plan  · Likely nonischemic myocardial injury per Cardiology    Lung nodule  Assessment & Plan  Noted incidentally on imaging, new, 4 mm right lower lobe nodule  · Recommend repeat CT chest in 1 year    [address]  Presented after suffering from a mechanical fall x2  Denied any loss consciousness or presyncopal symptoms  CT head and CT C-spine negative for acute injury  Noted during documentation, that patient was bradycardic and hypotensive upon EMS arrival     See rest of the treatment plan below  · ICD interrogation done on 3/28-  negative   · PT/OT recommending rehab, now consideration for home hospice    Transaminitis  Assessment & Plan  Multifactorial  Etiologies include congestive hepatopathy, ischemic injury related to hypotension, drug induced liver injury from amiodarone   Liver appeared unremarkable on CT without biliary dilatation  · Appreciate GI input--they feel this is most likely congestive hepatopathy, may also be element of DILI 2/2 amiodarone  · Acute hepatitis panel negative  · Cards following--recommend to stop amiodarone and stop mexiletine  · If patient were to have recurrent VT or VF, Rx should be restarted    Essential hypertension  Assessment & Plan  · Coreg 12 5 mg BID held as he is no longer taking PO meds- d/c   · BP lower limits of normal     PAF (paroxysmal atrial fibrillation) (HCC)  Assessment & Plan  · Coumadin and coreg currently on hold since he is no longer taking PO meds     Recent Labs     03/30/22  0544 03/31/22  0611   INR 2 04* 2 61*         CAD (coronary artery disease)  Assessment & Plan  CAD w/ remote history of MI and angioplasty, 1989  · No longer taking PO meds: coumadin or coreg d/c  · Statin on hold given transaminitis      Hyperlipidemia, mixed  Assessment & Plan  · Statin on hold in setting of transaminitis        VTE Pharmacologic Prophylaxis: VTE Score: 4 hospice care    Patient Centered Rounds: I performed bedside rounds with nursing staff today  Discussions with Specialists or Other Care Team Provider: case management    Education and Discussions with Family / Patient: Updated  (wife) via phone  Time Spent for Care: 30 minutes  More than 50% of total time spent on counseling and coordination of care as described above  Current Length of Stay: 6 day(s)  Current Patient Status: Inpatient   Certification Statement: The patient will continue to require additional inpatient hospital stay due to [address]   Discharge Plan: Anticipate discharge tomorrow to home with hospice    Code Status: Level 4 - Comfort Care    Subjective:   Patient resting comfortably     Objective:     Vitals:   No data recorded  Body mass index is 22 98 kg/m²  Input and Output Summary (last 24 hours):      Intake/Output Summary (Last 24 hours) at 4/2/2022 0943  Last data filed at 4/2/2022 2574  Gross per 24 hour   Intake --   Output 850 ml   Net -850 ml       Physical Exam:   Physical Exam   Exam deferred as patient is hospice care    Additional Data:     Labs:  Results from last 7 days   Lab Units 03/31/22  0611 03/30/22  0544 03/30/22  0544 03/29/22  0554 03/29/22  0554   WBC Thousand/uL 6 26   < > 6 35   < > 6 88   HEMOGLOBIN g/dL 10 4*   < > 11 5*   < > 11 4*   HEMATOCRIT % 30 5*   < > 34 5*   < > 33 9*   PLATELETS Thousands/uL 77*   < > 72*   < > 86*   BANDS PCT %  --   --   --   --  1   NEUTROS PCT %  --   --  80*  --   --    LYMPHS PCT %  --   --  8*  --   --    LYMPHO PCT %  --   --   --   --  6*   MONOS PCT %  --   --  11  --   --    MONO PCT %  --   --   --   --  7   EOS PCT %  --   --  0  --  0    < > = values in this interval not displayed  Results from last 7 days   Lab Units 03/31/22  0611   SODIUM mmol/L 138   POTASSIUM mmol/L 4 2   CHLORIDE mmol/L 102   CO2 mmol/L 24   BUN mg/dL 90*   CREATININE mg/dL 3 91*   ANION GAP mmol/L 12   CALCIUM mg/dL 8 1*   ALBUMIN g/dL 2 4*   TOTAL BILIRUBIN mg/dL 3 40*   ALK PHOS U/L 38*   ALT U/L 290*   AST U/L 215*   GLUCOSE RANDOM mg/dL 107     Results from last 7 days   Lab Units 03/31/22  0611   INR  2 61*     Results from last 7 days   Lab Units 03/29/22  1130   POC GLUCOSE mg/dl 104               Lines/Drains:  Invasive Devices  Report    Peripheral Intravenous Line            Peripheral IV 03/31/22 Left;Ventral (anterior) Forearm 2 days          Drain            Urethral Catheter 16 Fr  4 days              Urinary Catheter:  Goal for removal: N/A- Discharging with Damico               Imaging: No pertinent imaging reviewed      Recent Cultures (last 7 days):         Last 24 Hours Medication List:   Current Facility-Administered Medications   Medication Dose Route Frequency Provider Last Rate    acetaminophen  650 mg Rectal Q6H PRN Kerry Parish PA-C      LORazepam  0 5 mg Intravenous Q4H PRN WANEDR Pitts      morphine injection  2 mg Intravenous Q1H PRN Kerry Parish PA-C  Morphine Sulfate (Concentrate)  5 mg Oral Q4H PRN Yoder Seal, CRNP      nystatin   Topical BID Buffy Gates MD          Today, Patient Was Seen By: Yola Hines PA-C    **Please Note: This note may have been constructed using a voice recognition system  **

## 2022-04-02 NOTE — PLAN OF CARE
Problem: Potential for Falls  Goal: Patient will remain free of falls  Description: INTERVENTIONS:  - Educate patient/family on patient safety including physical limitations  - Instruct patient to call for assistance with activity   - Consult OT/PT to assist with strengthening/mobility   - Keep Call bell within reach  - Keep bed low and locked with side rails adjusted as appropriate  - Keep care items and personal belongings within reach  - Initiate and maintain comfort rounds  - Make Fall Risk Sign visible to staff  - Offer Toileting every 2 Hours, in advance of need  - Initiate/Maintain bed and chair alarm  - Obtain necessary fall risk management equipment: bed and chair alarm  - Apply yellow socks and bracelet for high fall risk patients  - Consider moving patient to room near nurses station  Outcome: Progressing     Problem: MOBILITY - ADULT  Goal: Maintain or return to baseline ADL function  Description: INTERVENTIONS:  -  Assess patient's ability to carry out ADLs; assess patient's baseline for ADL function and identify physical deficits which impact ability to perform ADLs (bathing, care of mouth/teeth, toileting, grooming, dressing, etc )  - Assess/evaluate cause of self-care deficits   - Assess range of motion  - Assess patient's mobility; develop plan if impaired  - Assess patient's need for assistive devices and provide as appropriate  - Encourage maximum independence but intervene and supervise when necessary  - Involve family in performance of ADLs  - Assess for home care needs following discharge   - Consider OT consult to assist with ADL evaluation and planning for discharge  - Provide patient education as appropriate  Outcome: Progressing  Goal: Maintains/Returns to pre admission functional level  Description: INTERVENTIONS:  - Perform BMAT or MOVE assessment daily    - Set and communicate daily mobility goal to care team and patient/family/caregiver     - Collaborate with rehabilitation services on mobility goals if consulted  - Perform Range of Motion 3 times a day  - Reposition patient every 2 hours    - Dangle patient 3 times a day  - Stand patient 3 times a day  - Ambulate patient 3 times a day  - Out of bed to chair 3 times a day   - Out of bed for meals 3 times a day  - Out of bed for toileting  - Record patient progress and toleration of activity level   Outcome: Progressing     Problem: CARDIOVASCULAR - ADULT  Goal: Maintains optimal cardiac output and hemodynamic stability  Description: INTERVENTIONS:  - Monitor I/O, vital signs and rhythm  - Monitor for S/S and trends of decreased cardiac output  - Administer and titrate ordered vasoactive medications to optimize hemodynamic stability  - Assess quality of pulses, skin color and temperature  - Assess for signs of decreased coronary artery perfusion  - Instruct patient to report change in severity of symptoms  Outcome: Progressing  Goal: Absence of cardiac dysrhythmias or at baseline rhythm  Description: INTERVENTIONS:  - Continuous cardiac monitoring, vital signs, obtain 12 lead EKG if ordered  - Administer antiarrhythmic and heart rate control medications as ordered  - Monitor electrolytes and administer replacement therapy as ordered  Outcome: Progressing     Problem: GENITOURINARY - ADULT  Goal: Maintains or returns to baseline urinary function  Description: INTERVENTIONS:  - Assess urinary function  - Encourage oral fluids to ensure adequate hydration if ordered  - Administer IV fluids as ordered to ensure adequate hydration  - Administer ordered medications as needed  - Offer frequent toileting  - Follow urinary retention protocol if ordered  Outcome: Progressing  Goal: Absence of urinary retention  Description: INTERVENTIONS:  - Assess patients ability to void and empty bladder  - Monitor I/O  - Bladder scan as needed  - Discuss with physician/AP medications to alleviate retention as needed  - Discuss catheterization for long term situations as appropriate  Outcome: Progressing     Problem: SKIN/TISSUE INTEGRITY - ADULT  Goal: Incision(s), wounds(s) or drain site(s) healing without S/S of infection  Description: INTERVENTIONS  - Assess and document dressing, incision, wound bed, drain sites and surrounding tissue  - Provide patient and family education  - Perform skin care/dressing changes every shift  Outcome: Progressing  Goal: Pressure injury heals and does not worsen  Description: Interventions:  - Implement low air loss mattress or specialty surface (Criteria met)  - Apply silicone foam dressing  - Instruct/assist with weight shifting every 60 minutes when in chair   - Limit chair time to 2 hour intervals  - Use special pressure reducing interventions such as waffle cushion when in chair   - Apply fecal or urinary incontinence containment device   - Perform passive or active ROM every 2 hours  - Turn and reposition patient & offload bony prominences every 2 hours   - Utilize friction reducing device or surface for transfers   - Consider consults to  interdisciplinary teams such as wound consult  - Consider nutrition services referral as needed  Outcome: Progressing     Problem: MUSCULOSKELETAL - ADULT  Goal: Maintain or return mobility to safest level of function  Description: INTERVENTIONS:  - Assess patient's ability to carry out ADLs; assess patient's baseline for ADL function and identify physical deficits which impact ability to perform ADLs (bathing, care of mouth/teeth, toileting, grooming, dressing, etc )  - Assess/evaluate cause of self-care deficits   - Assess range of motion  - Assess patient's mobility  - Assess patient's need for assistive devices and provide as appropriate  - Encourage maximum independence but intervene and supervise when necessary  - Involve family in performance of ADLs  - Assess for home care needs following discharge   - Consider OT consult to assist with ADL evaluation and planning for discharge  - Provide patient education as appropriate  Outcome: Progressing  Goal: Maintain proper alignment of affected body part  Description: INTERVENTIONS:  - Support, maintain and protect limb and body alignment  - Provide patient/ family with appropriate education  Outcome: Progressing     Problem: PAIN - ADULT  Goal: Verbalizes/displays adequate comfort level or baseline comfort level  Description: Interventions:  - Encourage patient to monitor pain and request assistance  - Assess pain using appropriate pain scale  - Administer analgesics based on type and severity of pain and evaluate response  - Implement non-pharmacological measures as appropriate and evaluate response  - Consider cultural and social influences on pain and pain management  - Notify physician/advanced practitioner if interventions unsuccessful or patient reports new pain  Outcome: Progressing     Problem: INFECTION - ADULT  Goal: Absence or prevention of progression during hospitalization  Description: INTERVENTIONS:  - Assess and monitor for signs and symptoms of infection  - Monitor lab/diagnostic results  - Monitor all insertion sites, i e  indwelling lines, tubes, and drains  - Monitor endotracheal if appropriate and nasal secretions for changes in amount and color  - [place] appropriate cooling/warming therapies per order  - Administer medications as ordered  - Instruct and encourage patient and family to use good hand hygiene technique  - Identify and instruct in appropriate isolation precautions for identified infection/condition  Outcome: Progressing     Problem: DISCHARGE PLANNING  Goal: Discharge to home or other facility with appropriate resources  Description: INTERVENTIONS:  - Identify barriers to discharge w/patient and caregiver  - Arrange for needed discharge resources and transportation as appropriate  - Identify discharge learning needs (meds, wound care, etc )  - Arrange for interpretive services to assist at discharge as needed  - Refer to Case Management Department for coordinating discharge planning if the patient needs post-hospital services based on physician/advanced practitioner order or complex needs related to functional status, cognitive ability, or social support system  Outcome: Progressing     Problem: Knowledge Deficit  Goal: Patient/family/caregiver demonstrates understanding of disease process, treatment plan, medications, and discharge instructions  Description: Complete learning assessment and assess knowledge base    Interventions:  - Provide teaching at level of understanding  - Provide teaching via preferred learning methods  Outcome: Progressing     Problem: Prexisting or High Potential for Compromised Skin Integrity  Goal: Skin integrity is maintained or improved  Description: INTERVENTIONS:  - Identify patients at risk for skin breakdown  - Assess and monitor skin integrity  - Assess and monitor nutrition and hydration status  - Monitor labs   - Assess for incontinence   - Turn and reposition patient  - Assist with mobility/ambulation  - Relieve pressure over bony prominences  - Avoid friction and shearing  - Provide appropriate hygiene as needed including keeping skin clean and dry  - Evaluate need for skin moisturizer/barrier cream  - Collaborate with interdisciplinary team   - Patient/family teaching  - Consider wound care consult   Outcome: Progressing     Problem: SAFETY,RESTRAINT: NV/NON-SELF DESTRUCTIVE BEHAVIOR  Goal: Remains free of harm/injury (restraint for non violent/non self-detsructive behavior)  Description: INTERVENTIONS:  - Instruct patient/family regarding restraint use   - Assess and monitor physiologic and psychological status   - Provide interventions and comfort measures to meet assessed patient needs   - Identify and implement measures to help patient regain control  - Assess readiness for release of restraint   Outcome: Progressing  Goal: Returns to optimal restraint-free functioning  Description: INTERVENTIONS:  - Assess the patient's behavior and symptoms that indicate continued need for restraint  - Identify and implement measures to help patient regain control  - Assess readiness for release of restraint   Outcome: Progressing     Problem: Nutrition/Hydration-ADULT  Goal: Nutrient/Hydration intake appropriate for improving, restoring or maintaining nutritional needs  Description: Monitor and assess patient's nutrition/hydration status for malnutrition  Collaborate with interdisciplinary team and initiate plan and interventions as ordered  Monitor patient's weight and dietary intake as ordered or per policy  Utilize nutrition screening tool and intervene as necessary  Determine patient's food preferences and provide high-protein, high-caloric foods as appropriate       INTERVENTIONS:  - Monitor oral intake, urinary output, labs, and treatment plans  - Assess nutrition and hydration status and recommend course of action  - Evaluate amount of meals eaten  - Assist patient with eating if necessary   - Allow adequate time for meals  - Recommend/ encourage appropriate diets, oral nutritional supplements, and vitamin/mineral supplements  - Order, calculate, and assess calorie counts as needed  - Recommend, monitor, and adjust tube feedings and TPN/PPN based on assessed needs  - Assess need for intravenous fluids  - Provide specific nutrition/hydration education as appropriate  - Include patient/family/caregiver in decisions related to nutrition  Outcome: Progressing     Problem: COPING  Goal: Pt/Family able to verbalize concerns and demonstrate effective coping strategies  Description: INTERVENTIONS:  - Assist patient/family to identify coping skills, available support systems and cultural and spiritual values  - Provide emotional support, including active listening and acknowledgement of concerns of patient and caregivers  - Reduce environmental stimuli, as able  - Provide patient education  - Assess for spiritual pain/suffering and initiate spiritual care, including notification of Pastoral Care or candace based community as needed  - Assess effectiveness of coping strategies  Outcome: Progressing  Goal: Will report anxiety at manageable levels  Description: INTERVENTIONS:  - Administer medication as ordered  - Teach and encourage coping skills  - Provide emotional support  - Assess patient/family for anxiety and ability to cope  Outcome: Progressing     Problem: DEATH & DYING  Goal: Pt/Family communicate acceptance of impending death and expresses psychological comfort and peace  Description: INTERVENTIONS:  - Assess patient/family anxiety and grief process related to end of life issues  - Provide emotional, spiritual and psychosocial support  - Provide information about the patients health status with consideration of family and cultural values  - Communicate willingness to discuss death and facilitate grief process  with patient/family as appropriate  - Emphasize sustaining relationships within family system and community, or candace/spiritual traditions  - Initiate Spiritual Care, Pastoral care or other ancillary consults as needed  - Refer to community support groups as appropriate  Outcome: Progressing     Problem: DECISION MAKING  Goal: Pt/Family able to effectively weigh alternatives and participate in decision making related to treatment and care  Description: INTERVENTIONS:  - Identify decision maker  - Determine when there are differences among patient's view, family's view, and healthcare provider's view of patient condition and care goals  - Facilitate patient/family articulation of goals for care  - Help patient/family identify pros/cons of alternative solutions  - Provide information as requested by patient/family  - Respect patient/family rights related to privacy and sharing information   - Serve as a liaison between patient, family and health care team  - Initiate consults as appropriate (Ethics Team, Palliative Care, Family Care Conference, etc )  Outcome: Progressing     Problem: SPIRITUAL CARE  Goal: Pt/Family able to move forward in process of forgiving self, others and/or higher power  Description: INTERVENTIONS:  - Assist patient with any spiritual needs/requests such as communion, confession, anointing, etc  - Explore guilt and help patient/family identify possible spiritual/cultural beliefs and values  - Explore possibilities of making amends & reconciliation with self, others, and/or a greater power  - Guide patient/family in identifying painful feelings  - Help patient explore and identify spiritual beliefs, cultural understandings or values that may help or hinder letting go of issue  - Help patient explore feelings of anger, bitterness, resentment, anxiety   Help patient/family identify and examine the situation in which these feelings are experienced  - Help patient/family identify destructive displacement of feelings onto other individuals  - Refer patient to formal counseling and/or to candace community for further support as needed or per request  Outcome: Progressing  Goal: Patient feels balance and connection with others and/or higher power that empowers the self during times of loss, guilt and fear  Description: INTERVENTIONS:  - Create safety for patient through empathic presence and non-judgmental listening  - Encourage patient to explore his/her values, beliefs and/or spiritual images and practices  - Encourage use of breath work, imagery, meditation, relaxation, reiki to ease distress and provide healing  - Encourage use of cultural and spiritual celebrations and rituals  - Facilitate discussion that helps patient sort out spiritual concerns  - Help patient identify where meaning/hope/comfort & strength are in his/her life  - Refer patient to candace community for assistance, as appropriate  - Respond to patient/family need for prayer/ritual/sacrament/ceremony  Outcome: Progressing

## 2022-04-02 NOTE — NURSING NOTE
Received a call regarding patient's transport home  Patient will be going home via [address] [place] [address] on Larry 4/3 with a  time of 11:00a m  They need a face sheet and medical necessity form faxed prior to transport  I attempted to fax @ 23:00 tonight, but it is not going through  Message left to try again on day shift tomorrow on treatment team sticky notes and nurse caring for patient made aware

## 2022-04-02 NOTE — ASSESSMENT & PLAN NOTE
Wt Readings from Last 3 Encounters:   03/31/22 57 kg (125 lb 10 6 oz)   03/25/22 74 4 kg (164 lb)   03/17/22 58 5 kg (129 lb)     LVEF 12%, mild LV dilatation, diastolic dysfunction, regional wall motion abnormalities, paradoxical septal wall motion consistent with conduction abnormality, moderately reduced RV function, mild LA dilatation, mild MR/TR/OH w/ PASP 45-50 mmHg, mild aortic dilatation October 2021  · Hospice care

## 2022-04-02 NOTE — DISCHARGE SUMMARY
[facility]  Discharge- Gopal Calhoun 10/31/1932, 80 y o  male MRN: [id number]  Unit/Bed#: E4 -01 Encounter: [id number]  Primary Care Provider: Addison Moyer DO   Date and time admitted to hospital: 3/27/2022 11:36 AM    Addendum: this is a late entry  Patient passed earlier this morning as was evaluated by my partner  Please refer to death note  * Goals of care, counseling/discussion  Assessment & Plan  Patient DNR/DNI level 4  · Patient is not able to make informed medical decisions at this time as he is encephalopathic uremic    · Wife making medical decisions at this time and agrees to not pursue RRT at this time   · Palliative care consult appreciated   · Plan is transfer home with hospice 4/3   Oral morphine sent to [facility]  Acute kidney injury superimposed on chronic kidney disease [facility])  Assessment & Plan  Lab Results   Component Value Date    EGFR 12 03/31/2022    EGFR 13 03/30/2022    EGFR 13 03/29/2022    CREATININE 3 91 (H) 03/31/2022    CREATININE 3 85 (H) 03/30/2022    CREATININE 3 68 (H) 03/29/2022     Baseline creatine unclear  In Jan 2022 was felt to be around 1 9  Presented with creatinine 2 89 now 3 91   · Likely 2/2 cardiorenal syndrome/ischemic injury/obstructive uropathy   · worsening uremic encephalopathy   · S/p mejia placement on 3/28- maintain  · Diuretics on hold  · Patient is not an ideal candidate for RRT per nephrology due to comorbidities/low EF 12%  Patient is not able to make informed medical decisions at this time, wife agreeable to not pursue RRT     · Hospice care    V-tach [facility])  Assessment & Plan  Per cards note 3/25/2022: "His episodes of ventricular tachycardia requiring defibrillator shocks appear to be controlled on amiodarone and mexiletine "  · Cardiology stopped amiodarone and mexiletine  · ICD interrogation appeareed without events  · ICD deactivated 4/1    Chronic combined systolic and diastolic congestive heart failure [facility])  Assessment & Plan  Wt Readings from Last 3 Encounters:   03/31/22 57 kg (125 lb 10 6 oz)   03/25/22 74 4 kg (164 lb)   03/17/22 58 5 kg (129 lb)     LVEF 12%, mild LV dilatation, diastolic dysfunction, regional wall motion abnormalities, paradoxical septal wall motion consistent with conduction abnormality, moderately reduced RV function, mild LA dilatation, mild MR/TR/MN w/ PASP 45-50 mmHg, mild aortic dilatation October 2021  · Hospice care        Urinary retention  Assessment & Plan  CT on arrival showed massive bladder distention causing mild bilateral hydroureter nephrosis and prostatomegaly  · S/p multiple straight catheterizations earlier in hospital stay, s/p Damico catheter placement 3/28  · Urology consult for input, appreciate   · Damico catheter should remain in place for comfort  · Repeat renal US noted        Fever  Assessment & Plan  · Noted tmax of 102 4 3/31  · Hospice care  · Ceftriaxone discontinued          Medical Problems             Resolved Problems  Date Reviewed: 4/2/2022    None              Discharging Physician / Practitioner: August Richardson PA-C  PCP: Bijal Guido DO  Admission Date:   Admission Orders (From admission, onward)     Ordered        03/27/22 1308  Inpatient Admission  Once                      Discharge Date: 4/3/22    Consultations During Hospital Stay:  · Dr Eliverto Fabry  · Dr Willson Standing  · Dr Cole Jimenes  · Dr Nba Ghosh  · Dr Jie Ramachandran    Procedures Performed:   · Renal US  · CT C/A/P  · CT cervical spine  · CT head    Significant Findings / Test Results:   · See above    Incidental Findings:   · none     Test Results Pending at Discharge (will require follow up):   · none     Outpatient Tests Requested:  · none    Complications:  none    Reason for Admission: fall    Hospital Course:   Gonzalo Deutsch is a 80 y o  male patient who originally presented to the hospital on 3/27/2022 due to fall x 2  He was found to be bradycardic and hypotensive   Patient was noted to have massive urinary retention  He developed CHRISTY and acute encephalopathy  Given his multiple medical problems as detailed above, the decision was made to pursue hospice care  Patient passed while in the hospital  Please see death note  Please see above list of diagnoses and related plan for additional information  Condition at Discharge: terminal    Discharge Day Visit / Exam:   Subjective:  Patient passed earlier this morning  Exam:   Physical Exam deferred as patient passed earlier this morning    Discussion with Family: My partner spoke with denise's family  Discharge instructions/Information to patient and family:   See after visit summary for information provided to patient and family  Provisions for Follow-Up Care:  See after visit summary for information related to follow-up care and any pertinent home health orders  Disposition:   Other: patient passed earlier this morning    Planned Readmission: none     Discharge Statement:  I spent 30 minutes discharging the patient  This time was spent on the day of discharge  I had direct contact with the patient on the day of discharge  Greater than 50% of the total time was spent examining patient, answering all patient questions, arranging and discussing plan of care with patient as well as directly providing post-discharge instructions  Additional time then spent on discharge activities  Discharge Medications:  See after visit summary for reconciled discharge medications provided to patient and/or family        **Please Note: This note may have been constructed using a voice recognition system**

## 2022-04-03 NOTE — PLAN OF CARE
Problem: Potential for Falls  Goal: Patient will remain free of falls  Description: INTERVENTIONS:  - Educate patient/family on patient safety including physical limitations  - Instruct patient to call for assistance with activity   - Consult OT/PT to assist with strengthening/mobility   - Keep Call bell within reach  - Keep bed low and locked with side rails adjusted as appropriate  - Keep care items and personal belongings within reach  - Initiate and maintain comfort rounds  - Make Fall Risk Sign visible to staff  - Offer Toileting every 2 Hours, in advance of need  - Initiate/Maintain bed and chair alarm  - Obtain necessary fall risk management equipment: bed and chair alarm  - Apply yellow socks and bracelet for high fall risk patients  - Consider moving patient to room near nurses station  Outcome: Progressing     Problem: GENITOURINARY - ADULT  Goal: Maintains or returns to baseline urinary function  Description: INTERVENTIONS:  - Assess urinary function  - Encourage oral fluids to ensure adequate hydration if ordered  - Administer IV fluids as ordered to ensure adequate hydration  - Administer ordered medications as needed  - Offer frequent toileting  - Follow urinary retention protocol if ordered  Outcome: Progressing  Goal: Absence of urinary retention  Description: INTERVENTIONS:  - Assess patients ability to void and empty bladder  - Monitor I/O  - Bladder scan as needed  - Discuss with physician/AP medications to alleviate retention as needed  - Discuss catheterization for long term situations as appropriate  Outcome: Progressing     Problem: SKIN/TISSUE INTEGRITY - ADULT  Goal: Incision(s), wounds(s) or drain site(s) healing without S/S of infection  Description: INTERVENTIONS  - Assess and document dressing, incision, wound bed, drain sites and surrounding tissue  - Provide patient and family education  - Perform skin care/dressing changes every shift  Outcome: Progressing  Goal: Pressure injury heals and does not worsen  Description: Interventions:  - Implement low air loss mattress or specialty surface (Criteria met)  - Apply silicone foam dressing  - Instruct/assist with weight shifting every 60 minutes when in chair   - Limit chair time to 2 hour intervals  - Use special pressure reducing interventions such as waffle cushion when in chair   - Apply fecal or urinary incontinence containment device   - Perform passive or active ROM every 2 hours  - Turn and reposition patient & offload bony prominences every 2 hours   - Utilize friction reducing device or surface for transfers   - Consider consults to  interdisciplinary teams such as wound consult  - Consider nutrition services referral as needed  Outcome: Progressing     Problem: MUSCULOSKELETAL - ADULT  Goal: Maintain proper alignment of affected body part  Description: INTERVENTIONS:  - Support, maintain and protect limb and body alignment  - Provide patient/ family with appropriate education  Outcome: Progressing     Problem: PAIN - ADULT  Goal: Verbalizes/displays adequate comfort level or baseline comfort level  Description: Interventions:  - Encourage patient to monitor pain and request assistance  - Assess pain using appropriate pain scale  - Administer analgesics based on type and severity of pain and evaluate response  - Implement non-pharmacological measures as appropriate and evaluate response  - Consider cultural and social influences on pain and pain management  - Notify physician/advanced practitioner if interventions unsuccessful or patient reports new pain  Outcome: Progressing     Problem: DISCHARGE PLANNING  Goal: Discharge to home or other facility with appropriate resources  Description: INTERVENTIONS:  - Identify barriers to discharge w/patient and caregiver  - Arrange for needed discharge resources and transportation as appropriate  - Identify discharge learning needs (meds, wound care, etc )  - Arrange for interpretive services to assist at discharge as needed  - Refer to Case Management Department for coordinating discharge planning if the patient needs post-hospital services based on physician/advanced practitioner order or complex needs related to functional status, cognitive ability, or social support system  Outcome: Progressing     Problem: Knowledge Deficit  Goal: Patient/family/caregiver demonstrates understanding of disease process, treatment plan, medications, and discharge instructions  Description: Complete learning assessment and assess knowledge base    Interventions:  - Provide teaching at level of understanding  - Provide teaching via preferred learning methods  Outcome: Progressing     Problem: Prexisting or High Potential for Compromised Skin Integrity  Goal: Skin integrity is maintained or improved  Description: INTERVENTIONS:  - Identify patients at risk for skin breakdown  - Assess and monitor skin integrity  - Assess and monitor nutrition and hydration status  - Monitor labs   - Assess for incontinence   - Turn and reposition patient  - Assist with mobility/ambulation  - Relieve pressure over bony prominences  - Avoid friction and shearing  - Provide appropriate hygiene as needed including keeping skin clean and dry  - Evaluate need for skin moisturizer/barrier cream  - Collaborate with interdisciplinary team   - Patient/family teaching  - Consider wound care consult   Outcome: Progressing     Problem: COPING  Goal: Pt/Family able to verbalize concerns and demonstrate effective coping strategies  Description: INTERVENTIONS:  - Assist patient/family to identify coping skills, available support systems and cultural and spiritual values  - Provide emotional support, including active listening and acknowledgement of concerns of patient and caregivers  - Reduce environmental stimuli, as able  - Provide patient education  - Assess for spiritual pain/suffering and initiate spiritual care, including notification of Pastoral Care or candace based community as needed  - Assess effectiveness of coping strategies  Outcome: Progressing     Problem: DEATH & DYING  Goal: Pt/Family communicate acceptance of impending death and expresses psychological comfort and peace  Description: INTERVENTIONS:  - Assess patient/family anxiety and grief process related to end of life issues  - Provide emotional, spiritual and psychosocial support  - Provide information about the patients health status with consideration of family and cultural values  - Communicate willingness to discuss death and facilitate grief process  with patient/family as appropriate  - Emphasize sustaining relationships within family system and community, or candace/spiritual traditions  - Initiate Spiritual Care, Pastoral care or other ancillary consults as needed  - Refer to community support groups as appropriate  Outcome: Progressing     Problem: DECISION MAKING  Goal: Pt/Family able to effectively weigh alternatives and participate in decision making related to treatment and care  Description: INTERVENTIONS:  - Identify decision maker  - Determine when there are differences among patient's view, family's view, and healthcare provider's view of patient condition and care goals  - Facilitate patient/family articulation of goals for care  - Help patient/family identify pros/cons of alternative solutions  - Provide information as requested by patient/family  - Respect patient/family rights related to privacy and sharing information   - Serve as a liaison between patient, family and health care team  - Initiate consults as appropriate (Ethics Team, Palliative Care, Family Care Conference, etc )  Outcome: Progressing     Problem: SPIRITUAL CARE  Goal: Pt/Family able to move forward in process of forgiving self, others and/or higher power  Description: INTERVENTIONS:  - Assist patient with any spiritual needs/requests such as communion, confession, anointing, etc  - Explore guilt and help patient/family identify possible spiritual/cultural beliefs and values  - Explore possibilities of making amends & reconciliation with self, others, and/or a greater power  - Guide patient/family in identifying painful feelings  - Help patient explore and identify spiritual beliefs, cultural understandings or values that may help or hinder letting go of issue  - Help patient explore feelings of anger, bitterness, resentment, anxiety   Help patient/family identify and examine the situation in which these feelings are experienced  - Help patient/family identify destructive displacement of feelings onto other individuals  - Refer patient to formal counseling and/or to candace [facility] for further support as needed or per request  Outcome: Progressing  Goal: Patient feels balance and connection with others and/or higher power that empowers the self during times of loss, guilt and fear  Description: INTERVENTIONS:  - Create safety for patient through empathic presence and non-judgmental listening  - Encourage patient to explore his/her values, beliefs and/or spiritual images and practices  - Encourage use of breath work, imagery, meditation, relaxation, reiki to ease distress and provide healing  - Encourage use of cultural and spiritual celebrations and rituals  - Facilitate discussion that helps patient sort out spiritual concerns  - Help patient identify where meaning/hope/comfort & strength are in his/her life  - Refer patient to candace [facility] for assistance, as appropriate  - Respond to patient/family need for prayer/ritual/sacrament/ceremony  Outcome: Progressing

## 2022-04-03 NOTE — DEATH NOTE
INPATIENT DEATH NOTE  Gonzalo Deutsch 80 y o  male MRN: [id number]  Unit/Bed#: E4 -01 Encounter: [id number]    Date, Time and Cause of Death    Date of Death: 4/3/22  Time of Death:  4:49 AM  Preliminary Cause of Death: Acute-on-chronic renal failure ([facility] )  Entered by: Gerald Maloney PA-C[CV1 [id number]]     Attribution     CV1  [place] [place] [address] 04/03/22 05:07             PRONOUNCEMENT OF DEATH     DOA:  03/27/2022    DOD:  04/03/2022    TOD:  0449    CODE STATUS AT TOD:  Level 4 comfort care    PATIENT ON HOSPICE?:  Being discharged on hospice    FAMILY NOTIFIED?:  Yes    AUTOPSY DESIRED?:  No    SUSPECTED COD:  End-stage heart failure, acute on chronic renal failure    HOSPITAL PROBLEM LIST:   Patient Active Problem List   Diagnosis    Cardiac defibrillator in situ    Acute kidney injury superimposed on chronic kidney disease ([facility] )    Embolic stroke ([facility] )    Hyperlipidemia, mixed    CAD (coronary artery disease)    Status post percutaneous transluminal coronary angioplasty    V-tach ([facility] )    Ischemic cardiomyopathy    PAF (paroxysmal atrial fibrillation) ([facility] )    Essential hypertension    Cardiomyopathy ([facility] )    Chronic combined systolic and diastolic congestive heart failure (HCC)    Hyponatremia    Bilateral lower extremity edema    Transaminitis    Dizziness    Goals of care, counseling/discussion    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    Fall    Urinary retention    Lung nodule    Elevated troponin    Encephalopathy acute    Fever       PRONOUNCEMENT OF DEATH    I was contacted by nursing staff to evaluate the patient found without vital signs  Patient was identified visually and identification was confirmed by hospital ID [email or web address]  Patient was found laying in bed with RN at bedside  Personally examined the patient without heart sounds auscultated on exam nor were pulses detected x 2  No breath sounds were appreciated after 2 minutes of auscultation   Pupils were fixed and non-reactive to light  Patient was pronounced dead at this date and time   home is [facility]  Phone number is [phone]  Please kindly review hospital chart for all details of this hospitalization and circumstances leading to death  Death certificate will be signed my attending physician at a later time

## 2024-12-20 NOTE — ASSESSMENT & PLAN NOTE
BP low-normal  · Continue Coreg 12 5 mg BID  · Consider reduced dose in setting of acute kidney injury/borderline blood pressures 10

## 2025-02-15 NOTE — PALLIATIVE CARE CONFERENCE
Palliative MSW saw patient at the bedside today  MSW appreciates the opportunity to provider patient/family with inpatient emotional support and guidance while patient continues to receive medical attention from the medical team     Topics discussed: Pt was not able to participate in conversation  He was asleep and unable to wake up when prompted  Dr  Regina Mason called and spoke with his wife       Areas that need follow-up:None  Resources given: None  Others present:  Dr Regina Friedman    MSW will continue to follow as requested by the medical team, patient, or family No